# Patient Record
Sex: FEMALE | Race: WHITE | NOT HISPANIC OR LATINO | Employment: UNEMPLOYED | URBAN - METROPOLITAN AREA
[De-identification: names, ages, dates, MRNs, and addresses within clinical notes are randomized per-mention and may not be internally consistent; named-entity substitution may affect disease eponyms.]

---

## 2017-01-16 ENCOUNTER — TELEPHONE (OUTPATIENT)
Dept: OTHER | Facility: HOSPITAL | Age: 57
End: 2017-01-16

## 2017-01-16 NOTE — TELEPHONE ENCOUNTER
Spoke with Dr. Warren's office to request the ov note. Note has not been dictated as of yet. Nurse Navigator will call back at the end of the week.

## 2018-11-12 ENCOUNTER — APPOINTMENT (OUTPATIENT)
Dept: GENERAL RADIOLOGY | Facility: HOSPITAL | Age: 58
End: 2018-11-12

## 2018-11-12 ENCOUNTER — HOSPITAL ENCOUNTER (INPATIENT)
Facility: HOSPITAL | Age: 58
LOS: 7 days | Discharge: HOME | End: 2018-11-19
Attending: EMERGENCY MEDICINE | Admitting: ORTHOPAEDIC SURGERY

## 2018-11-12 DIAGNOSIS — B99.9 INFECTION: ICD-10-CM

## 2018-11-12 DIAGNOSIS — L03.116 CELLULITIS OF LEFT FOOT: Primary | ICD-10-CM

## 2018-11-12 LAB
ALBUMIN SERPL-MCNC: 3.7 G/DL (ref 3.5–5.2)
ALBUMIN/GLOB SERPL: 1.1 G/DL
ALP SERPL-CCNC: 114 U/L (ref 39–117)
ALT SERPL W P-5'-P-CCNC: 28 U/L (ref 1–33)
ANION GAP SERPL CALCULATED.3IONS-SCNC: 8.7 MMOL/L
AST SERPL-CCNC: 33 U/L (ref 1–32)
BASOPHILS # BLD AUTO: 0.03 10*3/MM3 (ref 0–0.2)
BASOPHILS NFR BLD AUTO: 0.3 % (ref 0–1.5)
BILIRUB SERPL-MCNC: 0.3 MG/DL (ref 0.1–1.2)
BUN BLD-MCNC: 13 MG/DL (ref 6–20)
BUN/CREAT SERPL: 12.4 (ref 7–25)
CALCIUM SPEC-SCNC: 9.5 MG/DL (ref 8.6–10.5)
CHLORIDE SERPL-SCNC: 97 MMOL/L (ref 98–107)
CO2 SERPL-SCNC: 31.3 MMOL/L (ref 22–29)
CREAT BLD-MCNC: 1.05 MG/DL (ref 0.57–1)
CRP SERPL-MCNC: 4.79 MG/DL (ref 0–0.5)
DEPRECATED RDW RBC AUTO: 48 FL (ref 37–54)
EOSINOPHIL # BLD AUTO: 0.2 10*3/MM3 (ref 0–0.7)
EOSINOPHIL NFR BLD AUTO: 1.7 % (ref 0.3–6.2)
ERYTHROCYTE [DISTWIDTH] IN BLOOD BY AUTOMATED COUNT: 13.3 % (ref 11.7–13)
ERYTHROCYTE [SEDIMENTATION RATE] IN BLOOD: 62 MM/HR (ref 0–30)
GFR SERPL CREATININE-BSD FRML MDRD: 54 ML/MIN/1.73
GLOBULIN UR ELPH-MCNC: 3.5 GM/DL
GLUCOSE BLD-MCNC: 77 MG/DL (ref 65–99)
HCT VFR BLD AUTO: 40.1 % (ref 35.6–45.5)
HGB BLD-MCNC: 12.8 G/DL (ref 11.9–15.5)
IMM GRANULOCYTES # BLD: 0.04 10*3/MM3 (ref 0–0.03)
IMM GRANULOCYTES NFR BLD: 0.3 % (ref 0–0.5)
LYMPHOCYTES # BLD AUTO: 2.37 10*3/MM3 (ref 0.9–4.8)
LYMPHOCYTES NFR BLD AUTO: 20.7 % (ref 19.6–45.3)
MCH RBC QN AUTO: 31.4 PG (ref 26.9–32)
MCHC RBC AUTO-ENTMCNC: 31.9 G/DL (ref 32.4–36.3)
MCV RBC AUTO: 98.3 FL (ref 80.5–98.2)
MONOCYTES # BLD AUTO: 0.89 10*3/MM3 (ref 0.2–1.2)
MONOCYTES NFR BLD AUTO: 7.8 % (ref 5–12)
NEUTROPHILS # BLD AUTO: 7.93 10*3/MM3 (ref 1.9–8.1)
NEUTROPHILS NFR BLD AUTO: 69.2 % (ref 42.7–76)
PLATELET # BLD AUTO: 332 10*3/MM3 (ref 140–500)
PMV BLD AUTO: 9.4 FL (ref 6–12)
POTASSIUM BLD-SCNC: 3.8 MMOL/L (ref 3.5–5.2)
PROT SERPL-MCNC: 7.2 G/DL (ref 6–8.5)
RBC # BLD AUTO: 4.08 10*6/MM3 (ref 3.9–5.2)
SODIUM BLD-SCNC: 137 MMOL/L (ref 136–145)
WBC NRBC COR # BLD: 11.46 10*3/MM3 (ref 4.5–10.7)

## 2018-11-12 PROCEDURE — 80053 COMPREHEN METABOLIC PANEL: CPT | Performed by: NURSE PRACTITIONER

## 2018-11-12 PROCEDURE — 73630 X-RAY EXAM OF FOOT: CPT

## 2018-11-12 PROCEDURE — 87040 BLOOD CULTURE FOR BACTERIA: CPT | Performed by: EMERGENCY MEDICINE

## 2018-11-12 PROCEDURE — G0378 HOSPITAL OBSERVATION PER HR: HCPCS

## 2018-11-12 PROCEDURE — 86140 C-REACTIVE PROTEIN: CPT | Performed by: NURSE PRACTITIONER

## 2018-11-12 PROCEDURE — 85652 RBC SED RATE AUTOMATED: CPT | Performed by: NURSE PRACTITIONER

## 2018-11-12 PROCEDURE — 80048 BASIC METABOLIC PNL TOTAL CA: CPT | Performed by: EMERGENCY MEDICINE

## 2018-11-12 PROCEDURE — 99284 EMERGENCY DEPT VISIT MOD MDM: CPT

## 2018-11-12 PROCEDURE — 85025 COMPLETE CBC W/AUTO DIFF WBC: CPT | Performed by: NURSE PRACTITIONER

## 2018-11-12 PROCEDURE — 83605 ASSAY OF LACTIC ACID: CPT | Performed by: EMERGENCY MEDICINE

## 2018-11-12 RX ORDER — CLONAZEPAM 1 MG/1
1 TABLET ORAL NIGHTLY PRN
COMMUNITY
End: 2020-11-05 | Stop reason: ALTCHOICE

## 2018-11-12 RX ORDER — ALBUTEROL SULFATE 90 UG/1
2 AEROSOL, METERED RESPIRATORY (INHALATION) EVERY 4 HOURS PRN
COMMUNITY

## 2018-11-12 RX ORDER — DEXTROAMPHETAMINE SACCHARATE, AMPHETAMINE ASPARTATE, DEXTROAMPHETAMINE SULFATE AND AMPHETAMINE SULFATE 5; 5; 5; 5 MG/1; MG/1; MG/1; MG/1
20 TABLET ORAL 3 TIMES DAILY
COMMUNITY
End: 2020-11-05 | Stop reason: ALTCHOICE

## 2018-11-12 RX ORDER — HYDROCODONE BITARTRATE AND ACETAMINOPHEN 7.5; 325 MG/1; MG/1
1 TABLET ORAL ONCE
Status: COMPLETED | OUTPATIENT
Start: 2018-11-12 | End: 2018-11-12

## 2018-11-12 RX ADMIN — HYDROCODONE BITARTRATE AND ACETAMINOPHEN 1 TABLET: 7.5; 325 TABLET ORAL at 23:11

## 2018-11-13 ENCOUNTER — APPOINTMENT (OUTPATIENT)
Dept: MRI IMAGING | Facility: HOSPITAL | Age: 58
End: 2018-11-13
Attending: INTERNAL MEDICINE

## 2018-11-13 PROBLEM — Z86.14 HISTORY OF MRSA INFECTION: Status: ACTIVE | Noted: 2018-11-13

## 2018-11-13 PROBLEM — I10 HTN (HYPERTENSION): Status: ACTIVE | Noted: 2018-11-13

## 2018-11-13 PROBLEM — F41.9 ANXIETY: Status: ACTIVE | Noted: 2018-11-13

## 2018-11-13 LAB
ANION GAP SERPL CALCULATED.3IONS-SCNC: 12 MMOL/L
ANION GAP SERPL CALCULATED.3IONS-SCNC: 13.7 MMOL/L
BASOPHILS # BLD AUTO: 0.03 10*3/MM3 (ref 0–0.2)
BASOPHILS NFR BLD AUTO: 0.3 % (ref 0–1.5)
BUN BLD-MCNC: 11 MG/DL (ref 6–20)
BUN BLD-MCNC: 12 MG/DL (ref 6–20)
BUN/CREAT SERPL: 10.7 (ref 7–25)
BUN/CREAT SERPL: 11.9 (ref 7–25)
CALCIUM SPEC-SCNC: 9.5 MG/DL (ref 8.6–10.5)
CALCIUM SPEC-SCNC: 9.6 MG/DL (ref 8.6–10.5)
CHLORIDE SERPL-SCNC: 94 MMOL/L (ref 98–107)
CHLORIDE SERPL-SCNC: 97 MMOL/L (ref 98–107)
CO2 SERPL-SCNC: 26.3 MMOL/L (ref 22–29)
CO2 SERPL-SCNC: 30 MMOL/L (ref 22–29)
CREAT BLD-MCNC: 1.01 MG/DL (ref 0.57–1)
CREAT BLD-MCNC: 1.03 MG/DL (ref 0.57–1)
D-LACTATE SERPL-SCNC: 1.7 MMOL/L (ref 0.5–2)
DEPRECATED RDW RBC AUTO: 47.8 FL (ref 37–54)
EOSINOPHIL # BLD AUTO: 0.3 10*3/MM3 (ref 0–0.7)
EOSINOPHIL NFR BLD AUTO: 2.9 % (ref 0.3–6.2)
ERYTHROCYTE [DISTWIDTH] IN BLOOD BY AUTOMATED COUNT: 13.2 % (ref 11.7–13)
GFR SERPL CREATININE-BSD FRML MDRD: 55 ML/MIN/1.73
GFR SERPL CREATININE-BSD FRML MDRD: 56 ML/MIN/1.73
GLUCOSE BLD-MCNC: 69 MG/DL (ref 65–99)
GLUCOSE BLD-MCNC: 76 MG/DL (ref 65–99)
HCT VFR BLD AUTO: 39.8 % (ref 35.6–45.5)
HGB BLD-MCNC: 12.5 G/DL (ref 11.9–15.5)
IMM GRANULOCYTES # BLD: 0.03 10*3/MM3 (ref 0–0.03)
IMM GRANULOCYTES NFR BLD: 0.3 % (ref 0–0.5)
LYMPHOCYTES # BLD AUTO: 2.65 10*3/MM3 (ref 0.9–4.8)
LYMPHOCYTES NFR BLD AUTO: 25.6 % (ref 19.6–45.3)
MCH RBC QN AUTO: 31 PG (ref 26.9–32)
MCHC RBC AUTO-ENTMCNC: 31.4 G/DL (ref 32.4–36.3)
MCV RBC AUTO: 98.8 FL (ref 80.5–98.2)
MONOCYTES # BLD AUTO: 0.9 10*3/MM3 (ref 0.2–1.2)
MONOCYTES NFR BLD AUTO: 8.7 % (ref 5–12)
NEUTROPHILS # BLD AUTO: 6.45 10*3/MM3 (ref 1.9–8.1)
NEUTROPHILS NFR BLD AUTO: 62.2 % (ref 42.7–76)
PLATELET # BLD AUTO: 357 10*3/MM3 (ref 140–500)
PMV BLD AUTO: 9.7 FL (ref 6–12)
POTASSIUM BLD-SCNC: 3.7 MMOL/L (ref 3.5–5.2)
POTASSIUM BLD-SCNC: 3.7 MMOL/L (ref 3.5–5.2)
RBC # BLD AUTO: 4.03 10*6/MM3 (ref 3.9–5.2)
SODIUM BLD-SCNC: 136 MMOL/L (ref 136–145)
SODIUM BLD-SCNC: 137 MMOL/L (ref 136–145)
WBC NRBC COR # BLD: 10.36 10*3/MM3 (ref 4.5–10.7)

## 2018-11-13 PROCEDURE — 25010000002 VANCOMYCIN PER 500 MG: Performed by: INTERNAL MEDICINE

## 2018-11-13 PROCEDURE — A9577 INJ MULTIHANCE: HCPCS | Performed by: INTERNAL MEDICINE

## 2018-11-13 PROCEDURE — 25010000002 VANCOMYCIN 10 G RECONSTITUTED SOLUTION: Performed by: EMERGENCY MEDICINE

## 2018-11-13 PROCEDURE — 99205 OFFICE O/P NEW HI 60 MIN: CPT | Performed by: INTERNAL MEDICINE

## 2018-11-13 PROCEDURE — G0378 HOSPITAL OBSERVATION PER HR: HCPCS

## 2018-11-13 PROCEDURE — 80048 BASIC METABOLIC PNL TOTAL CA: CPT | Performed by: INTERNAL MEDICINE

## 2018-11-13 PROCEDURE — 25010000002 ONDANSETRON PER 1 MG: Performed by: INTERNAL MEDICINE

## 2018-11-13 PROCEDURE — 0 GADOBENATE DIMEGLUMINE 529 MG/ML SOLUTION: Performed by: INTERNAL MEDICINE

## 2018-11-13 PROCEDURE — 73723 MRI JOINT LWR EXTR W/O&W/DYE: CPT

## 2018-11-13 PROCEDURE — 85025 COMPLETE CBC W/AUTO DIFF WBC: CPT | Performed by: INTERNAL MEDICINE

## 2018-11-13 RX ORDER — SODIUM CHLORIDE 0.9 % (FLUSH) 0.9 %
3 SYRINGE (ML) INJECTION EVERY 12 HOURS SCHEDULED
Status: DISCONTINUED | OUTPATIENT
Start: 2018-11-13 | End: 2018-11-19 | Stop reason: HOSPADM

## 2018-11-13 RX ORDER — DULOXETIN HYDROCHLORIDE 30 MG/1
30 CAPSULE, DELAYED RELEASE ORAL DAILY
Status: DISCONTINUED | OUTPATIENT
Start: 2018-11-13 | End: 2018-11-19 | Stop reason: HOSPADM

## 2018-11-13 RX ORDER — ACETAMINOPHEN 325 MG/1
650 TABLET ORAL EVERY 4 HOURS PRN
Status: DISCONTINUED | OUTPATIENT
Start: 2018-11-13 | End: 2018-11-19 | Stop reason: HOSPADM

## 2018-11-13 RX ORDER — GABAPENTIN 400 MG/1
400 CAPSULE ORAL 3 TIMES DAILY
Status: DISCONTINUED | OUTPATIENT
Start: 2018-11-13 | End: 2018-11-19 | Stop reason: HOSPADM

## 2018-11-13 RX ORDER — ALBUTEROL SULFATE 2.5 MG/3ML
2.5 SOLUTION RESPIRATORY (INHALATION) EVERY 6 HOURS PRN
Status: DISCONTINUED | OUTPATIENT
Start: 2018-11-13 | End: 2018-11-19 | Stop reason: HOSPADM

## 2018-11-13 RX ORDER — ATENOLOL 25 MG/1
25 TABLET ORAL DAILY
Status: DISCONTINUED | OUTPATIENT
Start: 2018-11-13 | End: 2018-11-19 | Stop reason: HOSPADM

## 2018-11-13 RX ORDER — SODIUM CHLORIDE 0.9 % (FLUSH) 0.9 %
3-10 SYRINGE (ML) INJECTION AS NEEDED
Status: DISCONTINUED | OUTPATIENT
Start: 2018-11-13 | End: 2018-11-19 | Stop reason: HOSPADM

## 2018-11-13 RX ORDER — NICOTINE 21 MG/24HR
1 PATCH, TRANSDERMAL 24 HOURS TRANSDERMAL
Status: DISCONTINUED | OUTPATIENT
Start: 2018-11-13 | End: 2018-11-18 | Stop reason: DRUGHIGH

## 2018-11-13 RX ORDER — ONDANSETRON 2 MG/ML
4 INJECTION INTRAMUSCULAR; INTRAVENOUS EVERY 6 HOURS PRN
Status: DISCONTINUED | OUTPATIENT
Start: 2018-11-13 | End: 2018-11-17 | Stop reason: SDUPTHER

## 2018-11-13 RX ORDER — CLONAZEPAM 1 MG/1
1 TABLET ORAL DAILY
Status: DISCONTINUED | OUTPATIENT
Start: 2018-11-13 | End: 2018-11-16

## 2018-11-13 RX ORDER — VANCOMYCIN HYDROCHLORIDE 1 G/200ML
15 INJECTION, SOLUTION INTRAVENOUS EVERY 12 HOURS
Status: COMPLETED | OUTPATIENT
Start: 2018-11-13 | End: 2018-11-14

## 2018-11-13 RX ORDER — HYDROCODONE BITARTRATE AND ACETAMINOPHEN 5; 325 MG/1; MG/1
1 TABLET ORAL EVERY 4 HOURS PRN
Status: DISCONTINUED | OUTPATIENT
Start: 2018-11-13 | End: 2018-11-19 | Stop reason: HOSPADM

## 2018-11-13 RX ADMIN — HYDROCODONE BITARTRATE AND ACETAMINOPHEN 1 TABLET: 5; 325 TABLET ORAL at 20:47

## 2018-11-13 RX ADMIN — CLONAZEPAM 1 MG: 1 TABLET ORAL at 03:20

## 2018-11-13 RX ADMIN — GABAPENTIN 400 MG: 400 CAPSULE ORAL at 16:26

## 2018-11-13 RX ADMIN — NICOTINE 1 PATCH: 14 PATCH, EXTENDED RELEASE TRANSDERMAL at 08:43

## 2018-11-13 RX ADMIN — DULOXETINE HYDROCHLORIDE 30 MG: 30 CAPSULE, DELAYED RELEASE ORAL at 08:40

## 2018-11-13 RX ADMIN — HYDROCODONE BITARTRATE AND ACETAMINOPHEN 1 TABLET: 5; 325 TABLET ORAL at 03:20

## 2018-11-13 RX ADMIN — ATENOLOL 25 MG: 25 TABLET ORAL at 08:41

## 2018-11-13 RX ADMIN — GADOBENATE DIMEGLUMINE 13 ML: 529 INJECTION, SOLUTION INTRAVENOUS at 15:30

## 2018-11-13 RX ADMIN — NICOTINE 1 PATCH: 14 PATCH, EXTENDED RELEASE TRANSDERMAL at 02:22

## 2018-11-13 RX ADMIN — Medication 3 ML: at 08:40

## 2018-11-13 RX ADMIN — Medication 3 ML: at 02:22

## 2018-11-13 RX ADMIN — ONDANSETRON HYDROCHLORIDE 4 MG: 2 SOLUTION INTRAMUSCULAR; INTRAVENOUS at 08:40

## 2018-11-13 RX ADMIN — HYDROCODONE BITARTRATE AND ACETAMINOPHEN 1 TABLET: 5; 325 TABLET ORAL at 16:26

## 2018-11-13 RX ADMIN — HYDROCODONE BITARTRATE AND ACETAMINOPHEN 1 TABLET: 5; 325 TABLET ORAL at 08:40

## 2018-11-13 RX ADMIN — VANCOMYCIN HYDROCHLORIDE 1000 MG: 1 INJECTION, SOLUTION INTRAVENOUS at 12:57

## 2018-11-13 RX ADMIN — GABAPENTIN 400 MG: 400 CAPSULE ORAL at 20:47

## 2018-11-13 RX ADMIN — Medication 3 ML: at 20:48

## 2018-11-13 RX ADMIN — VANCOMYCIN HYDROCHLORIDE 1000 MG: 1 INJECTION, SOLUTION INTRAVENOUS at 23:44

## 2018-11-13 RX ADMIN — VANCOMYCIN HYDROCHLORIDE 1250 MG: 10 INJECTION, POWDER, LYOPHILIZED, FOR SOLUTION INTRAVENOUS at 00:03

## 2018-11-13 RX ADMIN — GABAPENTIN 400 MG: 400 CAPSULE ORAL at 08:40

## 2018-11-13 RX ADMIN — CLONAZEPAM 1 MG: 1 TABLET ORAL at 20:47

## 2018-11-13 NOTE — ED NOTES
Pt to ED via ambulance with c/o left foot redness and swelling x 1 year. Pt on bactrim currently. Pt with redness that travels up leg. 1+ left pedal pulse.      Eloise Ortega, RN  11/12/18 2123

## 2018-11-13 NOTE — CONSULTS
Referring Provider: Vimal Dukes*  Reason for Consultation: Recurrent cellulitis      Subjective   History of present illness:    This is a very nice 58-year-old with a history of tobacco abuse and we are asked to provide evaluation and opinion regarding recurrent cellulitis of the left ankle for which she was admitted on 11/12/18.    Per the patient, she was in her usual state until February 2018 when she developed a left ankle swelling and erythema.  She actually went to her PCP and aspiration was done which reportedly grew MRSA.  She is G2 with about 2 months of Bactrim with decent resolution in her symptoms.  She would have intermittent flares of the swelling and take a pill or 2 of Bactrim with good relief.  Denies associated constitutional symptoms of infection such as fever, chills, night sweats.  In August she had a dry aspiration with negative cultures.  Unfortunately in October she developed increasing swelling and pain in the left ankle.  This continued to progress and when she went her primary care doctor he recommended evaluation in the ER.      Per review of the past records, since admission she was evaluated with plain films of the left ankle which were suspicious but not definitive for osteomyelitis.  CRP was elevated at 4.  She was started on IV vancomycin and admitted.  Gen. surgery is been consulted and pending.  She did have a wound culture from February 2018 grew MRSA which was resistant to clindamycin but sensitive to levofloxacin, ciprofloxacin, linezolid, trimethoprim/sulfamethoxazole, rifampin, vancomycin and tetracyclines.    PMH: Depression, tobacco abuse, COPD, HTN, HLD, Back surgery, thymus removal  All-PCN (unknown reaction in youth, subsquently tolerated Augmentin)  No FMH infection  SH: lives in [place], [place] . Smoked 1 ppd x45 years.  unemployed      Review of Systems  Pertinent items are noted in HPI, all other systems reviewed and negative    Objective      Physical Exam:   Vital Signs   Temp:  [97.2 °F (36.2 °C)-98.6 °F (37 °C)] 97.8 °F (36.6 °C)  Heart Rate:  [] 92  Resp:  [16-18] 18  BP: (116-128)/(82-85) 116/84    GENERAL: Awake and alert, in no acute distress.   HEENT: Oropharynx is clear. Hearing is grossly normal.   EYES: PERRL. No conjunctival injection. No lid lag.   LYMPHATICS: No lymphadenopathy of the neck or inguinal regions.   HEART: Regular rate and rhythm. No peripheral edema.   LUNGS: Clear to auscultation anteriorly with normal respiratory effort.   GI: Soft, nontender, nondistended. No appreciable organomegaly.   SKIN: Warm and dry without cutaneous eruptions x L ankle with lateral erythematous lesion that is indurated   PSYCHIATRIC: Appropriate mood, affect, insight, and judgment.     Results Review:     Lab Results   Component Value Date    WBC 10.36 11/13/2018    HGB 12.5 11/13/2018    HCT 39.8 11/13/2018    MCV 98.8 (H) 11/13/2018     11/13/2018         Lab Results   Component Value Date    GLUCOSE 76 11/13/2018    BUN 11 11/13/2018    CREATININE 1.03 (H) 11/13/2018    EGFRIFNONA 55 (L) 11/13/2018    BCR 10.7 11/13/2018    CO2 26.3 11/13/2018    CALCIUM 9.6 11/13/2018    ALBUMIN 3.70 11/12/2018    AST 33 (H) 11/12/2018    ALT 28 11/12/2018         Estimated Creatinine Clearance: 52.6 mL/min (A) (by C-G formula based on SCr of 1.03 mg/dL (H)).      Microbiology:  bcx P    Radiology:  Plain films left ankle that showed possible osteo    Assessment/Plan   1.  Recurrent cellulitis, rule out deep infection  2.  MRSA  3.  Tobacco abuse with probable peripheral vascular disease    Recurrent cellulitis highly worrisome for deep nidus of infection such as osteomyelitis or tenosynovitis.  Agree with vancomycin as dosed for goal level of 15-20.  We will check vancomycin level tomorrow a.m. the head of the fourth dose.  I'm going to check an MRI of the left ankle with and without contrast to assess for osteomyelitis or deep infection.  She  understands we are to treat this infection very seriously or she may lose her leg.  I suspect she has underlying peripheral vascular disease given her long-standing tobacco abuse     Thank you for this consult.  We will continue to follow along and tailor antibiotics as the patient's clinical course evolves.    Jose G Staples MD  11/13/18  9:57 AM

## 2018-11-13 NOTE — ED NOTES
Pt intermittently dropping O2 sats, staying between 86-88% on RA. Pt reports Hx of COPD but denies wearing O2 at home. Placed patient on 2L O2 via NC for comfort, patient tolerated well. Current O2 95%. Will continue to monitor.      Ester Jacobo, RN  11/13/18 0006

## 2018-11-13 NOTE — ED PROVIDER NOTES
" EMERGENCY DEPARTMENT ENCOUNTER    CHIEF COMPLAINT  Chief Complaint: L foot infection  History given by: pt  History limited by: none  Room Number: 28/28  PMD: Maico Holguin MD      HPI:  Pt is a 58 y.o. female who presents complaining of \"infection\" to L foot that started a year ago and has gotten progressively worse. Pt reports she was put on Bactrim originally for MRSA but reports the second biopsy for MRSA was negative. Pt admits to left foot edema, \"black spot\" on left foot.  Reports she saw Dr Holguin today who sent her here to be admitted. Pt denies fevers or chills.    Duration:  A year  Onset: gradual  Timing: constant  Location: left foot  Radiation: none  Quality: \"infection\"  Intensity/Severity: severe  Progression: progressively worse  Associated Symptoms: left foot edema, \"black spot\" on left foot.  Aggravating Factors: none  Alleviating Factors: none  Previous Episodes: none  Treatment before arrival: Bactirum with no relief.    PAST MEDICAL HISTORY  Active Ambulatory Problems     Diagnosis Date Noted   • Mediastinal mass 12/14/2016   • Cervical stenosis of spinal canal 12/14/2016   • Cervical radiculopathy 12/14/2016     Resolved Ambulatory Problems     Diagnosis Date Noted   • No Resolved Ambulatory Problems     Past Medical History:   Diagnosis Date   • COPD (chronic obstructive pulmonary disease) (CMS/[facility])    • Depression    • Hypertension    • Lung mass        PAST SURGICAL HISTORY  Past Surgical History:   Procedure Laterality Date   • BACK SURGERY     • SKIN BIOPSY     • WRIST FRACTURE SURGERY         FAMILY HISTORY  Family History   Problem Relation Age of Onset   • Emphysema Mother    • Alzheimer's disease Father        SOCIAL HISTORY  Social History     Socioeconomic History   • Marital status:      Spouse name: Not on file   • Number of children: Not on file   • Years of education: Not on file   • Highest education level: Not on file   Social Needs   • Financial resource strain: " "Not on file   • Food insecurity - worry: Not on file   • Food insecurity - inability: Not on file   • Transportation needs - medical: Not on file   • Transportation needs - non-medical: Not on file   Occupational History   • Not on file   Tobacco Use   • Smoking status: Current Every Day Smoker   Substance and Sexual Activity   • Alcohol use: No   • Drug use: Not on file   • Sexual activity: Not on file   Other Topics Concern   • Not on file   Social History Narrative   • Not on file       ALLERGIES  Codeine and Penicillins    REVIEW OF SYSTEMS  Review of Systems   Constitutional: Negative for chills and fever.   HENT: Negative for congestion.    Respiratory: Negative for cough and shortness of breath.    Cardiovascular: Negative for chest pain.   Gastrointestinal: Negative for abdominal pain.   Genitourinary: Negative for dysuria.   Musculoskeletal:        Left foot swelling and \"black spot\" to left foot   Neurological: Negative for headaches.   All other systems reviewed and are negative.      PHYSICAL EXAM  ED Triage Vitals [11/12/18 2100]   Temp Heart Rate Resp BP SpO2   97.2 °F (36.2 °C) 100 16 116/83 100 %      Temp src Heart Rate Source Patient Position BP Location FiO2 (%)   Tympanic -- -- -- --       Physical Exam   Constitutional: She is oriented to person, place, and time. No distress.   HENT:   Head: Normocephalic and atraumatic.   Eyes: EOM are normal. Pupils are equal, round, and reactive to light.   Neck: Normal range of motion. Neck supple.   Cardiovascular: Normal rate, regular rhythm and normal heart sounds.   Pulmonary/Chest: Effort normal and breath sounds normal. No respiratory distress.   Abdominal: Soft. There is no tenderness. There is no rebound and no guarding.   Musculoskeletal: Normal range of motion. She exhibits no edema.        Left foot: There is tenderness (with erythema and warmth along top of foot, lateral aspect of foot, lateral ankle, and lateral leg) and swelling.   No " fluctuance or indurate to left foot   Neurological: She is alert and oriented to person, place, and time. She has normal sensation and normal strength.   Skin: Skin is warm and dry. No rash noted.   Black discoloration to the lateral malleolus   Psychiatric: Mood and affect normal.   Nursing note and vitals reviewed.      LAB RESULTS  Lab Results (last 24 hours)     Procedure Component Value Units Date/Time    CBC & Differential [[id number]] Collected:  11/12/18 2200    Specimen:  Blood Updated:  11/12/18 2211    Narrative:       The following orders were created for panel order CBC & Differential.  Procedure                               Abnormality         Status                     ---------                               -----------         ------                     CBC Auto Differential[[id number]]         Abnormal            Final result                 Please view results for these tests on the individual orders.    Comprehensive Metabolic Panel [[id number]]  (Abnormal) Collected:  11/12/18 2200    Specimen:  Blood Updated:  11/12/18 2233     Glucose 77 mg/dL      BUN 13 mg/dL      Creatinine 1.05 mg/dL      Sodium 137 mmol/L      Potassium 3.8 mmol/L      Chloride 97 mmol/L      CO2 31.3 mmol/L      Calcium 9.5 mg/dL      Total Protein 7.2 g/dL      Albumin 3.70 g/dL      ALT (SGPT) 28 U/L      AST (SGOT) 33 U/L      Alkaline Phosphatase 114 U/L      Total Bilirubin 0.3 mg/dL      eGFR Non African Amer 54 mL/min/1.73      Globulin 3.5 gm/dL      A/G Ratio 1.1 g/dL      BUN/Creatinine Ratio 12.4     Anion Gap 8.7 mmol/L     Sedimentation Rate [[id number]]  (Abnormal) Collected:  11/12/18 2200    Specimen:  Blood Updated:  11/12/18 2240     Sed Rate 62 mm/hr     C-reactive Protein [[id number]]  (Abnormal) Collected:  11/12/18 2200    Specimen:  Blood Updated:  11/12/18 2233     C-Reactive Protein 4.79 mg/dL     CBC Auto Differential [[id number]]  (Abnormal) Collected:  11/12/18 2200    Specimen:  Blood Updated:  11/12/18  2211     WBC 11.46 10*3/mm3      RBC 4.08 10*6/mm3      Hemoglobin 12.8 g/dL      Hematocrit 40.1 %      MCV 98.3 fL      MCH 31.4 pg      MCHC 31.9 g/dL      RDW 13.3 %      RDW-SD 48.0 fl      MPV 9.4 fL      Platelets 332 10*3/mm3      Neutrophil % 69.2 %      Lymphocyte % 20.7 %      Monocyte % 7.8 %      Eosinophil % 1.7 %      Basophil % 0.3 %      Immature Grans % 0.3 %      Neutrophils, Absolute 7.93 10*3/mm3      Lymphocytes, Absolute 2.37 10*3/mm3      Monocytes, Absolute 0.89 10*3/mm3      Eosinophils, Absolute 0.20 10*3/mm3      Basophils, Absolute 0.03 10*3/mm3      Immature Grans, Absolute 0.04 10*3/mm3           I ordered the above labs and reviewed the results    RADIOLOGY  XR Foot 3+ View Left   Final Result   Marked soft tissue swelling noted overlying the foot. On the AP view   only, there is a suggestion of some cortical irregularity involving the   lateral aspect of the calcaneus. I'm uncertain if this is a true   finding, or is artifactual, as I do not see it on the oblique view.   Osteomyelitis is not excluded. No subcutaneous gas is seen. MRI would be   more sensitive for evaluation.       This report was finalized on 11/12/2018 10:47 PM by Dr. Rdaha Laguna M.D.               I ordered the above noted radiological studies. Interpreted by radiologist. Reviewed by me in PACS.       PROCEDURES  Procedures      PROGRESS AND CONSULTS  ED Course as of Nov 12 2333   Mon Nov 12, 2018   2140 C/o left foot pain, swelling, and redness that has been ongoing for the past year, but acutely worsened over the past 3 days. Pt was initially treated for MRSA with Bactrim 1 year ago, and started another course 1 week ago. No fever or chills  [JS]      ED Course User Index  [JS] Greta Mcintyre, APRN     2227  Ordered labs for further viewing. Ordered vancomycin for infection and NORCO for pain.    2254  Ordered call from [facility].    2317  Discussed case with Dr Carpenter, [facility]  Reviewed history, exam,  results and treatments.  Discussed concerns and plan of care. Dr Carpenter accepts pt [place] [facility] [place]      MEDICAL DECISION MAKING  Results were reviewed/discussed with the patient and they were also made aware of online access. Pt also made aware that some labs, such as cultures, will not be resulted during ER visit and follow up with PMD is necessary.     MDM  Number of Diagnoses or Management Options  Cellulitis of left foot:   Diagnosis management comments: Patient's exam was consistent with cellulitis.  She was afebrile.  White blood cell count was elevated.  Sedimentation rate and CRP were also elevated.  X-ray showed soft tissue swelling.  Patient was given IV vancomycin.  Case was discussed with Dr. Carpenter and he agreed to admit the patient.       Amount and/or Complexity of Data Reviewed  Clinical lab tests: reviewed and ordered (Sed Rate 62, CRP 4.79, WBC 11.46)  Tests in the radiology section of CPT®: reviewed and ordered (Foot XR - Marked soft tissue swelling noted overlying the foot)  Discuss the patient with other providers: yes (Dr Carpenter)           DIAGNOSIS  Final diagnoses:   Cellulitis of left foot       DISPOSITION  ADMISSION    Discussed treatment plan and reason for admission with pt/family and admitting physician.  Pt/family voiced understanding of the plan for admission for further testing/treatment as needed.         Latest Documented Vital Signs:  As of 11:33 PM  BP- 126/82 HR- 95 Temp- 97.2 °F (36.2 °C) (Tympanic) O2 sat- 92%    --  Documentation assistance provided by moon Lima for Dr Dias.  Information recorded by the moon was done at my direction and has been verified and validated by me.                Alecia Lima  11/12/18 8588       Gabe Dias MD  11/12/18 7491

## 2018-11-13 NOTE — PROGRESS NOTES
"Pharmacokinetic Consult - Vancomycin Dosing (Initial Note)    Filomena Rm has been consulted for pharmacy to dose vancomycin for SSTI.  Pharmacy dosing vancomycin per Dr Carpenter's request.   Goal trough: 15-20 mg/L   Other antimicrobials: none    Relevant clinical data and objective history reviewed:  58 y.o. female 157.5 cm (62\") 64.9 kg (143 lb 1.6 oz)    Past Medical History:   Diagnosis Date   • COPD (chronic obstructive pulmonary disease) (CMS/[facility])    • Depression    • Hypertension    • Lung mass      Creatinine   Date Value Ref Range Status   11/12/2018 1.01 (H) 0.57 - 1.00 mg/dL Final   11/12/2018 1.05 (H) 0.57 - 1.00 mg/dL Final     BUN   Date Value Ref Range Status   11/12/2018 12 6 - 20 mg/dL Final     Estimated Creatinine Clearance: 53.7 mL/min (A) (by C-G formula based on SCr of 1.01 mg/dL (H)).    Lab Results   Component Value Date    WBC 11.46 (H) 11/12/2018     Temp Readings from Last 3 Encounters:   11/13/18 98.6 °F (37 °C) (Oral)   12/14/16 99.6 °F (37.6 °C) (Oral)   11/23/16 98.3 °F (36.8 °C) (Oral)           Assessment/Plan  Will start vancomycin 1,000 mg IV Q12h (LD 1,250mg). Will monitor serum creatinine every 24 hours for the first 3 days then at least every 48 hours per dosing recommendations. Vancomycin level prior to 4th dose. Pharmacy will continue to follow daily while on vancomycin and adjust as needed.     Domingo Marshall, [facility]    "

## 2018-11-13 NOTE — PLAN OF CARE
Problem: Patient Care Overview  Goal: Plan of Care Review  Outcome: Ongoing (interventions implemented as appropriate)   11/13/18 0639   Coping/Psychosocial   Plan of Care Reviewed With patient   Plan of Care Review   Progress improving   OTHER   Outcome Summary Pt rested well. Medicated for pain 1x with moderate relief. ID and Gen Surg consults to see later today. VSS, no s/s acute distress, will continue to monitor     Goal: Individualization and Mutuality  Outcome: Ongoing (interventions implemented as appropriate)      Problem: Pain, Acute (Adult)  Goal: Identify Related Risk Factors and Signs and Symptoms  Outcome: Outcome(s) achieved Date Met: 11/13/18    Goal: Acceptable Pain Control/Comfort Level  Outcome: Ongoing (interventions implemented as appropriate)      Problem: Wound (Includes Pressure Injury) (Adult)  Goal: Signs and Symptoms of Listed Potential Problems Will be Absent, Minimized or Managed (Wound)  Outcome: Ongoing (interventions implemented as appropriate)      Problem: Skin and Soft Tissue Infection (Adult)  Goal: Signs and Symptoms of Listed Potential Problems Will be Absent, Minimized or Managed (Skin and Soft Tissue Infection)  Outcome: Ongoing (interventions implemented as appropriate)

## 2018-11-13 NOTE — PROGRESS NOTES
This patient was seen early in the morning by my associate.  Discussed with the infectious disease MRI has been ordered  Discussed case in multidisciplinary rounds this morning  Conitinue antibiotics per ID    Marti Denise MD   11/13/2018

## 2018-11-13 NOTE — H&P
[facility] Admission H&P    Patient Care Team:  Maico Holguin MD as PCP - General (Family Medicine)    Chief complaint: Left ankle swelling, redness, pain    History of Present Illness    This is a 58-year-old female with a history of hypertension and anxiety as well as MRSA cellulitis/abscess involving the left foot and ankle.  She initially began having issues with this about one year ago.  She had a wound culture in February of this year that grew MRSA.  She was treated with Bactrim and had resolution of the cellulitis.  She states that over the next 5 months she had intermittent recurring mild redness and swelling to the area and she would take intermittent doses of Bactrim under her own direction.  She ultimately had a more significant infection in August that was treated with a more sustained and prolonged course of Bactrim.  She had another wound culture at that time that was negative.  Her symptoms once again improved until the past 3-4 days.  She went to see her primary care physician who referred her to the emergency room for admission, incision and drainage, and possible osteomyelitis of the left ankle.  Patient denies any fevers or chills.  She states the ankle looks as bad as ever has before.  She has not noticed any draining over the past 3-4 days.    Past Medical History:   Diagnosis Date   • COPD (chronic obstructive pulmonary disease) (CMS/[facility])    • Depression    • Hypertension    • Lung mass      Past Surgical History:   Procedure Laterality Date   • BACK SURGERY     • SKIN BIOPSY     • WRIST FRACTURE SURGERY       Family History   Problem Relation Age of Onset   • Emphysema Mother    • Alzheimer's disease Father      Social History     Tobacco Use   • Smoking status: Current Every Day Smoker   Substance Use Topics   • Alcohol use: No   • Drug use: Not on file     Medications Prior to Admission   Medication Sig Dispense Refill Last Dose   • albuterol (PROVENTIL HFA;VENTOLIN HFA) 108 (90 Base) MCG/ACT  inhaler Inhale 2 puffs Every 4 (Four) Hours As Needed for Wheezing.      • albuterol (PROVENTIL,VENTOLIN) 2 MG/5ML syrup Take 2 mg by mouth 3 (Three) Times a Day.      • amphetamine-dextroamphetamine (ADDERALL) 20 MG tablet Take 20 mg by mouth Daily.      • atenolol (TENORMIN) 25 MG tablet Take 25 mg by mouth Daily.   Taking   • clonazePAM (KlonoPIN) 1 MG tablet Take 1 mg by mouth Daily.      • DULoxetine (CYMBALTA) 30 MG capsule Take 30 mg by mouth Daily.   Taking   • gabapentin (NEURONTIN) 400 MG capsule Take 400 mg by mouth 3 (Three) Times a Day.   Taking   • TRIAMTERENE PO Take  by mouth.   Taking   • Hydrocod Polst-CPM Polst ER (TUSSIONEX PENNKINETIC ER) 10-8 MG/5ML ER suspension May take 1 teaspoon twice daily as needed for cough. Will cause drowsiness. 115 mL 0 Taking     Allergies:  Codeine and Penicillins    Review of Systems   Constitutional: Negative for chills and fever.   HENT: Negative for congestion and sore throat.    Eyes: Negative for visual disturbance.   Respiratory: Negative for cough, chest tightness, shortness of breath and wheezing.    Cardiovascular: Negative for chest pain, palpitations and leg swelling.   Gastrointestinal: Negative for abdominal distention, abdominal pain, diarrhea, nausea and vomiting.   Endocrine: Negative for polydipsia and polyuria.   Genitourinary: Negative for difficulty urinating, dysuria, frequency and urgency.   Musculoskeletal: Negative for arthralgias and myalgias.   Skin: Negative for color change and rash.        Redness, swelling, warmth to the left foot and ankle most predominant over the outside of the ankle bone   Neurological: Negative for dizziness and light-headedness.   Psychiatric/Behavioral: The patient is nervous/anxious.         PHYSICAL EXAM    Vital Signs  tMax 24 hrs:  Temp (24hrs), Av.9 °F (36.6 °C), Min:97.2 °F (36.2 °C), Max:98.6 °F (37 °C)    Vitals Ranges:  Temp:  [97.2 °F (36.2 °C)-98.6 °F (37 °C)] 98.6 °F (37 °C)  Heart Rate:   [] 92  Resp:  [16-18] 18  BP: (116-128)/(82-85) 128/85    Physical Exam   Constitutional: She is oriented to person, place, and time. She appears well-developed and well-nourished.   HENT:   Head: Normocephalic and atraumatic.   Eyes: EOM are normal. Pupils are equal, round, and reactive to light.   Neck: Neck supple. No tracheal deviation present.   Cardiovascular: Normal rate and regular rhythm. Exam reveals no gallop.   No murmur heard.  Pulmonary/Chest: Effort normal. No respiratory distress. She has no wheezes.   Abdominal: Soft. Bowel sounds are normal. She exhibits no distension. There is no tenderness.   Musculoskeletal: She exhibits no edema or tenderness.   Neurological: She is alert and oriented to person, place, and time. No cranial nerve deficit.   Skin: Skin is warm and dry.   She has cellulitis of the left foot and ankle with what appears to be a developing abscess over the lateral malleolus.  The area is erythematous, warm, tender to palpation with streaking up the left leg.  She has scattered abrasions over both lower extremities but no other areas appear cellulitic at this time.   Psychiatric:   She appears very nervous and anxious   Nursing note and vitals reviewed.      Results Review:  Results from last 7 days   Lab Units  11/12/18   2200   WBC 10*3/mm3  11.46*   HEMOGLOBIN g/dL  12.8   HEMATOCRIT %  40.1   PLATELETS 10*3/mm3  332     Results from last 7 days   Lab Units  11/12/18   2323  11/12/18   2200   SODIUM mmol/L  136  137   POTASSIUM mmol/L  3.7  3.8   CHLORIDE mmol/L  94*  97*   CO2 mmol/L  30.0*  31.3*   BUN mg/dL  12  13   CREATININE mg/dL  1.01*  1.05*   CALCIUM mg/dL  9.5  9.5   BILIRUBIN mg/dL   --   0.3   ALK PHOS U/L   --   114   ALT (SGPT) U/L   --   28   AST (SGOT) U/L   --   33*   GLUCOSE mg/dL  69  77     X-ray of the left foot and ankle:  Marked soft tissue swelling noted overlying the foot. On the AP view  only, there is a suggestion of some cortical irregularity  involving the  lateral aspect of the calcaneus. I'm uncertain if this is a true  finding, or is artifactual, as I do not see it on the oblique view.  Osteomyelitis is not excluded. No subcutaneous gas is seen. MRI would be  more sensitive for evaluation.     I reviewed the patient's new clinical results.  I reviewed the patient's new imaging results and agree with the interpretation.        Active Hospital Problems    Diagnosis Date Noted   • **Cellulitis/abscess of left foot [L03.116] 11/12/2018   • HTN (hypertension) [I10] 11/13/2018   • History of MRSA infection [Z86.14] 11/13/2018   • Anxiety [F41.9] 11/13/2018      Resolved Hospital Problems   No resolved problems to display.       Assessment & Plan    The patient will be admitted.  She was started on vancomycin in the emergency room for the cellulitis/abscess of the left ankle given her history of MRSA.  I will continue this for now.  I discussed with her my recommendation to perform an MRI for further evaluation however she is hesitant to do so given her self pay status.  I reviewed the x-ray results with her and she understands that the possibility of underlying osteomyelitis still remains.  I will ask surgery to see her as it does appear that she has an underlying abscess that's going to need incision and drainage.  I will also ask infectious disease to see her for antibiotic recommendations moving forward.  She understands that she may still need additional imaging of the foot and ankle to better evaluate for deeper infection.  Additional plans based on her clinical course.    I discussed the patients findings and my recommendations with patient    Vimal Carpenter MD  11/13/18  12:59 AM

## 2018-11-13 NOTE — ED NOTES
"Pt refusing to wear O2, states \"I just really don't want to wear it\"      Ester Jacobo, RN  11/13/18 0000    "

## 2018-11-14 ENCOUNTER — APPOINTMENT (OUTPATIENT)
Dept: CARDIOLOGY | Facility: HOSPITAL | Age: 58
End: 2018-11-14
Attending: SURGERY

## 2018-11-14 PROBLEM — M65.9 PERONEAL TENOSYNOVITIS: Status: ACTIVE | Noted: 2018-11-14

## 2018-11-14 PROBLEM — S92.252A: Status: ACTIVE | Noted: 2018-11-14

## 2018-11-14 PROBLEM — M65.979 PERONEAL TENOSYNOVITIS: Status: ACTIVE | Noted: 2018-11-14

## 2018-11-14 LAB
CREAT BLD-MCNC: 0.98 MG/DL (ref 0.57–1)
GFR SERPL CREATININE-BSD FRML MDRD: 58 ML/MIN/1.73
VANCOMYCIN TROUGH SERPL-MCNC: 19.4 MCG/ML (ref 5–20)

## 2018-11-14 PROCEDURE — 99232 SBSQ HOSP IP/OBS MODERATE 35: CPT | Performed by: INTERNAL MEDICINE

## 2018-11-14 PROCEDURE — 80202 ASSAY OF VANCOMYCIN: CPT | Performed by: INTERNAL MEDICINE

## 2018-11-14 PROCEDURE — 93922 UPR/L XTREMITY ART 2 LEVELS: CPT

## 2018-11-14 PROCEDURE — 82565 ASSAY OF CREATININE: CPT | Performed by: INTERNAL MEDICINE

## 2018-11-14 PROCEDURE — 25010000002 VANCOMYCIN PER 500 MG: Performed by: INTERNAL MEDICINE

## 2018-11-14 RX ADMIN — HYDROCODONE BITARTRATE AND ACETAMINOPHEN 1 TABLET: 5; 325 TABLET ORAL at 06:12

## 2018-11-14 RX ADMIN — NICOTINE 1 PATCH: 14 PATCH, EXTENDED RELEASE TRANSDERMAL at 09:18

## 2018-11-14 RX ADMIN — DULOXETINE HYDROCHLORIDE 30 MG: 30 CAPSULE, DELAYED RELEASE ORAL at 09:16

## 2018-11-14 RX ADMIN — Medication 3 ML: at 21:05

## 2018-11-14 RX ADMIN — CLONAZEPAM 1 MG: 1 TABLET ORAL at 21:05

## 2018-11-14 RX ADMIN — GABAPENTIN 400 MG: 400 CAPSULE ORAL at 21:05

## 2018-11-14 RX ADMIN — HYDROCODONE BITARTRATE AND ACETAMINOPHEN 1 TABLET: 5; 325 TABLET ORAL at 13:06

## 2018-11-14 RX ADMIN — GABAPENTIN 400 MG: 400 CAPSULE ORAL at 17:51

## 2018-11-14 RX ADMIN — HYDROCODONE BITARTRATE AND ACETAMINOPHEN 1 TABLET: 5; 325 TABLET ORAL at 00:43

## 2018-11-14 RX ADMIN — GABAPENTIN 400 MG: 400 CAPSULE ORAL at 09:16

## 2018-11-14 RX ADMIN — VANCOMYCIN HYDROCHLORIDE 1000 MG: 1 INJECTION, SOLUTION INTRAVENOUS at 14:33

## 2018-11-14 RX ADMIN — ATENOLOL 25 MG: 25 TABLET ORAL at 09:16

## 2018-11-14 RX ADMIN — HYDROCODONE BITARTRATE AND ACETAMINOPHEN 1 TABLET: 5; 325 TABLET ORAL at 17:51

## 2018-11-14 RX ADMIN — Medication 3 ML: at 09:18

## 2018-11-14 NOTE — PROGRESS NOTES
Discharge Planning Assessment  [facility]     Patient Name: Filomena Rm  MRN: [id number]  Today's Date: 11/14/2018    Admit Date: 11/12/2018    Discharge Needs Assessment     Row Name 11/14/18 1539       Living Environment    Lives With  alone    Current Living Arrangements  home/apartment/condo    Primary Care Provided by  self    Provides Primary Care For  no one    Family Caregiver if Needed  sibling(s)    Family Caregiver Names  brother/POA, Kenny Abrams ([phone])    Quality of Family Relationships  helpful;supportive    Able to Return to Prior Arrangements  yes       Resource/Environmental Concerns    Resource/Environmental Concerns  none    Transportation Concerns  car, none       Transition Planning    Patient/Family Anticipates Transition to  home    Transportation Anticipated  family or friend will provide       Discharge Needs Assessment    Concerns to be Addressed  discharge planning    Equipment Currently Used at Home  none    Discharge Coordination/Progress  Home        Discharge Plan     Row Name 11/14/18 2976       Plan    Plan  D/c needs pending treatment course    Patient/Family in Agreement with Plan  yes    Plan Comments  CCP met with pt to discuss d/c planning. Facesheet verified. CCP role explained. Pt resides alone in a two level  home, and denies DME use/ past home health/ past sub-acute rehab. Pt confirms pharmacy is [facility] in [place]. Per [employer] (Марина), pt approved for presumptive eligibility Medicaid and [place] will provide pt [employer] Medicaid ID when application is approved. Pt uncertain of d/c needs pending treatment course. CCP to follow. Alysha Araiza [facility]        Destination      No service coordination in this encounter.      Durable Medical Equipment      No service coordination in this encounter.      Dialysis/Infusion      No service coordination in this encounter.      Home Medical Care      No service coordination in this encounter.      Community Resources      No  service coordination in this encounter.          Demographic Summary     Row Name 11/14/18 1538       General Information    Admission Type  inpatient    Arrived From  home    Referral Source  admission list    Reason for Consult  discharge planning    Preferred Language  English        Functional Status     Row Name 11/14/18 1538       Functional Status    Usual Activity Tolerance  good    Current Activity Tolerance  good       Functional Status, IADL    Medications  independent    Meal Preparation  independent    Housekeeping  independent    Laundry  independent    Shopping  independent       Mental Status Summary    Recent Changes in Mental Status/Cognitive Functioning  no changes        Psychosocial    No documentation.       Abuse/Neglect    No documentation.       Legal    No documentation.       Substance Abuse    No documentation.       Patient Forms    No documentation.           Aaliyah Araiza LCSW

## 2018-11-14 NOTE — CONSULTS
Name: Filomena Rm ADMIT: 2018   : 1960  PCP: Maico Holguin MD    MRN: [id number] LOS: 0 days   AGE/SEX: 58 y.o. female  ROOM: [facility]         CHIEF COMPLAINT: left ankle wound, evaluate for PAD   HPI: Filomena Rm is a 58 y.o. female whose previous medical records I have reviewed and summarize below in addition to the findings from today's exam.  She was admitted on 18 complaining of left ankle swelling or swelling, erythema, drainage.  She was reportedly diagnosed with an MRSA infection as an outpatient in February and this was possibly treated.  However over the last month she has developed increasing swelling and pain of the left ankle.  She was admitted for left ankle abscess with suspicion for osteomyelitis.    She has an extensive smoking history and I been asked to evaluate her regarding her vascular status.    PAST MEDICAL HISTORY:   Past Medical History:   Diagnosis Date   • COPD (chronic obstructive pulmonary disease) (CMS/[facility])    • Depression    • Hypertension    • Lung mass       PAST SURGICAL HISTORY:   Past Surgical History:   Procedure Laterality Date   • BACK SURGERY     • SKIN BIOPSY     • WRIST FRACTURE SURGERY        FAMILY HISTORY:   Family History   Problem Relation Age of Onset   • Emphysema Mother    • Alzheimer's disease Father       SOCIAL HISTORY:   Social History     Tobacco Use   • Smoking status: Current Every Day Smoker   Substance Use Topics   • Alcohol use: No   • Drug use: Not on file      MEDICATIONS:   No current facility-administered medications on file prior to encounter.      Current Outpatient Medications on File Prior to Encounter   Medication Sig Dispense Refill   • albuterol (PROVENTIL HFA;VENTOLIN HFA) 108 (90 Base) MCG/ACT inhaler Inhale 2 puffs Every 4 (Four) Hours As Needed for Wheezing.     • albuterol (PROVENTIL,VENTOLIN) 2 MG/5ML syrup Take 2 mg by mouth 3 (Three) Times a Day.     • amphetamine-dextroamphetamine (ADDERALL) 20 MG  tablet Take 20 mg by mouth 3 (Three) Times a Day.     • atenolol (TENORMIN) 25 MG tablet Take 25 mg by mouth Daily.     • clonazePAM (KlonoPIN) 1 MG tablet Take 1 mg by mouth Daily.     • DULoxetine (CYMBALTA) 30 MG capsule Take 30 mg by mouth Daily.     • gabapentin (NEURONTIN) 400 MG capsule Take 400 mg by mouth 3 (Three) Times a Day.     • TRIAMTERENE PO Take 25 mg by mouth Daily.               ALLERGIES: Codeine and Penicillins     COMPLETE REVIEW OF SYSTEMS:     Review of Systems   Constitutional: Positive for fatigue. Negative for chills, diaphoresis and fever.   HENT: Negative for hearing loss, nosebleeds, sore throat and trouble swallowing.    Eyes: Negative for pain and visual disturbance.   Respiratory: Negative for cough, shortness of breath, wheezing and stridor.    Cardiovascular: Negative for chest pain, palpitations and leg swelling.   Gastrointestinal: Negative for abdominal distention, abdominal pain, blood in stool, constipation, diarrhea, nausea and vomiting.   Endocrine: Negative for polydipsia and polyphagia.   Genitourinary: Negative for flank pain and pelvic pain.   Musculoskeletal: Negative for arthralgias, back pain, joint swelling and neck pain.        Left ankle pain and drainage   Skin: Negative for color change, pallor, rash and wound.   Neurological: Negative for dizziness, seizures, syncope and headaches.   Psychiatric/Behavioral: Negative for behavioral problems, confusion, hallucinations and suicidal ideas.         PHYSICAL EXAM:   Patient Vitals for the past 24 hrs:   BP Temp Temp src Pulse Resp SpO2   11/14/18 0749 100/80 98.6 °F (37 °C) Oral 88 16 --   11/14/18 0000 103/81 98 °F (36.7 °C) Oral 77 18 --   11/13/18 2038 110/88 98.7 °F (37.1 °C) Oral 94 18 93 %        Physical Exam   Constitutional: She is oriented to person, place, and time. She appears well-developed and well-nourished.   HENT:   Head: Normocephalic and atraumatic.   Eyes: Pupils are equal, round, and reactive to  light. No scleral icterus.   Neck: Normal range of motion. Neck supple. No tracheal deviation present.   Cardiovascular: Normal rate and regular rhythm.   Pulses:       Carotid pulses are 2+ on the right side, and 2+ on the left side.       Radial pulses are 2+ on the right side, and 2+ on the left side.        Femoral pulses are 2+ on the right side, and 2+ on the left side.       Popliteal pulses are 2+ on the right side, and 2+ on the left side.        Dorsalis pedis pulses are 2+ on the right side, and 2+ on the left side.        Posterior tibial pulses are 2+ on the right side, and 2+ on the left side.   Pulmonary/Chest: Effort normal. No respiratory distress. She exhibits no tenderness.   Abdominal: Soft. Bowel sounds are normal. There is no tenderness.   Musculoskeletal: Normal range of motion. She exhibits no edema.   Left foot and ankle 1-2+ swelling with cellulitis, 2x3 mm skin defect on the lateral malleolus   Neurological: She is alert and oriented to person, place, and time. No cranial nerve deficit.   Skin: Skin is warm and dry.   Psychiatric: She has a normal mood and affect. Her behavior is normal.             LABS:      Recent Results (from the past 24 hour(s))   Creatinine, Serum    Collection Time: 11/14/18  6:12 AM   Result Value Ref Range    Creatinine 0.98 0.57 - 1.00 mg/dL    eGFR Non African Amer 58 (L) >60 mL/min/1.73   Vancomycin, Trough    Collection Time: 11/14/18 11:36 AM   Result Value Ref Range    Vancomycin Trough 19.40 5.00 - 20.00 mcg/mL   ]    The following radiologic or non-invasive studies including the images have been independently reviewed by me and my impressions are as follows: MRI shows abscess near the left lateral malleolus with peroneal tenosynovitis and a fracture of the navicular bone.       ASSESSMENT/PLAN: 58 y.o. female with a significant left ankle infection.  She has a long smoking history, but I think the pulses on her left foot are palpable.  I will check a  lower extremity Doppler with digit pressures to quantify her perfusion.  If these results are within normal limits, she will be cleared from my standpoint for orthopedic intervention on the left ankle to treat her abscess with underlying fracture.      Problem Points:  4:  Patient has a new problem, with additional work-up planned  Total problem points:4 or more    Data Points:  1:  I have reviewed or order clinical lab test  2:  I have personally and independently review of image, tracing, or specimen  2:  I have reviewed and summation of old records and/or discussed the patients care with another health care provider  Total data points:4 or more    Risk Points:  High:  Chronic illness with severe exacerbation or progression    MDM requires 2/3 (Problem points, Data points and Risk)  MDM Prob point Data point Risk   SF 1 1 Minimal   Low 2 2 Low   Mod 3 3 Moderate   High 4 4 High     Code requires 3/3 (MDM, History and Exam)  Code MDM History Exam Time   77732 SF/Low Detailed Detailed 30   78731 Mod Comprehensive Comprehensive 50   82749 High Comprehensive Comprehensive 70     Detailed history:  4 elements HPI or status of 3 chronic problems; 2-9 system ROS  Comprehensive:  4 elements HPI or status of 3 chronic problems;  10 system ROS    Detailed Exam:  12 findings from any organ system  Comprehensive Exam:  2 findings from each of 9 systems.     Billin    Assessment/Plan       Cellulitis/abscess of left foot    HTN (hypertension)    History of MRSA infection    Anxiety      Chano Hauser MD   18

## 2018-11-14 NOTE — PROGRESS NOTES
"Pharmacokinetic Consult - Vancomycin Dosing (Follow-up Note)    Filomena Rm is a 58 y.o. female who is on day 2 pharmacy to dose vancomycin for left ankle cellulitis with abscess and peroneal tenosynovitis - history of MRSA  Pharmacy dosing vancomycin per Dr. Carpenter's request.   Goal trough: 15-20 mg/L     Current Vancomycin dose: 1000mg IV q12h     ID (Dr. Staples) following.  General surgery recommended specialty surgical consultation - Ortho consulted    Relevant clinical data and objective history reviewed:  157.5 cm (62\")  64.9 kg (143 lb 1.6 oz)  Body mass index is 26.17 kg/m².     She has a past medical history of COPD (chronic obstructive pulmonary disease) (CMS/[facility]), Depression, Hypertension, and Lung mass.    Allergies as of 11/12/2018 - Reviewed 11/12/2018   Allergen Reaction Noted   • Codeine Nausea And Vomiting 11/23/2016   • Penicillins Rash 10/05/2016     Vital Signs (last 24 hours)       11/13 0700  -  11/14 0659 11/14 0700  -  11/14 1257   Most Recent    Temp (°F) 97.8 -  98.7      98.6     98.6 (37)    Heart Rate 77 -  112      88     88    Resp   18      16     16    /81 -  116/84      100/80     100/80    SpO2 (%)   93       93        Estimated Creatinine Clearance: 55.3 mL/min (by C-G formula based on SCr of 0.98 mg/dL).  Results from last 7 days   Lab Units  11/14/18   0612  11/13/18   0621  11/12/18   2323   CREATININE mg/dL  0.98  1.03*  1.01*     Results from last 7 days   Lab Units  11/13/18   0621  11/12/18   2200   WBC 10*3/mm3  10.36  11.46*     Baseline culture/source/susceptibility:   11/12 BC NG24h     Dr. Staples noted: MRI, discussed with radiology, shows lateral malleolus abscess with peroneal tenosynovitis and nondisplaced fracture on the medial aspect of the navicular     Labs:  11/12 CRP 4.79  11/12 ESR 62    Recent Vancomycin trough dosing history:  11/13 0003 1250mg IV x1 (~19mg/kg)  11/13 1257 & 2344 1000mg IV q12h therafter (~15mg/kg)   11/14 1136 Trough " 19.4 mcg/mL    Lab Results   Component Value Date    GILBERTO 19.40 11/14/2018     Assessment/Plan  1) Vancomycin trough level drawn prior to steady state = 19.4 mcg/mL. With longer duration of antibiotics anticipate Vancomycin accumulation.  Will reduce dose slightly to 750mg IV q12h.  Repeat trough before Friday 12 Noon dose.    2) Will monitor serum creatinine at least every 24h for first 72h followed by at least q48 hours per dosing recommendations.    3) Encourage hydration as allowed by MD to help prevent toxic accumulation; monitor for s/sxn of toxicity including increase in SCr and decrease in UOP.    Pharmacy will continue to follow daily while on vancomycin and adjust as needed.     Thanks, Shaheen Middleton, PharmD, BCPS

## 2018-11-14 NOTE — PROGRESS NOTES
INFECTIOUS DISEASES PROGRESS NOTE    CC: f/u ankle infection    S:   Some drainage from ankle  No f/c/ns    O:  Physical Exam:  Temp:  [98 °F (36.7 °C)-98.7 °F (37.1 °C)] 98.6 °F (37 °C)  Heart Rate:  [77-94] 88  Resp:  [16-18] 16  BP: (100-110)/(80-88) 100/80  Physical Exam   Constitutional: She appears well-developed. No distress.   Pulmonary/Chest: Effort normal.   Skin:   Left ankle abscess with surrounding erythema and cellulitis   Psychiatric:   Anxious.  Limited insight and judgment        Diagnostics:     MRI, discussed with radiology, shows lateral malleolus abscess with peroneal tenosynovitis and nondisplaced fracture on the medial aspect of the navicular    Assessment/Plan     1.   left ankle cellulitis with abscess and peroneal tenosynovitis  2.  MRSA  3.  Tobacco abuse with probable peripheral vascular disease  4.  Navicular fracture    Discussed with general surgery and they recommend specialty surgical consultation.  Will ask orthopedics for evaluation given navicular fracture, tenosynovitis and possible osteomyelitis.  The long-standing nature of the disease raises suspicion for osteomyelitis although the MRI changes are relatively mild and I would've expected more pronounced changes should she really of had 9 months of chronic osteomyelitis.  Discussed this with radiology.  We will continue on vancomycin.    Discussed with Dr. Denise yesterday.    Jose G Staples MD  8:49 AM  11/14/18

## 2018-11-15 LAB
BH CV LOWER ARTERIAL LEFT ABI RATIO: 1.1
BH CV LOWER ARTERIAL LEFT DORSALIS PEDIS SYS MAX: 118 MMHG
BH CV LOWER ARTERIAL LEFT GREAT TOE SYS MAX: 111 MMHG
BH CV LOWER ARTERIAL LEFT POST TIBIAL SYS MAX: 112 MMHG
BH CV LOWER ARTERIAL LEFT TBI RATIO: 1
BH CV LOWER ARTERIAL RIGHT ABI RATIO: 1.2
BH CV LOWER ARTERIAL RIGHT DORSALIS PEDIS SYS MAX: 125 MMHG
BH CV LOWER ARTERIAL RIGHT GREAT TOE SYS MAX: 102 MMHG
BH CV LOWER ARTERIAL RIGHT POST TIBIAL SYS MAX: 120 MMHG
BH CV LOWER ARTERIAL RIGHT TBI RATIO: 0.99
CREAT BLD-MCNC: 0.78 MG/DL (ref 0.57–1)
GFR SERPL CREATININE-BSD FRML MDRD: 76 ML/MIN/1.73
UPPER ARTERIAL RIGHT ARM BRACHIAL SYS MAX: 103 MMHG

## 2018-11-15 PROCEDURE — 82565 ASSAY OF CREATININE: CPT | Performed by: INTERNAL MEDICINE

## 2018-11-15 PROCEDURE — 99254 IP/OBS CNSLTJ NEW/EST MOD 60: CPT | Performed by: ORTHOPAEDIC SURGERY

## 2018-11-15 PROCEDURE — 90791 PSYCH DIAGNOSTIC EVALUATION: CPT

## 2018-11-15 PROCEDURE — 99232 SBSQ HOSP IP/OBS MODERATE 35: CPT | Performed by: INTERNAL MEDICINE

## 2018-11-15 PROCEDURE — 25010000002 VANCOMYCIN 750 MG RECONSTITUTED SOLUTION: Performed by: INTERNAL MEDICINE

## 2018-11-15 RX ADMIN — CLONAZEPAM 1 MG: 1 TABLET ORAL at 20:35

## 2018-11-15 RX ADMIN — DULOXETINE HYDROCHLORIDE 30 MG: 30 CAPSULE, DELAYED RELEASE ORAL at 09:42

## 2018-11-15 RX ADMIN — SODIUM CHLORIDE 750 MG: 900 INJECTION, SOLUTION INTRAVENOUS at 15:23

## 2018-11-15 RX ADMIN — HYDROCODONE BITARTRATE AND ACETAMINOPHEN 1 TABLET: 5; 325 TABLET ORAL at 00:44

## 2018-11-15 RX ADMIN — Medication 3 ML: at 20:35

## 2018-11-15 RX ADMIN — GABAPENTIN 400 MG: 400 CAPSULE ORAL at 09:43

## 2018-11-15 RX ADMIN — HYDROCODONE BITARTRATE AND ACETAMINOPHEN 1 TABLET: 5; 325 TABLET ORAL at 20:38

## 2018-11-15 RX ADMIN — Medication 3 ML: at 09:42

## 2018-11-15 RX ADMIN — HYDROCODONE BITARTRATE AND ACETAMINOPHEN 1 TABLET: 5; 325 TABLET ORAL at 08:00

## 2018-11-15 RX ADMIN — GABAPENTIN 400 MG: 400 CAPSULE ORAL at 20:35

## 2018-11-15 RX ADMIN — ATENOLOL 25 MG: 25 TABLET ORAL at 09:41

## 2018-11-15 RX ADMIN — SODIUM CHLORIDE 750 MG: 900 INJECTION, SOLUTION INTRAVENOUS at 02:34

## 2018-11-15 NOTE — CONSULTS
"AC consulted d/t suicidal statements made to dtr after MD told pt she may lose her foot.      Interviewed pt alone in [facility] [address].  Speech is rapid, she is very busy in her room getting dressed, make-up application.  Speech is relevant but overinclusive often.      Pt admits to bipolar diagnosis, sees Dr Igor Martinez q 3 months.  No therapist, states she doesn't want a therapist.  A different MD came in to room today, assured her that her foot would not be amputated.  She denies all depression today.      Has attempted suicide x 4 in past- last attempt was 8 yr ago.  She adamantly denies SI now.  Self mutilated as a teen ager.      She doesn't want to give her full history of alcohol/drug use.  Does state she stopped ETOH for over 20 yr but is drinking 1-2 x per week currently.  Refers to alcohol as a \"blessing or a curse.\"  Was active in AA x 30 yr.  Not active with AA now.  Plans to continue ETOH use moderately.  States has abused meds in past, gets pain meds, adderall from MD currently.      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  She is living off of her CARLOS, getting .  Job seeking.      DOESN'T WANT US TO SPEAK TO DTR.      When has psych or CD admits goes to [facility], last admit was 3 yr ago.      Declines all referrals.  AC will follow, if have further concerns may want to consult psychiatrist.    "

## 2018-11-15 NOTE — CONSULTS
Orthopedic Consult      Patient: Filomena Rm    Date of Admission: 11/12/2018  9:31 PM    YOB: 1960    Medical Record Number: [id number]    Consulting Physician: Marti Denise MD    Chief Complaints: Left ankle infection    History of Present Illness: 58 y.o. female admitted to [facility] to services of Marti Denise MD with a left ankle infection.  This has been a long-standing issue, dating back almost a year.  At one point, she had a biopsy done by her primary care physician that confirmed MRSA.  She was placed on Bactrim and things seemed to get better for a time.  Over the past month, she reports worsening pain and swelling.  She was admitted to [facility] 2 days ago with swelling and redness.  She reports that these have both gotten significantly better since starting on IV antibiotics.  She has been able to continue to bear weight on the left leg. Current pain is described as mild, constant and aching.  Denies any fevers, chills, or sweats.    Allergies:   Allergies   Allergen Reactions   • Codeine Nausea And Vomiting   • Penicillins Rash       Home Medications:    Current Facility-Administered Medications:   •  acetaminophen (TYLENOL) tablet 650 mg, 650 mg, Oral, Q4H PRN, Vimal Carpenter MD  •  albuterol (PROVENTIL) nebulizer solution 0.083% 2.5 mg/3mL, 2.5 mg, Nebulization, Q6H PRN, Vimal Carpenter MD  •  atenolol (TENORMIN) tablet 25 mg, 25 mg, Oral, Daily, Vimal Carpenter MD, 25 mg at 11/15/18 0941  •  clonazePAM (KlonoPIN) tablet 1 mg, 1 mg, Oral, Daily, Vimal Carpenter MD, 1 mg at 11/14/18 2105  •  DULoxetine (CYMBALTA) DR capsule 30 mg, 30 mg, Oral, Daily, Vimal Carpenter MD, 30 mg at 11/15/18 0942  •  gabapentin (NEURONTIN) capsule 400 mg, 400 mg, Oral, TID, Vimal Carpenter MD, 400 mg at 11/15/18 0943  •  HYDROcodone-acetaminophen (NORCO) 5-325 MG per tablet 1 tablet, 1 tablet, Oral, Q4H PRN,  Vimal Carpenter MD, 1 tablet at 11/15/18 0800  •  nicotine (NICODERM CQ) 14 MG/24HR patch 1 patch, 1 patch, Transdermal, Q24H, Geni Cordero APRN, 1 patch at 11/14/18 0918  •  nicotine polacrilex (NICORETTE) gum 2 mg, 2 mg, Mouth/Throat, Q1H PRN, Marti Denise MD  •  ondansetron (ZOFRAN) injection 4 mg, 4 mg, Intravenous, Q6H PRN, Vimal Carpenter MD, 4 mg at 11/13/18 0840  •  Pharmacy to dose vancomycin, , Does not apply, Continuous PRN, Vimal Carpenter MD  •  sodium chloride 0.9 % flush 3 mL, 3 mL, Intravenous, Q12H, Vimal Carpenter MD, 3 mL at 11/15/18 0942  •  sodium chloride 0.9 % flush 3-10 mL, 3-10 mL, Intravenous, PRN, Vimal Carpenter MD  •  vancomycin 750 mg/250 mL 0.9% NS add-vantage, 750 mg, Intravenous, Q12H, Marti Denise MD, 750 mg at 11/15/18 1523    Current Medications:  Scheduled Meds:  atenolol 25 mg Oral Daily   clonazePAM 1 mg Oral Daily   DULoxetine 30 mg Oral Daily   gabapentin 400 mg Oral TID   nicotine 1 patch Transdermal Q24H   sodium chloride 3 mL Intravenous Q12H   vancomycin 750 mg Intravenous Q12H     Continuous Infusions:  Pharmacy to dose vancomycin      PRN Meds:.•  acetaminophen  •  albuterol  •  HYDROcodone-acetaminophen  •  nicotine polacrilex  •  ondansetron  •  Pharmacy to dose vancomycin  •  sodium chloride    Past Medical History:   Diagnosis Date   • COPD (chronic obstructive pulmonary disease) (CMS/HCC)    • Depression    • Hypertension    • Lung mass        Past Surgical History:   Procedure Laterality Date   • BACK SURGERY     • SKIN BIOPSY     • WRIST FRACTURE SURGERY         Social History     Occupational History   • Not on file   Tobacco Use   • Smoking status: Current Every Day Smoker   Substance and Sexual Activity   • Alcohol use: No   • Drug use: Not on file   • Sexual activity: Not on file    Social History     Social History Narrative   • Not on file       Family History   Problem Relation Age  of Onset   • Emphysema Mother    • Alzheimer's disease Father        Review of Systems:     Constitutional:  Denies fever, shaking or chills   Eyes:  Denies change in visual acuity   HEENT:  Denies nasal congestion or sore throat   Respiratory:  Denies cough or shortness of breath   Cardiovascular:  Denies chest pain or edema  Endocrine: Denies tremors, palpitations, intolerance of heat or cold, polyuria, polydipsia.  GI:  Denies abdominal pain, nausea, vomiting, bloody stools or diarrhea  :  Denies frequency, urgency, incontinence, retention, or nocturia.  Musculoskeletal:  Denies numbness tingling or loss of motor function except as above  Integument:  Denies rash, lesion or ulceration   Neurologic:  Denies headache or focal weakness, deficits  Heme:  Denies epistaxis, spontaneous or excessive bleeding, hematuria, melena, fatigue, enlarged or tender lymph nodes.      All other pertinent positives and negatives as noted above in HPI.    Physical Exam: 58 y.o. female    Vitals:    11/14/18 1404 11/14/18 1945 11/14/18 2300 11/15/18 0733   BP: 130/90 99/62 119/86 128/86   BP Location: Right arm Left arm Right arm Right arm   Patient Position: Sitting Lying Lying Lying   Pulse: 84 89 80 67   Resp: 16 18 16 16   Temp: 97.2 °F (36.2 °C) 98.5 °F (36.9 °C) 98.4 °F (36.9 °C) 97.3 °F (36.3 °C)   TempSrc: Oral Oral Oral Oral   SpO2: 94% 93% 94%    Weight:       Height:         General:  Awake, alert. No acute distress.      Head/Neck:  Normocephalic, atraumatic.  Conjunctiva and sclera clear.  Hearing adequate for the exam.  Neck is supple with normal ROM.    Psych:  Affect and demeanor appropriate.    CV:  Regular rate and rhythm.  Hemodynamically stable.    Lungs:  Good chest expansion, breathing unlabored.    Abdomen:  Soft.  Non-tender, non-distended.    Extremities:  The left leg is examined.  She has an approximately 1 cm eschar over the lateral malleolus.  There is no drainage.  Slight superficial erythema.there is  tenderness over the lateral malleolus and peroneal tendons.  No fluctuance that I can appreciate although she does have moderate edema.  Ankle motion is well tolerated.  Eversion and dorsiflexion are mildly uncomfortable.  She can plantarflex and dorsiflex with good strength.  Intact sensation.  Palpable pulses.  Brisk capillary refill.  Her gait is nonantalgic.      All other extremities atraumatic without gross abnormality.     Diagnostic Tests:    Admission on 11/12/2018   Component Date Value Ref Range Status   • Glucose 11/12/2018 77  65 - 99 mg/dL Final   • BUN 11/12/2018 13  6 - 20 mg/dL Final   • Creatinine 11/12/2018 1.05* 0.57 - 1.00 mg/dL Final   • Sodium 11/12/2018 137  136 - 145 mmol/L Final   • Potassium 11/12/2018 3.8  3.5 - 5.2 mmol/L Final   • Chloride 11/12/2018 97* 98 - 107 mmol/L Final   • CO2 11/12/2018 31.3* 22.0 - 29.0 mmol/L Final   • Calcium 11/12/2018 9.5  8.6 - 10.5 mg/dL Final   • Total Protein 11/12/2018 7.2  6.0 - 8.5 g/dL Final   • Albumin 11/12/2018 3.70  3.50 - 5.20 g/dL Final   • ALT (SGPT) 11/12/2018 28  1 - 33 U/L Final   • AST (SGOT) 11/12/2018 33* 1 - 32 U/L Final   • Alkaline Phosphatase 11/12/2018 114  39 - 117 U/L Final   • Total Bilirubin 11/12/2018 0.3  0.1 - 1.2 mg/dL Final   • eGFR Non African Amer 11/12/2018 54* >60 mL/min/1.73 Final   • Globulin 11/12/2018 3.5  gm/dL Final   • A/G Ratio 11/12/2018 1.1  g/dL Final   • BUN/Creatinine Ratio 11/12/2018 12.4  7.0 - 25.0 Final   • Anion Gap 11/12/2018 8.7  mmol/L Final   • Sed Rate 11/12/2018 62* 0 - 30 mm/hr Final   • C-Reactive Protein 11/12/2018 4.79* 0.00 - 0.50 mg/dL Final   • WBC 11/12/2018 11.46* 4.50 - 10.70 10*3/mm3 Final   • RBC 11/12/2018 4.08  3.90 - 5.20 10*6/mm3 Final   • Hemoglobin 11/12/2018 12.8  11.9 - 15.5 g/dL Final   • Hematocrit 11/12/2018 40.1  35.6 - 45.5 % Final   • MCV 11/12/2018 98.3* 80.5 - 98.2 fL Final   • MCH 11/12/2018 31.4  26.9 - 32.0 pg Final   • MCHC 11/12/2018 31.9* 32.4 - 36.3 g/dL Final    • RDW 11/12/2018 13.3* 11.7 - 13.0 % Final   • RDW-SD 11/12/2018 48.0  37.0 - 54.0 fl Final   • MPV 11/12/2018 9.4  6.0 - 12.0 fL Final   • Platelets 11/12/2018 332  140 - 500 10*3/mm3 Final   • Neutrophil % 11/12/2018 69.2  42.7 - 76.0 % Final   • Lymphocyte % 11/12/2018 20.7  19.6 - 45.3 % Final   • Monocyte % 11/12/2018 7.8  5.0 - 12.0 % Final   • Eosinophil % 11/12/2018 1.7  0.3 - 6.2 % Final   • Basophil % 11/12/2018 0.3  0.0 - 1.5 % Final   • Immature Grans % 11/12/2018 0.3  0.0 - 0.5 % Final   • Neutrophils, Absolute 11/12/2018 7.93  1.90 - 8.10 10*3/mm3 Final   • Lymphocytes, Absolute 11/12/2018 2.37  0.90 - 4.80 10*3/mm3 Final   • Monocytes, Absolute 11/12/2018 0.89  0.20 - 1.20 10*3/mm3 Final   • Eosinophils, Absolute 11/12/2018 0.20  0.00 - 0.70 10*3/mm3 Final   • Basophils, Absolute 11/12/2018 0.03  0.00 - 0.20 10*3/mm3 Final   • Immature Grans, Absolute 11/12/2018 0.04* 0.00 - 0.03 10*3/mm3 Final   • Glucose 11/12/2018 69  65 - 99 mg/dL Final   • BUN 11/12/2018 12  6 - 20 mg/dL Final   • Creatinine 11/12/2018 1.01* 0.57 - 1.00 mg/dL Final   • Sodium 11/12/2018 136  136 - 145 mmol/L Final   • Potassium 11/12/2018 3.7  3.5 - 5.2 mmol/L Final   • Chloride 11/12/2018 94* 98 - 107 mmol/L Final   • CO2 11/12/2018 30.0* 22.0 - 29.0 mmol/L Final   • Calcium 11/12/2018 9.5  8.6 - 10.5 mg/dL Final   • eGFR Non  Amer 11/12/2018 56* >60 mL/min/1.73 Final   • BUN/Creatinine Ratio 11/12/2018 11.9  7.0 - 25.0 Final   • Anion Gap 11/12/2018 12.0  mmol/L Final   • Lactate 11/12/2018 1.7  0.5 - 2.0 mmol/L Final   • Blood Culture 11/12/2018 No growth at 2 days   Preliminary   • Blood Culture 11/12/2018 No growth at 2 days   Preliminary   • Glucose 11/13/2018 76  65 - 99 mg/dL Final   • BUN 11/13/2018 11  6 - 20 mg/dL Final   • Creatinine 11/13/2018 1.03* 0.57 - 1.00 mg/dL Final   • Sodium 11/13/2018 137  136 - 145 mmol/L Final   • Potassium 11/13/2018 3.7  3.5 - 5.2 mmol/L Final   • Chloride 11/13/2018 97* 98 -  107 mmol/L Final   • CO2 11/13/2018 26.3  22.0 - 29.0 mmol/L Final   • Calcium 11/13/2018 9.6  8.6 - 10.5 mg/dL Final   • eGFR Non African Amer 11/13/2018 55* >60 mL/min/1.73 Final   • BUN/Creatinine Ratio 11/13/2018 10.7  7.0 - 25.0 Final   • Anion Gap 11/13/2018 13.7  mmol/L Final   • WBC 11/13/2018 10.36  4.50 - 10.70 10*3/mm3 Final   • RBC 11/13/2018 4.03  3.90 - 5.20 10*6/mm3 Final   • Hemoglobin 11/13/2018 12.5  11.9 - 15.5 g/dL Final   • Hematocrit 11/13/2018 39.8  35.6 - 45.5 % Final   • MCV 11/13/2018 98.8* 80.5 - 98.2 fL Final   • MCH 11/13/2018 31.0  26.9 - 32.0 pg Final   • MCHC 11/13/2018 31.4* 32.4 - 36.3 g/dL Final   • RDW 11/13/2018 13.2* 11.7 - 13.0 % Final   • RDW-SD 11/13/2018 47.8  37.0 - 54.0 fl Final   • MPV 11/13/2018 9.7  6.0 - 12.0 fL Final   • Platelets 11/13/2018 357  140 - 500 10*3/mm3 Final   • Neutrophil % 11/13/2018 62.2  42.7 - 76.0 % Final   • Lymphocyte % 11/13/2018 25.6  19.6 - 45.3 % Final   • Monocyte % 11/13/2018 8.7  5.0 - 12.0 % Final   • Eosinophil % 11/13/2018 2.9  0.3 - 6.2 % Final   • Basophil % 11/13/2018 0.3  0.0 - 1.5 % Final   • Immature Grans % 11/13/2018 0.3  0.0 - 0.5 % Final   • Neutrophils, Absolute 11/13/2018 6.45  1.90 - 8.10 10*3/mm3 Final   • Lymphocytes, Absolute 11/13/2018 2.65  0.90 - 4.80 10*3/mm3 Final   • Monocytes, Absolute 11/13/2018 0.90  0.20 - 1.20 10*3/mm3 Final   • Eosinophils, Absolute 11/13/2018 0.30  0.00 - 0.70 10*3/mm3 Final   • Basophils, Absolute 11/13/2018 0.03  0.00 - 0.20 10*3/mm3 Final   • Immature Grans, Absolute 11/13/2018 0.03  0.00 - 0.03 10*3/mm3 Final   • Creatinine 11/14/2018 0.98  0.57 - 1.00 mg/dL Final   • eGFR Non African Amer 11/14/2018 58* >60 mL/min/1.73 Final   • Vancomycin Trough 11/14/2018 19.40  5.00 - 20.00 mcg/mL Final   • RIGHT DORSALIS PEDIS SYS MAX 11/14/2018 125  mmHg Final   • RIGHT POST TIBIAL SYS MAX 11/14/2018 120  mmHg Final   • RIGHT GREAT TOE SYS MAX 11/14/2018 102  mmHg Final   • RIGHT MICHAEL RATIO  11/14/2018 1.2   Final   • RIGHT TBI RATIO 11/14/2018 0.99   Final   • LEFT DORSALIS PEDIS SYS MAX 11/14/2018 118  mmHg Final   • LEFT POST TIBIAL SYS MAX 11/14/2018 112  mmHg Final   • LEFT GREAT TOE SYS MAX 11/14/2018 111  mmHg Final   • LEFT MICHAEL RATIO 11/14/2018 1.1   Final   • LEFT TBI RATIO 11/14/2018 1   Final   • Upper arterial right arm brachial * 11/14/2018 103  mmHg Final   • Creatinine 11/15/2018 0.78  0.57 - 1.00 mg/dL Final   • eGFR Non African Amer 11/15/2018 76  >60 mL/min/1.73 Final     Lab Results (last 24 hours)     Procedure Component Value Units Date/Time    Creatinine, Serum [[id number]]  (Normal) Collected:  11/15/18 0638    Specimen:  Blood Updated:  11/15/18 0805     Creatinine 0.78 mg/dL      eGFR Non African Amer 76 mL/min/1.73     Blood Culture - Blood, Arm, Right [[id number]] Collected:  11/12/18 2323    Specimen:  Blood from Arm, Right Updated:  11/14/18 2345     Blood Culture No growth at 2 days    Blood Culture - Blood, Arm, Left [[id number]] Collected:  11/12/18 2323    Specimen:  Blood from Arm, Left Updated:  11/14/18 2345     Blood Culture No growth at 2 days        Imaging: AP, oblique and lateral views of the left foot are reviewed along with the associated report.  There is edema adjacent to the lateral malleolus.  No other significant findings noted.  MRI of the left ankle is reviewed along with the associated report.  There is an abscess adjacent to lateral malleolus.  There is what appears to be infectious tenosynovitis of the peroneal tendons.  There is a questionable fracture of the navicular and some reactive marrow edema in the calcaneus.    Assessment:  Left ankle abscess and peroneal tenosynovitis    Plan:  This needs to be washed out.  She is not septic and there is no urgency but that is the next step for her.  We discussed this including all the risks, benefits and alternatives.  This has been a long-standing infection and may require more than one surgery.  There is also  certainly no guarantee that we will be able to eradicate this infection..  The risks, benefits and alternatives to an irrigation and debridement were carefully discussed with the patient at the bedside.  She wants to proceed with that.  We'll try to get this done expeditiously.    Date: 11/15/2018    Maxwell Lanier MD    CC: Marti Denise MD

## 2018-11-15 NOTE — NURSING NOTE
Pt's daughter, who is estranged from her since August '18, has visited the pt sofie. Pt was very happy and emotional to see her daughter. Dr. Denise saw the pt and the pt was told that an outcome could possibly be amputation of her left foot. The pt then became very sad, hopeless feeling, angry, then denial. The daughter came out of the room and spoke with me about the pt's emotions switching to suicidal/death talks. She stated the pt said she needed her to go to her house to watch her dog, the daughter stated that the pt's MRSA was on objects at her home and wasn't comfortable with that, so the pt stated to the daughter that she is just a big disease and should be killed off. The daughter asked the pt what she meant by that and the pt responded that she should just kill herself off and light the body on fire to cleanse it. ACCESS was then consulted by me and the daughter. When I returned to the room, the pt was very angry about ACCESS being consulted and I told the pt that it is just a conversation and that she is going through a lot so having someone else to talk to is a great thing. The pt asked the daughter to leave and she did. The pt then went off the floor for some time (no heart monitor or IV was inserted). When the pt returned she asked to be discharged at which time I told her that all I can do is call the Dr and let them know what she would like. Dr Juan walton/LUNA was then paged and the pt was refused a DC. The pt has now asked for me to give her something to make her high and be knocked out for the rest of the night and she will stay. I told her that I cannot and will not do that as the drs will not do that either. She said she is going to look up whether medicaid really will not pay for her medical bills if she leaves [place] and will call me back into her room with her next step.

## 2018-11-15 NOTE — PROGRESS NOTES
Name: Filomena Rm ADMIT: 2018   : 1960  PCP: Maico Holguin MD    MRN: [id number] LOS: 0 days   AGE/SEX: 58 y.o. female    ROOM: [facility]   Subjective   Drainage from left ankle    Brief hospital course since admission:  Chronic infection  MRI shows  1.  Abscess formation anterolateral to the lateral malleolus appears to  drain to the skin surface laterally with surrounding cellulitis. There  is adjacent peroneal tenosynovitis that is presumed infectious. Marrow  edema is present within the lateral calcaneus in the region of the  peroneal tubercle that may be reactive to adjacent synovitis or  represent early osteomyelitis.  2. Small nondisplaced fracture of the medial aspect of the navicular  where there is surrounding bone marrow edema and enhancement. This could  represent a posttraumatic fracture or stress fracture. Navicular  osteomyelitis is also a consideration though there is no clear cortical  loss and this is not in the region of the lateral ankle abscess.  Follow-up MRI may be helpful in the setting to assure that this is not  progressive.  I have reviewed past medical history, family history, social history and allergies.  No changes from admission note.      Review of Systems   Constitutional: Positive for fatigue. Negative for fever.   Respiratory: Negative for shortness of breath.    Cardiovascular: Negative for chest pain.   Skin: Positive for color change and wound.          Objective   Vital Signs  Temp:  [97.2 °F (36.2 °C)-98.7 °F (37.1 °C)] 97.2 °F (36.2 °C)  Heart Rate:  [77-94] 89  Resp:  [16-18] 18  BP: ()/(62-90) 99/62  SpO2:  [93 %-94 %] 93 %  on   ;   Device (Oxygen Therapy): room air  Body mass index is 26.17 kg/m².    Intake/Output Summary (Last 24 hours) at 2018  Last data filed at 2018 1700  Gross per 24 hour   Intake 840 ml   Output --   Net 840 ml       Physical Exam   Constitutional: She is oriented to person, place, and time. She  appears well-developed and well-nourished. No distress.   HENT:   Head: Normocephalic and atraumatic.   Eyes: Pupils are equal, round, and reactive to light. No scleral icterus.   Cardiovascular: Normal rate and regular rhythm.   Pulmonary/Chest: Effort normal. No respiratory distress. She has no decreased breath sounds. She has no wheezes.   Abdominal: Soft. Bowel sounds are normal. There is no hepatosplenomegaly.   Musculoskeletal:        Left ankle: She exhibits swelling. Tenderness.   Neurological: She is alert and oriented to person, place, and time.   Skin: No cyanosis. Nails show no clubbing.   Psychiatric: She has a normal mood and affect. Her behavior is normal.       Results Review:    Results from last 7 days   Lab Units  11/13/18   0621  11/12/18   2200   WBC 10*3/mm3  10.36  11.46*   HEMOGLOBIN g/dL  12.5  12.8   HEMATOCRIT %  39.8  40.1   PLATELETS 10*3/mm3  357  332     Results from last 7 days   Lab Units  11/14/18   0612  11/13/18   0621  11/12/18   2323  11/12/18   2200   SODIUM mmol/L   --   137  136  137   POTASSIUM mmol/L   --   3.7  3.7  3.8   CHLORIDE mmol/L   --   97*  94*  97*   CO2 mmol/L   --   26.3  30.0*  31.3*   BUN mg/dL   --   11  12  13   CREATININE mg/dL  0.98  1.03*  1.01*  1.05*   GLUCOSE mg/dL   --   76  69  77   CALCIUM mg/dL   --   9.6  9.5  9.5         Hemoglobin A1C:No results found for: HGBA1C  Glucose Range:No results found for: POCGLU      atenolol 25 mg Oral Daily   clonazePAM 1 mg Oral Daily   DULoxetine 30 mg Oral Daily   gabapentin 400 mg Oral TID   nicotine 1 patch Transdermal Q24H   sodium chloride 3 mL Intravenous Q12H   [START ON 11/15/2018] vancomycin 750 mg Intravenous Q12H       Pharmacy to dose vancomycin    Diet Regular  Assessment/Plan       Assessment/Plan      Active Hospital Problems    Diagnosis Date Noted   • **Peroneal tenosynovitis [M65.9] 11/14/2018   • Fracture of navicular bone of left foot [S92.252A] 11/14/2018   • HTN (hypertension) [I10]  11/13/2018   • History of MRSA infection [Z86.14] 11/13/2018   • Anxiety [F41.9] 11/13/2018   • Cellulitis/abscess of left foot [L03.116] 11/12/2018      Resolved Hospital Problems   No resolved problems to display.       Continue antibiotics per infectious disease recommendations  Both vascular surgery and orthopedic surgery will be evaluating the patient        Marti Denise MD  [facility]  11/14/18

## 2018-11-15 NOTE — PROGRESS NOTES
Name: Filomena Rm ADMIT: 2018   : 1960  PCP: Maico Holguin MD    MRN: [id number] LOS: 1 days   AGE/SEX: 58 y.o. female    ROOM: [facility]   Subjective   Drainage from left ankle is better    Brief hospital course since admission:  Chronic infection  MRI shows  1.  Abscess formation anterolateral to the lateral malleolus appears to  drain to the skin surface laterally with surrounding cellulitis. There  is adjacent peroneal tenosynovitis that is presumed infectious. Marrow  edema is present within the lateral calcaneus in the region of the  peroneal tubercle that may be reactive to adjacent synovitis or  represent early osteomyelitis.  2. Small nondisplaced fracture of the medial aspect of the navicular  where there is surrounding bone marrow edema and enhancement. This could  represent a posttraumatic fracture or stress fracture. Navicular  osteomyelitis is also a consideration though there is no clear cortical  loss and this is not in the region of the lateral ankle abscess.  Follow-up MRI may be helpful in the setting to assure that this is not  progressive.  I have reviewed past medical history, family history, social history and allergies.  No changes from admission note.      Review of Systems   Constitutional: Positive for fatigue. Negative for fever.   Respiratory: Negative for shortness of breath.    Cardiovascular: Negative for chest pain.   Skin: Positive for color change and wound.          Objective   Vital Signs  Temp:  [97.3 °F (36.3 °C)-98.5 °F (36.9 °C)] 97.3 °F (36.3 °C)  Heart Rate:  [67-89] 67  Resp:  [16-18] 16  BP: ()/(62-86) 128/86  SpO2:  [93 %-94 %] 94 %  on   ;   Device (Oxygen Therapy): room air  Body mass index is 26.17 kg/m².    Intake/Output Summary (Last 24 hours) at 11/15/2018 3791  Last data filed at 11/15/2018 1300  Gross per 24 hour   Intake 840 ml   Output --   Net 840 ml       Physical Exam   Constitutional: She is oriented to person, place, and  time. She appears well-developed and well-nourished. No distress.   HENT:   Head: Normocephalic and atraumatic.   Eyes: Pupils are equal, round, and reactive to light. No scleral icterus.   Cardiovascular: Normal rate and regular rhythm.   Pulmonary/Chest: Effort normal. No respiratory distress. She has no decreased breath sounds. She has no wheezes.   Abdominal: Soft. Bowel sounds are normal. There is no hepatosplenomegaly.   Musculoskeletal:        Left ankle: She exhibits swelling. Tenderness.   Neurological: She is alert and oriented to person, place, and time.   Skin: No cyanosis. Nails show no clubbing.   Psychiatric: She has a normal mood and affect. Her behavior is normal.       Results Review:    Results from last 7 days   Lab Units  11/13/18   0621  11/12/18   2200   WBC 10*3/mm3  10.36  11.46*   HEMOGLOBIN g/dL  12.5  12.8   HEMATOCRIT %  39.8  40.1   PLATELETS 10*3/mm3  357  332     Results from last 7 days   Lab Units  11/15/18   0638  11/14/18   0612  11/13/18   0621  11/12/18   2323  11/12/18   2200   SODIUM mmol/L   --    --   137  136  137   POTASSIUM mmol/L   --    --   3.7  3.7  3.8   CHLORIDE mmol/L   --    --   97*  94*  97*   CO2 mmol/L   --    --   26.3  30.0*  31.3*   BUN mg/dL   --    --   11  12  13   CREATININE mg/dL  0.78  0.98  1.03*  1.01*  1.05*   GLUCOSE mg/dL   --    --   76  69  77   CALCIUM mg/dL   --    --   9.6  9.5  9.5         Hemoglobin A1C:No results found for: HGBA1C  Glucose Range:No results found for: POCGLU      atenolol 25 mg Oral Daily   clonazePAM 1 mg Oral Daily   DULoxetine 30 mg Oral Daily   gabapentin 400 mg Oral TID   nicotine 1 patch Transdermal Q24H   sodium chloride 3 mL Intravenous Q12H   vancomycin 750 mg Intravenous Q12H       Pharmacy to dose vancomycin    Diet Regular  Assessment/Plan       Assessment/Plan      Active Hospital Problems    Diagnosis Date Noted   • **Peroneal tenosynovitis [M65.9] 11/14/2018   • Fracture of navicular bone of left foot  [S92.252A] 11/14/2018   • HTN (hypertension) [I10] 11/13/2018   • History of MRSA infection [Z86.14] 11/13/2018   • Anxiety [F41.9] 11/13/2018   • Cellulitis/abscess of left foot [L03.116] 11/12/2018      Resolved Hospital Problems   No resolved problems to display.       Continue antibiotics per infectious disease recommendations  Ortho to see        Marti Denise MD  [facility]  11/15/18

## 2018-11-15 NOTE — PROGRESS NOTES
ID note  CC: f/u MRSA  S: feels okay.  No more drainage. Foot better. No f/c/ns  O: NAD, L ankle indruated and fluctuant    Cr 0.8  vanc 19.4    A/p  1.   left ankle cellulitis with abscess and peroneal tenosynovitis  2.  MRSA  3.  Tobacco abuse without peripheral vascular disease  4.  Navicular fracture    No PVD on vascular eval.  Appreciate their help  Does not want amputation  Ortho eval P.    Cont vanc

## 2018-11-15 NOTE — PLAN OF CARE
Problem: Patient Care Overview  Goal: Plan of Care Review  Outcome: Ongoing (interventions implemented as appropriate)   11/15/18 0614   Coping/Psychosocial   Plan of Care Reviewed With patient   Plan of Care Review   Progress improving   OTHER   Outcome Summary c/o pain x2; no soa; left ankle swollen, red, warm - covered w/gauze; VSS; pt wanting to leave, getting anxious; ortho to see pt; will continue to monitor       Problem: Pain, Acute (Adult)  Goal: Acceptable Pain Control/Comfort Level  Outcome: Ongoing (interventions implemented as appropriate)      Problem: Wound (Includes Pressure Injury) (Adult)  Goal: Signs and Symptoms of Listed Potential Problems Will be Absent, Minimized or Managed (Wound)  Outcome: Ongoing (interventions implemented as appropriate)      Problem: Skin and Soft Tissue Infection (Adult)  Goal: Signs and Symptoms of Listed Potential Problems Will be Absent, Minimized or Managed (Skin and Soft Tissue Infection)  Outcome: Ongoing (interventions implemented as appropriate)

## 2018-11-15 NOTE — PROGRESS NOTES
LOS: 1 day   Patient Care Team:  Maico Holguin MD as PCP - General (Family Medicine)    Chief Complaint: left ankle abscess    Subjective     58 y.o. female with left ankle infection, navicular fracture  Agitated last night after interaction with her daughter per the RN  Calm this morning.  Mild left ankle pain    Review of Systems  Review of Systems - Negative except left ankle pain      Objective     Vital Signs  Temp:  [97.2 °F (36.2 °C)-98.5 °F (36.9 °C)] 97.3 °F (36.3 °C)  Heart Rate:  [67-89] 67  Resp:  [16-18] 16  BP: ()/(62-90) 128/86    Physical Exam  General: No acute distress. Alert and oriented x 4  HEENT: No jugular venous distension, trachea is midline  CV: RRR, S1S2  Resp: Clear unlabored breathing on room air  Abd: Abdomen is soft, nontender, nondistended  Extremities: left ankle abscess with 3x3 mm skin opening, no progression of her cellulitis    Results Review:       Recent Results (from the past 24 hour(s))   Vancomycin, Trough    Collection Time: 11/14/18 11:36 AM   Result Value Ref Range    Vancomycin Trough 19.40 5.00 - 20.00 mcg/mL   Doppler Ankle Brachial Index Single Level CAR    Collection Time: 11/14/18  8:38 PM   Result Value Ref Range    RIGHT DORSALIS PEDIS SYS  mmHg    RIGHT POST TIBIAL SYS  mmHg    RIGHT GREAT TOE SYS  mmHg    RIGHT MICHAEL RATIO 1.2     RIGHT TBI RATIO 0.99     LEFT DORSALIS PEDIS SYS  mmHg    LEFT POST TIBIAL SYS  mmHg    LEFT GREAT TOE SYS  mmHg    LEFT MICHAEL RATIO 1.1     LEFT TBI RATIO 1     Upper arterial right arm brachial sys max 103 mmHg   ]      Assessment/Plan             Peroneal tenosynovitis    Cellulitis/abscess of left foot    HTN (hypertension)    History of MRSA infection    Anxiety    Fracture of navicular bone of left foot      Assessment & Plan  58 y.o. female with left ankle abscess/tenosynovitis/tarsal fracture.  Her non-invasive vascular studies show normal ABIs and digital  pressures/waveforms  No further vascular workup- adequate perfusion present despite risk factors/tobacco use  Ortho eval pending for management of abscess/fracture  Will see as needed.       Chano Hauser MD  11/15/18  7:54 AM

## 2018-11-16 ENCOUNTER — ANESTHESIA EVENT (OUTPATIENT)
Dept: PERIOP | Facility: HOSPITAL | Age: 58
End: 2018-11-16

## 2018-11-16 ENCOUNTER — ANESTHESIA (OUTPATIENT)
Dept: PERIOP | Facility: HOSPITAL | Age: 58
End: 2018-11-16

## 2018-11-16 PROBLEM — Z72.0 TOBACCO USE: Status: ACTIVE | Noted: 2018-11-16

## 2018-11-16 LAB
CREAT BLD-MCNC: 0.72 MG/DL (ref 0.57–1)
GFR SERPL CREATININE-BSD FRML MDRD: 83 ML/MIN/1.73
VANCOMYCIN TROUGH SERPL-MCNC: 15.1 MCG/ML (ref 5–20)

## 2018-11-16 PROCEDURE — 25010000002 ONDANSETRON PER 1 MG: Performed by: INTERNAL MEDICINE

## 2018-11-16 PROCEDURE — 80202 ASSAY OF VANCOMYCIN: CPT | Performed by: INTERNAL MEDICINE

## 2018-11-16 PROCEDURE — 82565 ASSAY OF CREATININE: CPT | Performed by: INTERNAL MEDICINE

## 2018-11-16 PROCEDURE — 25010000002 VANCOMYCIN 750 MG RECONSTITUTED SOLUTION: Performed by: INTERNAL MEDICINE

## 2018-11-16 PROCEDURE — 99232 SBSQ HOSP IP/OBS MODERATE 35: CPT | Performed by: INTERNAL MEDICINE

## 2018-11-16 RX ORDER — ALPRAZOLAM 0.5 MG/1
0.5 TABLET ORAL EVERY 6 HOURS
Status: DISCONTINUED | OUTPATIENT
Start: 2018-11-16 | End: 2018-11-19 | Stop reason: HOSPADM

## 2018-11-16 RX ADMIN — GABAPENTIN 400 MG: 400 CAPSULE ORAL at 08:10

## 2018-11-16 RX ADMIN — SODIUM CHLORIDE 750 MG: 900 INJECTION, SOLUTION INTRAVENOUS at 02:29

## 2018-11-16 RX ADMIN — DULOXETINE HYDROCHLORIDE 30 MG: 30 CAPSULE, DELAYED RELEASE ORAL at 08:10

## 2018-11-16 RX ADMIN — HYDROCODONE BITARTRATE AND ACETAMINOPHEN 1 TABLET: 5; 325 TABLET ORAL at 12:28

## 2018-11-16 RX ADMIN — ONDANSETRON HYDROCHLORIDE 4 MG: 2 SOLUTION INTRAMUSCULAR; INTRAVENOUS at 12:28

## 2018-11-16 RX ADMIN — ATENOLOL 25 MG: 25 TABLET ORAL at 08:10

## 2018-11-16 RX ADMIN — Medication 3 ML: at 20:29

## 2018-11-16 RX ADMIN — GABAPENTIN 400 MG: 400 CAPSULE ORAL at 17:12

## 2018-11-16 RX ADMIN — ALPRAZOLAM 0.5 MG: 0.5 TABLET ORAL at 19:24

## 2018-11-16 RX ADMIN — Medication 3 ML: at 08:10

## 2018-11-16 RX ADMIN — SODIUM CHLORIDE 750 MG: 900 INJECTION, SOLUTION INTRAVENOUS at 14:55

## 2018-11-16 RX ADMIN — HYDROCODONE BITARTRATE AND ACETAMINOPHEN 1 TABLET: 5; 325 TABLET ORAL at 02:29

## 2018-11-16 RX ADMIN — HYDROCODONE BITARTRATE AND ACETAMINOPHEN 1 TABLET: 5; 325 TABLET ORAL at 08:10

## 2018-11-16 RX ADMIN — GABAPENTIN 400 MG: 400 CAPSULE ORAL at 20:28

## 2018-11-16 RX ADMIN — HYDROCODONE BITARTRATE AND ACETAMINOPHEN 1 TABLET: 5; 325 TABLET ORAL at 20:28

## 2018-11-16 NOTE — PLAN OF CARE
Problem: Patient Care Overview  Goal: Plan of Care Review  Outcome: Ongoing (interventions implemented as appropriate)   11/16/18 0700   Coping/Psychosocial   Plan of Care Reviewed With patient   Plan of Care Review   Progress no change   OTHER   Outcome Summary Left ankle pain controlled with Percocet. Encouraged to Stay in bed with left leg elevated. Mepilex dressing applied to left outer ankle. Will continue.

## 2018-11-16 NOTE — PROGRESS NOTES
ID note  CC: f/u MRSA  S: feels okay.  No more drainage. Foot better but still painful.  Tolerating vanc except for some mild nausea   O: NAD, L ankle indruated and fluctuant     Bcx NGTD     A/p  1.   left ankle cellulitis with abscess and peroneal tenosynovitis  2.  MRSA  3.  Tobacco abuse without peripheral vascular disease  4.  Navicular fracture     Ortho eval appreciated; I&D planned for tomorrow.    Cont vanc goal level of 15-20.

## 2018-11-16 NOTE — PROGRESS NOTES
Access Center Follow Up:  Attempted to see pt but she was not in her room.  RN suspects she left the floor to smoke.

## 2018-11-16 NOTE — PLAN OF CARE
Problem: Patient Care Overview  Goal: Plan of Care Review  Outcome: Ongoing (interventions implemented as appropriate)   11/15/18 2026   Coping/Psychosocial   Plan of Care Reviewed With patient   Plan of Care Review   Progress improving   OTHER   Outcome Summary pt medicated for pain with moderate relief. pt educated on not smoking while wearing nicotine patch, leaving floor with iv, and hospital safety procedures. pt continues to disregard. will try to continue to monitor pt

## 2018-11-16 NOTE — PLAN OF CARE
Problem: Patient Care Overview  Goal: Plan of Care Review  Outcome: Ongoing (interventions implemented as appropriate)   11/16/18 0880   Coping/Psychosocial   Plan of Care Reviewed With patient   Plan of Care Review   Progress no change   OTHER   Outcome Summary left ankle is red, painful and swollen. encouraged to keep off of left leg and to keep it elevated. refusing nicotine patch and gum. noncompliant, continues to go outside and smoke. MD aware. iv abx continued. npo after midnight. surgery on left foot tmrw. will continue to monitor.       Problem: Pain, Acute (Adult)  Goal: Acceptable Pain Control/Comfort Level  Outcome: Ongoing (interventions implemented as appropriate)      Problem: Wound (Includes Pressure Injury) (Adult)  Goal: Signs and Symptoms of Listed Potential Problems Will be Absent, Minimized or Managed (Wound)  Outcome: Ongoing (interventions implemented as appropriate)      Problem: Skin and Soft Tissue Infection (Adult)  Goal: Signs and Symptoms of Listed Potential Problems Will be Absent, Minimized or Managed (Skin and Soft Tissue Infection)  Outcome: Ongoing (interventions implemented as appropriate)      Problem: Suicide Risk (Adult)  Goal: Strength-Based Wellness/Recovery  Outcome: Ongoing (interventions implemented as appropriate)    Goal: Physical Safety  Outcome: Ongoing (interventions implemented as appropriate)

## 2018-11-16 NOTE — PROGRESS NOTES
"Pharmacy to Dose Vancomycin - trough evaluation    Patient: Filomena Rm (N642/1, [id number]  LOS: 2 days )  Relevant clinical data and objective history reviewed:  58 y.o. female 157.5 cm (62\") 64.9 kg (143 lb 1.6 oz)  Body mass index is 26.17 kg/m².  she has a past medical history of COPD (chronic obstructive pulmonary disease) (CMS/HCC), Depression, Hypertension, and Lung mass.    Day #4  Duration: 7 days  Consult for Dr Carpenter  Indication: LLE cellulitis  Current dose: 750 mg IV q12h     Microbiology Results (last 10 days)     Procedure Component Value - Date/Time    Blood Culture - Blood, Arm, Left [[id number]] Collected:  18    Lab Status:  Preliminary result Specimen:  Blood from Arm, Left Updated:  11/15/18 2345     Blood Culture No growth at 3 days    Blood Culture - Blood, Arm, Right [[id number]] Collected:  18    Lab Status:  Preliminary result Specimen:  Blood from Arm, Right Updated:  11/15/18 2345     Blood Culture No growth at 3 days        Other Abx: none    Recent Vancomycin trough dosing history:   0003 1250mg IV x1 (~19mg/kg)   1257 & 2344 1000mg IV q12h therafter (~15mg/kg)               1136 Trough 19.4 mcg/mL  11/15 0235 1523 vanc 750 mg IV q12h   0230                1322 Trough = 15.1 mcg/mL    Results from last 7 days   Lab Units  18   0621  18   2200   WBC 10*3/mm3  10.36  11.46*   HEMOGLOBIN g/dL  12.5  12.8   HEMATOCRIT %  39.8  40.1   PLATELETS 10*3/mm3  357  332     Lab Results   Lab Value Date/Time    LACTATE 1.7 2018 2323     Temp (24hrs), Av °F (36.7 °C), Min:98 °F (36.7 °C), Max:98 °F (36.7 °C)    Serum creatinine: 0.72 mg/dL 18 0534  Estimated creatinine clearance: 75.3 mL/min    Assessment/Plan:  - At low end of goal 15-20  - Continue vancomycin 750 mg IV q12h    - reassess trough . Predicted 15-20.    Dontrell Florez, PharmD  18 10:02 AM      "

## 2018-11-16 NOTE — PROGRESS NOTES
Continued Stay Note  [facility]     Patient Name: Filomena Rm  MRN: [id number]  Today's Date: 11/16/2018    Admit Date: 11/12/2018    Discharge Plan     Row Name 11/16/18 1242       Plan    Plan  Home pending ID recommendations for ABT     Plan Comments  [place] plans to return home at [place]. Pt to have an I&D today per Deanne. CCP to follow for ID's recommendations for antibiotic therapy. [email or web address]/CCP         Discharge Codes    No documentation.             Greta Gatica RN

## 2018-11-16 NOTE — PROGRESS NOTES
Name: Filomena Rm ADMIT: 2018   : 1960  PCP: Maico Holguin MD    MRN: [id number] LOS: 2 days   AGE/SEX: 58 y.o. female    ROOM: [facility]   Subjective   Drainage from left ankle is better    Brief hospital course since admission:  Chronic infection  MRI shows  1.  Abscess formation anterolateral to the lateral malleolus appears to  drain to the skin surface laterally with surrounding cellulitis. There  is adjacent peroneal tenosynovitis that is presumed infectious. Marrow  edema is present within the lateral calcaneus in the region of the  peroneal tubercle that may be reactive to adjacent synovitis or  represent early osteomyelitis.  2. Small nondisplaced fracture of the medial aspect of the navicular  where there is surrounding bone marrow edema and enhancement. This could  represent a posttraumatic fracture or stress fracture. Navicular  osteomyelitis is also a consideration though there is no clear cortical  loss and this is not in the region of the lateral ankle abscess.  Follow-up MRI may be helpful in the setting to assure that this is not  progressive.  I have reviewed past medical history, family history, social history and allergies.  No changes from admission note.      Review of Systems   Constitutional: Positive for fatigue. Negative for fever.   Respiratory: Negative for shortness of breath.    Cardiovascular: Negative for chest pain.   Skin: Positive for color change and wound.          Objective   Vital Signs  Temp:  [97.9 °F (36.6 °C)-98 °F (36.7 °C)] 97.9 °F (36.6 °C)  Heart Rate:  [69-75] 75  Resp:  [16-18] 18  BP: (120-147)/(79-93) 147/87  SpO2:  [95 %] 95 %  on   ;   Device (Oxygen Therapy): room air  Body mass index is 26.17 kg/m².  No intake or output data in the 24 hours ending 18 1805    Physical Exam   Constitutional: She is oriented to person, place, and time. She appears well-developed and well-nourished. No distress.   HENT:   Head: Normocephalic and  atraumatic.   Eyes: Pupils are equal, round, and reactive to light. No scleral icterus.   Cardiovascular: Normal rate and regular rhythm.   Pulmonary/Chest: Effort normal. No respiratory distress. She has no decreased breath sounds. She has no wheezes.   Abdominal: Soft. Bowel sounds are normal. There is no hepatosplenomegaly.   Musculoskeletal:        Left ankle: She exhibits swelling. Tenderness.   Neurological: She is alert and oriented to person, place, and time.   Skin: No cyanosis. Nails show no clubbing.   Psychiatric: She has a normal mood and affect. Her behavior is normal.       Results Review:    Results from last 7 days   Lab Units  11/13/18   0621  11/12/18   2200   WBC 10*3/mm3  10.36  11.46*   HEMOGLOBIN g/dL  12.5  12.8   HEMATOCRIT %  39.8  40.1   PLATELETS 10*3/mm3  357  332     Results from last 7 days   Lab Units  11/16/18   0534  11/15/18   0638  11/14/18   0612  11/13/18   0621  11/12/18   2323  11/12/18   2200   SODIUM mmol/L   --    --    --   137  136  137   POTASSIUM mmol/L   --    --    --   3.7  3.7  3.8   CHLORIDE mmol/L   --    --    --   97*  94*  97*   CO2 mmol/L   --    --    --   26.3  30.0*  31.3*   BUN mg/dL   --    --    --   11  12  13   CREATININE mg/dL  0.72  0.78  0.98  1.03*  1.01*  1.05*   GLUCOSE mg/dL   --    --    --   76  69  77   CALCIUM mg/dL   --    --    --   9.6  9.5  9.5         Hemoglobin A1C:No results found for: HGBA1C  Glucose Range:No results found for: POCGLU      atenolol 25 mg Oral Daily   clonazePAM 1 mg Oral Daily   DULoxetine 30 mg Oral Daily   gabapentin 400 mg Oral TID   nicotine 1 patch Transdermal Q24H   sodium chloride 3 mL Intravenous Q12H   vancomycin 750 mg Intravenous Q12H       Pharmacy to dose vancomycin    Diet Regular  NPO Diet NPO Except: Sips with meds  Assessment/Plan       Assessment/Plan      Active Hospital Problems    Diagnosis Date Noted   • **Peroneal tenosynovitis [M65.9] 11/14/2018   • Tobacco use [Z72.0] 11/16/2018   • Fracture  of navicular bone of left foot [S92.252A] 11/14/2018   • HTN (hypertension) [I10] 11/13/2018   • History of MRSA infection [Z86.14] 11/13/2018   • Anxiety [F41.9] 11/13/2018   • Cellulitis/abscess of left foot [L03.116] 11/12/2018      Resolved Hospital Problems   No resolved problems to display.       Continue antibiotics per infectious disease recommendations  I & D in AM  Add xanax for anxiety         Marti Denise MD  [facility]  11/16/18

## 2018-11-17 PROBLEM — Z91.199 NON COMPLIANCE WITH MEDICAL TREATMENT: Status: ACTIVE | Noted: 2018-11-17

## 2018-11-17 LAB
BACTERIA SPEC AEROBE CULT: NORMAL
BACTERIA SPEC AEROBE CULT: NORMAL

## 2018-11-17 PROCEDURE — 25010000002 ONDANSETRON PER 1 MG: Performed by: INTERNAL MEDICINE

## 2018-11-17 PROCEDURE — 25010000002 HYDROMORPHONE PER 4 MG: Performed by: NURSE ANESTHETIST, CERTIFIED REGISTERED

## 2018-11-17 PROCEDURE — 11044 DBRDMT BONE 1ST 20 SQ CM/<: CPT | Performed by: ORTHOPAEDIC SURGERY

## 2018-11-17 PROCEDURE — 25010000002 MIDAZOLAM PER 1 MG: Performed by: ANESTHESIOLOGY

## 2018-11-17 PROCEDURE — 0SBG0ZZ EXCISION OF LEFT ANKLE JOINT, OPEN APPROACH: ICD-10-PCS | Performed by: ORTHOPAEDIC SURGERY

## 2018-11-17 PROCEDURE — 25010000002 FENTANYL CITRATE (PF) 100 MCG/2ML SOLUTION: Performed by: NURSE ANESTHETIST, CERTIFIED REGISTERED

## 2018-11-17 PROCEDURE — 25010000002 KETOROLAC TROMETHAMINE PER 15 MG: Performed by: NURSE ANESTHETIST, CERTIFIED REGISTERED

## 2018-11-17 PROCEDURE — 87070 CULTURE OTHR SPECIMN AEROBIC: CPT | Performed by: ORTHOPAEDIC SURGERY

## 2018-11-17 PROCEDURE — 87205 SMEAR GRAM STAIN: CPT | Performed by: ORTHOPAEDIC SURGERY

## 2018-11-17 PROCEDURE — 25010000002 PROPOFOL 10 MG/ML EMULSION: Performed by: NURSE ANESTHETIST, CERTIFIED REGISTERED

## 2018-11-17 PROCEDURE — 25010000002 DEXAMETHASONE PER 1 MG: Performed by: NURSE ANESTHETIST, CERTIFIED REGISTERED

## 2018-11-17 PROCEDURE — 25010000002 VANCOMYCIN 750 MG RECONSTITUTED SOLUTION: Performed by: INTERNAL MEDICINE

## 2018-11-17 PROCEDURE — L4386 NON-PNEUM WALK BOOT PRE CST: HCPCS | Performed by: ORTHOPAEDIC SURGERY

## 2018-11-17 PROCEDURE — 0QBK0ZZ EXCISION OF LEFT FIBULA, OPEN APPROACH: ICD-10-PCS | Performed by: ORTHOPAEDIC SURGERY

## 2018-11-17 PROCEDURE — 87075 CULTR BACTERIA EXCEPT BLOOD: CPT | Performed by: ORTHOPAEDIC SURGERY

## 2018-11-17 RX ORDER — BISACODYL 10 MG
10 SUPPOSITORY, RECTAL RECTAL DAILY PRN
Status: DISCONTINUED | OUTPATIENT
Start: 2018-11-17 | End: 2018-11-19 | Stop reason: HOSPADM

## 2018-11-17 RX ORDER — PROMETHAZINE HYDROCHLORIDE 25 MG/1
25 SUPPOSITORY RECTAL ONCE AS NEEDED
Status: DISCONTINUED | OUTPATIENT
Start: 2018-11-17 | End: 2018-11-17 | Stop reason: HOSPADM

## 2018-11-17 RX ORDER — MAGNESIUM HYDROXIDE 1200 MG/15ML
LIQUID ORAL AS NEEDED
Status: DISCONTINUED | OUTPATIENT
Start: 2018-11-17 | End: 2018-11-17 | Stop reason: HOSPADM

## 2018-11-17 RX ORDER — PROMETHAZINE HYDROCHLORIDE 25 MG/ML
12.5 INJECTION, SOLUTION INTRAMUSCULAR; INTRAVENOUS ONCE AS NEEDED
Status: DISCONTINUED | OUTPATIENT
Start: 2018-11-17 | End: 2018-11-17 | Stop reason: HOSPADM

## 2018-11-17 RX ORDER — FENTANYL CITRATE 50 UG/ML
50 INJECTION, SOLUTION INTRAMUSCULAR; INTRAVENOUS
Status: DISCONTINUED | OUTPATIENT
Start: 2018-11-17 | End: 2018-11-17 | Stop reason: HOSPADM

## 2018-11-17 RX ORDER — OXYCODONE AND ACETAMINOPHEN 7.5; 325 MG/1; MG/1
2 TABLET ORAL EVERY 4 HOURS PRN
Status: DISCONTINUED | OUTPATIENT
Start: 2018-11-17 | End: 2018-11-19 | Stop reason: HOSPADM

## 2018-11-17 RX ORDER — HYDROCODONE BITARTRATE AND ACETAMINOPHEN 7.5; 325 MG/1; MG/1
1 TABLET ORAL ONCE AS NEEDED
Status: DISCONTINUED | OUTPATIENT
Start: 2018-11-17 | End: 2018-11-17 | Stop reason: HOSPADM

## 2018-11-17 RX ORDER — HYDROMORPHONE HYDROCHLORIDE 1 MG/ML
0.5 INJECTION, SOLUTION INTRAMUSCULAR; INTRAVENOUS; SUBCUTANEOUS
Status: DISCONTINUED | OUTPATIENT
Start: 2018-11-17 | End: 2018-11-17 | Stop reason: HOSPADM

## 2018-11-17 RX ORDER — ACETAMINOPHEN 650 MG
TABLET, EXTENDED RELEASE ORAL AS NEEDED
Status: DISCONTINUED | OUTPATIENT
Start: 2018-11-17 | End: 2018-11-17 | Stop reason: HOSPADM

## 2018-11-17 RX ORDER — SODIUM CHLORIDE, SODIUM LACTATE, POTASSIUM CHLORIDE, CALCIUM CHLORIDE 600; 310; 30; 20 MG/100ML; MG/100ML; MG/100ML; MG/100ML
100 INJECTION, SOLUTION INTRAVENOUS CONTINUOUS
Status: DISCONTINUED | OUTPATIENT
Start: 2018-11-17 | End: 2018-11-17

## 2018-11-17 RX ORDER — NALOXONE HCL 0.4 MG/ML
0.1 VIAL (ML) INJECTION
Status: DISCONTINUED | OUTPATIENT
Start: 2018-11-17 | End: 2018-11-19 | Stop reason: HOSPADM

## 2018-11-17 RX ORDER — DIPHENHYDRAMINE HYDROCHLORIDE 50 MG/ML
12.5 INJECTION INTRAMUSCULAR; INTRAVENOUS
Status: DISCONTINUED | OUTPATIENT
Start: 2018-11-17 | End: 2018-11-17 | Stop reason: HOSPADM

## 2018-11-17 RX ORDER — FAMOTIDINE 10 MG/ML
20 INJECTION, SOLUTION INTRAVENOUS ONCE
Status: DISCONTINUED | OUTPATIENT
Start: 2018-11-17 | End: 2018-11-17 | Stop reason: HOSPADM

## 2018-11-17 RX ORDER — SODIUM CHLORIDE, SODIUM LACTATE, POTASSIUM CHLORIDE, CALCIUM CHLORIDE 600; 310; 30; 20 MG/100ML; MG/100ML; MG/100ML; MG/100ML
9 INJECTION, SOLUTION INTRAVENOUS CONTINUOUS
Status: DISCONTINUED | OUTPATIENT
Start: 2018-11-17 | End: 2018-11-17

## 2018-11-17 RX ORDER — HYDROMORPHONE HCL 110MG/55ML
PATIENT CONTROLLED ANALGESIA SYRINGE INTRAVENOUS AS NEEDED
Status: DISCONTINUED | OUTPATIENT
Start: 2018-11-17 | End: 2018-11-17 | Stop reason: SURG

## 2018-11-17 RX ORDER — ONDANSETRON 2 MG/ML
4 INJECTION INTRAMUSCULAR; INTRAVENOUS EVERY 6 HOURS PRN
Status: DISCONTINUED | OUTPATIENT
Start: 2018-11-17 | End: 2018-11-19 | Stop reason: HOSPADM

## 2018-11-17 RX ORDER — PROPOFOL 10 MG/ML
VIAL (ML) INTRAVENOUS AS NEEDED
Status: DISCONTINUED | OUTPATIENT
Start: 2018-11-17 | End: 2018-11-17 | Stop reason: SURG

## 2018-11-17 RX ORDER — FENTANYL CITRATE 50 UG/ML
INJECTION, SOLUTION INTRAMUSCULAR; INTRAVENOUS AS NEEDED
Status: DISCONTINUED | OUTPATIENT
Start: 2018-11-17 | End: 2018-11-17 | Stop reason: SURG

## 2018-11-17 RX ORDER — ACETAMINOPHEN 325 MG/1
650 TABLET ORAL EVERY 4 HOURS PRN
Status: DISCONTINUED | OUTPATIENT
Start: 2018-11-17 | End: 2018-11-19 | Stop reason: HOSPADM

## 2018-11-17 RX ORDER — ONDANSETRON 2 MG/ML
4 INJECTION INTRAMUSCULAR; INTRAVENOUS ONCE AS NEEDED
Status: DISCONTINUED | OUTPATIENT
Start: 2018-11-17 | End: 2018-11-17 | Stop reason: HOSPADM

## 2018-11-17 RX ORDER — HYDROMORPHONE HYDROCHLORIDE 1 MG/ML
0.5 INJECTION, SOLUTION INTRAMUSCULAR; INTRAVENOUS; SUBCUTANEOUS
Status: DISCONTINUED | OUTPATIENT
Start: 2018-11-17 | End: 2018-11-19 | Stop reason: HOSPADM

## 2018-11-17 RX ORDER — LIDOCAINE HYDROCHLORIDE 10 MG/ML
0.5 INJECTION, SOLUTION EPIDURAL; INFILTRATION; INTRACAUDAL; PERINEURAL ONCE AS NEEDED
Status: DISCONTINUED | OUTPATIENT
Start: 2018-11-17 | End: 2018-11-17 | Stop reason: HOSPADM

## 2018-11-17 RX ORDER — NALOXONE HCL 0.4 MG/ML
0.2 VIAL (ML) INJECTION AS NEEDED
Status: DISCONTINUED | OUTPATIENT
Start: 2018-11-17 | End: 2018-11-17 | Stop reason: HOSPADM

## 2018-11-17 RX ORDER — ONDANSETRON 4 MG/1
4 TABLET, FILM COATED ORAL EVERY 6 HOURS PRN
Status: DISCONTINUED | OUTPATIENT
Start: 2018-11-17 | End: 2018-11-19 | Stop reason: HOSPADM

## 2018-11-17 RX ORDER — SODIUM CHLORIDE 0.9 % (FLUSH) 0.9 %
1-10 SYRINGE (ML) INJECTION AS NEEDED
Status: DISCONTINUED | OUTPATIENT
Start: 2018-11-17 | End: 2018-11-17 | Stop reason: HOSPADM

## 2018-11-17 RX ORDER — EPHEDRINE SULFATE 50 MG/ML
5 INJECTION, SOLUTION INTRAVENOUS ONCE AS NEEDED
Status: DISCONTINUED | OUTPATIENT
Start: 2018-11-17 | End: 2018-11-17 | Stop reason: HOSPADM

## 2018-11-17 RX ORDER — MIDAZOLAM HYDROCHLORIDE 1 MG/ML
2 INJECTION INTRAMUSCULAR; INTRAVENOUS
Status: DISCONTINUED | OUTPATIENT
Start: 2018-11-17 | End: 2018-11-17 | Stop reason: HOSPADM

## 2018-11-17 RX ORDER — VANCOMYCIN HYDROCHLORIDE 1 G/200ML
15 INJECTION, SOLUTION INTRAVENOUS ONCE
Status: DISCONTINUED | OUTPATIENT
Start: 2018-11-17 | End: 2018-11-17 | Stop reason: SDUPTHER

## 2018-11-17 RX ORDER — DOCUSATE SODIUM 100 MG/1
100 CAPSULE, LIQUID FILLED ORAL 2 TIMES DAILY
Status: DISCONTINUED | OUTPATIENT
Start: 2018-11-17 | End: 2018-11-19 | Stop reason: HOSPADM

## 2018-11-17 RX ORDER — LABETALOL HYDROCHLORIDE 5 MG/ML
5 INJECTION, SOLUTION INTRAVENOUS
Status: DISCONTINUED | OUTPATIENT
Start: 2018-11-17 | End: 2018-11-17 | Stop reason: HOSPADM

## 2018-11-17 RX ORDER — MIDAZOLAM HYDROCHLORIDE 1 MG/ML
1 INJECTION INTRAMUSCULAR; INTRAVENOUS
Status: DISCONTINUED | OUTPATIENT
Start: 2018-11-17 | End: 2018-11-17 | Stop reason: HOSPADM

## 2018-11-17 RX ORDER — OXYCODONE AND ACETAMINOPHEN 7.5; 325 MG/1; MG/1
1 TABLET ORAL EVERY 4 HOURS PRN
Status: DISCONTINUED | OUTPATIENT
Start: 2018-11-17 | End: 2018-11-19 | Stop reason: HOSPADM

## 2018-11-17 RX ORDER — DEXAMETHASONE SODIUM PHOSPHATE 10 MG/ML
INJECTION INTRAMUSCULAR; INTRAVENOUS AS NEEDED
Status: DISCONTINUED | OUTPATIENT
Start: 2018-11-17 | End: 2018-11-17 | Stop reason: SURG

## 2018-11-17 RX ORDER — FENTANYL CITRATE 50 UG/ML
INJECTION, SOLUTION INTRAMUSCULAR; INTRAVENOUS
Status: COMPLETED
Start: 2018-11-17 | End: 2018-11-17

## 2018-11-17 RX ORDER — FLUMAZENIL 0.1 MG/ML
0.2 INJECTION INTRAVENOUS AS NEEDED
Status: DISCONTINUED | OUTPATIENT
Start: 2018-11-17 | End: 2018-11-17 | Stop reason: HOSPADM

## 2018-11-17 RX ORDER — PROMETHAZINE HYDROCHLORIDE 25 MG/1
12.5 TABLET ORAL ONCE AS NEEDED
Status: DISCONTINUED | OUTPATIENT
Start: 2018-11-17 | End: 2018-11-17 | Stop reason: HOSPADM

## 2018-11-17 RX ORDER — KETOROLAC TROMETHAMINE 30 MG/ML
INJECTION, SOLUTION INTRAMUSCULAR; INTRAVENOUS AS NEEDED
Status: DISCONTINUED | OUTPATIENT
Start: 2018-11-17 | End: 2018-11-17 | Stop reason: SURG

## 2018-11-17 RX ORDER — PROMETHAZINE HYDROCHLORIDE 25 MG/1
25 TABLET ORAL ONCE AS NEEDED
Status: DISCONTINUED | OUTPATIENT
Start: 2018-11-17 | End: 2018-11-17 | Stop reason: HOSPADM

## 2018-11-17 RX ORDER — OXYCODONE AND ACETAMINOPHEN 7.5; 325 MG/1; MG/1
1 TABLET ORAL ONCE AS NEEDED
Status: COMPLETED | OUTPATIENT
Start: 2018-11-17 | End: 2018-11-17

## 2018-11-17 RX ORDER — ONDANSETRON 4 MG/1
4 TABLET, ORALLY DISINTEGRATING ORAL EVERY 6 HOURS PRN
Status: DISCONTINUED | OUTPATIENT
Start: 2018-11-17 | End: 2018-11-19 | Stop reason: HOSPADM

## 2018-11-17 RX ORDER — VANCOMYCIN HYDROCHLORIDE 1 G/200ML
15 INJECTION, SOLUTION INTRAVENOUS ONCE
Status: DISCONTINUED | OUTPATIENT
Start: 2018-11-17 | End: 2018-11-17 | Stop reason: DRUGHIGH

## 2018-11-17 RX ORDER — LIDOCAINE HYDROCHLORIDE 20 MG/ML
INJECTION, SOLUTION INFILTRATION; PERINEURAL AS NEEDED
Status: DISCONTINUED | OUTPATIENT
Start: 2018-11-17 | End: 2018-11-17 | Stop reason: SURG

## 2018-11-17 RX ADMIN — DOCUSATE SODIUM 100 MG: 100 CAPSULE, LIQUID FILLED ORAL at 13:28

## 2018-11-17 RX ADMIN — MUPIROCIN 10 APPLICATION: 20 OINTMENT TOPICAL at 20:25

## 2018-11-17 RX ADMIN — GABAPENTIN 400 MG: 400 CAPSULE ORAL at 20:25

## 2018-11-17 RX ADMIN — LIDOCAINE HYDROCHLORIDE 100 MG: 20 INJECTION, SOLUTION INFILTRATION; PERINEURAL at 10:50

## 2018-11-17 RX ADMIN — ALPRAZOLAM 0.5 MG: 0.5 TABLET ORAL at 01:12

## 2018-11-17 RX ADMIN — HYDROMORPHONE HYDROCHLORIDE 0.5 MG: 2 INJECTION INTRAMUSCULAR; INTRAVENOUS; SUBCUTANEOUS at 11:11

## 2018-11-17 RX ADMIN — FENTANYL CITRATE 50 MCG: 50 INJECTION INTRAMUSCULAR; INTRAVENOUS at 11:05

## 2018-11-17 RX ADMIN — GABAPENTIN 400 MG: 400 CAPSULE ORAL at 15:47

## 2018-11-17 RX ADMIN — OXYCODONE HYDROCHLORIDE AND ACETAMINOPHEN 2 TABLET: 7.5; 325 TABLET ORAL at 20:26

## 2018-11-17 RX ADMIN — DOCUSATE SODIUM 100 MG: 100 CAPSULE, LIQUID FILLED ORAL at 20:25

## 2018-11-17 RX ADMIN — HYDROMORPHONE HYDROCHLORIDE 0.5 MG: 2 INJECTION INTRAMUSCULAR; INTRAVENOUS; SUBCUTANEOUS at 11:26

## 2018-11-17 RX ADMIN — ALPRAZOLAM 0.5 MG: 0.5 TABLET ORAL at 18:21

## 2018-11-17 RX ADMIN — Medication 3 ML: at 20:45

## 2018-11-17 RX ADMIN — ATENOLOL 25 MG: 25 TABLET ORAL at 06:58

## 2018-11-17 RX ADMIN — ONDANSETRON HYDROCHLORIDE 4 MG: 2 SOLUTION INTRAMUSCULAR; INTRAVENOUS at 11:08

## 2018-11-17 RX ADMIN — OXYCODONE HYDROCHLORIDE AND ACETAMINOPHEN 1 TABLET: 7.5; 325 TABLET ORAL at 12:12

## 2018-11-17 RX ADMIN — SODIUM CHLORIDE, POTASSIUM CHLORIDE, SODIUM LACTATE AND CALCIUM CHLORIDE 9 ML/HR: 600; 310; 30; 20 INJECTION, SOLUTION INTRAVENOUS at 09:33

## 2018-11-17 RX ADMIN — SODIUM CHLORIDE 750 MG: 900 INJECTION, SOLUTION INTRAVENOUS at 02:05

## 2018-11-17 RX ADMIN — DULOXETINE HYDROCHLORIDE 30 MG: 30 CAPSULE, DELAYED RELEASE ORAL at 13:50

## 2018-11-17 RX ADMIN — OXYCODONE HYDROCHLORIDE AND ACETAMINOPHEN 2 TABLET: 7.5; 325 TABLET ORAL at 13:28

## 2018-11-17 RX ADMIN — SODIUM CHLORIDE 750 MG: 900 INJECTION, SOLUTION INTRAVENOUS at 14:41

## 2018-11-17 RX ADMIN — HYDROCODONE BITARTRATE AND ACETAMINOPHEN 1 TABLET: 5; 325 TABLET ORAL at 01:12

## 2018-11-17 RX ADMIN — DEXAMETHASONE SODIUM PHOSPHATE 8 MG: 10 INJECTION INTRAMUSCULAR; INTRAVENOUS at 11:05

## 2018-11-17 RX ADMIN — HYDROMORPHONE HYDROCHLORIDE 0.5 MG: 1 INJECTION, SOLUTION INTRAMUSCULAR; INTRAVENOUS; SUBCUTANEOUS at 11:58

## 2018-11-17 RX ADMIN — MIDAZOLAM 2 MG: 1 INJECTION INTRAMUSCULAR; INTRAVENOUS at 09:33

## 2018-11-17 RX ADMIN — FENTANYL CITRATE 50 MCG: 50 INJECTION INTRAMUSCULAR; INTRAVENOUS at 10:50

## 2018-11-17 RX ADMIN — Medication 3 ML: at 08:38

## 2018-11-17 RX ADMIN — ALPRAZOLAM 0.5 MG: 0.5 TABLET ORAL at 13:28

## 2018-11-17 RX ADMIN — FENTANYL CITRATE 50 MCG: 50 INJECTION INTRAMUSCULAR; INTRAVENOUS at 11:39

## 2018-11-17 RX ADMIN — PROPOFOL 200 MG: 10 INJECTION, EMULSION INTRAVENOUS at 10:50

## 2018-11-17 RX ADMIN — NICOTINE 1 PATCH: 14 PATCH, EXTENDED RELEASE TRANSDERMAL at 13:50

## 2018-11-17 RX ADMIN — FENTANYL CITRATE 50 MCG: 50 INJECTION INTRAMUSCULAR; INTRAVENOUS at 11:44

## 2018-11-17 RX ADMIN — KETOROLAC TROMETHAMINE 30 MG: 30 INJECTION, SOLUTION INTRAMUSCULAR; INTRAVENOUS at 11:27

## 2018-11-17 NOTE — PLAN OF CARE
Problem: Patient Care Overview  Goal: Plan of Care Review  Outcome: Ongoing (interventions implemented as appropriate)   11/17/18 8415   Coping/Psychosocial   Plan of Care Reviewed With patient   OTHER   Outcome Summary Patient resting well post xanax dose. Vital signs as charted. Refused to wear cardiac monitor. NPO post midnight(fluids removed from table,for I and D today-consented.Patient remained in her room as instructedthe whole night. Dressing left ankle dry and intact. Monitored.     Goal: Discharge Needs Assessment  Outcome: Ongoing (interventions implemented as appropriate)      Problem: Wound (Includes Pressure Injury) (Adult)  Goal: Signs and Symptoms of Listed Potential Problems Will be Absent, Minimized or Managed (Wound)  Outcome: Ongoing (interventions implemented as appropriate)      Problem: Skin and Soft Tissue Infection (Adult)  Goal: Signs and Symptoms of Listed Potential Problems Will be Absent, Minimized or Managed (Skin and Soft Tissue Infection)  Outcome: Ongoing (interventions implemented as appropriate)      Problem: Suicide Risk (Adult)  Goal: Identify Related Risk Factors and Signs and Symptoms  Outcome: Ongoing (interventions implemented as appropriate)    Goal: Strength-Based Wellness/Recovery  Outcome: Ongoing (interventions implemented as appropriate)    Goal: Physical Safety  Outcome: Ongoing (interventions implemented as appropriate)

## 2018-11-17 NOTE — BRIEF OP NOTE
INCISION AND DRAINAGE LOWER EXTREMITY  Progress Note    Filomena Rm  11/12/2018 - 11/17/2018    Pre-op Diagnosis:   Left ankle infection       Post-Op Diagnosis Codes:   same    Procedure/CPT® Codes:      Procedure(s):  Incision and Drainage of Left ankle    Surgeon(s):  Maxwell Lanier MD    Anesthesia: General    Staff:   Circulator: Caroline Ashford RN; Radha Ramos RN  Scrub Person: Natasha Son    Estimated Blood Loss: minimal    Urine Voided: * No values recorded between 11/17/2018 10:47 AM and 11/17/2018 11:33 AM *    Specimens:                ID Type Source Tests Collected by Time   1 : left infected ankle wound Wound Ankle, Left WOUND CULTURE Maxwell Lanier MD 11/17/2018 1126         Drains:      Findings: see dictation    Complications: none      Maxwell Lanier MD     Date: 11/17/2018  Time: 11:34 AM

## 2018-11-17 NOTE — PROGRESS NOTES
"Met w/ patient and met her b/f of \"2 weeks\".  She states she does not like mood stabilizers. She denies any SI or HI.  She is aware of the rules re: leaving the unit. Also discussed the need to follow MD's orders re: ambulating w/ her ankle to better assure a good results from the surgery.  She anticipates following up w/ Dr. Martinez in December.    "

## 2018-11-17 NOTE — NURSING NOTE
Report called to ERICA Peters ( in preop),informing her  that patient's atenolol was given with sips of water, IV site flushed this am with no problems . That patient is a hardstick and that IV site was inserted yesterday.Mentioned that patient seems to have her make up  On despite the advise given by RN not to (patient  non-compliant at times).

## 2018-11-17 NOTE — NURSING NOTE
Patient turning off bed alarm, and attempting to ambulate by her self. Patient has asked multiple times to go outside to smoke, nicotine patch applied. Patient then asked myself if she could smoke in the bathroom. Reinforced that no smoking is allowed. When entered room 10 minutes later, patient had turned the bed alarm off, left ankle wound had blood drainage, and bathroom/room smelled of smoke. Patient was educated to not turn off bed alarm, and was notified that she was placed on strict bed rest, and that smoking was not allowed. Bed alarm was re-activated. Left ankle dressing was re-inforced with an abd pad and kerlix.

## 2018-11-17 NOTE — PROGRESS NOTES
Name: Filomena Rm ADMIT: 2018   : 1960  PCP: Maico Holguin MD    MRN: [id number] LOS: 3 days   AGE/SEX: 58 y.o. female    ROOM: [facility]   Subjective   S/p Incision and Drainage of Left ankle      Brief hospital course since admission:  Chronic infection  MRI shows  1.  Abscess formation anterolateral to the lateral malleolus appears to  drain to the skin surface laterally with surrounding cellulitis. There  is adjacent peroneal tenosynovitis that is presumed infectious. Marrow  edema is present within the lateral calcaneus in the region of the  peroneal tubercle that may be reactive to adjacent synovitis or  represent early osteomyelitis.  2. Small nondisplaced fracture of the medial aspect of the navicular  where there is surrounding bone marrow edema and enhancement. This could  represent a posttraumatic fracture or stress fracture. Navicular  osteomyelitis is also a consideration though there is no clear cortical  loss and this is not in the region of the lateral ankle abscess.  Follow-up MRI may be helpful in the setting to assure that this is not  progressive.  I have reviewed past medical history, family history, social history and allergies.  No changes from admission note.      Review of Systems   Constitutional: Positive for fatigue. Negative for fever.   Respiratory: Negative for shortness of breath.    Cardiovascular: Negative for chest pain.   Skin: Positive for color change and wound.          Objective   Vital Signs  Temp:  [97.5 °F (36.4 °C)-98.3 °F (36.8 °C)] 98.2 °F (36.8 °C)  Heart Rate:  [62-84] 71  Resp:  [14-27] 18  BP: (120-166)/() 148/80  SpO2:  [96 %-100 %] 97 %  on  Flow (L/min):  [2-4] 2;   Device (Oxygen Therapy): room air  Body mass index is 26.17 kg/m².    Intake/Output Summary (Last 24 hours) at 2018 1445  Last data filed at 2018 1140  Gross per 24 hour   Intake 1270 ml   Output --   Net 1270 ml       Physical Exam   Constitutional: She is  oriented to person, place, and time. She appears well-developed and well-nourished. No distress.   HENT:   Head: Normocephalic and atraumatic.   Eyes: Pupils are equal, round, and reactive to light. No scleral icterus.   Cardiovascular: Normal rate and regular rhythm.   Pulmonary/Chest: Effort normal. No respiratory distress. She has no decreased breath sounds. She has no wheezes.   Abdominal: Soft. Bowel sounds are normal. There is no hepatosplenomegaly.   Musculoskeletal:        Left ankle: She exhibits swelling. Tenderness.   Neurological: She is alert and oriented to person, place, and time.   Skin: No cyanosis. Nails show no clubbing.   Psychiatric: She has a normal mood and affect. Her behavior is normal.       Results Review:    Results from last 7 days   Lab Units  11/13/18   0621  11/12/18   2200   WBC 10*3/mm3  10.36  11.46*   HEMOGLOBIN g/dL  12.5  12.8   HEMATOCRIT %  39.8  40.1   PLATELETS 10*3/mm3  357  332     Results from last 7 days   Lab Units  11/16/18   0534  11/15/18   0638  11/14/18   0612  11/13/18   0621  11/12/18   2323  11/12/18   2200   SODIUM mmol/L   --    --    --   137  136  137   POTASSIUM mmol/L   --    --    --   3.7  3.7  3.8   CHLORIDE mmol/L   --    --    --   97*  94*  97*   CO2 mmol/L   --    --    --   26.3  30.0*  31.3*   BUN mg/dL   --    --    --   11  12  13   CREATININE mg/dL  0.72  0.78  0.98  1.03*  1.01*  1.05*   GLUCOSE mg/dL   --    --    --   76  69  77   CALCIUM mg/dL   --    --    --   9.6  9.5  9.5         Hemoglobin A1C:No results found for: HGBA1C  Glucose Range:No results found for: POCGLU      ALPRAZolam 0.5 mg Oral Q6H   atenolol 25 mg Oral Daily   docusate sodium 100 mg Oral BID   DULoxetine 30 mg Oral Daily   gabapentin 400 mg Oral TID   mupirocin  Each Nare BID   nicotine 1 patch Transdermal Q24H   [START ON 11/18/2018] polyethylene glycol 17 g Oral Daily   sodium chloride 3 mL Intravenous Q12H   vancomycin 750 mg Intravenous Q12H       lactated ringers 9  mL/hr Last Rate: Stopped (11/17/18 1253)   lactated ringers 100 mL/hr Last Rate: 100 mL/hr (11/17/18 1253)   Pharmacy to dose vancomycin     Diet Regular  Assessment/Plan       Assessment/Plan      Active Hospital Problems    Diagnosis Date Noted   • **Peroneal tenosynovitis [M65.9] 11/14/2018   • Non compliance with medical treatment [Z91.19] 11/17/2018   • Tobacco use [Z72.0] 11/16/2018   • Fracture of navicular bone of left foot [S92.252A] 11/14/2018   • HTN (hypertension) [I10] 11/13/2018   • History of MRSA infection [Z86.14] 11/13/2018   • Anxiety [F41.9] 11/13/2018   • Cellulitis/abscess of left foot [L03.116] 11/12/2018      Resolved Hospital Problems   No resolved problems to display.       Continue antibiotics per infectious disease recommendations  I & D done  Add xanax for anxiety   Strict Bedrest  Patient has noncompliance with medical treatment.        Marti Denise MD  [facility]  11/17/18

## 2018-11-17 NOTE — ANESTHESIA PROCEDURE NOTES
ANESTHESIA INTUBATION  Urgency: elective    Airway not difficult    General Information and Staff    Patient location during procedure: OR  CRNA: Gladys Cervantes CRNA    Indications and Patient Condition  Indications for airway management: airway protection    Preoxygenated: yes  MILS not maintained throughout  Mask difficulty assessment: 1 - vent by mask    Final Airway Details  Final airway type: supraglottic airway      Successful airway: classic  Size 4    Number of attempts at approach: 1    Additional Comments  Inflated to seal.

## 2018-11-17 NOTE — ANESTHESIA PREPROCEDURE EVALUATION
Anesthesia Evaluation     Patient summary reviewed and Nursing notes reviewed   no history of anesthetic complications:  NPO Solid Status: > 8 hours  NPO Liquid Status: > 2 hours           Airway   Mallampati: II  TM distance: >3 FB  Neck ROM: full  Possible difficult intubation  Dental      Pulmonary    (+) a smoker Current Abstained day of surgery, COPD,   (-) sleep apnea, rhonchi, decreased breath sounds, wheezes  Cardiovascular   Exercise tolerance: good (4-7 METS)    Rhythm: regular  Rate: normal    (+) hypertension,   (-) CAD, dysrhythmias, angina, HERNANDEZ, murmur      Neuro/Psych  (+) numbness, psychiatric history,     (-) seizures, CVA  GI/Hepatic/Renal/Endo    (-) no renal disease, diabetes    Musculoskeletal         ROS comment: Hist ACDF good rom  Abdominal     Abdomen: soft.   Substance History      OB/GYN          Other      history of cancer      Other Comment: Thymoma s/p removal                Anesthesia Plan    ASA 2     general     intravenous induction   Anesthetic plan, all risks, benefits, and alternatives have been provided, discussed and informed consent has been obtained with: patient.

## 2018-11-17 NOTE — ANESTHESIA POSTPROCEDURE EVALUATION
"Patient: Filomena Rm    Procedure Summary     Date:  11/17/18 Room / Location:  [facility] OR [address] [place] [address] [facility] MAIN OR    Anesthesia Start:  1046 Anesthesia Stop:  1149    Procedure:  Incision and Drainage of Left ankle (Left ) Diagnosis:      Surgeon:  Maxwell Lanier MD Provider:  Kiko Lucero MD    Anesthesia Type:  general ASA Status:  2          Anesthesia Type: general  Last vitals  BP   (!) 166/102 (11/17/18 1143)   Temp   36.8 °C (98.3 °F) (11/17/18 1143)   Pulse   84 (11/17/18 1143)   Resp   24 (11/17/18 1143)     SpO2   99 % (11/17/18 1143)     Post Anesthesia Care and Evaluation    Patient location during evaluation: bedside  Patient participation: complete - patient participated  Level of consciousness: awake and alert  Pain management: adequate  Airway patency: patent  Anesthetic complications: No anesthetic complications  PONV Status: none  Cardiovascular status: acceptable  Respiratory status: acceptable  Hydration status: acceptable    Comments: BP (!) 166/102 (BP Location: Left arm, Patient Position: Lying)   Pulse 84   Temp 36.8 °C (98.3 °F) (Oral)   Resp 24   Ht 157.5 cm (62\")   Wt 64.9 kg (143 lb 1.6 oz)   SpO2 99%   BMI 26.17 kg/m²         "

## 2018-11-17 NOTE — OP NOTE
Orthopaedic Operative Note    Facility: [facility]    Patient: Filomena Rm    Medical Record Number: [id number]    YOB: 1960    Dictating Surgeon: Maxwell Lanier M.D.*    Primary Care Physician: Maico Holguin MD    Date of Operation: 11/17/2018    Pre-Operative Diagnosis:  Left ankle chronic infection and abscess with extension into the peroneal tendon sheath    Post-Operative Diagnosis:  Left ankle chronic infection and abscess with extension into the peroneal tendon sheath     Procedure Performed:  Irrigation and debridement of left ankle chronic infection including, skin, subcutaneous tissue and bone    Surgeon: Maxwell aLnier MD     Assistant: none    Anesthesia: general    Complications: None.     Estimated Blood Loss: Less than 50 mL.     Implants: none    Specimens: * No orders in the log *    Brief Operative Indication:  Ms. Rm had a long history of chronically infected left ankle.  The risks, benefits and alternatives to irrigation and debridement were discussed in detail.  She acknowledged understanding of the information and consented to proceed.  The patient and operative site were identified in the preoperative holding area.  The surgical site was marked.  The patient was taken to the operating room and placed in the supine position.  Adequate general anesthesia was administered.    Description of the procedure in detail:  The patient and operative site were identified in the preoperative holding area.  The surgical site was marked.  The patient was taken to the operating room and placed in the supine position.  Adequate general anesthesia was administered.  She was repositioned on the operating table.  A timeout was taken.  Preoperative antibiotics were intentionally withheld as this patient is already on IV antibiotics.    I began by fashioning an approximately 5 cm incision over the lateral aspect of the ankle.  She had an open wound over the  malleolus was some purulent material and necrotic edges.  I fashioned the incision so as to ellipse this wound out and debride the synovitis.  I incised the skin only and then carefully dissected down into the subcutaneous tissues.  There was a large abscess which was evacuated.  Several cultures were sent.  There was extensive necrosis involving the subcutaneous tissues, lateral aspect of the malleolus, and peroneal tendon sheath.  This made visualization of normal anatomic planes and structures somewhat difficult.  This was all debrided sharply using a combination of a scalpel and rongeur.  There was synovitis extending up the peroneal tendon sheath and this was carefully explored and debrided as well, taking care to keep the tendons protected.  Of note, the tendon sheath had been significantly eroded and the tendons were easily subluxatable out of the groove.  The necrotic nonviable tissue was all debrided back to healthy, viable-appearing tissue.  The wound was copiously irrigated out with 500 cc of a Betadine-containing saline solution followed by a liter of plain sterile saline.  I made sure that there were no further areas that warranted debridement.    I irrigated with another 500 cc of plain sterile saline and then let down the tourniquet.  I made sure that good hemostasis was obtained.  The wound was then closed using nylon suture.  I did leave the sinus tract open and packed that with iodoform.  A sterile dressing was applied.  The foot was placed in a boot.  The patient was awakened and taken to the recovery room in good condition.    Maxwell Lanier MD  11/17/18

## 2018-11-17 NOTE — PLAN OF CARE
Problem: Patient Care Overview  Goal: Plan of Care Review  Outcome: Ongoing (interventions implemented as appropriate)   11/17/18 0035   Coping/Psychosocial   Plan of Care Reviewed With patient   Plan of Care Review   Progress no change   OTHER   Outcome Summary VSS. Refusing telemetry monitor. IV antibiotics. Refusing bed rest. Dressing reinforced. To be transferred to ortho.        Problem: Pain, Acute (Adult)  Goal: Acceptable Pain Control/Comfort Level  Outcome: Ongoing (interventions implemented as appropriate)      Problem: Wound (Includes Pressure Injury) (Adult)  Goal: Signs and Symptoms of Listed Potential Problems Will be Absent, Minimized or Managed (Wound)  Outcome: Ongoing (interventions implemented as appropriate)      Problem: Skin and Soft Tissue Infection (Adult)  Goal: Signs and Symptoms of Listed Potential Problems Will be Absent, Minimized or Managed (Skin and Soft Tissue Infection)  Outcome: Ongoing (interventions implemented as appropriate)      Problem: Suicide Risk (Adult)  Goal: Identify Related Risk Factors and Signs and Symptoms  Outcome: Ongoing (interventions implemented as appropriate)    Goal: Strength-Based Wellness/Recovery  Outcome: Ongoing (interventions implemented as appropriate)    Goal: Physical Safety  Outcome: Ongoing (interventions implemented as appropriate)      Problem: Skin Injury Risk (Adult)  Goal: Identify Related Risk Factors and Signs and Symptoms  Outcome: Ongoing (interventions implemented as appropriate)    Goal: Skin Health and Integrity  Outcome: Ongoing (interventions implemented as appropriate)

## 2018-11-18 LAB
ANION GAP SERPL CALCULATED.3IONS-SCNC: 9.8 MMOL/L
BASOPHILS # BLD AUTO: 0.02 10*3/MM3 (ref 0–0.2)
BASOPHILS NFR BLD AUTO: 0.2 % (ref 0–1.5)
BUN BLD-MCNC: 12 MG/DL (ref 6–20)
BUN/CREAT SERPL: 15.2 (ref 7–25)
CALCIUM SPEC-SCNC: 8.9 MG/DL (ref 8.6–10.5)
CHLORIDE SERPL-SCNC: 107 MMOL/L (ref 98–107)
CO2 SERPL-SCNC: 26.2 MMOL/L (ref 22–29)
CREAT BLD-MCNC: 0.79 MG/DL (ref 0.57–1)
DEPRECATED RDW RBC AUTO: 46.3 FL (ref 37–54)
EOSINOPHIL # BLD AUTO: 0.02 10*3/MM3 (ref 0–0.7)
EOSINOPHIL NFR BLD AUTO: 0.2 % (ref 0.3–6.2)
ERYTHROCYTE [DISTWIDTH] IN BLOOD BY AUTOMATED COUNT: 12.8 % (ref 11.7–13)
GFR SERPL CREATININE-BSD FRML MDRD: 75 ML/MIN/1.73
GLUCOSE BLD-MCNC: 118 MG/DL (ref 65–99)
HCT VFR BLD AUTO: 34.9 % (ref 35.6–45.5)
HGB BLD-MCNC: 10.7 G/DL (ref 11.9–15.5)
IMM GRANULOCYTES # BLD: 0 10*3/MM3 (ref 0–0.03)
IMM GRANULOCYTES NFR BLD: 0 % (ref 0–0.5)
LYMPHOCYTES # BLD AUTO: 1.43 10*3/MM3 (ref 0.9–4.8)
LYMPHOCYTES NFR BLD AUTO: 12.8 % (ref 19.6–45.3)
MCH RBC QN AUTO: 30.5 PG (ref 26.9–32)
MCHC RBC AUTO-ENTMCNC: 30.7 G/DL (ref 32.4–36.3)
MCV RBC AUTO: 99.4 FL (ref 80.5–98.2)
MONOCYTES # BLD AUTO: 0.8 10*3/MM3 (ref 0.2–1.2)
MONOCYTES NFR BLD AUTO: 7.2 % (ref 5–12)
NEUTROPHILS # BLD AUTO: 8.89 10*3/MM3 (ref 1.9–8.1)
NEUTROPHILS NFR BLD AUTO: 79.6 % (ref 42.7–76)
PLATELET # BLD AUTO: 291 10*3/MM3 (ref 140–500)
PMV BLD AUTO: 9.2 FL (ref 6–12)
POTASSIUM BLD-SCNC: 4.7 MMOL/L (ref 3.5–5.2)
RBC # BLD AUTO: 3.51 10*6/MM3 (ref 3.9–5.2)
SODIUM BLD-SCNC: 143 MMOL/L (ref 136–145)
WBC NRBC COR # BLD: 11.16 10*3/MM3 (ref 4.5–10.7)

## 2018-11-18 PROCEDURE — 85025 COMPLETE CBC W/AUTO DIFF WBC: CPT | Performed by: ORTHOPAEDIC SURGERY

## 2018-11-18 PROCEDURE — 25010000002 VANCOMYCIN 750 MG RECONSTITUTED SOLUTION: Performed by: INTERNAL MEDICINE

## 2018-11-18 PROCEDURE — 80048 BASIC METABOLIC PNL TOTAL CA: CPT | Performed by: ORTHOPAEDIC SURGERY

## 2018-11-18 RX ORDER — TRIAMTERENE CAPSULES 50 MG/1
50 CAPSULE ORAL DAILY
Status: DISCONTINUED | OUTPATIENT
Start: 2018-11-18 | End: 2018-11-19 | Stop reason: HOSPADM

## 2018-11-18 RX ORDER — DEXTROAMPHETAMINE SACCHARATE, AMPHETAMINE ASPARTATE, DEXTROAMPHETAMINE SULFATE AND AMPHETAMINE SULFATE 1.25; 1.25; 1.25; 1.25 MG/1; MG/1; MG/1; MG/1
20 TABLET ORAL 3 TIMES DAILY
Status: DISCONTINUED | OUTPATIENT
Start: 2018-11-18 | End: 2018-11-19 | Stop reason: HOSPADM

## 2018-11-18 RX ORDER — NICOTINE 21 MG/24HR
1 PATCH, TRANSDERMAL 24 HOURS TRANSDERMAL
Status: DISCONTINUED | OUTPATIENT
Start: 2018-11-19 | End: 2018-11-19 | Stop reason: HOSPADM

## 2018-11-18 RX ADMIN — DULOXETINE HYDROCHLORIDE 30 MG: 30 CAPSULE, DELAYED RELEASE ORAL at 09:13

## 2018-11-18 RX ADMIN — POLYETHYLENE GLYCOL 3350 17 G: 17 POWDER, FOR SOLUTION ORAL at 09:13

## 2018-11-18 RX ADMIN — OXYCODONE HYDROCHLORIDE AND ACETAMINOPHEN 2 TABLET: 7.5; 325 TABLET ORAL at 18:16

## 2018-11-18 RX ADMIN — NICOTINE 1 PATCH: 14 PATCH, EXTENDED RELEASE TRANSDERMAL at 09:12

## 2018-11-18 RX ADMIN — SODIUM CHLORIDE 750 MG: 900 INJECTION, SOLUTION INTRAVENOUS at 17:00

## 2018-11-18 RX ADMIN — DOCUSATE SODIUM 100 MG: 100 CAPSULE, LIQUID FILLED ORAL at 09:13

## 2018-11-18 RX ADMIN — DOCUSATE SODIUM 100 MG: 100 CAPSULE, LIQUID FILLED ORAL at 20:48

## 2018-11-18 RX ADMIN — ATENOLOL 25 MG: 25 TABLET ORAL at 09:13

## 2018-11-18 RX ADMIN — ALPRAZOLAM 0.5 MG: 0.5 TABLET ORAL at 01:32

## 2018-11-18 RX ADMIN — OXYCODONE HYDROCHLORIDE AND ACETAMINOPHEN 2 TABLET: 7.5; 325 TABLET ORAL at 05:41

## 2018-11-18 RX ADMIN — OXYCODONE HYDROCHLORIDE AND ACETAMINOPHEN 2 TABLET: 7.5; 325 TABLET ORAL at 01:32

## 2018-11-18 RX ADMIN — TRIAMTERENE 50 MG: 50 CAPSULE ORAL at 16:13

## 2018-11-18 RX ADMIN — GABAPENTIN 400 MG: 400 CAPSULE ORAL at 09:12

## 2018-11-18 RX ADMIN — MUPIROCIN 10 APPLICATION: 20 OINTMENT TOPICAL at 20:47

## 2018-11-18 RX ADMIN — ALPRAZOLAM 0.5 MG: 0.5 TABLET ORAL at 06:36

## 2018-11-18 RX ADMIN — SODIUM CHLORIDE 750 MG: 900 INJECTION, SOLUTION INTRAVENOUS at 02:34

## 2018-11-18 RX ADMIN — GABAPENTIN 400 MG: 400 CAPSULE ORAL at 20:48

## 2018-11-18 RX ADMIN — Medication 3 ML: at 09:18

## 2018-11-18 RX ADMIN — OXYCODONE HYDROCHLORIDE AND ACETAMINOPHEN 2 TABLET: 7.5; 325 TABLET ORAL at 22:39

## 2018-11-18 RX ADMIN — OXYCODONE HYDROCHLORIDE AND ACETAMINOPHEN 2 TABLET: 7.5; 325 TABLET ORAL at 11:23

## 2018-11-18 RX ADMIN — DEXTROAMPHETAMINE SACCHARATE, AMPHETAMINE ASPARTATE, DEXTROAMPHETAMINE SULFATE AND AMPHETAMINE SULFATE 20 MG: 1.25; 1.25; 1.25; 1.25 TABLET ORAL at 16:13

## 2018-11-18 RX ADMIN — ALPRAZOLAM 0.5 MG: 0.5 TABLET ORAL at 12:45

## 2018-11-18 RX ADMIN — DEXTROAMPHETAMINE SACCHARATE, AMPHETAMINE ASPARTATE, DEXTROAMPHETAMINE SULFATE AND AMPHETAMINE SULFATE 20 MG: 1.25; 1.25; 1.25; 1.25 TABLET ORAL at 20:48

## 2018-11-18 RX ADMIN — ALPRAZOLAM 0.5 MG: 0.5 TABLET ORAL at 19:08

## 2018-11-18 NOTE — NURSING NOTE
Upon entering room patient has stated that she is going to go outside to smoke and that she is leaving today whether she gets discharged today or not. Informed patient that we have a strict no smoking policy and that she cannot do so. MD aware and also reinforced that no smoking will be allowed and that she is to not leave her room. Patient aware of the risks of leaving AMA and has agreed to comply with hospital policies.

## 2018-11-18 NOTE — PLAN OF CARE
Problem: Patient Care Overview  Goal: Plan of Care Review  Outcome: Ongoing (interventions implemented as appropriate)   11/18/18 0407   Coping/Psychosocial   Plan of Care Reviewed With patient   Plan of Care Review   Progress improving   OTHER   Outcome Summary Pt has been stable through the night. Ambulates with walker use and 1 assist. Advised to limit activity as LHA put bedrest orders in. Pain well controlled with PO pain meds. Dressing changed due to dried and new drainage, has stopped draining at this time. On IV vanc. Will d/c home when able to do so.      Goal: Individualization and Mutuality  Outcome: Ongoing (interventions implemented as appropriate)    Goal: Discharge Needs Assessment  Outcome: Ongoing (interventions implemented as appropriate)   11/13/18 0123 11/14/18 1539   Discharge Needs Assessment   Concerns to be Addressed --  discharge planning   Patient/Family Anticipates Transition to --  home   Patient/Family Anticipated Services at Transition none --    Transportation Concerns --  car, none   Transportation Anticipated --  family or friend will provide   Discharge Coordination/Progress --  Home   Disability   Equipment Currently Used at Home --  none     Goal: Interprofessional Rounds/Family Conf  Outcome: Ongoing (interventions implemented as appropriate)   11/18/18 0407   Interdisciplinary Rounds/Family Conf   Participants nursing;patient       Problem: Pain, Acute (Adult)  Goal: Acceptable Pain Control/Comfort Level  Outcome: Ongoing (interventions implemented as appropriate)   11/18/18 0407   Pain, Acute (Adult)   Acceptable Pain Control/Comfort Level making progress toward outcome       Problem: Wound (Includes Pressure Injury) (Adult)  Goal: Signs and Symptoms of Listed Potential Problems Will be Absent, Minimized or Managed (Wound)  Outcome: Ongoing (interventions implemented as appropriate)   11/18/18 0407   Goal/Outcome Evaluation   Problems Assessed (Wound) all   Problems Present  (Wound) pain;situational response;bleeding       Problem: Skin and Soft Tissue Infection (Adult)  Goal: Signs and Symptoms of Listed Potential Problems Will be Absent, Minimized or Managed (Skin and Soft Tissue Infection)  Outcome: Ongoing (interventions implemented as appropriate)   11/18/18 0407   Goal/Outcome Evaluation   Problems Assessed (Skin and Soft Tissue Infection) all   Problems Present (Skin/Soft Tissue Inf) pain;situational response       Problem: Fall Risk (Adult)  Goal: Identify Related Risk Factors and Signs and Symptoms  Outcome: Outcome(s) achieved Date Met: 11/18/18 11/18/18 0407   Fall Risk (Adult)   Related Risk Factors (Fall Risk) gait/mobility problems;environment unfamiliar;culprit medication(s)   Signs and Symptoms (Fall Risk) presence of risk factors     Goal: Absence of Fall  Outcome: Ongoing (interventions implemented as appropriate)   11/18/18 0407   Fall Risk (Adult)   Absence of Fall achieves outcome

## 2018-11-18 NOTE — PROGRESS NOTES
Name: Filomena Rm ADMIT: 2018   : 1960  PCP: Maico Holguin MD    MRN: [id number] LOS: 4 days   AGE/SEX: 58 y.o. female    ROOM: [facility]   Subjective   S/p Incision and Drainage of Left ankle  Patient was caught smoking in the bathroom  Is very noncompliant with medical directions and instructions      Brief hospital course since admission:  Chronic infection  MRI shows  1.  Abscess formation anterolateral to the lateral malleolus appears to  drain to the skin surface laterally with surrounding cellulitis. There  is adjacent peroneal tenosynovitis that is presumed infectious. Marrow  edema is present within the lateral calcaneus in the region of the  peroneal tubercle that may be reactive to adjacent synovitis or  represent early osteomyelitis.  2. Small nondisplaced fracture of the medial aspect of the navicular  where there is surrounding bone marrow edema and enhancement. This could  represent a posttraumatic fracture or stress fracture. Navicular  osteomyelitis is also a consideration though there is no clear cortical  loss and this is not in the region of the lateral ankle abscess.  Follow-up MRI may be helpful in the setting to assure that this is not  progressive.  I have reviewed past medical history, family history, social history and allergies.  No changes from admission note.      Review of Systems   Constitutional: Positive for fatigue. Negative for fever.   Respiratory: Negative for shortness of breath.    Cardiovascular: Negative for chest pain.   Skin: Positive for color change and wound.          Objective   Vital Signs  Temp:  [97.2 °F (36.2 °C)-97.8 °F (36.6 °C)] 97.2 °F (36.2 °C)  Heart Rate:  [71-83] 71  Resp:  [16] 16  BP: ()/(58-85) 122/68  SpO2:  [94 %-99 %] 94 %  on   ;   Device (Oxygen Therapy): room air  Body mass index is 26.17 kg/m².    Intake/Output Summary (Last 24 hours) at 2018 5283  Last data filed at 2018 0234  Gross per 24 hour   Intake  250 ml   Output 200 ml   Net 50 ml       Physical Exam   Constitutional: She is oriented to person, place, and time. She appears well-developed and well-nourished. No distress.   HENT:   Head: Normocephalic and atraumatic.   Eyes: Pupils are equal, round, and reactive to light. No scleral icterus.   Cardiovascular: Normal rate and regular rhythm.   Pulmonary/Chest: Effort normal. No respiratory distress. She has no decreased breath sounds. She has no wheezes.   Abdominal: Soft. Bowel sounds are normal. There is no hepatosplenomegaly.   Musculoskeletal:        Left ankle: She exhibits swelling. Tenderness.   Neurological: She is alert and oriented to person, place, and time.   Skin: No cyanosis. Nails show no clubbing.   Psychiatric: She has a normal mood and affect. Her behavior is normal.       Results Review:    Results from last 7 days   Lab Units  11/18/18   0506  11/13/18   0621  11/12/18   2200   WBC 10*3/mm3  11.16*  10.36  11.46*   HEMOGLOBIN g/dL  10.7*  12.5  12.8   HEMATOCRIT %  34.9*  39.8  40.1   PLATELETS 10*3/mm3  291  357  332     Results from last 7 days   Lab Units  11/18/18   0506  11/16/18   0534  11/15/18   0638   11/13/18   0621  11/12/18   2323   SODIUM mmol/L  143   --    --    --   137  136   POTASSIUM mmol/L  4.7   --    --    --   3.7  3.7   CHLORIDE mmol/L  107   --    --    --   97*  94*   CO2 mmol/L  26.2   --    --    --   26.3  30.0*   BUN mg/dL  12   --    --    --   11  12   CREATININE mg/dL  0.79  0.72  0.78   < >  1.03*  1.01*   GLUCOSE mg/dL  118*   --    --    --   76  69   CALCIUM mg/dL  8.9   --    --    --   9.6  9.5    < > = values in this interval not displayed.         Hemoglobin A1C:No results found for: HGBA1C  Glucose Range:No results found for: POCGLU      ALPRAZolam 0.5 mg Oral Q6H   atenolol 25 mg Oral Daily   docusate sodium 100 mg Oral BID   DULoxetine 30 mg Oral Daily   gabapentin 400 mg Oral TID   mupirocin  Each Nare BID   nicotine 1 patch Transdermal Q24H   NON  FORMULARY 20 mg Oral TID   polyethylene glycol 17 g Oral Daily   sodium chloride 3 mL Intravenous Q12H   triamterene 50 mg Oral Daily   vancomycin 750 mg Intravenous Q12H       Pharmacy to dose vancomycin    Diet Regular; Vegetarian; No Red Meat ([employer])  Assessment/Plan       Assessment/Plan      Active Hospital Problems    Diagnosis Date Noted   • **Peroneal tenosynovitis [M65.9] 11/14/2018   • Non compliance with medical treatment [Z91.19] 11/17/2018   • Tobacco use [Z72.0] 11/16/2018   • Fracture of navicular bone of left foot [S92.252A] 11/14/2018   • HTN (hypertension) [I10] 11/13/2018   • History of MRSA infection [Z86.14] 11/13/2018   • Anxiety [F41.9] 11/13/2018   • Cellulitis/abscess of left foot [L03.116] 11/12/2018      Resolved Hospital Problems   No resolved problems to display.       Continue antibiotics per infectious disease recommendations  I & D done  Add xanax for anxiety   Strict Bedrest  Patient has noncompliance with medical treatment.  She is not to leave room        Marti Denise MD  [facility]  11/18/18

## 2018-11-18 NOTE — PLAN OF CARE
Problem: Patient Care Overview  Goal: Plan of Care Review  Outcome: Ongoing (interventions implemented as appropriate)   11/18/18 1732   Coping/Psychosocial   Plan of Care Reviewed With patient   Plan of Care Review   Progress improving   OTHER   Outcome Summary Pain well managed w/ po percocet. Continues to be noncompliant with medical advice. Refusing to wear walking boot. Pt. aware that she cannot smoke in her bathroom and that security will be called. Empathetic listening provided, more relaxed and resting comfortably. Home triamterene and adderall ordered. Will cont to monitor.        Problem: Pain, Acute (Adult)  Goal: Acceptable Pain Control/Comfort Level  Outcome: Ongoing (interventions implemented as appropriate)   11/18/18 1732   Pain, Acute (Adult)   Acceptable Pain Control/Comfort Level achieves outcome       Problem: Fall Risk (Adult)  Goal: Absence of Fall  Outcome: Ongoing (interventions implemented as appropriate)   11/18/18 1732   Fall Risk (Adult)   Absence of Fall making progress toward outcome

## 2018-11-18 NOTE — SIGNIFICANT NOTE
11/18/18 1042   Rehab Time/Intention   Evaluation Not Performed other (see comments)  (Spoke with pt and RN. Pt up in room independently and ambulating. Upon entry, pt did not have boot on L foot.  Educated pt on importance of wearing boot with weight bearing. ASsisted pt to ensure proper fitting. Pt states the boot hurts her foot.)   Rehab Treatment   Discipline physical therapist   Reason Treatment Not Performed other (see comments)  (Encouraged pt to wear boot as instructed per MD and to f/u him regarding activity restrictions and boot wear. She verbalized understanding. She is anxious to go home. PT not indicated.)

## 2018-11-18 NOTE — PROGRESS NOTES
Status post operative irrigation and debridement of the left ankle.  Operative cultures negative to date.  Continue vancomycin.  We'll officially see tomorrow.

## 2018-11-18 NOTE — PROGRESS NOTES
"Access Center Follow Up:  Pt found in room with RN. She states she is aggravated at her foot and the situation she is in but denies any SI.  \"I am right with God and I won't do that\".  Pt was talkative and shared about her etoh use, her relapses and her difficult year of losses.  Pt is worried about her dog who is being checked on by family and friends but pt says her dog is dying.  Pt states she had been dx bipolar but she does not like the psych meds she has been given for it.  However she says her Adderall is helpful and she is frustrated that it has not been given to her while she is in the hospital.  Her RN is going to bring it up to the pt's MD.  Access will continue to follow to provide support.  "

## 2018-11-18 NOTE — SIGNIFICANT NOTE
11/18/18 1045   PT Discharge Summary   Reason for Discharge Independent   Discharge Destination Home with assist

## 2018-11-18 NOTE — PROGRESS NOTES
"Orthopaedic Surgery  Daily Progress Note    BP 99/58 (BP Location: Left arm, Patient Position: Lying)   Pulse 77   Temp 97.8 °F (36.6 °C) (Oral)   Resp 16   Ht 157.5 cm (62\")   Wt 64.9 kg (143 lb 1.6 oz)   SpO2 94%   BMI 26.17 kg/m²       Imaging Results (last 24 hours)     ** No results found for the last 24 hours. **          Patient Care Team:  Maico Holguin MD as PCP - General (Family Medicine)    SUBJECTIVE  Patient reports pain much better than before surgery.    PHYSICAL EXAM  On my entry into the room, patient is walking around without obvious pain.  She is not wearing the boot.  Dressing clean, dry, intact  Toes warm, perfused, brisk capillary refill  Flexes/extends toes  SILT over toes  No pain with passive stretch       Peroneal tenosynovitis    Cellulitis/abscess of left foot    HTN (hypertension)    History of MRSA infection    Anxiety    Fracture of navicular bone of left foot    Tobacco use    Non compliance with medical treatment      PLAN / DISPOSITION:  POD 1 s/p left ankle I&D by Dr. Lanier    Operative cultures show no growth to date.    1. Pain control: Orals  2.  Antibiotics: Continue IV antibiotics as directed by infectious disease, follow-up operative culture data  3.  PT: Weightbearing as tolerated in tall boot  4. DVT: ALLIE/SCDs plus mobilization  5. Dispo: pending    Kiko Britt Jr, MD  11/18/18  9:09 AM  "

## 2018-11-19 ENCOUNTER — TELEPHONE (OUTPATIENT)
Dept: ORTHOPEDIC SURGERY | Facility: CLINIC | Age: 58
End: 2018-11-19

## 2018-11-19 VITALS
DIASTOLIC BLOOD PRESSURE: 72 MMHG | TEMPERATURE: 97.9 F | OXYGEN SATURATION: 95 % | RESPIRATION RATE: 16 BRPM | BODY MASS INDEX: 26.33 KG/M2 | WEIGHT: 143.1 LBS | HEART RATE: 81 BPM | SYSTOLIC BLOOD PRESSURE: 119 MMHG | HEIGHT: 62 IN

## 2018-11-19 LAB
ANION GAP SERPL CALCULATED.3IONS-SCNC: 9.9 MMOL/L
BASOPHILS # BLD AUTO: 0.01 10*3/MM3 (ref 0–0.2)
BASOPHILS NFR BLD AUTO: 0.1 % (ref 0–1.5)
BUN BLD-MCNC: 11 MG/DL (ref 6–20)
BUN/CREAT SERPL: 15.5 (ref 7–25)
CALCIUM SPEC-SCNC: 8.5 MG/DL (ref 8.6–10.5)
CHLORIDE SERPL-SCNC: 106 MMOL/L (ref 98–107)
CO2 SERPL-SCNC: 26.1 MMOL/L (ref 22–29)
CREAT BLD-MCNC: 0.71 MG/DL (ref 0.57–1)
DEPRECATED RDW RBC AUTO: 49.6 FL (ref 37–54)
EOSINOPHIL # BLD AUTO: 0.27 10*3/MM3 (ref 0–0.7)
EOSINOPHIL NFR BLD AUTO: 3 % (ref 0.3–6.2)
ERYTHROCYTE [DISTWIDTH] IN BLOOD BY AUTOMATED COUNT: 13.3 % (ref 11.7–13)
GFR SERPL CREATININE-BSD FRML MDRD: 85 ML/MIN/1.73
GLUCOSE BLD-MCNC: 77 MG/DL (ref 65–99)
HCT VFR BLD AUTO: 34.8 % (ref 35.6–45.5)
HGB BLD-MCNC: 10.5 G/DL (ref 11.9–15.5)
IMM GRANULOCYTES # BLD: 0.03 10*3/MM3 (ref 0–0.03)
IMM GRANULOCYTES NFR BLD: 0.3 % (ref 0–0.5)
LYMPHOCYTES # BLD AUTO: 2.48 10*3/MM3 (ref 0.9–4.8)
LYMPHOCYTES NFR BLD AUTO: 27.3 % (ref 19.6–45.3)
MCH RBC QN AUTO: 30.6 PG (ref 26.9–32)
MCHC RBC AUTO-ENTMCNC: 30.2 G/DL (ref 32.4–36.3)
MCV RBC AUTO: 101.5 FL (ref 80.5–98.2)
MONOCYTES # BLD AUTO: 0.62 10*3/MM3 (ref 0.2–1.2)
MONOCYTES NFR BLD AUTO: 6.8 % (ref 5–12)
NEUTROPHILS # BLD AUTO: 5.69 10*3/MM3 (ref 1.9–8.1)
NEUTROPHILS NFR BLD AUTO: 62.5 % (ref 42.7–76)
PLATELET # BLD AUTO: 273 10*3/MM3 (ref 140–500)
PMV BLD AUTO: 9.4 FL (ref 6–12)
POTASSIUM BLD-SCNC: 4 MMOL/L (ref 3.5–5.2)
RBC # BLD AUTO: 3.43 10*6/MM3 (ref 3.9–5.2)
SODIUM BLD-SCNC: 142 MMOL/L (ref 136–145)
WBC NRBC COR # BLD: 9.1 10*3/MM3 (ref 4.5–10.7)

## 2018-11-19 PROCEDURE — 85025 COMPLETE CBC W/AUTO DIFF WBC: CPT | Performed by: INTERNAL MEDICINE

## 2018-11-19 PROCEDURE — 25010000002 VANCOMYCIN 750 MG RECONSTITUTED SOLUTION: Performed by: INTERNAL MEDICINE

## 2018-11-19 PROCEDURE — 99232 SBSQ HOSP IP/OBS MODERATE 35: CPT | Performed by: INTERNAL MEDICINE

## 2018-11-19 PROCEDURE — 80048 BASIC METABOLIC PNL TOTAL CA: CPT | Performed by: INTERNAL MEDICINE

## 2018-11-19 PROCEDURE — 99231 SBSQ HOSP IP/OBS SF/LOW 25: CPT | Performed by: NURSE PRACTITIONER

## 2018-11-19 RX ORDER — OXYCODONE AND ACETAMINOPHEN 7.5; 325 MG/1; MG/1
TABLET ORAL
Qty: 50 TABLET | Refills: 0 | OUTPATIENT
Start: 2018-11-19 | End: 2018-11-28 | Stop reason: SDUPTHER

## 2018-11-19 RX ORDER — ACETAMINOPHEN 325 MG/1
650 TABLET ORAL EVERY 4 HOURS PRN
COMMUNITY
Start: 2018-11-19 | End: 2019-02-12 | Stop reason: HOSPADM

## 2018-11-19 RX ORDER — OXYCODONE AND ACETAMINOPHEN 7.5; 325 MG/1; MG/1
TABLET ORAL
Qty: 50 TABLET | Refills: 0
Start: 2018-11-19 | End: 2018-11-19

## 2018-11-19 RX ORDER — SULFAMETHOXAZOLE AND TRIMETHOPRIM 800; 160 MG/1; MG/1
1 TABLET ORAL EVERY 12 HOURS SCHEDULED
Status: DISCONTINUED | OUTPATIENT
Start: 2018-11-19 | End: 2018-11-19 | Stop reason: HOSPADM

## 2018-11-19 RX ORDER — SULFAMETHOXAZOLE AND TRIMETHOPRIM 800; 160 MG/1; MG/1
1 TABLET ORAL EVERY 12 HOURS SCHEDULED
Qty: 41 TABLET | Refills: 0 | Status: SHIPPED | OUTPATIENT
Start: 2018-11-19 | End: 2018-12-10

## 2018-11-19 RX ADMIN — Medication 3 ML: at 09:18

## 2018-11-19 RX ADMIN — ALPRAZOLAM 0.5 MG: 0.5 TABLET ORAL at 01:04

## 2018-11-19 RX ADMIN — ALPRAZOLAM 0.5 MG: 0.5 TABLET ORAL at 06:18

## 2018-11-19 RX ADMIN — OXYCODONE HYDROCHLORIDE AND ACETAMINOPHEN 2 TABLET: 7.5; 325 TABLET ORAL at 06:18

## 2018-11-19 RX ADMIN — SULFAMETHOXAZOLE AND TRIMETHOPRIM 160 MG: 800; 160 TABLET ORAL at 10:37

## 2018-11-19 RX ADMIN — ALPRAZOLAM 0.5 MG: 0.5 TABLET ORAL at 13:05

## 2018-11-19 RX ADMIN — TRIAMTERENE 50 MG: 50 CAPSULE ORAL at 09:03

## 2018-11-19 RX ADMIN — POLYETHYLENE GLYCOL 3350 17 G: 17 POWDER, FOR SOLUTION ORAL at 09:03

## 2018-11-19 RX ADMIN — ATENOLOL 25 MG: 25 TABLET ORAL at 09:03

## 2018-11-19 RX ADMIN — DULOXETINE HYDROCHLORIDE 30 MG: 30 CAPSULE, DELAYED RELEASE ORAL at 09:03

## 2018-11-19 RX ADMIN — OXYCODONE HYDROCHLORIDE AND ACETAMINOPHEN 2 TABLET: 7.5; 325 TABLET ORAL at 10:40

## 2018-11-19 RX ADMIN — NICOTINE 1 PATCH: 21 PATCH, EXTENDED RELEASE TRANSDERMAL at 09:03

## 2018-11-19 RX ADMIN — OXYCODONE HYDROCHLORIDE AND ACETAMINOPHEN 2 TABLET: 7.5; 325 TABLET ORAL at 02:32

## 2018-11-19 RX ADMIN — SODIUM CHLORIDE 750 MG: 900 INJECTION, SOLUTION INTRAVENOUS at 01:04

## 2018-11-19 RX ADMIN — DEXTROAMPHETAMINE SACCHARATE, AMPHETAMINE ASPARTATE, DEXTROAMPHETAMINE SULFATE AND AMPHETAMINE SULFATE 20 MG: 1.25; 1.25; 1.25; 1.25 TABLET ORAL at 09:03

## 2018-11-19 RX ADMIN — DOCUSATE SODIUM 100 MG: 100 CAPSULE, LIQUID FILLED ORAL at 09:03

## 2018-11-19 RX ADMIN — GABAPENTIN 400 MG: 400 CAPSULE ORAL at 09:03

## 2018-11-19 NOTE — PROGRESS NOTES
Orthopedic Progress Note      Patient: Filomena Rm    YOB: 1960    Medical Record Number: [id number]    Attending Physician: Marti Denise MD    Date of Admission: 11/12/2018  9:31 PM    Admitting Dx:  Cellulitis of left foot [L03.116]  Cellulitis of left foot [L03.116]    Status Post: Irrigation and debridement of left ankle chronic infection including, skin, subcutaneous tissue and bone    Post Operative Day Number: 2    Current Problem List:   Patient Active Problem List   Diagnosis   • Mediastinal mass   • Cervical stenosis of spinal canal   • Cervical radiculopathy   • Cellulitis/abscess of left foot   • HTN (hypertension)   • History of MRSA infection   • Anxiety   • Peroneal tenosynovitis   • Fracture of navicular bone of left foot   • Tobacco use   • Non compliance with medical treatment         Past Medical History:   Diagnosis Date   • COPD (chronic obstructive pulmonary disease) (CMS/HCC)    • Depression    • Hypertension    • Lung mass        SUBJECTIVE: 58 y.o.  female    OBJECTIVE:   Vitals:    11/18/18 2234 11/19/18 0316 11/19/18 0700 11/19/18 1100   BP: 117/71 137/83 128/71 119/72   BP Location: Right arm Left arm Left arm Left arm   Patient Position: Lying Lying Lying Sitting   Pulse: 80 67 77 81   Resp: 16 16 16 16   Temp: 97.7 °F (36.5 °C) 98 °F (36.7 °C) 97.5 °F (36.4 °C) 97.9 °F (36.6 °C)   TempSrc: Oral Oral Oral Oral   SpO2: 94% 96% 94% 95%   Weight:       Height:         I/O last 3 completed shifts:  In: 1220 [P.O.:720; IV Piggyback:500]  Out: -     Current Medications:  Scheduled Meds:  ALPRAZolam 0.5 mg Oral Q6H   amphetamine-dextroamphetamine 20 mg Oral TID   atenolol 25 mg Oral Daily   docusate sodium 100 mg Oral BID   DULoxetine 30 mg Oral Daily   gabapentin 400 mg Oral TID   nicotine 1 patch Transdermal Q24H   polyethylene glycol 17 g Oral Daily   sodium chloride 3 mL Intravenous Q12H   sulfamethoxazole-trimethoprim 1 tablet Oral Q12H   triamterene 50 mg  Oral Daily     PRN Meds:.•  acetaminophen  •  acetaminophen  •  albuterol  •  bisacodyl  •  HYDROcodone-acetaminophen  •  HYDROmorphone **AND** naloxone  •  nicotine polacrilex  •  ondansetron **OR** ondansetron ODT **OR** ondansetron  •  oxyCODONE-acetaminophen  •  oxyCODONE-acetaminophen  •  sodium chloride    Diagnostic Tests:   Lab Results (last 24 hours)     Procedure Component Value Units Date/Time    Wound Culture - Wound, Ankle, Left [[id number]] Collected:  11/17/18 1126    Specimen:  Wound from Ankle, Left Updated:  11/19/18 0835     Wound Culture No growth at 2 days     Gram Stain No WBCs or organisms seen    Wound Culture - Wound, Ankle, Left [[id number]] Collected:  11/17/18 1153    Specimen:  Wound from Ankle, Left Updated:  11/19/18 0834     Wound Culture No growth at 2 days     Gram Stain No WBCs or organisms seen    Basic Metabolic Panel [[id number]]  (Abnormal) Collected:  11/19/18 0332    Specimen:  Blood Updated:  11/19/18 0449     Glucose 77 mg/dL      BUN 11 mg/dL      Creatinine 0.71 mg/dL      Sodium 142 mmol/L      Potassium 4.0 mmol/L      Chloride 106 mmol/L      CO2 26.1 mmol/L      Calcium 8.5 mg/dL      eGFR Non African Amer 85 mL/min/1.73      BUN/Creatinine Ratio 15.5     Anion Gap 9.9 mmol/L     Narrative:       GFR Normal >60  Chronic Kidney Disease <60  Kidney Failure <15    CBC & Differential [[id number]] Collected:  11/19/18 0332    Specimen:  Blood Updated:  11/19/18 0431    Narrative:       The following orders were created for panel order CBC & Differential.  Procedure                               Abnormality         Status                     ---------                               -----------         ------                     CBC Auto Differential[[id number]]        Abnormal            Final result                 Please view results for these tests on the individual orders.    CBC Auto Differential [[id number]]  (Abnormal) Collected:  11/19/18 0332    Specimen:  Blood Updated:   "11/19/18 0431     WBC 9.10 10*3/mm3      RBC 3.43 10*6/mm3      Hemoglobin 10.5 g/dL      Hematocrit 34.8 %      .5 fL      MCH 30.6 pg      MCHC 30.2 g/dL      RDW 13.3 %      RDW-SD 49.6 fl      MPV 9.4 fL      Platelets 273 10*3/mm3      Neutrophil % 62.5 %      Lymphocyte % 27.3 %      Monocyte % 6.8 %      Eosinophil % 3.0 %      Basophil % 0.1 %      Immature Grans % 0.3 %      Neutrophils, Absolute 5.69 10*3/mm3      Lymphocytes, Absolute 2.48 10*3/mm3      Monocytes, Absolute 0.62 10*3/mm3      Eosinophils, Absolute 0.27 10*3/mm3      Basophils, Absolute 0.01 10*3/mm3      Immature Grans, Absolute 0.03 10*3/mm3           PHYSICAL EXAM:      Patient alert and oriented x4.      Left ankle:  The boot and dressings are removed.  Dressing with small amount of serosanguinous drainage.  Sutures at the proximal and distal portions of the incision.  Packing in place.  Mild edema and erythema. Palpable pedal pulses.  Brisk capillary refill.       ASSESSMENT & PLAN:  Changed dressing and removed 1/2\" of packing. I provided patient with wound care instructions.  She will continue antibiotic therapy as instructed.  She will follow up with Dr. Lanier in clinic in 2 weeks.       Date: 11/19/2018    TAYLER Santizo            "

## 2018-11-19 NOTE — DISCHARGE SUMMARY
Name: Filomena Rm  Age: 58 y.o.  Sex: female  :  1960  MRN: [id number]         Primary Care Physician: Maico Holguin MD      Date of Admission:  2018  Date of Discharge:  2018      CHIEF COMPLAINT     Foot Swelling         DISCHARGE DIAGNOSIS  Active Hospital Problems    Diagnosis Date Noted   • **Peroneal tenosynovitis [M65.9] 2018   • Non compliance with medical treatment [Z91.19] 2018   • Tobacco use [Z72.0] 2018   • Fracture of navicular bone of left foot [S92.252A] 2018   • HTN (hypertension) [I10] 2018   • History of MRSA infection [Z86.14] 2018   • Anxiety [F41.9] 2018   • Cellulitis/abscess of left foot [L03.116] 2018      Resolved Hospital Problems   No resolved problems to display.       SECONDARY DIAGNOSES  Past Medical History:   Diagnosis Date   • COPD (chronic obstructive pulmonary disease) (CMS/[facility])    • Depression    • Hypertension    • Lung mass        CONSULTS   Consulting Physician(s)     Provider Relationship Specialty    Maxwell Lanier MD Consulting Physician Orthopedic Surgery    Marti Denise MD Consulting Physician Pulmonary Disease            PROCEDURES PERFORMED  Procedure : Incision and Drainage of Left ankle by Dr Pierce, 2018  Approximately 5 cm incision over the lateral aspect of the ankle.  She had an open wound over the malleolus was some purulent material and necrotic edges.  I fashioned the incision so as to ellipse this wound out and debride the synovitis.  I incised the skin only and then carefully dissected down into the subcutaneous tissues.  There was a large abscess which was evacuated.  Several cultures were sent.  There was extensive necrosis involving the subcutaneous tissues, lateral aspect of the malleolus, and peroneal tendon sheath.  This made visualization of normal anatomic planes and structures somewhat difficult.  This was all debrided sharply using a combination of  a scalpel and rongeur.  There was synovitis extending up the peroneal tendon sheath and this was carefully explored and debrided as well, taking care to keep the tendons protected.  Of note, the tendon sheath had been significantly eroded and the tendons were easily subluxatable out of the groove.  The necrotic nonviable tissue was all debrided back to healthy, viable-appearing tissue.  The wound was copiously irrigated out with 500 cc of a Betadine-containing saline solution followed by a liter of plain sterile saline.  I made sure that there were no further areas that warranted debridement.     I irrigated with another 500 cc of plain sterile saline and then let down the tourniquet.  I made sure that good hemostasis was obtained.  The wound was then closed using nylon suture.  I did leave the sinus tract open and packed that with iodoform.  A sterile dressing was applied.  The foot was placed in a boot.      HOSPITAL COURSE  This is a 58-year-old female with a history of hypertension, anxiety and bipolar disorder also MRSA cellulitis and abscess involving the left foot and ankle presented to the hospital with increased pain.  Initially this is all began in February of this year and grew MRSA.  She was treated with Bactrim with resolution of cellulitis.  However in the next 5 months she had intermittent recurrent mild redness and swelling of the area and was taking intermittently Bactrim and Augmentin.  After that admission she underwent MRI of the ankle which showed no abscess involving the tendon and also calcaneum fracture.  Both infectious disease and orthopedics saw the patient.  In addition vascular surgery saw the patient and did flow studies which showed fairly good blood flow.  She underwent incision and drainage of the left ankle and details of her documented.  Patient was initially given while she was in the hospital vancomycin.  Patient is a noncompliant patient and infectious disease feels that she is  not a good candidate for IV antibiotics because of her bipolar disorder and noncompliance and recommended Bactrim double strength twice a day for 3 weeks.  She will have a follow-up with the Dr. Lanier in 2 weeks      PHYSICAL EXAM  Temp:  [97.5 °F (36.4 °C)-98 °F (36.7 °C)] 97.9 °F (36.6 °C)  Heart Rate:  [67-85] 81  Resp:  [16] 16  BP: (117-137)/(71-83) 119/72  Body mass index is 26.17 kg/m².  Physical Exam  HEENT: Unremarkable, pupils are round equal and reacting to light   NECK: No lymphadenopathy, throat is clear,   RESPRATORY SYSTEM: Breath sounds are equal on both sides and are normal, no wheezes or crackles  CARDIOVASULAR SYSTEM: Heart rate is regular without murmur  ABDOMEN: Soft, no ascites, no hepatosplenomegaly.  EXTREMITIES: No cyanosis, clubbing. Left ankle in boot    CONDITION ON DISCHARGE  Stable.      DISCHARGE DISPOSITION   Home      ALLERGIES  Allergies   Allergen Reactions   • Codeine Nausea And Vomiting   • Penicillins Rash       RECENT LABS  Results from last 7 days   Lab Units  11/19/18   0332  11/18/18   0506  11/13/18   0621   WBC 10*3/mm3  9.10  11.16*  10.36   HEMOGLOBIN g/dL  10.5*  10.7*  12.5   HEMATOCRIT %  34.8*  34.9*  39.8   PLATELETS 10*3/mm3  273  291  357     Results from last 7 days   Lab Units  11/19/18   0332  11/18/18   0506  11/16/18   0534   11/13/18   0621   SODIUM mmol/L  142  143   --    --   137   POTASSIUM mmol/L  4.0  4.7   --    --   3.7   CHLORIDE mmol/L  106  107   --    --   97*   CO2 mmol/L  26.1  26.2   --    --   26.3   BUN mg/dL  11  12   --    --   11   CREATININE mg/dL  0.71  0.79  0.72   < >  1.03*   GLUCOSE mg/dL  77  118*   --    --   76   CALCIUM mg/dL  8.5*  8.9   --    --   9.6    < > = values in this interval not displayed.           DIET;  Diet Order   Procedures   • Diet Regular; Vegetarian; No Red Meat ([place] Vegetarian)       DISCHARGE MEDICATIONS     Your medication list      START taking these medications      Instructions Last Dose Given  Next Dose Due   acetaminophen 325 MG tablet  Commonly known as:  TYLENOL      Take 2 tablets by mouth Every 4 (Four) Hours As Needed for Mild Pain .       oxyCODONE-acetaminophen 7.5-325 MG per tablet  Commonly known as:  PERCOCET      Take 1-2 tabs PO Q 4 hours PRN pain       sulfamethoxazole-trimethoprim 800-160 MG per tablet  Commonly known as:  BACTRIM DS,SEPTRA DS      Take 1 tablet by mouth Every 12 (Twelve) Hours for 41 doses.          CHANGE how you take these medications      Instructions Last Dose Given Next Dose Due   albuterol 108 (90 Base) MCG/ACT inhaler  Commonly known as:  PROVENTIL HFA;VENTOLIN HFA  What changed:  Another medication with the same name was removed. Continue taking this medication, and follow the directions you see here.      Inhale 2 puffs Every 4 (Four) Hours As Needed for Wheezing.          CONTINUE taking these medications      Instructions Last Dose Given Next Dose Due   amphetamine-dextroamphetamine 20 MG tablet  Commonly known as:  ADDERALL      Take 20 mg by mouth 3 (Three) Times a Day.       atenolol 25 MG tablet  Commonly known as:  TENORMIN      Take 25 mg by mouth Daily.       clonazePAM 1 MG tablet  Commonly known as:  KlonoPIN      Take 1 mg by mouth Daily.       CYMBALTA 30 MG capsule  Generic drug:  DULoxetine      Take 30 mg by mouth Daily.       gabapentin 400 MG capsule  Commonly known as:  NEURONTIN      Take 400 mg by mouth 3 (Three) Times a Day.       TRIAMTERENE PO      Take 25 mg by mouth Daily.             Where to Get Your Medications      These medications were sent to [facility] - [address] [facility] [address] - [phone]  - [phone] [address] [address]    Phone:  [phone]   · sulfamethoxazole-trimethoprim 800-160 MG per tablet     You can get these medications from any pharmacy    You don't need a prescription for these medications  · acetaminophen 325 MG tablet    "  Information about where to get these medications is not yet available    Ask your nurse or doctor about these medications  · oxyCODONE-acetaminophen 7.5-325 MG per tablet        No future appointments.  Additional Instructions for the Follow-ups that You Need to Schedule     Apply Ice to Affected Ankle / Foot Every 2 Hours   As directed      Discharge Follow-up with Specialty: Orthopedics; 2 Weeks   As directed      Specialty:  Orthopedics    Follow Up:  2 Weeks    Follow Up Details:  Follow up with Dr. Lanier in office.         Remove Dressing   As directed      Change dressing daily.  Remove 1/2\" of packing every day.      Follow-up Information     Maico Holguin MD Follow up.    Specialty:  Family Medicine  Contact information:  [address]  [facility] [address]  [facility] [address]  [phone]             Maxwell Lanier MD Follow up in 2 week(s).    Specialty:  Orthopedic Surgery  Contact information:  [address] [facility] [address]  [facility] [id number] [phone]                   TEST  RESULTS PENDING AT DISCHARGE  None   Order Current Status    Anaerobic Culture - Wound, Ankle, Left In process    Anaerobic Culture - Wound, Ankle, Left In process    Wound Culture - Wound, Ankle, Left Preliminary result    Wound Culture - Wound, Ankle, Left Preliminary result           CODE STATUS  Code Status and Medical Interventions:   Ordered at: 11/13/18 0058     Code Status:    CPR     Medical Interventions (Level of Support Prior to Arrest):    Full           Marti Denise MD  [facility]  11/19/18      Time: greater than 35 minutes.  "

## 2018-11-19 NOTE — PLAN OF CARE
Problem: Patient Care Overview  Goal: Plan of Care Review  Outcome: Outcome(s) achieved Date Met: 11/19/18 11/19/18 1490   Coping/Psychosocial   Plan of Care Reviewed With patient   Plan of Care Review   Progress improving   OTHER   Outcome Summary Pt POD 2 I&D L ankle. VSS, NVI, dressing CDI. Ambulating without assistance, in a boot. Voiding adequately per BRP. Pain controlled with PO percocet. D/C home today

## 2018-11-19 NOTE — TELEPHONE ENCOUNTER
PATIENT IS CURRENTLY AN INPATIENT AT  WANTS TO KNOW WHEN SHE WILL BE DISCHARGED AND ALSO SAYS THAT THE BOOT SHE HAS TOO LONG AND TOO HIGH WHICH MAKES IT DIFFICULT TO WALK. PATIENT SAYS THAT SHE CAN BE REACHED ON CEE AT [phone].

## 2018-11-19 NOTE — PROGRESS NOTES
Access Center Follow Up:  Pt reports she got her Adderall and is feeling much better.  She is hoping to go home today.  The pt's dog is at home and she reports it is dying and she wants to be with it.  Pt still feels like her boot does not fit right and she can barely walk with it on.  Encouraged her to discuss with her ortho doc today prior to discharge.  Anticipate d/c to home today.

## 2018-11-19 NOTE — PLAN OF CARE
Problem: Patient Care Overview  Goal: Plan of Care Review  Outcome: Ongoing (interventions implemented as appropriate)   11/18/18 3032   Coping/Psychosocial   Plan of Care Reviewed With patient   Plan of Care Review   Progress improving   OTHER   Outcome Summary patient ambulating independently to bathroom and in a room, abx administered, pain controlled at this time, educated on b/p monitoring      Goal: Discharge Needs Assessment  Outcome: Ongoing (interventions implemented as appropriate)    Goal: Interprofessional Rounds/Family Conf  Outcome: Ongoing (interventions implemented as appropriate)      Problem: Pain, Acute (Adult)  Goal: Acceptable Pain Control/Comfort Level  Outcome: Ongoing (interventions implemented as appropriate)      Problem: Wound (Includes Pressure Injury) (Adult)  Goal: Signs and Symptoms of Listed Potential Problems Will be Absent, Minimized or Managed (Wound)  Outcome: Ongoing (interventions implemented as appropriate)      Problem: Skin and Soft Tissue Infection (Adult)  Goal: Signs and Symptoms of Listed Potential Problems Will be Absent, Minimized or Managed (Skin and Soft Tissue Infection)  Outcome: Ongoing (interventions implemented as appropriate)      Problem: Fall Risk (Adult)  Goal: Absence of Fall  Outcome: Ongoing (interventions implemented as appropriate)

## 2018-11-19 NOTE — PROGRESS NOTES
INFECTIOUS DISEASES PROGRESS NOTE    CC: f/u abscess    S:   Wants to go home  No sig pain  No f/c/ns    O:  Physical Exam:  Temp:  [97.2 °F (36.2 °C)-98 °F (36.7 °C)] 97.5 °F (36.4 °C)  Heart Rate:  [67-85] 77  Resp:  [16] 16  BP: (117-137)/(68-83) 128/71  Physical Exam   Constitutional: She appears well-developed. No distress.   Pulmonary/Chest: Effort normal.   Abdominal: Soft. She exhibits no distension. There is no tenderness.   Neurological: She is alert.   Skin: Skin is warm and dry.   Psychiatric: She has a normal mood and affect. Her behavior is normal.        Diagnostics:      bcx neg  Surgical cx ngtd  WBC 9.1 (p63, L 27, M 7, E3)  H/H 10.5/35    Cr 0.7     A/p  1.   left ankle cellulitis with abscess and peroneal tenosynovitis  2.  MRSA history  3.  Tobacco abuse without peripheral vascular disease  4.  Navicular fracture     S/p I and D with tendon sheath involvement.  Doing much better  I don't think she is a good candidate for IV abx after discharge and no mandatory indication for IV abx  Will d/c vanc and start bactrim ds po bid x3 additional weeks  Will see PRN    Jose G Staples MD  9:08 AM  11/19/18

## 2018-11-20 ENCOUNTER — READMISSION MANAGEMENT (OUTPATIENT)
Dept: CALL CENTER | Facility: HOSPITAL | Age: 58
End: 2018-11-20

## 2018-11-20 LAB
BACTERIA SPEC AEROBE CULT: NORMAL
BACTERIA SPEC AEROBE CULT: NORMAL
GRAM STN SPEC: NORMAL
GRAM STN SPEC: NORMAL

## 2018-11-20 NOTE — OUTREACH NOTE
Prep Survey      Responses   Facility patient discharged from?  [place]   Is patient eligible?  Yes   Discharge diagnosis  Peroneal tenosynovitis (I &D) L/ankle   Does the patient have one of the following disease processes/diagnoses(primary or secondary)?  General Surgery   Does the patient have Home health ordered?  No   Is there a DME ordered?  No   Comments regarding appointments  see AVS   General alerts for this patient  Non-compliance/daily dsg changes remove 1/2 inch packing daily   Prep survey completed?  Yes          Angle Bui RN

## 2018-11-20 NOTE — PAYOR COMM NOTE
"UR CONTACT:   JACKIE       P: [phone]      F: [phone]  WAS MEDICAID PENDING          Filomena Blancas (58 y.o. Female)     Date of Birth Social Security Number Address Home Phone MRN    1960  [id number] [address] [phone] [id number]    Church Marital Status          Other        Admission Date Admission Type Admitting Provider Attending Provider Department, Room/Bed    11/12/18 Emergency Vimal Carpenter MD  [facility] [address], P882/1    Discharge Date Discharge Disposition Discharge Destination        11/19/2018 Home or Self Care Home             Attending Provider:  (none)   Allergies:  Codeine, Penicillins    Isolation:  Contact   Infection:  MRSA (11/14/18)   Code Status:  Prior    Ht:  157.5 cm (62\")   Wt:  64.9 kg (143 lb 1.6 oz)    Admission Cmt:  None   Principal Problem:  Peroneal tenosynovitis [M65.9]                 Active Insurance as of 11/12/2018     Primary Coverage     Payor Plan Insurance Group Employer/Plan Group    [employer] MEDICAID HU[employer]      Payor Plan Address Payor Plan Phone Number Payor Plan Fax Number Effective Dates    PO  [phone]  11/12/2018 - None Entered    [place] [id number]       Subscriber Name Subscriber Birth Date Member ID       FILOMENA BLANCAS 1960 [id number]                 Emergency Contacts      (Rel.) Home Phone Work Phone Mobile Phone    Jose Alberto  (STEFANIE)Kenny (Brother) [phone] -- --               History & Physical      Vimal Carpenter MD at 11/13/2018 12:59 AM          [facility] Admission H&P    Patient Care Team:  Maico Holguin MD as PCP - General (Family Medicine)    Chief complaint: Left ankle swelling, redness, pain    History of Present Illness    This is a 58-year-old female with a history of hypertension and anxiety as well as MRSA cellulitis/abscess involving the left foot and ankle.  She initially began having issues with this about " one year ago.  She had a wound culture in February of this year that grew MRSA.  She was treated with Bactrim and had resolution of the cellulitis.  She states that over the next 5 months she had intermittent recurring mild redness and swelling to the area and she would take intermittent doses of Bactrim under her own direction.  She ultimately had a more significant infection in August that was treated with a more sustained and prolonged course of Bactrim.  She had another wound culture at that time that was negative.  Her symptoms once again improved until the past 3-4 days.  She went to see her primary care physician who referred her to the emergency room for admission, incision and drainage, and possible osteomyelitis of the left ankle.  Patient denies any fevers or chills.  She states the ankle looks as bad as ever has before.  She has not noticed any draining over the past 3-4 days.    Past Medical History:   Diagnosis Date   • COPD (chronic obstructive pulmonary disease) (CMS/[facility])    • Depression    • Hypertension    • Lung mass      Past Surgical History:   Procedure Laterality Date   • BACK SURGERY     • SKIN BIOPSY     • WRIST FRACTURE SURGERY       Family History   Problem Relation Age of Onset   • Emphysema Mother    • Alzheimer's disease Father      Social History     Tobacco Use   • Smoking status: Current Every Day Smoker   Substance Use Topics   • Alcohol use: No   • Drug use: Not on file     Medications Prior to Admission   Medication Sig Dispense Refill Last Dose   • albuterol (PROVENTIL HFA;VENTOLIN HFA) 108 (90 Base) MCG/ACT inhaler Inhale 2 puffs Every 4 (Four) Hours As Needed for Wheezing.      • albuterol (PROVENTIL,VENTOLIN) 2 MG/5ML syrup Take 2 mg by mouth 3 (Three) Times a Day.      • amphetamine-dextroamphetamine (ADDERALL) 20 MG tablet Take 20 mg by mouth Daily.      • atenolol (TENORMIN) 25 MG tablet Take 25 mg by mouth Daily.   Taking   • clonazePAM (KlonoPIN) 1 MG tablet Take 1 mg by  mouth Daily.      • DULoxetine (CYMBALTA) 30 MG capsule Take 30 mg by mouth Daily.   Taking   • gabapentin (NEURONTIN) 400 MG capsule Take 400 mg by mouth 3 (Three) Times a Day.   Taking   • TRIAMTERENE PO Take  by mouth.   Taking   • Hydrocod Polst-CPM Polst ER (TUSSIONEX PENNKINETIC ER) 10-8 MG/5ML ER suspension May take 1 teaspoon twice daily as needed for cough. Will cause drowsiness. 115 mL 0 Taking     Allergies:  Codeine and Penicillins    Review of Systems   Constitutional: Negative for chills and fever.   HENT: Negative for congestion and sore throat.    Eyes: Negative for visual disturbance.   Respiratory: Negative for cough, chest tightness, shortness of breath and wheezing.    Cardiovascular: Negative for chest pain, palpitations and leg swelling.   Gastrointestinal: Negative for abdominal distention, abdominal pain, diarrhea, nausea and vomiting.   Endocrine: Negative for polydipsia and polyuria.   Genitourinary: Negative for difficulty urinating, dysuria, frequency and urgency.   Musculoskeletal: Negative for arthralgias and myalgias.   Skin: Negative for color change and rash.        Redness, swelling, warmth to the left foot and ankle most predominant over the outside of the ankle bone   Neurological: Negative for dizziness and light-headedness.   Psychiatric/Behavioral: The patient is nervous/anxious.         PHYSICAL EXAM    Vital Signs  tMax 24 hrs:  Temp (24hrs), Av.9 °F (36.6 °C), Min:97.2 °F (36.2 °C), Max:98.6 °F (37 °C)    Vitals Ranges:  Temp:  [97.2 °F (36.2 °C)-98.6 °F (37 °C)] 98.6 °F (37 °C)  Heart Rate:  [] 92  Resp:  [16-18] 18  BP: (116-128)/(82-85) 128/85    Physical Exam   Constitutional: She is oriented to person, place, and time. She appears well-developed and well-nourished.   HENT:   Head: Normocephalic and atraumatic.   Eyes: EOM are normal. Pupils are equal, round, and reactive to light.   Neck: Neck supple. No tracheal deviation present.   Cardiovascular: Normal  rate and regular rhythm. Exam reveals no gallop.   No murmur heard.  Pulmonary/Chest: Effort normal. No respiratory distress. She has no wheezes.   Abdominal: Soft. Bowel sounds are normal. She exhibits no distension. There is no tenderness.   Musculoskeletal: She exhibits no edema or tenderness.   Neurological: She is alert and oriented to person, place, and time. No cranial nerve deficit.   Skin: Skin is warm and dry.   She has cellulitis of the left foot and ankle with what appears to be a developing abscess over the lateral malleolus.  The area is erythematous, warm, tender to palpation with streaking up the left leg.  She has scattered abrasions over both lower extremities but no other areas appear cellulitic at this time.   Psychiatric:   She appears very nervous and anxious   Nursing note and vitals reviewed.      Results Review:  Results from last 7 days   Lab Units  11/12/18   2200   WBC 10*3/mm3  11.46*   HEMOGLOBIN g/dL  12.8   HEMATOCRIT %  40.1   PLATELETS 10*3/mm3  332     Results from last 7 days   Lab Units  11/12/18   2323  11/12/18   2200   SODIUM mmol/L  136  137   POTASSIUM mmol/L  3.7  3.8   CHLORIDE mmol/L  94*  97*   CO2 mmol/L  30.0*  31.3*   BUN mg/dL  12  13   CREATININE mg/dL  1.01*  1.05*   CALCIUM mg/dL  9.5  9.5   BILIRUBIN mg/dL   --   0.3   ALK PHOS U/L   --   114   ALT (SGPT) U/L   --   28   AST (SGOT) U/L   --   33*   GLUCOSE mg/dL  69  77     X-ray of the left foot and ankle:  Marked soft tissue swelling noted overlying the foot. On the AP view  only, there is a suggestion of some cortical irregularity involving the  lateral aspect of the calcaneus. I'm uncertain if this is a true  finding, or is artifactual, as I do not see it on the oblique view.  Osteomyelitis is not excluded. No subcutaneous gas is seen. MRI would be  more sensitive for evaluation.     I reviewed the patient's new clinical results.  I reviewed the patient's new imaging results and agree with the  interpretation.        Active Hospital Problems    Diagnosis Date Noted   • **Cellulitis/abscess of left foot [L03.116] 11/12/2018   • HTN (hypertension) [I10] 11/13/2018   • History of MRSA infection [Z86.14] 11/13/2018   • Anxiety [F41.9] 11/13/2018      Resolved Hospital Problems   No resolved problems to display.       Assessment & Plan    The patient will be admitted.  She was started on vancomycin in the emergency room for the cellulitis/abscess of the left ankle given her history of MRSA.  I will continue this for now.  I discussed with her my recommendation to perform an MRI for further evaluation however she is hesitant to do so given her self pay status.  I reviewed the x-ray results with her and she understands that the possibility of underlying osteomyelitis still remains.  I will ask surgery to see her as it does appear that she has an underlying abscess that's going to need incision and drainage.  I will also ask infectious disease to see her for antibiotic recommendations moving forward.  She understands that she may still need additional imaging of the foot and ankle to better evaluate for deeper infection.  Additional plans based on her clinical course.    I discussed the patients findings and my recommendations with patient    Vimal Carpenter MD  11/13/18  12:59 AM              Electronically signed by Vimal Carpenter MD at 11/13/2018  1:12 AM          Emergency Department Notes      Eloise Ortega RN at 11/12/2018  9:14 PM        Pt to ED via ambulance with c/o left foot redness and swelling x 1 year. Pt on bactrim currently. Pt with redness that travels up leg. 1+ left pedal pulse.      Eloise Ortega RN  11/12/18 2123      Electronically signed by Eloise Ortega RN at 11/12/2018  9:23 PM     Gabe Dias MD at 11/12/2018 10:17 PM           EMERGENCY DEPARTMENT ENCOUNTER    CHIEF COMPLAINT  Chief Complaint: L foot infection  History given by: pt  History limited by:  "none  Room Number: 28/28  PMD: Maico Holguin MD      HPI:  Pt is a 58 y.o. female who presents complaining of \"infection\" to L foot that started a year ago and has gotten progressively worse. Pt reports she was put on Bactrim originally for MRSA but reports the second biopsy for MRSA was negative. Pt admits to left foot edema, \"black spot\" on left foot.  Reports she saw Dr Holguin today who sent her here to be admitted. Pt denies fevers or chills.    Duration:  A year  Onset: gradual  Timing: constant  Location: left foot  Radiation: none  Quality: \"infection\"  Intensity/Severity: severe  Progression: progressively worse  Associated Symptoms: left foot edema, \"black spot\" on left foot.  Aggravating Factors: none  Alleviating Factors: none  Previous Episodes: none  Treatment before arrival: Bactirum with no relief.    PAST MEDICAL HISTORY  Active Ambulatory Problems     Diagnosis Date Noted   • Mediastinal mass 12/14/2016   • Cervical stenosis of spinal canal 12/14/2016   • Cervical radiculopathy 12/14/2016     Resolved Ambulatory Problems     Diagnosis Date Noted   • No Resolved Ambulatory Problems     Past Medical History:   Diagnosis Date   • COPD (chronic obstructive pulmonary disease) (CMS/[facility])    • Depression    • Hypertension    • Lung mass        PAST SURGICAL HISTORY  Past Surgical History:   Procedure Laterality Date   • BACK SURGERY     • SKIN BIOPSY     • WRIST FRACTURE SURGERY         FAMILY HISTORY  Family History   Problem Relation Age of Onset   • Emphysema Mother    • Alzheimer's disease Father        SOCIAL HISTORY  Social History     Socioeconomic History   • Marital status:      Spouse name: Not on file   • Number of children: Not on file   • Years of education: Not on file   • Highest education level: Not on file   Social Needs   • Financial resource strain: Not on file   • Food insecurity - worry: Not on file   • Food insecurity - inability: Not on file   • Transportation needs - " "medical: Not on file   • Transportation needs - non-medical: Not on file   Occupational History   • Not on file   Tobacco Use   • Smoking status: Current Every Day Smoker   Substance and Sexual Activity   • Alcohol use: No   • Drug use: Not on file   • Sexual activity: Not on file   Other Topics Concern   • Not on file   Social History Narrative   • Not on file       ALLERGIES  Codeine and Penicillins    REVIEW OF SYSTEMS  Review of Systems   Constitutional: Negative for chills and fever.   HENT: Negative for congestion.    Respiratory: Negative for cough and shortness of breath.    Cardiovascular: Negative for chest pain.   Gastrointestinal: Negative for abdominal pain.   Genitourinary: Negative for dysuria.   Musculoskeletal:        Left foot swelling and \"black spot\" to left foot   Neurological: Negative for headaches.   All other systems reviewed and are negative.      PHYSICAL EXAM  ED Triage Vitals [11/12/18 2100]   Temp Heart Rate Resp BP SpO2   97.2 °F (36.2 °C) 100 16 116/83 100 %      Temp src Heart Rate Source Patient Position BP Location FiO2 (%)   Tympanic -- -- -- --       Physical Exam   Constitutional: She is oriented to person, place, and time. No distress.   HENT:   Head: Normocephalic and atraumatic.   Eyes: EOM are normal. Pupils are equal, round, and reactive to light.   Neck: Normal range of motion. Neck supple.   Cardiovascular: Normal rate, regular rhythm and normal heart sounds.   Pulmonary/Chest: Effort normal and breath sounds normal. No respiratory distress.   Abdominal: Soft. There is no tenderness. There is no rebound and no guarding.   Musculoskeletal: Normal range of motion. She exhibits no edema.        Left foot: There is tenderness (with erythema and warmth along top of foot, lateral aspect of foot, lateral ankle, and lateral leg) and swelling.   No fluctuance or indurate to left foot   Neurological: She is alert and oriented to person, place, and time. She has normal sensation and " normal strength.   Skin: Skin is warm and dry. No rash noted.   Black discoloration to the lateral malleolus   Psychiatric: Mood and affect normal.   Nursing note and vitals reviewed.      LAB RESULTS  Lab Results (last 24 hours)     Procedure Component Value Units Date/Time    CBC & Differential [[id number]] Collected:  11/12/18 2200    Specimen:  Blood Updated:  11/12/18 2211    Narrative:       The following orders were created for panel order CBC & Differential.  Procedure                               Abnormality         Status                     ---------                               -----------         ------                     CBC Auto Differential[[id number]]         Abnormal            Final result                 Please view results for these tests on the individual orders.    Comprehensive Metabolic Panel [[id number]]  (Abnormal) Collected:  11/12/18 2200    Specimen:  Blood Updated:  11/12/18 2233     Glucose 77 mg/dL      BUN 13 mg/dL      Creatinine 1.05 mg/dL      Sodium 137 mmol/L      Potassium 3.8 mmol/L      Chloride 97 mmol/L      CO2 31.3 mmol/L      Calcium 9.5 mg/dL      Total Protein 7.2 g/dL      Albumin 3.70 g/dL      ALT (SGPT) 28 U/L      AST (SGOT) 33 U/L      Alkaline Phosphatase 114 U/L      Total Bilirubin 0.3 mg/dL      eGFR Non African Amer 54 mL/min/1.73      Globulin 3.5 gm/dL      A/G Ratio 1.1 g/dL      BUN/Creatinine Ratio 12.4     Anion Gap 8.7 mmol/L     Sedimentation Rate [[id number]]  (Abnormal) Collected:  11/12/18 2200    Specimen:  Blood Updated:  11/12/18 2240     Sed Rate 62 mm/hr     C-reactive Protein [[id number]]  (Abnormal) Collected:  11/12/18 2200    Specimen:  Blood Updated:  11/12/18 2233     C-Reactive Protein 4.79 mg/dL     CBC Auto Differential [[id number]]  (Abnormal) Collected:  11/12/18 2200    Specimen:  Blood Updated:  11/12/18 2211     WBC 11.46 10*3/mm3      RBC 4.08 10*6/mm3      Hemoglobin 12.8 g/dL      Hematocrit 40.1 %      MCV 98.3 fL      MCH 31.4 pg       MCHC 31.9 g/dL      RDW 13.3 %      RDW-SD 48.0 fl      MPV 9.4 fL      Platelets 332 10*3/mm3      Neutrophil % 69.2 %      Lymphocyte % 20.7 %      Monocyte % 7.8 %      Eosinophil % 1.7 %      Basophil % 0.3 %      Immature Grans % 0.3 %      Neutrophils, Absolute 7.93 10*3/mm3      Lymphocytes, Absolute 2.37 10*3/mm3      Monocytes, Absolute 0.89 10*3/mm3      Eosinophils, Absolute 0.20 10*3/mm3      Basophils, Absolute 0.03 10*3/mm3      Immature Grans, Absolute 0.04 10*3/mm3           I ordered the above labs and reviewed the results    RADIOLOGY  XR Foot 3+ View Left   Final Result   Marked soft tissue swelling noted overlying the foot. On the AP view   only, there is a suggestion of some cortical irregularity involving the   lateral aspect of the calcaneus. I'm uncertain if this is a true   finding, or is artifactual, as I do not see it on the oblique view.   Osteomyelitis is not excluded. No subcutaneous gas is seen. MRI would be   more sensitive for evaluation.       This report was finalized on 11/12/2018 10:47 PM by Dr. Radha Laguna M.D.               I ordered the above noted radiological studies. Interpreted by radiologist. Reviewed by me in PACS.       PROCEDURES  Procedures      PROGRESS AND CONSULTS  ED Course as of Nov 12 2333   Mon Nov 12, 2018   2140 C/o left foot pain, swelling, and redness that has been ongoing for the past year, but acutely worsened over the past 3 days. Pt was initially treated for MRSA with Bactrim 1 year ago, and started another course 1 week ago. No fever or chills  [JS]      ED Course User Index  [JS] Greta Mcintyre, APRN     2227  Ordered labs for further viewing. Ordered vancomycin for infection and NORCO for pain.    2254  Ordered call from [facility].    2317  Discussed case with Dr Carpenter, [facility]  Reviewed history, exam, results and treatments.  Discussed concerns and plan of care. Dr Carpenter accepts pt to be admitted to med/surge.      MEDICAL DECISION  MAKING  Results were reviewed/discussed with the patient and they were also made aware of online access. Pt also made aware that some labs, such as cultures, will not be resulted during ER visit and follow up with PMD is necessary.     MDM  Number of Diagnoses or Management Options  Cellulitis of left foot:   Diagnosis management comments: Patient's exam was consistent with cellulitis.  She was afebrile.  White blood cell count was elevated.  Sedimentation rate and CRP were also elevated.  X-ray showed soft tissue swelling.  Patient was given IV vancomycin.  Case was discussed with Dr. Carpenter and he agreed to admit the patient.       Amount and/or Complexity of Data Reviewed  Clinical lab tests: reviewed and ordered (Sed Rate 62, CRP 4.79, WBC 11.46)  Tests in the radiology section of CPT®:  reviewed and ordered (Foot XR - Marked soft tissue swelling noted overlying the foot)  Discuss the patient with other providers: yes (Dr Carpenter)           DIAGNOSIS  Final diagnoses:   Cellulitis of left foot       DISPOSITION  ADMISSION    Discussed treatment plan and reason for admission with pt/family and admitting physician.  Pt/family voiced understanding of the plan for admission for further testing/treatment as needed.         Latest Documented Vital Signs:  As of 11:33 PM  BP- 126/82 HR- 95 Temp- 97.2 °F (36.2 °C) (Tympanic) O2 sat- 92%    --  Documentation assistance provided by moon Lima for Dr Dias.  Information recorded by the scribe was done at my direction and has been verified and validated by me.                Alecia iLma  11/12/18 2778       Gabe Dias MD  11/12/18 8196      Electronically signed by Gabe Dias MD at 11/12/2018 11:33 PM     Ester Jacobo, RN at 11/12/2018 11:15 PM        Pt intermittently dropping O2 sats, staying between 86-88% on RA. Pt reports Hx of COPD but denies wearing O2 at home. Placed patient on 2L O2 via NC for comfort, patient  "tolerated well. Current O2 95%. Will continue to monitor.      Ester Jacobo RN  11/13/18 0006      Electronically signed by Ester Jacobo, RN at 11/13/2018 12:06 AM     Ester Jacobo RN at 11/13/2018 12:00 AM        Pt refusing to wear O2, states \"I just really don't want to wear it\"      Ester Jacobo RN  11/13/18 0007      Electronically signed by Ester Jacobo RN at 11/13/2018 12:07 AM       Lines, Drains & Airways    Active LDAs     None         Inactive LDAs     Name:   Placement date:   Placement time:   Removal date:   Removal time:   Site:   Days:    [REMOVED] Peripheral IV 11/12/18 2200 Right Antecubital   11/12/18    2200    11/13/18    2230    Antecubital   1    [REMOVED] Peripheral IV 11/14/18 0100 Right Wrist   11/14/18    0100    11/14/18    0902    Wrist   less than 1    [REMOVED] Peripheral IV 11/14/18 1146 Anterior;Proximal;Right Forearm   11/14/18    1146    11/14/18    1600    Forearm   less than 1    [REMOVED] Peripheral IV 11/15/18 0111 Left Antecubital   11/15/18    0111    11/16/18    1930    Antecubital   1    [REMOVED] Peripheral IV 11/16/18 1749 Anterior;Distal;Right;Upper Arm   11/16/18    1749    11/17/18    1530    Arm   less than 1    [REMOVED] Peripheral IV 11/17/18 0923 Right Wrist   11/17/18    0923    11/17/18    1530    Wrist   less than 1    [REMOVED] Peripheral IV 11/17/18 1701 Anterior;Right Forearm   11/17/18    1701    11/19/18    0157    Forearm   1    [REMOVED] Peripheral IV 11/18/18 1744 Left Forearm   11/18/18    1744    11/19/18    0157    Forearm   less than 1    [REMOVED] Peripheral IV 11/19/18 0250 Right Antecubital   11/19/18    0250    11/19/18    1608    Antecubital   less than 1    [REMOVED] Supraglottic Airway Classic 4   11/17/18    1058 created via procedure documentation    11/17/18    1137     less than 1                Hospital Medications (all)       Dose Frequency Start End    fentaNYL citrate (PF) (SUBLIMAZE) 100 MCG/2ML injection  - ADS Override " Pull   11/17/2018 11/17/2018    Notes to Pharmacy: Created by cabinet override    gadobenate dimeglumine (MULTIHANCE) injection 13 mL 13 mL Once in Imaging 11/13/2018 11/13/2018    Sig - Route: Infuse 13 mL into a venous catheter Once. - Intravenous    HYDROcodone-acetaminophen (NORCO) 7.5-325 MG per tablet 1 tablet 1 tablet Once 11/12/2018 11/12/2018    Sig - Route: Take 1 tablet by mouth 1 (One) Time. - Oral    mupirocin (BACTROBAN) 2 % nasal ointment  2 Times Daily 11/17/2018 11/19/2018    Sig - Route: by Each Nare route 2 (Two) Times a Day. - Each Nare    oxyCODONE-acetaminophen (PERCOCET) 7.5-325 MG per tablet 1 tablet 1 tablet Once As Needed 11/17/2018 11/17/2018    Sig - Route: Take 1 tablet by mouth 1 (One) Time As Needed for Moderate Pain . - Oral    vancomycin (VANCOCIN) in iso-osmotic dextrose IVPB 1 g (premix) 200 mL 15 mg/kg × 64.9 kg Every 12 Hours 11/13/2018 11/14/2018    Sig - Route: Infuse 200 mL into a venous catheter Every 12 (Twelve) Hours. - Intravenous    vancomycin 1250 mg/250 mL 0.9% NS IVPB (BHS) 20 mg/kg × 66 kg Once 11/12/2018 11/13/2018    Sig - Route: Infuse 250 mL into a venous catheter 1 (One) Time. - Intravenous          Operative/Procedure Notes       Maxwell Lanier MD at 11/17/2018 11:06 AM  Version 1 of 1         INCISION AND DRAINAGE LOWER EXTREMITY  Progress Note    Filomena Rm  11/12/2018 - 11/17/2018    Pre-op Diagnosis:   Left ankle infection       Post-Op Diagnosis Codes:   same    Procedure/CPT® Codes:      Procedure(s):  Incision and Drainage of Left ankle    Surgeon(s):  Maxwell Lanier MD    Anesthesia: General    Staff:   Circulator: Caroline Ashford RN; Radha Ramos RN  Scrub Person: Natasha Son    Estimated Blood Loss: minimal    Urine Voided: * No values recorded between 11/17/2018 10:47 AM and 11/17/2018 11:33 AM *    Specimens:                ID Type Source Tests Collected by Time   1 : left infected ankle wound Wound Ankle, Left WOUND  CULTURE Maxwell Lanier MD 11/17/2018 1126         Drains:      Findings: see dictation    Complications: none      Maxwell Lanier MD     Date: 11/17/2018  Time: 11:34 AM        Electronically signed by Maxwell Lanier MD at 11/17/2018 11:35 AM     Maxwell Lanier MD at 11/17/2018 11:06 AM  Version 1 of 1       Orthopaedic Operative Note    Facility: [facility]    Patient: Filomena Rm    Medical Record Number: [id number]    YOB: 1960    Dictating Surgeon: Maxwell Lanier M.D.*    Primary Care Physician: Maico Holguin MD    Date of Operation: 11/17/2018    Pre-Operative Diagnosis:  Left ankle chronic infection and abscess with extension into the peroneal tendon sheath    Post-Operative Diagnosis:  Left ankle chronic infection and abscess with extension into the peroneal tendon sheath     Procedure Performed:  Irrigation and debridement of left ankle chronic infection including, skin, subcutaneous tissue and bone    Surgeon: Maxwell Lanier MD     Assistant: none    Anesthesia: general    Complications: None.     Estimated Blood Loss: Less than 50 mL.     Implants: none    Specimens: * No orders in the log *    Brief Operative Indication:  Ms. Rm had a long history of chronically infected left ankle.  The risks, benefits and alternatives to irrigation and debridement were discussed in detail.  She acknowledged understanding of the information and consented to proceed.  The patient and operative site were identified in the preoperative holding area.  The surgical site was marked.  The patient was taken to the operating room and placed in the supine position.  Adequate general anesthesia was administered.    Description of the procedure in detail:  The patient and operative site were identified in the preoperative holding area.  The surgical site was marked.  The patient was taken to the operating room and placed in the supine position.  Adequate general  anesthesia was administered.  She was repositioned on the operating table.  A timeout was taken.  Preoperative antibiotics were intentionally withheld as this patient is already on IV antibiotics.    I began by fashioning an approximately 5 cm incision over the lateral aspect of the ankle.  She had an open wound over the malleolus was some purulent material and necrotic edges.  I fashioned the incision so as to ellipse this wound out and debride the synovitis.  I incised the skin only and then carefully dissected down into the subcutaneous tissues.  There was a large abscess which was evacuated.  Several cultures were sent.  There was extensive necrosis involving the subcutaneous tissues, lateral aspect of the malleolus, and peroneal tendon sheath.  This made visualization of normal anatomic planes and structures somewhat difficult.  This was all debrided sharply using a combination of a scalpel and rongeur.  There was synovitis extending up the peroneal tendon sheath and this was carefully explored and debrided as well, taking care to keep the tendons protected.  Of note, the tendon sheath had been significantly eroded and the tendons were easily subluxatable out of the groove.  The necrotic nonviable tissue was all debrided back to healthy, viable-appearing tissue.  The wound was copiously irrigated out with 500 cc of a Betadine-containing saline solution followed by a liter of plain sterile saline.  I made sure that there were no further areas that warranted debridement.    I irrigated with another 500 cc of plain sterile saline and then let down the tourniquet.  I made sure that good hemostasis was obtained.  The wound was then closed using nylon suture.  I did leave the sinus tract open and packed that with iodoform.  A sterile dressing was applied.  The foot was placed in a boot.  The patient was awakened and taken to the recovery room in good condition.    Maxwell Lanier MD  11/17/18    Electronically  signed by Maxwell Lanier MD at 11/17/2018 11:50 AM          Physician Progress Notes      Gypsy Mart APRN at 11/19/2018  2:02 PM              Orthopedic Progress Note      Patient: Filomena Rm    YOB: 1960    Medical Record Number: [id number]    Attending Physician: Marti Denise MD    Date of Admission: 11/12/2018  9:31 PM    Admitting Dx:  Cellulitis of left foot [L03.116]  Cellulitis of left foot [L03.116]    Status Post: Irrigation and debridement of left ankle chronic infection including, skin, subcutaneous tissue and bone    Post Operative Day Number: 2    Current Problem List:   Patient Active Problem List   Diagnosis   • Mediastinal mass   • Cervical stenosis of spinal canal   • Cervical radiculopathy   • Cellulitis/abscess of left foot   • HTN (hypertension)   • History of MRSA infection   • Anxiety   • Peroneal tenosynovitis   • Fracture of navicular bone of left foot   • Tobacco use   • Non compliance with medical treatment         Past Medical History:   Diagnosis Date   • COPD (chronic obstructive pulmonary disease) (CMS/[facility])    • Depression    • Hypertension    • Lung mass        SUBJECTIVE: 58 y.o.  female    OBJECTIVE:   Vitals:    11/18/18 2234 11/19/18 0316 11/19/18 0700 11/19/18 1100   BP: 117/71 137/83 128/71 119/72   BP Location: Right arm Left arm Left arm Left arm   Patient Position: Lying Lying Lying Sitting   Pulse: 80 67 77 81   Resp: 16 16 16 16   Temp: 97.7 °F (36.5 °C) 98 °F (36.7 °C) 97.5 °F (36.4 °C) 97.9 °F (36.6 °C)   TempSrc: Oral Oral Oral Oral   SpO2: 94% 96% 94% 95%   Weight:       Height:         I/O last 3 completed shifts:  In: 1220 [P.O.:720; IV Piggyback:500]  Out: -     Current Medications:  Scheduled Meds:  ALPRAZolam 0.5 mg Oral Q6H   amphetamine-dextroamphetamine 20 mg Oral TID   atenolol 25 mg Oral Daily   docusate sodium 100 mg Oral BID   DULoxetine 30 mg Oral Daily   gabapentin 400 mg Oral TID   nicotine 1 patch Transdermal Q24H    polyethylene glycol 17 g Oral Daily   sodium chloride 3 mL Intravenous Q12H   sulfamethoxazole-trimethoprim 1 tablet Oral Q12H   triamterene 50 mg Oral Daily     PRN Meds:.•  acetaminophen  •  acetaminophen  •  albuterol  •  bisacodyl  •  HYDROcodone-acetaminophen  •  HYDROmorphone **AND** naloxone  •  nicotine polacrilex  •  ondansetron **OR** ondansetron ODT **OR** ondansetron  •  oxyCODONE-acetaminophen  •  oxyCODONE-acetaminophen  •  sodium chloride    Diagnostic Tests:   Lab Results (last 24 hours)     Procedure Component Value Units Date/Time    Wound Culture - Wound, Ankle, Left [[id number]] Collected:  11/17/18 1126    Specimen:  Wound from Ankle, Left Updated:  11/19/18 0835     Wound Culture No growth at 2 days     Gram Stain No WBCs or organisms seen    Wound Culture - Wound, Ankle, Left [[id number]] Collected:  11/17/18 1153    Specimen:  Wound from Ankle, Left Updated:  11/19/18 0834     Wound Culture No growth at 2 days     Gram Stain No WBCs or organisms seen    Basic Metabolic Panel [[id number]]  (Abnormal) Collected:  11/19/18 0332    Specimen:  Blood Updated:  11/19/18 0449     Glucose 77 mg/dL      BUN 11 mg/dL      Creatinine 0.71 mg/dL      Sodium 142 mmol/L      Potassium 4.0 mmol/L      Chloride 106 mmol/L      CO2 26.1 mmol/L      Calcium 8.5 mg/dL      eGFR Non African Amer 85 mL/min/1.73      BUN/Creatinine Ratio 15.5     Anion Gap 9.9 mmol/L     Narrative:       GFR Normal >60  Chronic Kidney Disease <60  Kidney Failure <15    CBC & Differential [[id number]] Collected:  11/19/18 0332    Specimen:  Blood Updated:  11/19/18 0431    Narrative:       The following orders were created for panel order CBC & Differential.  Procedure                               Abnormality         Status                     ---------                               -----------         ------                     CBC Auto Differential[[id number]]        Abnormal            Final result                 Please view  "results for these tests on the individual orders.    CBC Auto Differential [[id number]]  (Abnormal) Collected:  11/19/18 0332    Specimen:  Blood Updated:  11/19/18 0431     WBC 9.10 10*3/mm3      RBC 3.43 10*6/mm3      Hemoglobin 10.5 g/dL      Hematocrit 34.8 %      .5 fL      MCH 30.6 pg      MCHC 30.2 g/dL      RDW 13.3 %      RDW-SD 49.6 fl      MPV 9.4 fL      Platelets 273 10*3/mm3      Neutrophil % 62.5 %      Lymphocyte % 27.3 %      Monocyte % 6.8 %      Eosinophil % 3.0 %      Basophil % 0.1 %      Immature Grans % 0.3 %      Neutrophils, Absolute 5.69 10*3/mm3      Lymphocytes, Absolute 2.48 10*3/mm3      Monocytes, Absolute 0.62 10*3/mm3      Eosinophils, Absolute 0.27 10*3/mm3      Basophils, Absolute 0.01 10*3/mm3      Immature Grans, Absolute 0.03 10*3/mm3           PHYSICAL EXAM:      Patient alert and oriented x4.      Left ankle:  The boot and dressings are removed.  Dressing with small amount of serosanguinous drainage.  Sutures at the proximal and distal portions of the incision.  Packing in place.  Mild edema and erythema. Palpable pedal pulses.  Brisk capillary refill.       ASSESSMENT & PLAN:  Changed dressing and removed 1/2\" of packing. I provided patient with wound care instructions.  She will continue antibiotic therapy as instructed.  She will follow up with Dr. Lanier in clinic in 2 weeks.       Date: 11/19/2018    TAYLER Santizo              Electronically signed by Gypsy Mart APRN at 11/19/2018  3:27 PM     Jose G Staples MD at 11/19/2018  9:08 AM        INFECTIOUS DISEASES PROGRESS NOTE    CC: f/u abscess    S:   Wants to go home  No sig pain  No f/c/ns    O:  Physical Exam:  Temp:  [97.2 °F (36.2 °C)-98 °F (36.7 °C)] 97.5 °F (36.4 °C)  Heart Rate:  [67-85] 77  Resp:  [16] 16  BP: (117-137)/(68-83) 128/71  Physical Exam   Constitutional: She appears well-developed. No distress.   Pulmonary/Chest: Effort normal.   Abdominal: Soft. She exhibits no " distension. There is no tenderness.   Neurological: She is alert.   Skin: Skin is warm and dry.   Psychiatric: She has a normal mood and affect. Her behavior is normal.        Diagnostics:      bcx neg  Surgical cx ngtd  WBC 9.1 (p63, L 27, M 7, E3)  H/H 10.5/35    Cr 0.7     A/p  1.   left ankle cellulitis with abscess and peroneal tenosynovitis  2.  MRSA history  3.  Tobacco abuse without peripheral vascular disease  4.  Navicular fracture     S/p I and D with tendon sheath involvement.  Doing much better  I don't think she is a good candidate for IV abx after discharge and no mandatory indication for IV abx  Will d/c vanc and start bactrim ds po bid x3 additional weeks  Will see PRN    Jose G Staples MD  9:08 AM  18           Electronically signed by Jose G Staples MD at 2018  9:12 AM     Marti Denise MD at 2018  2:37 PM              Name: Filomena Rm ADMIT: 2018   : 1960  PCP: Maico Holguin MD    MRN: [id number] LOS: 4 days   AGE/SEX: 58 y.o. female    ROOM: [facility]   Subjective   S/p Incision and Drainage of Left ankle  Patient was caught smoking in the bathroom  Is very noncompliant with medical directions and instructions      Brief hospital course since admission:  Chronic infection  MRI shows  1.  Abscess formation anterolateral to the lateral malleolus appears to  drain to the skin surface laterally with surrounding cellulitis. There  is adjacent peroneal tenosynovitis that is presumed infectious. Marrow  edema is present within the lateral calcaneus in the region of the  peroneal tubercle that may be reactive to adjacent synovitis or  represent early osteomyelitis.  2. Small nondisplaced fracture of the medial aspect of the navicular  where there is surrounding bone marrow edema and enhancement. This could  represent a posttraumatic fracture or stress fracture. Navicular  osteomyelitis is also a consideration though there is  no clear cortical  loss and this is not in the region of the lateral ankle abscess.  Follow-up MRI may be helpful in the setting to assure that this is not  progressive.  I have reviewed past medical history, family history, social history and allergies.  No changes from admission note.      Review of Systems   Constitutional: Positive for fatigue. Negative for fever.   Respiratory: Negative for shortness of breath.    Cardiovascular: Negative for chest pain.   Skin: Positive for color change and wound.          Objective   Vital Signs  Temp:  [97.2 °F (36.2 °C)-97.8 °F (36.6 °C)] 97.2 °F (36.2 °C)  Heart Rate:  [71-83] 71  Resp:  [16] 16  BP: ()/(58-85) 122/68  SpO2:  [94 %-99 %] 94 %  on   ;   Device (Oxygen Therapy): room air  Body mass index is 26.17 kg/m².    Intake/Output Summary (Last 24 hours) at 11/18/2018 1437  Last data filed at 11/18/2018 0234  Gross per 24 hour   Intake 250 ml   Output 200 ml   Net 50 ml       Physical Exam   Constitutional: She is oriented to person, place, and time. She appears well-developed and well-nourished. No distress.   HENT:   Head: Normocephalic and atraumatic.   Eyes: Pupils are equal, round, and reactive to light. No scleral icterus.   Cardiovascular: Normal rate and regular rhythm.   Pulmonary/Chest: Effort normal. No respiratory distress. She has no decreased breath sounds. She has no wheezes.   Abdominal: Soft. Bowel sounds are normal. There is no hepatosplenomegaly.   Musculoskeletal:        Left ankle: She exhibits swelling. Tenderness.   Neurological: She is alert and oriented to person, place, and time.   Skin: No cyanosis. Nails show no clubbing.   Psychiatric: She has a normal mood and affect. Her behavior is normal.       Results Review:    Results from last 7 days   Lab Units  11/18/18   0506  11/13/18   0621  11/12/18   2200   WBC 10*3/mm3  11.16*  10.36  11.46*   HEMOGLOBIN g/dL  10.7*  12.5  12.8   HEMATOCRIT %  34.9*  39.8  40.1   PLATELETS 10*3/mm3   291  357  332     Results from last 7 days   Lab Units  11/18/18   0506  11/16/18   0534  11/15/18   0638   11/13/18   0621  11/12/18   2323   SODIUM mmol/L  143   --    --    --   137  136   POTASSIUM mmol/L  4.7   --    --    --   3.7  3.7   CHLORIDE mmol/L  107   --    --    --   97*  94*   CO2 mmol/L  26.2   --    --    --   26.3  30.0*   BUN mg/dL  12   --    --    --   11  12   CREATININE mg/dL  0.79  0.72  0.78   < >  1.03*  1.01*   GLUCOSE mg/dL  118*   --    --    --   76  69   CALCIUM mg/dL  8.9   --    --    --   9.6  9.5    < > = values in this interval not displayed.         Hemoglobin A1C:No results found for: HGBA1C  Glucose Range:No results found for: POCGLU      ALPRAZolam 0.5 mg Oral Q6H   atenolol 25 mg Oral Daily   docusate sodium 100 mg Oral BID   DULoxetine 30 mg Oral Daily   gabapentin 400 mg Oral TID   mupirocin  Each Nare BID   nicotine 1 patch Transdermal Q24H   NON FORMULARY 20 mg Oral TID   polyethylene glycol 17 g Oral Daily   sodium chloride 3 mL Intravenous Q12H   triamterene 50 mg Oral Daily   vancomycin 750 mg Intravenous Q12H       Pharmacy to dose vancomycin    Diet Regular; Vegetarian; No Red Meat ([employer] Veget[employer])  Assessment/Plan       Assessment/Plan     Active Hospital Problems    Diagnosis Date Noted   • **Peroneal tenosynovitis [M65.9] 11/14/2018   • Non compliance with medical treatment [Z91.19] 11/17/2018   • Tobacco use [Z72.0] 11/16/2018   • Fracture of navicular bone of left foot [S92.252A] 11/14/2018   • HTN (hypertension) [I10] 11/13/2018   • History of MRSA infection [Z86.14] 11/13/2018   • Anxiety [F41.9] 11/13/2018   • Cellulitis/abscess of left foot [L03.116] 11/12/2018      Resolved Hospital Problems   No resolved problems to display.       Continue antibiotics per infectious disease recommendations  I & D done  Add xanax for anxiety   Strict Bedrest  Patient has noncompliance with medical treatment.  She is not to leave room        Marti Denise  "MD [facility]  11/18/18    Electronically signed by Marti eDnise MD at 11/18/2018  2:44 PM     Elyse Mejia MD at 11/18/2018 12:12 PM        Status post operative irrigation and debridement of the left ankle.  Operative cultures negative to date.  Continue vancomycin.  We'll officially see tomorrow.    Electronically signed by Elyse Mejia MD at 11/18/2018 12:12 PM     Kiko Britt Jr., MD at 11/18/2018  9:09 AM          Orthopaedic Surgery  Daily Progress Note    BP 99/58 (BP Location: Left arm, Patient Position: Lying)   Pulse 77   Temp 97.8 °F (36.6 °C) (Oral)   Resp 16   Ht 157.5 cm (62\")   Wt 64.9 kg (143 lb 1.6 oz)   SpO2 94%   BMI 26.17 kg/m²        Imaging Results (last 24 hours)     ** No results found for the last 24 hours. **          Patient Care Team:  Maico Holguin MD as PCP - General (Family Medicine)    SUBJECTIVE  Patient reports pain much better than before surgery.    PHYSICAL EXAM  On my entry into the room, patient is walking around without obvious pain.  She is not wearing the boot.  Dressing clean, dry, intact  Toes warm, perfused, brisk capillary refill  Flexes/extends toes  SILT over toes  No pain with passive stretch       Peroneal tenosynovitis    Cellulitis/abscess of left foot    HTN (hypertension)    History of MRSA infection    Anxiety    Fracture of navicular bone of left foot    Tobacco use    Non compliance with medical treatment      PLAN / DISPOSITION:  POD 1 s/p left ankle I&D by Dr. Lanier    Operative cultures show no growth to date.    1. Pain control: Orals  2.  Antibiotics: Continue IV antibiotics as directed by infectious disease, follow-up operative culture data  3.  PT: Weightbearing as tolerated in tall boot  4. DVT: ALLIE/SCDs plus mobilization  5. Dispo: pending    Kiko Britt Jr, MD  11/18/18  9:09 AM    Electronically signed by Kiko Britt Jr., MD at 11/18/2018  9:15 AM     Marti Denise MD at 11/17/2018  " 2:27 PM              Name: Filomena Rm ADMIT: 2018   : 1960  PCP: Maico Holguin MD    MRN: [id number] LOS: 3 days   AGE/SEX: 58 y.o. female    ROOM: [facility]   Subjective   S/p Incision and Drainage of Left ankle      Brief hospital course since admission:  Chronic infection  MRI shows  1.  Abscess formation anterolateral to the lateral malleolus appears to  drain to the skin surface laterally with surrounding cellulitis. There  is adjacent peroneal tenosynovitis that is presumed infectious. Marrow  edema is present within the lateral calcaneus in the region of the  peroneal tubercle that may be reactive to adjacent synovitis or  represent early osteomyelitis.  2. Small nondisplaced fracture of the medial aspect of the navicular  where there is surrounding bone marrow edema and enhancement. This could  represent a posttraumatic fracture or stress fracture. Navicular  osteomyelitis is also a consideration though there is no clear cortical  loss and this is not in the region of the lateral ankle abscess.  Follow-up MRI may be helpful in the setting to assure that this is not  progressive.  I have reviewed past medical history, family history, social history and allergies.  No changes from admission note.      Review of Systems   Constitutional: Positive for fatigue. Negative for fever.   Respiratory: Negative for shortness of breath.    Cardiovascular: Negative for chest pain.   Skin: Positive for color change and wound.          Objective   Vital Signs  Temp:  [97.5 °F (36.4 °C)-98.3 °F (36.8 °C)] 98.2 °F (36.8 °C)  Heart Rate:  [62-84] 71  Resp:  [14-27] 18  BP: (120-166)/() 148/80  SpO2:  [96 %-100 %] 97 %  on  Flow (L/min):  [2-4] 2;   Device (Oxygen Therapy): room air  Body mass index is 26.17 kg/m².    Intake/Output Summary (Last 24 hours) at 2018 1445  Last data filed at 2018 1140  Gross per 24 hour   Intake 1270 ml   Output --   Net 1270 ml       Physical Exam    Constitutional: She is oriented to person, place, and time. She appears well-developed and well-nourished. No distress.   HENT:   Head: Normocephalic and atraumatic.   Eyes: Pupils are equal, round, and reactive to light. No scleral icterus.   Cardiovascular: Normal rate and regular rhythm.   Pulmonary/Chest: Effort normal. No respiratory distress. She has no decreased breath sounds. She has no wheezes.   Abdominal: Soft. Bowel sounds are normal. There is no hepatosplenomegaly.   Musculoskeletal:        Left ankle: She exhibits swelling. Tenderness.   Neurological: She is alert and oriented to person, place, and time.   Skin: No cyanosis. Nails show no clubbing.   Psychiatric: She has a normal mood and affect. Her behavior is normal.       Results Review:    Results from last 7 days   Lab Units  11/13/18   0621  11/12/18   2200   WBC 10*3/mm3  10.36  11.46*   HEMOGLOBIN g/dL  12.5  12.8   HEMATOCRIT %  39.8  40.1   PLATELETS 10*3/mm3  357  332     Results from last 7 days   Lab Units  11/16/18   0534  11/15/18   0638  11/14/18   0612  11/13/18   0621  11/12/18   2323  11/12/18   2200   SODIUM mmol/L   --    --    --   137  136  137   POTASSIUM mmol/L   --    --    --   3.7  3.7  3.8   CHLORIDE mmol/L   --    --    --   97*  94*  97*   CO2 mmol/L   --    --    --   26.3  30.0*  31.3*   BUN mg/dL   --    --    --   11  12  13   CREATININE mg/dL  0.72  0.78  0.98  1.03*  1.01*  1.05*   GLUCOSE mg/dL   --    --    --   76  69  77   CALCIUM mg/dL   --    --    --   9.6  9.5  9.5         Hemoglobin A1C:No results found for: HGBA1C  Glucose Range:No results found for: POCGLU      ALPRAZolam 0.5 mg Oral Q6H   atenolol 25 mg Oral Daily   docusate sodium 100 mg Oral BID   DULoxetine 30 mg Oral Daily   gabapentin 400 mg Oral TID   mupirocin  Each Nare BID   nicotine 1 patch Transdermal Q24H   [START ON 11/18/2018] polyethylene glycol 17 g Oral Daily   sodium chloride 3 mL Intravenous Q12H   vancomycin 750 mg Intravenous Q12H        lactated ringers 9 mL/hr Last Rate: Stopped (18 1253)   lactated ringers 100 mL/hr Last Rate: 100 mL/hr (18 1253)   Pharmacy to dose vancomycin     Diet Regular  Assessment/Plan       Assessment/Plan     Active Hospital Problems    Diagnosis Date Noted   • **Peroneal tenosynovitis [M65.9] 2018   • Non compliance with medical treatment [Z91.19] 2018   • Tobacco use [Z72.0] 2018   • Fracture of navicular bone of left foot [S92.252A] 2018   • HTN (hypertension) [I10] 2018   • History of MRSA infection [Z86.14] 2018   • Anxiety [F41.9] 2018   • Cellulitis/abscess of left foot [L03.116] 2018      Resolved Hospital Problems   No resolved problems to display.       Continue antibiotics per infectious disease recommendations  I & D done  Add xanax for anxiety   Strict Bedrest  Patient has noncompliance with medical treatment.        Marti Denise MD  [facility]  18    Electronically signed by Marti Denise MD at 2018  2:45 PM     Marti Denise MD at 2018  6:05 PM              Name: Filomena Rm ADMIT: 2018   : 1960  PCP: Maico Holguin MD    MRN: [id number] LOS: 2 days   AGE/SEX: 58 y.o. female    ROOM: [facility]   Subjective   Drainage from left ankle is better    Brief hospital course since admission:  Chronic infection  MRI shows  1.  Abscess formation anterolateral to the lateral malleolus appears to  drain to the skin surface laterally with surrounding cellulitis. There  is adjacent peroneal tenosynovitis that is presumed infectious. Marrow  edema is present within the lateral calcaneus in the region of the  peroneal tubercle that may be reactive to adjacent synovitis or  represent early osteomyelitis.  2. Small nondisplaced fracture of the medial aspect of the navicular  where there is surrounding bone marrow edema and enhancement. This could  represent a posttraumatic  fracture or stress fracture. Navicular  osteomyelitis is also a consideration though there is no clear cortical  loss and this is not in the region of the lateral ankle abscess.  Follow-up MRI may be helpful in the setting to assure that this is not  progressive.  I have reviewed past medical history, family history, social history and allergies.  No changes from admission note.      Review of Systems   Constitutional: Positive for fatigue. Negative for fever.   Respiratory: Negative for shortness of breath.    Cardiovascular: Negative for chest pain.   Skin: Positive for color change and wound.          Objective   Vital Signs  Temp:  [97.9 °F (36.6 °C)-98 °F (36.7 °C)] 97.9 °F (36.6 °C)  Heart Rate:  [69-75] 75  Resp:  [16-18] 18  BP: (120-147)/(79-93) 147/87  SpO2:  [95 %] 95 %  on   ;   Device (Oxygen Therapy): room air  Body mass index is 26.17 kg/m².  No intake or output data in the 24 hours ending 11/16/18 1805    Physical Exam   Constitutional: She is oriented to person, place, and time. She appears well-developed and well-nourished. No distress.   HENT:   Head: Normocephalic and atraumatic.   Eyes: Pupils are equal, round, and reactive to light. No scleral icterus.   Cardiovascular: Normal rate and regular rhythm.   Pulmonary/Chest: Effort normal. No respiratory distress. She has no decreased breath sounds. She has no wheezes.   Abdominal: Soft. Bowel sounds are normal. There is no hepatosplenomegaly.   Musculoskeletal:        Left ankle: She exhibits swelling. Tenderness.   Neurological: She is alert and oriented to person, place, and time.   Skin: No cyanosis. Nails show no clubbing.   Psychiatric: She has a normal mood and affect. Her behavior is normal.       Results Review:    Results from last 7 days   Lab Units  11/13/18   0621  11/12/18   2200   WBC 10*3/mm3  10.36  11.46*   HEMOGLOBIN g/dL  12.5  12.8   HEMATOCRIT %  39.8  40.1   PLATELETS 10*3/mm3  357  332     Results from last 7 days   Lab Units   11/16/18   0534  11/15/18   0638  11/14/18   0612  11/13/18   0621  11/12/18   2323  11/12/18   2200   SODIUM mmol/L   --    --    --   137  136  137   POTASSIUM mmol/L   --    --    --   3.7  3.7  3.8   CHLORIDE mmol/L   --    --    --   97*  94*  97*   CO2 mmol/L   --    --    --   26.3  30.0*  31.3*   BUN mg/dL   --    --    --   11  12  13   CREATININE mg/dL  0.72  0.78  0.98  1.03*  1.01*  1.05*   GLUCOSE mg/dL   --    --    --   76  69  77   CALCIUM mg/dL   --    --    --   9.6  9.5  9.5         Hemoglobin A1C:No results found for: HGBA1C  Glucose Range:No results found for: POCGLU      atenolol 25 mg Oral Daily   clonazePAM 1 mg Oral Daily   DULoxetine 30 mg Oral Daily   gabapentin 400 mg Oral TID   nicotine 1 patch Transdermal Q24H   sodium chloride 3 mL Intravenous Q12H   vancomycin 750 mg Intravenous Q12H       Pharmacy to dose vancomycin    Diet Regular  NPO Diet NPO Except: Sips with meds  Assessment/Plan       Assessment/Plan     Active Hospital Problems    Diagnosis Date Noted   • **Peroneal tenosynovitis [M65.9] 11/14/2018   • Tobacco use [Z72.0] 11/16/2018   • Fracture of navicular bone of left foot [S92.252A] 11/14/2018   • HTN (hypertension) [I10] 11/13/2018   • History of MRSA infection [Z86.14] 11/13/2018   • Anxiety [F41.9] 11/13/2018   • Cellulitis/abscess of left foot [L03.116] 11/12/2018      Resolved Hospital Problems   No resolved problems to display.       Continue antibiotics per infectious disease recommendations  I & D in AM  Add xanax for anxiety         Marti Denise MD  [facility]  11/16/18    Electronically signed by Marti Denise MD at 11/16/2018  6:06 PM     Jose G Staples MD at 11/16/2018 12:51 PM        ID note  CC: f/u MRSA  S: feels okay.  No more drainage. Foot better but still painful.  Tolerating vanc except for some mild nausea   O: NAD, L ankle indruated and fluctuant     Bcx NGTD     A/p  1.   left ankle cellulitis with  abscess and peroneal tenosynovitis  2.  MRSA  3.  Tobacco abuse without peripheral vascular disease  4.  Navicular fracture     Ortho eval appreciated; I&D planned for tomorrow.    Cont vanc  goal level of 15-20.        Electronically signed by Jose G Staples MD at 2018 12:52 PM     Marti Denise MD at 11/15/2018  5:51 PM              Name: Filomena Rm ADMIT: 2018   : 1960  PCP: Maico Holguin MD    MRN: [id number] LOS: 1 days   AGE/SEX: 58 y.o. female    ROOM: [facility]   Subjective   Drainage from left ankle is better    Brief hospital course since admission:  Chronic infection  MRI shows  1.  Abscess formation anterolateral to the lateral malleolus appears to  drain to the skin surface laterally with surrounding cellulitis. There  is adjacent peroneal tenosynovitis that is presumed infectious. Marrow  edema is present within the lateral calcaneus in the region of the  peroneal tubercle that may be reactive to adjacent synovitis or  represent early osteomyelitis.  2. Small nondisplaced fracture of the medial aspect of the navicular  where there is surrounding bone marrow edema and enhancement. This could  represent a posttraumatic fracture or stress fracture. Navicular  osteomyelitis is also a consideration though there is no clear cortical  loss and this is not in the region of the lateral ankle abscess.  Follow-up MRI may be helpful in the setting to assure that this is not  progressive.  I have reviewed past medical history, family history, social history and allergies.  No changes from admission note.      Review of Systems   Constitutional: Positive for fatigue. Negative for fever.   Respiratory: Negative for shortness of breath.    Cardiovascular: Negative for chest pain.   Skin: Positive for color change and wound.          Objective   Vital Signs  Temp:  [97.3 °F (36.3 °C)-98.5 °F (36.9 °C)] 97.3 °F (36.3 °C)  Heart Rate:  [67-89] 67  Resp:  [16-18] 16  BP:  ()/(62-86) 128/86  SpO2:  [93 %-94 %] 94 %  on   ;   Device (Oxygen Therapy): room air  Body mass index is 26.17 kg/m².    Intake/Output Summary (Last 24 hours) at 11/15/2018 1751  Last data filed at 11/15/2018 1300  Gross per 24 hour   Intake 840 ml   Output --   Net 840 ml       Physical Exam   Constitutional: She is oriented to person, place, and time. She appears well-developed and well-nourished. No distress.   HENT:   Head: Normocephalic and atraumatic.   Eyes: Pupils are equal, round, and reactive to light. No scleral icterus.   Cardiovascular: Normal rate and regular rhythm.   Pulmonary/Chest: Effort normal. No respiratory distress. She has no decreased breath sounds. She has no wheezes.   Abdominal: Soft. Bowel sounds are normal. There is no hepatosplenomegaly.   Musculoskeletal:        Left ankle: She exhibits swelling. Tenderness.   Neurological: She is alert and oriented to person, place, and time.   Skin: No cyanosis. Nails show no clubbing.   Psychiatric: She has a normal mood and affect. Her behavior is normal.       Results Review:    Results from last 7 days   Lab Units  11/13/18   0621  11/12/18   2200   WBC 10*3/mm3  10.36  11.46*   HEMOGLOBIN g/dL  12.5  12.8   HEMATOCRIT %  39.8  40.1   PLATELETS 10*3/mm3  357  332     Results from last 7 days   Lab Units  11/15/18   0638  11/14/18   0612  11/13/18   0621  11/12/18   2323  11/12/18   2200   SODIUM mmol/L   --    --   137  136  137   POTASSIUM mmol/L   --    --   3.7  3.7  3.8   CHLORIDE mmol/L   --    --   97*  94*  97*   CO2 mmol/L   --    --   26.3  30.0*  31.3*   BUN mg/dL   --    --   11 12  13   CREATININE mg/dL  0.78  0.98  1.03*  1.01*  1.05*   GLUCOSE mg/dL   --    --   76  69  77   CALCIUM mg/dL   --    --   9.6  9.5  9.5         Hemoglobin A1C:No results found for: HGBA1C  Glucose Range:No results found for: POCGLU      atenolol 25 mg Oral Daily   clonazePAM 1 mg Oral Daily   DULoxetine 30 mg Oral Daily   gabapentin 400 mg Oral  TID   nicotine 1 patch Transdermal Q24H   sodium chloride 3 mL Intravenous Q12H   vancomycin 750 mg Intravenous Q12H       Pharmacy to dose vancomycin    Diet Regular  Assessment/Plan       Assessment/Plan     Active Hospital Problems    Diagnosis Date Noted   • **Peroneal tenosynovitis [M65.9] 11/14/2018   • Fracture of navicular bone of left foot [S92.252A] 11/14/2018   • HTN (hypertension) [I10] 11/13/2018   • History of MRSA infection [Z86.14] 11/13/2018   • Anxiety [F41.9] 11/13/2018   • Cellulitis/abscess of left foot [L03.116] 11/12/2018      Resolved Hospital Problems   No resolved problems to display.       Continue antibiotics per infectious disease recommendations  Ortho to see        Marti Denise MD  [facility]  11/15/18    Electronically signed by Marti Denise MD at 11/15/2018  5:54 PM     Jose G Staples MD at 11/15/2018 12:04 PM        ID note  CC: f/u MRSA  S: feels okay.  No more drainage. Foot better. No f/c/ns  O: NAD, L ankle indruated and fluctuant    Cr 0.8  vanc 19.4    A/p  1.   left ankle cellulitis with abscess and peroneal tenosynovitis  2.  MRSA  3.  Tobacco abuse without peripheral vascular disease  4.  Navicular fracture    No PVD on vascular eval.  Appreciate their help  Does not want amputation  Ortho eval P.    Cont vanc         Electronically signed by Jose G Staples MD at 11/15/2018 12:06 PM     Chano Hauser MD at 11/15/2018  7:54 AM             LOS: 1 day   Patient Care Team:  Maico Holguin MD as PCP - General (Family Medicine)    Chief Complaint: left ankle abscess    Subjective     58 y.o. female with left ankle infection, navicular fracture  Agitated last night after interaction with her daughter per the RN  Calm this morning.  Mild left ankle pain    Review of Systems  Review of Systems - Negative except left ankle pain      Objective     Vital Signs  Temp:  [97.2 °F (36.2 °C)-98.5 °F (36.9 °C)]  97.3 °F (36.3 °C)  Heart Rate:  [67-89] 67  Resp:  [16-18] 16  BP: ()/(62-90) 128/86    Physical Exam  General: No acute distress. Alert and oriented x 4  HEENT: No jugular venous distension, trachea is midline  CV: RRR, S1S2  Resp: Clear unlabored breathing on room air  Abd: Abdomen is soft, nontender, nondistended  Extremities: left ankle abscess with 3x3 mm skin opening, no progression of her cellulitis    Results Review:       Recent Results (from the past 24 hour(s))   Vancomycin, Trough    Collection Time: 18 11:36 AM   Result Value Ref Range    Vancomycin Trough 19.40 5.00 - 20.00 mcg/mL   Doppler Ankle Brachial Index Single Level CAR    Collection Time: 18  8:38 PM   Result Value Ref Range    RIGHT DORSALIS PEDIS SYS  mmHg    RIGHT POST TIBIAL SYS  mmHg    RIGHT GREAT TOE SYS  mmHg    RIGHT MICHAEL RATIO 1.2     RIGHT TBI RATIO 0.99     LEFT DORSALIS PEDIS SYS  mmHg    LEFT POST TIBIAL SYS  mmHg    LEFT GREAT TOE SYS  mmHg    LEFT MICHAEL RATIO 1.1     LEFT TBI RATIO 1     Upper arterial right arm brachial sys max 103 mmHg   ]      Assessment/Plan             Peroneal tenosynovitis    Cellulitis/abscess of left foot    HTN (hypertension)    History of MRSA infection    Anxiety    Fracture of navicular bone of left foot      Assessment & Plan  58 y.o. female with left ankle abscess/tenosynovitis/tarsal fracture.  Her non-invasive vascular studies show normal ABIs and digital pressures/waveforms  No further vascular workup- adequate perfusion present despite risk factors/tobacco use  Ortho eval pending for management of abscess/fracture  Will see as needed.       Chano Hauser MD  11/15/18  7:54 AM      Electronically signed by Chano Hauser MD at 11/15/2018  7:57 AM     Marti Denise MD at 2018  7:26 PM              Name: Filomena Rm ADMIT: 2018   : 1960  PCP: Maico Holguin MD    MRN: [id number] LOS:  0 days   AGE/SEX: 58 y.o. female    ROOM: [facility]   Subjective   Drainage from left ankle    Brief hospital course since admission:  Chronic infection  MRI shows  1.  Abscess formation anterolateral to the lateral malleolus appears to  drain to the skin surface laterally with surrounding cellulitis. There  is adjacent peroneal tenosynovitis that is presumed infectious. Marrow  edema is present within the lateral calcaneus in the region of the  peroneal tubercle that may be reactive to adjacent synovitis or  represent early osteomyelitis.  2. Small nondisplaced fracture of the medial aspect of the navicular  where there is surrounding bone marrow edema and enhancement. This could  represent a posttraumatic fracture or stress fracture. Navicular  osteomyelitis is also a consideration though there is no clear cortical  loss and this is not in the region of the lateral ankle abscess.  Follow-up MRI may be helpful in the setting to assure that this is not  progressive.  I have reviewed past medical history, family history, social history and allergies.  No changes from admission note.      Review of Systems   Constitutional: Positive for fatigue. Negative for fever.   Respiratory: Negative for shortness of breath.    Cardiovascular: Negative for chest pain.   Skin: Positive for color change and wound.          Objective   Vital Signs  Temp:  [97.2 °F (36.2 °C)-98.7 °F (37.1 °C)] 97.2 °F (36.2 °C)  Heart Rate:  [77-94] 89  Resp:  [16-18] 18  BP: ()/(62-90) 99/62  SpO2:  [93 %-94 %] 93 %  on   ;   Device (Oxygen Therapy): room air  Body mass index is 26.17 kg/m².    Intake/Output Summary (Last 24 hours) at 11/14/2018 1956  Last data filed at 11/14/2018 1700  Gross per 24 hour   Intake 840 ml   Output --   Net 840 ml       Physical Exam   Constitutional: She is oriented to person, place, and time. She appears well-developed and well-nourished. No distress.   HENT:   Head: Normocephalic and atraumatic.   Eyes: Pupils  are equal, round, and reactive to light. No scleral icterus.   Cardiovascular: Normal rate and regular rhythm.   Pulmonary/Chest: Effort normal. No respiratory distress. She has no decreased breath sounds. She has no wheezes.   Abdominal: Soft. Bowel sounds are normal. There is no hepatosplenomegaly.   Musculoskeletal:        Left ankle: She exhibits swelling. Tenderness.   Neurological: She is alert and oriented to person, place, and time.   Skin: No cyanosis. Nails show no clubbing.   Psychiatric: She has a normal mood and affect. Her behavior is normal.       Results Review:    Results from last 7 days   Lab Units  11/13/18   0621  11/12/18   2200   WBC 10*3/mm3  10.36  11.46*   HEMOGLOBIN g/dL  12.5  12.8   HEMATOCRIT %  39.8  40.1   PLATELETS 10*3/mm3  357  332     Results from last 7 days   Lab Units  11/14/18   0612  11/13/18   0621  11/12/18   2323  11/12/18   2200   SODIUM mmol/L   --   137  136  137   POTASSIUM mmol/L   --   3.7  3.7  3.8   CHLORIDE mmol/L   --   97*  94*  97*   CO2 mmol/L   --   26.3  30.0*  31.3*   BUN mg/dL   --   11  12  13   CREATININE mg/dL  0.98  1.03*  1.01*  1.05*   GLUCOSE mg/dL   --   76  69  77   CALCIUM mg/dL   --   9.6  9.5  9.5         Hemoglobin A1C:No results found for: HGBA1C  Glucose Range:No results found for: POCGLU      atenolol 25 mg Oral Daily   clonazePAM 1 mg Oral Daily   DULoxetine 30 mg Oral Daily   gabapentin 400 mg Oral TID   nicotine 1 patch Transdermal Q24H   sodium chloride 3 mL Intravenous Q12H   [START ON 11/15/2018] vancomycin 750 mg Intravenous Q12H       Pharmacy to dose vancomycin    Diet Regular  Assessment/Plan       Assessment/Plan     Active Hospital Problems    Diagnosis Date Noted   • **Peroneal tenosynovitis [M65.9] 11/14/2018   • Fracture of navicular bone of left foot [S92.252A] 11/14/2018   • HTN (hypertension) [I10] 11/13/2018   • History of MRSA infection [Z86.14] 11/13/2018   • Anxiety [F41.9] 11/13/2018   • Cellulitis/abscess of left  foot [L03.116] 11/12/2018      Resolved Hospital Problems   No resolved problems to display.       Continue antibiotics per infectious disease recommendations  Both vascular surgery and orthopedic surgery will be evaluating the patient        Marti Denise MD  [facility]  11/14/18    Electronically signed by Marti Denise MD at 11/14/2018  7:57 PM     Jose G Staples MD at 11/14/2018  8:49 AM        INFECTIOUS DISEASES PROGRESS NOTE    CC: f/u ankle infection    S:   Some drainage from ankle  No f/c/ns    O:  Physical Exam:  Temp:  [98 °F (36.7 °C)-98.7 °F (37.1 °C)] 98.6 °F (37 °C)  Heart Rate:  [77-94] 88  Resp:  [16-18] 16  BP: (100-110)/(80-88) 100/80  Physical Exam   Constitutional: She appears well-developed. No distress.   Pulmonary/Chest: Effort normal.   Skin:   Left ankle abscess with surrounding erythema and cellulitis   Psychiatric:   Anxious.  Limited insight and judgment        Diagnostics:     MRI, discussed with radiology, shows lateral malleolus abscess with peroneal tenosynovitis and nondisplaced fracture on the medial aspect of the navicular    Assessment/Plan     1.    left ankle cellulitis with abscess and peroneal tenosynovitis  2.  MRSA  3.  Tobacco abuse with probable peripheral vascular disease  4.  Navicular fracture    Discussed with general surgery and they recommend specialty surgical consultation.  Will ask orthopedics for evaluation given navicular fracture, tenosynovitis and possible osteomyelitis.  The long-standing nature of the disease raises suspicion for osteomyelitis although the MRI changes are relatively mild and I would've expected more pronounced changes should she really of had 9 months of chronic osteomyelitis.  Discussed this with radiology.  We will continue on vancomycin.    Discussed with Dr. Denise yesterday.    Jose G Staples MD  8:49 AM  11/14/18           Electronically signed by Jose G Staples  MD Richard at 11/14/2018  8:52 AM     Marti Denise MD at 11/13/2018  2:15 PM        This patient was seen early in the morning by my associate.  Discussed with the infectious disease MRI has been ordered  Discussed case in multidisciplinary rounds this morning  Conitinue antibiotics per XAVIER MONAE. MD Camelia   11/13/2018    Electronically signed by Marti Denise MD at 11/13/2018  2:16 PM          Consult Notes       Maxwell Lanier MD at 11/15/2018  6:25 PM             Orthopedic Consult      Patient: Filomena Rm    Date of Admission: 11/12/2018  9:31 PM    YOB: 1960    Medical Record Number: [id number]    Consulting Physician: Marti Denise MD    Chief Complaints: Left ankle infection    History of Present Illness: 58 y.o. female admitted to [facility] to services of Marti Denise MD with a left ankle infection.  This has been a long-standing issue, dating back almost a year.  At one point, she had a biopsy done by her primary care physician that confirmed MRSA.  She was placed on Bactrim and things seemed to get better for a time.  Over the past month, she reports worsening pain and swelling.  She was admitted to [facility] 2 days ago with swelling and redness.  She reports that these have both gotten significantly better since starting on IV antibiotics.  She has been able to continue to bear weight on the left leg. Current pain is described as mild, constant and aching.  Denies any fevers, chills, or sweats.    Allergies:   Allergies   Allergen Reactions   • Codeine Nausea And Vomiting   • Penicillins Rash       Home Medications:    Current Facility-Administered Medications:   •  acetaminophen (TYLENOL) tablet 650 mg, 650 mg, Oral, Q4H PRN, Vimal Carpenter MD  •  albuterol (PROVENTIL) nebulizer solution 0.083% 2.5 mg/3mL, 2.5 mg, Nebulization, Q6H PRN, Vimal Carpenter MD  •  atenolol (TENORMIN) tablet 25 mg, 25 mg, Oral,  Daily, Vimal Carpenter MD, 25 mg at 11/15/18 0941  •  clonazePAM (KlonoPIN) tablet 1 mg, 1 mg, Oral, Daily, Vimal Carpenter MD, 1 mg at 11/14/18 2105  •  DULoxetine (CYMBALTA) DR capsule 30 mg, 30 mg, Oral, Daily, Vimal Carpenter MD, 30 mg at 11/15/18 0942  •  gabapentin (NEURONTIN) capsule 400 mg, 400 mg, Oral, TID, Vimal Carpenter MD, 400 mg at 11/15/18 0943  •  HYDROcodone-acetaminophen (NORCO) 5-325 MG per tablet 1 tablet, 1 tablet, Oral, Q4H PRN, Vimal Carpenter MD, 1 tablet at 11/15/18 0800  •  nicotine (NICODERM CQ) 14 MG/24HR patch 1 patch, 1 patch, Transdermal, Q24H, Geni Cordero APRN, 1 patch at 11/14/18 0918  •  nicotine polacrilex (NICORETTE) gum 2 mg, 2 mg, Mouth/Throat, Q1H PRN, Marti Denise MD  •  ondansetron (ZOFRAN) injection 4 mg, 4 mg, Intravenous, Q6H PRN, Vimal Carpenter MD, 4 mg at 11/13/18 0840  •  Pharmacy to dose vancomycin, , Does not apply, Continuous PRN, Vimal Carpenter MD  •  sodium chloride 0.9 % flush 3 mL, 3 mL, Intravenous, Q12H, Vimal Carpenter MD, 3 mL at 11/15/18 0942  •  sodium chloride 0.9 % flush 3-10 mL, 3-10 mL, Intravenous, PRN, Vimal Carpenter MD  •  vancomycin 750 mg/250 mL 0.9% NS add-vantage, 750 mg, Intravenous, Q12H, Marti Denise MD, 750 mg at 11/15/18 1523    Current Medications:  Scheduled Meds:  atenolol 25 mg Oral Daily   clonazePAM 1 mg Oral Daily   DULoxetine 30 mg Oral Daily   gabapentin 400 mg Oral TID   nicotine 1 patch Transdermal Q24H   sodium chloride 3 mL Intravenous Q12H   vancomycin 750 mg Intravenous Q12H     Continuous Infusions:  Pharmacy to dose vancomycin      PRN Meds:.•  acetaminophen  •  albuterol  •  HYDROcodone-acetaminophen  •  nicotine polacrilex  •  ondansetron  •  Pharmacy to dose vancomycin  •  sodium chloride    Past Medical History:   Diagnosis Date   • COPD (chronic obstructive pulmonary disease) (CMS/HCC)    •  Depression    • Hypertension    • Lung mass        Past Surgical History:   Procedure Laterality Date   • BACK SURGERY     • SKIN BIOPSY     • WRIST FRACTURE SURGERY         Social History     Occupational History   • Not on file   Tobacco Use   • Smoking status: Current Every Day Smoker   Substance and Sexual Activity   • Alcohol use: No   • Drug use: Not on file   • Sexual activity: Not on file    Social History     Social History Narrative   • Not on file       Family History   Problem Relation Age of Onset   • Emphysema Mother    • Alzheimer's disease Father        Review of Systems:     Constitutional:  Denies fever, shaking or chills   Eyes:  Denies change in visual acuity   HEENT:  Denies nasal congestion or sore throat   Respiratory:  Denies cough or shortness of breath   Cardiovascular:  Denies chest pain or edema  Endocrine: Denies tremors, palpitations, intolerance of heat or cold, polyuria, polydipsia.  GI:  Denies abdominal pain, nausea, vomiting, bloody stools or diarrhea  :  Denies frequency, urgency, incontinence, retention, or nocturia.  Musculoskeletal:  Denies numbness tingling or loss of motor function except as above  Integument:  Denies rash, lesion or ulceration   Neurologic:  Denies headache or focal weakness, deficits  Heme:  Denies epistaxis, spontaneous or excessive bleeding, hematuria, melena, fatigue, enlarged or tender lymph nodes.      All other pertinent positives and negatives as noted above in HPI.    Physical Exam: 58 y.o. female    Vitals:    11/14/18 1404 11/14/18 1945 11/14/18 2300 11/15/18 0733   BP: 130/90 99/62 119/86 128/86   BP Location: Right arm Left arm Right arm Right arm   Patient Position: Sitting Lying Lying Lying   Pulse: 84 89 80 67   Resp: 16 18 16 16   Temp: 97.2 °F (36.2 °C) 98.5 °F (36.9 °C) 98.4 °F (36.9 °C) 97.3 °F (36.3 °C)   TempSrc: Oral Oral Oral Oral   SpO2: 94% 93% 94%    Weight:       Height:         General:  Awake, alert. No acute distress.       Head/Neck:  Normocephalic, atraumatic.  Conjunctiva and sclera clear.  Hearing adequate for the exam.  Neck is supple with normal ROM.    Psych:  Affect and demeanor appropriate.    CV:  Regular rate and rhythm.  Hemodynamically stable.    Lungs:  Good chest expansion, breathing unlabored.    Abdomen:  Soft.  Non-tender, non-distended.    Extremities:  The left leg is examined.  She has an approximately 1 cm eschar over the lateral malleolus.  There is no drainage.  Slight superficial erythema.there is tenderness over the lateral malleolus and peroneal tendons.  No fluctuance that I can appreciate although she does have moderate edema.  Ankle motion is well tolerated.  Eversion and dorsiflexion are mildly uncomfortable.  She can plantarflex and dorsiflex with good strength.  Intact sensation.  Palpable pulses.  Brisk capillary refill.  Her gait is nonantalgic.      All other extremities atraumatic without gross abnormality.     Diagnostic Tests:    Admission on 11/12/2018   Component Date Value Ref Range Status   • Glucose 11/12/2018 77  65 - 99 mg/dL Final   • BUN 11/12/2018 13  6 - 20 mg/dL Final   • Creatinine 11/12/2018 1.05* 0.57 - 1.00 mg/dL Final   • Sodium 11/12/2018 137  136 - 145 mmol/L Final   • Potassium 11/12/2018 3.8  3.5 - 5.2 mmol/L Final   • Chloride 11/12/2018 97* 98 - 107 mmol/L Final   • CO2 11/12/2018 31.3* 22.0 - 29.0 mmol/L Final   • Calcium 11/12/2018 9.5  8.6 - 10.5 mg/dL Final   • Total Protein 11/12/2018 7.2  6.0 - 8.5 g/dL Final   • Albumin 11/12/2018 3.70  3.50 - 5.20 g/dL Final   • ALT (SGPT) 11/12/2018 28  1 - 33 U/L Final   • AST (SGOT) 11/12/2018 33* 1 - 32 U/L Final   • Alkaline Phosphatase 11/12/2018 114  39 - 117 U/L Final   • Total Bilirubin 11/12/2018 0.3  0.1 - 1.2 mg/dL Final   • eGFR Non African Amer 11/12/2018 54* >60 mL/min/1.73 Final   • Globulin 11/12/2018 3.5  gm/dL Final   • A/G Ratio 11/12/2018 1.1  g/dL Final   • BUN/Creatinine Ratio 11/12/2018 12.4  7.0 - 25.0  Final   • Anion Gap 11/12/2018 8.7  mmol/L Final   • Sed Rate 11/12/2018 62* 0 - 30 mm/hr Final   • C-Reactive Protein 11/12/2018 4.79* 0.00 - 0.50 mg/dL Final   • WBC 11/12/2018 11.46* 4.50 - 10.70 10*3/mm3 Final   • RBC 11/12/2018 4.08  3.90 - 5.20 10*6/mm3 Final   • Hemoglobin 11/12/2018 12.8  11.9 - 15.5 g/dL Final   • Hematocrit 11/12/2018 40.1  35.6 - 45.5 % Final   • MCV 11/12/2018 98.3* 80.5 - 98.2 fL Final   • MCH 11/12/2018 31.4  26.9 - 32.0 pg Final   • MCHC 11/12/2018 31.9* 32.4 - 36.3 g/dL Final   • RDW 11/12/2018 13.3* 11.7 - 13.0 % Final   • RDW-SD 11/12/2018 48.0  37.0 - 54.0 fl Final   • MPV 11/12/2018 9.4  6.0 - 12.0 fL Final   • Platelets 11/12/2018 332  140 - 500 10*3/mm3 Final   • Neutrophil % 11/12/2018 69.2  42.7 - 76.0 % Final   • Lymphocyte % 11/12/2018 20.7  19.6 - 45.3 % Final   • Monocyte % 11/12/2018 7.8  5.0 - 12.0 % Final   • Eosinophil % 11/12/2018 1.7  0.3 - 6.2 % Final   • Basophil % 11/12/2018 0.3  0.0 - 1.5 % Final   • Immature Grans % 11/12/2018 0.3  0.0 - 0.5 % Final   • Neutrophils, Absolute 11/12/2018 7.93  1.90 - 8.10 10*3/mm3 Final   • Lymphocytes, Absolute 11/12/2018 2.37  0.90 - 4.80 10*3/mm3 Final   • Monocytes, Absolute 11/12/2018 0.89  0.20 - 1.20 10*3/mm3 Final   • Eosinophils, Absolute 11/12/2018 0.20  0.00 - 0.70 10*3/mm3 Final   • Basophils, Absolute 11/12/2018 0.03  0.00 - 0.20 10*3/mm3 Final   • Immature Grans, Absolute 11/12/2018 0.04* 0.00 - 0.03 10*3/mm3 Final   • Glucose 11/12/2018 69  65 - 99 mg/dL Final   • BUN 11/12/2018 12  6 - 20 mg/dL Final   • Creatinine 11/12/2018 1.01* 0.57 - 1.00 mg/dL Final   • Sodium 11/12/2018 136  136 - 145 mmol/L Final   • Potassium 11/12/2018 3.7  3.5 - 5.2 mmol/L Final   • Chloride 11/12/2018 94* 98 - 107 mmol/L Final   • CO2 11/12/2018 30.0* 22.0 - 29.0 mmol/L Final   • Calcium 11/12/2018 9.5  8.6 - 10.5 mg/dL Final   • eGFR Non  Amer 11/12/2018 56* >60 mL/min/1.73 Final   • BUN/Creatinine Ratio 11/12/2018 11.9  7.0 -  25.0 Final   • Anion Gap 11/12/2018 12.0  mmol/L Final   • Lactate 11/12/2018 1.7  0.5 - 2.0 mmol/L Final   • Blood Culture 11/12/2018 No growth at 2 days   Preliminary   • Blood Culture 11/12/2018 No growth at 2 days   Preliminary   • Glucose 11/13/2018 76  65 - 99 mg/dL Final   • BUN 11/13/2018 11  6 - 20 mg/dL Final   • Creatinine 11/13/2018 1.03* 0.57 - 1.00 mg/dL Final   • Sodium 11/13/2018 137  136 - 145 mmol/L Final   • Potassium 11/13/2018 3.7  3.5 - 5.2 mmol/L Final   • Chloride 11/13/2018 97* 98 - 107 mmol/L Final   • CO2 11/13/2018 26.3  22.0 - 29.0 mmol/L Final   • Calcium 11/13/2018 9.6  8.6 - 10.5 mg/dL Final   • eGFR Non African Amer 11/13/2018 55* >60 mL/min/1.73 Final   • BUN/Creatinine Ratio 11/13/2018 10.7  7.0 - 25.0 Final   • Anion Gap 11/13/2018 13.7  mmol/L Final   • WBC 11/13/2018 10.36  4.50 - 10.70 10*3/mm3 Final   • RBC 11/13/2018 4.03  3.90 - 5.20 10*6/mm3 Final   • Hemoglobin 11/13/2018 12.5  11.9 - 15.5 g/dL Final   • Hematocrit 11/13/2018 39.8  35.6 - 45.5 % Final   • MCV 11/13/2018 98.8* 80.5 - 98.2 fL Final   • MCH 11/13/2018 31.0  26.9 - 32.0 pg Final   • MCHC 11/13/2018 31.4* 32.4 - 36.3 g/dL Final   • RDW 11/13/2018 13.2* 11.7 - 13.0 % Final   • RDW-SD 11/13/2018 47.8  37.0 - 54.0 fl Final   • MPV 11/13/2018 9.7  6.0 - 12.0 fL Final   • Platelets 11/13/2018 357  140 - 500 10*3/mm3 Final   • Neutrophil % 11/13/2018 62.2  42.7 - 76.0 % Final   • Lymphocyte % 11/13/2018 25.6  19.6 - 45.3 % Final   • Monocyte % 11/13/2018 8.7  5.0 - 12.0 % Final   • Eosinophil % 11/13/2018 2.9  0.3 - 6.2 % Final   • Basophil % 11/13/2018 0.3  0.0 - 1.5 % Final   • Immature Grans % 11/13/2018 0.3  0.0 - 0.5 % Final   • Neutrophils, Absolute 11/13/2018 6.45  1.90 - 8.10 10*3/mm3 Final   • Lymphocytes, Absolute 11/13/2018 2.65  0.90 - 4.80 10*3/mm3 Final   • Monocytes, Absolute 11/13/2018 0.90  0.20 - 1.20 10*3/mm3 Final   • Eosinophils, Absolute 11/13/2018 0.30  0.00 - 0.70 10*3/mm3 Final   • Basophils,  Absolute 11/13/2018 0.03  0.00 - 0.20 10*3/mm3 Final   • Immature Grans, Absolute 11/13/2018 0.03  0.00 - 0.03 10*3/mm3 Final   • Creatinine 11/14/2018 0.98  0.57 - 1.00 mg/dL Final   • eGFR Non African Amer 11/14/2018 58* >60 mL/min/1.73 Final   • Vancomycin Trough 11/14/2018 19.40  5.00 - 20.00 mcg/mL Final   • RIGHT DORSALIS PEDIS SYS MAX 11/14/2018 125  mmHg Final   • RIGHT POST TIBIAL SYS MAX 11/14/2018 120  mmHg Final   • RIGHT GREAT TOE SYS MAX 11/14/2018 102  mmHg Final   • RIGHT MICHAEL RATIO 11/14/2018 1.2   Final   • RIGHT TBI RATIO 11/14/2018 0.99   Final   • LEFT DORSALIS PEDIS SYS MAX 11/14/2018 118  mmHg Final   • LEFT POST TIBIAL SYS MAX 11/14/2018 112  mmHg Final   • LEFT GREAT TOE SYS MAX 11/14/2018 111  mmHg Final   • LEFT MICHAEL RATIO 11/14/2018 1.1   Final   • LEFT TBI RATIO 11/14/2018 1   Final   • Upper arterial right arm brachial * 11/14/2018 103  mmHg Final   • Creatinine 11/15/2018 0.78  0.57 - 1.00 mg/dL Final   • eGFR Non African Amer 11/15/2018 76  >60 mL/min/1.73 Final     Lab Results (last 24 hours)     Procedure Component Value Units Date/Time    Creatinine, Serum [[id number]]  (Normal) Collected:  11/15/18 0638    Specimen:  Blood Updated:  11/15/18 0805     Creatinine 0.78 mg/dL      eGFR Non African Amer 76 mL/min/1.73     Blood Culture - Blood, Arm, Right [[id number]] Collected:  11/12/18 2323    Specimen:  Blood from Arm, Right Updated:  11/14/18 2345     Blood Culture No growth at 2 days    Blood Culture - Blood, Arm, Left [[id number]] Collected:  11/12/18 2323    Specimen:  Blood from Arm, Left Updated:  11/14/18 2345     Blood Culture No growth at 2 days        Imaging: AP, oblique and lateral views of the left foot are reviewed along with the associated report.  There is edema adjacent to the lateral malleolus.  No other significant findings noted.  MRI of the left ankle is reviewed along with the associated report.  There is an abscess adjacent to lateral malleolus.  There is what  appears to be infectious tenosynovitis of the peroneal tendons.  There is a questionable fracture of the navicular and some reactive marrow edema in the calcaneus.    Assessment:  Left ankle abscess and peroneal tenosynovitis    Plan:  This needs to be washed out.  She is not septic and there is no urgency but that is the next step for her.  We discussed this including all the risks, benefits and alternatives.  This has been a long-standing infection and may require more than one surgery.  There is also certainly no guarantee that we will be able to eradicate this infection..  The risks, benefits and alternatives to an irrigation and debridement were carefully discussed with the patient at the bedside.  She wants to proceed with that.  We'll try to get this done expeditiously.    Date: 11/15/2018    Maxwell Lanier MD    CC: Marti Denise MD        Electronically signed by Maxwell Lanier MD at 2018  9:40 AM     Chano Hauser MD at 2018  1:25 PM      Consult Orders    1. Inpatient Vascular Surgery Consult [[id number]] ordered by Marti Denise MD at 18 1150                Name: Filomena Rm ADMIT: 2018   : 1960  PCP: Maico Holguin MD    MRN: [id number] LOS: 0 days   AGE/SEX: 58 y.o. female  ROOM: [facility]         CHIEF COMPLAINT: left ankle wound, evaluate for PAD   HPI: Filomena Rm is a 58 y.o. female whose previous medical records I have reviewed and summarize below in addition to the findings from today's exam.  She was admitted on 18 complaining of left ankle swelling or swelling, erythema, drainage.  She was reportedly diagnosed with an MRSA infection as an outpatient in February and this was possibly treated.  However over the last month she has developed increasing swelling and pain of the left ankle.  She was admitted for left ankle abscess with suspicion for osteomyelitis.    She has an extensive smoking history and I been asked  to evaluate her regarding her vascular status.    PAST MEDICAL HISTORY:   Past Medical History:   Diagnosis Date   • COPD (chronic obstructive pulmonary disease) (CMS/[facility])    • Depression    • Hypertension    • Lung mass       PAST SURGICAL HISTORY:   Past Surgical History:   Procedure Laterality Date   • BACK SURGERY     • SKIN BIOPSY     • WRIST FRACTURE SURGERY        FAMILY HISTORY:   Family History   Problem Relation Age of Onset   • Emphysema Mother    • Alzheimer's disease Father       SOCIAL HISTORY:   Social History     Tobacco Use   • Smoking status: Current Every Day Smoker   Substance Use Topics   • Alcohol use: No   • Drug use: Not on file      MEDICATIONS:   No current facility-administered medications on file prior to encounter.      Current Outpatient Medications on File Prior to Encounter   Medication Sig Dispense Refill   • albuterol (PROVENTIL HFA;VENTOLIN HFA) 108 (90 Base) MCG/ACT inhaler Inhale 2 puffs Every 4 (Four) Hours As Needed for Wheezing.     • albuterol (PROVENTIL,VENTOLIN) 2 MG/5ML syrup Take 2 mg by mouth 3 (Three) Times a Day.     • amphetamine-dextroamphetamine (ADDERALL) 20 MG tablet Take 20 mg by mouth 3 (Three) Times a Day.     • atenolol (TENORMIN) 25 MG tablet Take 25 mg by mouth Daily.     • clonazePAM (KlonoPIN) 1 MG tablet Take 1 mg by mouth Daily.     • DULoxetine (CYMBALTA) 30 MG capsule Take 30 mg by mouth Daily.     • gabapentin (NEURONTIN) 400 MG capsule Take 400 mg by mouth 3 (Three) Times a Day.     • TRIAMTERENE PO Take 25 mg by mouth Daily.               ALLERGIES: Codeine and Penicillins     COMPLETE REVIEW OF SYSTEMS:     Review of Systems   Constitutional: Positive for fatigue. Negative for chills, diaphoresis and fever.   HENT: Negative for hearing loss, nosebleeds, sore throat and trouble swallowing.    Eyes: Negative for pain and visual disturbance.   Respiratory: Negative for cough, shortness of breath, wheezing and stridor.    Cardiovascular: Negative for  chest pain, palpitations and leg swelling.   Gastrointestinal: Negative for abdominal distention, abdominal pain, blood in stool, constipation, diarrhea, nausea and vomiting.   Endocrine: Negative for polydipsia and polyphagia.   Genitourinary: Negative for flank pain and pelvic pain.   Musculoskeletal: Negative for arthralgias, back pain, joint swelling and neck pain.        Left ankle pain and drainage   Skin: Negative for color change, pallor, rash and wound.   Neurological: Negative for dizziness, seizures, syncope and headaches.   Psychiatric/Behavioral: Negative for behavioral problems, confusion, hallucinations and suicidal ideas.         PHYSICAL EXAM:   Patient Vitals for the past 24 hrs:   BP Temp Temp src Pulse Resp SpO2   11/14/18 0749 100/80 98.6 °F (37 °C) Oral 88 16 --   11/14/18 0000 103/81 98 °F (36.7 °C) Oral 77 18 --   11/13/18 2038 110/88 98.7 °F (37.1 °C) Oral 94 18 93 %        Physical Exam   Constitutional: She is oriented to person, place, and time. She appears well-developed and well-nourished.   HENT:   Head: Normocephalic and atraumatic.   Eyes: Pupils are equal, round, and reactive to light. No scleral icterus.   Neck: Normal range of motion. Neck supple. No tracheal deviation present.   Cardiovascular: Normal rate and regular rhythm.   Pulses:       Carotid pulses are 2+ on the right side, and 2+ on the left side.       Radial pulses are 2+ on the right side, and 2+ on the left side.        Femoral pulses are 2+ on the right side, and 2+ on the left side.       Popliteal pulses are 2+ on the right side, and 2+ on the left side.        Dorsalis pedis pulses are 2+ on the right side, and 2+ on the left side.        Posterior tibial pulses are 2+ on the right side, and 2+ on the left side.   Pulmonary/Chest: Effort normal. No respiratory distress. She exhibits no tenderness.   Abdominal: Soft. Bowel sounds are normal. There is no tenderness.   Musculoskeletal: Normal range of motion. She  exhibits no edema.   Left foot and ankle 1-2+ swelling with cellulitis, 2x3 mm skin defect on the lateral malleolus   Neurological: She is alert and oriented to person, place, and time. No cranial nerve deficit.   Skin: Skin is warm and dry.   Psychiatric: She has a normal mood and affect. Her behavior is normal.             LABS:      Recent Results (from the past 24 hour(s))   Creatinine, Serum    Collection Time: 11/14/18  6:12 AM   Result Value Ref Range    Creatinine 0.98 0.57 - 1.00 mg/dL    eGFR Non African Amer 58 (L) >60 mL/min/1.73   Vancomycin, Trough    Collection Time: 11/14/18 11:36 AM   Result Value Ref Range    Vancomycin Trough 19.40 5.00 - 20.00 mcg/mL   ]    The following radiologic or non-invasive studies including the images have been independently reviewed by me and my impressions are as follows: MRI shows abscess near the left lateral malleolus with peroneal tenosynovitis and a fracture of the navicular bone.       ASSESSMENT/PLAN: 58 y.o. female with a significant left ankle infection.  She has a long smoking history, but I think the pulses on her left foot are palpable.  I will check a lower extremity Doppler with digit pressures to quantify her perfusion.  If these results are within normal limits, she will be cleared from my standpoint for orthopedic intervention on the left ankle to treat her abscess with underlying fracture.      Problem Points:  4:  Patient has a new problem, with additional work-up planned  Total problem points:4 or more    Data Points:  1:  I have reviewed or order clinical lab test  2:  I have personally and independently review of image, tracing, or specimen  2:  I have reviewed and summation of old records and/or discussed the patients care with another health care provider  Total data points:4 or more    Risk Points:  High:  Chronic illness with severe exacerbation or progression    MDM requires 2/3 (Problem points, Data points and Risk)  MDM Prob point Data point  Risk   SF 1 1 Minimal   Low 2 2 Low   Mod 3 3 Moderate   High 4 4 High     Code requires 3/3 (MDM, History and Exam)  Code MDM History Exam Time   52802 SF/Low Detailed Detailed 30   95168 Mod Comprehensive Comprehensive 50   60225 High Comprehensive Comprehensive 70     Detailed history:  4 elements HPI or status of 3 chronic problems; 2-9 system ROS  Comprehensive:  4 elements HPI or status of 3 chronic problems;  10 system ROS    Detailed Exam:  12 findings from any organ system  Comprehensive Exam:  2 findings from each of 9 systems.     [place]illin    Assessment/Plan       Cellulitis/abscess of left foot    HTN (hypertension)    History of MRSA infection    Anxiety      Chano Hauser MD   18       Electronically signed by Chano Hauser MD at 2018  1:31 PM     Jose G Staples MD at 2018  9:57 AM      Consult Orders    1. Inpatient Infectious Diseases Consult [[id number]] ordered by Vimal Carpenter MD at 18 0058                Referring Provider: Vimal Dukes*  Reason for Consultation: Recurrent cellulitis      Subjective   History of present illness:    This is a very nice 58-year-old with a history of tobacco abuse and we are asked to provide evaluation and opinion regarding recurrent cellulitis of the left ankle for which she was admitted on 18.    Per the patient, she was in her usual state until 2018 when she developed a left ankle swelling and erythema.  She actually went to her PCP and aspiration was done which reportedly grew MRSA.  She is G2 with about 2 months of Bactrim with decent resolution in her symptoms.  She would have intermittent flares of the swelling and take a pill or 2 of Bactrim with good relief.  Denies associated constitutional symptoms of infection such as fever, chills, night sweats.  In August she had a dry aspiration with negative cultures.  Unfortunately in October she developed increasing  swelling and pain in the left ankle.  This continued to progress and when she went her primary care doctor he recommended evaluation in the ER.      Per review of the past records, since admission she was evaluated with plain films of the left ankle which were suspicious but not definitive for osteomyelitis.  CRP was elevated at 4.  She was started on IV vancomycin and admitted.  Gen. surgery is been consulted and pending.  She did have a wound culture from February 2018 grew MRSA which was resistant to clindamycin but sensitive to levofloxacin, ciprofloxacin, linezolid, trimethoprim/sulfamethoxazole, rifampin, vancomycin and tetracyclines.    PMH: Depression, tobacco abuse, COPD, HTN, HLD, Back surgery, thymus removal  All-PCN (unknown reaction in youth, subsquently tolerated Augmentin)  No FMH infection  SH: lives in [place], [place] . Smoked 1 ppd x45 years.  unemployed      Review of Systems  Pertinent items are noted in HPI, all other systems reviewed and negative    Objective     Physical Exam:   Vital Signs   Temp:  [97.2 °F (36.2 °C)-98.6 °F (37 °C)] 97.8 °F (36.6 °C)  Heart Rate:  [] 92  Resp:  [16-18] 18  BP: (116-128)/(82-85) 116/84    GENERAL: Awake and alert, in no acute distress.   HEENT: Oropharynx is clear. Hearing is grossly normal.   EYES: PERRL. No conjunctival injection. No lid lag.   LYMPHATICS: No lymphadenopathy of the neck or inguinal regions.   HEART: Regular rate and rhythm. No peripheral edema.   LUNGS: Clear to auscultation anteriorly with normal respiratory effort.   GI: Soft, nontender, nondistended. No appreciable organomegaly.   SKIN: Warm and dry without cutaneous eruptions x L ankle with lateral erythematous lesion that is indurated   PSYCHIATRIC: Appropriate mood, affect, insight, and judgment.     Results Review:     Lab Results   Component Value Date    WBC 10.36 11/13/2018    HGB 12.5 11/13/2018    HCT 39.8 11/13/2018    MCV 98.8 (H) 11/13/2018     11/13/2018          Lab Results   Component Value Date    GLUCOSE 76 2018    BUN 11 2018    CREATININE 1.03 (H) 2018    EGFRIFNONA 55 (L) 2018    BCR 10.7 2018    CO2 26.3 2018    CALCIUM 9.6 2018    ALBUMIN 3.70 2018    AST 33 (H) 2018    ALT 28 2018         Estimated Creatinine Clearance: 52.6 mL/min (A) (by C-G formula based on SCr of 1.03 mg/dL (H)).      Microbiology:  bcx P    Radiology:  Plain films left ankle that showed possible osteo    Assessment/Plan   1.  Recurrent cellulitis, rule out deep infection  2.  MRSA  3.  Tobacco abuse with probable peripheral vascular disease    Recurrent cellulitis highly worrisome for deep nidus of infection such as osteomyelitis or tenosynovitis.  Agree with vancomycin as dosed for goal level of 15-20.  We will check vancomycin level tomorrow a.m. the head of the fourth dose.  I'm going to check an MRI of the left ankle with and without contrast to assess for osteomyelitis or deep infection.  She understands we are to treat this infection very seriously or she may lose her leg.  I suspect she has underlying peripheral vascular disease given her long-standing tobacco abuse     Thank you for this consult.  We will continue to follow along and tailor antibiotics as the patient's clinical course evolves.    Jose G Staples MD  18  9:57 AM        Electronically signed by Jose G Staples MD at 2018 10:51 AM          Discharge Summary      Marti Denise MD at 2018  4:04 PM                 Name: Filomena Rm  Age: 58 y.o.  Sex: female  :  1960  MRN: [id number]         Primary Care Physician: Maico Holguin MD      Date of Admission:  2018  Date of Discharge:  2018      CHIEF COMPLAINT     Foot Swelling         DISCHARGE DIAGNOSIS  Active Hospital Problems    Diagnosis Date Noted   • **Peroneal tenosynovitis [M65.9] 2018   • Non compliance with medical  treatment [Z91.19] 11/17/2018   • Tobacco use [Z72.0] 11/16/2018   • Fracture of navicular bone of left foot [S92.252A] 11/14/2018   • HTN (hypertension) [I10] 11/13/2018   • History of MRSA infection [Z86.14] 11/13/2018   • Anxiety [F41.9] 11/13/2018   • Cellulitis/abscess of left foot [L03.116] 11/12/2018      Resolved Hospital Problems   No resolved problems to display.       SECONDARY DIAGNOSES  Past Medical History:   Diagnosis Date   • COPD (chronic obstructive pulmonary disease) (CMS/[facility])    • Depression    • Hypertension    • Lung mass        CONSULTS   Consulting Physician(s)     Provider Relationship Specialty    Maxwell Lanier MD Consulting Physician Orthopedic Surgery    Marti Denise MD Consulting Physician Pulmonary Disease            PROCEDURES PERFORMED  Procedure : Incision and Drainage of Left ankle by Dr Pierce, 11/17/2018  Approximately 5 cm incision over the lateral aspect of the ankle.  She had an open wound over the malleolus was some purulent material and necrotic edges.  I fashioned the incision so as to ellipse this wound out and debride the synovitis.  I incised the skin only and then carefully dissected down into the subcutaneous tissues.  There was a large abscess which was evacuated.  Several cultures were sent.  There was extensive necrosis involving the subcutaneous tissues, lateral aspect of the malleolus, and peroneal tendon sheath.  This made visualization of normal anatomic planes and structures somewhat difficult.  This was all debrided sharply using a combination of a scalpel and rongeur.  There was synovitis extending up the peroneal tendon sheath and this was carefully explored and debrided as well, taking care to keep the tendons protected.  Of note, the tendon sheath had been significantly eroded and the tendons were easily subluxatable out of the groove.  The necrotic nonviable tissue was all debrided back to healthy, viable-appearing tissue.  The wound was  copiously irrigated out with 500 cc of a Betadine-containing saline solution followed by a liter of plain sterile saline.  I made sure that there were no further areas that warranted debridement.     I irrigated with another 500 cc of plain sterile saline and then let down the tourniquet.  I made sure that good hemostasis was obtained.  The wound was then closed using nylon suture.  I did leave the sinus tract open and packed that with iodoform.  A sterile dressing was applied.  The foot was placed in a boot.      HOSPITAL COURSE  This is a 58-year-old female with a history of hypertension, anxiety and bipolar disorder also MRSA cellulitis and abscess involving the left foot and ankle presented to the hospital with increased pain.  Initially this is all began in February of this year and grew MRSA.  She was treated with Bactrim with resolution of cellulitis.  However in the next 5 months she had intermittent recurrent mild redness and swelling of the area and was taking intermittently Bactrim and Augmentin.  After that admission she underwent MRI of the ankle which showed no abscess involving the tendon and also calcaneum fracture.  Both infectious disease and orthopedics saw the patient.  In addition vascular surgery saw the patient and did flow studies which showed fairly good blood flow.  She underwent incision and drainage of the left ankle and details of her documented.  Patient was initially given while she was in the hospital vancomycin.  Patient is a noncompliant patient and infectious disease feels that she is not a good candidate for IV antibiotics because of her bipolar disorder and noncompliance and recommended Bactrim double strength twice a day for 3 weeks.  She will have a follow-up with the Dr. Lanier in 2 weeks      PHYSICAL EXAM  Temp:  [97.5 °F (36.4 °C)-98 °F (36.7 °C)] 97.9 °F (36.6 °C)  Heart Rate:  [67-85] 81  Resp:  [16] 16  BP: (117-137)/(71-83) 119/72  Body mass index is 26.17  kg/m².  Physical Exam  HEENT: Unremarkable, pupils are round equal and reacting to light   NECK: No lymphadenopathy, throat is clear,   RESPRATORY SYSTEM: Breath sounds are equal on both sides and are normal, no wheezes or crackles  CARDIOVASULAR SYSTEM: Heart rate is regular without murmur  ABDOMEN: Soft, no ascites, no hepatosplenomegaly.  EXTREMITIES: No cyanosis, clubbing. Left ankle in boot    CONDITION ON DISCHARGE  Stable.      DISCHARGE DISPOSITION   Home      ALLERGIES  Allergies   Allergen Reactions   • Codeine Nausea And Vomiting   • Penicillins Rash       RECENT LABS  Results from last 7 days   Lab Units  11/19/18   0332  11/18/18   0506  11/13/18   0621   WBC 10*3/mm3  9.10  11.16*  10.36   HEMOGLOBIN g/dL  10.5*  10.7*  12.5   HEMATOCRIT %  34.8*  34.9*  39.8   PLATELETS 10*3/mm3  273  291  357     Results from last 7 days   Lab Units  11/19/18   0332  11/18/18   0506  11/16/18   0534   11/13/18 0621   SODIUM mmol/L  142  143   --    --   137   POTASSIUM mmol/L  4.0  4.7   --    --   3.7   CHLORIDE mmol/L  106  107   --    --   97*   CO2 mmol/L  26.1  26.2   --    --   26.3   BUN mg/dL  11  12   --    --   11   CREATININE mg/dL  0.71  0.79  0.72   < >  1.03*   GLUCOSE mg/dL  77  118*   --    --   76   CALCIUM mg/dL  8.5*  8.9   --    --   9.6    < > = values in this interval not displayed.           DIET;  Diet Order   Procedures   • Diet Regular; Vegetarian; No Red Meat ([place] [employer])       DISCHARGE MEDICATIONS     Your medication list      START taking these medications      Instructions Last Dose Given Next Dose Due   acetaminophen 325 MG tablet  Commonly known as:  TYLENOL      Take 2 tablets by mouth Every 4 (Four) Hours As Needed for Mild Pain .       oxyCODONE-acetaminophen 7.5-325 MG per tablet  Commonly known as:  PERCOCET      Take 1-2 tabs PO Q 4 hours PRN pain       sulfamethoxazole-trimethoprim 800-160 MG per tablet  Commonly known as:  BACTRIM DS,SEPTRA DS      Take 1 tablet by  mouth Every 12 (Twelve) Hours for 41 doses.          CHANGE how you take these medications      Instructions Last Dose Given Next Dose Due   albuterol 108 (90 Base) MCG/ACT inhaler  Commonly known as:  PROVENTIL HFA;VENTOLIN HFA  What changed:  Another medication with the same name was removed. Continue taking this medication, and follow the directions you see here.      Inhale 2 puffs Every 4 (Four) Hours As Needed for Wheezing.          CONTINUE taking these medications      Instructions Last Dose Given Next Dose Due   amphetamine-dextroamphetamine 20 MG tablet  Commonly known as:  ADDERALL      Take 20 mg by mouth 3 (Three) Times a Day.       atenolol 25 MG tablet  Commonly known as:  TENORMIN      Take 25 mg by mouth Daily.       clonazePAM 1 MG tablet  Commonly known as:  KlonoPIN      Take 1 mg by mouth Daily.       CYMBALTA 30 MG capsule  Generic drug:  DULoxetine      Take 30 mg by mouth Daily.       gabapentin 400 MG capsule  Commonly known as:  NEURONTIN      Take 400 mg by mouth 3 (Three) Times a Day.       TRIAMTERENE PO      Take 25 mg by mouth Daily.             Where to Get Your Medications      These medications were sent to [address] AT [facility] [address] - [phone]  - [phone] [address] [address]    Phone:  [phone]   · sulfamethoxazole-trimethoprim 800-160 MG per tablet     You can get these medications from any pharmacy    You don't need a prescription for these medications  · acetaminophen 325 MG tablet     Information about where to get these medications is not yet available    Ask your nurse or doctor about these medications  · oxyCODONE-acetaminophen 7.5-325 MG per tablet        No future appointments.  Additional Instructions for the Follow-ups that You Need to Schedule     Apply Ice to Affected Ankle / Foot Every 2 Hours   As directed      Discharge Follow-up with Specialty: Orthopedics; 2  "Weeks   As directed      Specialty:  Orthopedics    Follow Up:  2 Weeks    Follow Up Details:  Follow up with Dr. Lanier in office.         Remove Dressing   As directed      Change dressing daily.  Remove 1/2\" of packing every day.      Follow-up Information     Maico Holguin MD Follow up.    Specialty:  Family Medicine  Contact information:  [address]  [address]  [facility] [address]  [phone]             Maxwell Lanier MD Follow up in 2 week(s).    Specialty:  Orthopedic Surgery  Contact information:  [address]  [facility] [address]  [phone]                   TEST  RESULTS PENDING AT DISCHARGE  None   Order Current Status    Anaerobic Culture - Wound, Ankle, Left In process    Anaerobic Culture - Wound, Ankle, Left In process    Wound Culture - Wound, Ankle, Left Preliminary result    Wound Culture - Wound, Ankle, Left Preliminary result           CODE STATUS  Code Status and Medical Interventions:   Ordered at: 11/13/18 0058     Code Status:    CPR     Medical Interventions (Level of Support Prior to Arrest):    Full           Marti Denise MD  [facility]  11/19/18      Time: greater than 35 minutes.    Electronically signed by Marti Denise MD at 11/19/2018  4:10 PM       "

## 2018-11-21 ENCOUNTER — READMISSION MANAGEMENT (OUTPATIENT)
Dept: CALL CENTER | Facility: HOSPITAL | Age: 58
End: 2018-11-21

## 2018-11-21 NOTE — OUTREACH NOTE
General Surgery Week 1 Survey      Responses   Facility patient discharged from?  [facility]   Does the patient have one of the following disease processes/diagnoses(primary or secondary)?  General Surgery   Is there a successful TCM telephone encounter documented?  No   Week 1 attempt successful?  Yes   Call start time  1542   Call end time  1550   General alerts for this patient  Non-compliance/daily dsg changes remove 1/2 inch packing daily   Discharge diagnosis  Peroneal tenosynovitis (I &D) L/ankle   Meds reviewed with patient/caregiver?  Yes   Is the patient having any side effects they believe may be caused by any medication additions or changes?  No   Does the patient have all medications related to this admission filled (includes all antibiotics, pain medications, etc.)  Yes   Is the patient taking all medications as directed (includes completed medication regime)?  Yes   Does the patient have a follow up appointment scheduled with their surgeon?  No   What is preventing the patient from scheduling follow up appointments?  Haven't had time   Nursing Interventions  Verified appointment date/time/provider   Has the patient kept scheduled appointments due by today?  N/A   Has home health visited the patient within 72 hours of discharge?  N/A   Psychosocial issues?  No   Did the patient receive a copy of their discharge instructions?  Yes   Nursing interventions  Reviewed instructions with patient   What is the patient's perception of their health status since discharge?  Improving   Nursing interventions  Nurse provided patient education   Is the patient /caregiver able to teach back basic post-op care?  Keep incision areas clean,dry and protected, Take showers only when approved by MD-sponge bathchandra until then, Drive as instructed by MD in discharge instructions   Is the patient/caregiver able to teach back signs and symptoms of incisional infection?  Increased redness, swelling or pain at the incisonal site,  Increased drainage or bleeding, Incisional warmth, Pus or odor from incision, Fever   Is the patient/caregiver able to teach back steps to recovery at home?  Set small, achievable goals for return to baseline health, Rest and rebuild strength, gradually increase activity, Make a list of questions for surgeon's appointment   Is the patient/caregiver able to teach back the hierarchy of who to call/visit for symptoms/problems? PCP, Specialist, Home health nurse, Urgent Care, ED, 911  Yes   Additional teach back comments  Pt states she is doing well, packing incision as ordered, not taking alot of pain meds.   Week 1 call completed?  Yes          Kristine Venegas RN

## 2018-11-22 LAB
BACTERIA SPEC ANAEROBE CULT: NORMAL
BACTERIA SPEC ANAEROBE CULT: NORMAL

## 2018-11-23 NOTE — PROGRESS NOTES
Case Management Discharge Note    Final Note: Home, no need for iv abx    Destination      No service has been selected for the patient.      Durable Medical Equipment      No service has been selected for the patient.      Dialysis/Infusion      No service has been selected for the patient.      Home Medical Care      No service has been selected for the patient.      Community Resources      No service has been selected for the patient.        Other: Other(private auto)    Final Discharge Disposition Code: 01 - home or self-care

## 2018-11-27 ENCOUNTER — TELEPHONE (OUTPATIENT)
Dept: ORTHOPEDIC SURGERY | Facility: CLINIC | Age: 58
End: 2018-11-27

## 2018-11-27 NOTE — TELEPHONE ENCOUNTER
Left answering machine message for patient to call for post op appt. Have scheduled patient at EP office on 12/3 @ 3:50. Need to confirm appt./[place]

## 2018-11-27 NOTE — TELEPHONE ENCOUNTER
----- Message from TAYLER Santizo sent at 11/20/2018 11:12 AM EST -----  Please call patient to schedule 2 week follow up with Dr. Lanier for  Left ankle I&D.  Thanks

## 2018-11-28 RX ORDER — OXYCODONE AND ACETAMINOPHEN 7.5; 325 MG/1; MG/1
TABLET ORAL
Qty: 50 TABLET | Refills: 0 | OUTPATIENT
Start: 2018-11-28 | End: 2018-11-28 | Stop reason: SDUPTHER

## 2018-11-28 RX ORDER — OXYCODONE AND ACETAMINOPHEN 7.5; 325 MG/1; MG/1
TABLET ORAL
Qty: 50 TABLET | Refills: 0 | Status: SHIPPED | OUTPATIENT
Start: 2018-11-28 | End: 2018-12-12 | Stop reason: SDUPTHER

## 2018-11-29 ENCOUNTER — READMISSION MANAGEMENT (OUTPATIENT)
Dept: CALL CENTER | Facility: HOSPITAL | Age: 58
End: 2018-11-29

## 2018-11-29 NOTE — OUTREACH NOTE
General Surgery Week 2 Survey      Responses   Facility patient discharged from?  [facility]   Does the patient have one of the following disease processes/diagnoses(primary or secondary)?  General Surgery   Week 2 attempt successful?  No   Unsuccessful attempts  Attempt 1          Michelle Aparicio RN

## 2018-11-30 ENCOUNTER — READMISSION MANAGEMENT (OUTPATIENT)
Dept: CALL CENTER | Facility: HOSPITAL | Age: 58
End: 2018-11-30

## 2018-11-30 NOTE — OUTREACH NOTE
General Surgery Week 2 Survey      Responses   Facility patient discharged from?  [facility]   Does the patient have one of the following disease processes/diagnoses(primary or secondary)?  General Surgery   Week 2 attempt successful?  No   Unsuccessful attempts  Attempt 2          Susanne Oneill RN

## 2018-12-03 ENCOUNTER — OFFICE VISIT (OUTPATIENT)
Dept: ORTHOPEDIC SURGERY | Facility: CLINIC | Age: 58
End: 2018-12-03

## 2018-12-03 VITALS — BODY MASS INDEX: 24.84 KG/M2 | TEMPERATURE: 97.4 F | HEIGHT: 62 IN | WEIGHT: 135 LBS

## 2018-12-03 DIAGNOSIS — Z09 SURGERY FOLLOW-UP: Primary | ICD-10-CM

## 2018-12-03 PROCEDURE — 99024 POSTOP FOLLOW-UP VISIT: CPT | Performed by: ORTHOPAEDIC SURGERY

## 2018-12-03 PROCEDURE — 73610 X-RAY EXAM OF ANKLE: CPT | Performed by: ORTHOPAEDIC SURGERY

## 2018-12-03 RX ORDER — OXYCODONE AND ACETAMINOPHEN 7.5; 325 MG/1; MG/1
1 TABLET ORAL EVERY 4 HOURS PRN
Qty: 50 TABLET | Refills: 0 | Status: SHIPPED | OUTPATIENT
Start: 2018-12-03 | End: 2018-12-12 | Stop reason: SDUPTHER

## 2018-12-04 NOTE — PROGRESS NOTES
"Ms. Rm is now 2 weeks out from her irrigation and debridement of the left ankle.  She tells me that she pulled the packing out of her ankle about a week ago.  After she did that, the wound got much bigger.  She also tells me that she noticed a \"white string\" in the base of the wound  She says that her pain and swelling seem to be getting better.  She is still having some drainage.  Denies any fevers, chills, or sweats.  She is still taking oral Bactrim but is off the IV antibiotics.    Vitals:    12/03/18 1627   Temp: 97.4 °F (36.3 °C)   TempSrc: Temporal   Weight: 61.2 kg (135 lb)   Height: 157.5 cm (62.01\")     Her sutures were in place and removed.  The wound has marginal erythema.  The opening in the midportion where she had that sinus is now much bigger, measuring about a centimeter in diameter.  One of the peroneal tendons is visible in the base of the wound.  There is serous drainage from the wound but no purulence.  Ankle motion is excellent and painless.  Gait is nonantalgic.    AP, mortise and lateral views left ankle are ordered and reviewed.  These compared to previous x-rays.  No evidence for osteomyelitis or other concerning findings.    Assessment: Two-week status post irrigation and debridement of left ankle abscess with postoperative wound dehiscence    Plan: I recommended wet-to-dry dressings and regular wound care.  I want to see her back in 2 weeks.  I've agreed to refill her Percocet.  She is instructed to continue the oral antibiotics.  She may need a referral to plastic surgery once we get her infection resolved.  "

## 2018-12-12 RX ORDER — OXYCODONE AND ACETAMINOPHEN 7.5; 325 MG/1; MG/1
1 TABLET ORAL EVERY 4 HOURS PRN
Qty: 50 TABLET | Refills: 0 | Status: SHIPPED | OUTPATIENT
Start: 2018-12-12 | End: 2018-12-20 | Stop reason: SDUPTHER

## 2018-12-20 RX ORDER — OXYCODONE AND ACETAMINOPHEN 7.5; 325 MG/1; MG/1
1 TABLET ORAL EVERY 4 HOURS PRN
Qty: 50 TABLET | Refills: 0 | Status: SHIPPED | OUTPATIENT
Start: 2018-12-20 | End: 2018-12-27 | Stop reason: SDUPTHER

## 2018-12-20 NOTE — TELEPHONE ENCOUNTER
SX Left Incision and Drainage of Left ankle    Per KELLY patient last filled oxycodone-apap 7.5/325 mg 12/12/2018 qty 50 days filled 8

## 2018-12-27 RX ORDER — OXYCODONE AND ACETAMINOPHEN 7.5; 325 MG/1; MG/1
1 TABLET ORAL EVERY 4 HOURS PRN
Qty: 50 TABLET | Refills: 0 | Status: SHIPPED | OUTPATIENT
Start: 2018-12-27 | End: 2019-01-03 | Stop reason: SDUPTHER

## 2019-01-02 ENCOUNTER — OFFICE VISIT (OUTPATIENT)
Dept: ORTHOPEDIC SURGERY | Facility: CLINIC | Age: 59
End: 2019-01-02

## 2019-01-02 VITALS — TEMPERATURE: 97.7 F | BODY MASS INDEX: 24.84 KG/M2 | WEIGHT: 135 LBS | HEIGHT: 62 IN

## 2019-01-02 DIAGNOSIS — Z09 SURGERY FOLLOW-UP: Primary | ICD-10-CM

## 2019-01-02 PROCEDURE — 99024 POSTOP FOLLOW-UP VISIT: CPT | Performed by: ORTHOPAEDIC SURGERY

## 2019-01-02 NOTE — TELEPHONE ENCOUNTER
Okay.  Please call her and let her know that I am not sure that he would do the surgery that she needs.  Did find another doctor, Dr. Guerra.  He says that he can see her and help her with her problem.  If she would like to see him, I can set it up.  Let me know.  Thanks.

## 2019-01-03 RX ORDER — OXYCODONE AND ACETAMINOPHEN 7.5; 325 MG/1; MG/1
1 TABLET ORAL EVERY 4 HOURS PRN
Qty: 50 TABLET | Refills: 0 | Status: SHIPPED | OUTPATIENT
Start: 2019-01-03 | End: 2019-01-15 | Stop reason: SDUPTHER

## 2019-01-03 NOTE — PROGRESS NOTES
Ms. Rm comes in for follow-up of her left ankle.  She reports persistent pain in the ankle which is modestly improved.  She reports that the wound is no better.  She has been compliant with the wet-to-dry dressings.  No fevers, chills or sweats.  She is still taking Bactrim.  Bactrim makes her feel nauseous.    Her wound appears much bigger today.  The wound now measures approximately 2 cm in diameter.  There is slight surrounding marginal erythema but no streaking.  No fluctuance or purulence.  There is some granulation tissue in the base of the wound.  This is covering the tendon sheath of the peroneals.  Ankle motion is well tolerated and roughly symmetric to the contralateral side.  Gait is nonantalgic.    Assessment: Wound dehiscence status post irrigation and debridement left ankle    Plan: We need to get her to see a plastic surgeon.  I'm going to do some investigating to see who I can find that will take her insurance.  I have instructed her to continue the wet-to-dry dressings for now.  She requested that I talk to Dr. Staples about her Bactrim which is making her nauseous to see if there is another alternative.  I will contact him as well.  I'm going to schedule her to come back and see me in 1 month but we are going to make arrangements for her to see plastics in the interim.    Maxwell Lanier MD

## 2019-01-15 RX ORDER — OXYCODONE AND ACETAMINOPHEN 7.5; 325 MG/1; MG/1
1 TABLET ORAL EVERY 4 HOURS PRN
Qty: 42 TABLET | Refills: 0 | Status: SHIPPED | OUTPATIENT
Start: 2019-01-15 | End: 2019-01-22 | Stop reason: SDUPTHER

## 2019-01-15 NOTE — TELEPHONE ENCOUNTER
Sx 11/17/18   Procedure Laterality Anesthesia   Incision and Drainage of Left ankle       KOF   Its been 2 month since SX   Wean down from : see below   oxyCODONE-acetaminophen (PERCOCET) 7.5-325 MG per tablet  1 tablet, Every 4 Hours PRN 0 ordered           Summary: Take 1 tablet by mouth Every 4 (Four) Hours As Needed for Moderate Pain ., Starting Thu 1/3/2019, Normal   Dose, Route, Frequency: 1 tablet, Oral, Every 4 Hours PRN  Start: 1/3/2019         Patient Sig: Take 1 tablet by mouth Every 4 (Four) Hours As Needed for Moderate Pain .       Ordered on: 1/3/2019           Dispense: 50 tablet

## 2019-01-22 RX ORDER — OXYCODONE AND ACETAMINOPHEN 7.5; 325 MG/1; MG/1
1 TABLET ORAL EVERY 4 HOURS PRN
Qty: 42 TABLET | Refills: 0 | Status: SHIPPED | OUTPATIENT
Start: 2019-01-22 | End: 2019-02-04 | Stop reason: SDUPTHER

## 2019-02-04 RX ORDER — OXYCODONE AND ACETAMINOPHEN 7.5; 325 MG/1; MG/1
1 TABLET ORAL EVERY 8 HOURS PRN
Qty: 42 TABLET | Refills: 0 | Status: SHIPPED | OUTPATIENT
Start: 2019-02-04 | End: 2020-11-05 | Stop reason: ALTCHOICE

## 2019-02-07 ENCOUNTER — APPOINTMENT (OUTPATIENT)
Dept: PREADMISSION TESTING | Facility: HOSPITAL | Age: 59
End: 2019-02-07

## 2019-02-07 VITALS
BODY MASS INDEX: 28.07 KG/M2 | HEIGHT: 60 IN | RESPIRATION RATE: 18 BRPM | DIASTOLIC BLOOD PRESSURE: 99 MMHG | HEART RATE: 105 BPM | SYSTOLIC BLOOD PRESSURE: 145 MMHG | WEIGHT: 143 LBS | OXYGEN SATURATION: 96 % | TEMPERATURE: 98.1 F

## 2019-02-07 LAB
ANION GAP SERPL CALCULATED.3IONS-SCNC: 13.7 MMOL/L
BUN BLD-MCNC: 8 MG/DL (ref 6–20)
BUN/CREAT SERPL: 11.1 (ref 7–25)
CALCIUM SPEC-SCNC: 9.6 MG/DL (ref 8.6–10.5)
CHLORIDE SERPL-SCNC: 95 MMOL/L (ref 98–107)
CO2 SERPL-SCNC: 27.3 MMOL/L (ref 22–29)
CREAT BLD-MCNC: 0.72 MG/DL (ref 0.57–1)
DEPRECATED RDW RBC AUTO: 47.7 FL (ref 37–54)
ERYTHROCYTE [DISTWIDTH] IN BLOOD BY AUTOMATED COUNT: 13.5 % (ref 11.7–13)
GFR SERPL CREATININE-BSD FRML MDRD: 83 ML/MIN/1.73
GLUCOSE BLD-MCNC: 82 MG/DL (ref 65–99)
HCT VFR BLD AUTO: 43 % (ref 35.6–45.5)
HGB BLD-MCNC: 13.9 G/DL (ref 11.9–15.5)
MCH RBC QN AUTO: 31.2 PG (ref 26.9–32)
MCHC RBC AUTO-ENTMCNC: 32.3 G/DL (ref 32.4–36.3)
MCV RBC AUTO: 96.6 FL (ref 80.5–98.2)
PLATELET # BLD AUTO: 294 10*3/MM3 (ref 140–500)
PMV BLD AUTO: 9.8 FL (ref 6–12)
POTASSIUM BLD-SCNC: 3.7 MMOL/L (ref 3.5–5.2)
RBC # BLD AUTO: 4.45 10*6/MM3 (ref 3.9–5.2)
SODIUM BLD-SCNC: 136 MMOL/L (ref 136–145)
WBC NRBC COR # BLD: 8.65 10*3/MM3 (ref 4.5–10.7)

## 2019-02-07 PROCEDURE — 85027 COMPLETE CBC AUTOMATED: CPT | Performed by: PLASTIC SURGERY

## 2019-02-07 PROCEDURE — 93005 ELECTROCARDIOGRAM TRACING: CPT

## 2019-02-07 PROCEDURE — 80048 BASIC METABOLIC PNL TOTAL CA: CPT | Performed by: PLASTIC SURGERY

## 2019-02-07 PROCEDURE — 93010 ELECTROCARDIOGRAM REPORT: CPT | Performed by: INTERNAL MEDICINE

## 2019-02-07 PROCEDURE — 36415 COLL VENOUS BLD VENIPUNCTURE: CPT

## 2019-02-07 NOTE — DISCHARGE INSTRUCTIONS
Take the following medications the morning of surgery with a small sip of water: ATENOLOL    ARRIVE 545    General Instructions:  • Do not eat solid food after midnight the night before surgery.  • You may drink clear liquids day of surgery but must stop at least one hour before your hospital arrival time.  • It is beneficial for you to have a clear drink that contains carbohydrates the day of surgery.  We suggest a 12 to 20 ounce bottle of Gatorade or Powerade for non-diabetic patients or a 12 to 20 ounce bottle of G2 or Powerade Zero for diabetic patients. (Pediatric patients, are not advised to drink a 12 to 20 ounce carbohydrate drink)    Clear liquids are liquids you can see through.  Nothing red in color.     Plain water                               Sports drinks  Sodas                                   Gelatin (Jell-O)  Fruit juices without pulp such as white grape juice and apple juice  Popsicles that contain no fruit or yogurt  Tea or coffee (no cream or milk added)  Gatorade / Powerade  G2 / Powerade Zero    • Infants may have breast milk up to four hours before surgery.  • Infants drinking formula may drink formula up to six hours before surgery.   • Patients who avoid smoking, chewing tobacco and alcohol for 4 weeks prior to surgery have a reduced risk of post-operative complications.  Quit smoking as many days before surgery as you can.  • Do not smoke, use chewing tobacco or drink alcohol the day of surgery.   • If applicable bring your C-PAP/ BI-PAP machine.  • Bring any papers given to you in the doctor’s office.  • Wear clean comfortable clothes and socks.  • Do not wear contact lenses or make-up.  Bring a case for your glasses.   • Bring crutches or walker if applicable.  • Remove all piercings.  Leave jewelry and any other valuables at home.  • Hair extensions with metal clips must be removed prior to surgery.  • The Pre-Admission Testing nurse will instruct you to bring medications if unable to  obtain an accurate list in Pre-Admission Testing.        If you were given a blood bank ID arm band remember to bring it with you the day of surgery.    Preventing a Surgical Site Infection:  • For 2 to 3 days before surgery, avoid shaving with a razor because the razor can irritate skin and make it easier to develop an infection.    • Any areas of open skin can increase the risk of a post-operative wound infection by allowing bacteria to enter and travel throughout the body.  Notify your surgeon if you have any skin wounds / rashes even if it is not near the expected surgical site.  The area will need assessed to determine if surgery should be delayed until it is healed.  • The night prior to surgery sleep in a clean bed with clean clothing.  Do not allow pets to sleep with you.  • Shower on the morning of surgery using a fresh bar of anti-bacterial soap (such as Dial) and clean washcloth.  Dry with a clean towel and dress in clean clothing.  • Ask your surgeon if you will be receiving antibiotics prior to surgery.  • Make sure you, your family, and all healthcare providers clean their hands with soap and water or an alcohol based hand  before caring for you or your wound.    Day of surgery:  Upon arrival, a Pre-op nurse and Anesthesiologist will review your health history, obtain vital signs, and answer questions you may have.  The only belongings needed at this time will be your home medications and if applicable your C-PAP/BI-PAP machine.  If you are staying overnight your family can leave the rest of your belongings in the car and bring them to your room later.  A Pre-op nurse will start an IV and you may receive medication in preparation for surgery, including something to help you relax.  Your family will be able to see you in the Pre-op area.  While you are in surgery your family should notify the waiting room  if they leave the waiting room area and provide a contact phone  number.    Please be aware that surgery does come with discomfort.  We want to make every effort to control your discomfort so please discuss any uncontrolled symptoms with your nurse.   Your doctor will most likely have prescribed pain medications.      If you are going home after surgery you will receive individualized written care instructions before being discharged.  A responsible adult must drive you to and from the hospital on the day of your surgery and stay with you for 24 hours.    If you are staying overnight following surgery, you will be transported to your hospital room following the recovery period.  [facility] has all private rooms.    You have received a list of surgical assistants for your reference.  If you have any questions please call Pre-Admission Testing at [phone].  Deductibles and co-payments are collected on the day of service. Please be prepared to pay the required co-pay, deductible or deposit on the day of service as defined by your plan.

## 2019-02-08 ENCOUNTER — OFFICE VISIT (OUTPATIENT)
Dept: ORTHOPEDIC SURGERY | Facility: CLINIC | Age: 59
End: 2019-02-08

## 2019-02-08 VITALS — WEIGHT: 143 LBS | BODY MASS INDEX: 28.07 KG/M2 | HEIGHT: 60 IN | TEMPERATURE: 98.4 F

## 2019-02-08 DIAGNOSIS — M70.41 PREPATELLAR BURSITIS OF RIGHT KNEE: Primary | ICD-10-CM

## 2019-02-08 PROCEDURE — 20610 DRAIN/INJ JOINT/BURSA W/O US: CPT | Performed by: ORTHOPAEDIC SURGERY

## 2019-02-08 PROCEDURE — 99213 OFFICE O/P EST LOW 20 MIN: CPT | Performed by: ORTHOPAEDIC SURGERY

## 2019-02-08 RX ORDER — OXYCODONE AND ACETAMINOPHEN 7.5; 325 MG/1; MG/1
1 TABLET ORAL EVERY 4 HOURS PRN
Qty: 50 TABLET | Refills: 0 | Status: SHIPPED | OUTPATIENT
Start: 2019-02-08 | End: 2020-11-05 | Stop reason: ALTCHOICE

## 2019-02-08 RX ADMIN — LIDOCAINE HYDROCHLORIDE 2 ML: 10 INJECTION, SOLUTION EPIDURAL; INFILTRATION; INTRACAUDAL; PERINEURAL at 12:40

## 2019-02-08 RX ADMIN — METHYLPREDNISOLONE ACETATE 80 MG: 80 INJECTION, SUSPENSION INTRA-ARTICULAR; INTRALESIONAL; INTRAMUSCULAR; SOFT TISSUE at 12:40

## 2019-02-08 NOTE — PROGRESS NOTES
Patient: Filomena Liu    YOB: 1960    Medical Record Number: [id number]    Chief Complaints:  Follow-up status post left ankle irrigation and debridement, new complaint of right knee swelling    History of Present Illness:     58 y.o. female patient who presents for 2 issues.  The first is her left ankle.  She continues to have significant pain.  She says the wound is somewhat better.  She is scheduled for surgery with Dr. Madi Mcintyre in the near future.  Denies any fevers, drainage, redness, or streaking.    She reports a new complaint of right knee swelling.  It started several weeks ago without any injury or precipitating event.  She had one aspiration and he lovely off approximately 40 cc of bloody fluid, per her report.  She reports moderate constant aching.  Localizes the symptoms to the front of the knee.  Activity and kneeling both seem to aggravate the symptoms.    Allergies: No Known Allergies    Home Medications:    Current Outpatient Medications:   •  acetaminophen (TYLENOL) 325 MG tablet, Take 2 tablets by mouth Every 4 (Four) Hours As Needed for Mild Pain ., Disp: , Rfl:   •  albuterol (PROVENTIL HFA;VENTOLIN HFA) 108 (90 Base) MCG/ACT inhaler, Inhale 2 puffs Every 4 (Four) Hours As Needed for Wheezing., Disp: , Rfl:   •  amphetamine-dextroamphetamine (ADDERALL) 20 MG tablet, Take 20 mg by mouth 3 (Three) Times a Day., Disp: , Rfl:   •  atenolol (TENORMIN) 25 MG tablet, Take 25 mg by mouth Daily., Disp: , Rfl:   •  clonazePAM (KlonoPIN) 1 MG tablet, Take 1 mg by mouth At Night As Needed., Disp: , Rfl:   •  DULoxetine (CYMBALTA) 30 MG capsule, Take 30 mg by mouth Daily., Disp: , Rfl:   •  oxyCODONE-acetaminophen (PERCOCET) 7.5-325 MG per tablet, Take 1 tablet by mouth Every 8 (Eight) Hours As Needed for Moderate Pain ., Disp: 42 tablet, Rfl: 0  •  TRIAMTERENE PO, Take 25 mg by mouth Daily., Disp: , Rfl:     Past Medical History:   Diagnosis Date   • Ankle pain    • Ankle wound      LEFT   • Asthma    • Benign tumor of thymus     REMOVED   • Bruises easily    • Cataract    • COPD (chronic obstructive pulmonary disease) (CMS/[facility])    • DDD (degenerative disc disease), cervical    • Depression    • GERD (gastroesophageal reflux disease)    • History of MRSA infection     LEFT ANKLE TREAT [facility]    • Hyperlipidemia    • Hypertension    • Spinal stenosis    • Spondylolisthesis of cervical region        Past Surgical History:   Procedure Laterality Date   • BACK SURGERY     • HYSTERECTOMY     • INCISION AND DRAINAGE LEG Left 11/17/2018    Procedure: Incision and Drainage of Left ankle;  Surgeon: Maxwell Lanier MD;  Location: [facility] OR;  Service: Orthopedics   • SKIN BIOPSY     • TOTAL THYMECTOMY     • WRIST FRACTURE SURGERY         Social History     Occupational History   • Not on file   Tobacco Use   • Smoking status: Current Every Day Smoker     Packs/day: 1.00     Types: Cigarettes   • Smokeless tobacco: Never Used   Substance and Sexual Activity   • Alcohol use: Yes     Comment: OCCAS   • Drug use: No   • Sexual activity: Not on file      Social History     Social History Narrative   • Not on file       Family History   Problem Relation Age of Onset   • Emphysema Mother    • Alzheimer's disease Father    • Malig Hyperthermia Neg Hx        Review of Systems:      Constitutional: Denies fever, shaking or chills   Eyes: Denies change in visual acuity   HEENT: Denies nasal congestion or sore throat   Respiratory: Denies cough or shortness of breath   Cardiovascular: Denies chest pain or edema  Endocrine: Denies tremors, palpitations, intolerance of heat or cold, polyuria, polydipsia.  GI: Denies abdominal pain, nausea, vomiting, bloody stools or diarrhea  : Denies frequency, urgency, incontinence, retention, or nocturia.  Musculoskeletal: Denies numbness, tingling or loss of motor function except as above  Integument: Denies new rash, lesion or ulceration   Neurologic: Denies headache or  "focal weakness, deficits  Heme: Denies spontaneous or excessive bleeding, epistaxis, hematuria, melena, fatigue, enlarged or tender lymph nodes.      All other pertinent positives and negatives as noted above in HPI.    Physical Exam: 58 y.o. female  Vitals:    02/08/19 1008   Temp: 98.4 °F (36.9 °C)   TempSrc: Temporal   Weight: 64.9 kg (143 lb)   Height: 152.4 cm (60\")       General:  Patient is awake and alert.  Appears in no acute distress or discomfort.    Psych:  Affect and demeanor are appropriate.    Eyes:  Conjunctiva and sclera appear grossly normal.  Eyes track well and EOM seem to be intact.    Ears:  No gross abnormalities.  Hearing adequate for the exam.    Cardiovascular:  Regular rate and rhythm.    Lungs:  Good chest expansion.  Breathing unlabored.    Extremities: The right knee is examined.  Skin is benign.  She has a large prepatellar bursal effusion.  No erythema.  No increased warmth or tenderness.  Full knee motion.  No instability.  Negative medial and lateral Sarbjit's test.  Good strength with knee extension.  Intact sensation throughout the leg and foot.  Brisk capillary refill.    The left ankle wound appears to have significant granulation tissue in the base of the wound.  The peroneal tendon remains visible.  Ankle motion is symmetric to the contralateral side and well-tolerated.    Imaging:  None taken    Assessment/Plan:  1.  Right knee prepatellar bursitis  2.  Follow-up 3 months status post left ankle irrigation and debridement for chronic infection    My impression is that the infection has resolved at this point.  She has chronic colonization of the wound but I think this is really a chronic wound healing issue at this point.  I defer further treatment to Dr. Madi Mcintyre.  She does not require any further antibiotics at this point.  I did agree to give her one last prescription for the pain medicine until her upcoming plastic surgery.      I have agreed to repeat the aspiration " for the right knee.  The risks, benefits and alternatives were discussed.  She consented.  The aspiration was performed as described below without complication.  We discussed appropriate activity modifications to hopefully minimize the risk of recurrence.  She can follow-up as needed for the knee.      Maxwell Lanier MD    02/08/2019    Large Joint Arthrocentesis: R knee  Date/Time: 2/8/2019 12:40 PM  Consent given by: patient  Site marked: site marked  Timeout: Immediately prior to procedure a time out was called to verify the correct patient, procedure, equipment, support staff and site/side marked as required   Supporting Documentation  Indications: pain and joint swelling   Procedure Details  Location: knee - R knee  Preparation: Patient was prepped and draped in the usual sterile fashion  Needle gauge: 21 G.  Approach: anterolateral  Medications administered: 80 mg methylPREDNISolone acetate 80 MG/ML; 2 mL lidocaine PF 1% 1 %  Aspirate amount: 30 mL  Aspirate: bloody  Patient tolerance: patient tolerated the procedure well with no immediate complications

## 2019-02-11 ENCOUNTER — TELEPHONE (OUTPATIENT)
Dept: ORTHOPEDIC SURGERY | Facility: CLINIC | Age: 59
End: 2019-02-11

## 2019-02-11 ENCOUNTER — ANESTHESIA EVENT (OUTPATIENT)
Dept: PERIOP | Facility: HOSPITAL | Age: 59
End: 2019-02-11

## 2019-02-11 NOTE — TELEPHONE ENCOUNTER
Called patient and double checked on her wanting a refill on the Oxycodone since patient was given an rx on Friday 2/8/19. Patient stated that she was unaware of her rx from Friday and will pick it up from the pharmacy.

## 2019-02-12 ENCOUNTER — ANESTHESIA (OUTPATIENT)
Dept: PERIOP | Facility: HOSPITAL | Age: 59
End: 2019-02-12

## 2019-02-12 ENCOUNTER — HOSPITAL ENCOUNTER (OUTPATIENT)
Facility: HOSPITAL | Age: 59
Setting detail: HOSPITAL OUTPATIENT SURGERY
Discharge: HOME | End: 2019-02-12
Attending: PLASTIC SURGERY | Admitting: PLASTIC SURGERY

## 2019-02-12 VITALS
HEART RATE: 91 BPM | DIASTOLIC BLOOD PRESSURE: 86 MMHG | RESPIRATION RATE: 16 BRPM | SYSTOLIC BLOOD PRESSURE: 127 MMHG | TEMPERATURE: 97 F | OXYGEN SATURATION: 90 %

## 2019-02-12 PROCEDURE — 25010000002 HYDROMORPHONE PER 4 MG: Performed by: ANESTHESIOLOGY

## 2019-02-12 PROCEDURE — 25010000002 KETOROLAC TROMETHAMINE PER 15 MG: Performed by: NURSE ANESTHETIST, CERTIFIED REGISTERED

## 2019-02-12 PROCEDURE — 25010000003 CEFAZOLIN 1-4 GM/50ML-% SOLUTION: Performed by: PLASTIC SURGERY

## 2019-02-12 PROCEDURE — 25010000002 ONDANSETRON PER 1 MG: Performed by: NURSE ANESTHETIST, CERTIFIED REGISTERED

## 2019-02-12 PROCEDURE — 25010000002 FENTANYL CITRATE (PF) 100 MCG/2ML SOLUTION: Performed by: NURSE ANESTHETIST, CERTIFIED REGISTERED

## 2019-02-12 PROCEDURE — 25010000002 DEXAMETHASONE PER 1 MG: Performed by: NURSE ANESTHETIST, CERTIFIED REGISTERED

## 2019-02-12 PROCEDURE — 25010000002 PROPOFOL 10 MG/ML EMULSION: Performed by: NURSE ANESTHETIST, CERTIFIED REGISTERED

## 2019-02-12 PROCEDURE — 25010000002 MIDAZOLAM PER 1 MG: Performed by: ANESTHESIOLOGY

## 2019-02-12 PROCEDURE — 25010000002 EPINEPHRINE PER 0.1 MG: Performed by: PLASTIC SURGERY

## 2019-02-12 PROCEDURE — 25010000002 FENTANYL CITRATE (PF) 100 MCG/2ML SOLUTION: Performed by: ANESTHESIOLOGY

## 2019-02-12 RX ORDER — PROPOFOL 10 MG/ML
VIAL (ML) INTRAVENOUS AS NEEDED
Status: DISCONTINUED | OUTPATIENT
Start: 2019-02-12 | End: 2019-02-12 | Stop reason: SURG

## 2019-02-12 RX ORDER — CEFAZOLIN SODIUM 1 G/50ML
1 INJECTION, SOLUTION INTRAVENOUS ONCE
Status: COMPLETED | OUTPATIENT
Start: 2019-02-12 | End: 2019-02-12

## 2019-02-12 RX ORDER — SODIUM CHLORIDE 0.9 % (FLUSH) 0.9 %
1-10 SYRINGE (ML) INJECTION AS NEEDED
Status: DISCONTINUED | OUTPATIENT
Start: 2019-02-12 | End: 2019-02-12 | Stop reason: HOSPADM

## 2019-02-12 RX ORDER — ACETAMINOPHEN 500 MG
500 TABLET ORAL ONCE
Status: COMPLETED | OUTPATIENT
Start: 2019-02-12 | End: 2019-02-12

## 2019-02-12 RX ORDER — LIDOCAINE HYDROCHLORIDE 20 MG/ML
INJECTION, SOLUTION INFILTRATION; PERINEURAL AS NEEDED
Status: DISCONTINUED | OUTPATIENT
Start: 2019-02-12 | End: 2019-02-12 | Stop reason: SURG

## 2019-02-12 RX ORDER — SODIUM CHLORIDE 0.9 % (FLUSH) 0.9 %
3 SYRINGE (ML) INJECTION EVERY 12 HOURS SCHEDULED
Status: DISCONTINUED | OUTPATIENT
Start: 2019-02-12 | End: 2019-02-12 | Stop reason: HOSPADM

## 2019-02-12 RX ORDER — PROMETHAZINE HYDROCHLORIDE 25 MG/ML
6.25 INJECTION, SOLUTION INTRAMUSCULAR; INTRAVENOUS ONCE AS NEEDED
Status: DISCONTINUED | OUTPATIENT
Start: 2019-02-12 | End: 2019-02-12 | Stop reason: HOSPADM

## 2019-02-12 RX ORDER — FENTANYL CITRATE 50 UG/ML
50 INJECTION, SOLUTION INTRAMUSCULAR; INTRAVENOUS
Status: DISCONTINUED | OUTPATIENT
Start: 2019-02-12 | End: 2019-02-12 | Stop reason: HOSPADM

## 2019-02-12 RX ORDER — NALOXONE HCL 0.4 MG/ML
0.4 VIAL (ML) INJECTION AS NEEDED
Status: DISCONTINUED | OUTPATIENT
Start: 2019-02-12 | End: 2019-02-12 | Stop reason: HOSPADM

## 2019-02-12 RX ORDER — LIDOCAINE HYDROCHLORIDE 10 MG/ML
0.5 INJECTION, SOLUTION EPIDURAL; INFILTRATION; INTRACAUDAL; PERINEURAL ONCE AS NEEDED
Status: DISCONTINUED | OUTPATIENT
Start: 2019-02-12 | End: 2019-02-12 | Stop reason: HOSPADM

## 2019-02-12 RX ORDER — MAGNESIUM HYDROXIDE 1200 MG/15ML
LIQUID ORAL AS NEEDED
Status: DISCONTINUED | OUTPATIENT
Start: 2019-02-12 | End: 2019-02-12 | Stop reason: HOSPADM

## 2019-02-12 RX ORDER — BUPIVACAINE HYDROCHLORIDE AND EPINEPHRINE 5; 5 MG/ML; UG/ML
INJECTION, SOLUTION PERINEURAL AS NEEDED
Status: DISCONTINUED | OUTPATIENT
Start: 2019-02-12 | End: 2019-02-12 | Stop reason: HOSPADM

## 2019-02-12 RX ORDER — FENTANYL CITRATE 50 UG/ML
INJECTION, SOLUTION INTRAMUSCULAR; INTRAVENOUS AS NEEDED
Status: DISCONTINUED | OUTPATIENT
Start: 2019-02-12 | End: 2019-02-12 | Stop reason: SURG

## 2019-02-12 RX ORDER — PROMETHAZINE HYDROCHLORIDE 25 MG/1
25 SUPPOSITORY RECTAL ONCE AS NEEDED
Status: DISCONTINUED | OUTPATIENT
Start: 2019-02-12 | End: 2019-02-12 | Stop reason: HOSPADM

## 2019-02-12 RX ORDER — NALBUPHINE HCL 10 MG/ML
2 AMPUL (ML) INJECTION EVERY 4 HOURS PRN
Status: DISCONTINUED | OUTPATIENT
Start: 2019-02-12 | End: 2019-02-12 | Stop reason: HOSPADM

## 2019-02-12 RX ORDER — DIPHENHYDRAMINE HYDROCHLORIDE 50 MG/ML
12.5 INJECTION INTRAMUSCULAR; INTRAVENOUS
Status: DISCONTINUED | OUTPATIENT
Start: 2019-02-12 | End: 2019-02-12 | Stop reason: HOSPADM

## 2019-02-12 RX ORDER — MIDAZOLAM HYDROCHLORIDE 1 MG/ML
2 INJECTION INTRAMUSCULAR; INTRAVENOUS
Status: DISCONTINUED | OUTPATIENT
Start: 2019-02-12 | End: 2019-02-12 | Stop reason: HOSPADM

## 2019-02-12 RX ORDER — HYDROMORPHONE HYDROCHLORIDE 1 MG/ML
0.25 INJECTION, SOLUTION INTRAMUSCULAR; INTRAVENOUS; SUBCUTANEOUS
Status: DISCONTINUED | OUTPATIENT
Start: 2019-02-12 | End: 2019-02-12 | Stop reason: HOSPADM

## 2019-02-12 RX ORDER — ACETAMINOPHEN 325 MG/1
650 TABLET ORAL ONCE AS NEEDED
Status: DISCONTINUED | OUTPATIENT
Start: 2019-02-12 | End: 2019-02-12 | Stop reason: HOSPADM

## 2019-02-12 RX ORDER — SODIUM CHLORIDE, SODIUM LACTATE, POTASSIUM CHLORIDE, CALCIUM CHLORIDE 600; 310; 30; 20 MG/100ML; MG/100ML; MG/100ML; MG/100ML
9 INJECTION, SOLUTION INTRAVENOUS CONTINUOUS
Status: DISCONTINUED | OUTPATIENT
Start: 2019-02-12 | End: 2019-02-12 | Stop reason: HOSPADM

## 2019-02-12 RX ORDER — HYDROCODONE BITARTRATE AND ACETAMINOPHEN 5; 325 MG/1; MG/1
1 TABLET ORAL ONCE AS NEEDED
Status: COMPLETED | OUTPATIENT
Start: 2019-02-12 | End: 2019-02-12

## 2019-02-12 RX ORDER — MIDAZOLAM HYDROCHLORIDE 1 MG/ML
1 INJECTION INTRAMUSCULAR; INTRAVENOUS
Status: DISCONTINUED | OUTPATIENT
Start: 2019-02-12 | End: 2019-02-12 | Stop reason: HOSPADM

## 2019-02-12 RX ORDER — ONDANSETRON 2 MG/ML
INJECTION INTRAMUSCULAR; INTRAVENOUS AS NEEDED
Status: DISCONTINUED | OUTPATIENT
Start: 2019-02-12 | End: 2019-02-12 | Stop reason: SURG

## 2019-02-12 RX ORDER — DEXAMETHASONE SODIUM PHOSPHATE 10 MG/ML
INJECTION INTRAMUSCULAR; INTRAVENOUS AS NEEDED
Status: DISCONTINUED | OUTPATIENT
Start: 2019-02-12 | End: 2019-02-12 | Stop reason: SURG

## 2019-02-12 RX ORDER — PROMETHAZINE HYDROCHLORIDE 25 MG/1
25 TABLET ORAL ONCE AS NEEDED
Status: DISCONTINUED | OUTPATIENT
Start: 2019-02-12 | End: 2019-02-12 | Stop reason: HOSPADM

## 2019-02-12 RX ORDER — ACETAMINOPHEN 650 MG/1
650 SUPPOSITORY RECTAL ONCE AS NEEDED
Status: DISCONTINUED | OUTPATIENT
Start: 2019-02-12 | End: 2019-02-12 | Stop reason: HOSPADM

## 2019-02-12 RX ORDER — KETOROLAC TROMETHAMINE 30 MG/ML
INJECTION, SOLUTION INTRAMUSCULAR; INTRAVENOUS AS NEEDED
Status: DISCONTINUED | OUTPATIENT
Start: 2019-02-12 | End: 2019-02-12 | Stop reason: SURG

## 2019-02-12 RX ORDER — NALBUPHINE HCL 10 MG/ML
10 AMPUL (ML) INJECTION EVERY 4 HOURS PRN
Status: DISCONTINUED | OUTPATIENT
Start: 2019-02-12 | End: 2019-02-12 | Stop reason: HOSPADM

## 2019-02-12 RX ORDER — HYDRALAZINE HYDROCHLORIDE 20 MG/ML
5 INJECTION INTRAMUSCULAR; INTRAVENOUS
Status: DISCONTINUED | OUTPATIENT
Start: 2019-02-12 | End: 2019-02-12 | Stop reason: HOSPADM

## 2019-02-12 RX ORDER — WOUND DRESSING ADHESIVE - LIQUID
LIQUID MISCELLANEOUS AS NEEDED
Status: DISCONTINUED | OUTPATIENT
Start: 2019-02-12 | End: 2019-02-12 | Stop reason: HOSPADM

## 2019-02-12 RX ADMIN — FENTANYL CITRATE 50 MCG: 50 INJECTION INTRAMUSCULAR; INTRAVENOUS at 09:30

## 2019-02-12 RX ADMIN — FENTANYL CITRATE 50 MCG: 50 INJECTION INTRAMUSCULAR; INTRAVENOUS at 08:02

## 2019-02-12 RX ADMIN — SODIUM CHLORIDE, POTASSIUM CHLORIDE, SODIUM LACTATE AND CALCIUM CHLORIDE 9 ML/HR: 600; 310; 30; 20 INJECTION, SOLUTION INTRAVENOUS at 06:29

## 2019-02-12 RX ADMIN — HYDROCODONE BITARTRATE AND ACETAMINOPHEN 1 TABLET: 5; 325 TABLET ORAL at 09:28

## 2019-02-12 RX ADMIN — FENTANYL CITRATE 50 MCG: 50 INJECTION INTRAMUSCULAR; INTRAVENOUS at 09:27

## 2019-02-12 RX ADMIN — ACETAMINOPHEN 500 MG: 500 TABLET, FILM COATED ORAL at 06:30

## 2019-02-12 RX ADMIN — FENTANYL CITRATE 50 MCG: 50 INJECTION INTRAMUSCULAR; INTRAVENOUS at 08:12

## 2019-02-12 RX ADMIN — FENTANYL CITRATE 50 MCG: 50 INJECTION INTRAMUSCULAR; INTRAVENOUS at 07:28

## 2019-02-12 RX ADMIN — LIDOCAINE HYDROCHLORIDE 100 MG: 20 INJECTION, SOLUTION INFILTRATION; PERINEURAL at 07:28

## 2019-02-12 RX ADMIN — FENTANYL CITRATE 50 MCG: 50 INJECTION INTRAMUSCULAR; INTRAVENOUS at 07:53

## 2019-02-12 RX ADMIN — DEXAMETHASONE SODIUM PHOSPHATE 6 MG: 10 INJECTION INTRAMUSCULAR; INTRAVENOUS at 07:41

## 2019-02-12 RX ADMIN — KETOROLAC TROMETHAMINE 30 MG: 30 INJECTION, SOLUTION INTRAMUSCULAR; INTRAVENOUS at 08:12

## 2019-02-12 RX ADMIN — PROPOFOL 200 MG: 10 INJECTION, EMULSION INTRAVENOUS at 07:28

## 2019-02-12 RX ADMIN — CEFAZOLIN SODIUM 1 G: 1 INJECTION, SOLUTION INTRAVENOUS at 07:35

## 2019-02-12 RX ADMIN — ONDANSETRON 4 MG: 2 INJECTION INTRAMUSCULAR; INTRAVENOUS at 08:12

## 2019-02-12 RX ADMIN — HYDROMORPHONE HYDROCHLORIDE 0.25 MG: 1 INJECTION, SOLUTION INTRAMUSCULAR; INTRAVENOUS; SUBCUTANEOUS at 09:42

## 2019-02-12 RX ADMIN — HYDROMORPHONE HYDROCHLORIDE 0.25 MG: 1 INJECTION, SOLUTION INTRAMUSCULAR; INTRAVENOUS; SUBCUTANEOUS at 09:36

## 2019-02-12 RX ADMIN — MIDAZOLAM 1 MG: 1 INJECTION INTRAMUSCULAR; INTRAVENOUS at 07:21

## 2019-02-12 RX ADMIN — MIDAZOLAM 2 MG: 1 INJECTION INTRAMUSCULAR; INTRAVENOUS at 06:30

## 2019-02-12 NOTE — ANESTHESIA PREPROCEDURE EVALUATION
Anesthesia Evaluation     NPO Solid Status: > 8 hours             Airway   Mallampati: II  TM distance: >3 FB  Neck ROM: full  No difficulty expected  Dental - normal exam     Pulmonary - normal exam   (+) a smoker Current Smoked day of surgery, COPD, asthma,     ROS comment: Hx of removal of thymus mass  Cardiovascular   Exercise tolerance: good (4-7 METS)    ECG reviewed  Rhythm: regular    (+) hypertension, hyperlipidemia,   (-) murmur      Neuro/Psych  (+) numbness, psychiatric history Anxiety,     GI/Hepatic/Renal/Endo    (+)  GERD,      Musculoskeletal     Abdominal    Substance History      OB/GYN          Other                        Anesthesia Plan    ASA 2     general     intravenous induction

## 2019-02-12 NOTE — ANESTHESIA POSTPROCEDURE EVALUATION
Patient: Filomena Liu    Procedure Summary     Date:  02/12/19 [facility] / [facility]:   [facility]    Anesthesia Start:  0725 Anesthesia Stop:  0831    Procedure:  debridement SKIN GRAFT SPLIT THICKNESS left ankle wound (N/A ) Diagnosis:  (Chronic left ankle wound)    Surgeon:  Barry Worthy MD Provider:  cSot Delgado MD    Anesthesia Type:  general ASA Status:  2          Anesthesia Type: general  Last vitals  BP   127/86 (02/12/19 1015)   Temp   36.1 °C (97 °F) (02/12/19 1015)   Pulse   91 (02/12/19 1015)   Resp   16 (02/12/19 1015)     SpO2   90 % (02/12/19 1015)     Post Anesthesia Care and Evaluation    Patient location during evaluation: bedside  Patient participation: complete - patient participated  Level of consciousness: awake  Pain score: 1  Pain management: adequate  Airway patency: patent  Anesthetic complications: No anesthetic complications    Cardiovascular status: acceptable  Respiratory status: acceptable  Hydration status: acceptable    Comments: -------------------------              02/12/19                    1015        -------------------------   BP:         127/86        Pulse:        91          Resp:         16          Temp:   36.1 °C (97 °F)   SpO2:         90%        -------------------------

## 2019-02-12 NOTE — BRIEF OP NOTE
SKIN GRAFT SPLIT THICKNESS  Progress Note    Filomena Liu  2/12/2019    Pre-op Diagnosis:   Chronic left ankle wound       Post-Op Diagnosis Codes:   Same    Procedure/CPT® Codes:      Procedure(s):  debridement SKIN GRAFT SPLIT THICKNESS left ankle wound, local advancement flaps for by 1 cm ×2    Surgeon(s):  Barry Worthy MD    Anesthesia: General    Staff:   Circulator: Jessie Breaux RN  Scrub Person: Ron Asher    Estimated Blood Loss: 5 mL    Urine Voided: * No values recorded between 2/12/2019  7:25 AM and 2/12/2019  8:21 AM *    Specimens:                None      Drains:      Findings: Granulating peroneal tendons    Complications: None      Barry Worthy MD     Date: 2/12/2019  Time: 8:25 AM

## 2019-02-12 NOTE — PERIOPERATIVE NURSING NOTE
Dr. Madi Mcintyre called to bedside regarding cast tightness near left fifth toe. Cast saw used to open cast on lateral aspect of cast. Left toes capillary refill brisk, color bianca.

## 2019-02-12 NOTE — H&P
Filomena Ospina is a 58-year-old woman who developed a chronic infection in the lateral aspect of the left ankle.  This may have originated from a spider bite but developed a chronic infection and eventual loss of skin posterior to the lateral malleolus.    Medical history includes COPD related to smoking, osteoarthritis and some psychiatric problems.  She is a smoker but no known drug allergies.    On examination she is 5 foot and weighs 101 she is to extreme nervousness and cheerful but is alert and oriented.  Chest is clear, heart sounds are normal with a dual rhythm and no bruits.  There is a skin defect approximately 4 x 3 cm on the lateral aspect of the left ankle exposing the peroneal tendons which are now largely granulating.  The wound is clean with no surrounding cellulitis or purulent discharge.    Plan of management is debridement, local advancement flaps and split skin graft.

## 2019-02-12 NOTE — ANESTHESIA PROCEDURE NOTES
Airway  Urgency: elective    Airway not difficult    General Information and Staff    Patient location during procedure: OR  Anesthesiologist: Scot Delgado MD  CRNA: Greta Osuna CRNA    Indications and Patient Condition  Indications for airway management: airway protection    Preoxygenated: yes (Pt pre-O2 with 100% O2)  Mask difficulty assessment: 0 - not attempted    Final Airway Details  Final airway type: supraglottic airway      Successful airway: classic  Size 4    Number of attempts at approach: 1    Additional Comments  Appears ATOLMA x1. No change in dentition. + ETCO2. Airway seal pressure <20cm H2O.

## 2019-02-18 RX ORDER — METHYLPREDNISOLONE ACETATE 80 MG/ML
80 INJECTION, SUSPENSION INTRA-ARTICULAR; INTRALESIONAL; INTRAMUSCULAR; SOFT TISSUE
Status: COMPLETED | OUTPATIENT
Start: 2019-02-08 | End: 2019-02-08

## 2019-02-18 RX ORDER — LIDOCAINE HYDROCHLORIDE 10 MG/ML
2 INJECTION, SOLUTION EPIDURAL; INFILTRATION; INTRACAUDAL; PERINEURAL
Status: COMPLETED | OUTPATIENT
Start: 2019-02-08 | End: 2019-02-08

## 2019-02-20 ENCOUNTER — TELEPHONE (OUTPATIENT)
Dept: ORTHOPEDIC SURGERY | Facility: CLINIC | Age: 59
End: 2019-02-20

## 2019-02-20 NOTE — TELEPHONE ENCOUNTER
Informed patient of [facility] prior message patient voiced understanding. 02/20/19 1449   She did state the skin graft she received is burning and I explaine to her that she will need to Dr. Worthy for he did the surgery skin graft 02/12/19

## 2019-02-20 NOTE — TELEPHONE ENCOUNTER
Please call patient to let her know the following:  According to Dr. Lanier's last progress note, he agreed to give her one last refill (which he did) to get her through until she had surgery with Dr. Worthy.  She needs to request refills from Dr. Worthy as she is now under his care. Thanks

## 2019-02-20 NOTE — TELEPHONE ENCOUNTER
[facility] prescribed  Oxycodone-apap 7.5/325 mg #50 RF 0 days supply: 8     SX debridement  02/12/19 SKIN GRAFT SPLIT THICKNESS left ankle wound Dr. MELISSA who  prescribed [place] 7.5/325 mg #24 rf 0 days supply 6  Per Jimbo patient filled medication  02/12/2019     Please advise

## 2019-02-28 ENCOUNTER — TELEPHONE (OUTPATIENT)
Dept: ORTHOPEDIC SURGERY | Facility: CLINIC | Age: 59
End: 2019-02-28

## 2019-03-01 NOTE — TELEPHONE ENCOUNTER
No. Please explain to her that I spoke with her plastic surgeon recently and she definitely needs further surgery.  I recommend that she follow up with the plastic surgeon recommended by Dr. Madi Mcintyre.  Thank.s

## 2019-03-04 ENCOUNTER — TELEPHONE (OUTPATIENT)
Dept: ORTHOPEDIC SURGERY | Facility: CLINIC | Age: 59
End: 2019-03-04

## 2019-03-04 NOTE — TELEPHONE ENCOUNTER
"[facility] patient called following up previous message left 02/28 & patient stated she previously \"had MRSA in Left ankle which never healed and had surgery about 16 days ago by plastic surgeon who did a skin graft & made a mess out of it\". Patient states Left ankle is swollen, red, warm to the touch and tendons in leg are hanging out of [my] skin and shooting, burning pain\". Would like to speak to [facility]. Please advise. Thanks /srh  "

## 2019-03-04 NOTE — TELEPHONE ENCOUNTER
I called patient with this information and she states that this is not good enough for her, she is not wanting to see another plastic surgeon. She is very adamant that she see [facility] again and she would like a referral to either/or both a wound clinic and a pain management clinic.   I advised that I will schedule her an appointment with [facility]'s Gypsy LESTER because [facility] is completely booked up for the next couple of weeks. Patient was okay with that and she is scheduled for Wednesday 3/6 @ 9:40 AM

## 2019-03-06 ENCOUNTER — TELEPHONE (OUTPATIENT)
Dept: ORTHOPEDIC SURGERY | Facility: CLINIC | Age: 59
End: 2019-03-06

## 2019-03-06 ENCOUNTER — OFFICE VISIT (OUTPATIENT)
Dept: ORTHOPEDIC SURGERY | Facility: CLINIC | Age: 59
End: 2019-03-06

## 2019-03-06 VITALS — TEMPERATURE: 98 F | WEIGHT: 143 LBS | HEIGHT: 60 IN | BODY MASS INDEX: 28.07 KG/M2

## 2019-03-06 DIAGNOSIS — Z09 SURGERY FOLLOW-UP: Primary | ICD-10-CM

## 2019-03-06 PROCEDURE — 99212 OFFICE O/P EST SF 10 MIN: CPT | Performed by: ORTHOPAEDIC SURGERY

## 2019-03-06 RX ORDER — OXYCODONE AND ACETAMINOPHEN 7.5; 325 MG/1; MG/1
1 TABLET ORAL EVERY 4 HOURS PRN
Qty: 50 TABLET | Refills: 0 | Status: SHIPPED | OUTPATIENT
Start: 2019-03-06 | End: 2020-11-05 | Stop reason: ALTCHOICE

## 2019-03-06 NOTE — TELEPHONE ENCOUNTER
"Patient has appt with Gypsy this morning at 10:40am. She says she wants to go to the ER because she is in so much pain with her ankle. She wants [facility] to get her admitted and get her pain rx. She says \"I am at my jumping point\".  "

## 2019-03-06 NOTE — TELEPHONE ENCOUNTER
I spoke to Dr. Lanier.  Please call patient to let her know she is welcome to see us in office today as scheduled or she may go to the ER if she chooses.  Dr. Lanier cannot directly admit her to the hospital. The ER doctor will assess and admit her if medically necessary.

## 2019-03-06 NOTE — PROGRESS NOTES
"Chief complaint: Follow-up status post left ankle irrigation and debridement    Ms. Rm comes in for follow-up of her left ankle.  She is very upset about her experience with her plastic surgeon.  She says that the surgery was \"a total disaster\".      I told her that I did personally speak with Dr. Madi Mcintyre after her surgery.  He informed me that she removed her cast and did not comply with any of the postoperative instructions.  Consequently, her graft completely failed.  He recommended referral to another plastic surgeon for a possible flap.  She admits that she did take the cast off and was not fully compliant.  Dr. Madi Mcintyre referred her to another plastic surgeon.  She cannot remember who that was.  She does not want to have to go see someone else.  She has asked if I might be able to do the soft tissue procedure for her.    Exam: Her wound looks about the same to me as it has in the past.  She has a large wound there with significant hypertrophic granulation tissue.  No erythema or purulence.  No palpable fluctuance.  She does have tenderness around the wound.  No tenderness anteriorly or medially in the ankle.  She can plantarflex and dorsiflex the ankle with minimal discomfort.    Assessment: Chronic left ankle wound status post irrigation and debridement    Plan: I told her that she absolutely needs to follow-up with recommended plastic surgeon.  I do not know who that was but I suggested she contact Dr. Madi Mcintyre's office to find out.  Unfortunately, she has a chronic wound and I do not have anything that I can offer her that could make her better at this point.  She asked if would be willing to give her 1 more prescription for pain medicine.  I did give her a refill of the Percocet but I told her that this will be her last refill from our office.    "

## 2019-03-06 NOTE — TELEPHONE ENCOUNTER
I relayed [facility] message to patient. She said she is going to the ER and to cancel her appt with Gypsy for today. ME

## 2019-03-19 ENCOUNTER — TELEPHONE (OUTPATIENT)
Dept: ORTHOPEDIC SURGERY | Facility: CLINIC | Age: 59
End: 2019-03-19

## 2019-03-19 NOTE — TELEPHONE ENCOUNTER
Patient would like to know if [facility] could refer her to pain mgmt. Until she is able to see the plastic surgeon on 4/15. Would like to attend [facility]. The phone number is [phone].

## 2019-03-20 DIAGNOSIS — S62.102B OPEN FRACTURE OF LEFT WRIST, INITIAL ENCOUNTER: Primary | ICD-10-CM

## 2019-03-21 ENCOUNTER — TELEPHONE (OUTPATIENT)
Dept: ORTHOPEDIC SURGERY | Facility: CLINIC | Age: 59
End: 2019-03-21

## 2019-03-21 DIAGNOSIS — M25.572 LEFT ANKLE PAIN, UNSPECIFIED CHRONICITY: ICD-10-CM

## 2019-03-21 DIAGNOSIS — Z98.890 STATUS POST INCISION AND DRAINAGE: ICD-10-CM

## 2019-03-21 DIAGNOSIS — Z09 SURGERY FOLLOW-UP: Primary | ICD-10-CM

## 2019-03-21 NOTE — TELEPHONE ENCOUNTER
Could you please change the referral to left ankle instead of left wrist so I can send to pain mgmt please/dm

## 2020-10-20 ENCOUNTER — PREP FOR SURGERY (OUTPATIENT)
Dept: OTHER | Facility: HOSPITAL | Age: 60
End: 2020-10-20

## 2020-10-20 DIAGNOSIS — Z12.11 SCREENING FOR COLON CANCER: Primary | ICD-10-CM

## 2020-10-27 PROBLEM — Z12.11 SCREENING FOR COLON CANCER: Status: ACTIVE | Noted: 2020-10-27

## 2020-10-29 ENCOUNTER — TRANSCRIBE ORDERS (OUTPATIENT)
Dept: ADMINISTRATIVE | Facility: HOSPITAL | Age: 60
End: 2020-10-29

## 2020-10-29 DIAGNOSIS — M81.0 SENILE OSTEOPOROSIS: Primary | ICD-10-CM

## 2020-10-31 PROBLEM — M81.0 OSTEOPOROSIS: Status: ACTIVE | Noted: 2020-10-31

## 2020-10-31 RX ORDER — ZOLEDRONIC ACID 5 MG/100ML
5 INJECTION, SOLUTION INTRAVENOUS ONCE
Status: CANCELLED | OUTPATIENT
Start: 2020-11-05

## 2020-11-05 ENCOUNTER — HOSPITAL ENCOUNTER (OUTPATIENT)
Dept: INFUSION THERAPY | Facility: HOSPITAL | Age: 60
Discharge: HOME | End: 2020-11-05
Admitting: FAMILY MEDICINE

## 2020-11-05 ENCOUNTER — TRANSCRIBE ORDERS (OUTPATIENT)
Dept: ADMINISTRATIVE | Facility: HOSPITAL | Age: 60
End: 2020-11-05

## 2020-11-05 VITALS
DIASTOLIC BLOOD PRESSURE: 88 MMHG | BODY MASS INDEX: 31.41 KG/M2 | RESPIRATION RATE: 20 BRPM | HEART RATE: 102 BPM | HEIGHT: 60 IN | OXYGEN SATURATION: 97 % | WEIGHT: 160 LBS | SYSTOLIC BLOOD PRESSURE: 133 MMHG | TEMPERATURE: 98.2 F

## 2020-11-05 DIAGNOSIS — M81.0 SENILE OSTEOPOROSIS: Primary | ICD-10-CM

## 2020-11-05 DIAGNOSIS — M81.0 AGE RELATED OSTEOPOROSIS, UNSPECIFIED PATHOLOGICAL FRACTURE PRESENCE: ICD-10-CM

## 2020-11-05 DIAGNOSIS — U07.1 COVID-19: Primary | ICD-10-CM

## 2020-11-05 PROCEDURE — 25010000002 ZOLEDRONIC ACID 5 MG/100ML SOLUTION: Performed by: FAMILY MEDICINE

## 2020-11-05 PROCEDURE — 96365 THER/PROPH/DIAG IV INF INIT: CPT

## 2020-11-05 PROCEDURE — 96374 THER/PROPH/DIAG INJ IV PUSH: CPT

## 2020-11-05 RX ORDER — ZOLEDRONIC ACID 5 MG/100ML
5 INJECTION, SOLUTION INTRAVENOUS ONCE
OUTPATIENT
Start: 2021-11-05

## 2020-11-05 RX ORDER — ZOLEDRONIC ACID 5 MG/100ML
5 INJECTION, SOLUTION INTRAVENOUS ONCE
Status: COMPLETED | OUTPATIENT
Start: 2020-11-05 | End: 2020-11-05

## 2020-11-05 RX ORDER — LISINOPRIL 20 MG/1
20 TABLET ORAL DAILY
COMMUNITY

## 2020-11-05 RX ORDER — ZOLEDRONIC ACID 5 MG/100ML
5 INJECTION, SOLUTION INTRAVENOUS ONCE
COMMUNITY

## 2020-11-05 RX ADMIN — ZOLEDRONIC ACID 5 MG: 5 SOLUTION INTRAVENOUS at 12:40

## 2020-11-05 NOTE — PROGRESS NOTES
Pt tolerated infusion well. Kept for 30 minute observation. No side effects noted. Pt cockcroft-gault score is 75.2cc/min. D/C ambulatory at 1325.

## 2020-11-05 NOTE — PATIENT INSTRUCTIONS
Zoledronic Acid injection (Paget's Disease, Osteoporosis)  What is this medicine?  ZOLEDRONIC ACID (ADAM le deonn ik AS id) lowers the amount of calcium loss from bone. It is used to treat Paget's disease and osteoporosis in women.  This medicine may be used for other purposes; ask your health care provider or pharmacist if you have questions.  COMMON BRAND NAME(S): Reclast, Zometa  What should I tell my health care provider before I take this medicine?  They need to know if you have any of these conditions:  · aspirin-sensitive asthma  · cancer, especially if you are receiving medicines used to treat cancer  · dental disease or wear dentures  · infection  · kidney disease  · low levels of calcium in the blood  · past surgery on the parathyroid gland or intestines  · receiving corticosteroids like dexamethasone or prednisone  · an unusual or allergic reaction to zoledronic acid, other medicines, foods, dyes, or preservatives  · pregnant or trying to get pregnant  · breast-feeding  How should I use this medicine?  This medicine is for infusion into a vein. It is given by a health care professional in a hospital or clinic setting.  Talk to your pediatrician regarding the use of this medicine in children. This medicine is not approved for use in children.  Overdosage: If you think you have taken too much of this medicine contact a poison control center or emergency room at once.  NOTE: This medicine is only for you. Do not share this medicine with others.  What if I miss a dose?  It is important not to miss your dose. Call your doctor or health care professional if you are unable to keep an appointment.  What may interact with this medicine?  · certain antibiotics given by injection  · NSAIDs, medicines for pain and inflammation, like ibuprofen or naproxen  · some diuretics like bumetanide, furosemide  · teriparatide  This list may not describe all possible interactions. Give your health care provider a list of all the  medicines, herbs, non-prescription drugs, or dietary supplements you use. Also tell them if you smoke, drink alcohol, or use illegal drugs. Some items may interact with your medicine.  What should I watch for while using this medicine?  Visit your doctor or health care professional for regular checkups. It may be some time before you see the benefit from this medicine. Do not stop taking your medicine unless your doctor tells you to. Your doctor may order blood tests or other tests to see how you are doing.  Women should inform their doctor if they wish to become pregnant or think they might be pregnant. There is a potential for serious side effects to an unborn child. Talk to your health care professional or pharmacist for more information.  You should make sure that you get enough calcium and vitamin D while you are taking this medicine. Discuss the foods you eat and the vitamins you take with your health care professional.  Some people who take this medicine have severe bone, joint, and/or muscle pain. This medicine may also increase your risk for jaw problems or a broken thigh bone. Tell your doctor right away if you have severe pain in your jaw, bones, joints, or muscles. Tell your doctor if you have any pain that does not go away or that gets worse.  Tell your dentist and dental surgeon that you are taking this medicine. You should not have major dental surgery while on this medicine. See your dentist to have a dental exam and fix any dental problems before starting this medicine. Take good care of your teeth while on this medicine. Make sure you see your dentist for regular follow-up appointments.  What side effects may I notice from receiving this medicine?  Side effects that you should report to your doctor or health care professional as soon as possible:  · allergic reactions like skin rash, itching or hives, swelling of the face, lips, or tongue  · anxiety, confusion, or depression  · breathing  problems  · changes in vision  · eye pain  · feeling faint or lightheaded, falls  · jaw pain, especially after dental work  · mouth sores  · muscle cramps, stiffness, or weakness  · redness, blistering, peeling or loosening of the skin, including inside the mouth  · trouble passing urine or change in the amount of urine  Side effects that usually do not require medical attention (report to your doctor or health care professional if they continue or are bothersome):  · bone, joint, or muscle pain  · constipation  · diarrhea  · fever  · hair loss  · irritation at site where injected  · loss of appetite  · nausea, vomiting  · stomach upset  · trouble sleeping  · trouble swallowing  · weak or tired  This list may not describe all possible side effects. Call your doctor for medical advice about side effects. You may report side effects to FDA at 5-294-FDA-5349.  Where should I keep my medicine?  This drug is given in a hospital or clinic and will not be stored at home.  NOTE: This sheet is a summary. It may not cover all possible information. If you have questions about this medicine, talk to your doctor, pharmacist, or health care provider.  © 2020 [employer]vier/Gold Standard (2015-05-16 14:19:57)

## 2020-11-10 ENCOUNTER — LAB (OUTPATIENT)
Dept: LAB | Facility: HOSPITAL | Age: 60
End: 2020-11-10

## 2020-11-10 DIAGNOSIS — U07.1 COVID-19: ICD-10-CM

## 2020-11-10 PROCEDURE — U0004 COV-19 TEST NON-CDC HGH THRU: HCPCS

## 2020-11-10 PROCEDURE — C9803 HOPD COVID-19 SPEC COLLECT: HCPCS

## 2020-11-11 ENCOUNTER — ANESTHESIA EVENT (OUTPATIENT)
Dept: PERIOP | Facility: HOSPITAL | Age: 60
End: 2020-11-11

## 2020-11-11 LAB — SARS-COV-2 RNA RESP QL NAA+PROBE: NOT DETECTED

## 2020-11-12 ENCOUNTER — ANESTHESIA (OUTPATIENT)
Dept: PERIOP | Facility: HOSPITAL | Age: 60
End: 2020-11-12

## 2020-11-12 ENCOUNTER — HOSPITAL ENCOUNTER (OUTPATIENT)
Facility: HOSPITAL | Age: 60
Setting detail: HOSPITAL OUTPATIENT SURGERY
Discharge: HOME | End: 2020-11-12
Attending: SURGERY | Admitting: SURGERY

## 2020-11-12 VITALS
RESPIRATION RATE: 20 BRPM | OXYGEN SATURATION: 96 % | TEMPERATURE: 97.4 F | HEART RATE: 79 BPM | WEIGHT: 162.2 LBS | BODY MASS INDEX: 31.68 KG/M2 | SYSTOLIC BLOOD PRESSURE: 139 MMHG | DIASTOLIC BLOOD PRESSURE: 92 MMHG

## 2020-11-12 DIAGNOSIS — Z12.11 SCREENING FOR COLON CANCER: ICD-10-CM

## 2020-11-12 PROCEDURE — 45385 COLONOSCOPY W/LESION REMOVAL: CPT | Performed by: SURGERY

## 2020-11-12 PROCEDURE — 45380 COLONOSCOPY AND BIOPSY: CPT | Performed by: SURGERY

## 2020-11-12 PROCEDURE — S0260 H&P FOR SURGERY: HCPCS | Performed by: SURGERY

## 2020-11-12 PROCEDURE — 25010000002 PROPOFOL 10 MG/ML EMULSION: Performed by: NURSE ANESTHETIST, CERTIFIED REGISTERED

## 2020-11-12 PROCEDURE — 88305 TISSUE EXAM BY PATHOLOGIST: CPT | Performed by: SURGERY

## 2020-11-12 RX ORDER — ALBUTEROL SULFATE 1.25 MG/3ML
1 SOLUTION RESPIRATORY (INHALATION) EVERY 6 HOURS PRN
COMMUNITY

## 2020-11-12 RX ORDER — PROPOFOL 10 MG/ML
VIAL (ML) INTRAVENOUS AS NEEDED
Status: DISCONTINUED | OUTPATIENT
Start: 2020-11-12 | End: 2020-11-12 | Stop reason: SURG

## 2020-11-12 RX ORDER — SODIUM CHLORIDE, SODIUM LACTATE, POTASSIUM CHLORIDE, CALCIUM CHLORIDE 600; 310; 30; 20 MG/100ML; MG/100ML; MG/100ML; MG/100ML
100 INJECTION, SOLUTION INTRAVENOUS CONTINUOUS
Status: DISCONTINUED | OUTPATIENT
Start: 2020-11-12 | End: 2020-11-12 | Stop reason: HOSPADM

## 2020-11-12 RX ORDER — SODIUM CHLORIDE 0.9 % (FLUSH) 0.9 %
10 SYRINGE (ML) INJECTION EVERY 12 HOURS SCHEDULED
Status: DISCONTINUED | OUTPATIENT
Start: 2020-11-12 | End: 2020-11-12 | Stop reason: HOSPADM

## 2020-11-12 RX ORDER — ONDANSETRON 2 MG/ML
4 INJECTION INTRAMUSCULAR; INTRAVENOUS ONCE AS NEEDED
Status: DISCONTINUED | OUTPATIENT
Start: 2020-11-12 | End: 2020-11-12 | Stop reason: HOSPADM

## 2020-11-12 RX ORDER — SODIUM CHLORIDE, SODIUM LACTATE, POTASSIUM CHLORIDE, CALCIUM CHLORIDE 600; 310; 30; 20 MG/100ML; MG/100ML; MG/100ML; MG/100ML
9 INJECTION, SOLUTION INTRAVENOUS CONTINUOUS PRN
Status: DISCONTINUED | OUTPATIENT
Start: 2020-11-12 | End: 2020-11-12 | Stop reason: HOSPADM

## 2020-11-12 RX ORDER — LIDOCAINE HYDROCHLORIDE 20 MG/ML
INJECTION, SOLUTION INFILTRATION; PERINEURAL AS NEEDED
Status: DISCONTINUED | OUTPATIENT
Start: 2020-11-12 | End: 2020-11-12 | Stop reason: SURG

## 2020-11-12 RX ORDER — SODIUM CHLORIDE 9 MG/ML
40 INJECTION, SOLUTION INTRAVENOUS AS NEEDED
Status: DISCONTINUED | OUTPATIENT
Start: 2020-11-12 | End: 2020-11-12 | Stop reason: HOSPADM

## 2020-11-12 RX ORDER — LIDOCAINE HYDROCHLORIDE 10 MG/ML
0.5 INJECTION, SOLUTION EPIDURAL; INFILTRATION; INTRACAUDAL; PERINEURAL ONCE AS NEEDED
Status: DISCONTINUED | OUTPATIENT
Start: 2020-11-12 | End: 2020-11-12 | Stop reason: HOSPADM

## 2020-11-12 RX ORDER — SODIUM CHLORIDE 0.9 % (FLUSH) 0.9 %
10 SYRINGE (ML) INJECTION AS NEEDED
Status: DISCONTINUED | OUTPATIENT
Start: 2020-11-12 | End: 2020-11-12 | Stop reason: HOSPADM

## 2020-11-12 RX ADMIN — PROPOFOL 50 MG: 10 INJECTION, EMULSION INTRAVENOUS at 08:53

## 2020-11-12 RX ADMIN — PROPOFOL 50 MG: 10 INJECTION, EMULSION INTRAVENOUS at 09:17

## 2020-11-12 RX ADMIN — PROPOFOL 50 MG: 10 INJECTION, EMULSION INTRAVENOUS at 09:13

## 2020-11-12 RX ADMIN — PROPOFOL 50 MG: 10 INJECTION, EMULSION INTRAVENOUS at 08:50

## 2020-11-12 RX ADMIN — PROPOFOL 100 MG: 10 INJECTION, EMULSION INTRAVENOUS at 08:47

## 2020-11-12 RX ADMIN — PROPOFOL 50 MG: 10 INJECTION, EMULSION INTRAVENOUS at 08:55

## 2020-11-12 RX ADMIN — PROPOFOL 50 MG: 10 INJECTION, EMULSION INTRAVENOUS at 09:05

## 2020-11-12 RX ADMIN — PROPOFOL 50 MG: 10 INJECTION, EMULSION INTRAVENOUS at 08:57

## 2020-11-12 RX ADMIN — PROPOFOL 50 MG: 10 INJECTION, EMULSION INTRAVENOUS at 09:03

## 2020-11-12 RX ADMIN — LIDOCAINE HYDROCHLORIDE 100 MG: 20 INJECTION, SOLUTION INFILTRATION; PERINEURAL at 08:44

## 2020-11-12 RX ADMIN — SODIUM CHLORIDE, POTASSIUM CHLORIDE, SODIUM LACTATE AND CALCIUM CHLORIDE 9 ML/HR: 600; 310; 30; 20 INJECTION, SOLUTION INTRAVENOUS at 08:17

## 2020-11-12 RX ADMIN — PROPOFOL 50 MG: 10 INJECTION, EMULSION INTRAVENOUS at 09:09

## 2020-11-12 RX ADMIN — PROPOFOL 50 MG: 10 INJECTION, EMULSION INTRAVENOUS at 09:00

## 2020-11-12 NOTE — INTERVAL H&P NOTE
BP (!) 146/102   Pulse 93   Temp 97.6 °F (36.4 °C) (Oral)   Resp 18   Wt 73.6 kg (162 lb 3.2 oz)   SpO2 100%   BMI 31.68 kg/m²     H&P reviewed. The patient was examined and there are no changes to the H&P.

## 2020-11-12 NOTE — OP NOTE
Operative Note:     Pre-op dx: Screening Colonoscopy     Post-op dx: Diverticulosis, hepatic flexure polyp, polyp at 70 cm and polyp at 60 cm     Procedure: Colonoscopy with hot snare polypectomy x1, cold polypectomy x2     Surgeon: Filomena Mckeon MD     Anesthesia: MAC     EBL: none     Specimen:  3 polps      Complications: none     Procedure:     Colonoscopy:  A digital rectal examination was performed which revealed no rectal masses.  Scope was introduced into the rectum and advanced into the sigmoid, descending colon, splenic flexure, transverse colon, hepatic flexure, ascending colon and into the cecum.  Cecum was identified by the ileocecal valve and appendiceal orifice.  Patient had severe diverticulosis throughout the sigmoid and descending colon that was large and small.  Patient had a hepatic flexure polyp that was removed in its entirety with a cold biopsy forceps and submitted to pathology.  Patient had a polyp at 70 cm that was removed in its entirety with a cold biopsy forceps.  Patient had a third polyp at 60 cm that was removed with a hot snare and its entirety and submitted to pathology.  There was no evidence of any masses, AV malformation or inflammation.  The bowel preparation was excellent. The scope was slowly withdrawn and a second look was performed.  Upon the second look, there were no new findings.  The colon was desufflated and the procedure was terminated.   Patient was transferred to recovery room in stable condition.       Assessment/Plan:  Repeat colonoscopy in 2 years.  Patient had diverticulosis and will give her a patient teaching pamphlet on diverticulosis  Patient had 3 polyps that was removed in their entirety and submitted to pathology, I will have her call my office on Tuesday for the pathology results     Filomena Mckeon MD  General, Minimally Invasive and Endoscopic Surgery  [facility] [place] [id number]   [address]                                   [address]  [place] [address]               [place] [address]     P: [phone]  F: [phone]     Cc:  Beatriz Bravo MD

## 2020-11-12 NOTE — H&P
Date: 2020     Patient: Filomena Liu    : 1960   [id number]     CC:  Screening Colonoscopy    History:   The patient is a 60 y.o. female of Fernando Dunn MD  who presents to discuss screening colonoscopy. The patient currently has no complaints.  Patient denies any history of nausea, abdominal pain, weight loss, change in bowel habits or rectal bleeding.  Patient denies melena, hematochezia or BRBPR.  No family history of ulcerative colitis, Crohn's disease, familial polyposis or colon cancer.    The following portions of the patient's history were reviewed and updated as appropriate: allergies, current medications, past family history, past medical history, past social history, past surgical history and problem list.    Past Medical History:   Diagnosis Date   • Ankle pain    • Ankle wound     LEFT   • Asthma    • Benign tumor of thymus     REMOVED   • Bruises easily    • Cataract    • COPD (chronic obstructive pulmonary disease) (CMS/[facility])    • DDD (degenerative disc disease), cervical    • Depression    • GERD (gastroesophageal reflux disease)    • History of MRSA infection     LEFT ANKLE TREAT [facility]    • Hyperlipidemia    • Hypertension    • Spinal stenosis    • Spondylolisthesis of cervical region       Past Surgical History:   Procedure Laterality Date   • BACK SURGERY     • HYSTERECTOMY     • INCISION AND DRAINAGE LEG Left 2018    Procedure: Incision and Drainage of Left ankle;  Surgeon: Maxwell Lanier MD;  Location: [facility];  Service: Orthopedics   • SKIN BIOPSY     • SKIN GRAFT SPLIT THICKNESS N/A 2019    Procedure: debridement SKIN GRAFT SPLIT THICKNESS left ankle wound;  Surgeon: Barry Worthy MD;  Location: [facility];  Service: Plastics   • TOTAL THYMECTOMY     • WRIST FRACTURE SURGERY       Medications:   Medications Prior to Admission   Medication Sig Dispense Refill Last Dose   • albuterol (PROVENTIL HFA;VENTOLIN HFA) 108 (90 Base) MCG/ACT inhaler  Inhale 2 puffs Every 4 (Four) Hours As Needed for Wheezing.      • atenolol (TENORMIN) 25 MG tablet Take 25 mg by mouth Daily.      • DULoxetine (CYMBALTA) 30 MG capsule Take 30 mg by mouth Daily.      • lisinopril (PRINIVIL,ZESTRIL) 20 MG tablet Take 20 mg by mouth Daily.      • TRIAMTERENE PO Take 25 mg by mouth Daily.      • zoledronic acid (Reclast) 5 MG/100ML solution infusion Infuse 5 mg into a venous catheter 1 (One) Time. YEARLY        Allergies: Patient has no known allergies.   Social History:  Social History     Socioeconomic History   • Marital status:      Spouse name: Not on file   • Number of children: Not on file   • Years of education: Not on file   • Highest education level: Not on file   Tobacco Use   • Smoking status: Current Every Day Smoker     Packs/day: 1.00     Types: Cigarettes   • Smokeless tobacco: Never Used   Substance and Sexual Activity   • Alcohol use: Yes     Comment: OCCAS   • Drug use: No      Family History   Problem Relation Age of Onset   • Emphysema Mother    • Alzheimer's disease Father    • Malig Hyperthermia Neg Hx         Review of Systems:   General: Patient reports good health  Eyes: No eye problems  Ears, nose, mouth and throat: No rhinitis, no hearing problems, no chronic cough  Cardiovascular/heart: Denies palpitations, syncope or chest pain  Respiratory/lung: Denies shortness of breath, hemoptysis, or dyspnea on exertion   Genital/urinary: No frequency, hematuria or dysuria  Hematological/lymphatic: Denies anemia or other problems  Musculoskeletal: No joint pain, no defects  Skin: No psoriasis or other skin issues  Neurological: No seizures or other neurological problems  Psychiatric: None  Endocrine: Negative  Gastro-intestinal: No constipation, no diarrhea, no melena, no hematochezia    Physical Examination:  General: Alert and oriented x3 in no acute distress  HEENT: Normal cephalic, atraumatic, PERRLA, EOMI, sclera anicteric, moist mucous membranes, neck  is supple, no JVD, no carotid bruits, no thyromegaly no adenopathy  Chest: CTA and percussion  CVA: RRR, normal S1-S2, no murmurs, no gallops or rubs  Abdomen: Positive BS, soft, nondistended, nontender, no rebound, no guarding, no hernias, no organomegaly and no masses  Extremities: Full range of motion, no clubbing, no cyanosis or edema  Neurovascular: Grossly intact  Debilities: none  Emotional behavior: appropriate     There were no vitals filed for this visit.  Impression:  60 y.o. female for screening colonoscopy.    Plan:  Patient is presenting for screening colonoscopy.  I have recommended that the patient undergo a screening colonoscopy in accordance of American Cancer Society's guidelines.  I have discussed this procedure in detail with the patient.  I have discussed the risks, benefits and alternatives.  I have discussed the risk of anesthesia, bleeding and perforation.  Patient understands these risks, benefits and alternatives and wishes to proceed.      Filomena Mckeon MD  General, Minimally Invasive and Endoscopic Surgery  [facility]    [facility] [address]    [address]  [place], [place] [address] [place] [id number]    P: [phone]  F: [phone]    Cc:  Fernando Dunn MD

## 2020-11-12 NOTE — ANESTHESIA PREPROCEDURE EVALUATION
Anesthesia Evaluation     Patient summary reviewed and Nursing notes reviewed   no history of anesthetic complications:  NPO Solid Status: > 8 hours  NPO Liquid Status: > 8 hours           Airway   Mallampati: II  TM distance: >3 FB  Neck ROM: full  No difficulty expected  Dental          Pulmonary    (+) a smoker (1 ppd.) Current, COPD (used inhhaler/nebulizer this AM) moderate, asthma,wheezes (insp. wheeze left),   Sleep apnea: snores.  Cardiovascular - normal exam    ECG reviewed  Beta blocker given within 24 hours of surgery and Beta blocker not taken-may be given intraoperatively  Rhythm: regular  Rate: normal    (+) hypertension poorly controlled 2 medications or greater,       Neuro/Psych  (+) psychiatric history Anxiety and Depression,     GI/Hepatic/Renal/Endo    (+) obesity,  GERD well controlled,  liver disease fatty liver disease,     Musculoskeletal     Abdominal   (+) obese,    Substance History - negative use  Alcohol use: in past.     OB/GYN          Other   arthritis,                      Anesthesia Plan    ASA 3     MAC     intravenous induction     Anesthetic plan, all risks, benefits, and alternatives have been provided, discussed and informed consent has been obtained with: patient.  Use of blood products discussed with patient  Consented to blood products.

## 2020-11-12 NOTE — ANESTHESIA POSTPROCEDURE EVALUATION
Patient: Filomena Liu    Procedure Summary     Date: 11/12/20 Room / Location: [facility] ENDOSCOPY 1 /  [facility]    Anesthesia Start: 0842 Anesthesia Stop: 0924    Procedure: COLONOSCOPY, polypectomy (N/A ) Diagnosis:       Screening for colon cancer      (Screening for colon cancer [Z12.11])    Surgeon: Filomena Mckeon MD Provider: Teri Laboy CRNA    Anesthesia Type: MAC ASA Status: 3          Anesthesia Type: MAC    Vitals  No vitals data found for the desired time range.          Post Anesthesia Care and Evaluation    Patient location during evaluation: bedside  Patient participation: complete - patient participated  Level of consciousness: awake and alert  Pain score: 0  Pain management: adequate  Airway patency: patent  Anesthetic complications: No anesthetic complications    Cardiovascular status: acceptable  Respiratory status: acceptable  Hydration status: acceptable

## 2020-11-13 LAB
CYTO UR: NORMAL
LAB AP CASE REPORT: NORMAL
PATH REPORT.FINAL DX SPEC: NORMAL
PATH REPORT.GROSS SPEC: NORMAL

## 2020-11-18 ENCOUNTER — TELEPHONE (OUTPATIENT)
Dept: SURGERY | Facility: CLINIC | Age: 60
End: 2020-11-18

## 2020-11-18 NOTE — TELEPHONE ENCOUNTER
I have telephoned the patient with her pathology results which revealed tubular adenomas x3.  I have advised patient that she will need to repeat her colonoscopy in 2 years.

## 2021-03-08 ENCOUNTER — TRANSCRIBE ORDERS (OUTPATIENT)
Dept: ADMINISTRATIVE | Facility: HOSPITAL | Age: 61
End: 2021-03-08

## 2021-03-08 DIAGNOSIS — R10.12 LEFT UPPER QUADRANT ABDOMINAL PAIN: Primary | ICD-10-CM

## 2021-03-09 ENCOUNTER — HOSPITAL ENCOUNTER (OUTPATIENT)
Dept: CT IMAGING | Facility: HOSPITAL | Age: 61
Discharge: HOME | End: 2021-03-09
Admitting: FAMILY MEDICINE

## 2021-03-09 DIAGNOSIS — R10.12 LEFT UPPER QUADRANT ABDOMINAL PAIN: ICD-10-CM

## 2021-03-09 LAB — CREAT BLDA-MCNC: 0.9 MG/DL (ref 0.6–1.3)

## 2021-03-09 PROCEDURE — 82565 ASSAY OF CREATININE: CPT

## 2021-03-09 PROCEDURE — 0 DIATRIZOATE MEGLUMINE & SODIUM PER 1 ML: Performed by: FAMILY MEDICINE

## 2021-03-09 PROCEDURE — 25010000002 IOPAMIDOL 61 % SOLUTION: Performed by: FAMILY MEDICINE

## 2021-03-09 PROCEDURE — 74177 CT ABD & PELVIS W/CONTRAST: CPT

## 2021-03-09 RX ADMIN — DIATRIZOATE MEGLUMINE AND DIATRIZOATE SODIUM 30 ML: 600; 100 SOLUTION ORAL; RECTAL at 10:40

## 2021-03-09 RX ADMIN — IOPAMIDOL 100 ML: 612 INJECTION, SOLUTION INTRAVENOUS at 11:53

## 2021-03-11 ENCOUNTER — APPOINTMENT (OUTPATIENT)
Dept: CT IMAGING | Facility: HOSPITAL | Age: 61
End: 2021-03-11

## 2021-03-19 ENCOUNTER — BULK ORDERING (OUTPATIENT)
Dept: CASE MANAGEMENT | Facility: OTHER | Age: 61
End: 2021-03-19

## 2021-03-19 DIAGNOSIS — Z23 IMMUNIZATION DUE: ICD-10-CM

## 2021-04-07 ENCOUNTER — IMMUNIZATION (OUTPATIENT)
Dept: VACCINE CLINIC | Facility: HOSPITAL | Age: 61
End: 2021-04-07

## 2021-04-07 DIAGNOSIS — Z23 IMMUNIZATION DUE: ICD-10-CM

## 2021-04-07 PROCEDURE — 91300 HC SARSCOV02 VAC 30MCG/0.3ML IM: CPT | Performed by: INTERNAL MEDICINE

## 2021-04-07 PROCEDURE — 0001A: CPT | Performed by: INTERNAL MEDICINE

## 2021-04-28 ENCOUNTER — IMMUNIZATION (OUTPATIENT)
Dept: VACCINE CLINIC | Facility: HOSPITAL | Age: 61
End: 2021-04-28

## 2021-04-28 PROCEDURE — 91300 HC SARSCOV02 VAC 30MCG/0.3ML IM: CPT | Performed by: INTERNAL MEDICINE

## 2021-04-28 PROCEDURE — 0002A: CPT | Performed by: INTERNAL MEDICINE

## 2023-05-12 NOTE — PROGRESS NOTES
New shoulder    Patient: Filomena Liu        YOB: 1960    Medical Record Number: [id number]        Chief Complaints: Left shoulder pain      History of Present Illness: This is a 62-year-old female presents complaining of left shoulder pain she is right-hand dominant is been ongoing for a year and a half no history of injury she does have a big dog about 2 years old and weighs 90 pounds and she thinks that may be part of it.  She does have night pain her pain is a 7 out of 10 moderate intermittent aching worse with activity somewhat better with rest past medical history well listed below and reviewed by me        Allergies: No Known Allergies    Medications:   Home Medications:  Current Outpatient Medications on File Prior to Visit   Medication Sig   • albuterol (ACCUNEB) 1.25 MG/3ML nebulizer solution Take 3 mL by nebulization Every 6 (Six) Hours As Needed for Wheezing.   • albuterol (PROVENTIL HFA;VENTOLIN HFA) 108 (90 Base) MCG/ACT inhaler Inhale 2 puffs Every 4 (Four) Hours As Needed for Wheezing.   • lisinopril (PRINIVIL,ZESTRIL) 20 MG tablet Take 1 tablet by mouth Daily.   • methocarbamol (ROBAXIN) 500 MG tablet    • mirtazapine (REMERON) 15 MG tablet Take 1 tablet by mouth every night at bedtime.   • QUEtiapine (SEROquel) 100 MG tablet Take 1 tablet by mouth every night at bedtime.   • rOPINIRole (REQUIP) 1 MG tablet Take 1 tablet by mouth every night at bedtime.   • Trelegy Ellipta 200-62.5-25 MCG/ACT aerosol powder  Inhale 1 puff Daily.   • TRIAMTERENE PO Take 25 mg by mouth Daily.   • atenolol (TENORMIN) 25 MG tablet Take 25 mg by mouth Daily. (Patient not taking: Reported on 5/15/2023)   • DULoxetine (CYMBALTA) 30 MG capsule Take 30 mg by mouth Daily. (Patient not taking: Reported on 5/15/2023)   • zoledronic acid (RECLAST) 5 MG/100ML solution infusion Infuse 5 mg into a venous catheter 1 (One) Time. YEARLY (Patient not taking: Reported on 5/15/2023)     No current  facility-administered medications on file prior to visit.     Current Medications:  Scheduled Meds:  Continuous Infusions:No current facility-administered medications for this visit.    PRN Meds:.    Past Medical History:   Diagnosis Date   • Ankle pain    • Ankle wound     LEFT   • Arthritis of back 2000   • Arthritis of neck 2000   • Asthma    • Benign tumor of thymus     REMOVED   • Bruises easily    • Cataract    • COPD (chronic obstructive pulmonary disease)    • DDD (degenerative disc disease), cervical    • Depression    • Fracture of wrist 2917   • GERD (gastroesophageal reflux disease)    • History of MRSA infection     LEFT ANKLE TREAT [facility]    • Hyperlipidemia    • Hypertension    • Spinal stenosis    • Spondylolisthesis of cervical region         Past Surgical History:   Procedure Laterality Date   • BACK SURGERY      cervical disc surgery with fusion-   • CHOLECYSTECTOMY     • COLONOSCOPY     • COLONOSCOPY N/A 11/12/2020    Procedure: COLONOSCOPY, polypectomy;  Surgeon: Filomena Mckeon MD;  Location: [facility];  Service: Gastroenterology;  Laterality: N/A;  Diverticulosis; Hepatic flexure polyp x 1; Polyp at 60cm x 1; Polyp at 50cm x 1- hot snare;   • HAND SURGERY  2000   • HYSTERECTOMY     • INCISION AND DRAINAGE LEG Left 11/17/2018    Procedure: Incision and Drainage of Left ankle;  Surgeon: Maxwell Lanier MD;  Location: [facility] OR;  Service: Orthopedics   • NECK SURGERY  2000   • SKIN BIOPSY     • SKIN GRAFT SPLIT THICKNESS N/A 02/12/2019    Procedure: debridement SKIN GRAFT SPLIT THICKNESS left ankle wound;  Surgeon: Barry Worthy MD;  Location: [facility];  Service: Plastics   • TOTAL THYMECTOMY     • TRIGGER POINT INJECTION  2000   • WRIST FRACTURE SURGERY          Social History     Occupational History   • Not on file   Tobacco Use   • Smoking status: Every Day     Packs/day: 0.50     Years: 30.00     Pack years: 15.00     Types: Cigarettes     Start date: 1/1/1980   •  "Smokeless tobacco: Never   Vaping Use   • Vaping Use: Never used   Substance and Sexual Activity   • Alcohol use: Not Currently     Comment: OCCAS   • Drug use: No   • Sexual activity: Yes     Partners: Male     Birth control/protection: Post-menopausal      Social History     Social History Narrative   • Not on file        Family History   Problem Relation Age of Onset   • Emphysema Mother    • Alzheimer's disease Father    • Malig Hyperthermia Neg Hx              Review of Systems:     Review of Systems      Physical Exam: 62 y.o. female  General Appearance:    Alert, cooperative, in no acute distress                   Vitals:    05/15/23 1424   Temp: 97.3 °F (36.3 °C)   Weight: 76.3 kg (168 lb 4.8 oz)   Height: 152.4 cm (60\")   PainSc:   7      Patient is alert and read ×3 no acute distress appears her above-listed at height weight and age.  Affect is normal respiratory rate is normal unlabored. Heart rate regular rate rhythm, sclera, dentition and hearing are normal for the purpose of this exam.        Ortho Exam   Physical exam the left shoulder reveals no overlying skin changes no lymphedema lymphadenopathy the patient can actively flex to about 150 passively I get them to 160 abduction is similar external rotation is 40 internal rotation to there buttock.  Rotator cuff strength is 4+ over 5 with isometric strength testing no overlying skin changes.  Patient has reasonable cervical range of motion for their age no radicular symptoms and a normal elbow exam.  There are good distal pulses.      Procedures             Radiology:   AP, scapular Y and axillary lateral of the left shoulder were ordered/reviewed to evauateknee pain.  I have no comparative films she appears to have some degenerative changes she has had fusion in her neck and has evidence of that as well as a CABG she does have an MRI from November 2022 x-rays do show some degenerative changes with sclerosis of the humeral head and narrowing of the " joint space.` Admittedly it is not a great axillary lateral  Imaging Results (Most Recent)     Procedure Component Value Units Date/Time    XR Shoulder 2+ View Left [[id number]] Resulted: 05/15/23 1416     Updated: 05/15/23 1416    Impression:      Ordering physician's impression is located in the Encounter Note dated 05/15/23. X-ray performed in the DR room.          Assessment/Plan:    Left shoulder degenerative changes I will have her see Dr. Lanier

## 2023-05-15 ENCOUNTER — OFFICE VISIT (OUTPATIENT)
Dept: ORTHOPEDIC SURGERY | Facility: CLINIC | Age: 63
End: 2023-05-15
Payer: MEDICARE

## 2023-05-15 VITALS — BODY MASS INDEX: 33.04 KG/M2 | HEIGHT: 60 IN | WEIGHT: 168.3 LBS | TEMPERATURE: 97.3 F

## 2023-05-15 DIAGNOSIS — R52 PAIN: Primary | ICD-10-CM

## 2023-05-15 DIAGNOSIS — M19.012 PRIMARY LOCALIZED OSTEOARTHROSIS OF LEFT SHOULDER REGION: ICD-10-CM

## 2023-05-15 RX ORDER — ROPINIROLE 1 MG/1
1 TABLET, FILM COATED ORAL
COMMUNITY
Start: 2023-04-27

## 2023-05-15 RX ORDER — METHOCARBAMOL 500 MG/1
TABLET, FILM COATED ORAL
COMMUNITY
Start: 2023-04-19

## 2023-05-15 RX ORDER — QUETIAPINE FUMARATE 100 MG/1
100 TABLET, FILM COATED ORAL
COMMUNITY
Start: 2023-04-27

## 2023-05-15 RX ORDER — MIRTAZAPINE 15 MG/1
15 TABLET, FILM COATED ORAL
COMMUNITY
Start: 2023-04-27

## 2023-05-15 RX ORDER — FLUTICASONE FUROATE, UMECLIDINIUM BROMIDE AND VILANTEROL TRIFENATATE 200; 62.5; 25 UG/1; UG/1; UG/1
1 POWDER RESPIRATORY (INHALATION) DAILY
COMMUNITY
Start: 2023-05-08

## 2023-05-18 ENCOUNTER — PATIENT ROUNDING (BHMG ONLY) (OUTPATIENT)
Dept: ORTHOPEDIC SURGERY | Facility: CLINIC | Age: 63
End: 2023-05-18
Payer: MEDICARE

## 2023-05-18 NOTE — PROGRESS NOTES
A [facility] Message has been sent to the patient for PATIENT ROUNDING with [facility] [place], [place]

## 2023-06-05 ENCOUNTER — OFFICE VISIT (OUTPATIENT)
Dept: ORTHOPEDIC SURGERY | Facility: CLINIC | Age: 63
End: 2023-06-05
Payer: MEDICARE

## 2023-06-05 ENCOUNTER — PREP FOR SURGERY (OUTPATIENT)
Dept: ORTHOPEDIC SURGERY | Facility: CLINIC | Age: 63
End: 2023-06-05

## 2023-06-05 VITALS — WEIGHT: 168 LBS | HEIGHT: 60 IN | TEMPERATURE: 97.8 F | BODY MASS INDEX: 32.98 KG/M2

## 2023-06-05 DIAGNOSIS — M19.012 PRIMARY LOCALIZED OSTEOARTHROSIS OF LEFT SHOULDER REGION: Primary | ICD-10-CM

## 2023-06-05 PROBLEM — M19.019 SHOULDER ARTHRITIS: Status: ACTIVE | Noted: 2023-06-05

## 2023-06-05 PROCEDURE — 1159F MED LIST DOCD IN RCRD: CPT | Performed by: ORTHOPAEDIC SURGERY

## 2023-06-05 PROCEDURE — 99214 OFFICE O/P EST MOD 30 MIN: CPT | Performed by: ORTHOPAEDIC SURGERY

## 2023-06-05 PROCEDURE — 1160F RVW MEDS BY RX/DR IN RCRD: CPT | Performed by: ORTHOPAEDIC SURGERY

## 2023-06-05 RX ORDER — ACETAMINOPHEN 325 MG/1
1000 TABLET ORAL ONCE
OUTPATIENT
Start: 2023-07-20 | End: 2023-06-05

## 2023-06-05 RX ORDER — MELOXICAM 15 MG/1
15 TABLET ORAL ONCE
OUTPATIENT
Start: 2023-07-20 | End: 2023-06-05

## 2023-06-05 RX ORDER — CEFAZOLIN SODIUM 2 G/100ML
2 INJECTION, SOLUTION INTRAVENOUS ONCE
OUTPATIENT
Start: 2023-07-20 | End: 2023-06-05

## 2023-06-05 RX ORDER — PREGABALIN 75 MG/1
150 CAPSULE ORAL ONCE
OUTPATIENT
Start: 2023-07-20 | End: 2023-06-05

## 2023-06-05 RX ORDER — HYDROCODONE BITARTRATE AND ACETAMINOPHEN 5; 325 MG/1; MG/1
1 TABLET ORAL EVERY 6 HOURS PRN
COMMUNITY

## 2023-06-05 NOTE — PROGRESS NOTES
Patient: Filomena Liu    YOB: 1960    Medical Record Number: [id number]    Chief Complaints:  Referral for left shoulder pain    History of Present Illness:     62 y.o. female patient who presents for evaluation of the left shoulder.  The patient is referred to me for further evaluation by Dr. Mullins.  Prior to seeing Dr. Mullins she had also seen Chance Mcintyre.  She tells me Dr. Mcintyre did an injection for her.  It did not help significantly.  She reports a long history of worsening shoulder pain and dysfunction.  Pain is described as moderate to severe, constant and aching. She reports difficulty with overhead activities, reaching and lifting. She has tried and failed conservative treatment including activity modifications, anti-inflammatories, home therapy and the previously discussed injection.  She is frustrated by her persistent symptoms.      Allergies: No Known Allergies    Home Medications:    Current Outpatient Medications:     albuterol (ACCUNEB) 1.25 MG/3ML nebulizer solution, Take 3 mL by nebulization Every 6 (Six) Hours As Needed for Wheezing., Disp: , Rfl:     albuterol (PROVENTIL HFA;VENTOLIN HFA) 108 (90 Base) MCG/ACT inhaler, Inhale 2 puffs Every 4 (Four) Hours As Needed for Wheezing., Disp: , Rfl:     atenolol (TENORMIN) 25 MG tablet, Take 1 tablet by mouth Daily., Disp: , Rfl:     DULoxetine (CYMBALTA) 30 MG capsule, Take 1 capsule by mouth Daily., Disp: , Rfl:     HYDROcodone-acetaminophen (NORCO) 5-325 MG per tablet, Take 1 tablet by mouth Every 6 (Six) Hours As Needed. Prescribed by pain management, Disp: , Rfl:     lisinopril (PRINIVIL,ZESTRIL) 20 MG tablet, Take 1 tablet by mouth Daily., Disp: , Rfl:     methocarbamol (ROBAXIN) 500 MG tablet, , Disp: , Rfl:     mirtazapine (REMERON) 15 MG tablet, Take 1 tablet by mouth every night at bedtime., Disp: , Rfl:     QUEtiapine (SEROquel) 100 MG tablet, Take 1 tablet by mouth every night at bedtime., Disp: , Rfl:     rOPINIRole  (REQUIP) 1 MG tablet, Take 1 tablet by mouth every night at bedtime., Disp: , Rfl:     Trelegy Ellipta 200-62.5-25 MCG/ACT aerosol powder , Inhale 1 puff Daily., Disp: , Rfl:     TRIAMTERENE PO, Take 25 mg by mouth Daily., Disp: , Rfl:     zoledronic acid (RECLAST) 5 MG/100ML solution infusion, Infuse 100 mL into a venous catheter 1 (One) Time. YEARLY, Disp: , Rfl:     Past Medical History:   Diagnosis Date    Ankle pain     Ankle wound     LEFT    Arthritis of back 2000    Arthritis of neck 2000    Asthma     Benign tumor of thymus     REMOVED    Bruises easily     Cataract     COPD (chronic obstructive pulmonary disease)     DDD (degenerative disc disease), cervical     Depression     Fracture of wrist 2917    GERD (gastroesophageal reflux disease)     History of MRSA infection     LEFT ANKLE TREAT [facility]     Hyperlipidemia     Hypertension     Spinal stenosis     Spondylolisthesis of cervical region        Past Surgical History:   Procedure Laterality Date    BACK SURGERY      cervical disc surgery with fusion-    CHOLECYSTECTOMY      COLONOSCOPY      COLONOSCOPY N/A 11/12/2020    Procedure: COLONOSCOPY, polypectomy;  Surgeon: Filomena Mckeon MD;  Location: [facility] OR;  Service: Gastroenterology;  Laterality: N/A;  Diverticulosis; Hepatic flexure polyp x 1; Polyp at 60cm x 1; Polyp at 50cm x 1- hot snare;    HAND SURGERY  2000    HYSTERECTOMY      INCISION AND DRAINAGE LEG Left 11/17/2018    Procedure: Incision and Drainage of Left ankle;  Surgeon: Maxwell Lanier MD;  Location: [facility] OR;  Service: Orthopedics    NECK SURGERY  2000    SKIN BIOPSY      SKIN GRAFT SPLIT THICKNESS N/A 02/12/2019    Procedure: debridement SKIN GRAFT SPLIT THICKNESS left ankle wound;  Surgeon: Barry Worthy MD;  Location: [facility];  Service: Plastics    TOTAL THYMECTOMY      TRIGGER POINT INJECTION  2000    WRIST FRACTURE SURGERY         Social History     Occupational History    Not on file   Tobacco Use     "Smoking status: Every Day     Packs/day: 0.50     Years: 30.00     Pack years: 15.00     Types: Cigarettes     Start date: 1/1/1980    Smokeless tobacco: Never   Vaping Use    Vaping Use: Never used   Substance and Sexual Activity    Alcohol use: Not Currently     Comment: OCCAS    Drug use: No    Sexual activity: Yes     Partners: Male     Birth control/protection: Post-menopausal      Social History     Social History Narrative    Not on file       Family History   Problem Relation Age of Onset    Emphysema Mother     Alzheimer's disease Father     Malig Hyperthermia Neg Hx        Review of Systems:      Constitutional: Denies fever, shaking or chills   Eyes: Denies change in visual acuity   HEENT: Denies nasal congestion or sore throat   Respiratory: Denies cough or shortness of breath   Cardiovascular: Denies chest pain or edema  Endocrine: Denies tremors, palpitations, intolerance of heat or cold, polyuria, polydipsia.  GI: Denies abdominal pain, nausea, vomiting, bloody stools or diarrhea  : Denies frequency, urgency, incontinence, retention, or nocturia.  Musculoskeletal: Denies numbness, tingling or loss of motor function except as above  Integument: Denies rash, lesion or ulceration   Neurologic: Denies headache or focal weakness, deficits  Heme: Denies spontaneous or excessive bleeding, epistaxis, hematuria, melena, fatigue, enlarged or tender lymph nodes.      All other pertinent positives and negatives as noted above in HPI.    Physical Exam:   62 y.o. female    Vitals:    06/05/23 1429   Temp: 97.8 °F (36.6 °C)   TempSrc: Temporal   Weight: 76.2 kg (168 lb)   Height: 152.4 cm (60\")     General:  Patient is awake and alert.  Appears in no acute distress or discomfort.    Psych:  Affect and demeanor are appropriate.  Cardiovascular:  Regular rate and rhythm.    Lungs:  Good chest expansion.  Breathing unlabored.    Extremities: Left shoulder is examined. Skin is benign.  No obvious gross abnormalities.  " No palpable masses or adenopathy.  Moderate tenderness noted over anterior glenohumeral joint and rotator interval.  Motion is limited and uncomfortable--155 FE, 55 ER, T10 IR.  No instability.  Good strength with resistive testing of the rotator cuff albeit with discomfort.  Good motor and sensory function in lower arm and hand.  Brisk capillary refill in hand.  Palpable radial pulse.  Good skin turgor.    Imaging:  Previous x-rays including AP, scapular Y and axillary views of the left shoulder are reviewed.  The x-rays show severe osteoarthritis with bone-on-bone degeneration, osteophyte formation and subchondral sclerosis.    MRI left shoulder is reviewed along with the associated report.  I have independently interpreted the images.  She has extensive rotator cuff tendinopathy and some low-grade interstitial tearing.  She has biceps tendinopathy.  She appears to have advanced glenohumeral arthritis.  There is a large effusion and a small loose body.    Assessment/Plan:  Left shoulder end stage osteoarthritis    We had a long discussion about conservative and surgical treatment options.  We talked about an arthroplasty and all that would potentially entail in terms of surgery itself, rehabilitation and recovery.  She acknowledged understanding this information.  Patient has stated that she is interested in moving forward with the replacement at this point.  The risk, benefits and alternatives were discussed.  She has consented to proceed with that.  I will have my  contact her.  She will follow-up with myself or [facility] for preoperative visit.    Maxwell Lanier MD    06/05/2023

## 2023-06-07 PROBLEM — M19.012 PRIMARY LOCALIZED OSTEOARTHROSIS OF LEFT SHOULDER REGION: Status: ACTIVE | Noted: 2023-06-07

## 2023-07-17 NOTE — H&P (VIEW-ONLY)
History & Physical         Patient: Filomena Liu    YOB: 1960    Medical Record Number: [id number]    Chief Complaints:   Chief Complaint   Patient presents with    Left Shoulder - Pre-op Exam       History of Present Illness: 63 y.o. female comes in today for upcoming shoulder replacement surgery. Reports a long history of progressively worsening pain, motion loss, and dysfunction.  Describes current pain as severe.  Has failed prolonged conservative treatment.  Denies any new complaints or issues.    Allergies: No Known Allergies    Medications:   Home Medications:    Current Outpatient Medications:     albuterol (ACCUNEB) 1.25 MG/3ML nebulizer solution, Take 3 mL by nebulization Every 6 (Six) Hours As Needed for Wheezing., Disp: , Rfl:     albuterol (PROVENTIL HFA;VENTOLIN HFA) 108 (90 Base) MCG/ACT inhaler, Inhale 2 puffs Every 4 (Four) Hours As Needed for Wheezing., Disp: , Rfl:     fluticasone (FLONASE) 50 MCG/ACT nasal spray, 1 spray into the nostril(s) as directed by provider., Disp: , Rfl:     Fluticasone-Umeclidin-Vilant (TRELEGY) 100-62.5-25 MCG/ACT inhaler, Inhale 1 puff Daily., Disp: , Rfl:     HYDROcodone-acetaminophen (NORCO) 5-325 MG per tablet, Take 1 tablet by mouth Every 6 (Six) Hours As Needed. Prescribed by pain management, Disp: , Rfl:     ipratropium-albuterol (DUO-NEB) 0.5-2.5 mg/3 ml nebulizer, , Disp: , Rfl:     lisinopril (PRINIVIL,ZESTRIL) 40 MG tablet, Take 1 tablet by mouth Daily., Disp: , Rfl:     predniSONE (DELTASONE) 20 MG tablet, TAKE 1 TABLET BY MOUTH THREE TIMES DAILY FOR 5 DAYS THEN TAKE 1 TABLET BY MOUTH TWICE DAILY FOR 5 DAYS THEN TAKE 1 TABLET BY MOUTH DAILY FOR 5 DAYS, Disp: , Rfl:     Trelegy Ellipta 200-62.5-25 MCG/ACT aerosol powder , Inhale 1 puff Daily., Disp: , Rfl:     TRIAMTERENE PO, Take 25 mg by mouth Daily., Disp: , Rfl:     triamterene-hydrochlorothiazide (MAXZIDE-25) 37.5-25 MG per tablet, Take 1 tablet by mouth Daily., Disp: , Rfl:      albuterol sulfate  (90 Base) MCG/ACT inhaler, Inhale 2 puffs. (Patient not taking: Reported on 7/17/2023), Disp: , Rfl:     amitriptyline (ELAVIL) 25 MG tablet, Take 1 tablet by mouth. (Patient not taking: Reported on 7/17/2023), Disp: , Rfl:     amphetamine-dextroamphetamine (ADDERALL) 5 MG tablet, 1 tablet. (Patient not taking: Reported on 7/17/2023), Disp: , Rfl:     atenolol (TENORMIN) 25 MG tablet, Take 1 tablet by mouth Daily. NOT CURRENTLY TAKING (Patient not taking: Reported on 7/17/2023), Disp: , Rfl:     azithromycin (ZITHROMAX) 250 MG tablet, , Disp: , Rfl:     benzonatate (TESSALON) 200 MG capsule, , Disp: , Rfl:     cephalexin (KEFLEX) 500 MG capsule, , Disp: , Rfl:     diclofenac (VOLTAREN) 0.1 % ophthalmic solution, , Disp: , Rfl:     doxycycline (VIBRAMYCIN) 100 MG capsule, , Disp: , Rfl:     DULoxetine (CYMBALTA) 30 MG capsule, Take 1 capsule by mouth Daily. NOT CURRENTLY TAKING (Patient not taking: Reported on 7/17/2023), Disp: , Rfl:     fluticasone (FLONASE) 50 MCG/ACT nasal spray, 1 spray into the nostril(s) as directed by provider. (Patient not taking: Reported on 7/17/2023), Disp: , Rfl:     HYDROcodone-acetaminophen (NORCO) 7.5-325 MG per tablet, , Disp: , Rfl:     lisinopril (PRINIVIL,ZESTRIL) 20 MG tablet, , Disp: , Rfl:     meclizine (ANTIVERT) 25 MG tablet, , Disp: , Rfl:     naltrexone (DEPADE) 50 MG tablet, , Disp: , Rfl:     pantoprazole (PROTONIX) 40 MG EC tablet, , Disp: , Rfl:     prednisoLONE acetate (PRED FORTE) 1 % ophthalmic suspension, , Disp: , Rfl:     promethazine-dextromethorphan (PROMETHAZINE-DM) 6.25-15 MG/5ML syrup, , Disp: , Rfl:     rOPINIRole (REQUIP) 1 MG tablet, Take 1 tablet by mouth every night at bedtime. (Patient not taking: Reported on 7/17/2023), Disp: , Rfl:     triamterene-hydrochlorothiazide (DYAZIDE) 37.5-25 MG per capsule, Take 1 capsule by mouth Daily. (Patient not taking: Reported on 7/17/2023), Disp: , Rfl:     zoledronic acid (RECLAST) 5  MG/100ML solution infusion, Infuse 100 mL into a venous catheter 1 (One) Time. YEARLY (Patient not taking: Reported on 7/17/2023), Disp: , Rfl:     Past Medical History:   Diagnosis Date    Ankle pain     Ankle wound     LEFT    Arthritis of back 2000    Arthritis of neck 2000    Asthma     Benign tumor of thymus     REMOVED    Bruises easily     Cataract     COPD (chronic obstructive pulmonary disease)     CTS (carpal tunnel syndrome) Surgery 1999    DDD (degenerative disc disease), cervical     Depression     Fracture of wrist 2917    GERD (gastroesophageal reflux disease)     Hip arthrosis Unknown    History of MRSA infection     LEFT ANKLE TREAT [facility]  5YEARS GO    Hyperlipidemia     Hypertension     Knee sprain June 2023    Knee swelling Unknown    Shoulder arthritis 06/05/2023    Shoulder pain, left 07/07/2023    Sleep apnea     NO MACHINE USE    Spinal stenosis     Spondylolisthesis of cervical region           Past Surgical History:   Procedure Laterality Date    BACK SURGERY      cervical disc surgery with fusion-    CHOLECYSTECTOMY      COLONOSCOPY      COLONOSCOPY N/A 11/12/2020    Procedure: COLONOSCOPY, polypectomy;  Surgeon: Filomena Mckeon MD;  Location: [facility] OR;  Service: Gastroenterology;  Laterality: N/A;  Diverticulosis; Hepatic flexure polyp x 1; Polyp at 60cm x 1; Polyp at 50cm x 1- hot snare;    HAND SURGERY  2000    HYSTERECTOMY      INCISION AND DRAINAGE LEG Left 11/17/2018    Procedure: Incision and Drainage of Left ankle;  Surgeon: Maxwell Lanier MD;  Location: [facility] OR;  Service: Orthopedics    NECK SURGERY  2000    SKIN BIOPSY      SKIN GRAFT SPLIT THICKNESS N/A 02/12/2019    Procedure: debridement SKIN GRAFT SPLIT THICKNESS left ankle wound;  Surgeon: Barry Worthy MD;  Location: [facility], [place];  Service: Plastics    TOTAL THYMECTOMY      TRIGGER POINT INJECTION  2000    WRIST FRACTURE SURGERY      CARPAL TUNNEL          Social History     Occupational History     "Not on file   Tobacco Use    Smoking status: Every Day     Packs/day: 0.25     Years: 30.00     Pack years: 7.50     Types: Cigarettes     Start date: 1/1/1980    Smokeless tobacco: Never    Tobacco comments:     I have cut down recently and will abstain starting 7/10/23   Vaping Use    Vaping Use: Never used   Substance and Sexual Activity    Alcohol use: Not Currently     Comment: OCCAS    Drug use: No    Sexual activity: Yes     Partners: Male     Birth control/protection: Post-menopausal, Tubal ligation, Hysterectomy      Social History     Social History Narrative    Not on file          Family History   Problem Relation Age of Onset    Emphysema Mother     Alzheimer's disease Father     Malig Hyperthermia Neg Hx        Review of Systems:     Constitutional:  Denies fever, shaking or chills   Eyes:  Denies change in visual acuity   HEENT:  Denies nasal congestion or sore throat   Respiratory:  Denies cough or shortness of breath   Cardiovascular:  Denies chest pain or edema   GI:  Denies abdominal pain, nausea, vomiting, bloody stools or diarrhea   Musculoskeletal:  Denies numbness, tingling or loss of motor function except as outlined above in history of present illness.  Integument:  Denies rash, lesion or ulceration   Neurologic:  Denies headache or focal weakness, deficits      All other pertinent positives and negatives as noted above in HPI.    Physical Exam: 63 y.o. female    Vitals:    07/17/23 1258   Temp: 97.4 °F (36.3 °C)   TempSrc: Temporal   Weight: 74.8 kg (165 lb)   Height: 152.4 cm (60\")     General:  Patient is awake and alert.  Appears in no acute distress or discomfort.    Psych:  Affect and demeanor are appropriate.    Eyes:  Conjunctivae and sclerae appear grossly normal.  Eyes track well and EOM seem to be intact.    Ears:  No gross abnormalities.  Hearing adequate for the exam.    Cardiovascular:  Regular rate and rhythm.    Lungs:  Good chest expansion.  Breathing unlabored.    Lymph:  " No palpable adenopathy about neck or axilla.    Neck:  Supple.  Normal ROM.  Negative Spurling's for shoulder or arm pain.    Left upper extremity:  Skin benign and intact without evidence for swelling, masses or atrophy.  No palpable masses.  Moderate anterior tenderness.  Shoulder ROM limited and uncomfortable--165° FE, 55° ER, IR to T10.  No evident instability or apprehension.  Good strength with testing of the rotator cuff, albeit with discomfort.  Deltoid fires on exam.  Good strength in the lower arm and hand.  Intact sensation throughout the arm and hand palpable radial pulse with brisk cap refill.    Diagnostic Tests:  Lab Results   Component Value Date    GLUCOSE 113 (H) 07/07/2023    CALCIUM 9.8 07/07/2023     07/07/2023    K 4.3 07/07/2023    CO2 24.7 07/07/2023     07/07/2023    BUN 12 07/07/2023    CREATININE 0.76 07/07/2023    EGFRIFNONA 83 02/07/2019    BCR 15.8 07/07/2023    ANIONGAP 10.3 07/07/2023     Lab Results   Component Value Date    WBC 9.97 07/07/2023    HGB 13.7 07/07/2023    HCT 42.2 07/07/2023    MCV 86.3 07/07/2023     07/07/2023     Lab Results   Component Value Date    INR 1.4 05/11/2022    INR 1.1 09/25/2020    INR 1.0 06/07/2020    PROTIME 15.8 (H) 05/11/2022    PROTIME 11.9 09/25/2020    PROTIME 11.5 06/07/2020       Imaging:  Previous x-rays of the shoulder are reviewed.  The x-rays show severe osteophyte formation and subchondral sclerosis.      The left shoulder MRI was previously reviewed by Dr. Lanier with the following interpretation:  She has extensive rotator cuff tendinopathy and some low-grade interstitial tearing. She has biceps tendinopathy. She appears to have advanced glenohumeral arthritis. There is a large effusion and a small loose body.      Assessment:  Left shoulder end-stage osteoarthritis    Plan: We had a thorough discussion regarding the risks, benefits and alternatives to an arthroplasty and non-surgical management versus surgery.  I  explained that surgical risks include infection, hematoma, hardware related complications including failure of fixation, loosening, fracture, persistent pain and/or loss of motion, iatrogenic nerve and/or blood vessel injury resulting in permanent weakness, numbness or dysfunction, DVT, PE, positioning related neuropraxia, and anesthesia related complications resulting in death.  We discussed the indication for a reverse as opposed to a standard total shoulder and the risks inherent to the reverse including notching, glenoid loosening, instability, and traction related neuropraxia, any of which could result in persistent pain or problems requiring further surgery.  Lastly, we discussed the rehab and all that will be expected of the patient post operatively to ensure an optimal outcome.  The patient voiced understanding of the risks, benefits, and alternative forms of treatment that were discussed and the patient consents to proceed with the reverse arthroplasty.      Patient is planning for hospital dismissal same day of surgery.  Denies history of DVT or metal allergy.    Gypsy Mart, APRN  07/17/23

## 2023-07-20 ENCOUNTER — APPOINTMENT (OUTPATIENT)
Dept: GENERAL RADIOLOGY | Facility: HOSPITAL | Age: 63
End: 2023-07-20
Payer: MEDICARE

## 2023-07-20 ENCOUNTER — HOSPITAL ENCOUNTER (OUTPATIENT)
Facility: HOSPITAL | Age: 63
Discharge: HOME | End: 2023-07-20
Attending: ORTHOPAEDIC SURGERY | Admitting: ORTHOPAEDIC SURGERY
Payer: MEDICARE

## 2023-07-20 VITALS
TEMPERATURE: 98.5 F | SYSTOLIC BLOOD PRESSURE: 126 MMHG | HEART RATE: 85 BPM | DIASTOLIC BLOOD PRESSURE: 77 MMHG | RESPIRATION RATE: 16 BRPM | OXYGEN SATURATION: 94 %

## 2023-07-20 DIAGNOSIS — Z96.612 H/O TOTAL SHOULDER REPLACEMENT, LEFT: Primary | ICD-10-CM

## 2023-07-20 DIAGNOSIS — M19.012 PRIMARY LOCALIZED OSTEOARTHROSIS OF LEFT SHOULDER REGION: ICD-10-CM

## 2023-07-20 PROCEDURE — 25010000002 FENTANYL CITRATE (PF) 50 MCG/ML SOLUTION: Performed by: ANESTHESIOLOGY

## 2023-07-20 PROCEDURE — L3670 SO ACRO/CLAV CAN WEB PRE OTS: HCPCS | Performed by: ORTHOPAEDIC SURGERY

## 2023-07-20 PROCEDURE — 25010000002 VANCOMYCIN 10 G RECONSTITUTED SOLUTION: Performed by: ORTHOPAEDIC SURGERY

## 2023-07-20 PROCEDURE — 23472 RECONSTRUCT SHOULDER JOINT: CPT | Performed by: ORTHOPAEDIC SURGERY

## 2023-07-20 PROCEDURE — C1776 JOINT DEVICE (IMPLANTABLE): HCPCS | Performed by: ORTHOPAEDIC SURGERY

## 2023-07-20 PROCEDURE — 25010000002 CEFAZOLIN IN DEXTROSE 2-4 GM/100ML-% SOLUTION: Performed by: ORTHOPAEDIC SURGERY

## 2023-07-20 PROCEDURE — 97165 OT EVAL LOW COMPLEX 30 MIN: CPT

## 2023-07-20 PROCEDURE — 73020 X-RAY EXAM OF SHOULDER: CPT

## 2023-07-20 PROCEDURE — 97110 THERAPEUTIC EXERCISES: CPT

## 2023-07-20 PROCEDURE — 25010000002 MIDAZOLAM PER 1 MG: Performed by: ANESTHESIOLOGY

## 2023-07-20 DEVICE — BLUE CO-BRAID POLYETHYLENE SIZE 2 38" C-7 NEEDLE BIOMET
Type: IMPLANTABLE DEVICE | Site: SHOULDER | Status: FUNCTIONAL
Brand: TELEFLEX

## 2023-07-20 DEVICE — TOTL SHLDER: Type: IMPLANTABLE DEVICE | Status: FUNCTIONAL

## 2023-07-20 DEVICE — COMP SHLDR GLENOID ALLIANCE 4PEG SZ3: Type: IMPLANTABLE DEVICE | Site: SHOULDER | Status: FUNCTIONAL

## 2023-07-20 DEVICE — IMPLANTABLE DEVICE
Type: IMPLANTABLE DEVICE | Site: SHOULDER | Status: FUNCTIONAL
Brand: COMPREHENSIVE SHOULDER SYSTEM

## 2023-07-20 DEVICE — KNOTLESS TISSUE CONTROL DEVICE, UNDYED UNIDIRECTIONAL (ANTIBACTERIAL) SYNTHETIC ABSORBABLE DEVICE
Type: IMPLANTABLE DEVICE | Site: SHOULDER | Status: FUNCTIONAL
Brand: STRATAFIX

## 2023-07-20 DEVICE — IMPLANTABLE DEVICE
Type: IMPLANTABLE DEVICE | Site: SHOULDER | Status: FUNCTIONAL
Brand: COMPREHENSIVE® SHOULDER SYSTEM

## 2023-07-20 DEVICE — IMPLANTABLE DEVICE
Type: IMPLANTABLE DEVICE | Site: SHOULDER | Status: FUNCTIONAL
Brand: BIOMET® BONE CEMENT R

## 2023-07-20 DEVICE — POST MOD GLEN ALLIANCE/TM: Type: IMPLANTABLE DEVICE | Site: SHOULDER | Status: FUNCTIONAL

## 2023-07-20 RX ORDER — SODIUM CHLORIDE 0.9 % (FLUSH) 0.9 %
3-10 SYRINGE (ML) INJECTION AS NEEDED
Status: DISCONTINUED | OUTPATIENT
Start: 2023-07-20 | End: 2023-07-20 | Stop reason: HOSPADM

## 2023-07-20 RX ORDER — DOCUSATE SODIUM 100 MG/1
100 CAPSULE, LIQUID FILLED ORAL 2 TIMES DAILY
Qty: 60 CAPSULE | Refills: 0 | Status: SHIPPED | OUTPATIENT
Start: 2023-07-20

## 2023-07-20 RX ORDER — DROPERIDOL 2.5 MG/ML
0.62 INJECTION, SOLUTION INTRAMUSCULAR; INTRAVENOUS
Status: DISCONTINUED | OUTPATIENT
Start: 2023-07-20 | End: 2023-07-20 | Stop reason: HOSPADM

## 2023-07-20 RX ORDER — CEFAZOLIN SODIUM 2 G/100ML
2 INJECTION, SOLUTION INTRAVENOUS EVERY 8 HOURS
Status: DISCONTINUED | OUTPATIENT
Start: 2023-07-20 | End: 2023-07-20 | Stop reason: HOSPADM

## 2023-07-20 RX ORDER — PROMETHAZINE HYDROCHLORIDE 25 MG/1
25 TABLET ORAL ONCE AS NEEDED
Status: DISCONTINUED | OUTPATIENT
Start: 2023-07-20 | End: 2023-07-20 | Stop reason: HOSPADM

## 2023-07-20 RX ORDER — ONDANSETRON 4 MG/1
4 TABLET, FILM COATED ORAL EVERY 8 HOURS PRN
Qty: 12 TABLET | Refills: 0 | Status: SHIPPED | OUTPATIENT
Start: 2023-07-20

## 2023-07-20 RX ORDER — HYDROCODONE BITARTRATE AND ACETAMINOPHEN 7.5; 325 MG/1; MG/1
1 TABLET ORAL ONCE AS NEEDED
Status: DISCONTINUED | OUTPATIENT
Start: 2023-07-20 | End: 2023-07-20 | Stop reason: HOSPADM

## 2023-07-20 RX ORDER — IPRATROPIUM BROMIDE AND ALBUTEROL SULFATE 2.5; .5 MG/3ML; MG/3ML
3 SOLUTION RESPIRATORY (INHALATION) ONCE AS NEEDED
Status: DISCONTINUED | OUTPATIENT
Start: 2023-07-20 | End: 2023-07-20 | Stop reason: HOSPADM

## 2023-07-20 RX ORDER — PREGABALIN 75 MG/1
150 CAPSULE ORAL ONCE
Status: COMPLETED | OUTPATIENT
Start: 2023-07-20 | End: 2023-07-20

## 2023-07-20 RX ORDER — MIDAZOLAM HYDROCHLORIDE 1 MG/ML
1 INJECTION INTRAMUSCULAR; INTRAVENOUS
Status: DISCONTINUED | OUTPATIENT
Start: 2023-07-20 | End: 2023-07-20 | Stop reason: HOSPADM

## 2023-07-20 RX ORDER — ONDANSETRON 2 MG/ML
4 INJECTION INTRAMUSCULAR; INTRAVENOUS ONCE AS NEEDED
Status: DISCONTINUED | OUTPATIENT
Start: 2023-07-20 | End: 2023-07-20 | Stop reason: HOSPADM

## 2023-07-20 RX ORDER — LABETALOL HYDROCHLORIDE 5 MG/ML
5 INJECTION, SOLUTION INTRAVENOUS
Status: DISCONTINUED | OUTPATIENT
Start: 2023-07-20 | End: 2023-07-20 | Stop reason: HOSPADM

## 2023-07-20 RX ORDER — ALBUTEROL SULFATE 90 UG/1
2 AEROSOL, METERED RESPIRATORY (INHALATION) EVERY 6 HOURS PRN
Status: DISCONTINUED | OUTPATIENT
Start: 2023-07-20 | End: 2023-07-20 | Stop reason: HOSPADM

## 2023-07-20 RX ORDER — FENTANYL CITRATE 50 UG/ML
50 INJECTION, SOLUTION INTRAMUSCULAR; INTRAVENOUS ONCE AS NEEDED
Status: COMPLETED | OUTPATIENT
Start: 2023-07-20 | End: 2023-07-20

## 2023-07-20 RX ORDER — FENTANYL CITRATE 50 UG/ML
50 INJECTION, SOLUTION INTRAMUSCULAR; INTRAVENOUS
Status: DISCONTINUED | OUTPATIENT
Start: 2023-07-20 | End: 2023-07-20 | Stop reason: HOSPADM

## 2023-07-20 RX ORDER — HYDROMORPHONE HYDROCHLORIDE 1 MG/ML
0.5 INJECTION, SOLUTION INTRAMUSCULAR; INTRAVENOUS; SUBCUTANEOUS
Status: DISCONTINUED | OUTPATIENT
Start: 2023-07-20 | End: 2023-07-20 | Stop reason: HOSPADM

## 2023-07-20 RX ORDER — FAMOTIDINE 10 MG/ML
20 INJECTION, SOLUTION INTRAVENOUS ONCE
Status: COMPLETED | OUTPATIENT
Start: 2023-07-20 | End: 2023-07-20

## 2023-07-20 RX ORDER — HYDRALAZINE HYDROCHLORIDE 20 MG/ML
5 INJECTION INTRAMUSCULAR; INTRAVENOUS
Status: DISCONTINUED | OUTPATIENT
Start: 2023-07-20 | End: 2023-07-20 | Stop reason: HOSPADM

## 2023-07-20 RX ORDER — EPHEDRINE SULFATE 50 MG/ML
5 INJECTION, SOLUTION INTRAVENOUS ONCE AS NEEDED
Status: DISCONTINUED | OUTPATIENT
Start: 2023-07-20 | End: 2023-07-20 | Stop reason: HOSPADM

## 2023-07-20 RX ORDER — SODIUM CHLORIDE 0.9 % (FLUSH) 0.9 %
3 SYRINGE (ML) INJECTION EVERY 12 HOURS SCHEDULED
Status: DISCONTINUED | OUTPATIENT
Start: 2023-07-20 | End: 2023-07-20 | Stop reason: HOSPADM

## 2023-07-20 RX ORDER — CEFAZOLIN SODIUM 2 G/100ML
2 INJECTION, SOLUTION INTRAVENOUS ONCE
Status: COMPLETED | OUTPATIENT
Start: 2023-07-20 | End: 2023-07-20

## 2023-07-20 RX ORDER — PROMETHAZINE HYDROCHLORIDE 25 MG/1
25 SUPPOSITORY RECTAL ONCE AS NEEDED
Status: DISCONTINUED | OUTPATIENT
Start: 2023-07-20 | End: 2023-07-20 | Stop reason: HOSPADM

## 2023-07-20 RX ORDER — HYDROCODONE BITARTRATE AND ACETAMINOPHEN 7.5; 325 MG/1; MG/1
TABLET ORAL
Qty: 42 TABLET | Refills: 0 | Status: SHIPPED | OUTPATIENT
Start: 2023-07-20

## 2023-07-20 RX ORDER — DIPHENHYDRAMINE HYDROCHLORIDE 50 MG/ML
12.5 INJECTION INTRAMUSCULAR; INTRAVENOUS
Status: DISCONTINUED | OUTPATIENT
Start: 2023-07-20 | End: 2023-07-20 | Stop reason: HOSPADM

## 2023-07-20 RX ORDER — ACETAMINOPHEN 325 MG/1
650 TABLET ORAL 2 TIMES DAILY PRN
Qty: 60 TABLET | Refills: 0 | Status: SHIPPED | OUTPATIENT
Start: 2023-07-20

## 2023-07-20 RX ORDER — MELOXICAM 15 MG/1
15 TABLET ORAL ONCE
Status: COMPLETED | OUTPATIENT
Start: 2023-07-20 | End: 2023-07-20

## 2023-07-20 RX ORDER — SODIUM CHLORIDE, SODIUM LACTATE, POTASSIUM CHLORIDE, CALCIUM CHLORIDE 600; 310; 30; 20 MG/100ML; MG/100ML; MG/100ML; MG/100ML
9 INJECTION, SOLUTION INTRAVENOUS CONTINUOUS
Status: DISCONTINUED | OUTPATIENT
Start: 2023-07-20 | End: 2023-07-20 | Stop reason: HOSPADM

## 2023-07-20 RX ORDER — MAGNESIUM HYDROXIDE 1200 MG/15ML
LIQUID ORAL AS NEEDED
Status: DISCONTINUED | OUTPATIENT
Start: 2023-07-20 | End: 2023-07-20 | Stop reason: HOSPADM

## 2023-07-20 RX ORDER — OXYCODONE AND ACETAMINOPHEN 7.5; 325 MG/1; MG/1
1 TABLET ORAL EVERY 4 HOURS PRN
Status: DISCONTINUED | OUTPATIENT
Start: 2023-07-20 | End: 2023-07-20 | Stop reason: HOSPADM

## 2023-07-20 RX ORDER — FLUMAZENIL 0.1 MG/ML
0.2 INJECTION INTRAVENOUS AS NEEDED
Status: DISCONTINUED | OUTPATIENT
Start: 2023-07-20 | End: 2023-07-20 | Stop reason: HOSPADM

## 2023-07-20 RX ORDER — ONDANSETRON 4 MG/1
4 TABLET, FILM COATED ORAL ONCE AS NEEDED
Status: DISCONTINUED | OUTPATIENT
Start: 2023-07-20 | End: 2023-07-20 | Stop reason: HOSPADM

## 2023-07-20 RX ORDER — NALOXONE HCL 0.4 MG/ML
0.2 VIAL (ML) INJECTION AS NEEDED
Status: DISCONTINUED | OUTPATIENT
Start: 2023-07-20 | End: 2023-07-20 | Stop reason: HOSPADM

## 2023-07-20 RX ORDER — ACETAMINOPHEN 325 MG/1
1000 TABLET ORAL ONCE
Status: COMPLETED | OUTPATIENT
Start: 2023-07-20 | End: 2023-07-20

## 2023-07-20 RX ORDER — HYDROCODONE BITARTRATE AND ACETAMINOPHEN 7.5; 325 MG/1; MG/1
2 TABLET ORAL ONCE AS NEEDED
Status: DISCONTINUED | OUTPATIENT
Start: 2023-07-20 | End: 2023-07-20 | Stop reason: HOSPADM

## 2023-07-20 RX ORDER — LIDOCAINE HYDROCHLORIDE 10 MG/ML
0.5 INJECTION, SOLUTION EPIDURAL; INFILTRATION; INTRACAUDAL; PERINEURAL ONCE AS NEEDED
Status: DISCONTINUED | OUTPATIENT
Start: 2023-07-20 | End: 2023-07-20 | Stop reason: HOSPADM

## 2023-07-20 RX ADMIN — PREGABALIN 150 MG: 75 CAPSULE ORAL at 07:14

## 2023-07-20 RX ADMIN — SODIUM CHLORIDE, POTASSIUM CHLORIDE, SODIUM LACTATE AND CALCIUM CHLORIDE 9 ML/HR: 600; 310; 30; 20 INJECTION, SOLUTION INTRAVENOUS at 07:13

## 2023-07-20 RX ADMIN — CEFAZOLIN SODIUM 2 G: 2 INJECTION, SOLUTION INTRAVENOUS at 08:09

## 2023-07-20 RX ADMIN — FENTANYL CITRATE 50 MCG: 50 INJECTION, SOLUTION INTRAMUSCULAR; INTRAVENOUS at 07:46

## 2023-07-20 RX ADMIN — MIDAZOLAM 1 MG: 1 INJECTION INTRAMUSCULAR; INTRAVENOUS at 07:46

## 2023-07-20 RX ADMIN — FAMOTIDINE 20 MG: 10 INJECTION INTRAVENOUS at 07:58

## 2023-07-20 RX ADMIN — MELOXICAM 15 MG: 15 TABLET ORAL at 07:14

## 2023-07-20 RX ADMIN — OXYCODONE AND ACETAMINOPHEN 1 TABLET: 7.5; 325 TABLET ORAL at 10:58

## 2023-07-20 RX ADMIN — VANCOMYCIN HYDROCHLORIDE 1250 MG: 10 INJECTION, POWDER, LYOPHILIZED, FOR SOLUTION INTRAVENOUS at 07:54

## 2023-07-20 RX ADMIN — ACETAMINOPHEN 975 MG: 325 TABLET, FILM COATED ORAL at 07:14

## 2023-07-20 NOTE — PERIOPERATIVE NURSING NOTE
PATIENT FINISHED WATCHING ORTHOPEDIC VIDEO ON TOTAL SHOULDER DISCHARGE. OCCUPATIONAL THERAPY IN ROOM.

## 2023-07-20 NOTE — THERAPY EVALUATION
Patient Name: Filomena Liu  : 1960    MRN: [id number]                              Today's Date: 2023       Admit Date: 2023    Visit Dx:     ICD-10-CM ICD-9-CM   1. H/O total shoulder replacement, left  Z96.612 V43.61   2. Primary localized osteoarthrosis of left shoulder region  M19.012 715.11     Patient Active Problem List   Diagnosis    Mediastinal mass    Cervical stenosis of spinal canal    Cervical radiculopathy    Cellulitis/abscess of left foot    HTN (hypertension)    History of MRSA infection    Anxiety    Peroneal tenosynovitis    Fracture of navicular bone of left foot    Tobacco use    Non compliance with medical treatment    Screening for colon cancer    Osteoporosis    Shoulder arthritis    Primary localized osteoarthrosis of left shoulder region     Past Medical History:   Diagnosis Date    Ankle pain     Ankle wound     LEFT    Arthritis of back     Arthritis of neck     Asthma     Benign tumor of thymus     REMOVED    Bruises easily     Cataract     COPD (chronic obstructive pulmonary disease)     CTS (carpal tunnel syndrome) Surgery     DDD (degenerative disc disease), cervical     Depression     Fracture of wrist 2917    GERD (gastroesophageal reflux disease)     Hip arthrosis Unknown    History of MRSA infection     LEFT ANKLE TREAT [facility]  5YEARS GO    Hyperlipidemia     Hypertension     Knee sprain 2023    Knee swelling Unknown    Shoulder arthritis 2023    Shoulder pain, left 2023    Sleep apnea     NO MACHINE USE    Spinal stenosis     Spondylolisthesis of cervical region      Past Surgical History:   Procedure Laterality Date    BACK SURGERY      cervical disc surgery with fusion-    CHOLECYSTECTOMY      COLONOSCOPY      COLONOSCOPY N/A 2020    Procedure: COLONOSCOPY, polypectomy;  Surgeon: Filomena Mckeon MD;  Location: [facility];  Service: Gastroenterology;  Laterality: N/A;  Diverticulosis; Hepatic flexure polyp x 1; Polyp at  60cm x 1; Polyp at 50cm x 1- hot snare;    HAND SURGERY  2000    HYSTERECTOMY      INCISION AND DRAINAGE LEG Left 11/17/2018    Procedure: Incision and Drainage of Left ankle;  Surgeon: Maxwell Lanier MD;  Location: [facility];  Service: Orthopedics    NECK SURGERY  2000    SKIN BIOPSY      SKIN GRAFT SPLIT THICKNESS N/A 02/12/2019    Procedure: debridement SKIN GRAFT SPLIT THICKNESS left ankle wound;  Surgeon: Barry Worthy MD;  Location: [facility], [place], operated by [facility];  Service: Plastics    TOTAL THYMECTOMY      TRIGGER POINT INJECTION  2000    WRIST FRACTURE SURGERY      CARPAL TUNNEL      General Information       Row Name 07/20/23 1420          OT Time and Intention    Document Type evaluation  -KB     Mode of Treatment individual therapy;occupational therapy  -KB       Row Name 07/20/23 1420          General Information    Patient Profile Reviewed yes  -KB     Prior Level of Function independent:  -KB     Existing Precautions/Restrictions non-weight bearing;shoulder  -KB     Barriers to Rehab none identified  -KB       Row Name 07/20/23 1420          Living Environment    People in Home significant other  -KB       Row Name 07/20/23 1420          Cognition    Orientation Status (Cognition) oriented x 4  -KB       Row Name 07/20/23 1420          Safety Issues, Functional Mobility    Impairments Affecting Function (Mobility) strength;range of motion (ROM);pain  -KB               User Key  (r) = Recorded By, (t) = Taken By, (c) = Cosigned By      Initials Name Provider Type    KB Pauly Andrade, OT Occupational Therapist                     Mobility/ADL's       Row Name 07/20/23 1420          Bed Mobility    Comment, (Bed Mobility) UIC  -KB       Row Name 07/20/23 1420          Activities of Daily Living    BADL Assessment/Intervention upper body dressing;grooming  -KB       Row Name 07/20/23 1420          Upper Body Dressing Assessment/Training    [place] Level (Upper Body Dressing) don;doff;minimum assist  (75% patient effort)  -KB       Row Name 07/20/23 1420          Grooming Assessment/Training    [place] Level (Grooming) minimum assist (75% patient effort)  -KB     Comment, (Grooming) ed on grooming/bathing with shoulder precautions  -KB               User Key  (r) = Recorded By, (t) = Taken By, (c) = Cosigned By      Initials Name Provider Type    Pauly Ramirez OT Occupational Therapist                   Obj/Interventions    No documentation.                  Goals/Plan       Row Name 07/20/23 1429          Dressing Goal 1 (OT)    Activity/Device (Dressing Goal 1, OT) upper body dressing  -KB     [place]/Cues Needed (Dressing Goal 1, OT) minimum assist (75% or more patient effort)  -KB     Time Frame (Dressing Goal 1, OT) 1 day  -KB     Progress/Outcome (Dressing Goal 1, OT) goal met  -KB               User Key  (r) = Recorded By, (t) = Taken By, (c) = Cosigned By      Initials Name Provider Type    Pauly Ramirez, OT Occupational Therapist                   Clinical Impression       Row Name 07/20/23 1422          Pain Assessment    Pretreatment Pain Rating 4/10  -KB     Posttreatment Pain Rating 4/10  -KB       Row Name 07/20/23 1422          Plan of Care Review    Plan of Care Reviewed With patient  -KB     Outcome Evaluation Pt seen for OT eval following L TSA. Pt and SO educated on sling use, how to don/doff, wear schedule. OT adjusted for pt. OT provided ed on UE dressing/bathing/grooming tech following shoulder precautions. Pt requiring min assist to complete. Provided written HEP including pendulum exericises, elbow, hand and wrist exercises and reviewed with pt and SO. Pt adviced to cont sling use, shoulder precautions and HEP until she returns to ortho for follow up.  -KB       Row Name 07/20/23 1422          Therapy Assessment/Plan (OT)    Therapy Frequency (OT) evaluation only  -KB       Row Name 07/20/23 1422          Positioning and Restraints    Pre-Treatment Position sitting in  chair/recliner  -KB     Post Treatment Position chair  -KB     In Chair sitting;with family/caregiver  -KB               User Key  (r) = Recorded By, (t) = Taken By, (c) = Cosigned By      Initials Name Provider Type    Pauly Ramirez OT Occupational Therapist                   Outcome Measures       Row Name 07/20/23 1429          How much help from another is currently needed...    Putting on and taking off regular lower body clothing? 3  -KB     Bathing (including washing, rinsing, and drying) 3  -KB     Toileting (which includes using toilet bed pan or urinal) 3  -KB     Putting on and taking off regular upper body clothing 3  -KB     Taking care of personal grooming (such as brushing teeth) 3  -KB     Eating meals 3  -KB     AM-PAC 6 Clicks Score (OT) 18  -KB       Row Name 07/20/23 1429          Functional Assessment    Outcome Measure Options AM-PAC 6 Clicks Daily Activity (OT)  -KB               User Key  (r) = Recorded By, (t) = Taken By, (c) = Cosigned By      Initials Name Provider Type    Pauly Ramirez OT Occupational Therapist                    Occupational Therapy Education       Title: PT OT SLP Therapies (Done)       Topic: Occupational Therapy (Done)       Point: ADL training (Done)       Description:   Instruct learner(s) on proper safety adaptation and remediation techniques during self care or transfers.   Instruct in proper use of assistive devices.                  Learning Progress Summary             CUONG Walker, VU by ISAÍAS at 7/20/2023 1429    Comment: ed on HEP, shoulder precautions, sling use                         Point: Home exercise program (Done)       Description:   Instruct learner(s) on appropriate technique for monitoring, assisting and/or progressing therapeutic exercises/activities.                  Learning Progress Summary             CUONG Walker, VU by ISAÍAS at 7/20/2023 1429    Comment: ed on HEP, shoulder precautions, sling use                         Point:  Precautions (Done)       Description:   Instruct learner(s) on prescribed precautions during self-care and functional transfers.                  Learning Progress Summary             Patient Eager, CUONG, VU by KB at 7/20/2023 0032    Comment: ed on HEP, shoulder precautions, sling use                         Point: Body mechanics (Done)       Description:   Instruct learner(s) on proper positioning and spine alignment during self-care, functional mobility activities and/or exercises.                  Learning Progress Summary             Patient Eager, E, VU by KB at 7/20/2023 3229    Comment: ed on HEP, shoulder precautions, sling use                                         User Key       Initials Effective Dates Name Provider Type Discipline     01/03/23 -  Pauly Andrade, OT Occupational Therapist OT                  OT Recommendation and Plan  Therapy Frequency (OT): evaluation only  Plan of Care Review  Plan of Care Reviewed With: patient  Outcome Evaluation: Pt seen for OT eval following L TSA. Pt and SO educated on sling use, how to don/doff, wear schedule. OT adjusted for pt. OT provided ed on UE dressing/bathing/grooming tech following shoulder precautions. Pt requiring min assist to complete. Provided written HEP including pendulum exericises, elbow, hand and wrist exercises and reviewed with pt and SO. Pt adviced to cont sling use, shoulder precautions and HEP until she returns to ortho for follow up.     Time Calculation:   Evaluation Complexity (OT)  Review Occupational Profile/Medical/Therapy History Complexity: brief/low complexity  Assessment, Occupational Performance/Identification of Deficit Complexity: 1-3 performance deficits  Clinical Decision Making Complexity (OT): problem focused assessment/low complexity  Overall Complexity of Evaluation (OT): low complexity     Time Calculation- OT       Row Name 07/20/23 1431             Time Calculation- OT    OT Start Time 1240  -KB      OT Stop Time  1257  -KB      OT Time Calculation (min) 17 min  -KB      Total Timed Code Minutes- OT 12 minute(s)  -KB      OT Received On 07/20/23  -KB         Timed Charges    56493 - OT Therapeutic Exercise Minutes 12  -KB         Untimed Charges    OT Eval/Re-eval Minutes 5  -KB         Total Minutes    Timed Charges Total Minutes 12  -KB      Untimed Charges Total Minutes 5  -KB       Total Minutes 17  -KB                User Key  (r) = Recorded By, (t) = Taken By, (c) = Cosigned By      Initials Name Provider Type    KB Pauly Andrade OT Occupational Therapist                  Therapy Charges for Today       Code Description Service Date Service Provider Modifiers Qty    [id number]  OT THER PROC EA 15 MIN 7/20/2023 Pauly Andrade OT GO 1    [id number]  OT EVAL LOW COMPLEXITY 2 7/20/2023 Pauly Andrade OT GO 1                 Pauly Andrade OT  7/20/2023

## 2023-07-20 NOTE — BRIEF OP NOTE
TOTAL SHOULDER ARTHROPLASTY  Progress Note    Filomena Liu  7/20/2023    Pre-op Diagnosis:   Primary localized osteoarthrosis of left shoulder region [M19.012]       Post-Op Diagnosis Codes:     * Primary localized osteoarthrosis of left shoulder region [M19.012]    Procedure/CPT® Codes:        Procedure(s):  Total shoulder arthroplasty, biceps tenodesis        SURGICAL APPROACH: Deltopectoral    SURGICAL TECHNIQUE: Tenotomy      Surgeon(s):  Maxwell Lanier MD    Anesthesia: General with Block    Staff:   Circulator: Aniya Girard RN  Scrub Person: Janeth Graf  Assistant: Gypsy Mart APRN  Assistant: Gypsy Mart APRN      Estimated Blood Loss: 100ml    Urine Voided: * No values recorded between 7/20/2023  8:14 AM and 7/20/2023  8:41 AM *    Specimens:                None          Drains: * No LDAs found *    Findings: See dictation        Complications: None    Assistant: Gypsy Mart APRN  was responsible for performing the following activities: Retraction and their skilled assistance was necessary for the success of this case.    Maxwell Lanier MD     Date: 7/20/2023  Time: 0949

## 2023-07-20 NOTE — OP NOTE
Orthopaedic Operative Note    Facility: [facility]    Patient: Filomena Liu    Medical Record Number: [id number]    YOB: 1960    Dictating Surgeon: Maxwell Lanier M.D.    Primary Care Physician: Fernando Dunn MD    Date of Operation:  7/20/2023    PREOPERATIVE DIAGNOSIS: Left shoulder end-stage osteoarthritis    POSTOPERATIVE DIAGNOSIS: Left shoulder end-stage osteoarthritis    PROCEDURES PERFORMED:    1.  Left total shoulder arthroplasty    2.  Tenodesis of long head of biceps tendon    SURGEON: Maxwell Lanier MD     ASSISTANT: Eden Javed whose assistance was critical for help with patient positioning, suctioning and irrigation, retraction, manipulation of the extremity for insertion of the implants, wound closure and application of the bandages.  Her assistance was critical to the success of this case.      ANESTHESIA: Regional followed General solution    SPECIMEN: None.     COMPLICATION: None.     ESTIMATED BLOOD LOSS: Less than 150 mL.     IMPLANTS:     1. Biomet 12 micro comprehensive stem with size 42 x 18 Versa-Dial humeral head    2. Size 3 glenoid with trabecular metal peg cemented with 1 batch of bone cement.     INDICATIONS: The patient had a long history of left shoulder pain which has been progressively worsening. The patient has long-standing osteoarthritis which has been nonresponsive to conservative treatment.    INFORMED CONSENT:  The risks, benefits, and alternatives to a total shoulder arthroplasty were discussed with the patient in detail. The patient acknowledged understanding the information and consented to proceed.  I explained that surgical risks include infection, hematoma, hardware related complications including failure of fixation, loosening, fracture, subscapularis repair failure and/or rotator cuff tear necessitating revision surgery, persistent pain and/or loss of motion, iatrogenic nerve and/or blood vessel injury resulting in  permanent weakness, numbness or dysfunction, DVT, PE, positioning related neuropraxia, and anesthesia related complications resulting in death.  I did explain the possible need for reverse arthroplasty depending upon the degree of retroversion and the status of his rotator cuff at the time of his surgery.  I further explained the risks inherent to reverse arthroplasty as compared to a standard total shoulder.  Specifically, we discussed the risks of notching, glenoid loosening, instability, and traction related neuropraxia, any of which could result in persistent pain or problems requiring further surgery.     DESCRIPTION OF PROCEDURE: The patient and operative site were identified in the preoperative holding area. The surgical site was marked. Following this, adequate regional anesthesia of the left upper extremity was administered. The patient was then taken to the operating room and placed in the supine position. Adequate general anesthesia was administered and then the patient was repositioned on the operating table in the modified beach chair position. The head and neck were placed in neutral alignment and all bony prominences were carefully padded and protected. Once we had the patient appropriately positioned, a timeout was taken. Preoperative antibiotics were administered.     I began the procedure by cleaning the extremity with an alcohol solution, a Hibiclens scrub was performed, and then the extremity was prepped with ChloraPrep x2. I allowed those to dry for 3 minutes before the draping procedure was carried out. Next, an approximately 8 cm incision was fashioned over the anterior aspect of the shoulder centered over the deltopectoral interval. Full-thickness medial and lateral skin flaps were developed and the cephalic vein dissected out. This structure was retracted laterally and kept protected throughout the duration of the case. The underlying clavipectoral fascia was divided and the strap muscles  were retracted medially. The anterior circumflex vessels were suture-ligated with 3-0 silk ties. The subscapularis was then tagged and released off the lesser tuberosity, careful to leave a small cuff of tissue to allow for a later anatomic repair of that structure.     The long head of the biceps was dissected out of the groove. This was released off of its attachment to the supraglenoid tubercle and then the diseased intraarticular portion of the biceps removed. The biceps was then tenodesed to the pectoralis tendon using two #2 MaxBraid sutures.     Once I completed that process, I then redirected my attention to the humeral side. There were large osteophytes surrounding the periphery of the head. These were removed at this point with a curved osteotome and rongeur. Once I completed that process, I had good exposure of the head. The cuff did appear to be intact and so I determined that we could proceed with a total shoulder arthroplasty. The cutting guide for the head cut was inserted. This was pinned into position at 30 degrees of retroversion as referenced off the forearm. The head cut was then carried out in typical fashion, careful to preserve the rotator cuff attachments during the cutting. The cut portion of the head was taken to the back table and measured. It measured a 42 in diameter.  Having completed the head cut and removal of the osteophytes, I then directed my attention to the glenoid. I dissected out the axillary nerve and made sure I had that structure identified and protected. Once the nerve was identified and protected, the subscapularis mobilization was performed. I was able to get a 360 degree release of the subscapularis and excellent mobilization of that structure without jeopardizing the axillary nerve.      Once I had completed that process, the glenoid was exposed. The superior, anterior, and inferior glenoid labral tissues were carefully removed to expose the glenoid. I measured it. It  measured a size 3. I pinned the center pin and then reamed and prepared the glenoid in typical fashion.     I had my assistant mix one batch of bone cement on the back table using current cement mixing technique. Once we completed that process and I drilled the 3 peg holes as well as the central hole for the central peg, the implant was impacted into position with cement in the peg holes and on the back of the implant.  This seated well and was firmly secure. I held that in place until the cement had cured. The excess extruded cement was removed with a Huger elevator. Next, I directed my attention back to the humerus. The humerus was reamed and broached up to a 12 micro broach where good fill was achieved. I elected to go with a size 12 stem. I drilled 2 drill holes in the lesser tuberosity and 2 transosseous sutures were placed through those to allow for a combined transosseous/transtendinous repair of the subscapularis. The implant was impacted into position. The size 12 stem seated well.      I then trialed with multiple head components.  The 42 x 18 fit very well and demonstrated excellent motion and stability. I could translate the head roughly 50% posteriorly and 50% inferiorly and reduce right back onto the glenoid. Again, it seemed to fit very well and I was satisfied that was the appropriate size for implantation in this case.     The trial head component was removed and the final component was impacted into position. I took care to make sure the Shields taper was fully secure before reducing the shoulder and again carrying it through a range of motion. Again, the shoulder demonstrated excellent motion and stability. Next, I irrigated out with 500 mL of a Betadine-containing saline solution followed by 1 L of sterile saline mixed with bacitracin via pulsatile lavage.     I placed the arm in about 30 degrees of external rotation. The subscapularis repair was performed in the typical fashion using the  previously placed transosseous sutures as well as multiple transtendinous sutures. Two sutures were used to close down the defect in the rotator interval as well. I was able to get an excellent repair in the subscapularis without any significant tension. The shoulder demonstrated good motion and stability. Therefore, I directed my attention to final closure. I confirmed the axillary nerve was intact at this point and then I irrigated out with another 2 L of sterile saline via pulsatile lavage. I made sure we had good hemostasis.      The wound was then sequentially closed in layers using Vicryl stitches to the deep tissues and a running subcuticular Monocryl stitch followed by Steri-Strips for the skin. Sterile dressings were applied to the wound and the drapes withdrawn. The patient tolerated the procedure well. There were no complications. Ms. Rm was taken to the recovery room in good condition. Her arm was placed in a sling prior to transfer to the recovery room.     Maxwell Lanier M.D.  7/20/2023    cc: Fernando Dunn MD

## 2023-07-20 NOTE — NURSING NOTE
Dr Lanier to bedside speaking to pt and family about bruising on upper operative arm and gauze on lower operative arm.

## 2023-07-20 NOTE — PLAN OF CARE
Goal Outcome Evaluation:  Plan of Care Reviewed With: patient           Outcome Evaluation: Pt seen for OT eval following L TSA. Pt and SO educated on sling use, how to don/doff, wear schedule. OT adjusted for pt. OT provided ed on UE dressing/bathing/grooming tech following shoulder precautions. Pt requiring min assist to complete. Provided written HEP including pendulum exericises, elbow, hand and wrist exercises and reviewed with pt and SO. Pt adviced to cont sling use, shoulder precautions and HEP until she returns to ortho for follow up.  OT wore appropriate PPE during session and performed hand hygiene before/after session.

## 2023-07-24 ENCOUNTER — TELEPHONE (OUTPATIENT)
Dept: ORTHOPEDIC SURGERY | Facility: HOSPITAL | Age: 63
End: 2023-07-24
Payer: MEDICARE

## 2023-07-24 RX ORDER — HYDROCODONE BITARTRATE AND ACETAMINOPHEN 7.5; 325 MG/1; MG/1
1 TABLET ORAL EVERY 4 HOURS PRN
Qty: 50 TABLET | Refills: 0 | Status: SHIPPED | OUTPATIENT
Start: 2023-07-24

## 2023-07-24 NOTE — TELEPHONE ENCOUNTER
Post op day 4  Discharge Instructions:  Ask patient about his or her discharge instructions  ?  Patient confirmed understanding   []  Further instruction needed   What, if any, recommendations, teaching, or interventions did you provide? Click or tap here to enter text.  Health status:  Pain controlled Yes   Pain is tolerable with the medications. Would like an increase or change in medication if possible.   Recommended interventions:  Yes  incision/dressing status   ?  Clean without redness, drainage, odor  []  Redness    []  Drainage - color Click or tap here to enter text.  []  Odor  IRAM - Green light blinking Yes  Difficulties urination No  Last BM 7/24/2023 (if no BM by day 3-recommend OTC suppository or fleets enema)  No issues   Medications:  ?Medications reviewed with patient/family/caregiver  Patient taking medications as prescribed?   Yes  If not taking medications as prescribed, note specific medicine(s) and reason for each:  Click or tap here to enter text.  Hospital Follow Up Plan:  Follow up Appointment with Orthopedic surgeon:  ?Has f/u appointment                []Scheduled f/u appointment  Home Care ordered at discharge?    No        Home Care started, or contact made?    No   If no, action taken: Total Shoulder   DME obtained/used in home?         Yes   Using IS  Yes   Other information: Ms. Rm said she is doing well. All in all she is pleased with the progress. She is having more pain than she anticipated. But she is taking the pain medication and they do seem to help some. The block did a number on her lungs, but she is doing the deep breathing and IS and it seems to be getting better, though she still gets out of breath with too much activity.  She is having some itching around the incision site, no rash, or open areas. But she is cleaning it regularly and it seems to help some. She was requesting a refill on her pain medication, she said she will be out by Thursday. She would like an  increase or change in medication if possible. She would like the medication sent to:  [facility]   [phone]

## 2023-07-24 NOTE — TELEPHONE ENCOUNTER
Attempted to reach Ms. Liu to see how she is doing as she is POD 4 LTS. Message left at this time.

## 2023-07-26 RX ORDER — HYDROCODONE BITARTRATE AND ACETAMINOPHEN 7.5; 325 MG/1; MG/1
1 TABLET ORAL EVERY 4 HOURS PRN
Qty: 50 TABLET | Refills: 0 | Status: SHIPPED | OUTPATIENT
Start: 2023-07-26

## 2023-08-01 ENCOUNTER — TREATMENT (OUTPATIENT)
Dept: PHYSICAL THERAPY | Facility: CLINIC | Age: 63
End: 2023-08-01
Payer: MEDICARE

## 2023-08-01 DIAGNOSIS — Z96.612 AFTERCARE FOLLOWING LEFT SHOULDER JOINT REPLACEMENT SURGERY: ICD-10-CM

## 2023-08-01 DIAGNOSIS — Z47.1 AFTERCARE FOLLOWING LEFT SHOULDER JOINT REPLACEMENT SURGERY: ICD-10-CM

## 2023-08-01 DIAGNOSIS — M25.512 ACUTE PAIN OF LEFT SHOULDER: ICD-10-CM

## 2023-08-01 DIAGNOSIS — R29.898 SHOULDER WEAKNESS: ICD-10-CM

## 2023-08-01 DIAGNOSIS — Z96.612 STATUS POST TOTAL SHOULDER ARTHROPLASTY, LEFT: Primary | ICD-10-CM

## 2023-08-01 DIAGNOSIS — R29.3 POSTURE IMBALANCE: ICD-10-CM

## 2023-08-01 PROCEDURE — 97110 THERAPEUTIC EXERCISES: CPT | Performed by: PHYSICAL THERAPIST

## 2023-08-01 PROCEDURE — 97530 THERAPEUTIC ACTIVITIES: CPT | Performed by: PHYSICAL THERAPIST

## 2023-08-01 PROCEDURE — 97161 PT EVAL LOW COMPLEX 20 MIN: CPT | Performed by: PHYSICAL THERAPIST

## 2023-08-02 ENCOUNTER — TELEPHONE (OUTPATIENT)
Dept: ORTHOPEDIC SURGERY | Facility: HOSPITAL | Age: 63
End: 2023-08-02
Payer: MEDICARE

## 2023-08-02 ENCOUNTER — OFFICE VISIT (OUTPATIENT)
Dept: ORTHOPEDIC SURGERY | Facility: CLINIC | Age: 63
End: 2023-08-02
Payer: MEDICARE

## 2023-08-02 VITALS — TEMPERATURE: 97.3 F | WEIGHT: 165.3 LBS | BODY MASS INDEX: 32.45 KG/M2 | HEIGHT: 60 IN

## 2023-08-02 DIAGNOSIS — Z98.890 S/P SHOULDER SURGERY: ICD-10-CM

## 2023-08-02 DIAGNOSIS — M19.012 PRIMARY LOCALIZED OSTEOARTHROSIS OF LEFT SHOULDER REGION: Primary | ICD-10-CM

## 2023-08-02 RX ORDER — HYDROCODONE BITARTRATE AND ACETAMINOPHEN 7.5; 325 MG/1; MG/1
1 TABLET ORAL EVERY 6 HOURS PRN
Qty: 50 TABLET | Refills: 0 | Status: SHIPPED | OUTPATIENT
Start: 2023-08-02

## 2023-08-04 ENCOUNTER — TELEPHONE (OUTPATIENT)
Dept: ORTHOPEDIC SURGERY | Facility: CLINIC | Age: 63
End: 2023-08-04

## 2023-08-04 NOTE — TELEPHONE ENCOUNTER
"     Caller: Filomena Liu \"Filomena Liu\"    Relationship to patient: Self    Best call back number: [phone]    Patient is needing PATIENT HAS MEDICAID UNTIL THE END OF AUGUST AND WANTS SERVICES (SHANNON, SX, ECT)  TO BE RE BILLED ADDING MEDICAID AS SECONDARY     "

## 2023-08-08 ENCOUNTER — TELEPHONE (OUTPATIENT)
Dept: ORTHOPEDIC SURGERY | Facility: CLINIC | Age: 63
End: 2023-08-08
Payer: MEDICARE

## 2023-08-08 RX ORDER — OXYCODONE AND ACETAMINOPHEN 7.5; 325 MG/1; MG/1
1 TABLET ORAL EVERY 4 HOURS PRN
Qty: 50 TABLET | Refills: 0 | Status: SHIPPED | OUTPATIENT
Start: 2023-08-08 | End: 2023-08-14 | Stop reason: SDUPTHER

## 2023-08-08 NOTE — TELEPHONE ENCOUNTER
I called pt and advised her that Rx was sent into pharmacy. Pt verbally understanding   
Please review and advise   [phone]    Pt called stated she is still in a lot of pain and had been taken her Norco every 4 hr but then looked at the bottle and it's says every 6 hrs. She didn't realize the sig changed.   Pt then stated the Norco was never really helping and ask if Percocet can be sent instead? If Not she will need RF on Norco before it's due.       Sx 07/20/2023 Total  shoulder arthroplasty Left   Last FD 08/02/2023   Last OV 08/02/2023  
No

## 2023-08-14 RX ORDER — OXYCODONE AND ACETAMINOPHEN 7.5; 325 MG/1; MG/1
1 TABLET ORAL EVERY 4 HOURS PRN
Qty: 50 TABLET | Refills: 0 | Status: SHIPPED | OUTPATIENT
Start: 2023-08-14 | End: 2023-08-19 | Stop reason: SDUPTHER

## 2023-08-14 NOTE — TELEPHONE ENCOUNTER
From: Filomena Liu  To: Office of Maxwell Lanier  Sent: 8/14/2023 10:43 AM EDT  Subject: Medication Renewal Request    Refills have been requested for the following medications:     oxyCODONE-acetaminophen (PERCOCET) 7.5-325 MG per tablet [Maxwell Lanier]    Preferred pharmacy: [id number] [address] [address] AT [facility] OF [place] [address] & [place] [address] - [phone] [facility] [phone] FX  Delivery method: Pickup

## 2023-08-21 RX ORDER — OXYCODONE AND ACETAMINOPHEN 7.5; 325 MG/1; MG/1
1 TABLET ORAL EVERY 6 HOURS PRN
Qty: 42 TABLET | Refills: 0 | Status: SHIPPED | OUTPATIENT
Start: 2023-08-21 | End: 2023-08-21 | Stop reason: SDUPTHER

## 2023-08-21 RX ORDER — OXYCODONE AND ACETAMINOPHEN 7.5; 325 MG/1; MG/1
1 TABLET ORAL EVERY 6 HOURS PRN
Qty: 42 TABLET | Refills: 0 | Status: SHIPPED | OUTPATIENT
Start: 2023-08-21 | End: 2023-08-28 | Stop reason: DRUGHIGH

## 2023-08-21 RX ORDER — OXYCODONE AND ACETAMINOPHEN 7.5; 325 MG/1; MG/1
1 TABLET ORAL EVERY 6 HOURS PRN
Qty: 42 TABLET | Refills: 0 | Status: SHIPPED | OUTPATIENT
Start: 2023-08-21 | End: 2023-08-28

## 2023-08-21 NOTE — TELEPHONE ENCOUNTER
Pt states all surrounding [facility] are out of stock for oxycodone.   Pt requesting hydrocodone 7.5mg be refilled at [facility].

## 2023-08-21 NOTE — TELEPHONE ENCOUNTER
----- Message from Filomena Liu sent at 8/21/2023 10:27 AM EDT -----  Regarding: Prescription refill  Contact: [phone]  Good morning. Your office approved a refill for Oxycodone 7.5-325 mg this morning to the [facility] in [facility]. That pharmacy has contacted me to say they are out of stock and dont know when they will have it come in. The surrounding [facility] are also out of stock.   The [facility] in [facility] has the Hydrocodone 7.5-325 mg. Which Dr Lanier also has prescribed.( 7.5-325 mg every 4 hours). Could your office please send a refill request for that medicine instead? Their address is:  [facility]   [address] [address] [phone]  Thank you  Filomena Liu

## 2023-08-21 NOTE — TELEPHONE ENCOUNTER
----- Message from Filomena Liu sent at 8/21/2023 11:07 AM EDT -----  Regarding: Prescription refill   Contact: [phone]  I just received your message.  No [facility] has the O[facility]codone. That’s why I sent a message asking Dr Eduardo to refill the Hydrocodone 7.5 to the [facility] in [facility]. Address [address] [place] [address]. Phone number [phone]

## 2023-08-28 ENCOUNTER — OFFICE VISIT (OUTPATIENT)
Dept: ORTHOPEDIC SURGERY | Facility: CLINIC | Age: 63
End: 2023-08-28
Payer: MEDICARE

## 2023-08-28 VITALS — WEIGHT: 165.6 LBS | TEMPERATURE: 97.3 F | BODY MASS INDEX: 32.51 KG/M2 | HEIGHT: 60 IN

## 2023-08-28 DIAGNOSIS — Z09 SURGERY FOLLOW-UP: Primary | ICD-10-CM

## 2023-08-28 RX ORDER — OXYCODONE AND ACETAMINOPHEN 7.5; 325 MG/1; MG/1
1 TABLET ORAL EVERY 6 HOURS PRN
Qty: 42 TABLET | Refills: 0 | OUTPATIENT
Start: 2023-08-28

## 2023-08-28 RX ORDER — HYDROCODONE BITARTRATE AND ACETAMINOPHEN 7.5; 325 MG/1; MG/1
1 TABLET ORAL EVERY 4 HOURS PRN
Qty: 42 TABLET | Refills: 0 | Status: SHIPPED | OUTPATIENT
Start: 2023-08-28 | End: 2023-09-01 | Stop reason: SDUPTHER

## 2023-08-28 NOTE — PROGRESS NOTES
"Filomena Liu : 1960 MRN: [id number] DATE: 2023    DIAGNOSIS: 6 week follow up left shoulder arthroplasty      SUBJECTIVE:  Patient returns today for 6 week follow up of left shoulder replacement. Patient reports doing well with no unusual complaints.     OBJECTIVE:    Temp 97.3 øF (36.3 øC) (Temporal)   Ht 152.4 cm (60\")   Wt 75.1 kg (165 lb 9.6 oz)   BMI 32.34 kg/mý     Exam: Stitch abscess noted at proximal end of incision.  Otherwise, the incision is well healed. No erythema or drainage. Shoulder moves fluidly with pendulums.  Motion is on track per protocol.  The arm is soft and nontender.  Good motor and sensory function.  Palpable distal pulses.     DIAGNOSTIC STUDIES    Xrays: AP and scapular Y views of the left shoulder are ordered and reviewed for evaluation of shoulder replacement.  They demonstrate a well positioned, well aligned replacement without complicating factors noted.  In comparison with previous films there has been no change.    ASSESSMENT: 6 week follow up left shoulder replacement    PLAN:   1.  Continue PT per protocol.  2.  Discontinue sling and begin working on progressing ROM as tolerated.  3.  Counseled patient about appropriate activity modifications and restrictions.  Released to drive at this point.  4.  Incision painted with Betadine.  Stitch removed from proximal end of incision.  Band-Aid applied.  Patient provided Betadine swabs and instructions on continued care.  She will monitor for signs and symptoms of infection.  Encouraged her to call if there is any issues or concerns.  5.  I have agreed to refill her pain medication today.  I have switched her from Percocet to [place].  The risk were discussed.  6.  Follow-up in 6 weeks with Dr. Lanier.    Gypsy Mart, APRN    2023   "

## 2023-09-01 DIAGNOSIS — Z09 SURGERY FOLLOW-UP: ICD-10-CM

## 2023-09-01 RX ORDER — HYDROCODONE BITARTRATE AND ACETAMINOPHEN 7.5; 325 MG/1; MG/1
1 TABLET ORAL EVERY 4 HOURS PRN
Qty: 42 TABLET | Refills: 0 | Status: SHIPPED | OUTPATIENT
Start: 2023-09-01

## 2023-09-01 NOTE — TELEPHONE ENCOUNTER
Last fill date 08/28/23    Last office visit 08/28/23    Surgery 07/20/23    Please review and advise

## 2023-09-06 DIAGNOSIS — Z09 SURGERY FOLLOW-UP: ICD-10-CM

## 2023-09-06 RX ORDER — HYDROCODONE BITARTRATE AND ACETAMINOPHEN 7.5; 325 MG/1; MG/1
1 TABLET ORAL EVERY 4 HOURS PRN
Qty: 42 TABLET | Refills: 0 | Status: SHIPPED | OUTPATIENT
Start: 2023-09-06 | End: 2023-09-11 | Stop reason: SDUPTHER

## 2023-09-06 NOTE — TELEPHONE ENCOUNTER
Last fill 09/01/23    Last appt 08/28/23    Surgery 07/20/23    Future appt 10/09/23       Please review and advise

## 2023-09-11 DIAGNOSIS — Z09 SURGERY FOLLOW-UP: ICD-10-CM

## 2023-09-11 RX ORDER — HYDROCODONE BITARTRATE AND ACETAMINOPHEN 7.5; 325 MG/1; MG/1
1 TABLET ORAL EVERY 6 HOURS PRN
Qty: 42 TABLET | Refills: 0 | Status: SHIPPED | OUTPATIENT
Start: 2023-09-11 | End: 2023-09-18 | Stop reason: SDUPTHER

## 2023-09-11 NOTE — TELEPHONE ENCOUNTER
I have sent in a refill of Norco, but changed the frequency to every 6 hours.  Please make sure patient is aware.  Thanks

## 2023-09-18 DIAGNOSIS — Z09 SURGERY FOLLOW-UP: ICD-10-CM

## 2023-09-18 RX ORDER — HYDROCODONE BITARTRATE AND ACETAMINOPHEN 7.5; 325 MG/1; MG/1
1 TABLET ORAL EVERY 6 HOURS PRN
Qty: 42 TABLET | Refills: 0 | OUTPATIENT
Start: 2023-09-18

## 2023-09-18 RX ORDER — HYDROCODONE BITARTRATE AND ACETAMINOPHEN 7.5; 325 MG/1; MG/1
1 TABLET ORAL EVERY 6 HOURS PRN
Qty: 42 TABLET | Refills: 0 | Status: SHIPPED | OUTPATIENT
Start: 2023-09-18

## 2023-09-26 DIAGNOSIS — Z09 SURGERY FOLLOW-UP: ICD-10-CM

## 2023-09-26 RX ORDER — HYDROCODONE BITARTRATE AND ACETAMINOPHEN 7.5; 325 MG/1; MG/1
1 TABLET ORAL EVERY 8 HOURS PRN
Qty: 30 TABLET | Refills: 0 | Status: SHIPPED | OUTPATIENT
Start: 2023-09-26

## 2023-10-04 DIAGNOSIS — Z09 SURGERY FOLLOW-UP: ICD-10-CM

## 2023-10-05 RX ORDER — HYDROCODONE BITARTRATE AND ACETAMINOPHEN 7.5; 325 MG/1; MG/1
1 TABLET ORAL EVERY 8 HOURS PRN
Qty: 30 TABLET | Refills: 0 | Status: SHIPPED | OUTPATIENT
Start: 2023-10-05

## 2023-10-12 DIAGNOSIS — Z09 SURGERY FOLLOW-UP: ICD-10-CM

## 2023-10-12 RX ORDER — HYDROCODONE BITARTRATE AND ACETAMINOPHEN 7.5; 325 MG/1; MG/1
1 TABLET ORAL EVERY 8 HOURS PRN
Qty: 30 TABLET | Refills: 0 | Status: CANCELLED | OUTPATIENT
Start: 2023-10-12

## 2023-10-12 RX ORDER — HYDROCODONE BITARTRATE AND ACETAMINOPHEN 7.5; 325 MG/1; MG/1
1 TABLET ORAL EVERY 8 HOURS PRN
Qty: 30 TABLET | Refills: 0 | Status: SHIPPED | OUTPATIENT
Start: 2023-10-12

## 2023-10-12 NOTE — TELEPHONE ENCOUNTER
I spoke to Ms. Rm.  She reports she continues to have pain not only in her shoulder, but all through her body.  She is no longer going to formal physical therapy, but says she is doing home exercises.  Reports she tested positive for rheumatoid arthritis, but the value was low and was not treated with medications.  I agreed to refill her pain medication once more.  The risk were discussed.  I recommended she contact Dr. Dunn to schedule an appointment for evaluation for her systemic pain.  She verbalized understanding of all we discussed and agreed with this plan.

## 2023-10-16 ENCOUNTER — TELEPHONE (OUTPATIENT)
Dept: ORTHOPEDIC SURGERY | Facility: CLINIC | Age: 63
End: 2023-10-16

## 2023-10-16 NOTE — TELEPHONE ENCOUNTER
"  Caller: Filomena Liu \"Filomena Liu\"    Relationship to patient: Self    Best call back number: [phone]    Patient is needing:  PATIENT TESTED POSITIVE TO COVID LAST FRIDAY AND SHE WOULD LIKE TO CHANGE HER 10/16 POST-OP TO TELEHEALTH IF POSSIBLE. PLEASE CALL BACK TO CONFIRM.   "

## 2023-10-18 ENCOUNTER — TELEMEDICINE (OUTPATIENT)
Dept: ORTHOPEDIC SURGERY | Facility: CLINIC | Age: 63
End: 2023-10-18
Payer: MEDICARE

## 2023-10-18 RX ORDER — IBUPROFEN 800 MG/1
800 TABLET ORAL 3 TIMES DAILY
Qty: 90 TABLET | Refills: 1 | Status: SHIPPED | OUTPATIENT
Start: 2023-10-18

## 2023-10-18 RX ORDER — TRAMADOL HYDROCHLORIDE 50 MG/1
50 TABLET ORAL EVERY 4 HOURS PRN
Qty: 60 TABLET | Refills: 0 | Status: SHIPPED | OUTPATIENT
Start: 2023-10-18

## 2023-10-18 NOTE — PROGRESS NOTES
We attempted a video visit.  She was able to see me but she could not get her audio to work.  We ended up having to abort to a telephone conversation.  She says that the shoulder does not seem to be getting much better.  She says she is still having a lot of pain down into her biceps.  She feels like she was progressing with therapy with regards to her motion and function.  She does have a history of neck problems and she wonders if her pain could be coming from the neck.  I told her that there is just no way for me to know over the phone.  I need to see her again, get x-rays and perform an exam.  She has been battling COVID so she had to miss her scheduled visit with me.  We will try to get that rescheduled for when she is cleared to come off of quarantine.  In the meantime, I did agree to give her a prescription for tramadol and ibuprofen to take as needed.  Risks of these medicines were discussed.  I will have the office call her to set up an appointment.    Maxwell Lanier MD    Answers submitted by the patient for this visit:  Other (Submitted on 10/16/2023)  Please describe your symptoms.: Arms and neck pain. Followup on shoulder surgery 7/20/23  Have you had these symptoms before?: Yes  How long have you been having these symptoms?: Greater than 2 weeks  Please list any medications you are currently taking for this condition.: Hydrocodone 7.5, Tylenol 500 mg, Icy hot  Please describe any probable cause for these symptoms. : Arthitis, Spinal stenosis, Degenerative disc disease  Primary Reason for Visit (Submitted on 10/16/2023)  What is the primary reason for your visit?: Other

## 2023-10-30 RX ORDER — TRAMADOL HYDROCHLORIDE 50 MG/1
50 TABLET ORAL EVERY 4 HOURS PRN
Qty: 60 TABLET | Refills: 0 | Status: SHIPPED | OUTPATIENT
Start: 2023-10-30

## 2023-10-30 NOTE — TELEPHONE ENCOUNTER
Last refill 10/18/23    Last appointment Telemedicine 10/18/23    Surgery 07/20/23    Future appointment Not on File

## 2023-11-15 RX ORDER — TRAMADOL HYDROCHLORIDE 50 MG/1
50 TABLET ORAL EVERY 4 HOURS PRN
Qty: 60 TABLET | Refills: 0 | Status: SHIPPED | OUTPATIENT
Start: 2023-11-15

## 2023-11-15 RX ORDER — TRAMADOL HYDROCHLORIDE 50 MG/1
50 TABLET ORAL EVERY 4 HOURS PRN
Qty: 60 TABLET | Refills: 0 | Status: SHIPPED | OUTPATIENT
Start: 2023-11-15 | End: 2023-11-15 | Stop reason: SDUPTHER

## 2023-11-29 RX ORDER — TRAMADOL HYDROCHLORIDE 50 MG/1
50 TABLET ORAL EVERY 8 HOURS PRN
Qty: 30 TABLET | Refills: 0 | Status: SHIPPED | OUTPATIENT
Start: 2023-11-29

## 2023-12-22 ENCOUNTER — OFFICE VISIT (OUTPATIENT)
Dept: ORTHOPEDIC SURGERY | Facility: CLINIC | Age: 63
End: 2023-12-22
Payer: MEDICARE

## 2023-12-22 VITALS — TEMPERATURE: 98.7 F | HEIGHT: 60 IN | BODY MASS INDEX: 34.08 KG/M2 | WEIGHT: 173.6 LBS

## 2023-12-22 DIAGNOSIS — R52 PAIN: Primary | ICD-10-CM

## 2023-12-22 DIAGNOSIS — Z09 SURGERY FOLLOW-UP: ICD-10-CM

## 2023-12-22 PROCEDURE — 99212 OFFICE O/P EST SF 10 MIN: CPT | Performed by: ORTHOPAEDIC SURGERY

## 2023-12-22 NOTE — PROGRESS NOTES
CC:  5 months s/p left TSA    Ms. Rm follows up today for her left shoulder.  She is now 5 months out.  She tells me she wishes she never had the surgery.  From a pain standpoint, she feels like the surgery was not nearly as helpful as she would like.  She says that her motion is good.  She says that on a daily basis she is able to do everything that she wants to do.  She developed COVID postoperatively and she feels like she is still battling residual effects from that illness.  As result of her COVID, she was never able to adequately rehab the shoulder.  She does feels like the surgery was a disappointment.    Her incision is healed.  No gross abnormalities on inspection.  She has mild diffuse tenderness around the shoulder but nothing exquisite.  No increased warmth or effusion.  Her motion is great.  She can range the shoulder nearly fully in all planes.  Internal rotation is limited to about T10 or so but otherwise I would say she has absolutely full motion.  She can abduct, elevate, internally and externally rotate against resistance with overall pretty good strength.  She had only mild discomfort with abduction and elevation.    AP, scapular Y and axillary views left shoulder are ordered and reviewed evaluate her implants.  These are compared to previous x-rays.  No concerning findings.  Implants are well-fixed and well-positioned.    Assessment: 5 months status post left total shoulder arthroplasty    Plan: Her x-rays look fine and on exam, my sense is that her shoulder is about as good as we could have hoped in terms of her motion and strength.  She still has a lot of pain though and I cannot account for that based on what I am seeing.  I told her that I would recommend we give it more time.  I think her pain should gradually subside.  She admits that she has problems with pain in general.  She has multiple other issues including her back and she reports chronic pain.  I told her that my experience is  that patients get better up to a year and I would recommend giving it a full year before we initiate an extensive workup here.  She is in agreement with that plan.  I encouraged her to keep working on home strengthening exercises.  I will plan to see her back in 6 months.     Maxwell Lanier MD

## 2024-01-13 RX ORDER — TRAMADOL HYDROCHLORIDE 50 MG/1
50 TABLET ORAL EVERY 8 HOURS PRN
Qty: 30 TABLET | Refills: 0 | OUTPATIENT
Start: 2024-01-13

## 2024-01-19 ENCOUNTER — PREP FOR SURGERY (OUTPATIENT)
Dept: OTHER | Facility: HOSPITAL | Age: 64
End: 2024-01-19
Payer: MEDICARE

## 2024-01-19 DIAGNOSIS — Z86.010 HISTORY OF ADENOMATOUS POLYP OF COLON: ICD-10-CM

## 2024-01-19 DIAGNOSIS — Z12.11 SCREENING FOR COLON CANCER: Primary | ICD-10-CM

## 2024-02-06 PROBLEM — Z86.0101 HISTORY OF ADENOMATOUS POLYP OF COLON: Status: ACTIVE | Noted: 2024-01-19

## 2024-02-06 PROBLEM — Z86.010 HISTORY OF ADENOMATOUS POLYP OF COLON: Status: ACTIVE | Noted: 2024-01-19

## 2024-02-22 RX ORDER — HYDROCODONE BITARTRATE AND ACETAMINOPHEN 7.5; 325 MG/1; MG/1
1 TABLET ORAL EVERY 6 HOURS PRN
Qty: 50 TABLET | Refills: 0 | OUTPATIENT
Start: 2024-02-22

## 2024-03-27 RX ORDER — ROPINIROLE 1 MG/1
1 TABLET, FILM COATED ORAL
COMMUNITY
Start: 2024-01-02

## 2024-03-28 ENCOUNTER — HOSPITAL ENCOUNTER (OUTPATIENT)
Facility: HOSPITAL | Age: 64
Setting detail: HOSPITAL OUTPATIENT SURGERY
Discharge: HOME | End: 2024-03-28
Attending: SURGERY | Admitting: SURGERY
Payer: MEDICARE

## 2024-03-28 ENCOUNTER — TELEPHONE (OUTPATIENT)
Dept: SURGERY | Facility: CLINIC | Age: 64
End: 2024-03-28
Payer: MEDICARE

## 2024-03-28 ENCOUNTER — ANESTHESIA (OUTPATIENT)
Dept: GASTROENTEROLOGY | Facility: HOSPITAL | Age: 64
End: 2024-03-28
Payer: MEDICARE

## 2024-03-28 ENCOUNTER — ANESTHESIA EVENT (OUTPATIENT)
Dept: GASTROENTEROLOGY | Facility: HOSPITAL | Age: 64
End: 2024-03-28
Payer: MEDICARE

## 2024-03-28 ENCOUNTER — PREP FOR SURGERY (OUTPATIENT)
Dept: OTHER | Facility: HOSPITAL | Age: 64
End: 2024-03-28
Payer: MEDICARE

## 2024-03-28 VITALS
DIASTOLIC BLOOD PRESSURE: 97 MMHG | BODY MASS INDEX: 34.63 KG/M2 | HEART RATE: 85 BPM | HEIGHT: 60 IN | RESPIRATION RATE: 16 BRPM | SYSTOLIC BLOOD PRESSURE: 174 MMHG | WEIGHT: 176.4 LBS | OXYGEN SATURATION: 97 %

## 2024-03-28 DIAGNOSIS — Z86.010 HISTORY OF ADENOMATOUS POLYP OF COLON: ICD-10-CM

## 2024-03-28 DIAGNOSIS — Z12.11 SCREENING FOR COLON CANCER: Primary | ICD-10-CM

## 2024-03-28 PROBLEM — K57.90 DIVERTICULOSIS: Status: ACTIVE | Noted: 2024-03-28

## 2024-03-28 PROCEDURE — S0260 H&P FOR SURGERY: HCPCS | Performed by: SURGERY

## 2024-03-28 PROCEDURE — 25010000002 PROPOFOL 10 MG/ML EMULSION: Performed by: NURSE ANESTHETIST, CERTIFIED REGISTERED

## 2024-03-28 PROCEDURE — G0104 CA SCREEN;FLEXI SIGMOIDSCOPE: HCPCS | Performed by: SURGERY

## 2024-03-28 PROCEDURE — 25810000003 LACTATED RINGERS PER 1000 ML: Performed by: SURGERY

## 2024-03-28 RX ORDER — SODIUM CHLORIDE, SODIUM LACTATE, POTASSIUM CHLORIDE, CALCIUM CHLORIDE 600; 310; 30; 20 MG/100ML; MG/100ML; MG/100ML; MG/100ML
30 INJECTION, SOLUTION INTRAVENOUS CONTINUOUS PRN
Status: DISCONTINUED | OUTPATIENT
Start: 2024-03-28 | End: 2024-03-28 | Stop reason: HOSPADM

## 2024-03-28 RX ORDER — PROPOFOL 10 MG/ML
VIAL (ML) INTRAVENOUS AS NEEDED
Status: DISCONTINUED | OUTPATIENT
Start: 2024-03-28 | End: 2024-03-28 | Stop reason: SURG

## 2024-03-28 RX ORDER — LIDOCAINE HYDROCHLORIDE 20 MG/ML
INJECTION, SOLUTION INFILTRATION; PERINEURAL AS NEEDED
Status: DISCONTINUED | OUTPATIENT
Start: 2024-03-28 | End: 2024-03-28 | Stop reason: SURG

## 2024-03-28 RX ADMIN — PROPOFOL 180 MCG/KG/MIN: 10 INJECTION, EMULSION INTRAVENOUS at 08:41

## 2024-03-28 RX ADMIN — PROPOFOL 100 MG: 10 INJECTION, EMULSION INTRAVENOUS at 08:40

## 2024-03-28 RX ADMIN — PROPOFOL 50 MG: 10 INJECTION, EMULSION INTRAVENOUS at 08:46

## 2024-03-28 RX ADMIN — LIDOCAINE HYDROCHLORIDE 60 MG: 20 INJECTION, SOLUTION INFILTRATION; PERINEURAL at 08:40

## 2024-03-28 RX ADMIN — PROPOFOL 50 MG: 10 INJECTION, EMULSION INTRAVENOUS at 08:55

## 2024-03-28 RX ADMIN — PROPOFOL 50 MG: 10 INJECTION, EMULSION INTRAVENOUS at 08:51

## 2024-03-28 RX ADMIN — SODIUM CHLORIDE, POTASSIUM CHLORIDE, SODIUM LACTATE AND CALCIUM CHLORIDE 30 ML/HR: 600; 310; 30; 20 INJECTION, SOLUTION INTRAVENOUS at 07:56

## 2024-03-28 NOTE — ANESTHESIA POSTPROCEDURE EVALUATION
Patient: Filomena Liu    Procedure Summary       Date: 03/28/24 Room / Location: [facility] ENDOSCOPY 5 / [facility] ENDOSCOPY    Anesthesia Start: 0836 Anesthesia Stop: 0905    Procedure: SIGMOIDOSCOPY FLEXIBLE Diagnosis:       Screening for colon cancer      History of adenomatous polyp of colon      (Screening for colon cancer [Z12.11])      (History of adenomatous polyp of colon [Z86.010])    Surgeons: Leatha Johns MD Provider: Pako Patel MD    Anesthesia Type: MAC ASA Status: 3            Anesthesia Type: MAC    Vitals  Vitals Value Taken Time   /97 03/28/24 0925   Temp     Pulse 86 03/28/24 0926   Resp 16 03/28/24 0925   SpO2 97 % 03/28/24 0926   Vitals shown include unfiled device data.        Post Anesthesia Care and Evaluation    Patient location during evaluation: PACU  Patient participation: complete - patient participated  Level of consciousness: awake and alert  Pain management: adequate    Airway patency: patent  Anesthetic complications: No anesthetic complications    Cardiovascular status: acceptable  Respiratory status: acceptable  Hydration status: acceptable    Comments: --------------------            03/28/24 0925     --------------------   BP:       174/97     Pulse:      85       Resp:       16       SpO2:      97%      --------------------

## 2024-03-28 NOTE — H&P
General Surgery  History and Physical    CC: Screening for colon cancer    HPI: The patient is a pleasant 63 y.o. year-old lady who presents today for a repeat screening colonoscopy.  Her last colonoscopy was done in November 2020 by Dr. Mckeon and significant for 3 adenomatous colon polyps and diverticulosis.  She denies any melena or hematochezia and has no family history of colon cancer.    Past Medical History:   Hypertension  Hyperlipidemia  GERD  Cervical spinal stenosis  Carpal tunnel syndrome  COPD  Obstructive sleep apnea  History of benign thymus tumor  History of adenomatous colon polyps    Past Surgical History:   Colonoscopy (2020, Dr. Mckeon-tubular adenoma x 3, diverticulosis)  Cervical spine surgery  Cholecystectomy  Hysterectomy  Hand surgery  Left ankle skin graft  Left shoulder arthroplasty  Thymectomy    Medications:   Medications Prior to Admission   Medication Sig Dispense Refill Last Dose    acetaminophen (TYLENOL) 325 MG tablet Take 2 tablets by mouth 2 (Two) Times a Day As Needed for Mild Pain. 60 tablet 0 3/27/2024    albuterol (PROVENTIL HFA;VENTOLIN HFA) 108 (90 Base) MCG/ACT inhaler Inhale 2 puffs 3 times a day.   3/28/2024    fluticasone (FLONASE) 50 MCG/ACT nasal spray 1 spray into the nostril(s) as directed by provider.   Past Week    HYDROcodone-acetaminophen (Norco) 7.5-325 MG per tablet Take 1 tablet by mouth Every 8 (Eight) Hours As Needed for Moderate Pain. 30 tablet 0 Past Week    ipratropium-albuterol (DUO-NEB) 0.5-2.5 mg/3 ml nebulizer    Past Week    lisinopril (PRINIVIL,ZESTRIL) 40 MG tablet Take 1 tablet by mouth Daily.   3/27/2024    rOPINIRole (REQUIP) 1 MG tablet Take 1 tablet by mouth every night at bedtime.   Past Week    Trelegy Ellipta 200-62.5-25 MCG/ACT aerosol powder  Inhale 1 puff Daily.   3/27/2024    TRIAMTERENE PO Take 25 mg by mouth Daily.   3/27/2024    docusate sodium (COLACE) 100 MG capsule Take 1 capsule by mouth 2 (Two) Times a Day. 60 capsule 0      ibuprofen (ADVIL,MOTRIN) 800 MG tablet Take 1 tablet by mouth 3 (Three) Times a Day. 90 tablet 1 3/22/2024    ondansetron (Zofran) 4 MG tablet Take 1 tablet by mouth Every 8 (Eight) Hours As Needed for Nausea or Vomiting. 12 tablet 0 More than a month    traMADol (ULTRAM) 50 MG tablet Take 1 tablet by mouth Every 8 (Eight) Hours As Needed for Moderate Pain. 30 tablet 0        Allergies: No known drug allergies    Family History: No family history of gastrointestinal malignancy, her father had Alzheimer's dementia    Social History: Engaged, daily cigarette smoker, occasional alcohol use    ROS: A comprehensive review of systems was conducted and negative for melena or hematochezia  All other systems reviewed and negative    Physical Exam:  Vitals:    03/28/24 0747   BP: 168/99   Pulse: 96   Resp: 20   SpO2: 96%     Height: 152 cm  Weight: 80 kg  BMI: 34  General: No acute distress, well-nourished & well-developed  HEAD: normocephalic, atraumatic  EYES: normal conjunctiva, sclera anicteric  EARS: grossly normal hearing  NECK: supple, no thyromegaly  CARDIOVASCULAR: regular rate and rhythm  RESPIRATORY: clear to auscultation bilaterally  GASTROINTESTINAL: soft, nontender, non-distended  PSYCHIATRIC: oriented x3, normal mood and affect    ASSESSMENT & PLAN  Ms. Rm is a 63-year-old lady with a history of adenomatous colon polyps who presents today for repeat screening colonoscopy.  She has been counseled on the risks of the procedure to include bleeding, colon perforation, and missed pathology.  Despite these risks, she has consented to proceed.    Leatha Johns MD  General, Robotic, and Endoscopic Surgery  [facility]    [address], [address]  [place] [address]  P: [phone]  F: [phone]

## 2024-03-28 NOTE — OP NOTE
Operative Note :  eLatha Johns MD      Filomena Rm  1960    Procedure Date: 03/28/24    Pre-op Diagnosis:  Screening for colon cancer [Z12.11]  History of adenomatous polyp of colon [Z86.010]    Post-Operative Diagnosis:  Poor prep  Diverticulosis    Procedure:   Flexible sigmoidoscopy    Surgeon: Leatha Johns MD    Assistant: None    Anesthesia:  MAC (monitored anesthetic care)    Estimated Blood Loss: Minimal    Specimens: None    Complications: None    Indications:  Ms. Rm is a 63 year-old lady with a history of adenomatous colon polyps who presents today for repeat screening colonoscopy.  She has been counseled on the risks of the procedure to include bleeding, colon perforation, and missed pathology.  Despite these risks, she has consented to proceed.    Findings: Poor prep with solid stool throughout the colon, extensive sigmoid diverticulosis    Description of procedure:  The patient was brought to the endoscopy suite and karen in the left lateral decubitus position.  Continuous propofol anesthesia was administered.  A surgical timeout was completed.  A digital rectal exam was performed, revealing no abnormalities.  An adult colonoscope was then inserted through the anus and passed under direct visualization to the level of the mid sigmoid colon noting extensive diverticulosis and a lot of solid brown stool throughout the sigmoid colon and rectum.  I attempted to traverse through the sigmoid colon but a large piece of stool occluded the lumen and made it so that I could not pass safely beyond it.  The colonoscopy was therefore aborted and scope withdrawn out of the rectum.  The patient transferred to the recovery area in stable condition.     Recommendations:  She will need a 2-day Nulytely bowel prep before a reattempt at colonoscopy can be undertaken.    Leatha Johns MD  General, Robotic, and Endoscopic Surgery  [facility] [address]  [place] [address]  P: [phone]  F: [phone]

## 2024-03-28 NOTE — ANESTHESIA PREPROCEDURE EVALUATION
Anesthesia Evaluation                  Airway   Mallampati: III  Neck ROM: full  Dental      Pulmonary    (+) a smoker Current, COPD, asthma,sleep apnea  Cardiovascular     Rhythm: regular  Rate: normal    (+) hypertension, hyperlipidemia      Neuro/Psych  (+) numbness, psychiatric history Anxiety and Depression  GI/Hepatic/Renal/Endo      Musculoskeletal     Abdominal    Substance History      OB/GYN          Other   arthritis,                 Anesthesia Plan    ASA 3     MAC     (Smoker  BMI  inhalers)  intravenous induction     Anesthetic plan, risks, benefits, and alternatives have been provided, discussed and informed consent has been obtained with: patient.    CODE STATUS:

## 2024-03-28 NOTE — TELEPHONE ENCOUNTER
Called patient to r/s her colonoscopy with Nulytely bowel prep. She states she will call back if she decides to schedule.

## 2024-03-28 NOTE — TELEPHONE ENCOUNTER
----- Message from Leatha Johns MD sent at 3/28/2024  9:02 AM EDT -----  Regarding: Reschedule colonoscopy with 2-day Nulytely bowel prep  This patient had a poor bowel prep with a lot of solid stool still in the colon.  Her colonoscopy was therefore aborted.  She will need to be rescheduled for a different day with a 2-day Nulytely bowel prep.    Thanks,  KAYE

## 2024-03-29 ENCOUNTER — TELEPHONE (OUTPATIENT)
Dept: SURGERY | Facility: CLINIC | Age: 64
End: 2024-03-29
Payer: MEDICARE

## 2024-03-29 NOTE — TELEPHONE ENCOUNTER
Patient called in to reschedule her Colonoscopy with Dr. Johns. Her Nulytely Bowel Prep was called into her [facility] voicemail today at 1:12 PM. Her bowel prep [facility]. Patient aware.

## 2024-04-11 ENCOUNTER — TELEPHONE (OUTPATIENT)
Dept: SURGERY | Facility: CLINIC | Age: 64
End: 2024-04-11
Payer: MEDICARE

## 2024-04-11 NOTE — TELEPHONE ENCOUNTER
Left message informing patient of new arrival time for 4/15/24. Patient now needs to arrive at 12:30

## 2024-04-15 ENCOUNTER — ANESTHESIA EVENT (OUTPATIENT)
Dept: GASTROENTEROLOGY | Facility: HOSPITAL | Age: 64
End: 2024-04-15
Payer: MEDICARE

## 2024-04-15 ENCOUNTER — HOSPITAL ENCOUNTER (OUTPATIENT)
Facility: HOSPITAL | Age: 64
Setting detail: HOSPITAL OUTPATIENT SURGERY
Discharge: HOME | End: 2024-04-15
Attending: SURGERY | Admitting: SURGERY
Payer: MEDICARE

## 2024-04-15 ENCOUNTER — ANESTHESIA (OUTPATIENT)
Dept: GASTROENTEROLOGY | Facility: HOSPITAL | Age: 64
End: 2024-04-15
Payer: MEDICARE

## 2024-04-15 VITALS
WEIGHT: 181.7 LBS | HEART RATE: 80 BPM | SYSTOLIC BLOOD PRESSURE: 133 MMHG | RESPIRATION RATE: 18 BRPM | OXYGEN SATURATION: 94 % | DIASTOLIC BLOOD PRESSURE: 82 MMHG | HEIGHT: 60 IN | BODY MASS INDEX: 35.67 KG/M2

## 2024-04-15 DIAGNOSIS — Z12.11 SCREENING FOR COLON CANCER: ICD-10-CM

## 2024-04-15 DIAGNOSIS — R93.3 ABNORMAL COLONOSCOPY: ICD-10-CM

## 2024-04-15 DIAGNOSIS — Z86.010 HISTORY OF ADENOMATOUS POLYP OF COLON: ICD-10-CM

## 2024-04-15 DIAGNOSIS — K57.90 DIVERTICULOSIS: Primary | ICD-10-CM

## 2024-04-15 PROCEDURE — 25810000003 LACTATED RINGERS PER 1000 ML: Performed by: SURGERY

## 2024-04-15 PROCEDURE — 88305 TISSUE EXAM BY PATHOLOGIST: CPT | Performed by: SURGERY

## 2024-04-15 PROCEDURE — S0260 H&P FOR SURGERY: HCPCS | Performed by: SURGERY

## 2024-04-15 PROCEDURE — 45385 COLONOSCOPY W/LESION REMOVAL: CPT | Performed by: SURGERY

## 2024-04-15 PROCEDURE — 25010000002 PROPOFOL 200 MG/20ML EMULSION: Performed by: ANESTHESIOLOGY

## 2024-04-15 RX ORDER — SODIUM CHLORIDE, SODIUM LACTATE, POTASSIUM CHLORIDE, CALCIUM CHLORIDE 600; 310; 30; 20 MG/100ML; MG/100ML; MG/100ML; MG/100ML
1000 INJECTION, SOLUTION INTRAVENOUS CONTINUOUS
Status: DISCONTINUED | OUTPATIENT
Start: 2024-04-15 | End: 2024-04-15 | Stop reason: HOSPADM

## 2024-04-15 RX ORDER — PROPOFOL 10 MG/ML
INJECTION, EMULSION INTRAVENOUS CONTINUOUS PRN
Status: DISCONTINUED | OUTPATIENT
Start: 2024-04-15 | End: 2024-04-15 | Stop reason: SURG

## 2024-04-15 RX ORDER — SODIUM CHLORIDE 0.9 % (FLUSH) 0.9 %
10 SYRINGE (ML) INJECTION AS NEEDED
Status: DISCONTINUED | OUTPATIENT
Start: 2024-04-15 | End: 2024-04-15 | Stop reason: HOSPADM

## 2024-04-15 RX ADMIN — PROPOFOL 200 MCG/KG/MIN: 10 INJECTION, EMULSION INTRAVENOUS at 14:05

## 2024-04-15 RX ADMIN — SODIUM CHLORIDE, POTASSIUM CHLORIDE, SODIUM LACTATE AND CALCIUM CHLORIDE 1000 ML: 600; 310; 30; 20 INJECTION, SOLUTION INTRAVENOUS at 13:00

## 2024-04-15 NOTE — OP NOTE
Operative Note :  Leatha Johns MD      Filomena Tinsleyver  1960    Procedure Date: 04/15/24    Pre-op Diagnosis:  Screening for colon cancer [Z12.11]  History of adenomatous polyp of colon [Z86.010]    Post-Operative Diagnosis:  Poor prep  Diverticulosis  Tortuous colon  Colon polyp    Procedure:   Flexible sigmoidoscopy with hot snare polypectomy    Surgeon: Leatha Johns MD    Assistant: None    Anesthesia:  MAC (monitored anesthetic care)    Estimated Blood Loss: Minimal    Specimens: Sigmoid colon polyp    Complications: None    Indications:  The patient is a 63-year-old lady who recently underwent an attempted colonoscopy but had an extremely poor prep with large solid stool burden throughout the sigmoid colon precluding safe continuation of colonoscopy beyond that point.  Therefore, her previous colonoscopy was aborted and she returns today after completing a 2-day bowel prep but still not being completely clear.  She has been counseled on the risks of the planned colonoscopy today and has consented to proceed.    Findings: Very tortuous sigmoid colon with extensive diverticulosis, a lot of retained solid stool still throughout the sigmoid colon    Description of procedure:  The patient was brought to the endoscopy suite and karen in the left lateral decubitus position.  Continuous propofol anesthesia was administered.  A surgical timeout was completed.  A digital rectal exam was performed, revealing no abnormalities.  An adult colonoscope was then inserted through the anus and passed under direct visualization to the level of the mid sigmoid colon.  There was a lot of solid brown stool still retained within the sigmoid colon, I suspect from previous diverticular impactions that had cleared.  I identified a 5 mm polyp of the sigmoid colon that was removed using the hot snare and retrieved.  Despite multiple attempts at turning her onto her back and then back on her side again and applying a lot  of abdominal wall pressure, I could not advance safely through the remainder of the sigmoid colon due to tortuosity of the colon and significant belly breathing by the patient causing decompression of the colon and unsafe visualization.  I spent about 45 minutes trying to traverse through the sigmoid colon and despite all of our best efforts, I need any further attempts with possible risk perforation of the colon.  Therefore, the procedure was terminated and the scope withdrawn.  The patient had was transferred to the recovery area in stable condition.     Recommendations:  I will call the patient in 1 week or less with the pathology results of the one polyp removed.  She will then need to have a barium enema done as an outpatient due to 2 unsuccessful attempts at colonoscopy.    Leatha Johns MD  General, Robotic, and Endoscopic Surgery  [facility]    [address], [address]  [place] [address]  P: [phone]  F: [phone]

## 2024-04-15 NOTE — ANESTHESIA PREPROCEDURE EVALUATION
Anesthesia Evaluation     NPO Solid Status: > 8 hours  NPO Liquid Status: > 8 hours           Airway   Mallampati: I  No difficulty expected  Dental      Pulmonary    (+) COPD,sleep apnea  Cardiovascular   Exercise tolerance: good (4-7 METS)    (+) hypertension, hyperlipidemia      Neuro/Psych  (+) numbness, psychiatric history  GI/Hepatic/Renal/Endo    (+) obesity, GERD    Musculoskeletal     Abdominal    Substance History      OB/GYN          Other   arthritis,                 Anesthesia Plan    ASA 3     MAC     intravenous induction     Anesthetic plan, risks, benefits, and alternatives have been provided, discussed and informed consent has been obtained with: patient.    CODE STATUS:

## 2024-04-15 NOTE — ANESTHESIA POSTPROCEDURE EVALUATION
Patient: Filomena Liu    Procedure Summary       Date: 04/15/24 Room / Location: [facility] ENDOSCOPY 4 / [facility] ENDOSCOPY    Anesthesia Start: 1404 Anesthesia Stop: 1438    Procedure: COLONOSCOPY into sigmoid colon with hot snare polypectomy Diagnosis:       Screening for colon cancer      History of adenomatous polyp of colon      (Screening for colon cancer [Z12.11])      (History of adenomatous polyp of colon [Z86.010])    Surgeons: Leatha Johns MD Provider: Tato Stubbs MD    Anesthesia Type: MAC ASA Status: 3            Anesthesia Type: MAC    Vitals  Vitals Value Taken Time   /82 04/15/24 1453   Temp     Pulse 81 04/15/24 1453   Resp 18 04/15/24 1452   SpO2 93 % 04/15/24 1453   Vitals shown include unfiled device data.        Post Anesthesia Care and Evaluation    Patient location during evaluation: PACU  Patient participation: complete - patient participated  Level of consciousness: awake  Pain management: adequate    Airway patency: patent  Anesthetic complications: No anesthetic complications  PONV Status: none  Cardiovascular status: acceptable  Respiratory status: acceptable  Hydration status: acceptable    Comments: Patient seen and examined postoperatively; vital signs stable; SpO2 greater than or equal to 90%; cardiopulmonary status stable; nausea/vomiting adequately controlled; pain adequately controlled; no apparent anesthesia complications; patient discharged from anesthesia care when discharge criteria were met

## 2024-04-15 NOTE — H&P
General Surgery  History and Physical    CC: Screening for colon cancer    HPI: The patient is a pleasant 63 y.o. year-old lady who presents today for repeat attempt at screening colonoscopy.  She presented last month for an attempted colonoscopy but had a very poor bowel prep and I was unable to go beyond the sigmoid colon due to fecal impaction.  Her last full colonoscopy was done in November 2020 by Dr. Mckeon and significant for 3 adenomatous colon polyps and diverticulosis.  She has no family history of colon cancer and denies melena or hematochezia.    Past Medical History:   Hypertension  Hyperlipidemia  GERD  Cervical spinal stenosis  Carpal tunnel syndrome  COPD  Obstructive sleep apnea  History of benign thymus tumor  History of adenomatous colon polyps     Past Surgical History:   Colonoscopy (2020, Dr. Mckeon-tubular adenoma x 3, diverticulosis)  Cervical spine surgery  Cholecystectomy  Hysterectomy  Hand surgery  Left ankle skin graft  Left shoulder arthroplasty  Thymectomy    Medications:   Medications Prior to Admission   Medication Sig Dispense Refill Last Dose    albuterol (PROVENTIL HFA;VENTOLIN HFA) 108 (90 Base) MCG/ACT inhaler Inhale 2 puffs 3 times a day.   Past Week    fluticasone (FLONASE) 50 MCG/ACT nasal spray 1 spray into the nostril(s) as directed by provider.   Past Month    ipratropium-albuterol (DUO-NEB) 0.5-2.5 mg/3 ml nebulizer    4/15/2024    lisinopril (PRINIVIL,ZESTRIL) 40 MG tablet Take 1 tablet by mouth Daily.   4/14/2024    rOPINIRole (REQUIP) 1 MG tablet Take 1 tablet by mouth every night at bedtime.   4/14/2024    Trelegy Ellipta 200-62.5-25 MCG/ACT aerosol powder  Inhale 1 puff Daily.   4/14/2024    TRIAMTERENE PO Take 25 mg by mouth Daily.   4/14/2024    acetaminophen (TYLENOL) 325 MG tablet Take 2 tablets by mouth 2 (Two) Times a Day As Needed for Mild Pain. 60 tablet 0     docusate sodium (COLACE) 100 MG capsule Take 1 capsule by mouth 2 (Two) Times a Day. 60 capsule 0  More than a month    HYDROcodone-acetaminophen (Norco) 7.5-325 MG per tablet Take 1 tablet by mouth Every 8 (Eight) Hours As Needed for Moderate Pain. 30 tablet 0 More than a month    ibuprofen (ADVIL,MOTRIN) 800 MG tablet Take 1 tablet by mouth 3 (Three) Times a Day. 90 tablet 1     ondansetron (Zofran) 4 MG tablet Take 1 tablet by mouth Every 8 (Eight) Hours As Needed for Nausea or Vomiting. 12 tablet 0 More than a month    traMADol (ULTRAM) 50 MG tablet Take 1 tablet by mouth Every 8 (Eight) Hours As Needed for Moderate Pain. 30 tablet 0 More than a month       Allergies: No known drug allergies     Family History: No family history of gastrointestinal malignancy, her father had Alzheimer's dementia     Social History: Engaged, daily cigarette smoker, occasional alcohol use     ROS: A comprehensive review of systems was conducted and negative for melena or hematochezia  All other systems reviewed and negative    Physical Exam:  Vitals:    04/15/24 1259   BP: 155/86   Pulse: 96   Resp: 16   SpO2: 96%     Height: 152 cm  Weight: 82 kg  BMI: 35.49  General: No acute distress, well-nourished & well-developed  HEAD: normocephalic, atraumatic  EYES: normal conjunctiva, sclera anicteric  EARS: grossly normal hearing  NECK: supple, no thyromegaly  CARDIOVASCULAR: regular rate and rhythm  RESPIRATORY: clear to auscultation bilaterally  GASTROINTESTINAL: soft, nontender, non-distended  PSYCHIATRIC: oriented x3, normal mood and affect    ASSESSMENT & PLAN  Mrs. Liu is a 63-year-old lady here for repeat screening colonoscopy given a history of adenomatous colon polyps.  She has been counseled on the risks of the procedure to include bleeding, colon perforation, and possible missed pathology.  Despite these risks, she has consented to proceed.    Leatha Johns MD  General, Robotic, and Endoscopic Surgery  [facility]    [address], [address]  [place] [address]  P: [phone]  F: [phone]

## 2024-04-15 NOTE — DISCHARGE INSTRUCTIONS
For the next 24 hours patient needs to be with a responsible adult.    For 24 hours DO NOT drive, operate machinery, appliances, drink alcohol, make important decisions or sign legal documents.    Start with a light or bland diet if you are feeling sick to your stomach otherwise advance to regular diet as tolerated.    Follow recommendations on procedure report if provided by your doctor.    Call Dr Johns for problems .    Problems may include but not limited to: large amounts of bleeding, trouble breathing, repeated vomiting, severe unrelieved pain, fever or chills.

## 2024-04-16 LAB
LAB AP CASE REPORT: NORMAL
PATH REPORT.FINAL DX SPEC: NORMAL
PATH REPORT.GROSS SPEC: NORMAL

## 2024-04-17 ENCOUNTER — TELEPHONE (OUTPATIENT)
Dept: SURGERY | Facility: CLINIC | Age: 64
End: 2024-04-17
Payer: MEDICARE

## 2024-04-17 NOTE — TELEPHONE ENCOUNTER
Pt called in shortly after we called her.  She is asking if this test can be performed under anesthesia?  If so, she would be willing to do it this way.

## 2024-04-17 NOTE — TELEPHONE ENCOUNTER
Cld pt and informed her of Dr Johns's message and recommendation for the BE.  Pt stated scheduling had already called her and she is refusing the test.  She stated she had one previously and it was humiliating and will never do again.    Message sent back to Dr Johns to inform.

## 2024-04-17 NOTE — TELEPHONE ENCOUNTER
----- Message from Leatha Johns MD sent at 4/17/2024  1:07 PM EDT -----  Rozina, please call this patient and let her know the single polyp identified and removed came back benign but showed some precancerous potential to it.  That precancerous potential is removed when the polyp is removed, but it is possible she may have other precancerous polyps elsewhere throughout the colon and that is why it was important to do the barium enema ([facility] has been working with the patient to get this arranged) to identify any other polyps as she may need to have a laparoscopic surgery to remove those.    Thanks,  KAYE

## 2024-04-18 NOTE — TELEPHONE ENCOUNTER
I called and spoke with the pt - explained to her that RAD does not do the BE under anesthesia.  I read Dr Johns's msg to her and expressed the need for the test.   Pt said she would think on it and call back if she decides to move forward.

## 2024-04-18 NOTE — TELEPHONE ENCOUNTER
I spoke with Rozina in [facility]/ [facility] and she stated the do NOT do this test under anesthesia.  They need the pt alert and awake to be able to complete.

## 2024-08-05 ENCOUNTER — APPOINTMENT (OUTPATIENT)
Dept: CARDIOLOGY | Facility: HOSPITAL | Age: 64
DRG: 190 | End: 2024-08-05
Payer: MEDICARE

## 2024-08-05 ENCOUNTER — APPOINTMENT (OUTPATIENT)
Dept: GENERAL RADIOLOGY | Facility: HOSPITAL | Age: 64
DRG: 190 | End: 2024-08-05
Payer: MEDICARE

## 2024-08-05 ENCOUNTER — HOSPITAL ENCOUNTER (INPATIENT)
Facility: HOSPITAL | Age: 64
LOS: 10 days | Discharge: HOME HEALTH | DRG: 190 | End: 2024-08-16
Attending: EMERGENCY MEDICINE | Admitting: STUDENT IN AN ORGANIZED HEALTH CARE EDUCATION/TRAINING PROGRAM
Payer: MEDICARE

## 2024-08-05 ENCOUNTER — APPOINTMENT (OUTPATIENT)
Dept: ULTRASOUND IMAGING | Facility: HOSPITAL | Age: 64
DRG: 190 | End: 2024-08-05
Payer: MEDICARE

## 2024-08-05 DIAGNOSIS — K92.1 MELENA: ICD-10-CM

## 2024-08-05 DIAGNOSIS — N17.9 ACUTE RENAL FAILURE, UNSPECIFIED ACUTE RENAL FAILURE TYPE: ICD-10-CM

## 2024-08-05 DIAGNOSIS — D62 ACUTE BLOOD LOSS ANEMIA: Primary | ICD-10-CM

## 2024-08-05 DIAGNOSIS — E87.5 HYPERKALEMIA: ICD-10-CM

## 2024-08-05 DIAGNOSIS — J44.1 ACUTE EXACERBATION OF CHRONIC OBSTRUCTIVE PULMONARY DISEASE (COPD): ICD-10-CM

## 2024-08-05 DIAGNOSIS — K21.9 GASTROESOPHAGEAL REFLUX DISEASE, UNSPECIFIED WHETHER ESOPHAGITIS PRESENT: ICD-10-CM

## 2024-08-05 DIAGNOSIS — K70.30 ALCOHOLIC CIRRHOSIS OF LIVER WITHOUT ASCITES: ICD-10-CM

## 2024-08-05 DIAGNOSIS — K92.2 GASTROINTESTINAL HEMORRHAGE, UNSPECIFIED GASTROINTESTINAL HEMORRHAGE TYPE: ICD-10-CM

## 2024-08-05 DIAGNOSIS — R13.10 DYSPHAGIA, UNSPECIFIED TYPE: ICD-10-CM

## 2024-08-05 DIAGNOSIS — F10.20 ALCOHOLISM: ICD-10-CM

## 2024-08-05 LAB
ABO GROUP BLD: NORMAL
ALBUMIN SERPL-MCNC: 3.6 G/DL (ref 3.5–5.2)
ALBUMIN/GLOB SERPL: 1.2 G/DL
ALP SERPL-CCNC: 146 U/L (ref 39–117)
ALT SERPL W P-5'-P-CCNC: 65 U/L (ref 1–33)
AMPHET+METHAMPHET UR QL: NEGATIVE
ANION GAP SERPL CALCULATED.3IONS-SCNC: 18 MMOL/L (ref 5–15)
ANION GAP SERPL CALCULATED.3IONS-SCNC: 23.6 MMOL/L (ref 5–15)
AST SERPL-CCNC: 209 U/L (ref 1–32)
BARBITURATES UR QL SCN: NEGATIVE
BASOPHILS # BLD AUTO: 0.01 10*3/MM3 (ref 0–0.2)
BASOPHILS # BLD AUTO: 0.03 10*3/MM3 (ref 0–0.2)
BASOPHILS NFR BLD AUTO: 0.2 % (ref 0–1.5)
BASOPHILS NFR BLD AUTO: 0.4 % (ref 0–1.5)
BENZODIAZ UR QL SCN: POSITIVE
BH CV LOWER VASCULAR LEFT COMMON FEMORAL AUGMENT: NORMAL
BH CV LOWER VASCULAR LEFT COMMON FEMORAL COMPETENT: NORMAL
BH CV LOWER VASCULAR LEFT COMMON FEMORAL COMPRESS: NORMAL
BH CV LOWER VASCULAR LEFT COMMON FEMORAL PHASIC: NORMAL
BH CV LOWER VASCULAR LEFT COMMON FEMORAL SPONT: NORMAL
BH CV LOWER VASCULAR RIGHT COMMON FEMORAL AUGMENT: NORMAL
BH CV LOWER VASCULAR RIGHT COMMON FEMORAL COMPETENT: NORMAL
BH CV LOWER VASCULAR RIGHT COMMON FEMORAL COMPRESS: NORMAL
BH CV LOWER VASCULAR RIGHT COMMON FEMORAL PHASIC: NORMAL
BH CV LOWER VASCULAR RIGHT COMMON FEMORAL SPONT: NORMAL
BH CV LOWER VASCULAR RIGHT DISTAL FEMORAL COMPRESS: NORMAL
BH CV LOWER VASCULAR RIGHT GASTRONEMIUS COMPRESS: NORMAL
BH CV LOWER VASCULAR RIGHT GREATER SAPH AK COMPRESS: NORMAL
BH CV LOWER VASCULAR RIGHT GREATER SAPH BK COMPRESS: NORMAL
BH CV LOWER VASCULAR RIGHT LESSER SAPH COMPRESS: NORMAL
BH CV LOWER VASCULAR RIGHT MID FEMORAL AUGMENT: NORMAL
BH CV LOWER VASCULAR RIGHT MID FEMORAL COMPETENT: NORMAL
BH CV LOWER VASCULAR RIGHT MID FEMORAL COMPRESS: NORMAL
BH CV LOWER VASCULAR RIGHT MID FEMORAL PHASIC: NORMAL
BH CV LOWER VASCULAR RIGHT MID FEMORAL SPONT: NORMAL
BH CV LOWER VASCULAR RIGHT PERONEAL COMPRESS: NORMAL
BH CV LOWER VASCULAR RIGHT POPLITEAL AUGMENT: NORMAL
BH CV LOWER VASCULAR RIGHT POPLITEAL COMPETENT: NORMAL
BH CV LOWER VASCULAR RIGHT POPLITEAL COMPRESS: NORMAL
BH CV LOWER VASCULAR RIGHT POPLITEAL PHASIC: NORMAL
BH CV LOWER VASCULAR RIGHT POPLITEAL SPONT: NORMAL
BH CV LOWER VASCULAR RIGHT POSTERIOR TIBIAL COMPRESS: NORMAL
BH CV LOWER VASCULAR RIGHT PROFUNDA FEMORAL COMPRESS: NORMAL
BH CV LOWER VASCULAR RIGHT PROXIMAL FEMORAL COMPRESS: NORMAL
BH CV LOWER VASCULAR RIGHT SAPHENOFEMORAL JUNCTION COMPRESS: NORMAL
BH CV VAS HEPATIC PORTAL VEIN DIAMETER: 0.97 CM
BH CV VAS HEPATOPORTAL HEPATIC V LT DIRECTION: NORMAL
BH CV VAS HEPATOPORTAL HEPATIC V MID DIRECTION: NORMAL
BH CV VAS HEPATOPORTAL HEPATIC V RT DIRECTION: NORMAL
BH CV VAS HEPATOPORTAL IVC CONFLUENCE SPONT: NORMAL
BH CV VAS HEPATOPORTAL PORTAL V LT INTRA DIRECTION: NORMAL
BH CV VAS HEPATOPORTAL PORTAL V MAIN INTRA DIRECTION: NORMAL
BH CV VAS HEPATOPORTAL PORTAL V PSV: 26.2 CM/S
BH CV VAS HEPATOPORTAL PORTAL V RT INTRA DIRECTION: NORMAL
BH CV VAS HEPATOPORTAL SMV DIRECTION: NORMAL
BH CV VAS HEPATOPORTAL SMV PSV: 28.5 CM/S
BH CV VAS HEPATOPORTAL SPLENIC DIRECTION: NORMAL
BH CV VAS HEPATOPORTAL SPLENIC V PSV: 17.8 CM/S
BH CV VAS POP FLUID COLLECTED: 1
BH CV VAS PRELIMINARY FINDINGS SCRIPTING: 1
BH CV VAS SMA HEPATIC EDV: 31.8 CM/S
BH CV VAS SMA HEPATIC PSV: 104 CM/S
BH CV VAS SMA SPLENIC EDV: 40 CM/S
BH CV VAS SMA SPLENIC PSV: 153 CM/S
BILIRUB SERPL-MCNC: 2.6 MG/DL (ref 0–1.2)
BILIRUB UR QL STRIP: NEGATIVE
BLD GP AB SCN SERPL QL: NEGATIVE
BUN SERPL-MCNC: 39 MG/DL (ref 8–23)
BUN SERPL-MCNC: 42 MG/DL (ref 8–23)
BUN/CREAT SERPL: 11.1 (ref 7–25)
BUN/CREAT SERPL: 14.3 (ref 7–25)
CALCIUM SPEC-SCNC: 8.4 MG/DL (ref 8.6–10.5)
CALCIUM SPEC-SCNC: 8.7 MG/DL (ref 8.6–10.5)
CANNABINOIDS SERPL QL: NEGATIVE
CHLORIDE SERPL-SCNC: 87 MMOL/L (ref 98–107)
CHLORIDE SERPL-SCNC: 88 MMOL/L (ref 98–107)
CLARITY UR: CLEAR
CO2 SERPL-SCNC: 18 MMOL/L (ref 22–29)
CO2 SERPL-SCNC: 18.4 MMOL/L (ref 22–29)
COCAINE UR QL: NEGATIVE
COLOR UR: YELLOW
CREAT SERPL-MCNC: 2.94 MG/DL (ref 0.57–1)
CREAT SERPL-MCNC: 3.5 MG/DL (ref 0.57–1)
CREAT UR-MCNC: 114 MG/DL
D-LACTATE SERPL-SCNC: 2.9 MMOL/L (ref 0.5–2)
D-LACTATE SERPL-SCNC: 3.2 MMOL/L (ref 0.5–2)
DEPRECATED RDW RBC AUTO: 44.7 FL (ref 37–54)
DEPRECATED RDW RBC AUTO: 48 FL (ref 37–54)
EGFRCR SERPLBLD CKD-EPI 2021: 14 ML/MIN/1.73
EGFRCR SERPLBLD CKD-EPI 2021: 17.3 ML/MIN/1.73
EOSINOPHIL # BLD AUTO: 0 10*3/MM3 (ref 0–0.4)
EOSINOPHIL # BLD AUTO: 0.06 10*3/MM3 (ref 0–0.4)
EOSINOPHIL NFR BLD AUTO: 0 % (ref 0.3–6.2)
EOSINOPHIL NFR BLD AUTO: 0.7 % (ref 0.3–6.2)
ERYTHROCYTE [DISTWIDTH] IN BLOOD BY AUTOMATED COUNT: 15.4 % (ref 12.3–15.4)
ERYTHROCYTE [DISTWIDTH] IN BLOOD BY AUTOMATED COUNT: 16.1 % (ref 12.3–15.4)
ETHANOL BLD-MCNC: 148 MG/DL (ref 0–10)
ETHANOL UR QL: 0.15 %
FENTANYL UR-MCNC: NEGATIVE NG/ML
GEN 5 2HR TROPONIN T REFLEX: 48 NG/L
GLOBULIN UR ELPH-MCNC: 2.9 GM/DL
GLUCOSE BLDC GLUCOMTR-MCNC: 107 MG/DL (ref 70–130)
GLUCOSE BLDC GLUCOMTR-MCNC: 122 MG/DL (ref 70–130)
GLUCOSE BLDC GLUCOMTR-MCNC: 123 MG/DL (ref 70–130)
GLUCOSE BLDC GLUCOMTR-MCNC: 168 MG/DL (ref 70–130)
GLUCOSE BLDC GLUCOMTR-MCNC: 172 MG/DL (ref 70–130)
GLUCOSE SERPL-MCNC: 129 MG/DL (ref 65–99)
GLUCOSE SERPL-MCNC: 65 MG/DL (ref 65–99)
GLUCOSE UR STRIP-MCNC: NEGATIVE MG/DL
HBV SURFACE AG SERPL QL IA: NORMAL
HCT VFR BLD AUTO: 23.8 % (ref 34–46.6)
HCT VFR BLD AUTO: 24.4 % (ref 34–46.6)
HGB BLD-MCNC: 7.3 G/DL (ref 12–15.9)
HGB BLD-MCNC: 7.4 G/DL (ref 12–15.9)
HGB UR QL STRIP.AUTO: NEGATIVE
IMM GRANULOCYTES # BLD AUTO: 0.03 10*3/MM3 (ref 0–0.05)
IMM GRANULOCYTES # BLD AUTO: 0.05 10*3/MM3 (ref 0–0.05)
IMM GRANULOCYTES NFR BLD AUTO: 0.4 % (ref 0–0.5)
IMM GRANULOCYTES NFR BLD AUTO: 0.8 % (ref 0–0.5)
INR PPP: 1.4 (ref 0.9–1.1)
KETONES UR QL STRIP: ABNORMAL
LEUKOCYTE ESTERASE UR QL STRIP.AUTO: NEGATIVE
LYMPHOCYTES # BLD AUTO: 0.29 10*3/MM3 (ref 0.7–3.1)
LYMPHOCYTES # BLD AUTO: 0.85 10*3/MM3 (ref 0.7–3.1)
LYMPHOCYTES NFR BLD AUTO: 10.4 % (ref 19.6–45.3)
LYMPHOCYTES NFR BLD AUTO: 4.6 % (ref 19.6–45.3)
MAGNESIUM SERPL-MCNC: 2.8 MG/DL (ref 1.6–2.4)
MCH RBC QN AUTO: 24.9 PG (ref 26.6–33)
MCH RBC QN AUTO: 25.8 PG (ref 26.6–33)
MCHC RBC AUTO-ENTMCNC: 30.3 G/DL (ref 31.5–35.7)
MCHC RBC AUTO-ENTMCNC: 30.7 G/DL (ref 31.5–35.7)
MCV RBC AUTO: 82.2 FL (ref 79–97)
MCV RBC AUTO: 84.1 FL (ref 79–97)
METHADONE UR QL SCN: NEGATIVE
MONOCYTES # BLD AUTO: 0.06 10*3/MM3 (ref 0.1–0.9)
MONOCYTES # BLD AUTO: 0.74 10*3/MM3 (ref 0.1–0.9)
MONOCYTES NFR BLD AUTO: 0.9 % (ref 5–12)
MONOCYTES NFR BLD AUTO: 9.1 % (ref 5–12)
NEUTROPHILS NFR BLD AUTO: 5.95 10*3/MM3 (ref 1.7–7)
NEUTROPHILS NFR BLD AUTO: 6.45 10*3/MM3 (ref 1.7–7)
NEUTROPHILS NFR BLD AUTO: 79 % (ref 42.7–76)
NEUTROPHILS NFR BLD AUTO: 93.5 % (ref 42.7–76)
NITRITE UR QL STRIP: NEGATIVE
NRBC BLD AUTO-RTO: 0 /100 WBC (ref 0–0.2)
NRBC BLD AUTO-RTO: 0 /100 WBC (ref 0–0.2)
NT-PROBNP SERPL-MCNC: 605 PG/ML (ref 0–900)
OPIATES UR QL: POSITIVE
OXYCODONE UR QL SCN: NEGATIVE
PH UR STRIP.AUTO: <=5 [PH] (ref 5–8)
PLATELET # BLD AUTO: 103 10*3/MM3 (ref 140–450)
PLATELET # BLD AUTO: 192 10*3/MM3 (ref 140–450)
PMV BLD AUTO: 10.6 FL (ref 6–12)
PMV BLD AUTO: 12 FL (ref 6–12)
POTASSIUM SERPL-SCNC: 5.7 MMOL/L (ref 3.5–5.2)
POTASSIUM SERPL-SCNC: 6 MMOL/L (ref 3.5–5.2)
POTASSIUM SERPL-SCNC: 6 MMOL/L (ref 3.5–5.2)
PROT ?TM UR-MCNC: 10.7 MG/DL
PROT SERPL-MCNC: 6.5 G/DL (ref 6–8.5)
PROT UR QL STRIP: NEGATIVE
PROT/CREAT UR: 93.9 MG/G CREA (ref 0–200)
PROTHROMBIN TIME: 17.4 SECONDS (ref 11.7–14.2)
QT INTERVAL: 425 MS
QTC INTERVAL: 446 MS
RBC # BLD AUTO: 2.83 10*6/MM3 (ref 3.77–5.28)
RBC # BLD AUTO: 2.97 10*6/MM3 (ref 3.77–5.28)
RH BLD: POSITIVE
SODIUM SERPL-SCNC: 123 MMOL/L (ref 136–145)
SODIUM SERPL-SCNC: 130 MMOL/L (ref 136–145)
SODIUM UR-SCNC: <20 MMOL/L
SP GR UR STRIP: 1.01 (ref 1–1.03)
T&S EXPIRATION DATE: NORMAL
TROPONIN T DELTA: -8 NG/L
TROPONIN T SERPL HS-MCNC: 56 NG/L
UROBILINOGEN UR QL STRIP: ABNORMAL
WBC NRBC COR # BLD AUTO: 6.36 10*3/MM3 (ref 3.4–10.8)
WBC NRBC COR # BLD AUTO: 8.16 10*3/MM3 (ref 3.4–10.8)

## 2024-08-05 PROCEDURE — G0378 HOSPITAL OBSERVATION PER HR: HCPCS

## 2024-08-05 PROCEDURE — 94799 UNLISTED PULMONARY SVC/PX: CPT

## 2024-08-05 PROCEDURE — 80307 DRUG TEST PRSMV CHEM ANLYZR: CPT | Performed by: EMERGENCY MEDICINE

## 2024-08-05 PROCEDURE — 25010000002 CEFTRIAXONE PER 250 MG: Performed by: EMERGENCY MEDICINE

## 2024-08-05 PROCEDURE — 83735 ASSAY OF MAGNESIUM: CPT | Performed by: EMERGENCY MEDICINE

## 2024-08-05 PROCEDURE — 87340 HEPATITIS B SURFACE AG IA: CPT | Performed by: INTERNAL MEDICINE

## 2024-08-05 PROCEDURE — 86901 BLOOD TYPING SEROLOGIC RH(D): CPT | Performed by: EMERGENCY MEDICINE

## 2024-08-05 PROCEDURE — 76705 ECHO EXAM OF ABDOMEN: CPT

## 2024-08-05 PROCEDURE — 85610 PROTHROMBIN TIME: CPT | Performed by: EMERGENCY MEDICINE

## 2024-08-05 PROCEDURE — 93971 EXTREMITY STUDY: CPT

## 2024-08-05 PROCEDURE — 25010000002 METHYLPREDNISOLONE PER 125 MG: Performed by: EMERGENCY MEDICINE

## 2024-08-05 PROCEDURE — 25010000002 BUMETANIDE PER 0.5 MG: Performed by: INTERNAL MEDICINE

## 2024-08-05 PROCEDURE — 81003 URINALYSIS AUTO W/O SCOPE: CPT | Performed by: INTERNAL MEDICINE

## 2024-08-05 PROCEDURE — 93005 ELECTROCARDIOGRAM TRACING: CPT | Performed by: EMERGENCY MEDICINE

## 2024-08-05 PROCEDURE — 80053 COMPREHEN METABOLIC PANEL: CPT | Performed by: INTERNAL MEDICINE

## 2024-08-05 PROCEDURE — 99291 CRITICAL CARE FIRST HOUR: CPT

## 2024-08-05 PROCEDURE — 86900 BLOOD TYPING SEROLOGIC ABO: CPT | Performed by: EMERGENCY MEDICINE

## 2024-08-05 PROCEDURE — 84132 ASSAY OF SERUM POTASSIUM: CPT | Performed by: INTERNAL MEDICINE

## 2024-08-05 PROCEDURE — 94640 AIRWAY INHALATION TREATMENT: CPT

## 2024-08-05 PROCEDURE — 76775 US EXAM ABDO BACK WALL LIM: CPT

## 2024-08-05 PROCEDURE — 94760 N-INVAS EAR/PLS OXIMETRY 1: CPT

## 2024-08-05 PROCEDURE — 84156 ASSAY OF PROTEIN URINE: CPT | Performed by: INTERNAL MEDICINE

## 2024-08-05 PROCEDURE — 93971 EXTREMITY STUDY: CPT | Performed by: SURGERY

## 2024-08-05 PROCEDURE — 36415 COLL VENOUS BLD VENIPUNCTURE: CPT | Performed by: INTERNAL MEDICINE

## 2024-08-05 PROCEDURE — 93975 VASCULAR STUDY: CPT | Performed by: SURGERY

## 2024-08-05 PROCEDURE — 94664 DEMO&/EVAL PT USE INHALER: CPT

## 2024-08-05 PROCEDURE — P9047 ALBUMIN (HUMAN), 25%, 50ML: HCPCS | Performed by: INTERNAL MEDICINE

## 2024-08-05 PROCEDURE — 86923 COMPATIBILITY TEST ELECTRIC: CPT

## 2024-08-05 PROCEDURE — 83880 ASSAY OF NATRIURETIC PEPTIDE: CPT | Performed by: EMERGENCY MEDICINE

## 2024-08-05 PROCEDURE — 82570 ASSAY OF URINE CREATININE: CPT | Performed by: INTERNAL MEDICINE

## 2024-08-05 PROCEDURE — 71045 X-RAY EXAM CHEST 1 VIEW: CPT

## 2024-08-05 PROCEDURE — 25010000002 OCTREOTIDE PER 25 MCG: Performed by: EMERGENCY MEDICINE

## 2024-08-05 PROCEDURE — 85025 COMPLETE CBC W/AUTO DIFF WBC: CPT | Performed by: INTERNAL MEDICINE

## 2024-08-05 PROCEDURE — 63710000001 INSULIN REGULAR HUMAN PER 5 UNITS: Performed by: INTERNAL MEDICINE

## 2024-08-05 PROCEDURE — 82077 ASSAY SPEC XCP UR&BREATH IA: CPT | Performed by: EMERGENCY MEDICINE

## 2024-08-05 PROCEDURE — 84300 ASSAY OF URINE SODIUM: CPT | Performed by: INTERNAL MEDICINE

## 2024-08-05 PROCEDURE — 94761 N-INVAS EAR/PLS OXIMETRY MLT: CPT

## 2024-08-05 PROCEDURE — 25010000002 CALCIUM GLUCONATE-NACL 1-0.675 GM/50ML-% SOLUTION: Performed by: EMERGENCY MEDICINE

## 2024-08-05 PROCEDURE — 84484 ASSAY OF TROPONIN QUANT: CPT | Performed by: EMERGENCY MEDICINE

## 2024-08-05 PROCEDURE — 25010000002 ALBUMIN HUMAN 25% PER 50 ML: Performed by: INTERNAL MEDICINE

## 2024-08-05 PROCEDURE — 93975 VASCULAR STUDY: CPT

## 2024-08-05 PROCEDURE — 83605 ASSAY OF LACTIC ACID: CPT | Performed by: INTERNAL MEDICINE

## 2024-08-05 PROCEDURE — 99204 OFFICE O/P NEW MOD 45 MIN: CPT | Performed by: INTERNAL MEDICINE

## 2024-08-05 PROCEDURE — 63710000001 INSULIN REGULAR HUMAN PER 5 UNITS: Performed by: EMERGENCY MEDICINE

## 2024-08-05 PROCEDURE — 86850 RBC ANTIBODY SCREEN: CPT | Performed by: EMERGENCY MEDICINE

## 2024-08-05 PROCEDURE — 25010000002 THIAMINE PER 100 MG: Performed by: EMERGENCY MEDICINE

## 2024-08-05 PROCEDURE — 85025 COMPLETE CBC W/AUTO DIFF WBC: CPT | Performed by: EMERGENCY MEDICINE

## 2024-08-05 PROCEDURE — 82948 REAGENT STRIP/BLOOD GLUCOSE: CPT

## 2024-08-05 PROCEDURE — 93010 ELECTROCARDIOGRAM REPORT: CPT | Performed by: INTERNAL MEDICINE

## 2024-08-05 RX ORDER — ACETAMINOPHEN 160 MG/5ML
650 SOLUTION ORAL EVERY 4 HOURS PRN
Status: DISCONTINUED | OUTPATIENT
Start: 2024-08-05 | End: 2024-08-15

## 2024-08-05 RX ORDER — ROPINIROLE 1 MG/1
1 TABLET, FILM COATED ORAL NIGHTLY
Status: DISCONTINUED | OUTPATIENT
Start: 2024-08-05 | End: 2024-08-16 | Stop reason: HOSPADM

## 2024-08-05 RX ORDER — LORAZEPAM 2 MG/ML
1 INJECTION INTRAMUSCULAR
Status: DISCONTINUED | OUTPATIENT
Start: 2024-08-05 | End: 2024-08-10

## 2024-08-05 RX ORDER — ONDANSETRON 2 MG/ML
4 INJECTION INTRAMUSCULAR; INTRAVENOUS EVERY 6 HOURS PRN
Status: DISCONTINUED | OUTPATIENT
Start: 2024-08-05 | End: 2024-08-16 | Stop reason: HOSPADM

## 2024-08-05 RX ORDER — ATENOLOL 25 MG/1
25 TABLET ORAL DAILY
Status: DISCONTINUED | OUTPATIENT
Start: 2024-08-06 | End: 2024-08-16 | Stop reason: HOSPADM

## 2024-08-05 RX ORDER — LORAZEPAM 2 MG/ML
2 INJECTION INTRAMUSCULAR
Status: DISCONTINUED | OUTPATIENT
Start: 2024-08-05 | End: 2024-08-10

## 2024-08-05 RX ORDER — SODIUM CHLORIDE 0.9 % (FLUSH) 0.9 %
10 SYRINGE (ML) INJECTION AS NEEDED
Status: DISCONTINUED | OUTPATIENT
Start: 2024-08-05 | End: 2024-08-16 | Stop reason: HOSPADM

## 2024-08-05 RX ORDER — SODIUM CHLORIDE 0.9 % (FLUSH) 0.9 %
10 SYRINGE (ML) INJECTION AS NEEDED
Status: DISCONTINUED | OUTPATIENT
Start: 2024-08-05 | End: 2024-08-15

## 2024-08-05 RX ORDER — HYDROCODONE BITARTRATE AND ACETAMINOPHEN 7.5; 325 MG/1; MG/1
1 TABLET ORAL EVERY 8 HOURS PRN
Status: DISCONTINUED | OUTPATIENT
Start: 2024-08-05 | End: 2024-08-16 | Stop reason: HOSPADM

## 2024-08-05 RX ORDER — LORAZEPAM 1 MG/1
2 TABLET ORAL
Status: DISCONTINUED | OUTPATIENT
Start: 2024-08-05 | End: 2024-08-10

## 2024-08-05 RX ORDER — CALCIUM GLUCONATE 20 MG/ML
1000 INJECTION, SOLUTION INTRAVENOUS ONCE
Status: COMPLETED | OUTPATIENT
Start: 2024-08-05 | End: 2024-08-05

## 2024-08-05 RX ORDER — IPRATROPIUM BROMIDE AND ALBUTEROL SULFATE 2.5; .5 MG/3ML; MG/3ML
3 SOLUTION RESPIRATORY (INHALATION) 4 TIMES DAILY
Status: DISCONTINUED | OUTPATIENT
Start: 2024-08-05 | End: 2024-08-12

## 2024-08-05 RX ORDER — FUROSEMIDE 20 MG/1
20 TABLET ORAL DAILY
COMMUNITY
Start: 2024-07-25 | End: 2024-08-16 | Stop reason: HOSPADM

## 2024-08-05 RX ORDER — PANTOPRAZOLE SODIUM 40 MG/10ML
80 INJECTION, POWDER, LYOPHILIZED, FOR SOLUTION INTRAVENOUS ONCE
Status: COMPLETED | OUTPATIENT
Start: 2024-08-05 | End: 2024-08-05

## 2024-08-05 RX ORDER — CHOLECALCIFEROL (VITAMIN D3) 25 MCG
1 TABLET ORAL DAILY
COMMUNITY
Start: 2024-01-01

## 2024-08-05 RX ORDER — NICOTINE 21 MG/24HR
1 PATCH, TRANSDERMAL 24 HOURS TRANSDERMAL
Status: DISCONTINUED | OUTPATIENT
Start: 2024-08-06 | End: 2024-08-06

## 2024-08-05 RX ORDER — LORAZEPAM 1 MG/1
1 TABLET ORAL DAILY
Status: DISCONTINUED | OUTPATIENT
Start: 2024-08-08 | End: 2024-08-06

## 2024-08-05 RX ORDER — BUMETANIDE 0.25 MG/ML
2 INJECTION INTRAMUSCULAR; INTRAVENOUS ONCE
Status: COMPLETED | OUTPATIENT
Start: 2024-08-05 | End: 2024-08-05

## 2024-08-05 RX ORDER — METHYLPREDNISOLONE SODIUM SUCCINATE 125 MG/2ML
125 INJECTION, POWDER, LYOPHILIZED, FOR SOLUTION INTRAMUSCULAR; INTRAVENOUS ONCE
Status: COMPLETED | OUTPATIENT
Start: 2024-08-05 | End: 2024-08-05

## 2024-08-05 RX ORDER — ONDANSETRON 4 MG/1
4 TABLET, ORALLY DISINTEGRATING ORAL EVERY 6 HOURS PRN
Status: DISCONTINUED | OUTPATIENT
Start: 2024-08-05 | End: 2024-08-16 | Stop reason: HOSPADM

## 2024-08-05 RX ORDER — OCTREOTIDE ACETATE 50 UG/ML
50 INJECTION, SOLUTION INTRAVENOUS; SUBCUTANEOUS ONCE
Status: COMPLETED | OUTPATIENT
Start: 2024-08-05 | End: 2024-08-05

## 2024-08-05 RX ORDER — ACETAMINOPHEN 325 MG/1
650 TABLET ORAL EVERY 4 HOURS PRN
Status: DISCONTINUED | OUTPATIENT
Start: 2024-08-05 | End: 2024-08-16 | Stop reason: HOSPADM

## 2024-08-05 RX ORDER — FOLIC ACID 1 MG/1
1 TABLET ORAL DAILY
Status: DISCONTINUED | OUTPATIENT
Start: 2024-08-05 | End: 2024-08-15

## 2024-08-05 RX ORDER — ALBUTEROL SULFATE 2.5 MG/3ML
2.5 SOLUTION RESPIRATORY (INHALATION)
Status: COMPLETED | OUTPATIENT
Start: 2024-08-05 | End: 2024-08-05

## 2024-08-05 RX ORDER — DEXTROSE MONOHYDRATE 25 G/50ML
25 INJECTION, SOLUTION INTRAVENOUS ONCE
Status: COMPLETED | OUTPATIENT
Start: 2024-08-05 | End: 2024-08-05

## 2024-08-05 RX ORDER — TRIAMTERENE AND HYDROCHLOROTHIAZIDE 37.5; 25 MG/1; MG/1
1 CAPSULE ORAL DAILY
COMMUNITY
Start: 2024-04-30 | End: 2024-08-16 | Stop reason: HOSPADM

## 2024-08-05 RX ORDER — POLYETHYLENE GLYCOL 3350 17 G/17G
17 POWDER, FOR SOLUTION ORAL DAILY PRN
Status: DISCONTINUED | OUTPATIENT
Start: 2024-08-05 | End: 2024-08-16 | Stop reason: HOSPADM

## 2024-08-05 RX ORDER — LORAZEPAM 1 MG/1
1 TABLET ORAL
Status: DISCONTINUED | OUTPATIENT
Start: 2024-08-05 | End: 2024-08-10

## 2024-08-05 RX ORDER — BISACODYL 5 MG/1
5 TABLET, DELAYED RELEASE ORAL DAILY PRN
Status: DISCONTINUED | OUTPATIENT
Start: 2024-08-05 | End: 2024-08-16 | Stop reason: HOSPADM

## 2024-08-05 RX ORDER — SODIUM CHLORIDE 0.9 % (FLUSH) 0.9 %
10 SYRINGE (ML) INJECTION EVERY 12 HOURS SCHEDULED
Status: DISCONTINUED | OUTPATIENT
Start: 2024-08-05 | End: 2024-08-16 | Stop reason: HOSPADM

## 2024-08-05 RX ORDER — ALBUMIN (HUMAN) 12.5 G/50ML
25 SOLUTION INTRAVENOUS EVERY 8 HOURS
Status: COMPLETED | OUTPATIENT
Start: 2024-08-05 | End: 2024-08-06

## 2024-08-05 RX ORDER — SODIUM CHLORIDE 9 MG/ML
40 INJECTION, SOLUTION INTRAVENOUS AS NEEDED
Status: DISCONTINUED | OUTPATIENT
Start: 2024-08-05 | End: 2024-08-16 | Stop reason: HOSPADM

## 2024-08-05 RX ORDER — LORAZEPAM 1 MG/1
1 TABLET ORAL EVERY 12 HOURS SCHEDULED
Status: DISCONTINUED | OUTPATIENT
Start: 2024-08-07 | End: 2024-08-06

## 2024-08-05 RX ORDER — ATENOLOL 25 MG/1
1 TABLET ORAL DAILY
COMMUNITY
Start: 2024-04-30 | End: 2024-10-27

## 2024-08-05 RX ORDER — BISACODYL 10 MG
10 SUPPOSITORY, RECTAL RECTAL DAILY PRN
Status: DISCONTINUED | OUTPATIENT
Start: 2024-08-05 | End: 2024-08-16 | Stop reason: HOSPADM

## 2024-08-05 RX ORDER — LORAZEPAM 1 MG/1
2 TABLET ORAL EVERY 6 HOURS
Status: DISCONTINUED | OUTPATIENT
Start: 2024-08-05 | End: 2024-08-06

## 2024-08-05 RX ORDER — ACETAMINOPHEN 650 MG/1
650 SUPPOSITORY RECTAL EVERY 4 HOURS PRN
Status: DISCONTINUED | OUTPATIENT
Start: 2024-08-05 | End: 2024-08-15

## 2024-08-05 RX ORDER — BUDESONIDE AND FORMOTEROL FUMARATE DIHYDRATE 160; 4.5 UG/1; UG/1
2 AEROSOL RESPIRATORY (INHALATION)
Status: DISCONTINUED | OUTPATIENT
Start: 2024-08-05 | End: 2024-08-16 | Stop reason: HOSPADM

## 2024-08-05 RX ORDER — LORAZEPAM 1 MG/1
1 TABLET ORAL EVERY 6 HOURS
Status: DISCONTINUED | OUTPATIENT
Start: 2024-08-06 | End: 2024-08-06

## 2024-08-05 RX ORDER — POTASSIUM CHLORIDE 750 MG/1
10 TABLET, EXTENDED RELEASE ORAL DAILY
COMMUNITY
Start: 2024-07-25 | End: 2024-08-16 | Stop reason: HOSPADM

## 2024-08-05 RX ORDER — IPRATROPIUM BROMIDE AND ALBUTEROL SULFATE 2.5; .5 MG/3ML; MG/3ML
3 SOLUTION RESPIRATORY (INHALATION) EVERY 4 HOURS PRN
Status: DISCONTINUED | OUTPATIENT
Start: 2024-08-05 | End: 2024-08-15

## 2024-08-05 RX ORDER — THIAMINE HYDROCHLORIDE 100 MG/ML
200 INJECTION, SOLUTION INTRAMUSCULAR; INTRAVENOUS EVERY 8 HOURS SCHEDULED
Status: COMPLETED | OUTPATIENT
Start: 2024-08-05 | End: 2024-08-10

## 2024-08-05 RX ORDER — AMOXICILLIN 250 MG
2 CAPSULE ORAL 2 TIMES DAILY PRN
Status: DISCONTINUED | OUTPATIENT
Start: 2024-08-05 | End: 2024-08-16 | Stop reason: HOSPADM

## 2024-08-05 RX ORDER — IBUPROFEN 200 MG
400 TABLET ORAL EVERY 6 HOURS PRN
COMMUNITY
End: 2024-08-16 | Stop reason: HOSPADM

## 2024-08-05 RX ADMIN — BUDESONIDE AND FORMOTEROL FUMARATE DIHYDRATE 2 PUFF: 160; 4.5 AEROSOL RESPIRATORY (INHALATION) at 20:56

## 2024-08-05 RX ADMIN — LORAZEPAM 2 MG: 1 TABLET ORAL at 15:59

## 2024-08-05 RX ADMIN — OCTREOTIDE ACETATE 25 MCG/HR: 500 INJECTION, SOLUTION INTRAVENOUS; SUBCUTANEOUS at 09:27

## 2024-08-05 RX ADMIN — BUMETANIDE 2 MG: 0.25 INJECTION INTRAMUSCULAR; INTRAVENOUS at 20:59

## 2024-08-05 RX ADMIN — FOLIC ACID 1 MG: 1 TABLET ORAL at 09:41

## 2024-08-05 RX ADMIN — CALCIUM GLUCONATE 1000 MG: 20 INJECTION, SOLUTION INTRAVENOUS at 10:01

## 2024-08-05 RX ADMIN — LORAZEPAM 2 MG: 1 TABLET ORAL at 21:01

## 2024-08-05 RX ADMIN — IPRATROPIUM BROMIDE AND ALBUTEROL SULFATE 3 ML: .5; 3 SOLUTION RESPIRATORY (INHALATION) at 20:56

## 2024-08-05 RX ADMIN — SODIUM ZIRCONIUM CYCLOSILICATE 10 G: 10 POWDER, FOR SUSPENSION ORAL at 10:48

## 2024-08-05 RX ADMIN — PANTOPRAZOLE SODIUM 80 MG: 40 INJECTION, POWDER, FOR SOLUTION INTRAVENOUS at 09:29

## 2024-08-05 RX ADMIN — THIAMINE HYDROCHLORIDE 200 MG: 100 INJECTION, SOLUTION INTRAMUSCULAR; INTRAVENOUS at 17:51

## 2024-08-05 RX ADMIN — DEXTROSE MONOHYDRATE 25 G: 25 INJECTION, SOLUTION INTRAVENOUS at 10:03

## 2024-08-05 RX ADMIN — PANTOPRAZOLE SODIUM 8 MG/HR: 40 INJECTION, POWDER, FOR SOLUTION INTRAVENOUS at 16:01

## 2024-08-05 RX ADMIN — INSULIN HUMAN 10 UNITS: 100 INJECTION, SOLUTION PARENTERAL at 19:08

## 2024-08-05 RX ADMIN — PANTOPRAZOLE SODIUM 8 MG/HR: 40 INJECTION, POWDER, FOR SOLUTION INTRAVENOUS at 09:23

## 2024-08-05 RX ADMIN — ALBUTEROL SULFATE 2.5 MG: 2.5 SOLUTION RESPIRATORY (INHALATION) at 07:50

## 2024-08-05 RX ADMIN — CEFTRIAXONE 2000 MG: 2 INJECTION, POWDER, FOR SOLUTION INTRAMUSCULAR; INTRAVENOUS at 12:13

## 2024-08-05 RX ADMIN — OCTREOTIDE ACETATE 50 MCG: 50 INJECTION, SOLUTION INTRAVENOUS; SUBCUTANEOUS at 09:30

## 2024-08-05 RX ADMIN — LORAZEPAM 2 MG: 1 TABLET ORAL at 22:37

## 2024-08-05 RX ADMIN — ALBUMIN (HUMAN) 25 G: 0.25 INJECTION, SOLUTION INTRAVENOUS at 19:13

## 2024-08-05 RX ADMIN — THIAMINE HYDROCHLORIDE 200 MG: 100 INJECTION, SOLUTION INTRAMUSCULAR; INTRAVENOUS at 21:01

## 2024-08-05 RX ADMIN — SODIUM BICARBONATE 50 MEQ: 84 INJECTION INTRAVENOUS at 10:07

## 2024-08-05 RX ADMIN — INSULIN HUMAN 5 UNITS: 100 INJECTION, SOLUTION PARENTERAL at 10:05

## 2024-08-05 RX ADMIN — LORAZEPAM 1 MG: 1 TABLET ORAL at 10:48

## 2024-08-05 RX ADMIN — PANTOPRAZOLE SODIUM 8 MG/HR: 40 INJECTION, POWDER, FOR SOLUTION INTRAVENOUS at 21:00

## 2024-08-05 RX ADMIN — Medication 10 ML: at 22:46

## 2024-08-05 RX ADMIN — SODIUM BICARBONATE 50 MEQ: 84 INJECTION INTRAVENOUS at 19:58

## 2024-08-05 RX ADMIN — ALBUTEROL SULFATE 2.5 MG: 2.5 SOLUTION RESPIRATORY (INHALATION) at 08:00

## 2024-08-05 RX ADMIN — METHYLPREDNISOLONE SODIUM SUCCINATE 125 MG: 125 INJECTION INTRAMUSCULAR; INTRAVENOUS at 07:41

## 2024-08-05 RX ADMIN — LORAZEPAM 2 MG: 1 TABLET ORAL at 09:40

## 2024-08-05 RX ADMIN — DEXTROSE MONOHYDRATE 25 G: 25 INJECTION, SOLUTION INTRAVENOUS at 19:05

## 2024-08-05 RX ADMIN — SODIUM ZIRCONIUM CYCLOSILICATE 10 G: 10 POWDER, FOR SUSPENSION ORAL at 21:00

## 2024-08-05 RX ADMIN — ALBUTEROL SULFATE 2.5 MG: 2.5 SOLUTION RESPIRATORY (INHALATION) at 07:55

## 2024-08-05 RX ADMIN — HYDROCODONE BITARTRATE AND ACETAMINOPHEN 1 TABLET: 7.5; 325 TABLET ORAL at 22:54

## 2024-08-05 RX ADMIN — ROPINIROLE 1 MG: 1 TABLET, FILM COATED ORAL at 21:01

## 2024-08-05 NOTE — ED NOTES
Pt to ED from home via [facility]. EMS called for CP. Pt reports intermittent CP x1 week and increased SOA with exertion. Pt reports hx COPD. Pt given 324mg asa by EMS.

## 2024-08-05 NOTE — H&P
HISTORY AND PHYSICAL   [facility]        Patient Identification:  Name: Filomena Liu  Age: 64 y.o.  Sex: female  :  1960  MRN: [id number]                     Primary Care Physician: Fernando Dunn MD    Chief Complaint: Chest pain    History of Present Illness:      The patient  is a 64 y.o. female with a medical history of COPD, depression, GERD, anxiety who presents to the hospital complaining of acute difficulty breathing.  The patient reports that she has been drinking alcohol for the last month.  She states she relapsed about a month ago.  She reports 2 weeks ago her right leg started swelling.  She states she had an ultrasound 2 weeks ago.  She reports for the last week she has had shortness of breath.  She states she has a history of COPD.  She reports she has been overusing her nebulizer.  She reports she has been wheezing.  She states she has been drinking heavily for the last month.  She states she has resources including AA.  She states she has not been to AA in a month.  She states that she has been to rehab in the past but she refuses to go to rehab again.  She states she would rather use her outpatient resources.  She denies any suicidal or homicidal ideation.  She reports now all of her extremities are swelling.  She denies any prior extremity swelling in the past.  The patient was evaluated in the ER and found to be in acute renal failure with creatinine above baseline in the 3 range.  She also had elevated potassium of 6.  Patient's hemoglobin was around 7 and she appeared to be having GI bleeding.  The patient was started on octreotide and Protonix drip in the ER and admitted for further evaluation treatment with consultation with GI medicine and nephrology.  She did get treatment for hyperkalemia in the ER.    Past Medical History:  Past Medical History:   Diagnosis Date    Ankle pain     Ankle wound     LEFT    Anxiety     Arthritis of back     Arthritis of neck  2000    Asthma     Benign tumor of thymus     REMOVED    Bruises easily     Cataract     COPD (chronic obstructive pulmonary disease)     CTS (carpal tunnel syndrome) Surgery 1999    DDD (degenerative disc disease), cervical     Depression     Fracture of wrist 2917    GERD (gastroesophageal reflux disease)     Hip arthrosis Unknown    History of MRSA infection     LEFT ANKLE TREAT [facility]  5YEARS GO    Hyperlipidemia     Hypertension     Knee sprain June 2023    Knee swelling Unknown    Shoulder arthritis 06/05/2023    Shoulder pain, left 07/07/2023    Sleep apnea     NO MACHINE USE    Spinal stenosis     Spondylolisthesis of cervical region      Past Surgical History:  Past Surgical History:   Procedure Laterality Date    BACK SURGERY      cervical disc surgery with fusion-    CHOLECYSTECTOMY      COLONOSCOPY      COLONOSCOPY N/A 11/12/2020    Procedure: COLONOSCOPY, polypectomy;  Surgeon: Filomena Mckeon MD;  Location: [facility] OR;  Service: Gastroenterology;  Laterality: N/A;  Diverticulosis; Hepatic flexure polyp x 1; Polyp at 60cm x 1; Polyp at 50cm x 1- hot snare;    COLONOSCOPY N/A 4/15/2024    Procedure: COLONOSCOPY into sigmoid colon with hot snare polypectomy;  Surgeon: Leatha Johns MD;  Location: [facility] ENDOSCOPY;  Service: General;  Laterality: N/A;  pre- history of polyps  post- diverticulosis, polyp    HAND SURGERY  2000    HYSTERECTOMY      INCISION AND DRAINAGE LEG Left 11/17/2018    Procedure: Incision and Drainage of Left ankle;  Surgeon: Maxwell Lanier MD;  Location: [facility] OR;  Service: Orthopedics    NECK SURGERY  2000    SIGMOIDOSCOPY N/A 3/28/2024    Procedure: SIGMOIDOSCOPY FLEXIBLE;  Surgeon: eLatha Johns MD;  Location: [facility] ENDOSCOPY;  Service: General;  Laterality: N/A;  pre: h/o colon polyps  post: poor prep, diverticulosis    SKIN BIOPSY      SKIN GRAFT SPLIT THICKNESS N/A 02/12/2019    Procedure: debridement SKIN GRAFT SPLIT THICKNESS left ankle wound;  Surgeon:  Barry Worthy MD;  Location:  [facility];  Service: Plastics    TOTAL SHOULDER ARTHROPLASTY Left 7/20/2023    Procedure: Total  shoulder arthroplasty;  Surgeon: Maxwell Lanier MD;  Location:  [facility];  Service: Orthopedics;  Laterality: Left;    TOTAL THYMECTOMY      TRIGGER POINT INJECTION  2000    WRIST FRACTURE SURGERY      CARPAL TUNNEL      Home Meds:  Medications Prior to Admission   Medication Sig Dispense Refill Last Dose    acetaminophen (TYLENOL) 325 MG tablet Take 2 tablets by mouth 2 (Two) Times a Day As Needed for Mild Pain. 60 tablet 0     albuterol (PROVENTIL HFA;VENTOLIN HFA) 108 (90 Base) MCG/ACT inhaler Inhale 2 puffs 3 times a day.   8/5/2024    atenolol (TENORMIN) 25 MG tablet Take 1 tablet by mouth Daily.   8/4/2024    fluticasone (FLONASE) 50 MCG/ACT nasal spray 1 spray into the nostril(s) as directed by provider.   Past Week    furosemide (LASIX) 20 MG tablet Take 1 tablet by mouth Daily.   8/4/2024    HYDROcodone-acetaminophen (Norco) 7.5-325 MG per tablet Take 1 tablet by mouth Every 8 (Eight) Hours As Needed for Moderate Pain. 30 tablet 0 8/4/2024    ibuprofen (ADVIL,MOTRIN) 200 MG tablet Take 2 tablets by mouth Every 6 (Six) Hours As Needed for Mild Pain.   8/5/2024    ipratropium-albuterol (DUO-NEB) 0.5-2.5 mg/3 ml nebulizer Take 3 mL by nebulization 4 (Four) Times a Day.       lisinopril (PRINIVIL,ZESTRIL) 40 MG tablet Take 1 tablet by mouth Daily.   8/4/2024    potassium chloride (KLOR-CON M10) 10 MEQ CR tablet Take 1 tablet by mouth Daily.   8/4/2024    rOPINIRole (REQUIP) 1 MG tablet Take 1 tablet by mouth every night at bedtime.   Past Week    Trelegy Ellipta 200-62.5-25 MCG/ACT aerosol powder  Inhale 1 puff Daily.   8/5/2024    triamterene-hydrochlorothiazide (DYAZIDE) 37.5-25 MG per capsule Take 1 capsule by mouth Daily.   8/4/2024     Current meds    Current Facility-Administered Medications:     albumin human 25 % IV SOLN 25 g, 25 g, Intravenous, Q8H,  Fernando Baker MD    folic acid (FOLVITE) tablet 1 mg, 1 mg, Oral, Daily, Shukri Romero MD, 1 mg at 08/05/24 0941    LORazepam (ATIVAN) tablet 1 mg, 1 mg, Oral, Q1H PRN, 1 mg at 08/05/24 1048 **OR** LORazepam (ATIVAN) injection 1 mg, 1 mg, Intravenous, Q1H PRN **OR** LORazepam (ATIVAN) tablet 2 mg, 2 mg, Oral, Q1H PRN, 2 mg at 08/05/24 1559 **OR** LORazepam (ATIVAN) injection 2 mg, 2 mg, Intravenous, Q1H PRN **OR** LORazepam (ATIVAN) injection 2 mg, 2 mg, Intravenous, Q15 Min PRN **OR** LORazepam (ATIVAN) injection 2 mg, 2 mg, Intramuscular, Q15 Min PRN, Shukri Romero MD    LORazepam (ATIVAN) tablet 2 mg, 2 mg, Oral, Q6H, 2 mg at 08/05/24 0940 **FOLLOWED BY** [START ON 8/6/2024] LORazepam (ATIVAN) tablet 1 mg, 1 mg, Oral, Q6H **FOLLOWED BY** [START ON 8/7/2024] LORazepam (ATIVAN) tablet 1 mg, 1 mg, Oral, Q12H **FOLLOWED BY** [START ON 8/8/2024] LORazepam (ATIVAN) tablet 1 mg, 1 mg, Oral, Daily, Shukri Romero MD    Magnesium Standard Dose Replacement - Follow Nurse / BPA Driven Protocol, , Does not apply, PRN, Shukri Romero MD    octreotide (sandoSTATIN) 500 mcg in sodium chloride 0.9 % 100 mL (5 mcg/mL) infusion, 25 mcg/hr, Intravenous, Continuous, Shukri Romero MD, Last Rate: 5 mL/hr at 08/05/24 0927, 25 mcg/hr at 08/05/24 0927    [COMPLETED] pantoprazole (PROTONIX) injection 80 mg, 80 mg, Intravenous, Once, 80 mg at 08/05/24 0929 **AND** pantoprazole (PROTONIX) 40 mg in sodium chloride 0.9 % 100 mL (0.4 mg/mL) MBP, 8 mg/hr, Intravenous, Continuous, Shukri Romero MD, Last Rate: 20 mL/hr at 08/05/24 1601, 8 mg/hr at 08/05/24 1601    [COMPLETED] Insert Peripheral IV, , , Once **AND** sodium chloride 0.9 % flush 10 mL, 10 mL, Intravenous, PRN, Shukri Romero MD    thiamine (B-1) injection 200 mg, 200 mg, Intravenous, Q8H **FOLLOWED BY** [START ON 8/10/2024] thiamine (VITAMIN B-1) tablet 100 mg, 100 mg, Oral, Daily, Shukri Romero MD  Allergies:  No Known  Allergies  Immunizations:  Immunization History   Administered Date(s) Administered    COVID-19 (PFIZER) Purple Cap Monovalent 2021, 2021     Social History:   Social History     Social History Narrative    Not on file     Social History     Socioeconomic History    Marital status:    Tobacco Use    Smoking status: Every Day     Current packs/day: 0.25     Average packs/day: 0.3 packs/day for 44.6 years (11.1 ttl pk-yrs)     Types: Cigarettes     Start date: 1980     Passive exposure: Current    Smokeless tobacco: Never    Tobacco comments:     I have cut down recently and will abstain starting 7/10/23   Vaping Use    Vaping status: Never Used   Substance and Sexual Activity    Alcohol use: Not Currently     Comment: OCCAS    Drug use: No    Sexual activity: Yes     Partners: Male     Birth control/protection: Post-menopausal, Tubal ligation, Hysterectomy       Family History:  Family History   Problem Relation Age of Onset    Emphysema Mother     Alzheimer's disease Father     Malig Hyperthermia Neg Hx         Review of Systems  See history of present illness and past medical history.  Patient denies headache, dizziness, syncope, falls, trauma, change in vision, change in hearing, change in taste, changes in weight, changes in appetite, focal weakness, numbness, or paresthesia.  Patient denies  orthopnea, PND, cough, sinus pressure, rhinorrhea, epistaxis, hemoptysis, nausea, vomiting, hematemesis, diarrhea, constipation or hematochezia. Denies cold or heat intolerance, polydipsia, polyuria, polyphagia. Denies hematuria, pyuria, dysuria, hesitancy, frequency or urgency.   Denies fever, chills, sweats, night sweats.    Remainder of ROS is negative.    Objective:  tMax 24 hrs: Temp (24hrs), Av.2 °F (36.8 °C), Min:98.2 °F (36.8 °C), Max:98.2 °F (36.8 °C)    Vitals Ranges:   Temp:  [98.2 °F (36.8 °C)] 98.2 °F (36.8 °C)  Heart Rate:  [] 80  Resp:  [18-20] 18  BP: (102-135)/(40-70)  "135/68      Exam:  /68 (BP Location: Left arm, Patient Position: Lying)   Pulse 80   Temp 98.2 °F (36.8 °C) (Oral)   Resp 18   Ht 152.4 cm (60\")   Wt 83.9 kg (185 lb)   SpO2 94%   BMI 36.13 kg/m²     General Appearance:    Alert, cooperative, no distress, appears stated age   Head:    Normocephalic, without obvious abnormality, atraumatic   Eyes:    PERRL, conjunctivae/corneas clear, EOM's intact, both eyes   Ears:    Normal external ear canals, both ears   Nose:   Nares normal, septum midline, mucosa normal, no drainage    or sinus tenderness   Throat:   Lips, mucosa, and tongue normal   Neck:   Supple, symmetrical, trachea midline, no adenopathy;     thyroid:  no enlargement/tenderness/nodules; no carotid    bruit or JVD   Back:     Symmetric, no curvature, ROM normal, no CVA tenderness   Lungs:     Wheezes bilaterally, respirations unlabored   Chest Wall:    No tenderness or deformity    Heart:    Regular rate and rhythm, S1 and S2 normal, no murmur, rub   or gallop   Abdomen:     Soft, nontender, bowel sounds active all four quadrants,     no masses, no hepatomegaly, no splenomegaly   Extremities:   Extremities normal, atraumatic, no cyanosis or edema   Pulses:   2+ and symmetric all extremities   Skin:   Skin color, texture, turgor normal, no rashes or lesions   Lymph nodes:   Cervical, supraclavicular, and axillary nodes normal   Neurologic:   CNII-XII intact, normal strength, sensation intact throughout      .    Data Review:  Lab Results (last 72 hours)       Procedure Component Value Units Date/Time    POC Glucose Once [[id number]]  (Normal) Collected: 08/05/24 1238    Specimen: Blood Updated: 08/05/24 1239     Glucose 122 mg/dL     High Sensitivity Troponin T 2Hr [[id number]]  (Abnormal) Collected: 08/05/24 1142    Specimen: Blood Updated: 08/05/24 1223     HS Troponin T 48 ng/L      Troponin T Delta -8 ng/L     Narrative:      High Sensitive Troponin T Reference Range:  <14.0 ng/L- Negative " Female for AMI  <22.0 ng/L- Negative Male for AMI  >=14 - Abnormal Female indicating possible myocardial injury.  >=22 - Abnormal Male indicating possible myocardial injury.   Clinicians would have to utilize clinical acumen, EKG, Troponin, and serial changes to determine if it is an Acute Myocardial Infarction or myocardial injury due to an underlying chronic condition.         POC Glucose Once [[id number]]  (Normal) Collected: 08/05/24 1144    Specimen: Blood Updated: 08/05/24 1145     Glucose 107 mg/dL     POC Glucose Once [[id number]]  (Normal) Collected: 08/05/24 1042    Specimen: Blood Updated: 08/05/24 1044     Glucose 123 mg/dL     High Sensitivity Troponin T [[id number]]  (Abnormal) Collected: 08/05/24 0921    Specimen: Blood Updated: 08/05/24 0950     HS Troponin T 56 ng/L     Narrative:      High Sensitive Troponin T Reference Range:  <14.0 ng/L- Negative Female for AMI  <22.0 ng/L- Negative Male for AMI  >=14 - Abnormal Female indicating possible myocardial injury.  >=22 - Abnormal Male indicating possible myocardial injury.   Clinicians would have to utilize clinical acumen, EKG, Troponin, and serial changes to determine if it is an Acute Myocardial Infarction or myocardial injury due to an underlying chronic condition.         Comprehensive Metabolic Panel [[id number]]  (Abnormal) Collected: 08/05/24 0921    Specimen: Blood Updated: 08/05/24 0950     Glucose 65 mg/dL      BUN 39 mg/dL      Creatinine 3.50 mg/dL      Sodium 130 mmol/L      Potassium 6.0 mmol/L      Chloride 88 mmol/L      CO2 18.4 mmol/L      Calcium 8.7 mg/dL      Total Protein 6.5 g/dL      Albumin 3.6 g/dL      ALT (SGPT) 65 U/L      AST (SGOT) 209 U/L      Alkaline Phosphatase 146 U/L      Total Bilirubin 2.6 mg/dL      Globulin 2.9 gm/dL      A/G Ratio 1.2 g/dL      BUN/Creatinine Ratio 11.1     Anion Gap 23.6 mmol/L      eGFR 14.0 mL/min/1.73      Comment: <15 Indicative of kidney failure       Narrative:      GFR Normal >60  Chronic  Kidney Disease <60  Kidney Failure <15      BNP [[id number]]  (Normal) Collected: 08/05/24 0921    Specimen: Blood Updated: 08/05/24 0950     proBNP 605.0 pg/mL     Narrative:      This assay is used as an aid in the diagnosis of individuals suspected of having heart failure. It can be used as an aid in the diagnosis of acute decompensated heart failure (ADHF) in patients presenting with signs and symptoms of ADHF to the emergency department (ED). In addition, NT-proBNP of <300 pg/mL indicates ADHF is not likely.    Age Range Result Interpretation  NT-proBNP Concentration (pg/mL:      <50             Positive            >450                   Gray                 300-450                    Negative             <300    50-75           Positive            >900                  Gray                300-900                  Negative            <300      >75             Positive            >1800                  Gray                300-1800                  Negative            <300    Magnesium [[id number]]  (Abnormal) Collected: 08/05/24 0921    Specimen: Blood Updated: 08/05/24 0950     Magnesium 2.8 mg/dL     Ethanol [[id number]]  (Abnormal) Collected: 08/05/24 0921    Specimen: Blood Updated: 08/05/24 0950     Ethanol 148 mg/dL      Ethanol % 0.148 %     Protime-INR [[id number]]  (Abnormal) Collected: 08/05/24 0921    Specimen: Blood Updated: 08/05/24 0940     Protime 17.4 Seconds      INR 1.40    CBC & Differential [[id number]]  (Abnormal) Collected: 08/05/24 0735    Specimen: Blood Updated: 08/05/24 0749    Narrative:      The following orders were created for panel order CBC & Differential.  Procedure                               Abnormality         Status                     ---------                               -----------         ------                     CBC Auto Differential[[id number]]        Abnormal            Final result                 Please view results for these tests on the individual orders.    CBC  Auto Differential [[id number]]  (Abnormal) Collected: 08/05/24 0735    Specimen: Blood Updated: 08/05/24 0749     WBC 8.16 10*3/mm3      RBC 2.83 10*6/mm3      Hemoglobin 7.3 g/dL      Hematocrit 23.8 %      MCV 84.1 fL      MCH 25.8 pg      MCHC 30.7 g/dL      RDW 16.1 %      RDW-SD 48.0 fl      MPV 12.0 fL      Platelets 192 10*3/mm3      Neutrophil % 79.0 %      Lymphocyte % 10.4 %      Monocyte % 9.1 %      Eosinophil % 0.7 %      Basophil % 0.4 %      Immature Grans % 0.4 %      Neutrophils, Absolute 6.45 10*3/mm3      Lymphocytes, Absolute 0.85 10*3/mm3      Monocytes, Absolute 0.74 10*3/mm3      Eosinophils, Absolute 0.06 10*3/mm3      Basophils, Absolute 0.03 10*3/mm3      Immature Grans, Absolute 0.03 10*3/mm3      nRBC 0.0 /100 WBC                      Imaging Results (All)       Procedure Component Value Units Date/Time    US Liver [[id number]] Resulted: 08/05/24 1435     Updated: 08/05/24 1502    XR Chest 1 View [[id number]] Collected: 08/05/24 0755     Updated: 08/05/24 0800    Narrative:      XR CHEST 1 VW-     Clinical: Chest pain     COMPARISON examination dated 2/10/2017     FINDINGS: Median sternotomy wires in position. There is mild cardiac  enlargement. No effusion, edema or acute airspace disease has developed.  Linear area of scarring versus discoid atelectasis again demonstrated at  the left lung base. The remainder is unremarkable.     CONCLUSION: No acute pulmonary process has developed.     This report was finalized on 8/5/2024 7:57 AM by Dr. Nolan Nolen M.D  on Workstation: [id number]             Past Medical History:   Diagnosis Date    Ankle pain     Ankle wound     LEFT    Anxiety     Arthritis of back 2000    Arthritis of neck 2000    Asthma     Benign tumor of thymus     REMOVED    Bruises easily     Cataract     COPD (chronic obstructive pulmonary disease)     CTS (carpal tunnel syndrome) Surgery 1999    DDD (degenerative disc disease), cervical     Depression     Fracture of wrist  2917    GERD (gastroesophageal reflux disease)     Hip arthrosis Unknown    History of MRSA infection     LEFT ANKLE TREAT [facility]  5YEARS GO    Hyperlipidemia     Hypertension     Knee sprain June 2023    Knee swelling Unknown    Shoulder arthritis 06/05/2023    Shoulder pain, left 07/07/2023    Sleep apnea     NO MACHINE USE    Spinal stenosis     Spondylolisthesis of cervical region        Assessment:  Active Hospital Problems    Diagnosis  POA    **Acute GI bleeding [K92.2]  Yes    Hyperkalemia [E87.5]  Unknown    Acute renal failure [N17.9]  Unknown    Alcoholism [F10.20]  Unknown    Acute exacerbation of chronic obstructive pulmonary disease (COPD) [J44.1]  Unknown    Acute blood loss anemia [D62]  Unknown      Resolved Hospital Problems   No resolved problems to display.       Plan:  Continue with Protonix and octreotide drip.  Nephrology and GI consults.  Treatments for hyperkalemia and acute renal failure.  Avoid nephrotoxic drugs.  Follow-up on labs and hemoglobin and transfuse as needed.  Order some bronchodilator treatments and has oxygen.  Will check echocardiogram.  Continue with alcohol withdrawal treatment protocol.    Mustapha Subramanian MD  8/5/2024  16:23 EDT

## 2024-08-05 NOTE — PROGRESS NOTES
Clinical Pharmacy Services: Medication History    Filomena Liu is a 64 y.o. female presenting to [facility] for   Chief Complaint   Patient presents with    Chest Pain       She  has a past medical history of Ankle pain, Ankle wound, Anxiety, Arthritis of back (2000), Arthritis of neck (2000), Asthma, Benign tumor of thymus, Bruises easily, Cataract, COPD (chronic obstructive pulmonary disease), CTS (carpal tunnel syndrome) (Surgery 1999), DDD (degenerative disc disease), cervical, Depression, Fracture of wrist (2917), GERD (gastroesophageal reflux disease), Hip arthrosis (Unknown), History of MRSA infection, Hyperlipidemia, Hypertension, Knee sprain (June 2023), Knee swelling (Unknown), Shoulder arthritis (06/05/2023), Shoulder pain, left (07/07/2023), Sleep apnea, Spinal stenosis, and Spondylolisthesis of cervical region.    Allergies as of 08/05/2024    (No Known Allergies)       Medication information was obtained from: Patient   Pharmacy and Phone Number:     Prior to Admission Medications       Prescriptions Last Dose Informant Patient Reported? Taking?    acetaminophen (TYLENOL) 325 MG tablet  Self No Yes    Take 2 tablets by mouth 2 (Two) Times a Day As Needed for Mild Pain.    albuterol (PROVENTIL HFA;VENTOLIN HFA) 108 (90 Base) MCG/ACT inhaler 8/5/2024 Pharmacy Yes Yes    Inhale 2 puffs 3 times a day.    atenolol (TENORMIN) 25 MG tablet 8/4/2024 Self Yes Yes    Take 1 tablet by mouth Daily.    fluticasone (FLONASE) 50 MCG/ACT nasal spray Past Week  Yes Yes    1 spray into the nostril(s) as directed by provider.    furosemide (LASIX) 20 MG tablet 8/4/2024 Self Yes Yes    Take 1 tablet by mouth Daily.    HYDROcodone-acetaminophen (Norco) 7.5-325 MG per tablet 8/4/2024 Self No Yes    Take 1 tablet by mouth Every 8 (Eight) Hours As Needed for Moderate Pain.    ibuprofen (ADVIL,MOTRIN) 200 MG tablet 8/5/2024 Self Yes Yes    Take 2 tablets by mouth Every 6 (Six) Hours As Needed for Mild Pain.     ipratropium-albuterol (DUO-NEB) 0.5-2.5 mg/3 ml nebulizer  Self Yes Yes    Take 3 mL by nebulization 4 (Four) Times a Day.    lisinopril (PRINIVIL,ZESTRIL) 40 MG tablet 8/4/2024 Self Yes Yes    Take 1 tablet by mouth Daily.    potassium chloride (KLOR-CON M10) 10 MEQ CR tablet 8/4/2024 Self Yes Yes    Take 1 tablet by mouth Daily.    rOPINIRole (REQUIP) 1 MG tablet Past Week Self Yes Yes    Take 1 tablet by mouth every night at bedtime.    Trelegy Ellipta 200-62.5-25 MCG/ACT aerosol powder  8/5/2024 Self Yes Yes    Inhale 1 puff Daily.    triamterene-hydrochlorothiazide (DYAZIDE) 37.5-25 MG per capsule 8/4/2024  Yes Yes    Take 1 capsule by mouth Daily.              Medication notes:     This medication list is complete to the best of my knowledge as of 8/5/2024    Please call if questions.    Bereket Treviño  Medication History Technician  [phone]    8/5/2024 10:05 EDT

## 2024-08-05 NOTE — PROGRESS NOTES
Full nephrology consult to follow.  Chart reviewed.  Has SANDRO, hyperkalemia, and suspected GIB.  K 6.0.  Temporizing measures already ordered.  Will recheck BMP at 1PM.

## 2024-08-05 NOTE — ED NOTES
Nursing report ED to floor  Filomena Liu  64 y.o.  female    HPI :  HPI (Adult)  Stated Reason for Visit: CP/ SOA  History Obtained From: patient, EMS  Duration (Weeks): 1    Chief Complaint  Chief Complaint   Patient presents with    Chest Pain       Admitting doctor:   Mustapha Subramanian MD    Admitting diagnosis:   The primary encounter diagnosis was Acute blood loss anemia. Diagnoses of Gastrointestinal hemorrhage, unspecified gastrointestinal hemorrhage type, Acute exacerbation of chronic obstructive pulmonary disease (COPD), Alcoholism, Acute renal failure, unspecified acute renal failure type, and Hyperkalemia were also pertinent to this visit.    Code status:   Current Code Status       Date Active Code Status Order ID Comments User Context       Prior            Allergies:   Patient has no known allergies.    Isolation:   No active isolations    Intake and Output  No intake or output data in the 24 hours ending 08/05/24 1224    Weight:       08/05/24  0719   Weight: 83.9 kg (185 lb)       Most recent vitals:   Vitals:    08/05/24 0959 08/05/24 1000 08/05/24 1040 08/05/24 1131   BP:   112/54 120/68   Pulse: 75 74 81 78   Resp:   18    Temp:       SpO2: 92% 92% 92% 92%   Weight:       Height:           Active LDAs/IV Access:   Lines, Drains & Airways       Active LDAs       Name Placement date Placement time Site Days    Peripheral IV 08/05/24 0741 Anterior;Left Hand 08/05/24  0741  Hand  less than 1    Peripheral IV 08/05/24 0920 Anterior;Right;Upper Arm 08/05/24  0920  Arm  less than 1    Peripheral IV 08/05/24 1003 Left Antecubital 08/05/24  1003  Antecubital  less than 1                    Labs (abnormal labs have a star):   Labs Reviewed   COMPREHENSIVE METABOLIC PANEL - Abnormal; Notable for the following components:       Result Value    BUN 39 (*)     Creatinine 3.50 (*)     Sodium 130 (*)     Potassium 6.0 (*)     Chloride 88 (*)     CO2 18.4 (*)     ALT (SGPT) 65 (*)     AST (SGOT) 209 (*)      Alkaline Phosphatase 146 (*)     Total Bilirubin 2.6 (*)     Anion Gap 23.6 (*)     eGFR 14.0 (*)     All other components within normal limits    Narrative:     GFR Normal >60  Chronic Kidney Disease <60  Kidney Failure <15     TROPONIN - Abnormal; Notable for the following components:    HS Troponin T 56 (*)     All other components within normal limits    Narrative:     High Sensitive Troponin T Reference Range:  <14.0 ng/L- Negative Female for AMI  <22.0 ng/L- Negative Male for AMI  >=14 - Abnormal Female indicating possible myocardial injury.  >=22 - Abnormal Male indicating possible myocardial injury.   Clinicians would have to utilize clinical acumen, EKG, Troponin, and serial changes to determine if it is an Acute Myocardial Infarction or myocardial injury due to an underlying chronic condition.        CBC WITH AUTO DIFFERENTIAL - Abnormal; Notable for the following components:    RBC 2.83 (*)     Hemoglobin 7.3 (*)     Hematocrit 23.8 (*)     MCH 25.8 (*)     MCHC 30.7 (*)     RDW 16.1 (*)     Neutrophil % 79.0 (*)     Lymphocyte % 10.4 (*)     All other components within normal limits   ETHANOL - Abnormal; Notable for the following components:    Ethanol 148 (*)     All other components within normal limits   MAGNESIUM - Abnormal; Notable for the following components:    Magnesium 2.8 (*)     All other components within normal limits   PROTIME-INR - Abnormal; Notable for the following components:    Protime 17.4 (*)     INR 1.40 (*)     All other components within normal limits   HIGH SENSITIVITIY TROPONIN T 2HR - Abnormal; Notable for the following components:    HS Troponin T 48 (*)     Troponin T Delta -8 (*)     All other components within normal limits    Narrative:     High Sensitive Troponin T Reference Range:  <14.0 ng/L- Negative Female for AMI  <22.0 ng/L- Negative Male for AMI  >=14 - Abnormal Female indicating possible myocardial injury.  >=22 - Abnormal Male indicating possible myocardial  injury.   Clinicians would have to utilize clinical acumen, EKG, Troponin, and serial changes to determine if it is an Acute Myocardial Infarction or myocardial injury due to an underlying chronic condition.        BNP (IN-HOUSE) - Normal    Narrative:     This assay is used as an aid in the diagnosis of individuals suspected of having heart failure. It can be used as an aid in the diagnosis of acute decompensated heart failure (ADHF) in patients presenting with signs and symptoms of ADHF to the emergency department (ED). In addition, NT-proBNP of <300 pg/mL indicates ADHF is not likely.    Age Range Result Interpretation  NT-proBNP Concentration (pg/mL:      <50             Positive            >450                   Gray                 300-450                    Negative             <300    50-75           Positive            >900                  Gray                300-900                  Negative            <300      >75             Positive            >1800                  Gray                300-1800                  Negative            <300   POCT GLUCOSE FINGERSTICK - Normal   POCT GLUCOSE FINGERSTICK - Normal   URINE DRUG SCREEN   BASIC METABOLIC PANEL   HEPATITIS B SURFACE ANTIGEN   CBC WITH AUTO DIFFERENTIAL   POCT GLUCOSE FINGERSTICK   POCT GLUCOSE FINGERSTICK   POCT GLUCOSE FINGERSTICK   POCT GLUCOSE FINGERSTICK   TYPE AND SCREEN   CBC AND DIFFERENTIAL    Narrative:     The following orders were created for panel order CBC & Differential.  Procedure                               Abnormality         Status                     ---------                               -----------         ------                     CBC Auto Differential[[id number]]        Abnormal            Final result                 Please view results for these tests on the individual orders.   CBC AND DIFFERENTIAL    Narrative:     The following orders were created for panel order CBC & Differential.  Procedure                                Abnormality         Status                     ---------                               -----------         ------                     CBC Auto Differential[[id number]]                                                         Please view results for these tests on the individual orders.       EKG:   ECG 12 Lead Chest Pain   Final Result   HEART RATE=66  bpm   RR Nrviotvv=095  ms   TX Usnkgksc=310  ms   P Horizontal Axis=7  deg   P Front Axis=70  deg   QRSD Interval=99  ms   QT Gcyoqcvc=815  ms   TGaQ=260  ms   QRS Axis=56  deg   T Wave Axis=54  deg   - OTHERWISE NORMAL ECG -   Sinus rhythm   Low voltage, precordial leads   No change from previous tracing   Electronically Signed By: Arvin Schmidt ([facility]) 2024-08-05 09:58:53   Date and Time of Study:2024-08-05 07:48:18      Telemetry Scan   Final Result          Meds given in ED:   Medications   sodium chloride 0.9 % flush 10 mL (has no administration in time range)   pantoprazole (PROTONIX) injection 80 mg (80 mg Intravenous Given 8/5/24 0929)     And   pantoprazole (PROTONIX) 40 mg in sodium chloride 0.9 % 100 mL (0.4 mg/mL) MBP (8 mg/hr Intravenous Currently Infusing 8/5/24 0942)   octreotide (sandoSTATIN) 500 mcg in sodium chloride 0.9 % 100 mL (5 mcg/mL) infusion (25 mcg/hr Intravenous New Bag 8/5/24 0927)   Magnesium Standard Dose Replacement - Follow Nurse / BPA Driven Protocol (has no administration in time range)   thiamine (B-1) injection 200 mg (has no administration in time range)     Followed by   thiamine (VITAMIN B-1) tablet 100 mg (has no administration in time range)   folic acid (FOLVITE) tablet 1 mg (1 mg Oral Given 8/5/24 0941)   LORazepam (ATIVAN) tablet 2 mg (2 mg Oral Given 8/5/24 0940)     Followed by   LORazepam (ATIVAN) tablet 1 mg (has no administration in time range)     Followed by   LORazepam (ATIVAN) tablet 1 mg (has no administration in time range)     Followed by   LORazepam (ATIVAN) tablet 1 mg (has no administration in time range)    LORazepam (ATIVAN) tablet 1 mg (1 mg Oral Given 8/5/24 1048)     Or   LORazepam (ATIVAN) injection 1 mg ( Intravenous Not Given:  See Alt 8/5/24 1048)     Or   LORazepam (ATIVAN) tablet 2 mg ( Oral Not Given:  See Alt 8/5/24 1048)     Or   LORazepam (ATIVAN) injection 2 mg ( Intravenous Not Given:  See Alt 8/5/24 1048)     Or   LORazepam (ATIVAN) injection 2 mg ( Intravenous Not Given:  See Alt 8/5/24 1048)     Or   LORazepam (ATIVAN) injection 2 mg ( Intramuscular Not Given:  See Alt 8/5/24 1048)   cefTRIAXone (ROCEPHIN) 2,000 mg in sodium chloride 0.9 % 100 mL MBP (2,000 mg Intravenous New Bag 8/5/24 1213)   methylPREDNISolone sodium succinate (SOLU-Medrol) injection 125 mg (125 mg Intravenous Given 8/5/24 0741)   albuterol (PROVENTIL) nebulizer solution 0.083% 2.5 mg/3mL (2.5 mg Nebulization Given 8/5/24 0800)   octreotide (sandoSTATIN) injection 50 mcg (50 mcg Intravenous Given 8/5/24 0930)   calcium gluconate 1000 Mg/50ml 0.675% NaCl IV SOLN (0 mg Intravenous Stopped 8/5/24 1020)   insulin regular (humuLIN R,novoLIN R) injection 5 Units (5 Units Intravenous Given 8/5/24 1005)   dextrose (D50W) (25 g/50 mL) IV injection 25 g (25 g Intravenous Given 8/5/24 1003)   sodium zirconium cyclosilicate (LOKELMA) packet 10 g (10 g Oral Given 8/5/24 1048)   sodium bicarbonate injection 8.4% 50 mEq (50 mEq Intravenous Given 8/5/24 1007)       Imaging results:  No radiology results for the last day    Ambulatory status:   - assist    Social issues:   Social History     Socioeconomic History    Marital status:    Tobacco Use    Smoking status: Every Day     Current packs/day: 0.25     Average packs/day: 0.3 packs/day for 44.6 years (11.1 ttl pk-yrs)     Types: Cigarettes     Start date: 1/1/1980     Passive exposure: Current    Smokeless tobacco: Never    Tobacco comments:     I have cut down recently and will abstain starting 7/10/23   Vaping Use    Vaping status: Never Used   Substance and Sexual Activity     Alcohol use: Not Currently     Comment: OCCAS    Drug use: No    Sexual activity: Yes     Partners: Male     Birth control/protection: Post-menopausal, Tubal ligation, Hysterectomy       Peripheral Neurovascular  Peripheral Neurovascular (Adult)  Peripheral Neurovascular WDL: WDL, pulse assessment  Pulse Assessment: dorsalis pedis  Additional Documentation: Edema (Group)  Edema  Edema: leg, right  Leg, Right Edema: 1+ (Trace)    Neuro Cognitive  Neuro Cognitive (Adult)  Cognitive/Neuro/Behavioral WDL: WDL, orientation  Orientation: oriented x 4    Learning  Learning Assessment (Adult)  Learning Readiness and Ability: no barriers identified    Respiratory  Respiratory WDL  Respiratory WDL: .WDL except, rhythm/pattern, cough  Rhythm/Pattern, Respiratory: unlabored, pattern regular  Cough Frequency: frequent  Cough Type: no productive sputum  Breath Sounds  Breath Sounds: All Fields  All Lung Fields Breath Sounds: Posterior:, Lateral:, coarse, wheezes, expiratory  Breath Sounds Post-Respiratory Treatment  Breath Sounds Posttreatment All Fields: All Fields  Breath Sounds Posttreatment All Fields: aeration increased    Abdominal Pain       Pain Assessments  Pain (Adult)  (0-10) Pain Rating: Rest: 8  Pain Location: chest  Pain Description: tightness    NIH Stroke Scale       Barbara Singh RN  08/05/24 12:24 EDT

## 2024-08-05 NOTE — CONSULTS
[facility] Consult Note      Patient Name: Filomena Liu  : 1960  MRN: [id number]  Primary Care Physician:  Fernando Dunn MD  Referring Physician: Mustapha Subramanian MD  Date of admission: 2024    Subjective     Reason for Consult:  SANDRO     HPI:   Filomena Liu is a 64 y.o. female with HTN, GERD, COPD, ETOH abuse and suspected cirrhosis who presented today with cough, dyspnea and BLE edema.  Also n/v + dark stool.  Found to have SANDRO with BUN/Cr 39/3.5.  Hyperkalemic, acidotic, and anemic, with K 6.0, bicab 18 (AG 23), and hgb 7.3.  Cr was normal 0.7 in 2023 (and hgb 11.3 in 2024).  Normotensive.  Given D50, insulin, calcium, amp of bicarb, and lokelma in ER.  Started on PPI & octreotide drips.  Home meds notable for ACEi, lasix, KCL supplement and ibuprofen.  RLE doppler neg for DVT.  CXR w/o central congestion.  Seen by GI.  Serum albumin not terribly low (3.6).  INR is 1.4 and PLT normal.      Review of Systems:   14 point review of systems is otherwise negative except for mentioned above on HPI    Personal History     Past Medical History:   Diagnosis Date    Ankle pain     Ankle wound     LEFT    Anxiety     Arthritis of back     Arthritis of neck     Asthma     Benign tumor of thymus     REMOVED    Bruises easily     Cataract     COPD (chronic obstructive pulmonary disease)     CTS (carpal tunnel syndrome) Surgery     DDD (degenerative disc disease), cervical     Depression     Fracture of wrist 2917    GERD (gastroesophageal reflux disease)     Hip arthrosis Unknown    History of MRSA infection     LEFT ANKLE TREAT [facility]  5YEARS GO    Hyperlipidemia     Hypertension     Knee sprain 2023    Knee swelling Unknown    Shoulder arthritis 2023    Shoulder pain, left 2023    Sleep apnea     NO MACHINE USE    Spinal stenosis     Spondylolisthesis of cervical region        Past Surgical History:   Procedure Laterality Date    BACK  SURGERY      cervical disc surgery with fusion-    CHOLECYSTECTOMY      COLONOSCOPY      COLONOSCOPY N/A 11/12/2020    Procedure: COLONOSCOPY, polypectomy;  Surgeon: Filomena Mckeon MD;  Location: [facility] OR;  Service: Gastroenterology;  Laterality: N/A;  Diverticulosis; Hepatic flexure polyp x 1; Polyp at 60cm x 1; Polyp at 50cm x 1- hot snare;    COLONOSCOPY N/A 4/15/2024    Procedure: COLONOSCOPY into sigmoid colon with hot snare polypectomy;  Surgeon: Leatha Johns MD;  Location: [facility] ENDOSCOPY;  Service: General;  Laterality: N/A;  pre- history of polyps  post- diverticulosis, polyp    HAND SURGERY  2000    HYSTERECTOMY      INCISION AND DRAINAGE LEG Left 11/17/2018    Procedure: Incision and Drainage of Left ankle;  Surgeon: Maxwell Lanier MD;  Location: [facility] MAIN OR;  Service: Orthopedics    NECK SURGERY  2000    SIGMOIDOSCOPY N/A 3/28/2024    Procedure: SIGMOIDOSCOPY FLEXIBLE;  Surgeon: Leatha Johns MD;  Location: [facility] ENDOSCOPY;  Service: General;  Laterality: N/A;  pre: h/o colon polyps  post: poor prep, diverticulosis    SKIN BIOPSY      SKIN GRAFT SPLIT THICKNESS N/A 02/12/2019    Procedure: debridement SKIN GRAFT SPLIT THICKNESS left ankle wound;  Surgeon: Barry Worthy MD;  Location: [facility] OR [facility];  Service: Plastics    TOTAL SHOULDER ARTHROPLASTY Left 7/20/2023    Procedure: Total  shoulder arthroplasty;  Surgeon: Maxwell Lanier MD;  [place] [facility] [place] [facility]  Service: Orthopedics;  Laterality: Left;    TOTAL THYMECTOMY      TRIGGER POINT INJECTION  2000    WRIST FRACTURE SURGERY      CARPAL TUNNEL       Family History: family history includes Alzheimer's disease in her father; Emphysema in her mother.    Social History:  reports that she has been smoking cigarettes. She started smoking about 44 years ago. She has a 11.1 pack-year smoking history. She has been exposed to tobacco smoke. She has never used smokeless tobacco. She reports that she does not currently  use alcohol. She reports that she does not use drugs.    Home Medications:  Prior to Admission medications    Medication Sig Start Date End Date Taking? Authorizing Provider   acetaminophen (TYLENOL) 325 MG tablet Take 2 tablets by mouth 2 (Two) Times a Day As Needed for Mild Pain. 7/20/23  Yes Maxwell Lanier MD   albuterol (PROVENTIL HFA;VENTOLIN HFA) 108 (90 Base) MCG/ACT inhaler Inhale 2 puffs 3 times a day.   Yes Trevin Liu MD   atenolol (TENORMIN) 25 MG tablet Take 1 tablet by mouth Daily. 4/30/24 10/27/24 Yes Trevin Liu MD   fluticasone (FLONASE) 50 MCG/ACT nasal spray 1 spray into the nostril(s) as directed by provider.   Yes Trevin Liu MD   furosemide (LASIX) 20 MG tablet Take 1 tablet by mouth Daily. 7/25/24 7/25/25 Yes Trevin Liu MD   HYDROcodone-acetaminophen (Norco) 7.5-325 MG per tablet Take 1 tablet by mouth Every 8 (Eight) Hours As Needed for Moderate Pain. 10/12/23  Yes Gypsy Mart APRN   ibuprofen (ADVIL,MOTRIN) 200 MG tablet Take 2 tablets by mouth Every 6 (Six) Hours As Needed for Mild Pain.   Yes Trevin Liu MD   ipratropium-albuterol (DUO-NEB) 0.5-2.5 mg/3 ml nebulizer Take 3 mL by nebulization 4 (Four) Times a Day.   Yes Trevin Liu MD   lisinopril (PRINIVIL,ZESTRIL) 40 MG tablet Take 1 tablet by mouth Daily. 4/10/23  Yes Trevin Liu MD   potassium chloride (KLOR-CON M10) 10 MEQ CR tablet Take 1 tablet by mouth Daily. 7/25/24 7/25/25 Yes Trevin Liu MD   rOPINIRole (REQUIP) 1 MG tablet Take 1 tablet by mouth every night at bedtime. 1/2/24  Yes Trevin Liu MD Trelegy Ellipta 200-62.5-25 MCG/ACT aerosol powder  Inhale 1 puff Daily. 5/8/23  Yes Trevin Liu MD   triamterene-hydrochlorothiazide (DYAZIDE) 37.5-25 MG per capsule Take 1 capsule by mouth Daily. 4/30/24  Yes Provider, MD Trevin   docusate sodium (COLACE) 100 MG capsule Take 1 capsule by mouth 2 (Two) Times a Day. 7/20/23  8/5/24  Maxwell Lanier MD   ibuprofen (ADVIL,MOTRIN) 800 MG tablet Take 1 tablet by mouth 3 (Three) Times a Day. 10/18/23 8/5/24  Maxwell Lanier MD   ondansetron (Zofran) 4 MG tablet Take 1 tablet by mouth Every 8 (Eight) Hours As Needed for Nausea or Vomiting. 7/20/23 8/5/24  Maxwell Lanier MD   traMADol (ULTRAM) 50 MG tablet Take 1 tablet by mouth Every 8 (Eight) Hours As Needed for Moderate Pain. 11/29/23 8/5/24  Maxwell Lanier MD   TRIAMTERENE PO Take 25 mg by mouth Daily.  8/5/24  Emergency, Nurse Shana, RN       Allergies:  No Known Allergies    Objective     Vitals:   Temp:  [98.2 °F (36.8 °C)] 98.2 °F (36.8 °C)  Heart Rate:  [] 80  Resp:  [18-20] 18  BP: (102-135)/(40-70) 135/68  No intake or output data in the 24 hours ending 08/05/24 8144    Physical Exam:    General Appearance: anxious WF in no acute distress on NC O2   Skin: warm and dry  HEENT: oral mucosa normal, nonicteric sclera  Neck: supple, no JVD  Lungs: CTA  Heart: RRR, normal S1 and S2  Abdomen: soft, nontender, nondistended  : no palpable bladder  Extremities: trace to 1+ BLE edema, no cyanosis or clubbing  Neuro: normal speech and mental status     Scheduled Meds:     folic acid, 1 mg, Oral, Daily  LORazepam, 2 mg, Oral, Q6H   Followed by  [START ON 8/6/2024] LORazepam, 1 mg, Oral, Q6H   Followed by  [START ON 8/7/2024] LORazepam, 1 mg, Oral, Q12H   Followed by  [START ON 8/8/2024] LORazepam, 1 mg, Oral, Daily  thiamine (B-1) IV, 200 mg, Intravenous, Q8H   Followed by  [START ON 8/10/2024] thiamine, 100 mg, Oral, Daily      IV Meds:   octreotide (SandoSTATIN) infusion, 25 mcg/hr, Last Rate: 25 mcg/hr (08/05/24 0927)  pantoprazole, 8 mg/hr, Last Rate: 8 mg/hr (08/05/24 0942)        Results Reviewed:   I have personally reviewed the results from the time of this admission to 8/5/2024 15:54 EDT     Lab Results   Component Value Date    GLUCOSE 65 08/05/2024    CALCIUM 8.7 08/05/2024     (L) 08/05/2024    K 6.0 (H)  08/05/2024    CO2 18.4 (L) 08/05/2024    CL 88 (L) 08/05/2024    BUN 39 (H) 08/05/2024    CREATININE 3.50 (H) 08/05/2024    EGFRIFAFRI >60 03/14/2023    EGFRIFNONA 83 02/07/2019    BCR 11.1 08/05/2024    ANIONGAP 23.6 (H) 08/05/2024      Lab Results   Component Value Date    MG 2.8 (H) 08/05/2024    ALBUMIN 3.6 08/05/2024           Assessment / Plan     ASSESSMENT:  SANDRO - appears non olig.  Cr 3.5 (vs 0.7 in July 2023).  Poss prerenal azotemia in assoc with GIB, severe anemia, diuretic use and impaired compensation via ACE/NSAIDs (though states motrin use scant).  HRS a consideration.  R/O obstruction.  Periph edema is fairly mild and does not have central catie on CXR, but is short of breath - will give 25% albumin over saline  Hyperkalemia, due to SANDRO + meds (ACE, KCL, beta blocker); K 6.0, given temporizing measures  AG metabolic acidosis due to SANDRO/uremia  Anemia, hgb 7.3 with dark stool.  GI w/u in progress.  Too unstable for endoscopy currently  Coagulopathy, mild (INR 1.4)  ETOH abuse  HTN - BP stable 102 to 132 systolic thus far, hold antihypertensives in light of poss GIB + SANDRO    PLAN:  Repeat BMP now   Discussed potential need for HD if K does not respond to medical therapy (though doubt will be needed)  25% albumin 25g x 2   Hold antihypertensives & lasix  Check UA, Eunice, Pr/Cr ratio, lactic acid, SPEP/IF, iron stores  Obtain renal US  Upon diet resumption please ensure low K restriction    Addendum.  Repeat labs noted.  K still 6.0.  Cr down slightly 2.9.  Bicarb ~ same.  Will retreat K with another round of D50, insulin, bicarb, lokelma, and also give bumex 2mg IV x1.  Hold atenolol still given hyperkalemia.  Recheck K at 9PM    Thank you for involving us in the care of Filomena Liu.  Please feel free to call with any questions.    Fernando Baker MD  08/05/24  15:54 EDT    [facility]  [phone]

## 2024-08-05 NOTE — CONSULTS
[facility]  Initial Inpatient Consult Note    Referring Provider: Dr. QUE Subramanian    Reason for Consultation: GI bleed    Subjective     History of present illness:    64 y.o. female with COPD(and smokes 1 ppd), GERD, HTN, sleep apnea, h/o ETOH ism (started drinking 1 pint of whiskey/day one month ago), and probable cirrhosis (seen on CT abd 3/23 at NH) who is being admitted through the emergency room today because of shortness of breath, coughing, and swelling in the legs.  She has also had a 1 week history of dark stool she has had nausea and nonbloody vomiting for the last week as well as diarrhea for the last 1 week.  No constipation.  No bright red blood per rectum.  She does take ibuprofen as needed.  She last had an EGD a long time ago.       She last had 2 colonoscopy attempts by Dr. Leatha Johns in the last one year that couldn't be done because of diverticular disease according to the patient. She then had  a ColoGuard test this year that was negative.        In the emergency room the patient's troponin level was elevated at 56.  Her creatinine is 3.5 with a BUN of 39.  Her potassium is 6.0 with a sodium of 130.  Total bilirubin is 2.6, alk phos 146, ALT of 65, AST of 209, albumin of 3.6.  INR 1.4, hemoglobin is 7.3, hematocrit 23.8, MCV of 84, platelet count 192,000, white count of 8.16.  In 4 of 2024 her hemoglobin was 11.6.    Past Medical History:  Past Medical History:   Diagnosis Date    Ankle pain     Ankle wound     LEFT    Anxiety     Arthritis of back 2000    Arthritis of neck 2000    Asthma     Benign tumor of thymus     REMOVED    Bruises easily     Cataract     COPD (chronic obstructive pulmonary disease)     CTS (carpal tunnel syndrome) Surgery 1999    DDD (degenerative disc disease), cervical     Depression     Fracture of wrist 2917    GERD (gastroesophageal reflux disease)     Hip arthrosis Unknown    History of MRSA infection     LEFT ANKLE TREAT [facility]  5YEARS GO     Hyperlipidemia     Hypertension     Knee sprain June 2023    Knee swelling Unknown    Shoulder arthritis 06/05/2023    Shoulder pain, left 07/07/2023    Sleep apnea     NO MACHINE USE    Spinal stenosis     Spondylolisthesis of cervical region      Past Surgical History:  Past Surgical History:   Procedure Laterality Date    BACK SURGERY      cervical disc surgery with fusion-    CHOLECYSTECTOMY      COLONOSCOPY      COLONOSCOPY N/A 11/12/2020    Procedure: COLONOSCOPY, polypectomy;  Surgeon: Filomena Mckeon MD;  Location:  [facility] OR;  Service: Gastroenterology;  Laterality: N/A;  Diverticulosis; Hepatic flexure polyp x 1; Polyp at 60cm x 1; Polyp at 50cm x 1- hot snare;    COLONOSCOPY N/A 4/15/2024    Procedure: COLONOSCOPY into sigmoid colon with hot snare polypectomy;  Surgeon: Leatha Johns MD;  Location: [facility] ENDOSCOPY;  Service: General;  Laterality: N/A;  pre- history of polyps  post- diverticulosis, polyp    HAND SURGERY  2000    HYSTERECTOMY      INCISION AND DRAINAGE LEG Left 11/17/2018    Procedure: Incision and Drainage of Left ankle;  Surgeon: Maxwell Lanier MD;  Location: [facility] MAIN OR;  Service: Orthopedics    NECK SURGERY  2000    SIGMOIDOSCOPY N/A 3/28/2024    Procedure: SIGMOIDOSCOPY FLEXIBLE;  Surgeon: Leatha Johns MD;  Location: [facility] ENDOSCOPY;  Service: General;  Laterality: N/A;  pre: h/o colon polyps  post: poor prep, diverticulosis    SKIN BIOPSY      SKIN GRAFT SPLIT THICKNESS N/A 02/12/2019    Procedure: debridement SKIN GRAFT SPLIT THICKNESS left ankle wound;  Surgeon: Barry Worthy MD;  [facility];  Service: Plastics    TOTAL SHOULDER ARTHROPLASTY Left 7/20/2023    Procedure: Total  shoulder arthroplasty;  Surgeon: Maxwell Lanier MD;  [facility];  Service: Orthopedics;  Laterality: Left;    TOTAL THYMECTOMY      TRIGGER POINT INJECTION  2000    WRIST FRACTURE SURGERY      CARPAL TUNNEL      Social History:   Social History      Tobacco Use    Smoking status: Every Day     Current packs/day: 0.25     Average packs/day: 0.3 packs/day for 44.6 years (11.1 ttl pk-yrs)     Types: Cigarettes     Start date: 1/1/1980     Passive exposure: Current    Smokeless tobacco: Never    Tobacco comments:     I have cut down recently and will abstain starting 7/10/23   Substance Use Topics    Alcohol use: Not Currently     Comment: OCCAS      Family History:  Family History   Problem Relation Age of Onset    Emphysema Mother     Alzheimer's disease Father     Malig Hyperthermia Neg Hx        Home Meds:  (Not in a hospital admission)    Current Meds:   folic acid, 1 mg, Oral, Daily  LORazepam, 2 mg, Oral, Q6H   Followed by  [START ON 8/6/2024] LORazepam, 1 mg, Oral, Q6H   Followed by  [START ON 8/7/2024] LORazepam, 1 mg, Oral, Q12H   Followed by  [START ON 8/8/2024] LORazepam, 1 mg, Oral, Daily  thiamine (B-1) IV, 200 mg, Intravenous, Q8H   Followed by  [START ON 8/10/2024] thiamine, 100 mg, Oral, Daily      Allergies:  No Known Allergies  Review of Systems  The following systems were reviewed and negative;  musculoskeletal and neurological     Objective     Vital Signs  Temp:  [98.2 °F (36.8 °C)] 98.2 °F (36.8 °C)  Heart Rate:  [] 81  Resp:  [18-20] 18  BP: (102-132)/(40-69) 112/54  Physical Exam:  General Appearance:    Alert, cooperative, in no acute distress   Head:    Normocephalic, without obvious abnormality, atraumatic   Eyes:            Lids and lashes normal, conjunctivae and sclerae normal, no   icterus   Throat:   No oral lesions, no thrush, oral mucosa moist   Neck:   No adenopathy, supple, trachea midline, no thyromegaly, no   carotid bruit, no JVD   Lungs:     ,respirations regular, even and                   unlabored.  She has expiratory wheezing.    Heart:    Regular rhythm and normal rate, normal S1 and S2, no            murmur, no gallop, no rub, no click   Chest Wall:    No abnormalities observed   Abdomen:     Normal bowel  sounds, no masses, no organomegaly, soft        nontender, nondistended, no guarding, no rebound                 tenderness, obese   Rectal:     Deferred   Extremities:   no edema, no cyanosis, no redness   Skin:   No bleeding, bruising or rash   Lymph nodes:   No palpable adenopathy   Psychiatric:  Judgement and insight: normal   Orientation to person place and time: normal   Mood and affect: normal   Results Review:   I reviewed the patient's new clinical results.    Results from last 7 days   Lab Units 08/05/24  0735   WBC 10*3/mm3 8.16   HEMOGLOBIN g/dL 7.3*   HEMATOCRIT % 23.8*   PLATELETS 10*3/mm3 192     Results from last 7 days   Lab Units 08/05/24  0921   SODIUM mmol/L 130*   POTASSIUM mmol/L 6.0*   CHLORIDE mmol/L 88*   CO2 mmol/L 18.4*   BUN mg/dL 39*   CREATININE mg/dL 3.50*   CALCIUM mg/dL 8.7   BILIRUBIN mg/dL 2.6*   ALK PHOS U/L 146*   ALT (SGPT) U/L 65*   AST (SGOT) U/L 209*   GLUCOSE mg/dL 65     Results from last 7 days   Lab Units 08/05/24  0921   INR  1.40*     Lab Results   Lab Value Date/Time    LIPASE 31 03/14/2023 1237    LIPASE 14 05/11/2022 0059    LIPASE 21 (L) 06/01/2020 0149    LIPASE 22 (L) 05/12/2020 1235    LIPASE 29 01/09/2020 1229    LIPASE 23 02/27/2018 1221       Radiology:  XR Chest 1 View   Final Result          Assessment & Plan   Assessment:   64 y.o. female with COPD(and smokes 1 ppd), GERD, HTN, sleep apnea, h/o ETOH ism (started drinking 1 pint of whiskey/day one month ago), and probable cirrhosis (seen on CT abd 3/23 at NH) who is being admitted through the emergency room today because of shortness of breath, coughing, and swelling in the legs.  She has also had a 1 week history of dark stool she has had nausea and nonbloody vomiting for the last week.  She has an elevated troponin with chest tightness.  Renal failure with potassium of 6 point  She has chest tightness, shortness of breath, and is wheezing on exam.  I think she is too sick to have an EGD at this time.  She  has elevated liver function test.  She is anemic.  She has dyspepsia.  She has diarrhea.  She has melena.      Plan:   I would recommend Cardiology evaluate her.  I would also recommend that pulmonary medicine evaluate her.  I agree with nephrology seeing her because of her renal failure and potassium of 6.0.  She needs to stop smoking.  She needs to stop drinking alcohol.  Check a hepatitis profile.  Ultrasound of the liver.  I will check serial H&H's and transfuse as necessary.  On a Protonix drip and octreotide drip.  I agree with her being on IV antibiotics because of GI bleeding with a history of cirrhosis of the liver.  I think she should eventually have an EGD but I think she is too sick to have an EGD now.  I will check anemia labs.  I will check an alpha-fetoprotein level.  I will check a Doppler ultrasound of the hepatic vessels.    I discussed the patient's findings and my recommendations with patient, family, and primary care team.         Dilan Nunes M.D.  [facility]  [address] [place] [address]  Office: [phone]

## 2024-08-05 NOTE — ED PROVIDER NOTES
EMERGENCY DEPARTMENT ENCOUNTER  Room Number:  12/12  PCP: Fernando Dunn MD  Independent Historians: Patient and EMS      HPI:  Chief Complaint: had concerns including Chest Pain.     A complete HPI/ROS/PMH/PSH/SH/FH are unobtainable due to: None    Chronic or social conditions impacting patient care (Social Determinants of Health): None      Context: Filomena Liu is a 64 y.o. female with a medical history of COPD, depression, GERD, anxiety who presents to the ED c/o acute difficulty breathing.  The patient reports that she has been drinking alcohol for the last month.  She states she relapsed about a month ago.  She reports 2 weeks ago her right leg started swelling.  She states she had an ultrasound 2 weeks ago.  She reports for the last week she has had shortness of breath.  She states she has a history of COPD.  She reports she has been overusing her nebulizer.  She reports she has been wheezing.  She states she has been drinking heavily for the last month.  She states she has resources including AA.  She states she has not been to AA in a month.  She states that she has been to rehab in the past but she refuses to go to rehab again.  She states she would rather use her outpatient resources.  She denies any suicidal or homicidal ideation.  She reports now all of her extremities are swelling.  She denies any prior extremity swelling in the past.      Review of prior external notes (non-ED) -and- Review of prior external test results outside of this encounter:  Ultrasound of the right lower extremity on 7/8/2024 was normal    Prescription drug monitoring program review:         PAST MEDICAL HISTORY  Active Ambulatory Problems     Diagnosis Date Noted    Mediastinal mass 12/14/2016    Cervical stenosis of spinal canal 12/14/2016    Cervical radiculopathy 12/14/2016    Cellulitis/abscess of left foot 11/12/2018    HTN (hypertension) 11/13/2018    History of MRSA infection 11/13/2018    Anxiety 11/13/2018     Peroneal tenosynovitis 11/14/2018    Fracture of navicular bone of left foot 11/14/2018    Tobacco use 11/16/2018    Non compliance with medical treatment 11/17/2018    Screening for colon cancer 10/27/2020    Osteoporosis 10/31/2020    Shoulder arthritis 06/05/2023    Primary localized osteoarthrosis of left shoulder region 06/07/2023    History of adenomatous polyp of colon 01/19/2024    Diverticulosis 03/28/2024     Resolved Ambulatory Problems     Diagnosis Date Noted    No Resolved Ambulatory Problems     Past Medical History:   Diagnosis Date    Ankle pain     Ankle wound     Arthritis of back 2000    Arthritis of neck 2000    Asthma     Benign tumor of thymus     Bruises easily     Cataract     COPD (chronic obstructive pulmonary disease)     CTS (carpal tunnel syndrome) Surgery 1999    DDD (degenerative disc disease), cervical     Depression     Fracture of wrist 2917    GERD (gastroesophageal reflux disease)     Hip arthrosis Unknown    Hyperlipidemia     Hypertension     Knee sprain June 2023    Knee swelling Unknown    Shoulder pain, left 07/07/2023    Sleep apnea     Spinal stenosis     Spondylolisthesis of cervical region          PAST SURGICAL HISTORY  Past Surgical History:   Procedure Laterality Date    BACK SURGERY      cervical disc surgery with fusion-    CHOLECYSTECTOMY      COLONOSCOPY      COLONOSCOPY N/A 11/12/2020    Procedure: COLONOSCOPY, polypectomy;  Surgeon: Filomena Mckeon MD;  Location: [facility] OR;  Service: Gastroenterology;  Laterality: N/A;  Diverticulosis; Hepatic flexure polyp x 1; Polyp at 60cm x 1; Polyp at 50cm x 1- hot snare;    COLONOSCOPY N/A 4/15/2024    Procedure: COLONOSCOPY into sigmoid colon with hot snare polypectomy;  Surgeon: Leatha Johns MD;  Location: [facility] ENDOSCOPY;  Service: General;  Laterality: N/A;  pre- history of polyps  post- diverticulosis, polyp    HAND SURGERY  2000    HYSTERECTOMY      INCISION AND DRAINAGE LEG Left 11/17/2018    Procedure:  Incision and Drainage of Left ankle;  Surgeon: Maxwell Lanier MD;  Location: [facility] MAIN OR;  Service: Orthopedics    NECK SURGERY  2000    SIGMOIDOSCOPY N/A 3/28/2024    Procedure: SIGMOIDOSCOPY FLEXIBLE;  Surgeon: Leatha Johns MD;  Location: [facility] ENDOSCOPY;  Service: General;  Laterality: N/A;  pre: h/o colon polyps  post: poor prep, diverticulosis    SKIN BIOPSY      SKIN GRAFT SPLIT THICKNESS N/A 02/12/2019    Procedure: debridement SKIN GRAFT SPLIT THICKNESS left ankle wound;  Surgeon: Barry Worthy MD;  Location: [facility] OR [facility];  Service: Plastics    TOTAL SHOULDER ARTHROPLASTY Left 7/20/2023    Procedure: Total  shoulder arthroplasty;  Surgeon: Maxwell Lanier MD;  Location: [facility] OR [facility];  Service: Orthopedics;  Laterality: Left;    TOTAL THYMECTOMY      TRIGGER POINT INJECTION  2000    WRIST FRACTURE SURGERY      CARPAL TUNNEL         FAMILY HISTORY  Family History   Problem Relation Age of Onset    Emphysema Mother     Alzheimer's disease Father     Malig Hyperthermia Neg Hx          SOCIAL HISTORY  Social History     Socioeconomic History    Marital status:    Tobacco Use    Smoking status: Every Day     Current packs/day: 0.25     Average packs/day: 0.3 packs/day for 44.6 years (11.1 ttl pk-yrs)     Types: Cigarettes     Start date: 1/1/1980     Passive exposure: Current    Smokeless tobacco: Never    Tobacco comments:     I have cut down recently and will abstain starting 7/10/23   Vaping Use    Vaping status: Never Used   Substance and Sexual Activity    Alcohol use: Not Currently     Comment: OCCAS    Drug use: No    Sexual activity: Yes     Partners: Male     Birth control/protection: Post-menopausal, Tubal ligation, Hysterectomy         ALLERGIES  Patient has no known allergies.      REVIEW OF SYSTEMS  Review of Systems  Included in HPI  All systems reviewed and negative except for those discussed in HPI.      PHYSICAL EXAM    I have reviewed the triage vital signs and  nursing notes.    ED Triage Vitals [08/05/24 0719]   Temp Heart Rate Resp BP SpO2   98.2 °F (36.8 °C) 105 19 132/69 95 %      Temp src Heart Rate Source Patient Position BP Location FiO2 (%)   -- -- -- -- --       Physical Exam  GENERAL: Awake, alert, no acute distress chronically ill-appearing  SKIN: Warm, dry  HENT: Normocephalic, atraumatic  EYES: no scleral icterus  CV: regular rhythm, regular rate  RESPIRATORY: normal effort, lungs wheezing  ABDOMEN: soft, nontender, nondistended  MUSCULOSKELETAL: no deformity, pitting edema to the right leg, right foot.  NEURO: alert, moves all extremities, follows commands            LAB RESULTS  Recent Results (from the past 24 hour(s))   CBC Auto Differential    Collection Time: 08/05/24  7:35 AM    Specimen: Blood   Result Value Ref Range    WBC 8.16 3.40 - 10.80 10*3/mm3    RBC 2.83 (L) 3.77 - 5.28 10*6/mm3    Hemoglobin 7.3 (L) 12.0 - 15.9 g/dL    Hematocrit 23.8 (L) 34.0 - 46.6 %    MCV 84.1 79.0 - 97.0 fL    MCH 25.8 (L) 26.6 - 33.0 pg    MCHC 30.7 (L) 31.5 - 35.7 g/dL    RDW 16.1 (H) 12.3 - 15.4 %    RDW-SD 48.0 37.0 - 54.0 fl    MPV 12.0 6.0 - 12.0 fL    Platelets 192 140 - 450 10*3/mm3    Neutrophil % 79.0 (H) 42.7 - 76.0 %    Lymphocyte % 10.4 (L) 19.6 - 45.3 %    Monocyte % 9.1 5.0 - 12.0 %    Eosinophil % 0.7 0.3 - 6.2 %    Basophil % 0.4 0.0 - 1.5 %    Immature Grans % 0.4 0.0 - 0.5 %    Neutrophils, Absolute 6.45 1.70 - 7.00 10*3/mm3    Lymphocytes, Absolute 0.85 0.70 - 3.10 10*3/mm3    Monocytes, Absolute 0.74 0.10 - 0.90 10*3/mm3    Eosinophils, Absolute 0.06 0.00 - 0.40 10*3/mm3    Basophils, Absolute 0.03 0.00 - 0.20 10*3/mm3    Immature Grans, Absolute 0.03 0.00 - 0.05 10*3/mm3    nRBC 0.0 0.0 - 0.2 /100 WBC   ECG 12 Lead Chest Pain    Collection Time: 08/05/24  7:48 AM   Result Value Ref Range    QT Interval 425 ms    QTC Interval 446 ms   Duplex Venous Lower Extremity - Right CAR    Collection Time: 08/05/24  8:51 AM   Result Value Ref Range    BH CV VAS  POP FLUID COLLECTED 1.0     Right Common Femoral Spont Y     Right Common Femoral Competent Y     Right Common Femoral Phasic Y     Right Common Femoral Compress C     Right Common Femoral Augment Y     Right Saphenofemoral Junction Compress C     Right Profunda Femoral Compress C     Right Proximal Femoral Compress C     Right Mid Femoral Spont Y     Right Mid Femoral Competent Y     Right Mid Femoral Phasic Y     Right Mid Femoral Compress C     Right Mid Femoral Augment Y     Right Distal Femoral Compress C     Right Popliteal Spont Y     Right Popliteal Competent Y     Right Popliteal Phasic Y     Right Popliteal Compress C     Right Popliteal Augment Y     Right Posterior Tibial Compress C     Right Peroneal Compress C     Right Gastronemius Compress C     Right Greater Saph AK Compress C     Right Greater Saph BK Compress C     Right Lesser Saph Compress C     Left Common Femoral Spont Y     Left Common Femoral Competent Y     Left Common Femoral Phasic Y     Left Common Femoral Compress C     Left Common Femoral Augment Y     BH CV VAS PRELIMINARY FINDINGS SCRIPTING 1.0    Comprehensive Metabolic Panel    Collection Time: 08/05/24  9:21 AM    Specimen: Blood   Result Value Ref Range    Glucose 65 65 - 99 mg/dL    BUN 39 (H) 8 - 23 mg/dL    Creatinine 3.50 (H) 0.57 - 1.00 mg/dL    Sodium 130 (L) 136 - 145 mmol/L    Potassium 6.0 (H) 3.5 - 5.2 mmol/L    Chloride 88 (L) 98 - 107 mmol/L    CO2 18.4 (L) 22.0 - 29.0 mmol/L    Calcium 8.7 8.6 - 10.5 mg/dL    Total Protein 6.5 6.0 - 8.5 g/dL    Albumin 3.6 3.5 - 5.2 g/dL    ALT (SGPT) 65 (H) 1 - 33 U/L    AST (SGOT) 209 (H) 1 - 32 U/L    Alkaline Phosphatase 146 (H) 39 - 117 U/L    Total Bilirubin 2.6 (H) 0.0 - 1.2 mg/dL    Globulin 2.9 gm/dL    A/G Ratio 1.2 g/dL    BUN/Creatinine Ratio 11.1 7.0 - 25.0    Anion Gap 23.6 (H) 5.0 - 15.0 mmol/L    eGFR 14.0 (L) >60.0 mL/min/1.73   High Sensitivity Troponin T    Collection Time: 08/05/24  9:21 AM    Specimen: Blood    Result Value Ref Range    HS Troponin T 56 (C) <14 ng/L   BNP    Collection Time: 08/05/24  9:21 AM    Specimen: Blood   Result Value Ref Range    proBNP 605.0 0.0 - 900.0 pg/mL   Ethanol    Collection Time: 08/05/24  9:21 AM    Specimen: Blood   Result Value Ref Range    Ethanol 148 (H) 0 - 10 mg/dL    Ethanol % 0.148 %   Magnesium    Collection Time: 08/05/24  9:21 AM    Specimen: Blood   Result Value Ref Range    Magnesium 2.8 (H) 1.6 - 2.4 mg/dL   Type & Screen    Collection Time: 08/05/24  9:21 AM    Specimen: Blood   Result Value Ref Range    ABO Type O     RH type Positive     Antibody Screen Negative     T&S Expiration Date 8/8/2024 11:59:59 PM    Protime-INR    Collection Time: 08/05/24  9:21 AM    Specimen: Blood   Result Value Ref Range    Protime 17.4 (H) 11.7 - 14.2 Seconds    INR 1.40 (H) 0.90 - 1.10   POC Glucose Once    Collection Time: 08/05/24 10:42 AM    Specimen: Blood   Result Value Ref Range    Glucose 123 70 - 130 mg/dL         RADIOLOGY  Duplex Venous Lower Extremity - Right CAR    Result Date: 8/5/2024    Normal right lower extremity venous duplex scan.   Right popliteal fossa fluid collection.     XR Chest 1 View    Result Date: 8/5/2024  XR CHEST 1 VW-  Clinical: Chest pain  COMPARISON examination dated 2/10/2017  FINDINGS: Median sternotomy wires in position. There is mild cardiac enlargement. No effusion, edema or acute airspace disease has developed. Linear area of scarring versus discoid atelectasis again demonstrated at the left lung base. The remainder is unremarkable.  CONCLUSION: No acute pulmonary process has developed.  This report was finalized on 8/5/2024 7:57 AM by Dr. Nolan Nolen M.D on Workstation: [id number]         MEDICATIONS GIVEN IN ER  Medications   sodium chloride 0.9 % flush 10 mL (has no administration in time range)   pantoprazole (PROTONIX) injection 80 mg (80 mg Intravenous Given 8/5/24 0929)     And   pantoprazole (PROTONIX) 40 mg in sodium chloride 0.9 %  100 mL (0.4 mg/mL) MBP (8 mg/hr Intravenous Currently Infusing 8/5/24 0942)   octreotide (sandoSTATIN) 500 mcg in sodium chloride 0.9 % 100 mL (5 mcg/mL) infusion (25 mcg/hr Intravenous New Bag 8/5/24 0927)   Magnesium Standard Dose Replacement - Follow Nurse / BPA Driven Protocol (has no administration in time range)   thiamine (B-1) injection 200 mg (has no administration in time range)     Followed by   thiamine (VITAMIN B-1) tablet 100 mg (has no administration in time range)   folic acid (FOLVITE) tablet 1 mg (1 mg Oral Given 8/5/24 0941)   LORazepam (ATIVAN) tablet 2 mg (2 mg Oral Given 8/5/24 0940)     Followed by   LORazepam (ATIVAN) tablet 1 mg (has no administration in time range)     Followed by   LORazepam (ATIVAN) tablet 1 mg (has no administration in time range)     Followed by   LORazepam (ATIVAN) tablet 1 mg (has no administration in time range)   LORazepam (ATIVAN) tablet 1 mg (1 mg Oral Given 8/5/24 1048)     Or   LORazepam (ATIVAN) injection 1 mg ( Intravenous Not Given:  See Alt 8/5/24 1048)     Or   LORazepam (ATIVAN) tablet 2 mg ( Oral Not Given:  See Alt 8/5/24 1048)     Or   LORazepam (ATIVAN) injection 2 mg ( Intravenous Not Given:  See Alt 8/5/24 1048)     Or   LORazepam (ATIVAN) injection 2 mg ( Intravenous Not Given:  See Alt 8/5/24 1048)     Or   LORazepam (ATIVAN) injection 2 mg ( Intramuscular Not Given:  See Alt 8/5/24 1048)   cefTRIAXone (ROCEPHIN) 2,000 mg in sodium chloride 0.9 % 100 mL MBP (has no administration in time range)   methylPREDNISolone sodium succinate (SOLU-Medrol) injection 125 mg (125 mg Intravenous Given 8/5/24 0741)   albuterol (PROVENTIL) nebulizer solution 0.083% 2.5 mg/3mL (2.5 mg Nebulization Given 8/5/24 0800)   octreotide (sandoSTATIN) injection 50 mcg (50 mcg Intravenous Given 8/5/24 0930)   calcium gluconate 1000 Mg/50ml 0.675% NaCl IV SOLN (0 mg Intravenous Stopped 8/5/24 1020)   insulin regular (humuLIN R,novoLIN R) injection 5 Units (5 Units Intravenous  Given 8/5/24 1005)   dextrose (D50W) (25 g/50 mL) IV injection 25 g (25 g Intravenous Given 8/5/24 1003)   sodium zirconium cyclosilicate (LOKELMA) packet 10 g (10 g Oral Given 8/5/24 1048)   sodium bicarbonate injection 8.4% 50 mEq (50 mEq Intravenous Given 8/5/24 1007)         ORDERS PLACED DURING THIS VISIT:  Orders Placed This Encounter   Procedures    XR Chest 1 View    Comprehensive Metabolic Panel    High Sensitivity Troponin T    BNP    CBC Auto Differential    Urine Drug Screen - Urine, Clean Catch    Ethanol    Magnesium    Protime-INR    High Sensitivity Troponin T 2Hr    Basic Metabolic Panel    Hepatitis B Surface Antigen    Vital Signs    Continuous Pulse Oximetry    Obtain Baseline Clinical [place] Withdrawal Assessment - Ar (CIWA-Ar), Sedation Scale & Vital Signs    Clinical [place] Withdrawal Assessment (CIWA-Ar)    If CIWA-Ar Score Less Than 8 For 3 Consecutive Assessments, Monitor Every 4 Hours & Discontinue Assessment When CIWA-Ar Less Than 8 for 24 Hours    Obtain Pre & Post Sedation Scores With Every Sedative Dose - Hold For POSS Greater Than 2 or RASS of -3 or Less    Notify Provider - Withdrawal    Notify Provider of Abnormal Lab Results    Notify Provider - Vitals    Gastroenterology (on-call MD unless specified)    Nephrology (on -call MD unless specified)    [facility] (on-call MD unless specified) Details    POC Glucose Q1H    POC Glucose Once    ECG 12 Lead Chest Pain    Telemetry Scan    Type & Screen    Insert Peripheral IV    Initiate Observation Status    Seizure Precautions    Safety Precautions    CBC & Differential         OUTPATIENT MEDICATION MANAGEMENT:  Current Facility-Administered Medications Ordered in Epic   Medication Dose Route Frequency Provider Last Rate Last Admin    cefTRIAXone (ROCEPHIN) 2,000 mg in sodium chloride 0.9 % 100 mL MBP  2,000 mg Intravenous Once Shukri Romero MD        folic acid (FOLVITE) tablet 1 mg  1 mg Oral Daily Shukri Romero MD   1 mg at  08/05/24 0941    LORazepam (ATIVAN) tablet 1 mg  1 mg Oral Q1H PRN Shukri Romero MD   1 mg at 08/05/24 1048    Or    LORazepam (ATIVAN) injection 1 mg  1 mg Intravenous Q1H PRN Shukri Romero MD        Or    LORazepam (ATIVAN) tablet 2 mg  2 mg Oral Q1H PRN Shukri Romero MD        Or    LORazepam (ATIVAN) injection 2 mg  2 mg Intravenous Q1H PRN Shukri Romero MD        Or    LORazepam (ATIVAN) injection 2 mg  2 mg Intravenous Q15 Min PRN Shukri Romero MD        Or    LORazepam (ATIVAN) injection 2 mg  2 mg Intramuscular Q15 Min PRN Shukri Romero MD        LORazepam (ATIVAN) tablet 2 mg  2 mg Oral Q6H Shukri Romero MD   2 mg at 08/05/24 0940    Followed by    [START ON 8/6/2024] LORazepam (ATIVAN) tablet 1 mg  1 mg Oral Q6H Shukri Romero MD        Followed by    [START ON 8/7/2024] LORazepam (ATIVAN) tablet 1 mg  1 mg Oral Q12H Shukri Romero MD        Followed by    [START ON 8/8/2024] LORazepam (ATIVAN) tablet 1 mg  1 mg Oral Daily Shukri Romero MD        Magnesium Standard Dose Replacement - Follow Nurse / BPA Driven Protocol   Does not apply PRN Shukri Romero MD        octreotide (sandoSTATIN) 500 mcg in sodium chloride 0.9 % 100 mL (5 mcg/mL) infusion  25 mcg/hr Intravenous Continuous Shukri Romero MD 5 mL/hr at 08/05/24 0927 25 mcg/hr at 08/05/24 0927    pantoprazole (PROTONIX) 40 mg in sodium chloride 0.9 % 100 mL (0.4 mg/mL) MBP  8 mg/hr Intravenous Continuous Shukri Romero MD 20 mL/hr at 08/05/24 0942 8 mg/hr at 08/05/24 0942    sodium chloride 0.9 % flush 10 mL  10 mL Intravenous PRN Shukri Romero MD        thiamine (B-1) injection 200 mg  200 mg Intravenous Q8H Shukri Romero MD        Followed by    [START ON 8/10/2024] thiamine (VITAMIN B-1) tablet 100 mg  100 mg Oral Daily Shukri Romero MD         Current Outpatient Medications Ordered in Epic   Medication Sig Dispense Refill    acetaminophen (TYLENOL) 325 MG tablet Take 2 tablets by mouth 2 (Two) Times a Day  As Needed for Mild Pain. 60 tablet 0    albuterol (PROVENTIL HFA;VENTOLIN HFA) 108 (90 Base) MCG/ACT inhaler Inhale 2 puffs 3 times a day.      atenolol (TENORMIN) 25 MG tablet Take 1 tablet by mouth Daily.      fluticasone (FLONASE) 50 MCG/ACT nasal spray 1 spray into the nostril(s) as directed by provider.      furosemide (LASIX) 20 MG tablet Take 1 tablet by mouth Daily.      HYDROcodone-acetaminophen (Norco) 7.5-325 MG per tablet Take 1 tablet by mouth Every 8 (Eight) Hours As Needed for Moderate Pain. 30 tablet 0    ibuprofen (ADVIL,MOTRIN) 200 MG tablet Take 2 tablets by mouth Every 6 (Six) Hours As Needed for Mild Pain.      ipratropium-albuterol (DUO-NEB) 0.5-2.5 mg/3 ml nebulizer Take 3 mL by nebulization 4 (Four) Times a Day.      lisinopril (PRINIVIL,ZESTRIL) 40 MG tablet Take 1 tablet by mouth Daily.      potassium chloride (KLOR-CON M10) 10 MEQ CR tablet Take 1 tablet by mouth Daily.      rOPINIRole (REQUIP) 1 MG tablet Take 1 tablet by mouth every night at bedtime.      Trelegy Ellipta 200-62.5-25 MCG/ACT aerosol powder  Inhale 1 puff Daily.      triamterene-hydrochlorothiazide (DYAZIDE) 37.5-25 MG per capsule Take 1 capsule by mouth Daily.           PROCEDURES  Procedures      Critical care provider statement:    Critical care time (minutes): 45.   Critical care time was exclusive of:  Separately billable procedures and treating other patients   Critical care was necessary to treat or prevent imminent or life-threatening deterioration of the following conditions:  Respiratory Failure   Critical care was time spent personally by me on the following activities:  Development of treatment plan with patient or surrogate, discussions with consultants, evaluation of patient's response to treatment, examination of patient, obtaining history from patient or surrogate, ordering and performing treatments and interventions, ordering and review of laboratory studies, ordering and review of radiographic studies,  pulse oximetry, re-evaluation of patient's condition and review of old charts. Critical Care indicators:        PROGRESS, DATA ANALYSIS, CONSULTS, AND MEDICAL DECISION MAKING  All labs have been independently interpreted by me.  All radiology studies have been reviewed by me. All EKG's have been independently viewed and interpreted by me.  Discussion below represents my analysis of pertinent findings related to patient's condition, differential diagnosis, treatment plan and final disposition.    Differential diagnosis includes but is not limited to renal failure, alcohol intoxication, alcohol withdrawal, DVT, PE.    Clinical Scores:                   ED Course as of 08/05/24 1140   Mon Aug 05, 2024   0750 EKG          EKG time: 748  Rhythm/Rate: Normal sinus, rate 66  P waves and FL: Normal P, normal FL  QRS, axis: Narrow QRS, normal axis  ST and T waves: No acute    Independently Interpreted by me  Not significantly changed compared to prior 7/7/2023   [TR]   0751 The patient does report she took 2 atenolol this morning due to palpitations [TR]   0751 Hemoglobin(!): 7.3 [TR]   0816 XR Chest 1 View  My independent interpretation of the imaging study is no dense consolidation [TR]   0816 The patient reports she has had dark stools for the last several days [TR]   0852 Discussing with the ultrasound technician by phone.  Ultrasound preliminarily is negative for DVT. [TR]   0914 Rectal exam shows empty vault.  Residual is heme positive. [TR]   0915 The patient reports she feels like she is starting to go into alcohol withdrawal.  She has no tremors on exam.  She is not tachycardic.  I have ordered the alcohol withdrawal protocol and we will give her benzodiazepines as indicated.  Her labs hemolyzed and need to be recollected.  We are pending alcohol level as well as chemistries. [TR]   0945 INR(!): 1.40 [TR]   0952 Ethanol(!): 148 [TR]   0953 HS Troponin T(!!): 56 [TR]   1002 Discussed with Dr. Subramanian with A.  He  agrees to admit. [TR]   1014 Discussing with Dr. Baker with nephrology.  He agrees to consult. [TR]   1031 Discussing with Dr. Nunes with gastroenterology.  He agrees to consult. [TR]   1140 Dr. Nunes has evaluated.  Adding Rocephin for GI bleed. [TR]      ED Course User Index  [TR] Shukri Romero MD             AS OF 11:40 EDT VITALS:    BP - 112/54  HR - 81  TEMP - 98.2 °F (36.8 °C)  O2 SATS - 92%    COMPLEXITY OF CARE  The patient requires admission.      DIAGNOSIS  Final diagnoses:   Acute blood loss anemia   Gastrointestinal hemorrhage, unspecified gastrointestinal hemorrhage type   Acute exacerbation of chronic obstructive pulmonary disease (COPD)   Alcoholism   Acute renal failure, unspecified acute renal failure type   Hyperkalemia         DISPOSITION  ED Disposition       ED Disposition   Decision to Admit    Condition   --    Comment   Level of Care: Telemetry [5]   Diagnosis: Acute GI bleeding [[id number]]   Admitting Physician: PUJA AMANDA [[id number]]   Attending Physician: PUJA AMANDA [[id number]]   Bed Request Comments: not [address]                  Please note that portions of this document were completed with a voice recognition program.    Note Disclaimer: At [facility], we believe that sharing information builds trust and better relationships. You are receiving this note because you recently visited [facility]. It is possible you will see health information before a provider has talked with you about it. This kind of information can be easy to misunderstand. To help you fully understand what it means for your health, we urge you to discuss this note with your provider.         Shukri Romero MD  08/05/24 1032       Shukri Romero MD  08/05/24 1140

## 2024-08-06 ENCOUNTER — APPOINTMENT (OUTPATIENT)
Dept: GENERAL RADIOLOGY | Facility: HOSPITAL | Age: 64
DRG: 190 | End: 2024-08-06
Payer: MEDICARE

## 2024-08-06 ENCOUNTER — APPOINTMENT (OUTPATIENT)
Dept: CARDIOLOGY | Facility: HOSPITAL | Age: 64
DRG: 190 | End: 2024-08-06
Payer: MEDICARE

## 2024-08-06 LAB
ABO GROUP BLD: NORMAL
ALBUMIN SERPL-MCNC: 3.6 G/DL (ref 3.5–5.2)
ALPHA-FETOPROTEIN: 5.4 NG/ML (ref 0–8.3)
ANION GAP SERPL CALCULATED.3IONS-SCNC: 11 MMOL/L (ref 5–15)
ANION GAP SERPL CALCULATED.3IONS-SCNC: 15.5 MMOL/L (ref 5–15)
AORTIC DIMENSIONLESS INDEX: 0.7 (DI)
ARTERIAL PATENCY WRIST A: POSITIVE
ATMOSPHERIC PRESS: 743.5 MMHG
ATMOSPHERIC PRESS: 746.3 MMHG
BASE EXCESS BLDA CALC-SCNC: -0.5 MMOL/L (ref 0–2)
BASE EXCESS BLDV CALC-SCNC: 0.5 MMOL/L (ref -2–2)
BASOPHILS # BLD AUTO: 0 10*3/MM3 (ref 0–0.2)
BASOPHILS # BLD AUTO: 0.01 10*3/MM3 (ref 0–0.2)
BASOPHILS NFR BLD AUTO: 0 % (ref 0–1.5)
BASOPHILS NFR BLD AUTO: 0.1 % (ref 0–1.5)
BDY SITE: ABNORMAL
BDY SITE: ABNORMAL
BH BB BLOOD EXPIRATION DATE: NORMAL
BH BB BLOOD EXPIRATION DATE: NORMAL
BH BB BLOOD TYPE BARCODE: 5100
BH BB BLOOD TYPE BARCODE: 5100
BH BB DISPENSE STATUS: NORMAL
BH BB DISPENSE STATUS: NORMAL
BH BB PRODUCT CODE: NORMAL
BH BB PRODUCT CODE: NORMAL
BH BB UNIT NUMBER: NORMAL
BH BB UNIT NUMBER: NORMAL
BH CV ECHO MEAS - AO MAX PG: 13.1 MMHG
BH CV ECHO MEAS - AO MEAN PG: 7.4 MMHG
BH CV ECHO MEAS - AO ROOT DIAM: 2.8 CM
BH CV ECHO MEAS - AO V2 MAX: 181 CM/SEC
BH CV ECHO MEAS - AO V2 VTI: 39.8 CM
BH CV ECHO MEAS - AVA(I,D): 1.98 CM2
BH CV ECHO MEAS - EDV(CUBED): 110.6 ML
BH CV ECHO MEAS - EDV(MOD-SP2): 106 ML
BH CV ECHO MEAS - EDV(MOD-SP4): 107 ML
BH CV ECHO MEAS - EF(MOD-BP): 63.4 %
BH CV ECHO MEAS - EF(MOD-SP2): 62.3 %
BH CV ECHO MEAS - EF(MOD-SP4): 65.4 %
BH CV ECHO MEAS - ESV(CUBED): 32.6 ML
BH CV ECHO MEAS - ESV(MOD-SP2): 40 ML
BH CV ECHO MEAS - ESV(MOD-SP4): 37 ML
BH CV ECHO MEAS - FS: 33.5 %
BH CV ECHO MEAS - IVS/LVPW: 1 CM
BH CV ECHO MEAS - IVSD: 0.8 CM
BH CV ECHO MEAS - LAT PEAK E' VEL: 10.2 CM/SEC
BH CV ECHO MEAS - LV DIASTOLIC VOL/BSA (35-75): 58.3 CM2
BH CV ECHO MEAS - LV MASS(C)D: 126.7 GRAMS
BH CV ECHO MEAS - LV MAX PG: 7.1 MMHG
BH CV ECHO MEAS - LV MEAN PG: 4 MMHG
BH CV ECHO MEAS - LV SYSTOLIC VOL/BSA (12-30): 20.2 CM2
BH CV ECHO MEAS - LV V1 MAX: 133 CM/SEC
BH CV ECHO MEAS - LV V1 VTI: 26.1 CM
BH CV ECHO MEAS - LVIDD: 4.8 CM
BH CV ECHO MEAS - LVIDS: 3.2 CM
BH CV ECHO MEAS - LVOT AREA: 3 CM2
BH CV ECHO MEAS - LVOT DIAM: 1.96 CM
BH CV ECHO MEAS - LVPWD: 0.8 CM
BH CV ECHO MEAS - MED PEAK E' VEL: 10.6 CM/SEC
BH CV ECHO MEAS - MR MAX PG: 91.3 MMHG
BH CV ECHO MEAS - MR MAX VEL: 477.8 CM/SEC
BH CV ECHO MEAS - MV A DUR: 0.1 SEC
BH CV ECHO MEAS - MV A MAX VEL: 115.7 CM/SEC
BH CV ECHO MEAS - MV DEC SLOPE: 881.1 CM/SEC2
BH CV ECHO MEAS - MV DEC TIME: 187 SEC
BH CV ECHO MEAS - MV E MAX VEL: 164.8 CM/SEC
BH CV ECHO MEAS - MV E/A: 1.42
BH CV ECHO MEAS - MV MAX PG: 11 MMHG
BH CV ECHO MEAS - MV MEAN PG: 4.6 MMHG
BH CV ECHO MEAS - MV P1/2T: 59.6 MSEC
BH CV ECHO MEAS - MV V2 VTI: 35.8 CM
BH CV ECHO MEAS - MVA(P1/2T): 3.7 CM2
BH CV ECHO MEAS - MVA(VTI): 2.2 CM2
BH CV ECHO MEAS - PA ACC TIME: 0.13 SEC
BH CV ECHO MEAS - PA V2 MAX: 110.8 CM/SEC
BH CV ECHO MEAS - RAP SYSTOLE: 3 MMHG
BH CV ECHO MEAS - RV MAX PG: 1.95 MMHG
BH CV ECHO MEAS - RV V1 MAX: 69.8 CM/SEC
BH CV ECHO MEAS - RV V1 VTI: 16.3 CM
BH CV ECHO MEAS - RVSP: 38.7 MMHG
BH CV ECHO MEAS - SV(LVOT): 78.7 ML
BH CV ECHO MEAS - SV(MOD-SP2): 66 ML
BH CV ECHO MEAS - SV(MOD-SP4): 70 ML
BH CV ECHO MEAS - SVI(LVOT): 42.9 ML/M2
BH CV ECHO MEAS - SVI(MOD-SP2): 36 ML/M2
BH CV ECHO MEAS - SVI(MOD-SP4): 38.2 ML/M2
BH CV ECHO MEAS - TAPSE (>1.6): 3 CM
BH CV ECHO MEAS - TR MAX PG: 35.7 MMHG
BH CV ECHO MEAS - TR MAX VEL: 298.6 CM/SEC
BH CV ECHO MEASUREMENTS AVERAGE E/E' RATIO: 15.85
BH CV XLRA - RV BASE: 3.5 CM
BH CV XLRA - TDI S': 17 CM/SEC
BLD GP AB SCN SERPL QL: NEGATIVE
BUN SERPL-MCNC: 51 MG/DL (ref 8–23)
BUN SERPL-MCNC: 57 MG/DL (ref 8–23)
BUN/CREAT SERPL: 16.8 (ref 7–25)
BUN/CREAT SERPL: 19.3 (ref 7–25)
CALCIUM SPEC-SCNC: 8 MG/DL (ref 8.6–10.5)
CALCIUM SPEC-SCNC: 8 MG/DL (ref 8.6–10.5)
CHLORIDE SERPL-SCNC: 89 MMOL/L (ref 98–107)
CHLORIDE SERPL-SCNC: 89 MMOL/L (ref 98–107)
CO2 BLDA-SCNC: 26.5 MMOL/L (ref 23–27)
CO2 BLDA-SCNC: 26.8 MMOL/L (ref 23–27)
CO2 SERPL-SCNC: 20.5 MMOL/L (ref 22–29)
CO2 SERPL-SCNC: 21 MMOL/L (ref 22–29)
CREAT SERPL-MCNC: 2.95 MG/DL (ref 0.57–1)
CREAT SERPL-MCNC: 3.04 MG/DL (ref 0.57–1)
CROSSMATCH INTERPRETATION: NORMAL
CROSSMATCH INTERPRETATION: NORMAL
D-LACTATE SERPL-SCNC: 1.4 MMOL/L (ref 0.5–2)
DEPRECATED RDW RBC AUTO: 47.2 FL (ref 37–54)
DEPRECATED RDW RBC AUTO: 48.5 FL (ref 37–54)
DEPRECATED RDW RBC AUTO: 49.2 FL (ref 37–54)
DEVICE COMMENT: ABNORMAL
DEVICE COMMENT: ABNORMAL
EGFRCR SERPLBLD CKD-EPI 2021: 16.6 ML/MIN/1.73
EGFRCR SERPLBLD CKD-EPI 2021: 17.2 ML/MIN/1.73
EOSINOPHIL # BLD AUTO: 0 10*3/MM3 (ref 0–0.4)
EOSINOPHIL # BLD AUTO: 0.01 10*3/MM3 (ref 0–0.4)
EOSINOPHIL NFR BLD AUTO: 0 % (ref 0.3–6.2)
EOSINOPHIL NFR BLD AUTO: 0.1 % (ref 0.3–6.2)
ERYTHROCYTE [DISTWIDTH] IN BLOOD BY AUTOMATED COUNT: 15.9 % (ref 12.3–15.4)
ERYTHROCYTE [DISTWIDTH] IN BLOOD BY AUTOMATED COUNT: 16.4 % (ref 12.3–15.4)
ERYTHROCYTE [DISTWIDTH] IN BLOOD BY AUTOMATED COUNT: 16.5 % (ref 12.3–15.4)
FERRITIN SERPL-MCNC: 68.6 NG/ML (ref 13–150)
GAS FLOW AIRWAY: 2 LPM
GAS FLOW AIRWAY: 8 LPM
GEN 5 2HR TROPONIN T REFLEX: 50 NG/L
GLUCOSE BLDC GLUCOMTR-MCNC: 186 MG/DL (ref 70–130)
GLUCOSE BLDC GLUCOMTR-MCNC: 189 MG/DL (ref 70–130)
GLUCOSE BLDC GLUCOMTR-MCNC: 194 MG/DL (ref 70–130)
GLUCOSE BLDC GLUCOMTR-MCNC: 224 MG/DL (ref 70–130)
GLUCOSE SERPL-MCNC: 165 MG/DL (ref 65–99)
GLUCOSE SERPL-MCNC: 183 MG/DL (ref 65–99)
HAV IGM SERPL QL IA: NORMAL
HBV CORE IGM SERPL QL IA: NORMAL
HBV SURFACE AG SERPL QL IA: NORMAL
HCO3 BLDA-SCNC: 25.4 MMOL/L (ref 22–28)
HCO3 BLDV-SCNC: 25.3 MMOL/L (ref 22–28)
HCT VFR BLD AUTO: 21.3 % (ref 34–46.6)
HCT VFR BLD AUTO: 23.8 % (ref 34–46.6)
HCT VFR BLD AUTO: 24.6 % (ref 34–46.6)
HCV AB SER QL: NORMAL
HEMODILUTION: NO
HGB BLD-MCNC: 6.3 G/DL (ref 12–15.9)
HGB BLD-MCNC: 7.4 G/DL (ref 12–15.9)
HGB BLD-MCNC: 7.7 G/DL (ref 12–15.9)
IMM GRANULOCYTES # BLD AUTO: 0.04 10*3/MM3 (ref 0–0.05)
IMM GRANULOCYTES # BLD AUTO: 0.07 10*3/MM3 (ref 0–0.05)
IMM GRANULOCYTES NFR BLD AUTO: 0.6 % (ref 0–0.5)
IMM GRANULOCYTES NFR BLD AUTO: 0.7 % (ref 0–0.5)
IRON 24H UR-MRATE: 87 MCG/DL (ref 37–145)
IRON SATN MFR SERPL: 21 % (ref 20–50)
LEFT ATRIUM VOLUME INDEX: 22.2 ML/M2
LYMPHOCYTES # BLD AUTO: 0.24 10*3/MM3 (ref 0.7–3.1)
LYMPHOCYTES # BLD AUTO: 0.32 10*3/MM3 (ref 0.7–3.1)
LYMPHOCYTES NFR BLD AUTO: 2.9 % (ref 19.6–45.3)
LYMPHOCYTES NFR BLD AUTO: 4 % (ref 19.6–45.3)
MCH RBC QN AUTO: 25 PG (ref 26.6–33)
MCH RBC QN AUTO: 26.4 PG (ref 26.6–33)
MCH RBC QN AUTO: 26.5 PG (ref 26.6–33)
MCHC RBC AUTO-ENTMCNC: 29.6 G/DL (ref 31.5–35.7)
MCHC RBC AUTO-ENTMCNC: 31.1 G/DL (ref 31.5–35.7)
MCHC RBC AUTO-ENTMCNC: 31.3 G/DL (ref 31.5–35.7)
MCV RBC AUTO: 84.5 FL (ref 79–97)
MCV RBC AUTO: 84.5 FL (ref 79–97)
MCV RBC AUTO: 85 FL (ref 79–97)
MODALITY: ABNORMAL
MODALITY: ABNORMAL
MONOCYTES # BLD AUTO: 0.23 10*3/MM3 (ref 0.1–0.9)
MONOCYTES # BLD AUTO: 0.82 10*3/MM3 (ref 0.1–0.9)
MONOCYTES NFR BLD AUTO: 3.8 % (ref 5–12)
MONOCYTES NFR BLD AUTO: 7.4 % (ref 5–12)
NEUTROPHILS NFR BLD AUTO: 5.55 10*3/MM3 (ref 1.7–7)
NEUTROPHILS NFR BLD AUTO: 88.9 % (ref 42.7–76)
NEUTROPHILS NFR BLD AUTO: 9.78 10*3/MM3 (ref 1.7–7)
NEUTROPHILS NFR BLD AUTO: 91.5 % (ref 42.7–76)
NRBC BLD AUTO-RTO: 0 /100 WBC (ref 0–0.2)
PCO2 BLDA: 46.6 MM HG (ref 35–45)
PCO2 BLDV: 40.1 MM HG (ref 41–51)
PH BLDA: 7.34 PH UNITS (ref 7.35–7.45)
PH BLDV: 7.41 PH UNITS (ref 7.31–7.41)
PHOSPHATE SERPL-MCNC: 4.8 MG/DL (ref 2.5–4.5)
PLATELET # BLD AUTO: 119 10*3/MM3 (ref 140–450)
PLATELET # BLD AUTO: 68 10*3/MM3 (ref 140–450)
PLATELET # BLD AUTO: 75 10*3/MM3 (ref 140–450)
PMV BLD AUTO: 11.1 FL (ref 6–12)
PMV BLD AUTO: 11.3 FL (ref 6–12)
PMV BLD AUTO: 12.8 FL (ref 6–12)
PO2 BLDA: 96.3 MM HG (ref 80–100)
PO2 BLDV: 52.9 MM HG (ref 35–45)
POTASSIUM SERPL-SCNC: 5.2 MMOL/L (ref 3.5–5.2)
POTASSIUM SERPL-SCNC: 5.8 MMOL/L (ref 3.5–5.2)
RBC # BLD AUTO: 2.52 10*6/MM3 (ref 3.77–5.28)
RBC # BLD AUTO: 2.8 10*6/MM3 (ref 3.77–5.28)
RBC # BLD AUTO: 2.91 10*6/MM3 (ref 3.77–5.28)
RH BLD: POSITIVE
SAO2 % BLDCOA: 97 % (ref 92–98.5)
SAO2 % BLDCOV: 87.2 % (ref 45–75)
SODIUM SERPL-SCNC: 121 MMOL/L (ref 136–145)
SODIUM SERPL-SCNC: 125 MMOL/L (ref 136–145)
T&S EXPIRATION DATE: NORMAL
TIBC SERPL-MCNC: 416 MCG/DL (ref 298–536)
TOTAL RATE: 28 BREATHS/MINUTE
TOTAL RATE: 30 BREATHS/MINUTE
TRANSFERRIN SERPL-MCNC: 279 MG/DL (ref 200–360)
TROPONIN T DELTA: 4 NG/L
TROPONIN T SERPL HS-MCNC: 46 NG/L
UNIT  ABO: NORMAL
UNIT  ABO: NORMAL
UNIT  RH: NORMAL
UNIT  RH: NORMAL
VIT B12 BLD-MCNC: 840 PG/ML (ref 211–946)
WBC NRBC COR # BLD AUTO: 11.01 10*3/MM3 (ref 3.4–10.8)
WBC NRBC COR # BLD AUTO: 6.06 10*3/MM3 (ref 3.4–10.8)
WBC NRBC COR # BLD AUTO: 9.89 10*3/MM3 (ref 3.4–10.8)

## 2024-08-06 PROCEDURE — 99232 SBSQ HOSP IP/OBS MODERATE 35: CPT | Performed by: PHYSICIAN ASSISTANT

## 2024-08-06 PROCEDURE — 84484 ASSAY OF TROPONIN QUANT: CPT | Performed by: INTERNAL MEDICINE

## 2024-08-06 PROCEDURE — 25010000002 CEFTRIAXONE PER 250 MG: Performed by: PHYSICIAN ASSISTANT

## 2024-08-06 PROCEDURE — 36415 COLL VENOUS BLD VENIPUNCTURE: CPT | Performed by: INTERNAL MEDICINE

## 2024-08-06 PROCEDURE — 25010000002 OCTREOTIDE PER 25 MCG: Performed by: EMERGENCY MEDICINE

## 2024-08-06 PROCEDURE — 86900 BLOOD TYPING SEROLOGIC ABO: CPT

## 2024-08-06 PROCEDURE — 80048 BASIC METABOLIC PNL TOTAL CA: CPT | Performed by: INTERNAL MEDICINE

## 2024-08-06 PROCEDURE — 25010000002 THIAMINE HCL 200 MG/2ML SOLUTION: Performed by: EMERGENCY MEDICINE

## 2024-08-06 PROCEDURE — 82607 VITAMIN B-12: CPT | Performed by: INTERNAL MEDICINE

## 2024-08-06 PROCEDURE — 80069 RENAL FUNCTION PANEL: CPT | Performed by: INTERNAL MEDICINE

## 2024-08-06 PROCEDURE — 94799 UNLISTED PULMONARY SVC/PX: CPT

## 2024-08-06 PROCEDURE — 25010000002 LORAZEPAM PER 2 MG: Performed by: EMERGENCY MEDICINE

## 2024-08-06 PROCEDURE — 36430 TRANSFUSION BLD/BLD COMPNT: CPT

## 2024-08-06 PROCEDURE — 82747 ASSAY OF FOLIC ACID RBC: CPT | Performed by: INTERNAL MEDICINE

## 2024-08-06 PROCEDURE — 25010000002 ALBUMIN HUMAN 25% PER 50 ML: Performed by: INTERNAL MEDICINE

## 2024-08-06 PROCEDURE — P9016 RBC LEUKOCYTES REDUCED: HCPCS

## 2024-08-06 PROCEDURE — 85014 HEMATOCRIT: CPT | Performed by: INTERNAL MEDICINE

## 2024-08-06 PROCEDURE — 90791 PSYCH DIAGNOSTIC EVALUATION: CPT | Performed by: SOCIAL WORKER

## 2024-08-06 PROCEDURE — 86900 BLOOD TYPING SEROLOGIC ABO: CPT | Performed by: INTERNAL MEDICINE

## 2024-08-06 PROCEDURE — 86923 COMPATIBILITY TEST ELECTRIC: CPT

## 2024-08-06 PROCEDURE — 94761 N-INVAS EAR/PLS OXIMETRY MLT: CPT

## 2024-08-06 PROCEDURE — 82728 ASSAY OF FERRITIN: CPT | Performed by: INTERNAL MEDICINE

## 2024-08-06 PROCEDURE — 84165 PROTEIN E-PHORESIS SERUM: CPT | Performed by: INTERNAL MEDICINE

## 2024-08-06 PROCEDURE — 86901 BLOOD TYPING SEROLOGIC RH(D): CPT

## 2024-08-06 PROCEDURE — 93010 ELECTROCARDIOGRAM REPORT: CPT | Performed by: INTERNAL MEDICINE

## 2024-08-06 PROCEDURE — 94760 N-INVAS EAR/PLS OXIMETRY 1: CPT

## 2024-08-06 PROCEDURE — 25010000002 METHYLPREDNISOLONE PER 125 MG: Performed by: INTERNAL MEDICINE

## 2024-08-06 PROCEDURE — 25010000002 FUROSEMIDE PER 20 MG: Performed by: INTERNAL MEDICINE

## 2024-08-06 PROCEDURE — 71045 X-RAY EXAM CHEST 1 VIEW: CPT

## 2024-08-06 PROCEDURE — 82803 BLOOD GASES ANY COMBINATION: CPT

## 2024-08-06 PROCEDURE — 63710000001 INSULIN LISPRO (HUMAN) PER 5 UNITS

## 2024-08-06 PROCEDURE — 84466 ASSAY OF TRANSFERRIN: CPT | Performed by: INTERNAL MEDICINE

## 2024-08-06 PROCEDURE — 93306 TTE W/DOPPLER COMPLETE: CPT | Performed by: INTERNAL MEDICINE

## 2024-08-06 PROCEDURE — 82948 REAGENT STRIP/BLOOD GLUCOSE: CPT

## 2024-08-06 PROCEDURE — 93306 TTE W/DOPPLER COMPLETE: CPT

## 2024-08-06 PROCEDURE — 82105 ALPHA-FETOPROTEIN SERUM: CPT | Performed by: INTERNAL MEDICINE

## 2024-08-06 PROCEDURE — 36600 WITHDRAWAL OF ARTERIAL BLOOD: CPT

## 2024-08-06 PROCEDURE — 25510000001 PERFLUTREN (DEFINITY) 8.476 MG IN SODIUM CHLORIDE (PF) 0.9 % 10 ML INJECTION: Performed by: INTERNAL MEDICINE

## 2024-08-06 PROCEDURE — 83540 ASSAY OF IRON: CPT | Performed by: INTERNAL MEDICINE

## 2024-08-06 PROCEDURE — 93005 ELECTROCARDIOGRAM TRACING: CPT

## 2024-08-06 PROCEDURE — 25010000002 METHYLPREDNISOLONE PER 40 MG: Performed by: INTERNAL MEDICINE

## 2024-08-06 PROCEDURE — 80074 ACUTE HEPATITIS PANEL: CPT | Performed by: INTERNAL MEDICINE

## 2024-08-06 PROCEDURE — 84155 ASSAY OF PROTEIN SERUM: CPT | Performed by: INTERNAL MEDICINE

## 2024-08-06 PROCEDURE — 94664 DEMO&/EVAL PT USE INHALER: CPT

## 2024-08-06 PROCEDURE — 85025 COMPLETE CBC W/AUTO DIFF WBC: CPT | Performed by: INTERNAL MEDICINE

## 2024-08-06 PROCEDURE — 83605 ASSAY OF LACTIC ACID: CPT | Performed by: INTERNAL MEDICINE

## 2024-08-06 PROCEDURE — 25010000002 THIAMINE PER 100 MG: Performed by: EMERGENCY MEDICINE

## 2024-08-06 PROCEDURE — 94660 CPAP INITIATION&MGMT: CPT

## 2024-08-06 PROCEDURE — 82784 ASSAY IGA/IGD/IGG/IGM EACH: CPT | Performed by: INTERNAL MEDICINE

## 2024-08-06 PROCEDURE — P9047 ALBUMIN (HUMAN), 25%, 50ML: HCPCS | Performed by: INTERNAL MEDICINE

## 2024-08-06 PROCEDURE — 86334 IMMUNOFIX E-PHORESIS SERUM: CPT | Performed by: INTERNAL MEDICINE

## 2024-08-06 PROCEDURE — 86850 RBC ANTIBODY SCREEN: CPT | Performed by: INTERNAL MEDICINE

## 2024-08-06 PROCEDURE — 86901 BLOOD TYPING SEROLOGIC RH(D): CPT | Performed by: INTERNAL MEDICINE

## 2024-08-06 PROCEDURE — 85007 BL SMEAR W/DIFF WBC COUNT: CPT | Performed by: INTERNAL MEDICINE

## 2024-08-06 RX ORDER — IBUPROFEN 600 MG/1
1 TABLET ORAL
Status: DISCONTINUED | OUTPATIENT
Start: 2024-08-06 | End: 2024-08-16 | Stop reason: HOSPADM

## 2024-08-06 RX ORDER — DEXMEDETOMIDINE HYDROCHLORIDE 4 UG/ML
.2-1.5 INJECTION, SOLUTION INTRAVENOUS
Status: DISCONTINUED | OUTPATIENT
Start: 2024-08-06 | End: 2024-08-09

## 2024-08-06 RX ORDER — IPRATROPIUM BROMIDE AND ALBUTEROL SULFATE 2.5; .5 MG/3ML; MG/3ML
3 SOLUTION RESPIRATORY (INHALATION) ONCE
Status: DISCONTINUED | OUTPATIENT
Start: 2024-08-06 | End: 2024-08-06

## 2024-08-06 RX ORDER — ALBUMIN (HUMAN) 12.5 G/50ML
25 SOLUTION INTRAVENOUS ONCE
Status: COMPLETED | OUTPATIENT
Start: 2024-08-06 | End: 2024-08-06

## 2024-08-06 RX ORDER — METHYLPREDNISOLONE SODIUM SUCCINATE 40 MG/ML
40 INJECTION, POWDER, LYOPHILIZED, FOR SOLUTION INTRAMUSCULAR; INTRAVENOUS EVERY 8 HOURS
Status: DISCONTINUED | OUTPATIENT
Start: 2024-08-06 | End: 2024-08-07

## 2024-08-06 RX ORDER — MIDODRINE HYDROCHLORIDE 5 MG/1
10 TABLET ORAL
Status: DISCONTINUED | OUTPATIENT
Start: 2024-08-06 | End: 2024-08-07

## 2024-08-06 RX ORDER — INSULIN LISPRO 100 [IU]/ML
2-7 INJECTION, SOLUTION INTRAVENOUS; SUBCUTANEOUS
Status: DISCONTINUED | OUTPATIENT
Start: 2024-08-06 | End: 2024-08-07

## 2024-08-06 RX ORDER — METHYLPREDNISOLONE SODIUM SUCCINATE 125 MG/2ML
125 INJECTION, POWDER, LYOPHILIZED, FOR SOLUTION INTRAMUSCULAR; INTRAVENOUS ONCE
Status: COMPLETED | OUTPATIENT
Start: 2024-08-06 | End: 2024-08-06

## 2024-08-06 RX ORDER — NICOTINE POLACRILEX 4 MG
15 LOZENGE BUCCAL
Status: DISCONTINUED | OUTPATIENT
Start: 2024-08-06 | End: 2024-08-16 | Stop reason: HOSPADM

## 2024-08-06 RX ORDER — FUROSEMIDE 10 MG/ML
40 INJECTION INTRAMUSCULAR; INTRAVENOUS ONCE
Status: COMPLETED | OUTPATIENT
Start: 2024-08-06 | End: 2024-08-06

## 2024-08-06 RX ORDER — DEXTROSE MONOHYDRATE 25 G/50ML
25 INJECTION, SOLUTION INTRAVENOUS
Status: DISCONTINUED | OUTPATIENT
Start: 2024-08-06 | End: 2024-08-16 | Stop reason: HOSPADM

## 2024-08-06 RX ORDER — NICOTINE 21 MG/24HR
1 PATCH, TRANSDERMAL 24 HOURS TRANSDERMAL EVERY 24 HOURS
Status: DISCONTINUED | OUTPATIENT
Start: 2024-08-06 | End: 2024-08-16 | Stop reason: HOSPADM

## 2024-08-06 RX ADMIN — PANTOPRAZOLE SODIUM 8 MG/HR: 40 INJECTION, POWDER, FOR SOLUTION INTRAVENOUS at 21:01

## 2024-08-06 RX ADMIN — IPRATROPIUM BROMIDE AND ALBUTEROL SULFATE 3 ML: .5; 3 SOLUTION RESPIRATORY (INHALATION) at 17:06

## 2024-08-06 RX ADMIN — INSULIN LISPRO 2 UNITS: 100 INJECTION, SOLUTION INTRAVENOUS; SUBCUTANEOUS at 23:21

## 2024-08-06 RX ADMIN — PANTOPRAZOLE SODIUM 8 MG/HR: 40 INJECTION, POWDER, FOR SOLUTION INTRAVENOUS at 14:51

## 2024-08-06 RX ADMIN — TIOTROPIUM BROMIDE INHALATION SPRAY 2 PUFF: 3.12 SPRAY, METERED RESPIRATORY (INHALATION) at 07:04

## 2024-08-06 RX ADMIN — NICOTINE 1 PATCH: 21 PATCH, EXTENDED RELEASE TRANSDERMAL at 00:19

## 2024-08-06 RX ADMIN — ALBUMIN (HUMAN) 25 G: 0.25 INJECTION, SOLUTION INTRAVENOUS at 11:44

## 2024-08-06 RX ADMIN — PANTOPRAZOLE SODIUM 8 MG/HR: 40 INJECTION, POWDER, FOR SOLUTION INTRAVENOUS at 08:58

## 2024-08-06 RX ADMIN — ALBUMIN (HUMAN) 25 G: 0.25 INJECTION, SOLUTION INTRAVENOUS at 01:44

## 2024-08-06 RX ADMIN — LORAZEPAM 2 MG: 1 TABLET ORAL at 03:31

## 2024-08-06 RX ADMIN — THIAMINE HYDROCHLORIDE 200 MG: 100 INJECTION, SOLUTION INTRAMUSCULAR; INTRAVENOUS at 06:44

## 2024-08-06 RX ADMIN — Medication 10 ML: at 08:39

## 2024-08-06 RX ADMIN — THIAMINE HYDROCHLORIDE 200 MG: 100 INJECTION, SOLUTION INTRAMUSCULAR; INTRAVENOUS at 21:00

## 2024-08-06 RX ADMIN — LORAZEPAM 2 MG: 2 INJECTION INTRAMUSCULAR; INTRAVENOUS at 19:41

## 2024-08-06 RX ADMIN — SODIUM ZIRCONIUM CYCLOSILICATE 10 G: 10 POWDER, FOR SUSPENSION ORAL at 11:33

## 2024-08-06 RX ADMIN — FOLIC ACID 1 MG: 1 TABLET ORAL at 08:59

## 2024-08-06 RX ADMIN — LORAZEPAM 2 MG: 2 INJECTION INTRAMUSCULAR; INTRAVENOUS at 23:18

## 2024-08-06 RX ADMIN — PANTOPRAZOLE SODIUM 8 MG/HR: 40 INJECTION, POWDER, FOR SOLUTION INTRAVENOUS at 03:31

## 2024-08-06 RX ADMIN — METHYLPREDNISOLONE SODIUM SUCCINATE 40 MG: 40 INJECTION, POWDER, FOR SOLUTION INTRAMUSCULAR; INTRAVENOUS at 23:21

## 2024-08-06 RX ADMIN — IPRATROPIUM BROMIDE AND ALBUTEROL SULFATE 3 ML: .5; 3 SOLUTION RESPIRATORY (INHALATION) at 19:35

## 2024-08-06 RX ADMIN — BUDESONIDE AND FORMOTEROL FUMARATE DIHYDRATE 2 PUFF: 160; 4.5 AEROSOL RESPIRATORY (INHALATION) at 07:04

## 2024-08-06 RX ADMIN — LORAZEPAM 2 MG: 2 INJECTION INTRAMUSCULAR; INTRAVENOUS at 20:31

## 2024-08-06 RX ADMIN — PERFLUTREN 1 ML: 6.52 INJECTION, SUSPENSION INTRAVENOUS at 19:13

## 2024-08-06 RX ADMIN — HYDROCODONE BITARTRATE AND ACETAMINOPHEN 1 TABLET: 7.5; 325 TABLET ORAL at 08:59

## 2024-08-06 RX ADMIN — OCTREOTIDE ACETATE 25 MCG/HR: 500 INJECTION, SOLUTION INTRAVENOUS; SUBCUTANEOUS at 04:53

## 2024-08-06 RX ADMIN — LORAZEPAM 2 MG: 2 INJECTION INTRAMUSCULAR; INTRAVENOUS at 20:03

## 2024-08-06 RX ADMIN — IPRATROPIUM BROMIDE AND ALBUTEROL SULFATE 3 ML: .5; 3 SOLUTION RESPIRATORY (INHALATION) at 07:04

## 2024-08-06 RX ADMIN — IPRATROPIUM BROMIDE AND ALBUTEROL SULFATE 3 ML: .5; 3 SOLUTION RESPIRATORY (INHALATION) at 14:29

## 2024-08-06 RX ADMIN — Medication 10 ML: at 08:44

## 2024-08-06 RX ADMIN — LORAZEPAM 2 MG: 1 TABLET ORAL at 01:00

## 2024-08-06 RX ADMIN — CEFTRIAXONE 2000 MG: 2 INJECTION, POWDER, FOR SOLUTION INTRAMUSCULAR; INTRAVENOUS at 13:18

## 2024-08-06 RX ADMIN — THIAMINE HYDROCHLORIDE 200 MG: 100 INJECTION, SOLUTION INTRAMUSCULAR; INTRAVENOUS at 13:20

## 2024-08-06 RX ADMIN — DEXMEDETOMIDINE HYDROCHLORIDE 0.4 MCG/KG/HR: 4 INJECTION, SOLUTION INTRAVENOUS at 21:00

## 2024-08-06 RX ADMIN — LORAZEPAM 2 MG: 1 TABLET ORAL at 11:57

## 2024-08-06 RX ADMIN — LORAZEPAM 2 MG: 1 TABLET ORAL at 08:59

## 2024-08-06 RX ADMIN — METHYLPREDNISOLONE SODIUM SUCCINATE 125 MG: 125 INJECTION INTRAMUSCULAR; INTRAVENOUS at 18:38

## 2024-08-06 RX ADMIN — MIDODRINE HYDROCHLORIDE 10 MG: 5 TABLET ORAL at 12:08

## 2024-08-06 RX ADMIN — FUROSEMIDE 40 MG: 10 INJECTION, SOLUTION INTRAMUSCULAR; INTRAVENOUS at 18:38

## 2024-08-06 RX ADMIN — BUDESONIDE AND FORMOTEROL FUMARATE DIHYDRATE 2 PUFF: 160; 4.5 AEROSOL RESPIRATORY (INHALATION) at 19:40

## 2024-08-06 NOTE — CONSULTS
"Access Center evaluated 64-year-old female for alcohol abuse.  Patient came in with medical issues related to her drinking which includes acute GI bleeding ,hyperkalemia and other issues.  Patient states she has been drinking heavily a month ago where she was drinking 1 pint of whiskey daily.  Patient states the past month she has had about a half a pint of whiskey daily.  Patient states she has been to rehab in the past with the last time being 6 years ago at the [facility] and stated it was a \"bad experience\".  Patient denies any blackouts and denies any cravings.  Patient states part of her drinking is to deal with the chronic pain in her lower back.  Patient states she has started getting epidurals in a pain management clinic.  Patient denies SI and denies any history of attempts.  Patient denies any inpatient psychiatric care but does states she had some counseling around her divorce 6 years ago.  Patient rates her current anxiety as a 6/10 and her current depressed mood as a 5/10.  Patient's current CIWA is a 12.    Patient states her sleep is \"horrible\" and states her sleep has always been bad since she was a child.  Patient states she has had no appetite in the last 4 or 5 days.  Patient denies any psychosis.  Patient lives in a house and is in a relationship of 6 years with her significant other.  Patient has 1 adult daughter who does not live here.  Patient states her SO is her main support.    Patient getting very sleepy.  Access will follow and get more details regarding her alcohol use in terms of how long she has been drinking.  "

## 2024-08-06 NOTE — PROGRESS NOTES
[facility]  Inpatient Progress Note    Reason for Followup: Cirrhosis, anemia, melena    Subjective     Interval History:   Patient denies any dark stools this morning.  She is inquiring about going home.  No current complaints of abdominal pain.    Current Facility-Administered Medications:     acetaminophen (TYLENOL) tablet 650 mg, 650 mg, Oral, Q4H PRN **OR** acetaminophen (TYLENOL) 160 MG/5ML oral solution 650 mg, 650 mg, Oral, Q4H PRN **OR** acetaminophen (TYLENOL) suppository 650 mg, 650 mg, Rectal, Q4H PRN, Mustapha Subramanian MD    atenolol (TENORMIN) tablet 25 mg, 25 mg, Oral, Daily, Mustapha Subramanian MD    sennosides-docusate (PERICOLACE) 8.6-50 MG per tablet 2 tablet, 2 tablet, Oral, BID PRN **AND** polyethylene glycol (MIRALAX) packet 17 g, 17 g, Oral, Daily PRN **AND** bisacodyl (DULCOLAX) EC tablet 5 mg, 5 mg, Oral, Daily PRN **AND** bisacodyl (DULCOLAX) suppository 10 mg, 10 mg, Rectal, Daily PRN, Mustapha Subramanian MD    budesonide-formoterol (SYMBICORT) 160-4.5 MCG/ACT inhaler 2 puff, 2 puff, Inhalation, BID - RT, 2 puff at 08/06/24 0704 **AND** tiotropium (SPIRIVA RESPIMAT) 2.5 mcg/act aerosol solution inhaler, 2 puff, Inhalation, Daily - RT, Mustapha Subramanian MD, 2 puff at 08/06/24 0704    folic acid (FOLVITE) tablet 1 mg, 1 mg, Oral, Daily, Shukri Romero MD, 1 mg at 08/06/24 0859    HYDROcodone-acetaminophen (NORCO) 7.5-325 MG per tablet 1 tablet, 1 tablet, Oral, Q8H PRN, Mustapha Subramanian MD, 1 tablet at 08/06/24 0859    ipratropium-albuterol (DUO-NEB) nebulizer solution 3 mL, 3 mL, Nebulization, 4x Daily, Mustapha Subramanian MD, 3 mL at 08/06/24 0704    ipratropium-albuterol (DUO-NEB) nebulizer solution 3 mL, 3 mL, Nebulization, Q4H PRN, Mustapha Subramanian MD    LORazepam (ATIVAN) tablet 1 mg, 1 mg, Oral, Q1H PRN, 1 mg at 08/05/24 1048 **OR** LORazepam (ATIVAN) injection 1 mg, 1 mg, Intravenous, Q1H PRN **OR** LORazepam (ATIVAN) tablet 2 mg, 2 mg, Oral, Q1H PRN, 2 mg at 08/06/24 0859 **OR**  LORazepam (ATIVAN) injection 2 mg, 2 mg, Intravenous, Q1H PRN **OR** LORazepam (ATIVAN) injection 2 mg, 2 mg, Intravenous, Q15 Min PRN **OR** LORazepam (ATIVAN) injection 2 mg, 2 mg, Intramuscular, Q15 Min PRN, Shukri Romero MD    LORazepam (ATIVAN) tablet 2 mg, 2 mg, Oral, Q6H, 2 mg at 08/06/24 0331 **FOLLOWED BY** LORazepam (ATIVAN) tablet 1 mg, 1 mg, Oral, Q6H **FOLLOWED BY** [START ON 8/7/2024] LORazepam (ATIVAN) tablet 1 mg, 1 mg, Oral, Q12H **FOLLOWED BY** [START ON 8/8/2024] LORazepam (ATIVAN) tablet 1 mg, 1 mg, Oral, Daily, Shukri Romero MD    Magnesium Standard Dose Replacement - Follow Nurse / BPA Driven Protocol, , Does not apply, PRN, Shukri Romero MD    nicotine (NICODERM CQ) 21 MG/24HR patch 1 patch, 1 patch, Transdermal, Q24H, Mustapha Subramanian MD, 1 patch at 08/06/24 0019    octreotide (sandoSTATIN) 500 mcg in sodium chloride 0.9 % 100 mL (5 mcg/mL) infusion, 25 mcg/hr, Intravenous, Continuous, Shukri Romero MD, Last Rate: 5 mL/hr at 08/06/24 0624, 25 mcg/hr at 08/06/24 0624    ondansetron ODT (ZOFRAN-ODT) disintegrating tablet 4 mg, 4 mg, Oral, Q6H PRN **OR** ondansetron (ZOFRAN) injection 4 mg, 4 mg, Intravenous, Q6H PRN, Mustapha Subramanian MD    [COMPLETED] pantoprazole (PROTONIX) injection 80 mg, 80 mg, Intravenous, Once, 80 mg at 08/05/24 0929 **AND** pantoprazole (PROTONIX) 40 mg in sodium chloride 0.9 % 100 mL (0.4 mg/mL) MBP, 8 mg/hr, Intravenous, Continuous, Shukri Romero MD, Last Rate: 20 mL/hr at 08/06/24 0858, 8 mg/hr at 08/06/24 0858    rOPINIRole (REQUIP) tablet 1 mg, 1 mg, Oral, Nightly, Mustapha Subramanian MD, 1 mg at 08/05/24 2101    [COMPLETED] Insert Peripheral IV, , , Once **AND** sodium chloride 0.9 % flush 10 mL, 10 mL, Intravenous, PRN, Shukri Romero MD    sodium chloride 0.9 % flush 10 mL, 10 mL, Intravenous, Q12H, Mustapha Subramanian MD, 10 mL at 08/06/24 0844    sodium chloride 0.9 % flush 10 mL, 10 mL, Intravenous, PRN, Mustapha Subramanian MD, 10 mL at 08/06/24 0839    sodium  chloride 0.9 % infusion 40 mL, 40 mL, Intravenous, PRN, Mustapha Subramanian MD    thiamine (B-1) injection 200 mg, 200 mg, Intravenous, Q8H, 200 mg at 08/06/24 0644 **FOLLOWED BY** [START ON 8/10/2024] thiamine (VITAMIN B-1) tablet 100 mg, 100 mg, Oral, Daily, Shukri Romero MD    Objective     Vital Signs  Temp:  [98.2 °F (36.8 °C)-99.1 °F (37.3 °C)] 98.4 °F (36.9 °C)  Heart Rate:  [] 75  Resp:  [18-20] 20  BP: ()/(54-72) 97/60  Body mass index is 37.5 kg/m².    Intake/Output Summary (Last 24 hours) at 8/6/2024 0918  Last data filed at 8/6/2024 0545  Gross per 24 hour   Intake 950 ml   Output 1250 ml   Net -300 ml     No intake/output data recorded.     Physical Exam:   General: patient awake, alert and cooperative   Abdomen: soft, nontender, nondistended; normal bowel sounds     Results Review:     I reviewed the patient's new clinical results.  I reviewed the patient's new imaging results and agree with the interpretation.    Results from last 7 days   Lab Units 08/06/24  0833 08/06/24  0010 08/05/24  1606   WBC 10*3/mm3 11.01* 6.06 6.36   HEMOGLOBIN g/dL 7.7* 6.3* 7.4*   HEMATOCRIT % 24.6* 21.3* 24.4*   PLATELETS 10*3/mm3 119* 75* 103*     Results from last 7 days   Lab Units 08/06/24  0833 08/05/24 2051 08/05/24  1606 08/05/24  0921   SODIUM mmol/L 121*  --  123* 130*   POTASSIUM mmol/L 5.8* 5.7* 6.0* 6.0*   CHLORIDE mmol/L 89*  --  87* 88*   CO2 mmol/L 21.0*  --  18.0* 18.4*   BUN mg/dL 51*  --  42* 39*   CREATININE mg/dL 3.04*  --  2.94* 3.50*   CALCIUM mg/dL 8.0*  --  8.4* 8.7   BILIRUBIN mg/dL  --   --   --  2.6*   ALK PHOS U/L  --   --   --  146*   ALT (SGPT) U/L  --   --   --  65*   AST (SGOT) U/L  --   --   --  209*   GLUCOSE mg/dL 183*  --  129* 65     Results from last 7 days   Lab Units 08/05/24  0921   INR  1.40*     Lab Results   Lab Value Date/Time    LIPASE 31 03/14/2023 1237    LIPASE 14 05/11/2022 0059    LIPASE 21 (L) 06/01/2020 0149    LIPASE 22 (L) 05/12/2020 1235    LIPASE 29  01/09/2020 1229    LIPASE 23 02/27/2018 1221       Radiology:  US Renal Bilateral   Final Result   No hydronephrosis or echogenic nephrolithiasis.           This report was finalized on 8/5/2024 8:25 PM by Dr. Harpreet Mistry M.D on Workstation: [id number]          US Liver   Final Result   1.  Hepatic steatosis. Otherwise, evaluation of the liver is   nondiagnostic likely due to combination of patient body habitus and   degree of steatosis. Therefore, underlying focal hepatic   malignancy/abnormality cannot be excluded. Further screening for HCC   should be performed with multiphase MRI with and without contrast.   2.  No definite ascites is visualized on provided images.       This report was finalized on 8/5/2024 6:51 PM by Dr. David Briceño M.D   on Workstation: [id number] Chest 1 View   Final Result            Assessment & Plan   Assessment:   Cirrhsosis as seen on CT imaging, likely secondary to alcohol   -Liver ultrasound limited secondary to body habitus -will need to check MRI with and without for HCC screening  -No definitive ascites seen  Anemia -normal iron studies  Melena   -Needs EGD but awaiting improvement in her clinical status  Diarrhea   SANDRO   Hyperkalemia   SOB   Chest tightness w/ elevated troponins  COPD    All problems are new to me.  Plan:   Limited evaluation of the liver with liver ultrasound and hepatic duplex due to patient's body habitus.  Will plan to check MRI of the abdomen pelvis with and without contrast when kidney function is improved  Nephrology following for SANDRO and hyperkalemia -stopping diuretics, adding midodrine and giving albumin  Continue Protonix and octreotide infusion  Will eventually need EGD for evaluation of melena in the setting of her anemia and cirrhosis  Continue empiric ceftriaxone in the setting of GI bleed with her history of cirrhosis  AFP normal  Troponins flat  - checking echocardiogram with patient's chest tightness and shortness of breath  per primary team  [facility] protocol     I discussed the patient's findings and my recommendations with patient.    Dictated utilizing Dragon dictation.        Nazanin Quan PA-C   [facility], [place]  [address] [place] [address]  Office: [phone]

## 2024-08-06 NOTE — CONSULTS
[facility]    Reason for consult: respiratory distress    HPI;  Mrs. Liu is a 63yo female with COPD and alcohol abuse admitted 8/5 with melena.  She seems to have have little further in the way of melena since admission.  She has been on protonix and octeotide.  She has SANDRO she has had back/chest pain for months and per patient this hasn't changed much. This afternoon she had sudden increase in shortness of breath with increased respiratory rate and increased wob.  She has some cough.  She is somewhat confused.  Her  is at bedside.  Rapid response has been called and I have been called to see her stat.    Past Medical History:   Diagnosis Date    Ankle pain     Ankle wound     LEFT    Anxiety     Arthritis of back 2000    Arthritis of neck 2000    Asthma     Benign tumor of thymus     REMOVED    Bruises easily     Cataract     COPD (chronic obstructive pulmonary disease)     CTS (carpal tunnel syndrome) Surgery 1999    DDD (degenerative disc disease), cervical     Depression     Fracture of wrist 2917    GERD (gastroesophageal reflux disease)     Hip arthrosis Unknown    History of MRSA infection     LEFT ANKLE TREAT [facility]  5YEARS GO    Hyperlipidemia     Hypertension     Knee sprain June 2023    Knee swelling Unknown    Shoulder arthritis 06/05/2023    Shoulder pain, left 07/07/2023    Sleep apnea     NO MACHINE USE    Spinal stenosis     Spondylolisthesis of cervical region      Social History     Socioeconomic History    Marital status:    Tobacco Use    Smoking status: Every Day     Current packs/day: 0.25     Average packs/day: 0.2 packs/day for 44.6 years (11.1 ttl pk-yrs)     Types: Cigarettes     Start date: 1/1/1980     Passive exposure: Current    Smokeless tobacco: Never    Tobacco comments:     I have cut down recently and will abstain starting 7/10/23   Vaping Use    Vaping status: Never Used   Substance and Sexual Activity    Alcohol use: Not Currently     Comment: OCCAS     Drug use: No    Sexual activity: Yes     Partners: Male     Birth control/protection: Post-menopausal, Tubal ligation, Hysterectomy     Family History   Problem Relation Age of Onset    Emphysema Mother     Alzheimer's disease Father     Malig Hyperthermia Neg Hx      MEDS: reviewed as per chart  ALL: nkda  ROS; UTO 2/2 acuity, and increased wob    Vital Sign Min/Max for last 24 hours  Temp  Min: 98.4 °F (36.9 °C)  Max: 99.1 °F (37.3 °C)   BP  Min: 97/60  Max: 128/54   Pulse  Min: 73  Max: 108   Resp  Min: 18  Max: 32   SpO2  Min: 94 %  Max: 99 %   Flow (L/min)  Min: 2  Max: 6   No data recorded     GEN:   appears ill, obese, A&O x2 martina  HEENT: PERRL, EOMI, no icterus, mmm, no JVD, trachea midline, neck supple  CHEST: quite decreased, tight bilat, + wheezes, + crackles, + use of accessory muscles, she actually can get out several words at a time when she calms down a bit.   CV: RRR, no m/g/r  ABD: soft, nt, nd +bs, no hepatosplenomegaly  EXT: no c/c/e  SKIN: no rashes, no xanthomas, nl turgor, warm, dry  LYMPH: no palpable cervical or supraclavicular lymphadenopathy  NEURO: CN 2-12 intact and symmetric bilaterally  PSYCH: nl affect, impaired orientation, nl judgment, anxious mood  MSK: some kyphososis, 5/5 strength ue and le bilaterally    Labs: 8/6: reviewed:  Wbc 9  Hgb 7.4  Plts 68  Glucose 165  Bun 57  Cr 2.9  Na 125  Bicab 20  7.40/52 (VBG)    8/6: CXR increased interstitial markings, mildly in a pattern that could be c/w with pulm vasc congestion    A/P:  Acute respiratory distress -- likely multifactorial -- vbg looks ok for now -- may need nippv so will move to ICU. Respirations are very tight so suspect copd is exacerbated will give solumedrol.  Will give lasix given recent blood products, albumin, hyponatremia and likely pulm vasc congestion on CXr, will get cardiac echo.   COPD with ae -- solumedrol bronchodilators  Possible volume overload -- dose of lasix, nephrology will conitnue to follow  SANDRO --  avoid nephrotoxins.  Cirrhosis with possible gi bleed -- continue current measures  Alcohol abuse with depedence and withdrawal -- continue current measures - her anxiety probably doesn't help  Chronic back pain  8. BENY -- patient unable to tolerate pap    CC 50 mins excluding any future billable procedures, discussed with  and with rapid response nursing team

## 2024-08-06 NOTE — PLAN OF CARE
Goal Outcome Evaluation:   Plan of care reviewed with client her S.O. at bedside.  Admitted from ED and after going to Vascular Dept and Ultrasound Depts. To room 434, placed on heart monitor.  IV protonix gtt continued, Sandostatin gtt also infusing.  CIWA 14, med with prn Ativan 2 mg. Po per protocol; CIWA came down to 4.  Renal MD in to see, orders noted.  Dr. Subramanian also in to see, admit orders noted.  Repeat labs noted. Add'l meds given.  No s/s active bleeding noted.  Renal US done at bedside.  Up to BSC twice since arrival to urinate.  Bowels have not moved.  Safety maintained.  Continue to monitor.

## 2024-08-06 NOTE — CASE MANAGEMENT/SOCIAL WORK
Discharge Planning Assessment  [facility]     Patient Name: Filomena Liu  MRN: [id number]  Today's Date: 8/6/2024    Admit Date: 8/5/2024    Plan: Home, SO to transport   Discharge Needs Assessment       Row Name 08/06/24 1018       Living Environment    People in Home significant other    Name(s) of People in Home Parveen Flores [phone]    Current Living Arrangements home    Potentially Unsafe Housing Conditions none    In the past 12 months has the electric, gas, oil, or water company threatened to shut off services in your home? No    Primary Care Provided by self    Provides Primary Care For no one    Family Caregiver if Needed significant other    Family Caregiver Names Parveen Flores    Quality of Family Relationships unable to assess    Able to Return to Prior Arrangements yes       Resource/Environmental Concerns    Resource/Environmental Concerns none    Transportation Concerns none       Transportation Needs    In the past 12 months, has lack of transportation kept you from medical appointments or from getting medications? no    In the past 12 months, has lack of transportation kept you from meetings, work, or from getting things needed for daily living? No       Food Insecurity    Within the past 12 months, you worried that your food would run out before you got the money to buy more. Never true    Within the past 12 months, the food you bought just didn't last and you didn't have money to get more. Never true       Transition Planning    Patient/Family Anticipates Transition to home;home with family    Patient/Family Anticipated Services at Transition none    Transportation Anticipated family or friend will provide       Discharge Needs Assessment    Equipment Currently Used at Home none    Concerns to be Addressed no discharge needs identified;denies needs/concerns at this time    Anticipated Changes Related to Illness none    Equipment Needed After Discharge none                   Discharge Plan        Row Name 08/06/24 1020       Plan    Plan Home, SO to transport    Plan Comments Met with pt at bedside. Introduced self and explained role of . Face sheet verified, PCP is Fernando Dunn. Pt denies any difficulty paying for medications, and she obtains her medications from [email or web address]. Pt lives with her SO, Parveen, and stated that he could assist with any care needs that may arise. Pt stated that presently, she is independent in ADL's, and that she does not use, nor require, any assistive devices. Pt has never had home health or been to  SNF/Rehab, and she does not anticipate requiring those resources upon discharge. Pt refuses any SA resources at this time. Upon discharge, pt stated that her SO will transport home. Explained that CCP would follow to assess for discharge needs.                  Continued Care and Services - Admitted Since 8/5/2024    No active coordination exists for this encounter.       Expected Discharge Date and Time       Expected Discharge Date Expected Discharge Time    Aug 8, 2024            Demographic Summary    No documentation.                  Functional Status    No documentation.                  Psychosocial    No documentation.                  Abuse/Neglect    No documentation.                  Legal    No documentation.                  Substance Abuse    No documentation.                  Patient Forms    No documentation.                     Rozina Morales, RN

## 2024-08-06 NOTE — PROGRESS NOTES
[facility] Progress Note      Patient Name: Filomena Liu  : 1960  MRN: [id number]  Primary Care Physician:  Fernando Dunn MD  Date of admission: 2024    Subjective     Interval History:   F/u SANDRO    Review of Systems:   K down slightly 5.7 last night after retreatment   UOP 1250+ s/p IV bumex x1  BP low 97/60 this AM   Less dyspnea/swelling  Very anxious     Objective     Vitals:   Temp:  [98.2 °F (36.8 °C)-99.1 °F (37.3 °C)] 98.6 °F (37 °C)  Heart Rate:  [] 76  Resp:  [18-20] 20  BP: (102-135)/(40-72) 124/71  Flow (L/min):  [2] 2    Intake/Output Summary (Last 24 hours) at 2024 0732  Last data filed at 2024 0545  Gross per 24 hour   Intake 950 ml   Output 1250 ml   Net -300 ml       Physical Exam:    General Appearance:anxious WF in no acute distress on NC O2  Neck: supple, no JVD  Lungs: CTA bilat no rales  Heart: RRR, normal S1 and S2  Abdomen: soft, nontender, nondistended  : no palpable bladder  Extremities: no edema, BLE / BUE rash noted     Scheduled Meds:     atenolol, 25 mg, Oral, Daily  budesonide-formoterol, 2 puff, Inhalation, BID - RT   And  tiotropium bromide monohydrate, 2 puff, Inhalation, Daily - RT  folic acid, 1 mg, Oral, Daily  ipratropium-albuterol, 3 mL, Nebulization, 4x Daily  LORazepam, 2 mg, Oral, Q6H   Followed by  LORazepam, 1 mg, Oral, Q6H   Followed by  [START ON 2024] LORazepam, 1 mg, Oral, Q12H   Followed by  [START ON 2024] LORazepam, 1 mg, Oral, Daily  nicotine, 1 patch, Transdermal, Q24H  rOPINIRole, 1 mg, Oral, Nightly  sodium chloride, 10 mL, Intravenous, Q12H  thiamine (B-1) IV, 200 mg, Intravenous, Q8H   Followed by  [START ON 8/10/2024] thiamine, 100 mg, Oral, Daily      IV Meds:   octreotide (SandoSTATIN) infusion, 25 mcg/hr, Last Rate: 25 mcg/hr (24)  pantoprazole, 8 mg/hr, Last Rate: 8 mg/hr (24)        Results Reviewed:   I have personally reviewed the results from the time of  this admission to 8/6/2024 07:32 EDT     Results from last 7 days   Lab Units 08/05/24  2051 08/05/24  1606 08/05/24  0921   SODIUM mmol/L  --  123* 130*   POTASSIUM mmol/L 5.7* 6.0* 6.0*   CHLORIDE mmol/L  --  87* 88*   CO2 mmol/L  --  18.0* 18.4*   BUN mg/dL  --  42* 39*   CREATININE mg/dL  --  2.94* 3.50*   CALCIUM mg/dL  --  8.4* 8.7   BILIRUBIN mg/dL  --   --  2.6*   ALK PHOS U/L  --   --  146*   ALT (SGPT) U/L  --   --  65*   AST (SGOT) U/L  --   --  209*   GLUCOSE mg/dL  --  129* 65     Estimated Creatinine Clearance: 19 mL/min (A) (by C-G formula based on SCr of 2.94 mg/dL (H)).  Results from last 7 days   Lab Units 08/05/24  0921   MAGNESIUM mg/dL 2.8*         Results from last 7 days   Lab Units 08/06/24  0010 08/05/24  1606 08/05/24  0735   WBC 10*3/mm3 6.06 6.36 8.16   HEMOGLOBIN g/dL 6.3* 7.4* 7.3*   PLATELETS 10*3/mm3 75* 103* 192     Results from last 7 days   Lab Units 08/05/24  0921   INR  1.40*       Assessment / Plan     ASSESSMENT:  Non olig SANDRO - Cr 3.5 -> 3 yesterday (vs 0.7 in July 2023).  Poss prerenal azotemia in assoc with GIB, severe anemia, diuretic use and impaired compensation via ACE/NSAIDs (though states motrin use scant).  HRS a consideration.  UA bland and Eunice low < 20.  US: no hydronephrosis.  Periph edema resolved and no central catie on CXR (re: dyspnea).  Gave IV bumex x1 yesterday to address persistent hyperkalemia, with good UOP.    Hyperkalemia, due to SANDRO + meds (ACE, KCL, beta blocker); K 6.0 -> 5.7 -> 5.8 today after couple rounds of temporizing measures   AG metabolic acidosis due to SANDRO/uremia, better, bicarb 21 and AG 11  Hyponatremia, suspect due to liver dysfunction + SANDRO, Na down 121  Anemia, hgb 7.3 -> 7.7 with dark stool.  GI w/u in progress. Endoscopy on hold.  TSAT ok 21%.  SPEP pending.  On PPI/octreotide drips  Coagulopathy, mild (INR 1.4)  ETOH abuse  HTN - BP low side, 97 systolic this AM, DC'd atenolol     Rash - has vasculitic appearance    PLAN:  DC'd  atenolol   Hold additional diuresis   Add midodrine for empiric HRS therapy  Repeat 25% albumin this AM  Sched lokelma daily  Fluid restrict 1200 cc/day  Repeat BMP later  Check DOV/ANCA & C3/C4 re: dave Baker MD  08/06/24  07:32 EDT    [facility]  [phone]

## 2024-08-06 NOTE — PLAN OF CARE
Goal Outcome Evaluation:              Outcome Evaluation: VSS. O2 SAT STABLE ON 2 L/M N/C. HAD PERIODS OF INCREASED ANXIETY, RESTLESS WITH A CIWA SCORE AS HIGH AS 13. WANTING TO GO SMOKE. NICOTINE PATCH WAS APPLIED. HAD A TOTAL OF 8 MG PO ATIVAN OVERNIGHT WITH CIWA DOWN TO A 6 THIS MORNING. HAD A SMALL DARK BROWN STOOL WITH BRIGHT RED BLOOD AROUND IT. HGB  DID DROP TO 6.3 AND RECEIVED 1 UNIT PRBC'S. WILL BE RECHECKED AT 0800.

## 2024-08-06 NOTE — PROGRESS NOTES
Name: Filomena Liu ADMIT: 2024   : 1960  PCP: Fernando Dunn MD    MRN: [id number] LOS: 0 days   AGE/SEX: 64 y.o. female  ROOM: [facility]     Subjective   Subjective   Patient resting comfortably in bed.  Feels better today than yesterday.  Now having brown stool.  Having some shortness of breath but she states is not far off from her normal.  Does not have oxygen at home, states she would like it but she has not qualified for it before.       Objective   Objective   Vital Signs  Temp:  [98.4 °F (36.9 °C)-99.1 °F (37.3 °C)] 98.4 °F (36.9 °C)  Heart Rate:  [] 73  Resp:  [18-28] 28  BP: ()/(53-72) 101/53  SpO2:  [94 %-99 %] 98 %  on  Flow (L/min):  [2] 2;   Device (Oxygen Therapy): nasal cannula  Body mass index is 37.5 kg/m².  Physical Exam  Constitutional:       General: She is not in acute distress.     Appearance: She is ill-appearing.   Cardiovascular:      Rate and Rhythm: Normal rate and regular rhythm.   Pulmonary:      Effort: Pulmonary effort is normal. No respiratory distress.      Comments: Diminished breath sounds bilaterally  Abdominal:      General: Abdomen is flat. There is no distension.      Tenderness: There is no abdominal tenderness.   Musculoskeletal:         General: No swelling or deformity. Normal range of motion.   Skin:     General: Skin is warm and dry.   Neurological:      General: No focal deficit present.      Mental Status: She is alert. Mental status is at baseline.         Results Review     I reviewed the patient's new clinical results.  Results from last 7 days   Lab Units 24  1519 24  0833 24  0010 24  1606   WBC 10*3/mm3 9.89 11.01* 6.06 6.36   HEMOGLOBIN g/dL 7.4* 7.7* 6.3* 7.4*   PLATELETS 10*3/mm3 68* 119* 75* 103*     Results from last 7 days   Lab Units 24  1519 24  0833 24  2051 24  1606 24  0921   SODIUM mmol/L 125* 121*  --  123* 130*   POTASSIUM mmol/L 5.2 5.8* 5.7* 6.0* 6.0*   CHLORIDE  mmol/L 89* 89*  --  87* 88*   CO2 mmol/L 20.5* 21.0*  --  18.0* 18.4*   BUN mg/dL 57* 51*  --  42* 39*   CREATININE mg/dL 2.95* 3.04*  --  2.94* 3.50*   GLUCOSE mg/dL 165* 183*  --  129* 65   Estimated Creatinine Clearance: 18.9 mL/min (A) (by C-G formula based on SCr of 2.95 mg/dL (H)).  Results from last 7 days   Lab Units 08/06/24  0833 08/05/24  0921   ALBUMIN g/dL 3.6 3.6   BILIRUBIN mg/dL  --  2.6*   ALK PHOS U/L  --  146*   AST (SGOT) U/L  --  209*   ALT (SGPT) U/L  --  65*     Results from last 7 days   Lab Units 08/06/24  1519 08/06/24  0833 08/05/24  1606 08/05/24  0921   CALCIUM mg/dL 8.0* 8.0* 8.4* 8.7   ALBUMIN g/dL  --  3.6  --  3.6   MAGNESIUM mg/dL  --   --   --  2.8*   PHOSPHORUS mg/dL  --  4.8*  --   --      Results from last 7 days   Lab Units 08/06/24  0010 08/05/24  1954 08/05/24  1641   LACTATE mmol/L 1.4 2.9* 3.2*     SARS-CoV-2 BREEZY   Date Value Ref Range Status   11/10/2020 Not Detected Not Detected Final     Glucose   Date/Time Value Ref Range Status   08/06/2024 1205 189 (H) 70 - 130 mg/dL Final   08/06/2024 0728 224 (H) 70 - 130 mg/dL Final   08/05/2024 2023 172 (H) 70 - 130 mg/dL Final   08/05/2024 1659 168 (H) 70 - 130 mg/dL Final   08/05/2024 1238 122 70 - 130 mg/dL Final   08/05/2024 1144 107 70 - 130 mg/dL Final   08/05/2024 1042 123 70 - 130 mg/dL Final       US Renal Bilateral  Narrative: US RENAL BILATERAL-     INDICATIONS: Acute kidney injury     TECHNIQUE: ULTRASOUND OF THE KIDNEYS AND URINARY BLADDER.     COMPARISON: CT from 3/9/2021     FINDINGS:     The right kidney measures 9.1 centimeters, the left kidney measures 10.3  centimeters.     No renal lesion is identified. The right kidney is suboptimally  visualized as a result of body habitus, bowel gas, and difficulty with  patient positioning. No hydronephrosis or echogenic nephrolithiasis.     No ureteral jets were observed during the exam. The urinary bladder  otherwise appears unremarkable.     Impression: No hydronephrosis  or echogenic nephrolithiasis.        This report was finalized on 8/5/2024 8:25 PM by Dr. Harpreet Mistry M.D on Workstation: [id number]     US Liver  Narrative: Right upper quadrant abdominal sonogram     TECHNIQUE: Grayscale and color Doppler images of the right upper  quadrant of the abdomen were obtained.     HISTORY: Alcoholic cirrhosis     COMPARISON: Abdominal sonogram 11/18/2016 and CT abdomen pelvis 3/9/2021     FINDINGS:     The gallbladder is surgically absent.     The common bile duct measures for mm in diameter. There is no  intrahepatic biliary ductal dilation.     There is hepatic steatosis. Otherwise, evaluation of the liver is  nondiagnostic due to underpenetration of the ultrasound beam, likely  related to patient body habitus and degree of hepatic steatosis.     The right kidney measures 8.8 cm in length.  The right kidney  demonstrates normal echogenicity and cortical thickness. There is no  right-sided hydronephrosis. There are no right-sided cysts, masses or  renal calculi.     The visualized portion of the pancreas is unremarkable.     Visualized portions of the aorta and IVC appear normal.     Impression: 1.  Hepatic steatosis. Otherwise, evaluation of the liver is  nondiagnostic likely due to combination of patient body habitus and  degree of steatosis. Therefore, underlying focal hepatic  malignancy/abnormality cannot be excluded. Further screening for HCC  should be performed with multiphase MRI with and without contrast.  2.  No definite ascites is visualized on provided images.     This report was finalized on 8/5/2024 6:51 PM by Dr. David Briceño M.D  on Workstation: [id number]     Duplex Portal Hepatic Complete CAR    Technically difficult and limited exam. Nonvisualization of the   extrahepatic main portal vein.    No thrombus identified, but there appears to be hepatofugal in the   proximal splenic vein.    All other vessels appear normal with normal flow direction and no   evidence  of thrombus.  Duplex Venous Lower Extremity - Right CAR    Normal right lower extremity venous duplex scan.    Right popliteal fossa fluid collection.  XR Chest 1 View  XR CHEST 1 VW-     Clinical: Chest pain     COMPARISON examination dated 2/10/2017     FINDINGS: Median sternotomy wires in position. There is mild cardiac  enlargement. No effusion, edema or acute airspace disease has developed.  Linear area of scarring versus discoid atelectasis again demonstrated at  the left lung base. The remainder is unremarkable.     CONCLUSION: No acute pulmonary process has developed.     This report was finalized on 8/5/2024 7:57 AM by Dr. Nolan Nolen M.D  on Workstation: [id number]       I reviewed the patient's daily medications.  Scheduled Medications  [Held by provider] atenolol, 25 mg, Oral, Daily  budesonide-formoterol, 2 puff, Inhalation, BID - RT   And  tiotropium bromide monohydrate, 2 puff, Inhalation, Daily - RT  cefTRIAXone, 2,000 mg, Intravenous, Q24H  folic acid, 1 mg, Oral, Daily  ipratropium-albuterol, 3 mL, Nebulization, 4x Daily  midodrine, 10 mg, Oral, TID AC  nicotine, 1 patch, Transdermal, Q24H  rOPINIRole, 1 mg, Oral, Nightly  sodium chloride, 10 mL, Intravenous, Q12H  sodium zirconium cyclosilicate, 10 g, Oral, Daily  thiamine (B-1) IV, 200 mg, Intravenous, Q8H   Followed by  [START ON 8/10/2024] thiamine, 100 mg, Oral, Daily    Infusions  octreotide (SandoSTATIN) infusion, 25 mcg/hr, Last Rate: 25 mcg/hr (08/06/24 0624)  pantoprazole, 8 mg/hr, Last Rate: 8 mg/hr (08/06/24 1451)    Diet  Diet: Liquid, Renal, Fluid Restriction (240 mL/tray); Clear Liquid; Low Potassium; Other (Specify mL/day) (1200); Fluid Consistency: Thin (IDDSI 0)         I have personally reviewed:  [x]  Laboratory   [x]  Microbiology   [x]  Radiology   []  EKG/Telemetry   [x]  Cardiology/Vascular   []  Pathology   [x]  Records     Assessment/Plan     Active Hospital Problems    Diagnosis  POA    **Acute GI bleeding [K92.2]   Yes    Hyperkalemia [E87.5]  Unknown    Acute renal failure [N17.9]  Unknown    Alcoholism [F10.20]  Unknown    Acute exacerbation of chronic obstructive pulmonary disease (COPD) [J44.1]  Unknown    Acute blood loss anemia [D62]  Unknown      Resolved Hospital Problems   No resolved problems to display.       64 y.o. female admitted with Acute GI bleeding.    Melena  Cirrhosis  -Hemoglobin stable at 7.4, received a unit of blood overnight  -On PPI and octreotide drips  -GI consulted-plan for an MRI with and without for HCC screening when clinically improved.  Also plan for EGD  -SBP prophylaxis with ceftriaxone    SANDRO  Hyponatremia  Hyperkalemia  -Renal consulted-on midodrine for empiric HRS.  Discontinued atenolol.  Holding further diuresis at this time.  Taking Lokelma daily.  DOV/ANCA with C3/C4 levels ordered.  Discussed with patient and she has had the rash on her extremities for decades.  States that she has previously seen multiple dermatologist and has had autoimmune workups in the past    Alcohol use disorder  -Continue thiamine, folic acid, CIWA protocol  -Stop scheduled Ativan    Chest tightness  Shortness of breath  Elevated troponin, downtrending  -Check chest x-ray and echo  -Concern for symptomatic anemia, will give additional unit of blood  -Asked pulm to see    COPD, not on home oxygen  -Continue inhalers and DuoNeb        SCDs for DVT prophylaxis.  Full code.  Discussed with patient, family, and nursing staff.  Anticipate discharge home with HH vs SNU facility in 2-3 days.    Expected Discharge Date: 8/8/2024; Expected Discharge Time:       Marshal Lara MD  [facility]  08/06/24  17:04 EDT

## 2024-08-07 ENCOUNTER — APPOINTMENT (OUTPATIENT)
Dept: GENERAL RADIOLOGY | Facility: HOSPITAL | Age: 64
DRG: 190 | End: 2024-08-07
Payer: COMMERCIAL

## 2024-08-07 ENCOUNTER — APPOINTMENT (OUTPATIENT)
Dept: GENERAL RADIOLOGY | Facility: HOSPITAL | Age: 64
DRG: 190 | End: 2024-08-07
Payer: MEDICARE

## 2024-08-07 LAB
ALBUMIN SERPL ELPH-MCNC: 3.4 G/DL (ref 2.9–4.4)
ALBUMIN SERPL-MCNC: 3.8 G/DL (ref 3.5–5.2)
ALBUMIN/GLOB SERPL: 1.4 {RATIO} (ref 0.7–1.7)
ALBUMIN/GLOB SERPL: 1.7 G/DL
ALP SERPL-CCNC: 106 U/L (ref 39–117)
ALPHA1 GLOB SERPL ELPH-MCNC: 0.2 G/DL (ref 0–0.4)
ALPHA2 GLOB SERPL ELPH-MCNC: 0.4 G/DL (ref 0.4–1)
ALT SERPL W P-5'-P-CCNC: 51 U/L (ref 1–33)
ANION GAP SERPL CALCULATED.3IONS-SCNC: 16 MMOL/L (ref 5–15)
ARTERIAL PATENCY WRIST A: ABNORMAL
AST SERPL-CCNC: 143 U/L (ref 1–32)
ATMOSPHERIC PRESS: 742.5 MMHG
B-GLOBULIN SERPL ELPH-MCNC: 1 G/DL (ref 0.7–1.3)
BASE EXCESS BLDA CALC-SCNC: 2.6 MMOL/L (ref 0–2)
BASOPHILS # BLD AUTO: 0 10*3/MM3 (ref 0–0.2)
BASOPHILS NFR BLD AUTO: 0 % (ref 0–1.5)
BDY SITE: ABNORMAL
BILIRUB SERPL-MCNC: 2.7 MG/DL (ref 0–1.2)
BUN SERPL-MCNC: 59 MG/DL (ref 8–23)
BUN/CREAT SERPL: 23 (ref 7–25)
C3 SERPL-MCNC: 69 MG/DL (ref 82–167)
C4 SERPL-MCNC: 6 MG/DL (ref 14–44)
CALCIUM SPEC-SCNC: 8.1 MG/DL (ref 8.6–10.5)
CHLORIDE SERPL-SCNC: 90 MMOL/L (ref 98–107)
CO2 BLDA-SCNC: 29.5 MMOL/L (ref 23–27)
CO2 SERPL-SCNC: 22 MMOL/L (ref 22–29)
CREAT SERPL-MCNC: 2.57 MG/DL (ref 0.57–1)
DEPRECATED RDW RBC AUTO: 49 FL (ref 37–54)
DEPRECATED RDW RBC AUTO: 49.2 FL (ref 37–54)
DEPRECATED RDW RBC AUTO: 49.4 FL (ref 37–54)
DEVICE COMMENT: ABNORMAL
EGFRCR SERPLBLD CKD-EPI 2021: 20.3 ML/MIN/1.73
EOSINOPHIL # BLD AUTO: 0 10*3/MM3 (ref 0–0.4)
EOSINOPHIL NFR BLD AUTO: 0 % (ref 0.3–6.2)
ERYTHROCYTE [DISTWIDTH] IN BLOOD BY AUTOMATED COUNT: 16.3 % (ref 12.3–15.4)
ERYTHROCYTE [DISTWIDTH] IN BLOOD BY AUTOMATED COUNT: 16.4 % (ref 12.3–15.4)
ERYTHROCYTE [DISTWIDTH] IN BLOOD BY AUTOMATED COUNT: 16.4 % (ref 12.3–15.4)
FOLATE BLD-MCNC: 422 NG/ML
FOLATE RBC-MCNC: 1722 NG/ML
GAMMA GLOB SERPL ELPH-MCNC: 0.9 G/DL (ref 0.4–1.8)
GLOBULIN SER-MCNC: 2.6 G/DL (ref 2.2–3.9)
GLOBULIN UR ELPH-MCNC: 2.3 GM/DL
GLUCOSE BLDC GLUCOMTR-MCNC: 184 MG/DL (ref 70–130)
GLUCOSE BLDC GLUCOMTR-MCNC: 194 MG/DL (ref 70–130)
GLUCOSE BLDC GLUCOMTR-MCNC: 223 MG/DL (ref 70–130)
GLUCOSE BLDC GLUCOMTR-MCNC: 236 MG/DL (ref 70–130)
GLUCOSE SERPL-MCNC: 186 MG/DL (ref 65–99)
HBA1C MFR BLD: <4.2 % (ref 4.8–5.6)
HCO3 BLDA-SCNC: 28.1 MMOL/L (ref 22–28)
HCT VFR BLD AUTO: 23.3 % (ref 34–46.6)
HCT VFR BLD AUTO: 23.5 % (ref 34–46.6)
HCT VFR BLD AUTO: 24.5 % (ref 34–46.6)
HCT VFR BLD AUTO: 25.9 % (ref 34–46.6)
HEMODILUTION: NO
HGB BLD-MCNC: 7 G/DL (ref 12–15.9)
HGB BLD-MCNC: 7.2 G/DL (ref 12–15.9)
HGB BLD-MCNC: 7.9 G/DL (ref 12–15.9)
IGA SERPL-MCNC: 346 MG/DL (ref 87–352)
IGG SERPL-MCNC: 1009 MG/DL (ref 586–1602)
IGM SERPL-MCNC: 131 MG/DL (ref 26–217)
IMM GRANULOCYTES # BLD AUTO: 0.03 10*3/MM3 (ref 0–0.05)
IMM GRANULOCYTES # BLD AUTO: 0.07 10*3/MM3 (ref 0–0.05)
IMM GRANULOCYTES # BLD AUTO: 0.08 10*3/MM3 (ref 0–0.05)
IMM GRANULOCYTES NFR BLD AUTO: 0.5 % (ref 0–0.5)
IMM GRANULOCYTES NFR BLD AUTO: 0.9 % (ref 0–0.5)
IMM GRANULOCYTES NFR BLD AUTO: 0.9 % (ref 0–0.5)
INHALED O2 CONCENTRATION: 40 %
INTERPRETATION SERPL IEP-IMP: NORMAL
LABORATORY COMMENT REPORT: NORMAL
LYMPHOCYTES # BLD AUTO: 0.13 10*3/MM3 (ref 0.7–3.1)
LYMPHOCYTES # BLD AUTO: 0.15 10*3/MM3 (ref 0.7–3.1)
LYMPHOCYTES # BLD AUTO: 0.19 10*3/MM3 (ref 0.7–3.1)
LYMPHOCYTES NFR BLD AUTO: 1.7 % (ref 19.6–45.3)
LYMPHOCYTES NFR BLD AUTO: 1.7 % (ref 19.6–45.3)
LYMPHOCYTES NFR BLD AUTO: 3.1 % (ref 19.6–45.3)
M PROTEIN SERPL ELPH-MCNC: NORMAL G/DL
MCH RBC QN AUTO: 25.7 PG (ref 26.6–33)
MCH RBC QN AUTO: 25.9 PG (ref 26.6–33)
MCH RBC QN AUTO: 26.6 PG (ref 26.6–33)
MCHC RBC AUTO-ENTMCNC: 29.8 G/DL (ref 31.5–35.7)
MCHC RBC AUTO-ENTMCNC: 30.5 G/DL (ref 31.5–35.7)
MCHC RBC AUTO-ENTMCNC: 30.9 G/DL (ref 31.5–35.7)
MCV RBC AUTO: 84.9 FL (ref 79–97)
MCV RBC AUTO: 86 FL (ref 79–97)
MCV RBC AUTO: 86.4 FL (ref 79–97)
MODALITY: ABNORMAL
MONOCYTES # BLD AUTO: 0.16 10*3/MM3 (ref 0.1–0.9)
MONOCYTES # BLD AUTO: 0.24 10*3/MM3 (ref 0.1–0.9)
MONOCYTES # BLD AUTO: 0.31 10*3/MM3 (ref 0.1–0.9)
MONOCYTES NFR BLD AUTO: 2.1 % (ref 5–12)
MONOCYTES NFR BLD AUTO: 2.8 % (ref 5–12)
MONOCYTES NFR BLD AUTO: 5 % (ref 5–12)
NEUTROPHILS NFR BLD AUTO: 5.62 10*3/MM3 (ref 1.7–7)
NEUTROPHILS NFR BLD AUTO: 7.41 10*3/MM3 (ref 1.7–7)
NEUTROPHILS NFR BLD AUTO: 8.23 10*3/MM3 (ref 1.7–7)
NEUTROPHILS NFR BLD AUTO: 91.4 % (ref 42.7–76)
NEUTROPHILS NFR BLD AUTO: 94.6 % (ref 42.7–76)
NEUTROPHILS NFR BLD AUTO: 95.3 % (ref 42.7–76)
NRBC BLD AUTO-RTO: 0 /100 WBC (ref 0–0.2)
O2 A-A PPRESDIFF RESPIRATORY: 0.4 MMHG
PCO2 BLDA: 46.8 MM HG (ref 35–45)
PH BLDA: 7.39 PH UNITS (ref 7.35–7.45)
PHOSPHATE SERPL-MCNC: 5.3 MG/DL (ref 2.5–4.5)
PLATELET # BLD AUTO: 63 10*3/MM3 (ref 140–450)
PLATELET # BLD AUTO: 75 10*3/MM3 (ref 140–450)
PLATELET # BLD AUTO: 75 10*3/MM3 (ref 140–450)
PMV BLD AUTO: 10.4 FL (ref 6–12)
PMV BLD AUTO: 10.9 FL (ref 6–12)
PMV BLD AUTO: 11.8 FL (ref 6–12)
PO2 BLD: 214 MM[HG] (ref 0–500)
PO2 BLDA: 85.4 MM HG (ref 80–100)
POTASSIUM SERPL-SCNC: 4.9 MMOL/L (ref 3.5–5.2)
POTASSIUM SERPL-SCNC: 5.8 MMOL/L (ref 3.5–5.2)
PROT SERPL-MCNC: 6 G/DL (ref 6–8.5)
PROT SERPL-MCNC: 6.1 G/DL (ref 6–8.5)
QT INTERVAL: 429 MS
QTC INTERVAL: 447 MS
RBC # BLD AUTO: 2.71 10*6/MM3 (ref 3.77–5.28)
RBC # BLD AUTO: 2.72 10*6/MM3 (ref 3.77–5.28)
RBC # BLD AUTO: 3.05 10*6/MM3 (ref 3.77–5.28)
SAO2 % BLDCOA: 96.2 % (ref 92–98.5)
SET MECH RESP RATE: 16
SODIUM SERPL-SCNC: 128 MMOL/L (ref 136–145)
TOTAL RATE: 24 BREATHS/MINUTE
VENTILATOR MODE: ABNORMAL
WBC NRBC COR # BLD AUTO: 6.15 10*3/MM3 (ref 3.4–10.8)
WBC NRBC COR # BLD AUTO: 7.77 10*3/MM3 (ref 3.4–10.8)
WBC NRBC COR # BLD AUTO: 8.7 10*3/MM3 (ref 3.4–10.8)

## 2024-08-07 PROCEDURE — 99232 SBSQ HOSP IP/OBS MODERATE 35: CPT | Performed by: INTERNAL MEDICINE

## 2024-08-07 PROCEDURE — 71045 X-RAY EXAM CHEST 1 VIEW: CPT

## 2024-08-07 PROCEDURE — 25010000002 CEFTRIAXONE PER 250 MG: Performed by: PHYSICIAN ASSISTANT

## 2024-08-07 PROCEDURE — 25010000002 THIAMINE HCL 200 MG/2ML SOLUTION: Performed by: EMERGENCY MEDICINE

## 2024-08-07 PROCEDURE — 94761 N-INVAS EAR/PLS OXIMETRY MLT: CPT

## 2024-08-07 PROCEDURE — 25010000002 OCTREOTIDE PER 25 MCG: Performed by: EMERGENCY MEDICINE

## 2024-08-07 PROCEDURE — 85025 COMPLETE CBC W/AUTO DIFF WBC: CPT | Performed by: INTERNAL MEDICINE

## 2024-08-07 PROCEDURE — 63710000001 INSULIN LISPRO (HUMAN) PER 5 UNITS: Performed by: INTERNAL MEDICINE

## 2024-08-07 PROCEDURE — 94799 UNLISTED PULMONARY SVC/PX: CPT

## 2024-08-07 PROCEDURE — 80053 COMPREHEN METABOLIC PANEL: CPT | Performed by: STUDENT IN AN ORGANIZED HEALTH CARE EDUCATION/TRAINING PROGRAM

## 2024-08-07 PROCEDURE — 84100 ASSAY OF PHOSPHORUS: CPT | Performed by: INTERNAL MEDICINE

## 2024-08-07 PROCEDURE — 82803 BLOOD GASES ANY COMBINATION: CPT

## 2024-08-07 PROCEDURE — 83516 IMMUNOASSAY NONANTIBODY: CPT | Performed by: INTERNAL MEDICINE

## 2024-08-07 PROCEDURE — 25010000002 METHYLPREDNISOLONE PER 40 MG: Performed by: INTERNAL MEDICINE

## 2024-08-07 PROCEDURE — 83036 HEMOGLOBIN GLYCOSYLATED A1C: CPT

## 2024-08-07 PROCEDURE — 94760 N-INVAS EAR/PLS OXIMETRY 1: CPT

## 2024-08-07 PROCEDURE — 84132 ASSAY OF SERUM POTASSIUM: CPT | Performed by: INTERNAL MEDICINE

## 2024-08-07 PROCEDURE — 25010000002 PHENOBARBITAL PER 120 MG: Performed by: INTERNAL MEDICINE

## 2024-08-07 PROCEDURE — 25010000002 THIAMINE PER 100 MG: Performed by: EMERGENCY MEDICINE

## 2024-08-07 PROCEDURE — 86037 ANCA TITER EACH ANTIBODY: CPT | Performed by: INTERNAL MEDICINE

## 2024-08-07 PROCEDURE — 25010000002 LORAZEPAM PER 2 MG: Performed by: EMERGENCY MEDICINE

## 2024-08-07 PROCEDURE — 94664 DEMO&/EVAL PT USE INHALER: CPT

## 2024-08-07 PROCEDURE — 36600 WITHDRAWAL OF ARTERIAL BLOOD: CPT

## 2024-08-07 PROCEDURE — 86160 COMPLEMENT ANTIGEN: CPT | Performed by: INTERNAL MEDICINE

## 2024-08-07 PROCEDURE — 82948 REAGENT STRIP/BLOOD GLUCOSE: CPT

## 2024-08-07 PROCEDURE — 74018 RADEX ABDOMEN 1 VIEW: CPT

## 2024-08-07 PROCEDURE — 63710000001 INSULIN LISPRO (HUMAN) PER 5 UNITS

## 2024-08-07 PROCEDURE — 86038 ANTINUCLEAR ANTIBODIES: CPT | Performed by: INTERNAL MEDICINE

## 2024-08-07 PROCEDURE — 25010000002 BUMETANIDE PER 0.5 MG: Performed by: INTERNAL MEDICINE

## 2024-08-07 RX ORDER — PANTOPRAZOLE SODIUM 40 MG/10ML
40 INJECTION, POWDER, LYOPHILIZED, FOR SOLUTION INTRAVENOUS EVERY 12 HOURS SCHEDULED
Status: DISCONTINUED | OUTPATIENT
Start: 2024-08-07 | End: 2024-08-15

## 2024-08-07 RX ORDER — LORAZEPAM 1 MG/1
2 TABLET ORAL 3 TIMES DAILY
Status: DISCONTINUED | OUTPATIENT
Start: 2024-08-07 | End: 2024-08-11

## 2024-08-07 RX ORDER — PHENOBARBITAL 32.4 MG/1
32.4 TABLET ORAL ONCE
Status: DISCONTINUED | OUTPATIENT
Start: 2024-08-10 | End: 2024-08-07

## 2024-08-07 RX ORDER — PHENOBARBITAL 32.4 MG/1
32.4 TABLET ORAL ONCE
Status: COMPLETED | OUTPATIENT
Start: 2024-08-09 | End: 2024-08-09

## 2024-08-07 RX ORDER — PHENOBARBITAL SODIUM 65 MG/ML
65 INJECTION, SOLUTION INTRAMUSCULAR; INTRAVENOUS ONCE
Status: DISCONTINUED | OUTPATIENT
Start: 2024-08-08 | End: 2024-08-07

## 2024-08-07 RX ORDER — BUMETANIDE 0.25 MG/ML
4 INJECTION INTRAMUSCULAR; INTRAVENOUS ONCE
Status: COMPLETED | OUTPATIENT
Start: 2024-08-07 | End: 2024-08-07

## 2024-08-07 RX ORDER — PHENOBARBITAL 32.4 MG/1
32.4 TABLET ORAL ONCE
Status: DISCONTINUED | OUTPATIENT
Start: 2024-08-09 | End: 2024-08-07

## 2024-08-07 RX ORDER — PHENOBARBITAL SODIUM 65 MG/ML
65 INJECTION, SOLUTION INTRAMUSCULAR; INTRAVENOUS ONCE
Status: COMPLETED | OUTPATIENT
Start: 2024-08-08 | End: 2024-08-08

## 2024-08-07 RX ORDER — INSULIN LISPRO 100 [IU]/ML
2-9 INJECTION, SOLUTION INTRAVENOUS; SUBCUTANEOUS
Status: DISCONTINUED | OUTPATIENT
Start: 2024-08-07 | End: 2024-08-16 | Stop reason: HOSPADM

## 2024-08-07 RX ORDER — METHYLPREDNISOLONE SODIUM SUCCINATE 40 MG/ML
40 INJECTION, POWDER, LYOPHILIZED, FOR SOLUTION INTRAMUSCULAR; INTRAVENOUS EVERY 12 HOURS
Status: DISCONTINUED | OUTPATIENT
Start: 2024-08-07 | End: 2024-08-08

## 2024-08-07 RX ORDER — PANTOPRAZOLE SODIUM 40 MG/10ML
40 INJECTION, POWDER, LYOPHILIZED, FOR SOLUTION INTRAVENOUS
Status: DISCONTINUED | OUTPATIENT
Start: 2024-08-07 | End: 2024-08-07

## 2024-08-07 RX ORDER — PHENOBARBITAL 32.4 MG/1
32.4 TABLET ORAL ONCE
Status: COMPLETED | OUTPATIENT
Start: 2024-08-10 | End: 2024-08-10

## 2024-08-07 RX ADMIN — LORAZEPAM 2 MG: 2 INJECTION INTRAMUSCULAR; INTRAVENOUS at 10:48

## 2024-08-07 RX ADMIN — IPRATROPIUM BROMIDE AND ALBUTEROL SULFATE 3 ML: .5; 3 SOLUTION RESPIRATORY (INHALATION) at 06:37

## 2024-08-07 RX ADMIN — INSULIN LISPRO 2 UNITS: 100 INJECTION, SOLUTION INTRAVENOUS; SUBCUTANEOUS at 20:18

## 2024-08-07 RX ADMIN — FOLIC ACID 1 MG: 1 TABLET ORAL at 08:22

## 2024-08-07 RX ADMIN — THIAMINE HYDROCHLORIDE 200 MG: 100 INJECTION, SOLUTION INTRAMUSCULAR; INTRAVENOUS at 06:11

## 2024-08-07 RX ADMIN — IPRATROPIUM BROMIDE AND ALBUTEROL SULFATE 3 ML: .5; 3 SOLUTION RESPIRATORY (INHALATION) at 19:35

## 2024-08-07 RX ADMIN — Medication 10 ML: at 20:13

## 2024-08-07 RX ADMIN — SODIUM ZIRCONIUM CYCLOSILICATE 10 G: 10 POWDER, FOR SUSPENSION ORAL at 03:48

## 2024-08-07 RX ADMIN — LORAZEPAM 2 MG: 2 INJECTION INTRAMUSCULAR; INTRAVENOUS at 21:08

## 2024-08-07 RX ADMIN — BUMETANIDE 4 MG: 0.25 INJECTION INTRAMUSCULAR; INTRAVENOUS at 14:04

## 2024-08-07 RX ADMIN — LORAZEPAM 2 MG: 2 INJECTION INTRAMUSCULAR; INTRAVENOUS at 07:34

## 2024-08-07 RX ADMIN — INSULIN LISPRO 3 UNITS: 100 INJECTION, SOLUTION INTRAVENOUS; SUBCUTANEOUS at 06:32

## 2024-08-07 RX ADMIN — LORAZEPAM 2 MG: 2 INJECTION INTRAMUSCULAR; INTRAVENOUS at 15:45

## 2024-08-07 RX ADMIN — INSULIN LISPRO 2 UNITS: 100 INJECTION, SOLUTION INTRAVENOUS; SUBCUTANEOUS at 14:14

## 2024-08-07 RX ADMIN — PHENOBARBITAL SODIUM 91 MG: 65 INJECTION INTRAMUSCULAR; INTRAVENOUS at 15:09

## 2024-08-07 RX ADMIN — PANTOPRAZOLE SODIUM 40 MG: 40 INJECTION, POWDER, FOR SOLUTION INTRAVENOUS at 13:40

## 2024-08-07 RX ADMIN — METHYLPREDNISOLONE SODIUM SUCCINATE 40 MG: 40 INJECTION, POWDER, FOR SOLUTION INTRAMUSCULAR; INTRAVENOUS at 14:01

## 2024-08-07 RX ADMIN — CEFTRIAXONE 2000 MG: 2 INJECTION, POWDER, FOR SOLUTION INTRAMUSCULAR; INTRAVENOUS at 13:58

## 2024-08-07 RX ADMIN — PHENOBARBITAL SODIUM 91 MG: 65 INJECTION INTRAMUSCULAR; INTRAVENOUS at 17:36

## 2024-08-07 RX ADMIN — THIAMINE HYDROCHLORIDE 200 MG: 100 INJECTION, SOLUTION INTRAMUSCULAR; INTRAVENOUS at 21:08

## 2024-08-07 RX ADMIN — IPRATROPIUM BROMIDE AND ALBUTEROL SULFATE 3 ML: .5; 3 SOLUTION RESPIRATORY (INHALATION) at 14:19

## 2024-08-07 RX ADMIN — OCTREOTIDE ACETATE 25 MCG/HR: 500 INJECTION, SOLUTION INTRAVENOUS; SUBCUTANEOUS at 03:03

## 2024-08-07 RX ADMIN — IPRATROPIUM BROMIDE AND ALBUTEROL SULFATE 3 ML: .5; 3 SOLUTION RESPIRATORY (INHALATION) at 11:22

## 2024-08-07 RX ADMIN — LORAZEPAM 2 MG: 2 INJECTION INTRAMUSCULAR; INTRAVENOUS at 12:13

## 2024-08-07 RX ADMIN — NICOTINE 1 PATCH: 21 PATCH, EXTENDED RELEASE TRANSDERMAL at 03:08

## 2024-08-07 RX ADMIN — LORAZEPAM 2 MG: 1 TABLET ORAL at 08:22

## 2024-08-07 RX ADMIN — LORAZEPAM 2 MG: 2 INJECTION INTRAMUSCULAR; INTRAVENOUS at 03:04

## 2024-08-07 RX ADMIN — PANTOPRAZOLE SODIUM 40 MG: 40 INJECTION, POWDER, FOR SOLUTION INTRAVENOUS at 20:11

## 2024-08-07 RX ADMIN — Medication 10 ML: at 08:03

## 2024-08-07 RX ADMIN — PANTOPRAZOLE SODIUM 8 MG/HR: 40 INJECTION, POWDER, FOR SOLUTION INTRAVENOUS at 07:26

## 2024-08-07 RX ADMIN — THIAMINE HYDROCHLORIDE 200 MG: 100 INJECTION, SOLUTION INTRAMUSCULAR; INTRAVENOUS at 13:39

## 2024-08-07 RX ADMIN — BUDESONIDE AND FORMOTEROL FUMARATE DIHYDRATE 2 PUFF: 160; 4.5 AEROSOL RESPIRATORY (INHALATION) at 19:40

## 2024-08-07 RX ADMIN — INSULIN LISPRO 3 UNITS: 100 INJECTION, SOLUTION INTRAVENOUS; SUBCUTANEOUS at 10:52

## 2024-08-07 RX ADMIN — DEXMEDETOMIDINE HYDROCHLORIDE 0.4 MCG/KG/HR: 4 INJECTION, SOLUTION INTRAVENOUS at 06:07

## 2024-08-07 RX ADMIN — LORAZEPAM 2 MG: 1 TABLET ORAL at 20:11

## 2024-08-07 RX ADMIN — BUMETANIDE 4 MG: 0.25 INJECTION INTRAMUSCULAR; INTRAVENOUS at 03:46

## 2024-08-07 RX ADMIN — ROPINIROLE 1 MG: 1 TABLET, FILM COATED ORAL at 20:17

## 2024-08-07 RX ADMIN — HYDROCODONE BITARTRATE AND ACETAMINOPHEN 1 TABLET: 7.5; 325 TABLET ORAL at 18:31

## 2024-08-07 RX ADMIN — LORAZEPAM 2 MG: 1 TABLET ORAL at 15:11

## 2024-08-07 RX ADMIN — LORAZEPAM 2 MG: 2 INJECTION INTRAMUSCULAR; INTRAVENOUS at 20:11

## 2024-08-07 RX ADMIN — LORAZEPAM 2 MG: 2 INJECTION INTRAMUSCULAR; INTRAVENOUS at 03:47

## 2024-08-07 NOTE — PLAN OF CARE
Goal Outcome Evaluation:              Outcome Evaluation: Pt on and off bipap, increased work of breathing and oxygen requirement. Pt combative, confused and axious. Ativan given multiple times per CIWA, precedex started. Blood pressure and heart rate stable. NG inserted for medication d/t pt NPO for increase WOB. Increase in potassium level and treated per MD. IV diuretics given for congested chest x ray. Brother updated per phone call. Will continue plan of care.

## 2024-08-07 NOTE — PROGRESS NOTES
[facility] follow up d/t alcohol use; this writer reviewed chart and spoke with RN Kiera. Per RN, patient has been confused, anxious, and combative at times; she is back on BiPAP, temporarily out of restraints but sitter will be ordered.  Last CIWA 24 at 04:00; patient has been started on Precedex.  Discharge plan is home with significant other; CCP involved. No further needs/concerns noted at this time per RN and/or medical team; [facility] to continue following.

## 2024-08-07 NOTE — PROGRESS NOTES
[facility] Progress Note      Patient Name: Filomena Liu  : 1960  MRN: [id number]  Primary Care Physician:  Fernando Dunn MD  Date of admission: 2024    Subjective     Interval History:   F/u SANDRO    Review of Systems:   Moved to ICU yesterday for resp distress, was on bipap, now weaned to NC now (8L)  UOP 2300 s/p lasix 40mg IV x1 yesterday PM  RN called me this AM re: K 5.8.  I gave bumex 4mg IV and temporizing measures, now K down to 4.9  CXR this AM with persistent vascular catie  Remains confused    Objective     Vitals:   Temp:  [97.5 °F (36.4 °C)-98.8 °F (37.1 °C)] 98 °F (36.7 °C)  Heart Rate:  [64-92] 71  Resp:  [18-54] 30  BP: ()/(51-98) 147/86  Flow (L/min):  [2-8] 8    Intake/Output Summary (Last 24 hours) at 2024 0804  Last data filed at 2024 0624  Gross per 24 hour   Intake 1803 ml   Output 2300 ml   Net -497 ml       Physical Exam:    General Appearance: confused and agitated on NC O2  HEENT: oral mucosa normal, nonicteric sclera  Neck: supple, no JVD  Lungs: Bibasilar rales present  Heart: RRR, normal S1 and S2  Abdomen: soft, nontender, nondistended   pure wick   Extremities: no edema, cyanosis or clubbing       Scheduled Meds:     [Held by provider] atenolol, 25 mg, Oral, Daily  budesonide-formoterol, 2 puff, Inhalation, BID - RT   And  tiotropium bromide monohydrate, 2 puff, Inhalation, Daily - RT  cefTRIAXone, 2,000 mg, Intravenous, Q24H  folic acid, 1 mg, Oral, Daily  insulin lispro, 2-7 Units, Subcutaneous, 4x Daily AC & at Bedtime  ipratropium-albuterol, 3 mL, Nebulization, 4x Daily  LORazepam, 2 mg, Oral, TID  methylPREDNISolone sodium succinate, 40 mg, Intravenous, Q12H  midodrine, 10 mg, Oral, TID AC  nicotine, 1 patch, Transdermal, Q24H  rOPINIRole, 1 mg, Oral, Nightly  sodium chloride, 10 mL, Intravenous, Q12H  sodium zirconium cyclosilicate, 10 g, Oral, Daily  thiamine (B-1) IV, 200 mg, Intravenous, Q8H   Followed by  [START  ON 8/10/2024] thiamine, 100 mg, Oral, Daily      IV Meds:   dexmedetomidine, 0.2-1.5 mcg/kg/hr, Last Rate: 0.5 mcg/kg/hr (08/07/24 0624)  octreotide (SandoSTATIN) infusion, 25 mcg/hr, Last Rate: 25 mcg/hr (08/07/24 0303)  pantoprazole, 8 mg/hr, Last Rate: 8 mg/hr (08/07/24 0726)        Results Reviewed:   I have personally reviewed the results from the time of this admission to 8/7/2024 08:04 EDT     Results from last 7 days   Lab Units 08/07/24  0728 08/07/24  0251 08/06/24  1519 08/06/24  0833 08/05/24  1606 08/05/24  0921   SODIUM mmol/L  --  128* 125* 121*   < > 130*   POTASSIUM mmol/L 4.9 5.8* 5.2 5.8*   < > 6.0*   CHLORIDE mmol/L  --  90* 89* 89*   < > 88*   CO2 mmol/L  --  22.0 20.5* 21.0*   < > 18.4*   BUN mg/dL  --  59* 57* 51*   < > 39*   CREATININE mg/dL  --  2.57* 2.95* 3.04*   < > 3.50*   CALCIUM mg/dL  --  8.1* 8.0* 8.0*   < > 8.7   BILIRUBIN mg/dL  --  2.7*  --   --   --  2.6*   ALK PHOS U/L  --  106  --   --   --  146*   ALT (SGPT) U/L  --  51*  --   --   --  65*   AST (SGOT) U/L  --  143*  --   --   --  209*   GLUCOSE mg/dL  --  186* 165* 183*   < > 65    < > = values in this interval not displayed.     Estimated Creatinine Clearance: 21.7 mL/min (A) (by C-G formula based on SCr of 2.57 mg/dL (H)).  Results from last 7 days   Lab Units 08/07/24  0251 08/06/24  0833 08/05/24  0921   MAGNESIUM mg/dL  --   --  2.8*   PHOSPHORUS mg/dL 5.3* 4.8*  --          Results from last 7 days   Lab Units 08/07/24  0251 08/07/24  0023 08/06/24  1519 08/06/24  0833 08/06/24  0010   WBC 10*3/mm3 7.77 8.70 9.89 11.01* 6.06   HEMOGLOBIN g/dL 7.2* 7.0* 7.4* 7.7* 6.3*   PLATELETS 10*3/mm3 75* 75* 68* 119* 75*     Results from last 7 days   Lab Units 08/05/24  0921   INR  1.40*       Assessment / Plan     ASSESSMENT:  Non olig SANDRO - slightly better, Cr down to 2.5 (peak 3.5; was 0.7 in July 2023.  Multifactorial prerenal azotemia, vs HRS (favor latter), in assoc with GIB, severe anemia, diuretic use and impaired  compensation via ACE/NSAIDs (though states motrin use scant).   UA bland and Eunice low < 20.  US: no hydronephrosis.  UOP improved with diuresis.  Been on midodrine/octreotide  Hyperkalemia, due to SANDRO + meds (ACE, KCL, beta blocker); K 5.8 to 4.9 after temporizing measures overnight   Vol overload - CXR noted.  No periph edema.  Needs more aggressive diuresis today  Hypotension - improved, SBP up to 140s, stop midodrine   Hyponatremia, hypervolemic, improved with diuresis, Na up to 128  Anemia, hgb low 7s.  GI w/u in progress. Endoscopy on hold.  TSAT ok 21%.  SPEP pending.  On PPI/octreotide drips  ETOH abuse  Acute encephalopathy - poss withdrawal   Acute hypoxic resp failure - off bipap, on 8L NC  Rash - has vasculitic appearance but chronic in nature.  Sent DOV/ANCA.  Low C3/C4 noted    PLAN:  Bumex 4mg IV x 2 today  Hold midodrine/octreotide   D/W ERICA Baker MD  08/07/24  08:04 EDT    [facility]  [phone]

## 2024-08-07 NOTE — SIGNIFICANT NOTE
08/07/24 1249   OTHER   Discipline occupational therapist   Rehab Time/Intention   Session Not Performed other (see comments);unable to evaluate, medical status change  (Spoke with RN, hold OT today. Pt agitated and not appropriate. Will follow-up)   Recommendation   OT - Next Appointment 08/08/24

## 2024-08-07 NOTE — SIGNIFICANT NOTE
08/07/24 1204   OTHER   Discipline physical therapist   Rehab Time/Intention   Session Not Performed other (see comments);unable to evaluate, medical status change  (Hold PT today per RN, pt agitated and not appropriate for therapy. Will f/u tomorrow.)   Therapy Assessment/Plan (PT)   Criteria for Skilled Interventions Met (PT) yes   Recommendation   PT - Next Appointment 08/08/24

## 2024-08-07 NOTE — PROGRESS NOTES
[facility]  Inpatient Progress Note    Reason for Follow Up: GI bleed    Subjective     Interval History:   Hemoglobin 7.2 hematocrit 23.3.  Reviewed pulmonary note, issues with progressive respiratory compromise as well as confusion.  This prompted transfer to ICU.  Discussed with family at bedside, discussed with RN.  No evidence of active bleeding.  Should be okay to wean octreotide and switch PPI to twice daily infusion.  Will continue to monitor H&H and eventually perform endoscopy when her condition improves.    Current Facility-Administered Medications:     acetaminophen (TYLENOL) tablet 650 mg, 650 mg, Oral, Q4H PRN **OR** acetaminophen (TYLENOL) 160 MG/5ML oral solution 650 mg, 650 mg, Oral, Q4H PRN **OR** acetaminophen (TYLENOL) suppository 650 mg, 650 mg, Rectal, Q4H PRN, Mustapha Subramanian MD    [Held by provider] atenolol (TENORMIN) tablet 25 mg, 25 mg, Oral, Daily, Mustapha Subramanian MD    sennosides-docusate (PERICOLACE) 8.6-50 MG per tablet 2 tablet, 2 tablet, Oral, BID PRN **AND** polyethylene glycol (MIRALAX) packet 17 g, 17 g, Oral, Daily PRN **AND** bisacodyl (DULCOLAX) EC tablet 5 mg, 5 mg, Oral, Daily PRN **AND** bisacodyl (DULCOLAX) suppository 10 mg, 10 mg, Rectal, Daily PRN, Mustapha Subramanian MD    budesonide-formoterol (SYMBICORT) 160-4.5 MCG/ACT inhaler 2 puff, 2 puff, Inhalation, BID - RT, 2 puff at 08/06/24 1940 **AND** tiotropium (SPIRIVA RESPIMAT) 2.5 mcg/act aerosol solution inhaler, 2 puff, Inhalation, Daily - RT, Mustapha Subramanian MD, 2 puff at 08/06/24 0704    bumetanide (BUMEX) injection 4 mg, 4 mg, Intravenous, Once, Fernando Baker MD    cefTRIAXone (ROCEPHIN) 2,000 mg in sodium chloride 0.9 % 100 mL MBP, 2,000 mg, Intravenous, Q24H, Clinkenbeard, Nazanin M, PA-C, Last Rate: 200 mL/hr at 08/06/24 1318, 2,000 mg at 08/06/24 1318    dexmedetomidine (PRECEDEX) 400 mcg in 100 mL NS infusion, 0.2-1.5 mcg/kg/hr, Intravenous, Titrated, Alejandra Mendoza, TAYLER, Last  Rate: 4.4 mL/hr at 08/07/24 0823, 0.2 mcg/kg/hr at 08/07/24 0823    dextrose (D50W) (25 g/50 mL) IV injection 25 g, 25 g, Intravenous, Q15 Min PRN, Alejandra Mendoza APRN    dextrose (GLUTOSE) oral gel 15 g, 15 g, Oral, Q15 Min PRN, Alejandra Mendoza APRN    folic acid (FOLVITE) tablet 1 mg, 1 mg, Oral, Daily, Shukri Romero MD, 1 mg at 08/07/24 0822    glucagon (GLUCAGEN) injection 1 mg, 1 mg, Intramuscular, Q15 Min PRN, Alejandra Mendoza APRN    HYDROcodone-acetaminophen (NORCO) 7.5-325 MG per tablet 1 tablet, 1 tablet, Oral, Q8H PRN, Mustapha Subramanian MD, 1 tablet at 08/06/24 0859    insulin lispro (HUMALOG/ADMELOG) injection 2-7 Units, 2-7 Units, Subcutaneous, 4x Daily AC & at Bedtime, Alejandra Mendoza APRN, 3 Units at 08/07/24 0632    ipratropium-albuterol (DUO-NEB) nebulizer solution 3 mL, 3 mL, Nebulization, 4x Daily, Mustapha Subramanian MD, 3 mL at 08/07/24 0637    ipratropium-albuterol (DUO-NEB) nebulizer solution 3 mL, 3 mL, Nebulization, Q4H PRN, Mustapha Subramanian MD, 3 mL at 08/06/24 1706    LORazepam (ATIVAN) tablet 1 mg, 1 mg, Oral, Q1H PRN, 1 mg at 08/05/24 1048 **OR** LORazepam (ATIVAN) injection 1 mg, 1 mg, Intravenous, Q1H PRN **OR** LORazepam (ATIVAN) tablet 2 mg, 2 mg, Oral, Q1H PRN, 2 mg at 08/06/24 1157 **OR** LORazepam (ATIVAN) injection 2 mg, 2 mg, Intravenous, Q1H PRN, 2 mg at 08/07/24 0734 **OR** LORazepam (ATIVAN) injection 2 mg, 2 mg, Intravenous, Q15 Min PRN, 2 mg at 08/07/24 0347 **OR** LORazepam (ATIVAN) injection 2 mg, 2 mg, Intramuscular, Q15 Min PRN, Shukri Romero MD, 2 mg at 08/06/24 2003    LORazepam (ATIVAN) tablet 2 mg, 2 mg, Oral, TID, Maxwell Torres MD, 2 mg at 08/07/24 0822    Magnesium Standard Dose Replacement - Follow Nurse / BPA Driven Protocol, , Does not apply, PRN, Shukri Romero MD    methylPREDNISolone sodium succinate (SOLU-Medrol) injection 40 mg, 40 mg, Intravenous, Q12H, Maxwell Torres MD    nicotine (NICODERM CQ) 21 MG/24HR patch 1 patch, 1 patch, Transdermal,  Q24H, Mustapha Subramanian MD, 1 patch at 08/07/24 0308    octreotide (sandoSTATIN) 500 mcg in sodium chloride 0.9 % 100 mL (5 mcg/mL) infusion, 25 mcg/hr, Intravenous, Continuous, Shukri Romero MD, Last Rate: 5 mL/hr at 08/07/24 0303, 25 mcg/hr at 08/07/24 0303    ondansetron ODT (ZOFRAN-ODT) disintegrating tablet 4 mg, 4 mg, Oral, Q6H PRN **OR** ondansetron (ZOFRAN) injection 4 mg, 4 mg, Intravenous, Q6H PRN, Mustapha Subramanian MD    [COMPLETED] pantoprazole (PROTONIX) injection 80 mg, 80 mg, Intravenous, Once, 80 mg at 08/05/24 0929 **AND** pantoprazole (PROTONIX) 40 mg in sodium chloride 0.9 % 100 mL (0.4 mg/mL) MBP, 8 mg/hr, Intravenous, Continuous, Shukri Romero MD, Last Rate: 20 mL/hr at 08/07/24 0726, 8 mg/hr at 08/07/24 0726    rOPINIRole (REQUIP) tablet 1 mg, 1 mg, Oral, Nightly, Mustapha Subramanian MD, 1 mg at 08/05/24 2101    [COMPLETED] Insert Peripheral IV, , , Once **AND** sodium chloride 0.9 % flush 10 mL, 10 mL, Intravenous, PRN, Shukri Romero MD    sodium chloride 0.9 % flush 10 mL, 10 mL, Intravenous, Q12H, Mustapha Subramanian MD, 10 mL at 08/07/24 0803    sodium chloride 0.9 % flush 10 mL, 10 mL, Intravenous, PRN, Mustapha Subramanian MD, 10 mL at 08/06/24 0839    sodium chloride 0.9 % infusion 40 mL, 40 mL, Intravenous, PRN, Mustapha Subramanian MD    thiamine (B-1) injection 200 mg, 200 mg, Intravenous, Q8H, 200 mg at 08/07/24 0611 **FOLLOWED BY** [START ON 8/10/2024] thiamine (VITAMIN B-1) tablet 100 mg, 100 mg, Oral, Daily, Shukri Romero MD  Review of Systems:    Review of systems could not be obtained due to  patient confusion.    Objective     Vital Signs  Temp:  [97.5 °F (36.4 °C)-98.8 °F (37.1 °C)] 98 °F (36.7 °C)  Heart Rate:  [64-92] 77  Resp:  [18-54] 30  BP: ()/(51-98) 156/89  Body mass index is 37.5 kg/m².    Intake/Output Summary (Last 24 hours) at 8/7/2024 0938  Last data filed at 8/7/2024 0838  Gross per 24 hour   Intake 2193 ml   Output 4150 ml   Net -1957 ml     I/O this shift:  In: 390  [P.O.:240; NG/GT:150]  Out: 1850 [Urine:1850]     Physical Exam:   General: patient awake, alert and cooperative   Eyes: Normal lids and lashes, no scleral icterus   Neck: supple, normal ROM   Skin: warm and dry, not jaundiced   Cardiovascular: regular rhythm and rate, no murmurs auscultated   Pulm: clear to auscultation bilaterally, regular and unlabored   Abdomen: soft, nontender, nondistended; normal bowel sounds   Extremities: no rash or edema   Psychiatric: Normal mood and behavior; memory intact     Results Review:     I reviewed the patient's new clinical results.    Results from last 7 days   Lab Units 08/07/24  0251 08/07/24  0023 08/06/24  1519   WBC 10*3/mm3 7.77 8.70 9.89   HEMOGLOBIN g/dL 7.2* 7.0* 7.4*   HEMATOCRIT % 23.3* 23.5* 23.8*   PLATELETS 10*3/mm3 75* 75* 68*     Results from last 7 days   Lab Units 08/07/24  0728 08/07/24  0251 08/06/24  1519 08/06/24  0833 08/05/24  1606 08/05/24  0921   SODIUM mmol/L  --  128* 125* 121*   < > 130*   POTASSIUM mmol/L 4.9 5.8* 5.2 5.8*   < > 6.0*   CHLORIDE mmol/L  --  90* 89* 89*   < > 88*   CO2 mmol/L  --  22.0 20.5* 21.0*   < > 18.4*   BUN mg/dL  --  59* 57* 51*   < > 39*   CREATININE mg/dL  --  2.57* 2.95* 3.04*   < > 3.50*   CALCIUM mg/dL  --  8.1* 8.0* 8.0*   < > 8.7   BILIRUBIN mg/dL  --  2.7*  --   --   --  2.6*   ALK PHOS U/L  --  106  --   --   --  146*   ALT (SGPT) U/L  --  51*  --   --   --  65*   AST (SGOT) U/L  --  143*  --   --   --  209*   GLUCOSE mg/dL  --  186* 165* 183*   < > 65    < > = values in this interval not displayed.     Results from last 7 days   Lab Units 08/05/24  0921   INR  1.40*     Lab Results   Lab Value Date/Time    LIPASE 31 03/14/2023 1237    LIPASE 14 05/11/2022 0059    LIPASE 21 (L) 06/01/2020 0149    LIPASE 22 (L) 05/12/2020 1235    LIPASE 29 01/09/2020 1229    LIPASE 23 02/27/2018 1221       Radiology:  XR Abdomen KUB   Final Result      XR Chest 1 View   Final Result   Persistent vascular congestion.       This  report was finalized on 8/7/2024 3:35 AM by Dr. Radha Laguna M.D on Workstation: [id number]          XR Chest 1 View   Final Result   Cardiomegaly with pulmonary vascular congestion and edema,   follow-up advised.       This report was finalized on 8/6/2024 6:40 PM by Dr. Harpreet Mistry M.D on Workstation: [id number]          US Renal Bilateral   Final Result   No hydronephrosis or echogenic nephrolithiasis.           This report was finalized on 8/5/2024 8:25 PM by Dr. Harpreet Mistry M.D on Workstation: [id number]          US Liver   Final Result   1.  Hepatic steatosis. Otherwise, evaluation of the liver is   nondiagnostic likely due to combination of patient body habitus and   degree of steatosis. Therefore, underlying focal hepatic   malignancy/abnormality cannot be excluded. Further screening for HCC   should be performed with multiphase MRI with and without contrast.   2.  No definite ascites is visualized on provided images.       This report was finalized on 8/5/2024 6:51 PM by Dr. David Briceño M.D   on Workstation: [id number]          XR Chest 1 View   Final Result          Assessment & Plan     Active Hospital Problems    Diagnosis     **Acute GI bleeding     Hyperkalemia     Acute renal failure     Alcoholism     Acute exacerbation of chronic obstructive pulmonary disease (COPD)     Acute blood loss anemia        Assessment:  Melena: Suspect upper GI bleeding source, I have yet to investigate because of her current hemodynamic status and pulmonary compromise.  Patient being treated with octreotide and PPI.  Cirrhosis: Suspect alcohol in etiology.  SANDRO: Nephrology following      Plan:  Monitor H&H, transfuse as per protocol.  Okay to wean octreotide and switch PPI to infusion twice daily.  I discussed the patients findings and my recommendations with family and nursing staff.    Fernando Cohen MD

## 2024-08-07 NOTE — PROGRESS NOTES
[facility]     Mar/chart reviewed  Follow up COPD with AE, possible GI bleed, alcohol abuse with depedence and withdrawal.  Patient's confusion worsened overnight. Respiratory status looks worse at periods of agitation.    Vital Sign Min/Max for last 24 hours  Temp  Min: 97.5 °F (36.4 °C)  Max: 98.8 °F (37.1 °C)   BP  Min: 90/51  Max: 158/81   Pulse  Min: 64  Max: 92   Resp  Min: 18  Max: 54   SpO2  Min: 68 %  Max: 100 %   Flow (L/min)  Min: 2  Max: 8   Weight  Min: 87.1 kg (192 lb)  Max: 87.1 kg (192 lb)   1803/2300  Appears ill, sedated  perrl, eomi, normal sclera  mmm, no jvd, trachea midline, neck supple,  chest decreased ae bilaterally, no crackles, no wheezes,   rrr,   soft, nt, nd +bs,  no c/c/ e  Skin warm, dry no rashes    Labs: 8/7: reviewed:  Glucose 186  Bun 59  Cr 2.57  Na 128  Bicarb 22  Alt 51  Ast 143  Tbili 2.7  Wbc 7  Hgb 7.2  Plts 75    8/6: cardiac echo: EF 63%; at least moderate mitral valve regurg; dilated ivc    8/7: CXR reviewed, vascular congestion looks somewhat improved to me    A/P:  Acute respiratory distress -- likely multifactorial -- vbg looks ok for now -- may need nippv so will move to ICU. Respirations are very tight so suspect copd is exacerbated will give solumedrol.    COPD with ae -- solumedrol bronchodilators  Possible volume overload -- hopefully better now, repeat cxr, nephrology will conitnue to follow  SANDRO -- avoid nephrotoxins.  Cirrhosis with possible gi bleed -- continue current measures  Alcohol abuse with depedence and withdrawal -- this has worsened, add scheduled ativan  Chronic back pain  8. BENY -- patient unable to tolerate pap  9. Moderate mitral valve regurg    She is very sedated right now, repeat ABG.  She has not really had any evidence of bleeding since admission, maybe we can stop octreotide and protonix gtt's to limit ins?    CC 36 mins excluding any future billable procedures.

## 2024-08-08 LAB
ALBUMIN SERPL-MCNC: 3.8 G/DL (ref 3.5–5.2)
ALBUMIN/GLOB SERPL: 1.5 G/DL
ALP SERPL-CCNC: 113 U/L (ref 39–117)
ALT SERPL W P-5'-P-CCNC: 57 U/L (ref 1–33)
ANION GAP SERPL CALCULATED.3IONS-SCNC: 13 MMOL/L (ref 5–15)
AST SERPL-CCNC: 123 U/L (ref 1–32)
BASOPHILS # BLD AUTO: 0 10*3/MM3 (ref 0–0.2)
BASOPHILS NFR BLD AUTO: 0 % (ref 0–1.5)
BILIRUB SERPL-MCNC: 2.4 MG/DL (ref 0–1.2)
BUN SERPL-MCNC: 59 MG/DL (ref 8–23)
BUN/CREAT SERPL: 28.5 (ref 7–25)
CALCIUM SPEC-SCNC: 9.4 MG/DL (ref 8.6–10.5)
CHLORIDE SERPL-SCNC: 90 MMOL/L (ref 98–107)
CO2 SERPL-SCNC: 32 MMOL/L (ref 22–29)
CREAT SERPL-MCNC: 2.07 MG/DL (ref 0.57–1)
DEPRECATED RDW RBC AUTO: 50.1 FL (ref 37–54)
DEPRECATED RDW RBC AUTO: 51.7 FL (ref 37–54)
DEPRECATED RDW RBC AUTO: 52.4 FL (ref 37–54)
EGFRCR SERPLBLD CKD-EPI 2021: 26.3 ML/MIN/1.73
EOSINOPHIL # BLD AUTO: 0 10*3/MM3 (ref 0–0.4)
EOSINOPHIL NFR BLD AUTO: 0 % (ref 0.3–6.2)
ERYTHROCYTE [DISTWIDTH] IN BLOOD BY AUTOMATED COUNT: 17 % (ref 12.3–15.4)
ERYTHROCYTE [DISTWIDTH] IN BLOOD BY AUTOMATED COUNT: 17.1 % (ref 12.3–15.4)
ERYTHROCYTE [DISTWIDTH] IN BLOOD BY AUTOMATED COUNT: 17.3 % (ref 12.3–15.4)
GLOBULIN UR ELPH-MCNC: 2.6 GM/DL
GLUCOSE BLDC GLUCOMTR-MCNC: 122 MG/DL (ref 70–130)
GLUCOSE BLDC GLUCOMTR-MCNC: 153 MG/DL (ref 70–130)
GLUCOSE BLDC GLUCOMTR-MCNC: 160 MG/DL (ref 70–130)
GLUCOSE BLDC GLUCOMTR-MCNC: 164 MG/DL (ref 70–130)
GLUCOSE BLDC GLUCOMTR-MCNC: 99 MG/DL (ref 70–130)
GLUCOSE SERPL-MCNC: 160 MG/DL (ref 65–99)
HCT VFR BLD AUTO: 27.3 % (ref 34–46.6)
HCT VFR BLD AUTO: 27.3 % (ref 34–46.6)
HCT VFR BLD AUTO: 27.4 % (ref 34–46.6)
HGB BLD-MCNC: 8.2 G/DL (ref 12–15.9)
HGB BLD-MCNC: 8.3 G/DL (ref 12–15.9)
HGB BLD-MCNC: 8.4 G/DL (ref 12–15.9)
IMM GRANULOCYTES # BLD AUTO: 0.05 10*3/MM3 (ref 0–0.05)
IMM GRANULOCYTES # BLD AUTO: 0.06 10*3/MM3 (ref 0–0.05)
IMM GRANULOCYTES # BLD AUTO: 0.06 10*3/MM3 (ref 0–0.05)
IMM GRANULOCYTES NFR BLD AUTO: 0.5 % (ref 0–0.5)
IMM GRANULOCYTES NFR BLD AUTO: 0.7 % (ref 0–0.5)
IMM GRANULOCYTES NFR BLD AUTO: 0.8 % (ref 0–0.5)
LYMPHOCYTES # BLD AUTO: 0.14 10*3/MM3 (ref 0.7–3.1)
LYMPHOCYTES # BLD AUTO: 0.17 10*3/MM3 (ref 0.7–3.1)
LYMPHOCYTES # BLD AUTO: 0.41 10*3/MM3 (ref 0.7–3.1)
LYMPHOCYTES NFR BLD AUTO: 1.7 % (ref 19.6–45.3)
LYMPHOCYTES NFR BLD AUTO: 2.4 % (ref 19.6–45.3)
LYMPHOCYTES NFR BLD AUTO: 4.3 % (ref 19.6–45.3)
MCH RBC QN AUTO: 25.8 PG (ref 26.6–33)
MCH RBC QN AUTO: 25.9 PG (ref 26.6–33)
MCH RBC QN AUTO: 26.1 PG (ref 26.6–33)
MCHC RBC AUTO-ENTMCNC: 30 G/DL (ref 31.5–35.7)
MCHC RBC AUTO-ENTMCNC: 30.4 G/DL (ref 31.5–35.7)
MCHC RBC AUTO-ENTMCNC: 30.7 G/DL (ref 31.5–35.7)
MCV RBC AUTO: 85.1 FL (ref 79–97)
MCV RBC AUTO: 85.3 FL (ref 79–97)
MCV RBC AUTO: 85.8 FL (ref 79–97)
MONOCYTES # BLD AUTO: 0.38 10*3/MM3 (ref 0.1–0.9)
MONOCYTES # BLD AUTO: 0.5 10*3/MM3 (ref 0.1–0.9)
MONOCYTES # BLD AUTO: 0.83 10*3/MM3 (ref 0.1–0.9)
MONOCYTES NFR BLD AUTO: 5.3 % (ref 5–12)
MONOCYTES NFR BLD AUTO: 6.2 % (ref 5–12)
MONOCYTES NFR BLD AUTO: 8.7 % (ref 5–12)
NEUTROPHILS NFR BLD AUTO: 6.53 10*3/MM3 (ref 1.7–7)
NEUTROPHILS NFR BLD AUTO: 7.41 10*3/MM3 (ref 1.7–7)
NEUTROPHILS NFR BLD AUTO: 8.27 10*3/MM3 (ref 1.7–7)
NEUTROPHILS NFR BLD AUTO: 86.5 % (ref 42.7–76)
NEUTROPHILS NFR BLD AUTO: 91.4 % (ref 42.7–76)
NEUTROPHILS NFR BLD AUTO: 91.5 % (ref 42.7–76)
NRBC BLD AUTO-RTO: 0 /100 WBC (ref 0–0.2)
PHOSPHATE SERPL-MCNC: 4.4 MG/DL (ref 2.5–4.5)
PLATELET # BLD AUTO: 71 10*3/MM3 (ref 140–450)
PLATELET # BLD AUTO: 76 10*3/MM3 (ref 140–450)
PLATELET # BLD AUTO: 81 10*3/MM3 (ref 140–450)
PMV BLD AUTO: 11.2 FL (ref 6–12)
PMV BLD AUTO: 11.2 FL (ref 6–12)
PMV BLD AUTO: 11.5 FL (ref 6–12)
POTASSIUM SERPL-SCNC: 3.9 MMOL/L (ref 3.5–5.2)
PROT SERPL-MCNC: 6.4 G/DL (ref 6–8.5)
RBC # BLD AUTO: 3.18 10*6/MM3 (ref 3.77–5.28)
RBC # BLD AUTO: 3.2 10*6/MM3 (ref 3.77–5.28)
RBC # BLD AUTO: 3.22 10*6/MM3 (ref 3.77–5.28)
SODIUM SERPL-SCNC: 135 MMOL/L (ref 136–145)
WBC NRBC COR # BLD AUTO: 7.14 10*3/MM3 (ref 3.4–10.8)
WBC NRBC COR # BLD AUTO: 8.11 10*3/MM3 (ref 3.4–10.8)
WBC NRBC COR # BLD AUTO: 9.56 10*3/MM3 (ref 3.4–10.8)

## 2024-08-08 PROCEDURE — 82948 REAGENT STRIP/BLOOD GLUCOSE: CPT

## 2024-08-08 PROCEDURE — 25010000002 CEFTRIAXONE PER 250 MG: Performed by: PHYSICIAN ASSISTANT

## 2024-08-08 PROCEDURE — 25010000002 LORAZEPAM PER 2 MG: Performed by: EMERGENCY MEDICINE

## 2024-08-08 PROCEDURE — 94664 DEMO&/EVAL PT USE INHALER: CPT

## 2024-08-08 PROCEDURE — 25010000002 THIAMINE HCL 200 MG/2ML SOLUTION: Performed by: EMERGENCY MEDICINE

## 2024-08-08 PROCEDURE — 25010000002 THIAMINE PER 100 MG: Performed by: EMERGENCY MEDICINE

## 2024-08-08 PROCEDURE — 25010000002 BUMETANIDE PER 0.5 MG: Performed by: INTERNAL MEDICINE

## 2024-08-08 PROCEDURE — 94799 UNLISTED PULMONARY SVC/PX: CPT

## 2024-08-08 PROCEDURE — 94761 N-INVAS EAR/PLS OXIMETRY MLT: CPT

## 2024-08-08 PROCEDURE — 80053 COMPREHEN METABOLIC PANEL: CPT | Performed by: STUDENT IN AN ORGANIZED HEALTH CARE EDUCATION/TRAINING PROGRAM

## 2024-08-08 PROCEDURE — 97166 OT EVAL MOD COMPLEX 45 MIN: CPT

## 2024-08-08 PROCEDURE — 97110 THERAPEUTIC EXERCISES: CPT

## 2024-08-08 PROCEDURE — 85025 COMPLETE CBC W/AUTO DIFF WBC: CPT | Performed by: INTERNAL MEDICINE

## 2024-08-08 PROCEDURE — 94760 N-INVAS EAR/PLS OXIMETRY 1: CPT

## 2024-08-08 PROCEDURE — 25010000002 PHENOBARBITAL PER 120 MG: Performed by: INTERNAL MEDICINE

## 2024-08-08 PROCEDURE — 84100 ASSAY OF PHOSPHORUS: CPT | Performed by: INTERNAL MEDICINE

## 2024-08-08 PROCEDURE — 63710000001 INSULIN LISPRO (HUMAN) PER 5 UNITS: Performed by: INTERNAL MEDICINE

## 2024-08-08 PROCEDURE — 97162 PT EVAL MOD COMPLEX 30 MIN: CPT

## 2024-08-08 PROCEDURE — 99232 SBSQ HOSP IP/OBS MODERATE 35: CPT | Performed by: INTERNAL MEDICINE

## 2024-08-08 PROCEDURE — 92610 EVALUATE SWALLOWING FUNCTION: CPT

## 2024-08-08 PROCEDURE — 25010000002 METHYLPREDNISOLONE PER 40 MG: Performed by: INTERNAL MEDICINE

## 2024-08-08 RX ORDER — BUMETANIDE 0.25 MG/ML
2 INJECTION INTRAMUSCULAR; INTRAVENOUS
Status: DISCONTINUED | OUTPATIENT
Start: 2024-08-08 | End: 2024-08-09

## 2024-08-08 RX ORDER — METHYLPREDNISOLONE SODIUM SUCCINATE 40 MG/ML
40 INJECTION, POWDER, LYOPHILIZED, FOR SOLUTION INTRAMUSCULAR; INTRAVENOUS DAILY
Status: DISCONTINUED | OUTPATIENT
Start: 2024-08-09 | End: 2024-08-11

## 2024-08-08 RX ADMIN — Medication 10 ML: at 21:00

## 2024-08-08 RX ADMIN — Medication 10 ML: at 08:32

## 2024-08-08 RX ADMIN — ATENOLOL 25 MG: 25 TABLET ORAL at 08:51

## 2024-08-08 RX ADMIN — LORAZEPAM 2 MG: 2 INJECTION INTRAMUSCULAR; INTRAVENOUS at 00:59

## 2024-08-08 RX ADMIN — PHENOBARBITAL SODIUM 65 MG: 65 INJECTION INTRAMUSCULAR at 08:31

## 2024-08-08 RX ADMIN — IPRATROPIUM BROMIDE AND ALBUTEROL SULFATE 3 ML: .5; 3 SOLUTION RESPIRATORY (INHALATION) at 19:13

## 2024-08-08 RX ADMIN — PHENOBARBITAL SODIUM 65 MG: 65 INJECTION INTRAMUSCULAR at 23:12

## 2024-08-08 RX ADMIN — BUDESONIDE AND FORMOTEROL FUMARATE DIHYDRATE 2 PUFF: 160; 4.5 AEROSOL RESPIRATORY (INHALATION) at 19:15

## 2024-08-08 RX ADMIN — LORAZEPAM 2 MG: 2 INJECTION INTRAMUSCULAR; INTRAVENOUS at 02:02

## 2024-08-08 RX ADMIN — LORAZEPAM 1 MG: 1 TABLET ORAL at 17:10

## 2024-08-08 RX ADMIN — BUMETANIDE 2 MG: 0.25 INJECTION INTRAMUSCULAR; INTRAVENOUS at 18:11

## 2024-08-08 RX ADMIN — PANTOPRAZOLE SODIUM 40 MG: 40 INJECTION, POWDER, FOR SOLUTION INTRAVENOUS at 08:31

## 2024-08-08 RX ADMIN — HYDROCODONE BITARTRATE AND ACETAMINOPHEN 1 TABLET: 7.5; 325 TABLET ORAL at 23:09

## 2024-08-08 RX ADMIN — HYDROCODONE BITARTRATE AND ACETAMINOPHEN 1 TABLET: 7.5; 325 TABLET ORAL at 14:32

## 2024-08-08 RX ADMIN — FOLIC ACID 1 MG: 1 TABLET ORAL at 11:22

## 2024-08-08 RX ADMIN — THIAMINE HYDROCHLORIDE 200 MG: 100 INJECTION, SOLUTION INTRAMUSCULAR; INTRAVENOUS at 14:17

## 2024-08-08 RX ADMIN — NICOTINE 1 PATCH: 21 PATCH, EXTENDED RELEASE TRANSDERMAL at 00:14

## 2024-08-08 RX ADMIN — IPRATROPIUM BROMIDE AND ALBUTEROL SULFATE 3 ML: .5; 3 SOLUTION RESPIRATORY (INHALATION) at 10:49

## 2024-08-08 RX ADMIN — BUDESONIDE AND FORMOTEROL FUMARATE DIHYDRATE 2 PUFF: 160; 4.5 AEROSOL RESPIRATORY (INHALATION) at 06:51

## 2024-08-08 RX ADMIN — TIOTROPIUM BROMIDE INHALATION SPRAY 2 PUFF: 3.12 SPRAY, METERED RESPIRATORY (INHALATION) at 06:54

## 2024-08-08 RX ADMIN — LORAZEPAM 2 MG: 1 TABLET ORAL at 14:22

## 2024-08-08 RX ADMIN — ROPINIROLE 1 MG: 1 TABLET, FILM COATED ORAL at 23:10

## 2024-08-08 RX ADMIN — DEXMEDETOMIDINE HYDROCHLORIDE 0.2 MCG/KG/HR: 4 INJECTION, SOLUTION INTRAVENOUS at 02:01

## 2024-08-08 RX ADMIN — LORAZEPAM 2 MG: 2 INJECTION INTRAMUSCULAR; INTRAVENOUS at 06:17

## 2024-08-08 RX ADMIN — LORAZEPAM 2 MG: 2 INJECTION INTRAMUSCULAR; INTRAVENOUS at 11:21

## 2024-08-08 RX ADMIN — BUMETANIDE 2 MG: 0.25 INJECTION INTRAMUSCULAR; INTRAVENOUS at 10:27

## 2024-08-08 RX ADMIN — THIAMINE HYDROCHLORIDE 200 MG: 100 INJECTION, SOLUTION INTRAMUSCULAR; INTRAVENOUS at 23:13

## 2024-08-08 RX ADMIN — IPRATROPIUM BROMIDE AND ALBUTEROL SULFATE 3 ML: .5; 3 SOLUTION RESPIRATORY (INHALATION) at 06:51

## 2024-08-08 RX ADMIN — THIAMINE HYDROCHLORIDE 200 MG: 100 INJECTION, SOLUTION INTRAMUSCULAR; INTRAVENOUS at 06:12

## 2024-08-08 RX ADMIN — IPRATROPIUM BROMIDE AND ALBUTEROL SULFATE 3 ML: .5; 3 SOLUTION RESPIRATORY (INHALATION) at 14:02

## 2024-08-08 RX ADMIN — METHYLPREDNISOLONE SODIUM SUCCINATE 40 MG: 40 INJECTION, POWDER, FOR SOLUTION INTRAMUSCULAR; INTRAVENOUS at 00:14

## 2024-08-08 RX ADMIN — LORAZEPAM 2 MG: 1 TABLET ORAL at 08:51

## 2024-08-08 RX ADMIN — INSULIN LISPRO 2 UNITS: 100 INJECTION, SOLUTION INTRAVENOUS; SUBCUTANEOUS at 11:22

## 2024-08-08 RX ADMIN — PHENOBARBITAL SODIUM 91 MG: 65 INJECTION, SOLUTION INTRAMUSCULAR; INTRAVENOUS at 00:00

## 2024-08-08 RX ADMIN — PANTOPRAZOLE SODIUM 40 MG: 40 INJECTION, POWDER, FOR SOLUTION INTRAVENOUS at 23:12

## 2024-08-08 RX ADMIN — LORAZEPAM 2 MG: 2 INJECTION INTRAMUSCULAR; INTRAVENOUS at 02:41

## 2024-08-08 RX ADMIN — INSULIN LISPRO 2 UNITS: 100 INJECTION, SOLUTION INTRAVENOUS; SUBCUTANEOUS at 08:31

## 2024-08-08 RX ADMIN — CEFTRIAXONE 2000 MG: 2 INJECTION, POWDER, FOR SOLUTION INTRAMUSCULAR; INTRAVENOUS at 14:17

## 2024-08-08 RX ADMIN — LORAZEPAM 2 MG: 2 INJECTION INTRAMUSCULAR; INTRAVENOUS at 00:26

## 2024-08-08 RX ADMIN — LORAZEPAM 2 MG: 1 TABLET ORAL at 23:09

## 2024-08-08 NOTE — PLAN OF CARE
Goal Outcome Evaluation:            Pt WOB improved with BiPAP usage throughout shift. Off precedex, no sitter needed and restraints d/c. PRN ativan and scheduled used to manage CIWA. Scoring around 15. Responding well to dieretics. VSS, family updated at bedside.

## 2024-08-08 NOTE — PLAN OF CARE
Goal Outcome Evaluation:  Plan of Care Reviewed With: patient           Outcome Evaluation: Pt seen for OT keegan this AM. Admitted with acute GI bleed. PMH includes COPD, alcohol abuse. Pt initially speaking illogically then began to answer PLOF questions. She reports living alone and states she does not use equipment for ambulation. Says she does own and use a shower chair for bathing and that she was independent prior with ADLs. She required max Ax2 today for rolling L<>R. In restraints upon arrival and worked on BUE ther ex. She was also able to comb her hair sitting up in bed with min A. She continues to benefit from skilled OT. Unable to attempt sitting EOB today.      Anticipated Discharge Disposition (OT): skilled nursing facility, inpatient rehabilitation facility

## 2024-08-08 NOTE — PLAN OF CARE
Goal Outcome Evaluation:      Pt remains in CICU. Pt oriented to person and place. Restraints d/josette and Precedex off, tolerating well. Seen by speech therapy.  Family updated at bedside.

## 2024-08-08 NOTE — PLAN OF CARE
Goal Outcome Evaluation:      Remain in CICU on CIWA protocol. Patient is agitated and hallucinating. Ativan given and dex restarted on  minimal dose. Vitals stable will continued to monitor.

## 2024-08-08 NOTE — PROGRESS NOTES
[facility], [place], operated by [facility]  Inpatient Progress Note    Reason for Follow Up:  GI bleed     Subjective     Interval History:   The patient is sedated and not able to answer questions.  She is in restraints.  Her nurse notes that there is no bleeding.    Current Facility-Administered Medications:     acetaminophen (TYLENOL) tablet 650 mg, 650 mg, Oral, Q4H PRN **OR** acetaminophen (TYLENOL) 160 MG/5ML oral solution 650 mg, 650 mg, Oral, Q4H PRN **OR** acetaminophen (TYLENOL) suppository 650 mg, 650 mg, Rectal, Q4H PRN, Mustapha Subramanian MD    atenolol (TENORMIN) tablet 25 mg, 25 mg, Oral, Daily, Maxwell Torres MD, 25 mg at 08/08/24 0851    sennosides-docusate (PERICOLACE) 8.6-50 MG per tablet 2 tablet, 2 tablet, Oral, BID PRN **AND** polyethylene glycol (MIRALAX) packet 17 g, 17 g, Oral, Daily PRN **AND** bisacodyl (DULCOLAX) EC tablet 5 mg, 5 mg, Oral, Daily PRN **AND** bisacodyl (DULCOLAX) suppository 10 mg, 10 mg, Rectal, Daily PRN, Mustapha Subramanian MD    budesonide-formoterol (SYMBICORT) 160-4.5 MCG/ACT inhaler 2 puff, 2 puff, Inhalation, BID - RT, 2 puff at 08/08/24 0651 **AND** tiotropium (SPIRIVA RESPIMAT) 2.5 mcg/act aerosol solution inhaler, 2 puff, Inhalation, Daily - RT, Mustapha Subramanian MD, 2 puff at 08/08/24 0654    bumetanide (BUMEX) injection 2 mg, 2 mg, Intravenous, BID, Fernando Baker MD, 2 mg at 08/08/24 1027    cefTRIAXone (ROCEPHIN) 2,000 mg in sodium chloride 0.9 % 100 mL MBP, 2,000 mg, Intravenous, Q24H, Nazanin Quan PA-C, Last Rate: 200 mL/hr at 08/07/24 1358, 2,000 mg at 08/07/24 1358    dexmedetomidine (PRECEDEX) 400 mcg in 100 mL NS infusion, 0.2-1.5 mcg/kg/hr, Intravenous, Titrated, Alejandra Mendoza APRN, Last Rate: 4.4 mL/hr at 08/08/24 0618, 0.2 mcg/kg/hr at 08/08/24 0618    dextrose (D50W) (25 g/50 mL) IV injection 25 g, 25 g, Intravenous, Q15 Min PRN, Alejandra Mendoza APRN    dextrose (GLUTOSE) oral gel 15 g, 15 g, Oral, Q15 Min PRN, Alejandra Mendoza APRN     folic acid (FOLVITE) tablet 1 mg, 1 mg, Oral, Daily, Shukri Romero MD, 1 mg at 08/07/24 0822    glucagon (GLUCAGEN) injection 1 mg, 1 mg, Intramuscular, Q15 Min PRN, Alejandra Mendoza APRN    HYDROcodone-acetaminophen (NORCO) 7.5-325 MG per tablet 1 tablet, 1 tablet, Oral, Q8H PRN, Mustapha Subramanian MD, 1 tablet at 08/07/24 1831    insulin lispro (HUMALOG/ADMELOG) injection 2-9 Units, 2-9 Units, Subcutaneous, 4x Daily AC & at Bedtime, Maxwell Torres MD, 2 Units at 08/08/24 0831    ipratropium-albuterol (DUO-NEB) nebulizer solution 3 mL, 3 mL, Nebulization, 4x Daily, Mustapha Subramanian MD, 3 mL at 08/08/24 0651    ipratropium-albuterol (DUO-NEB) nebulizer solution 3 mL, 3 mL, Nebulization, Q4H PRN, Mustapha Subramanian MD, 3 mL at 08/06/24 1706    LORazepam (ATIVAN) tablet 1 mg, 1 mg, Oral, Q1H PRN, 1 mg at 08/05/24 1048 **OR** LORazepam (ATIVAN) injection 1 mg, 1 mg, Intravenous, Q1H PRN **OR** LORazepam (ATIVAN) tablet 2 mg, 2 mg, Oral, Q1H PRN, 2 mg at 08/06/24 1157 **OR** LORazepam (ATIVAN) injection 2 mg, 2 mg, Intravenous, Q1H PRN, 2 mg at 08/07/24 0734 **OR** LORazepam (ATIVAN) injection 2 mg, 2 mg, Intravenous, Q15 Min PRN, 2 mg at 08/08/24 0617 **OR** LORazepam (ATIVAN) injection 2 mg, 2 mg, Intramuscular, Q15 Min PRN, Shukri Romero MD, 2 mg at 08/06/24 2003    LORazepam (ATIVAN) tablet 2 mg, 2 mg, Oral, TID, Maxwell Torres MD, 2 mg at 08/08/24 0851    Magnesium Standard Dose Replacement - Follow Nurse / BPA Driven Protocol, , Does not apply, PRN, Shukri Romero MD    [START ON 8/9/2024] methylPREDNISolone sodium succinate (SOLU-Medrol) injection 40 mg, 40 mg, Intravenous, Daily, Maxwell Torres MD    nicotine (NICODERM CQ) 21 MG/24HR patch 1 patch, 1 patch, Transdermal, Q24H, Mustapha Subramanian MD, 1 patch at 08/08/24 0014    ondansetron ODT (ZOFRAN-ODT) disintegrating tablet 4 mg, 4 mg, Oral, Q6H PRN **OR** ondansetron (ZOFRAN) injection 4 mg, 4 mg, Intravenous, Q6H PRN, Mustapha Subramanian MD    pantoprazole  (PROTONIX) injection 40 mg, 40 mg, Intravenous, Q12H, Maxwell Torres MD, 40 mg at 08/08/24 0831    [COMPLETED] PHENobarbital injection 65 mg, 65 mg, Intravenous, Once, 65 mg at 08/08/24 0831 **FOLLOWED BY** PHENobarbital injection 65 mg, 65 mg, Intravenous, Once **FOLLOWED BY** [START ON 8/9/2024] PHENobarbital tablet 32.4 mg, 32.4 mg, Oral, Once **FOLLOWED BY** [START ON 8/9/2024] PHENobarbital tablet 32.4 mg, 32.4 mg, Oral, Once **FOLLOWED BY** [START ON 8/10/2024] PHENobarbital tablet 32.4 mg, 32.4 mg, Oral, Once, Maxwell Torres MD    rOPINIRole (REQUIP) tablet 1 mg, 1 mg, Oral, Nightly, Mustapha Subramanian MD, 1 mg at 08/07/24 2017    [COMPLETED] Insert Peripheral IV, , , Once **AND** sodium chloride 0.9 % flush 10 mL, 10 mL, Intravenous, PRN, Shukri Romero MD    sodium chloride 0.9 % flush 10 mL, 10 mL, Intravenous, Q12H, Mustapha Subramanian MD, 10 mL at 08/08/24 0832    sodium chloride 0.9 % flush 10 mL, 10 mL, Intravenous, PRN, Mustapha Subramanian MD, 10 mL at 08/06/24 0839    sodium chloride 0.9 % infusion 40 mL, 40 mL, Intravenous, PRN, Mustapha Subramanian MD    thiamine (B-1) injection 200 mg, 200 mg, Intravenous, Q8H, 200 mg at 08/08/24 0612 **FOLLOWED BY** [START ON 8/10/2024] thiamine (VITAMIN B-1) tablet 100 mg, 100 mg, Oral, Daily, Shukri Romero MD  Review of Systems:    Review of systems could not be obtained due to  patient sedation status.    Objective     Vital Signs  Temp:  [97.9 °F (36.6 °C)-98.2 °F (36.8 °C)] 98 °F (36.7 °C)  Heart Rate:  [69-90] 69  Resp:  [18-22] 22  BP: (112-149)/() 122/66  Body mass index is 37.5 kg/m².    Intake/Output Summary (Last 24 hours) at 8/8/2024 1036  Last data filed at 8/8/2024 0800  Gross per 24 hour   Intake 1160 ml   Output 3300 ml   Net -2140 ml     I/O this shift:  In: 40 [P.O.:40]  Out: -      Physical Exam:   General: patient asleep and in restraints   Eyes: Normal lids and lashes, no scleral icterus   Neck: supple, normal ROM   Skin: warm and dry, not  jaundiced   Cardiovascular: regular rhythm and rate, no murmurs auscultated   Pulm: clear to auscultation bilaterally, regular and unlabored   Abdomen: soft, nontender, nondistended; normal bowel sounds   Extremities: no rash or edema. In arm restraints.   Psychiatric: asleep     Results Review:     I reviewed the patient's new clinical results.    Results from last 7 days   Lab Units 08/08/24  0805 08/08/24  0013 08/07/24  1616   WBC 10*3/mm3 7.14 8.11 6.15   HEMOGLOBIN g/dL 8.4* 8.3* 7.9*   HEMATOCRIT % 27.4* 27.3* 25.9*   PLATELETS 10*3/mm3 71* 76* 63*     Results from last 7 days   Lab Units 08/08/24  0805 08/07/24  0728 08/07/24  0251 08/06/24  1519 08/05/24  1606 08/05/24  0921   SODIUM mmol/L 135*  --  128* 125*   < > 130*   POTASSIUM mmol/L 3.9 4.9 5.8* 5.2   < > 6.0*   CHLORIDE mmol/L 90*  --  90* 89*   < > 88*   CO2 mmol/L 32.0*  --  22.0 20.5*   < > 18.4*   BUN mg/dL 59*  --  59* 57*   < > 39*   CREATININE mg/dL 2.07*  --  2.57* 2.95*   < > 3.50*   CALCIUM mg/dL 9.4  --  8.1* 8.0*   < > 8.7   BILIRUBIN mg/dL 2.4*  --  2.7*  --   --  2.6*   ALK PHOS U/L 113  --  106  --   --  146*   ALT (SGPT) U/L 57*  --  51*  --   --  65*   AST (SGOT) U/L 123*  --  143*  --   --  209*   GLUCOSE mg/dL 160*  --  186* 165*   < > 65    < > = values in this interval not displayed.     Results from last 7 days   Lab Units 08/05/24  0921   INR  1.40*     Lab Results   Lab Value Date/Time    LIPASE 31 03/14/2023 1237    LIPASE 14 05/11/2022 0059    LIPASE 21 (L) 06/01/2020 0149    LIPASE 22 (L) 05/12/2020 1235    LIPASE 29 01/09/2020 1229    LIPASE 23 02/27/2018 1221       Radiology:  XR Abdomen KUB   Final Result      XR Chest 1 View   Final Result   Persistent vascular congestion.       This report was finalized on 8/7/2024 3:35 AM by Dr. Radha Laguna M.D on Workstation: [id number]          XR Chest 1 View   Final Result   Cardiomegaly with pulmonary vascular congestion and edema,   follow-up advised.       This  report was finalized on 8/6/2024 6:40 PM by Dr. Harpreet Mistry M.D on Workstation: [id number]          US Renal Bilateral   Final Result   No hydronephrosis or echogenic nephrolithiasis.           This report was finalized on 8/5/2024 8:25 PM by Dr. Harpreet Mistry M.D on Workstation: [id number]          US Liver   Final Result   1.  Hepatic steatosis. Otherwise, evaluation of the liver is   nondiagnostic likely due to combination of patient body habitus and   degree of steatosis. Therefore, underlying focal hepatic   malignancy/abnormality cannot be excluded. Further screening for HCC   should be performed with multiphase MRI with and without contrast.   2.  No definite ascites is visualized on provided images.       This report was finalized on 8/5/2024 6:51 PM by Dr. David Briceño M.D   on Workstation: [id number]          XR Chest 1 View   Final Result          Assessment & Plan     Active Hospital Problems    Diagnosis     **Acute GI bleeding     Hyperkalemia     Acute renal failure     Alcoholism     Acute exacerbation of chronic obstructive pulmonary disease (COPD)     Acute blood loss anemia        Assessment:  Melena: Suspect upper GI bleeding source, I have yet to investigate because of her current hemodynamic status and pulmonary compromise.  Patient being treated with octreotide and PPI.  Alcoholic Cirrhosis:   SANDRO: Nephrology following  H/o alcoholism.     Plan:  Monitor H&H, transfuse as per protocol.  Off Octreotide and on PPI infusion BID.  When she is more stable will perform an EGD.      I discussed the patients findings and my recommendations with patient and nursing staff.    Dilan Nunes MD

## 2024-08-08 NOTE — PLAN OF CARE
Goal Outcome Evaluation:  Plan of Care Reviewed With: patient           Outcome Evaluation: Clinical swallow eval completed.  Recommend continue with thin liquids, meds whole with thin. Recommend upright, slow rate, single sips, supervision. RN reported coughing with thin this date, if coughing persists recommen downgrade to nectar thick liquids. SLP to follow for diet tolerance and re-eval. If mental status worsens, recommend NPO.                 SLP Swallowing Diagnosis: R/O pharyngeal dysphagia (08/08/24 1500)

## 2024-08-08 NOTE — PROGRESS NOTES
[facility] Progress Note      Patient Name: Filomena Liu  : 1960  MRN: [id number]  Primary Care Physician:  Fernando Dunn MD  Date of admission: 2024    Subjective     Interval History:   F/u SANDRO    Review of Systems:   I/O 1.5/4.3, diuresed well with high dose IV bumex   Remains confused, in soft restraints  On 2.5L NC O2  Denies dyspnea     Objective     Vitals:   Temp:  [97.9 °F (36.6 °C)-98.2 °F (36.8 °C)] 98 °F (36.7 °C)  Heart Rate:  [69-90] 73  Resp:  [18-22] 22  BP: (112-156)/() 139/83  Flow (L/min):  [2-8] 2    Intake/Output Summary (Last 24 hours) at 2024 0852  Last data filed at 2024 0400  Gross per 24 hour   Intake 1120 ml   Output 3300 ml   Net -2180 ml       Physical Exam:    General Appearance: frail confused WF in soft restraints on NC O2  Neck: supple, no JVD  Lungs: Coarse BS bilat  Heart: RRR, normal S1 and S2  Abdomen: soft, nontender, nondistended  Extremities: no edema, cyanosis or clubbing       Scheduled Meds:     atenolol, 25 mg, Oral, Daily  budesonide-formoterol, 2 puff, Inhalation, BID - RT   And  tiotropium bromide monohydrate, 2 puff, Inhalation, Daily - RT  cefTRIAXone, 2,000 mg, Intravenous, Q24H  folic acid, 1 mg, Oral, Daily  insulin lispro, 2-9 Units, Subcutaneous, 4x Daily AC & at Bedtime  ipratropium-albuterol, 3 mL, Nebulization, 4x Daily  LORazepam, 2 mg, Oral, TID  [START ON 2024] methylPREDNISolone sodium succinate, 40 mg, Intravenous, Daily  nicotine, 1 patch, Transdermal, Q24H  pantoprazole, 40 mg, Intravenous, Q12H  PHENobarbital, 2 mg/kg (Ideal), Intravenous, Once  PHENobarbital, 65 mg, Intravenous, Once   Followed by  [START ON 2024] PHENobarbital, 32.4 mg, Oral, Once   Followed by  [START ON 2024] PHENobarbital, 32.4 mg, Oral, Once   Followed by  [START ON 8/10/2024] PHENobarbital, 32.4 mg, Oral, Once  rOPINIRole, 1 mg, Oral, Nightly  sodium chloride, 10 mL, Intravenous, Q12H  thiamine (B-1)  IV, 200 mg, Intravenous, Q8H   Followed by  [START ON 8/10/2024] thiamine, 100 mg, Oral, Daily      IV Meds:   dexmedetomidine, 0.2-1.5 mcg/kg/hr, Last Rate: 0.2 mcg/kg/hr (08/08/24 0618)        Results Reviewed:   I have personally reviewed the results from the time of this admission to 8/8/2024 08:52 EDT     Results from last 7 days   Lab Units 08/07/24  0728 08/07/24  0251 08/06/24  1519 08/06/24  0833 08/05/24  1606 08/05/24  0921   SODIUM mmol/L  --  128* 125* 121*   < > 130*   POTASSIUM mmol/L 4.9 5.8* 5.2 5.8*   < > 6.0*   CHLORIDE mmol/L  --  90* 89* 89*   < > 88*   CO2 mmol/L  --  22.0 20.5* 21.0*   < > 18.4*   BUN mg/dL  --  59* 57* 51*   < > 39*   CREATININE mg/dL  --  2.57* 2.95* 3.04*   < > 3.50*   CALCIUM mg/dL  --  8.1* 8.0* 8.0*   < > 8.7   BILIRUBIN mg/dL  --  2.7*  --   --   --  2.6*   ALK PHOS U/L  --  106  --   --   --  146*   ALT (SGPT) U/L  --  51*  --   --   --  65*   AST (SGOT) U/L  --  143*  --   --   --  209*   GLUCOSE mg/dL  --  186* 165* 183*   < > 65    < > = values in this interval not displayed.     Estimated Creatinine Clearance: 21.7 mL/min (A) (by C-G formula based on SCr of 2.57 mg/dL (H)).  Results from last 7 days   Lab Units 08/07/24  0251 08/06/24  0833 08/05/24  0921   MAGNESIUM mg/dL  --   --  2.8*   PHOSPHORUS mg/dL 5.3* 4.8*  --          Results from last 7 days   Lab Units 08/08/24  0805 08/08/24  0013 08/07/24  1616 08/07/24  0251 08/07/24  0023   WBC 10*3/mm3 7.14 8.11 6.15 7.77 8.70   HEMOGLOBIN g/dL 8.4* 8.3* 7.9* 7.2* 7.0*   PLATELETS 10*3/mm3 71* 76* 63* 75* 75*     Results from last 7 days   Lab Units 08/05/24  0921   INR  1.40*       Assessment / Plan     ASSESSMENT:  Non olig SANDRO - slightly better, Cr down to 2.5 yesterday (peak 3.5; was 0.7 in July 2023.  Multifactorial prerenal azotemia, vs HRS (favor latter), in assoc with GIB, severe anemia, diuretic use and impaired compensation via ACE/NSAIDs (though states motrin use scant).   UA bland and Eunice low < 20.   US: no hydronephrosis.  UOP improved with diuresis.  Labs today pending. DC'd midodrine / octreotide  Hyperkalemia, due to SANDRO + meds (ACE, KCL, beta blocker); K 5.8 to 4.9 after temporizing measures overnight   Vol overload - CXR noted.  No periph edema.  Improved diuresis yesterday with high dose IV bumex  Hypotension - improved, stopped midodrine, tolerating atenolol  Hyponatremia, hypervolemic, improved with diuresis, Na up to 128  Anemia, hgb mid8s.  GI w/u in progress. Endoscopy been on hold.  TSAT ok 21%.     ETOH abuse  Acute encephalopathy - poss withdrawal ; in soft restraints  Acute hypoxic resp failure - off bipap, O2 has been weaned to 2.5L   Rash - has vasculitic appearance but chronic in nature.  Sent DOV/ANCA.  Low C3/C4 noted       PLAN:  Follow up labs   Resume bumex at lower dose 2mg IV BID  DC'd midodrine/octreotide  D/W RN, PULURSULA Baker MD  08/08/24  08:52 EDT    [facility]  [phone]

## 2024-08-08 NOTE — NURSING NOTE
"[facility] follow-up d/t ETOH.    Patient RIB. Patient currently in bilateral wrist restraints. The patient has been hallucinating, confused, and combative. Last CIWA 9, was 26 @ 0615 but the patient reported she was doing \"better.\" The patient remains drowsy and kept eyes closed during visit. The patient answered \"yeah\" when asked about ETOH treatment and stated she plans on going to AA and has a sponsor. [facility] will follow and discuss more treatment options as the patient improves.   "

## 2024-08-08 NOTE — THERAPY EVALUATION
Acute Care - Speech Language Pathology   Swallow Initial Evaluation [facility]     Patient Name: Filomena Liu  : 1960  MRN: [id number]  Today's Date: 2024               Admit Date: 2024    Visit Dx:     ICD-10-CM ICD-9-CM   1. Acute blood loss anemia  D62 285.1   2. Gastrointestinal hemorrhage, unspecified gastrointestinal hemorrhage type  K92.2 578.9   3. Acute exacerbation of chronic obstructive pulmonary disease (COPD)  J44.1 491.21   4. Alcoholism  F10.20 303.90   5. Acute renal failure, unspecified acute renal failure type  N17.9 584.9   6. Hyperkalemia  E87.5 276.7     Patient Active Problem List   Diagnosis    Mediastinal mass    Cervical stenosis of spinal canal    Cervical radiculopathy    Cellulitis/abscess of left foot    HTN (hypertension)    History of MRSA infection    Anxiety    Peroneal tenosynovitis    Fracture of navicular bone of left foot    Tobacco use    Non compliance with medical treatment    Screening for colon cancer    Osteoporosis    Shoulder arthritis    Primary localized osteoarthrosis of left shoulder region    History of adenomatous polyp of colon    Diverticulosis    Acute GI bleeding    Hyperkalemia    Acute renal failure    Alcoholism    Acute exacerbation of chronic obstructive pulmonary disease (COPD)    Acute blood loss anemia     Past Medical History:   Diagnosis Date    Ankle pain     Ankle wound     LEFT    Anxiety     Arthritis of back     Arthritis of neck     Asthma     Benign tumor of thymus     REMOVED    Bruises easily     Cataract     COPD (chronic obstructive pulmonary disease)     CTS (carpal tunnel syndrome) Surgery     DDD (degenerative disc disease), cervical     Depression     Fracture of wrist 2917    GERD (gastroesophageal reflux disease)     Hip arthrosis Unknown    History of MRSA infection     LEFT ANKLE TREAT [facility]  5YEARS GO    Hyperlipidemia     Hypertension     Knee sprain 2023    Knee swelling Unknown    Shoulder  arthritis 06/05/2023    Shoulder pain, left 07/07/2023    Sleep apnea     NO MACHINE USE    Spinal stenosis     Spondylolisthesis of cervical region      Past Surgical History:   Procedure Laterality Date    BACK SURGERY      cervical disc surgery with fusion-    CHOLECYSTECTOMY      COLONOSCOPY      COLONOSCOPY N/A 11/12/2020    Procedure: COLONOSCOPY, polypectomy;  Surgeon: Filomena Mckeon MD;  Location:  [facility] OR;  Service: Gastroenterology;  Laterality: N/A;  Diverticulosis; Hepatic flexure polyp x 1; Polyp at 60cm x 1; Polyp at 50cm x 1- hot snare;    COLONOSCOPY N/A 4/15/2024    Procedure: COLONOSCOPY into sigmoid colon with hot snare polypectomy;  Surgeon: Leatha Johns MD;  [facility];  Service: General;  Laterality: N/A;  pre- history of polyps  post- diverticulosis, polyp    HAND SURGERY  2000    HYSTERECTOMY      INCISION AND DRAINAGE LEG Left 11/17/2018    Procedure: Incision and Drainage of Left ankle;  Surgeon: Maxwell Lanier MD;  Location: [facility] MAIN OR;  Service: Orthopedics    NECK SURGERY  2000    SIGMOIDOSCOPY N/A 3/28/2024    Procedure: SIGMOIDOSCOPY FLEXIBLE;  Surgeon: Leatha Johns MD;  [facility];  Service: General;  Laterality: N/A;  pre: h/o colon polyps  post: poor prep, diverticulosis    SKIN BIOPSY      SKIN GRAFT SPLIT THICKNESS N/A 02/12/2019    Procedure: debridement SKIN GRAFT SPLIT THICKNESS left ankle wound;  Surgeon: Barry Worthy MD;  [facility];  Service: Plastics    TOTAL SHOULDER ARTHROPLASTY Left 7/20/2023    Procedure: Total  shoulder arthroplasty;  Surgeon: Maxwell Lanier MD;  [facility];  Service: Orthopedics;  Laterality: Left;    TOTAL THYMECTOMY      TRIGGER POINT INJECTION  2000    WRIST FRACTURE SURGERY      CARPAL TUNNEL       SLP Recommendation and Plan  SLP Swallowing Diagnosis: R/O pharyngeal dysphagia (08/08/24 1500)  SLP Diet Recommendation: thin liquids, clear liquid diet,  other (see comments) (clears per baseline diet ordered, can downgrade to NTL with coughing) (08/08/24 1500)  Recommended Precautions and Strategies: upright posture during/after eating, small bites of food and sips of liquid (08/08/24 1500)  SLP Rec. for Method of Medication Administration: meds whole, meds crushed, with thick liquids (08/08/24 1500)     Monitor for Signs of Aspiration: yes, notify SLP if any concerns (08/08/24 1500)  Recommended Diagnostics: reassess via clinical swallow evaluation (08/08/24 1500)  Swallow Criteria for Skilled Therapeutic Interventions Met: demonstrates skilled criteria (08/08/24 1500)     Rehab Potential/Prognosis, Swallowing: good, to achieve stated therapy goals (08/08/24 1500)  Therapy Frequency (Swallow): PRN (08/08/24 1500)  Predicted Duration Therapy Intervention (Days): until discharge (08/08/24 1500)  Oral Care Recommendations: Oral Care BID/PRN (08/08/24 1500)                                        Plan of Care Reviewed With: patient  Outcome Evaluation: Clinical swallow eval completed.  Recommend continue with thin liquids, meds whole with thin. Recommend upright, slow rate, single sips, supervision. RN reported coughing with thin this date, if coughing persists recommen downgrade to nectar thick liquids. SLP to follow for diet tolerance and re-eval. If mental status worsens, recommend NPO.      SWALLOW EVALUATION (Last 72 Hours)       SLP Adult Swallow Evaluation       Row Name 08/08/24 1500                   Rehab Evaluation    Document Type evaluation  -OC        Subjective Information no complaints  -OC        Patient Observations alert;cooperative;agree to therapy  -OC        Patient Effort good  -OC        Symptoms Noted During/After Treatment none  -OC           General Information    Patient Profile Reviewed yes  -OC        Pertinent History Of Current Problem Patient admitted with acute GI bleeding. Currently on clear liquid diet. Hx ETOH abuse, COPD.  -OC         Current Method of Nutrition clear liquids;thin liquids  -OC        Precautions/Limitations, Vision WFL;for purposes of eval  -OC        Precautions/Limitations, Hearing WFL;for purposes of eval  -OC        Prior Level of Function-Communication unknown  -OC        Prior Level of Function-Swallowing no diet consistency restrictions  -OC        Plans/Goals Discussed with patient  -OC        Barriers to Rehab medically complex  -OC        Patient's Goals for Discharge patient did not state  -OC           Pain Scale: Numbers Pre/Post-Treatment    Pretreatment Pain Rating 0/10 - no pain  -OC        Posttreatment Pain Rating 0/10 - no pain  -OC           Oral Motor Structure and Function    Dentition Assessment natural, present and adequate  -OC        Secretion Management WNL/WFL  -OC        Mucosal Quality dry  -OC        Volitional Swallow delayed  -OC        Volitional Cough weak  -OC           Oral Musculature and Cranial Nerve Assessment    Oral Motor General Assessment generalized oral motor weakness  -OC        Vocal Impairment, Detail. Cranial Nerve X (Vagus) other (see comments)  weak vocal quality  -OC           Clinical Swallow Eval    Clinical Swallow Evaluation Summary Patient alert and upright for eval. Agreeable to PO trials. Patient demonstrated delayed coughing s/p ice chip, no change in vocal quality. Patient demonstrated no overt s/s aspiration with thin via cup/straw with small/single sips. No overt s/s aspiration with nectar thick via cup. Vocal quality weak but remained clear throughout trials.  -OC           SLP Evaluation Clinical Impression    SLP Swallowing Diagnosis R/O pharyngeal dysphagia  -OC        Functional Impact risk of aspiration/pneumonia  -OC        Rehab Potential/Prognosis, Swallowing good, to achieve stated therapy goals  -OC        Swallow Criteria for Skilled Therapeutic Interventions Met demonstrates skilled criteria  -OC           Recommendations    Therapy Frequency  (Swallow) PRN  -OC        Predicted Duration Therapy Intervention (Days) until discharge  -OC        SLP Diet Recommendation thin liquids;clear liquid diet;other (see comments)  clears per baseline diet ordered, can downgrade to NTL with coughing  -OC        Recommended Diagnostics reassess via clinical swallow evaluation  -OC        Recommended Precautions and Strategies upright posture during/after eating;small bites of food and sips of liquid  -OC        Oral Care Recommendations Oral Care BID/PRN  -OC        SLP Rec. for Method of Medication Administration meds whole;meds crushed;with thick liquids  -OC        Monitor for Signs of Aspiration yes;notify SLP if any concerns  -OC           Swallow Goals (SLP)    Swallow LTGs Swallow Long Term Goal (free text)  -OC           (LTG) Swallow    (LTG) Swallow Tolerate least restrictive diet with no overt s/s aspiration.  -OC        Echo (Swallow Long Term Goal) with minimal cues (75-90% accuracy)  -OC        Time Frame (Swallow Long Term Goal) by discharge  -OC                  User Key  (r) = Recorded By, (t) = Taken By, (c) = Cosigned By      Initials Name Effective Dates    Rachel Martínez SLP 08/28/23 -                     EDUCATION  The patient has been educated in the following areas:   Dysphagia (Swallowing Impairment).        SLP GOALS       Row Name 08/08/24 1500             (LTG) Swallow    (LTG) Swallow Tolerate least restrictive diet with no overt s/s aspiration.  -OC      Echo (Swallow Long Term Goal) with minimal cues (75-90% accuracy)  -OC      Time Frame (Swallow Long Term Goal) by discharge  -OC                User Key  (r) = Recorded By, (t) = Taken By, (c) = Cosigned By      Initials Name Provider Type    Rachel Martínez SLP Speech and Language Pathologist                         Time Calculation:    Time Calculation- SLP       Row Name 08/08/24 1530             Time Calculation- SLP    SLP Start Time 1530  -OC      SLP Received On  08/08/24  -OC         Untimed Charges    SLP Eval/Re-eval  ST Eval Oral Pharyng Swallow - 48540  -OC      35146-CL Eval Oral Pharyng Swallow Minutes 45  -OC         Total Minutes    Untimed Charges Total Minutes 45  -OC       Total Minutes 45  -OC                User Key  (r) = Recorded By, (t) = Taken By, (c) = Cosigned By      Initials Name Provider Type    Rachel Martínez SLP Speech and Language Pathologist                    Therapy Charges for Today       Code Description Service Date Service Provider Modifiers Qty    [id number]  ST EVAL ORAL PHARYNG SWALLOW 3 8/8/2024 Rachel Interiano SLP GN 1                 RUPESH Uribe  8/8/2024

## 2024-08-08 NOTE — PROGRESS NOTES
"[facility]      Mar/chart reviewed  Follow up COPD with AE, possible GI bleed, alcohol abuse with depedence and withdrawal.  Some improvement in agitation/confusion, on minimal precedex     Vital Sign Min/Max for last 24 hours  Temp  Min: 97.9 °F (36.6 °C)  Max: 98.2 °F (36.8 °C)   BP  Min: 112/74  Max: 156/89   Pulse  Min: 69  Max: 90   Resp  Min: 18  Max: 22   SpO2  Min: 83 %  Max: 100 %   Flow (L/min)  Min: 2  Max: 8   No data recorded   1510/4350    Appears ill, sedated, confused a little less agitated gives names says she is \"God only knows where\" and year is 2027  perrl, eomi, normal sclera  mmm, no jvd, trachea midline, neck supple,  chest decreased ae bilaterally, no crackles, no wheezes,   rrr,   soft, nt, nd +bs,  no c/c/ e  Skin warm, dry no rashes    Labs: 8/8: reviewed:  Wbc 8  Hgb 8.3  Plts 76  Renal function panel -- ordered, doesn't appear to be pending or collected?      A/P:  Acute respiratory distress -- likely multifactorial -- vbg looks ok for now -- may need nippv so will move to ICU. Respirations are very tight so suspect copd is exacerbated will give solumedrol.    COPD with ae -- solumedrol bronchodilators  Possible volume overload -- hopefully better now, repeat cxr, nephrology will conitnue to follow  SANDRO -- avoid nephrotoxins.  Cirrhosis with possible gi bleed -- continue current measures  Alcohol abuse with depedence and withdrawal -- this has worsened, add scheduled ativan  Chronic back pain  8. BENY -- patient unable to tolerate pap  9. Moderate mitral valve regurg    Decrease solumedrol; continue withdrawal protocols, started on phenobarbital yesterday, try and NOT use precedex.      "

## 2024-08-08 NOTE — PLAN OF CARE
Goal Outcome Evaluation:  Plan of Care Reviewed With: patient              Patient is a 64 y.o. female admitted [facility] for acute GI bleed, hyperkalemia on 8/5/2024. PMHx includes alcohol abuse, COPD, depression, GERD. Pt is AO x3 today and very sleepy. She reports she lives alone and denies use of AD at baseline. She said she has a flight of steps to enter home. Today, patient performed rolling R<>L requiring maxAx2. Strength, activity tolerance, safety awareness deficits noted. Patient may benefit from skilled PT services to address functional deficits and improve level of independence prior to discharge.       Anticipated Discharge Disposition (PT): skilled nursing facility, inpatient rehabilitation facility (pending progress and hospital course)

## 2024-08-08 NOTE — THERAPY EVALUATION
Patient Name: Filomena Liu  : 1960    MRN: [id number]                              Today's Date: 2024       Admit Date: 2024    Visit Dx:     ICD-10-CM ICD-9-CM   1. Acute blood loss anemia  D62 285.1   2. Gastrointestinal hemorrhage, unspecified gastrointestinal hemorrhage type  K92.2 578.9   3. Acute exacerbation of chronic obstructive pulmonary disease (COPD)  J44.1 491.21   4. Alcoholism  F10.20 303.90   5. Acute renal failure, unspecified acute renal failure type  N17.9 584.9   6. Hyperkalemia  E87.5 276.7     Patient Active Problem List   Diagnosis    Mediastinal mass    Cervical stenosis of spinal canal    Cervical radiculopathy    Cellulitis/abscess of left foot    HTN (hypertension)    History of MRSA infection    Anxiety    Peroneal tenosynovitis    Fracture of navicular bone of left foot    Tobacco use    Non compliance with medical treatment    Screening for colon cancer    Osteoporosis    Shoulder arthritis    Primary localized osteoarthrosis of left shoulder region    History of adenomatous polyp of colon    Diverticulosis    Acute GI bleeding    Hyperkalemia    Acute renal failure    Alcoholism    Acute exacerbation of chronic obstructive pulmonary disease (COPD)    Acute blood loss anemia     Past Medical History:   Diagnosis Date    Ankle pain     Ankle wound     LEFT    Anxiety     Arthritis of back     Arthritis of neck     Asthma     Benign tumor of thymus     REMOVED    Bruises easily     Cataract     COPD (chronic obstructive pulmonary disease)     CTS (carpal tunnel syndrome) Surgery     DDD (degenerative disc disease), cervical     Depression     Fracture of wrist 2917    GERD (gastroesophageal reflux disease)     Hip arthrosis Unknown    History of MRSA infection     LEFT ANKLE TREAT [facility]  5YEARS GO    Hyperlipidemia     Hypertension     Knee sprain 2023    Knee swelling Unknown    Shoulder arthritis 2023    Shoulder pain, left 2023    Sleep  apnea     NO MACHINE USE    Spinal stenosis     Spondylolisthesis of cervical region      Past Surgical History:   Procedure Laterality Date    BACK SURGERY      cervical disc surgery with fusion-    CHOLECYSTECTOMY      COLONOSCOPY      COLONOSCOPY N/A 11/12/2020    Procedure: COLONOSCOPY, polypectomy;  Surgeon: Filomena Mckeon MD;  Location: [facility] OR;  Service: Gastroenterology;  Laterality: N/A;  Diverticulosis; Hepatic flexure polyp x 1; Polyp at 60cm x 1; Polyp at 50cm x 1- hot snare;    COLONOSCOPY N/A 4/15/2024    Procedure: COLONOSCOPY into sigmoid colon with hot snare polypectomy;  Surgeon: Leatha Johns MD;  Location: [facility] ENDOSCOPY;  Service: General;  Laterality: N/A;  pre- history of polyps  post- diverticulosis, polyp    HAND SURGERY  2000    HYSTERECTOMY      INCISION AND DRAINAGE LEG Left 11/17/2018    Procedure: Incision and Drainage of Left ankle;  Surgeon: Maxwell Lanier MD;  Location: [facility] MAIN OR;  Service: Orthopedics    NECK SURGERY  2000    SIGMOIDOSCOPY N/A 3/28/2024    Procedure: SIGMOIDOSCOPY FLEXIBLE;  Surgeon: Leatha Johns MD;  Location: [facility] ENDOSCOPY;  Service: General;  Laterality: N/A;  pre: h/o colon polyps  post: poor prep, diverticulosis    SKIN BIOPSY      SKIN GRAFT SPLIT THICKNESS N/A 02/12/2019    Procedure: debridement SKIN GRAFT SPLIT THICKNESS left ankle wound;  Surgeon: Barry Worthy MD;  [facility];  Service: Plastics    TOTAL SHOULDER ARTHROPLASTY Left 7/20/2023    Procedure: Total  shoulder arthroplasty;  Surgeon: Maxwell Lanier MD;  Location: [facility] OR [facility];  Service: Orthopedics;  Laterality: Left;    TOTAL THYMECTOMY      TRIGGER POINT INJECTION  2000    WRIST FRACTURE SURGERY      CARPAL TUNNEL      General Information       Row Name 08/08/24 7276          OT Time and Intention    Document Type evaluation  -KR     Mode of Treatment co-treatment;physical therapy;occupational therapy  -KR       Row Name 08/08/24 8093           General Information    Patient Profile Reviewed yes  -KR     Prior Level of Function independent:  denies use of AD and states she uses a shower chair for bathing  -KR     Existing Precautions/Restrictions fall  -KR       Row Name 08/08/24 1330          Living Environment    People in Home alone  -KR       Row Name 08/08/24 1330          Home Main Entrance    Number of Stairs, Main Entrance --  flight of steps per pt  -KR       Row Name 08/08/24 1330          Cognition    Orientation Status (Cognition) oriented x 3  Initially pt speaking illogically but then began to answer orientation questions  -KR       Row Name 08/08/24 1330          Safety Issues, Functional Mobility    Impairments Affecting Function (Mobility) endurance/activity tolerance;strength;balance;cognition  -KR     Comment, Safety Issues/Impairments (Mobility) [place] medically appropriate and necessary due to pt acuity, activity tolerance, to maximize mobility efforts and safety of pt and staff. Focus on progression of care and goals established in plan of care.  -KR               User Key  (r) = Recorded By, (t) = Taken By, (c) = Cosigned By      Initials Name Provider Type    KR Delphine Madrid OT Occupational Therapist                     Mobility/ADL's       Row Name 08/08/24 1331          Bed Mobility    Rolling Left [place] (Bed Mobility) maximum assist (25% patient effort);2 person assist;verbal cues  -KR     Rolling Right [place] (Bed Mobility) maximum assist (25% patient effort);2 person assist;verbal cues  -KR     Scooting/Bridging [place] (Bed Mobility) maximum assist (25% patient effort);2 person assist;verbal cues  -KR     Assistive Device (Bed Mobility) draw sheet;bed rails  -KR       Row Name 08/08/24 1331          Transfers    Transfers sit-stand transfer  -KR       Row Name 08/08/24 1331          Sit-Stand Transfer    Sit-Stand [place] (Transfers) not tested  -KR       Row Name 08/08/24 1331           Functional Mobility    Functional Mobility- Ind. Level not tested  -[facility] Name 08/08/24 1331          Activities of Daily Living    BADL Assessment/Intervention lower body dressing;toileting;grooming;feeding  -KR       Row Name 08/08/24 1331          Lower Body Dressing Assessment/Training    [place] Level (Lower Body Dressing) lower body dressing skills;doff;don;socks;dependent (less than 25% patient effort)  -KR       Row Name 08/08/24 1331          Toileting Assessment/Training    [place] Level (Toileting) toileting skills;adjust/manage clothing;change pad/brief;perform perineal hygiene;dependent (less than 25% patient effort)  -[facility] Name 08/08/24 1331          Grooming Assessment/Training    [place] Level (Grooming) grooming skills;hair care, combing/brushing;minimum assist (75% patient effort)  -KR       Row Name 08/08/24 1331          Self-Feeding Assessment/Training    Comment, (Feeding) brought cup to mouth using RUE  -               User Key  (r) = Recorded By, (t) = Taken By, (c) = Cosigned By      Initials Name Provider Type    Delphine Alvraez OT Occupational Therapist                   Obj/Interventions       Row Name 08/08/24 1332          Range of Motion Comprehensive    General Range of Motion bilateral upper extremity ROM WFL  -KR       Row Name 08/08/24 1332          Strength Comprehensive (MMT)    General Manual Muscle Testing (MMT) Assessment upper extremity strength deficits identified  -KR       Row Name 08/08/24 1332          Motor Skills    Therapeutic Exercise --  shoulder and elbow AROM peformed sitting up in bed  -               User Key  (r) = Recorded By, (t) = Taken By, (c) = Cosigned By      Initials Name Provider Type    Delphine Alvarez OT Occupational Therapist                   Goals/Plan       Row Name 08/08/24 1334          Transfer Goal 1 (OT)    Activity/Assistive Device (Transfer Goal 1, OT) transfers, all  -     [place] Level/Cues  Needed (Transfer Goal 1, OT) moderate assist (50-74% patient effort)  -KR     Time Frame (Transfer Goal 1, OT) short term goal (STG);2 weeks  -KR     Progress/Outcome (Transfer Goal 1, OT) goal ongoing  -KR       Row Name 08/08/24 1334          Toileting Goal 1 (OT)    Activity/Device (Toileting Goal 1, OT) toileting skills, all  -KR     [place] Level/Cues Needed (Toileting Goal 1, OT) moderate assist (50-74% patient effort)  -KR     Time Frame (Toileting Goal 1, OT) short term goal (STG);2 weeks  -KR     Progress/Outcome (Toileting Goal 1, OT) goal ongoing  -KR       Row Name 08/08/24 1334          Grooming Goal 1 (OT)    Activity/Device (Grooming Goal 1, OT) grooming skills, all  -KR     [place] (Grooming Goal 1, OT) standby assist  -KR     Time Frame (Grooming Goal 1, OT) short term goal (STG);2 weeks  -KR     Progress/Outcome (Grooming Goal 1, OT) goal ongoing  -KR       Row Name 08/08/24 1334          Self-Feeding Goal 1 (OT)    [place] Level/Cues Needed (Self-Feeding Goal 1, OT) standby assist  -KR     Time Frame (Self-Feeding Goal 1, OT) short term goal (STG);2 weeks  -KR     Progress/Outcomes (Self-Feeding Goal 1, OT) goal ongoing  -KR       Row Name 08/08/24 1334          Therapy Assessment/Plan (OT)    Planned Therapy Interventions (OT) activity tolerance training;BADL retraining;neuromuscular control/coordination retraining;patient/caregiver education/training;ROM/therapeutic exercise;occupation/activity based interventions;strengthening exercise;transfer/mobility retraining;functional balance retraining;adaptive equipment training  -KR               User Key  (r) = Recorded By, (t) = Taken By, (c) = Cosigned By      Initials Name Provider Type    Delphine Alvarez OT Occupational Therapist                   Clinical Impression       Row Name 08/08/24 1333          Pain Assessment    Pretreatment Pain Rating 0/10 - no pain  -KR     Posttreatment Pain Rating 0/10 - no pain  -KR       Hal  Name 08/08/24 1333          Plan of Care Review    Plan of Care Reviewed With patient  -KR     Outcome Evaluation Pt seen for OT keegan this AM. Admitted with acute GI bleed. PMH includes COPD, alcohol abuse. Pt initially speaking illogically then began to answer PLOF questions. She reports living alone and states she does not use equipment for ambulation. Says she does own and use a shower chair for bathing and that she was independent prior with ADLs. She required max Ax2 today for rolling L<>R. In restraints upon arrival and worked on BUE ther ex. She was also able to comb her hair sitting up in bed with min A. She continues to benefit from skilled OT. Unable to attempt sitting EOB today.  -KR       Row Name 08/08/24 1333          Therapy Assessment/Plan (OT)    Rehab Potential (OT) good, to achieve stated therapy goals  -KR     Criteria for Skilled Therapeutic Interventions Met (OT) yes  -KR     Therapy Frequency (OT) 5 times/wk  -KR       Row Name 08/08/24 1333          Therapy Plan Review/Discharge Plan (OT)    Anticipated Discharge Disposition (OT) skilled nursing facility;inpatient rehabilitation facility  -KR       Row Name 08/08/24 1333          Vital Signs    Pre Patient Position Supine  -KR     Intra Patient Position Supine  -KR     Post Patient Position Supine  -KR       Row Name 08/08/24 1333          Positioning and Restraints    Pre-Treatment Position in bed  -KR     Post Treatment Position bed  -KR     In Bed notified nsg;supine;call light within reach;encouraged to call for assist;exit alarm on  -KR     Restraints released:;reapplied:;soft limb  -KR               User Key  (r) = Recorded By, (t) = Taken By, (c) = Cosigned By      Initials Name Provider Type    Delphine Alvarez, OT Occupational Therapist                   Outcome Measures       Row Name 08/08/24 9373          How much help from another is currently needed...    Putting on and taking off regular lower body clothing? 1  -KR     Bathing  (including washing, rinsing, and drying) 1  -KR     Toileting (which includes using toilet bed pan or urinal) 1  -KR     Putting on and taking off regular upper body clothing 2  -KR     Taking care of personal grooming (such as brushing teeth) 2  -KR     Eating meals 2  -KR     AM-PAC 6 Clicks Score (OT) 9  -KR       Row Name 08/08/24 1144 08/08/24 0800       How much help from another person do you currently need...    Turning from your back to your side while in flat bed without using bedrails? 1  -CB 2  -NT    Moving from lying on back to sitting on the side of a flat bed without bedrails? 1  -CB 2  -NT    Moving to and from a bed to a chair (including a wheelchair)? 1  -CB 2  -NT    Standing up from a chair using your arms (e.g., wheelchair, bedside chair)? 1  -CB 1  -NT    Climbing 3-5 steps with a railing? 1  -CB 1  -NT    To walk in hospital room? 1  -CB 1  -NT    AM-PAC 6 Clicks Score (PT) 6  -CB 9  -NT    Highest Level of Mobility Goal 2 --> Bed activities/dependent transfer  -CB 3 --> Sit at edge of bed  -NT      Row Name 08/08/24 0400          How much help from another person do you currently need...    Turning from your back to your side while in flat bed without using bedrails? 3 (P)   -MN     Moving from lying on back to sitting on the side of a flat bed without bedrails? 3 (P)   -MN     Moving to and from a bed to a chair (including a wheelchair)? 2 (P)   -MN     Standing up from a chair using your arms (e.g., wheelchair, bedside chair)? 2 (P)   -MN     Climbing 3-5 steps with a railing? 1 (P)   -MN     To walk in hospital room? 1 (P)   -MN     AM-PAC 6 Clicks Score (PT) 12 (P)   -MN     Highest Level of Mobility Goal 4 --> Transfer to chair/commode (P)   -MN       Row Name 08/08/24 1334 08/08/24 1144       Modified [place] Scale    Modified [place] Scale 5 - Severe disability.  Bedridden, incontinent, and requiring constant nursing care and attention.  -KR 5 - Severe disability.  Bedridden,  incontinent, and requiring constant nursing care and attention.  -CB      Row Name 08/08/24 1334 08/08/24 1144       Functional Assessment    Outcome Measure Options AM-PAC 6 Clicks Daily Activity (OT);Modified Ev  -KR AM-PAC 6 Clicks Basic Mobility (PT);Modified Ev  -CB              User Key  (r) = Recorded By, (t) = Taken By, (c) = Cosigned By      Initials Name Provider Type    CB Mariia Hidalgo, PT Physical Therapist    Alissa Coon, RN Registered Nurse    Delphine Alvarez, OT Occupational Therapist    Sariah Polanco, Nursing Student Nursing Student                    Occupational Therapy Education       Title: PT OT SLP Therapies (In Progress)       Topic: Occupational Therapy (Not Started)       Point: ADL training (Not Started)       Description:   Instruct learner(s) on proper safety adaptation and remediation techniques during self care or transfers.   Instruct in proper use of assistive devices.                  Learner Progress:  Not documented in this visit.              Point: Home exercise program (Not Started)       Description:   Instruct learner(s) on appropriate technique for monitoring, assisting and/or progressing therapeutic exercises/activities.                  Learner Progress:  Not documented in this visit.              Point: Precautions (Not Started)       Description:   Instruct learner(s) on prescribed precautions during self-care and functional transfers.                  Learner Progress:  Not documented in this visit.              Point: Body mechanics (Not Started)       Description:   Instruct learner(s) on proper positioning and spine alignment during self-care, functional mobility activities and/or exercises.                  Learner Progress:  Not documented in this visit.                                  OT Recommendation and Plan  Planned Therapy Interventions (OT): activity tolerance training, BADL retraining, neuromuscular control/coordination retraining,  patient/caregiver education/training, ROM/therapeutic exercise, occupation/activity based interventions, strengthening exercise, transfer/mobility retraining, functional balance retraining, adaptive equipment training  Therapy Frequency (OT): 5 times/wk  Plan of Care Review  Plan of Care Reviewed With: patient  Outcome Evaluation: Pt seen for OT eval this AM. Admitted with acute GI bleed. PMH includes COPD, alcohol abuse. Pt initially speaking illogically then began to answer PLOF questions. She reports living alone and states she does not use equipment for ambulation. Says she does own and use a shower chair for bathing and that she was independent prior with ADLs. She required max Ax2 today for rolling L<>R. In restraints upon arrival and worked on BUE ther ex. She was also able to comb her hair sitting up in bed with min A. She continues to benefit from skilled OT. Unable to attempt sitting EOB today.     Time Calculation:         Time Calculation- OT       Row Name 08/08/24 1335             Time Calculation- OT    OT Start Time 1008  -KR      OT Stop Time 1027  -KR      OT Time Calculation (min) 19 min  -KR      OT Received On 08/08/24  -KR      OT - Next Appointment 08/09/24  -KR      OT Goal Re-Cert Due Date 08/22/24  -KR         Timed Charges    50109 - OT Therapeutic Exercise Minutes 9  -KR         Untimed Charges    OT Eval/Re-eval Minutes 10  -KR         Total Minutes    Timed Charges Total Minutes 9  -KR      Untimed Charges Total Minutes 10  -KR       Total Minutes 19  -KR                User Key  (r) = Recorded By, (t) = Taken By, (c) = Cosigned By      Initials Name Provider Type    KR Delphine Madrid OT Occupational Therapist                  Therapy Charges for Today       Code Description Service Date Service Provider Modifiers Qty    [id number]  OT THER PROC EA 15 MIN 8/8/2024 Delphine Madrid OT GO 1    [id number] HC OT EVAL MOD COMPLEXITY 3 8/8/2024 Delphine Madrid OT GO 1                 Delphine  Julius, OT  8/8/2024

## 2024-08-08 NOTE — THERAPY EVALUATION
Patient Name: Filomena Liu  : 1960    MRN: [id number]                              Today's Date: 2024       Admit Date: 2024    Visit Dx:     ICD-10-CM ICD-9-CM   1. Acute blood loss anemia  D62 285.1   2. Gastrointestinal hemorrhage, unspecified gastrointestinal hemorrhage type  K92.2 578.9   3. Acute exacerbation of chronic obstructive pulmonary disease (COPD)  J44.1 491.21   4. Alcoholism  F10.20 303.90   5. Acute renal failure, unspecified acute renal failure type  N17.9 584.9   6. Hyperkalemia  E87.5 276.7     Patient Active Problem List   Diagnosis    Mediastinal mass    Cervical stenosis of spinal canal    Cervical radiculopathy    Cellulitis/abscess of left foot    HTN (hypertension)    History of MRSA infection    Anxiety    Peroneal tenosynovitis    Fracture of navicular bone of left foot    Tobacco use    Non compliance with medical treatment    Screening for colon cancer    Osteoporosis    Shoulder arthritis    Primary localized osteoarthrosis of left shoulder region    History of adenomatous polyp of colon    Diverticulosis    Acute GI bleeding    Hyperkalemia    Acute renal failure    Alcoholism    Acute exacerbation of chronic obstructive pulmonary disease (COPD)    Acute blood loss anemia     Past Medical History:   Diagnosis Date    Ankle pain     Ankle wound     LEFT    Anxiety     Arthritis of back     Arthritis of neck     Asthma     Benign tumor of thymus     REMOVED    Bruises easily     Cataract     COPD (chronic obstructive pulmonary disease)     CTS (carpal tunnel syndrome) Surgery     DDD (degenerative disc disease), cervical     Depression     Fracture of wrist 2917    GERD (gastroesophageal reflux disease)     Hip arthrosis Unknown    History of MRSA infection     LEFT ANKLE TREAT [facility]  5YEARS GO    Hyperlipidemia     Hypertension     Knee sprain 2023    Knee swelling Unknown    Shoulder arthritis 2023    Shoulder pain, left 2023    Sleep  apnea     NO MACHINE USE    Spinal stenosis     Spondylolisthesis of cervical region      Past Surgical History:   Procedure Laterality Date    BACK SURGERY      cervical disc surgery with fusion-    CHOLECYSTECTOMY      COLONOSCOPY      COLONOSCOPY N/A 11/12/2020    Procedure: COLONOSCOPY, polypectomy;  Surgeon: Filomena Mckeon MD;  Location: [facility] OR;  Service: Gastroenterology;  Laterality: N/A;  Diverticulosis; Hepatic flexure polyp x 1; Polyp at 60cm x 1; Polyp at 50cm x 1- hot snare;    COLONOSCOPY N/A 4/15/2024    Procedure: COLONOSCOPY into sigmoid colon with hot snare polypectomy;  Surgeon: Leatha Johns MD;  Location: [facility] ENDOSCOPY;  Service: General;  Laterality: N/A;  pre- history of polyps  post- diverticulosis, polyp    HAND SURGERY  2000    HYSTERECTOMY      INCISION AND DRAINAGE LEG Left 11/17/2018    Procedure: Incision and Drainage of Left ankle;  Surgeon: Maxwell Lanier MD;  Location: [facility] MAIN OR;  Service: Orthopedics    NECK SURGERY  2000    SIGMOIDOSCOPY N/A 3/28/2024    Procedure: SIGMOIDOSCOPY FLEXIBLE;  Surgeon: Leatha Johns MD;  Location: [facility] ENDOSCOPY;  Service: General;  Laterality: N/A;  pre: h/o colon polyps  post: poor prep, diverticulosis    SKIN BIOPSY      SKIN GRAFT SPLIT THICKNESS N/A 02/12/2019    Procedure: debridement SKIN GRAFT SPLIT THICKNESS left ankle wound;  Surgeon: Barry Worthy MD;  Location: [facility] OR [facility];  Service: Plastics    TOTAL SHOULDER ARTHROPLASTY Left 7/20/2023    Procedure: Total  shoulder arthroplasty;  Surgeon: Maxwell Lanier MD;  Location: [facility] OR [facility];  Service: Orthopedics;  Laterality: Left;    TOTAL THYMECTOMY      TRIGGER POINT INJECTION  2000    WRIST FRACTURE SURGERY      CARPAL TUNNEL      General Information       Row Name 08/08/24 1139          Physical Therapy Time and Intention    Document Type evaluation  -CB     Mode of Treatment co-treatment;physical therapy;occupational therapy;other (see  comments)  -CB       Row Name 08/08/24 1139          General Information    Patient Profile Reviewed yes  -CB     Prior Level of Function independent:;gait;transfer;bed mobility  pt reports she is ind at baseline without use of AD  -CB     Existing Precautions/Restrictions fall  -CB     Barriers to Rehab medically complex  -CB       Row Name 08/08/24 1139          Living Environment    People in Home --  pt reports alone  -CB       Row Name 08/08/24 1139          Home Main Entrance    Number of Stairs, Main Entrance --  flight of steps per pt report  -CB       Row Name 08/08/24 1139          Cognition    Orientation Status (Cognition) oriented x 3  -CB       Row Name 08/08/24 1139          Safety Issues, Functional Mobility    Safety Issues Affecting Function (Mobility) insight into deficits/self-awareness;judgment;problem-solving;safety precaution awareness;safety precautions follow-through/compliance  -CB     Impairments Affecting Function (Mobility) endurance/activity tolerance;strength  -CB     Comment, Safety Issues/Impairments (Mobility) Co treatment medically appropriate and necessary due to patient acuity level, activity tolerance and safety of patient and staff. Evaluation established to achieve all goals in POC.  -CB               User Key  (r) = Recorded By, (t) = Taken By, (c) = Cosigned By      Initials Name Provider Type    Mariia Olivera, PT Physical Therapist                   Mobility       Row Name 08/08/24 1141          Bed Mobility    Bed Mobility rolling left;rolling right;scooting/bridging  -CB     Rolling Left [place] (Bed Mobility) maximum assist (25% patient effort);2 person assist;verbal cues  -CB     Rolling Right [place] (Bed Mobility) maximum assist (25% patient effort);2 person assist;verbal cues  -CB     Scooting/Bridging [place] (Bed Mobility) maximum assist (25% patient effort);2 person assist;verbal cues  -CB     Assistive Device (Bed Mobility) draw sheet;bed rails   -CB               User Key  (r) = Recorded By, (t) = Taken By, (c) = Cosigned By      Initials Name Provider Type    Mariia Olivera PT Physical Therapist                   Obj/Interventions       Row Name 08/08/24 1141          Range of Motion Comprehensive    General Range of Motion bilateral lower extremity ROM WFL  -CB       Row Name 08/08/24 1141          Strength Comprehensive (MMT)    Comment, General Manual Muscle Testing (MMT) Assessment BLE grossly 3/5  -CB       Row Name 08/08/24 1141          Motor Skills    Therapeutic Exercise --  AP and HS x10 reps  -CB       Row Name 08/08/24 1141          Sensory Assessment (Somatosensory)    Sensory Assessment (Somatosensory) sensation intact  -CB               User Key  (r) = Recorded By, (t) = Taken By, (c) = Cosigned By      Initials Name Provider Type    Mariia Olivera PT Physical Therapist                   Goals/Plan       [facility] Name 08/08/24 1143          Bed Mobility Goal 1 (PT)    Activity/Assistive Device (Bed Mobility Goal 1, PT) bed mobility activities, all  -CB     [place] Level/Cues Needed (Bed Mobility Goal 1, PT) contact guard required  -CB     Time Frame (Bed Mobility Goal 1, PT) long term goal (LTG);2 weeks  -CB       Row Name 08/08/24 1143          Transfer Goal 1 (PT)    Activity/Assistive Device (Transfer Goal 1, PT) sit-to-stand/stand-to-sit;bed-to-chair/chair-to-bed;walker, rolling  -CB     [place] Level/Cues Needed (Transfer Goal 1, PT) minimum assist (75% or more patient effort)  -CB     Time Frame (Transfer Goal 1, PT) long term goal (LTG);2 weeks  -CB       Row Name 08/08/24 1143          Gait Training Goal 1 (PT)    Activity/Assistive Device (Gait Training Goal 1, PT) gait (walking locomotion);assistive device use;improve balance and speed;increase endurance/gait distance;decrease fall risk;diminish gait deviation  -CB     [place] Level (Gait Training Goal 1, PT) minimum assist (75% or more patient effort)  -CB      Distance (Gait Training Goal 1, PT) 15  -CB     Time Frame (Gait Training Goal 1, PT) long term goal (LTG);2 weeks  -CB       Row Name 08/08/24 1143          Problem Specific Goal 1 (PT)    Problem Specific Goal 1 (PT) Pt will be able to sit EOB 15min with SBA  -CB     Time Frame (Problem Specific Goal 1, PT) long-term goal (LTG);2 weeks  -CB       Row Name 08/08/24 1143          Therapy Assessment/Plan (PT)    Planned Therapy Interventions (PT) balance training;bed mobility training;gait training;home exercise program;patient/family education;transfer training;strengthening;postural re-education  -CB               User Key  (r) = Recorded By, (t) = Taken By, (c) = Cosigned By      Initials Name Provider Type    Mariia Olivera, PT Physical Therapist                   Clinical Impression       Row Name 08/08/24 1141          Pain    Pretreatment Pain Rating 0/10 - no pain  -CB     Posttreatment Pain Rating 0/10 - no pain  -CB       Row Name 08/08/24 1141          Plan of Care Review    Plan of Care Reviewed With patient  -CB     Outcome Evaluation Patient is a 64 y.o. female admitted to [facility] for acute GI bleed, hyperkalemia on 8/5/2024. PMHx includes alcohol abuse, COPD, depression, GERD. Pt is AO x3 today and very sleepy. She reports she lives alone and denies use of AD at baseline. She said she has a flight of steps to enter home. Today, patient performed rolling R<>L requiring maxAx2. Strength, activity tolerance, safety awareness deficits noted. Patient may benefit from skilled PT services to address functional deficits and improve level of independence prior to discharge.  -CB       Row Name 08/08/24 1141          Therapy Assessment/Plan (PT)    Rehab Potential (PT) good, to achieve stated therapy goals  -CB     Criteria for Skilled Interventions Met (PT) yes  -CB     Therapy Frequency (PT) 5 times/wk  -CB       Row Name 08/08/24 1141          Positioning and Restraints    Pre-Treatment Position in bed  -CB      Post Treatment Position bed  -CB     In Bed notified nsg;fowlers;call light within reach;encouraged to call for assist;side rails up x3;exit alarm on  -CB     Restraints released:;reapplied:;soft limb  -CB               User Key  (r) = Recorded By, (t) = Taken By, (c) = Cosigned By      Initials Name Provider Type    CB Mariia Hidalgo, PT Physical Therapist                   Outcome Measures       Row Name 08/08/24 1144 08/08/24 0800       How much help from another person do you currently need...    Turning from your back to your side while in flat bed without using bedrails? 1  -CB 2  -NT    Moving from lying on back to sitting on the side of a flat bed without bedrails? 1  -CB 2  -NT    Moving to and from a bed to a chair (including a wheelchair)? 1  -CB 2  -NT    Standing up from a chair using your arms (e.g., wheelchair, bedside chair)? 1  -CB 1  -NT    Climbing 3-5 steps with a railing? 1  -CB 1  -NT    To walk in hospital room? 1  -CB 1  -NT    AM-PAC 6 Clicks Score (PT) 6  -CB 9  -NT    Highest Level of Mobility Goal 2 --> Bed activities/dependent transfer  -CB 3 --> Sit at edge of bed  -NT      Row Name 08/08/24 0400          How much help from another person do you currently need...    Turning from your back to your side while in flat bed without using bedrails? 3 (P)   -MN     Moving from lying on back to sitting on the side of a flat bed without bedrails? 3 (P)   -MN     Moving to and from a bed to a chair (including a wheelchair)? 2 (P)   -MN     Standing up from a chair using your arms (e.g., wheelchair, bedside chair)? 2 (P)   -MN     Climbing 3-5 steps with a railing? 1 (P)   -MN     To walk in hospital room? 1 (P)   -MN     AM-PAC 6 Clicks Score (PT) 12 (P)   -MN     Highest Level of Mobility Goal 4 --> Transfer to chair/commode (P)   -MN       Row Name 08/08/24 1144          Modified [place] Scale    Modified Ev Scale 5 - Severe disability.  Bedridden, incontinent, and requiring constant  nursing care and attention.  -CB       Row Name 08/08/24 1144          Functional Assessment    Outcome Measure Options AM-PAC 6 Clicks Basic Mobility (PT);Modified [place]  -               User Key  (r) = Recorded By, (t) = Taken By, (c) = Cosigned By      Initials Name Provider Type    CB Mariia Hidalgo, PT Physical Therapist    Alissa Coon, RN Registered Nurse    Sariah Polanco, Nursing Student Nursing Student                                 Physical Therapy Education       Title: PT OT SLP Therapies (In Progress)       Topic: Physical Therapy (In Progress)       Point: Mobility training (In Progress)       Learning Progress Summary             Patient Acceptance, E,TB, NR by CB at 8/8/2024 1145                         Point: Home exercise program (In Progress)       Learning Progress Summary             Patient Acceptance, E,TB, NR by CB at 8/8/2024 1145                         Point: Body mechanics (Not Started)       Learner Progress:  Not documented in this visit.              Point: Precautions (Not Started)       Learner Progress:  Not documented in this visit.                              User Key       Initials Effective Dates Name Provider Type Discipline     10/22/21 -  Mariia Hidalgo PT Physical Therapist PT                  PT Recommendation and Plan  Planned Therapy Interventions (PT): balance training, bed mobility training, gait training, home exercise program, patient/family education, transfer training, strengthening, postural re-education  Plan of Care Reviewed With: patient  Outcome Evaluation: Patient is a 64 y.o. female admitted to [facility] for acute GI bleed, hyperkalemia on 8/5/2024. PMHx includes alcohol abuse, COPD, depression, GERD. Pt is AO x3 today and very sleepy. She reports she lives alone and denies use of AD at baseline. She said she has a flight of steps to enter home. Today, patient performed rolling R<>L requiring maxAx2. Strength, activity tolerance, safety awareness  deficits noted. Patient may benefit from skilled PT services to address functional deficits and improve level of independence prior to discharge.     Time Calculation:         PT Charges       Row Name 08/08/24 1147             Time Calculation    Start Time 1009  -CB      Stop Time 1027  -CB      Time Calculation (min) 18 min  -CB      PT Received On 08/08/24  -CB      PT - Next Appointment 08/09/24  -CB      PT Goal Re-Cert Due Date 08/22/24  -CB         Time Calculation- PT    Total Timed Code Minutes- PT 8 minute(s)  -CB         Timed Charges    39761 - PT Therapeutic Exercise Minutes 8  -CB         Total Minutes    Timed Charges Total Minutes 8  -CB       Total Minutes 8  -CB                User Key  (r) = Recorded By, (t) = Taken By, (c) = Cosigned By      Initials Name Provider Type    CB Mariia Hidalgo, PT Physical Therapist                  Therapy Charges for Today       Code Description Service Date Service Provider Modifiers Qty    13404899775  PT THER PROC EA 15 MIN 8/8/2024 Mariia Hidalgo, PT GP 1    58059201362 HC PT EVAL MOD COMPLEXITY 3 8/8/2024 Mariia Hidalgo, PT GP 1            PT G-Codes  Outcome Measure Options: AM-PAC 6 Clicks Basic Mobility (PT), Modified [place]  AM-PAC 6 Clicks Score (PT): 6  Modified [place] Scale: 5 - Severe disability.  Bedridden, incontinent, and requiring constant nursing care and attention.  PT Discharge Summary  Anticipated Discharge Disposition (PT): skilled nursing facility, inpatient rehabilitation facility (pending progress and hospital course)    Mariia Hidalgo PT  8/8/2024

## 2024-08-09 LAB
ALBUMIN SERPL-MCNC: 3.8 G/DL (ref 3.5–5.2)
ALBUMIN/GLOB SERPL: 1.4 G/DL
ALP SERPL-CCNC: 117 U/L (ref 39–117)
ALPHA-FETOPROTEIN: 6.54 NG/ML (ref 0–8.3)
ALT SERPL W P-5'-P-CCNC: 52 U/L (ref 1–33)
AMMONIA BLD-SCNC: 34 UMOL/L (ref 11–51)
ANA HOMOGEN TITR SER: ABNORMAL {TITER}
ANA SER QL IF: POSITIVE
ANION GAP SERPL CALCULATED.3IONS-SCNC: 15 MMOL/L (ref 5–15)
AST SERPL-CCNC: 108 U/L (ref 1–32)
BASOPHILS # BLD AUTO: 0 10*3/MM3 (ref 0–0.2)
BASOPHILS # BLD AUTO: 0 10*3/MM3 (ref 0–0.2)
BASOPHILS # BLD AUTO: 0.01 10*3/MM3 (ref 0–0.2)
BASOPHILS NFR BLD AUTO: 0 % (ref 0–1.5)
BASOPHILS NFR BLD AUTO: 0 % (ref 0–1.5)
BASOPHILS NFR BLD AUTO: 0.1 % (ref 0–1.5)
BILIRUB SERPL-MCNC: 2.2 MG/DL (ref 0–1.2)
BUN SERPL-MCNC: 60 MG/DL (ref 8–23)
BUN/CREAT SERPL: 33.5 (ref 7–25)
C-ANCA TITR SER IF: NORMAL TITER
CALCIUM SPEC-SCNC: 9.6 MG/DL (ref 8.6–10.5)
CHLORIDE SERPL-SCNC: 89 MMOL/L (ref 98–107)
CO2 SERPL-SCNC: 34 MMOL/L (ref 22–29)
CREAT SERPL-MCNC: 1.79 MG/DL (ref 0.57–1)
DEPRECATED RDW RBC AUTO: 52 FL (ref 37–54)
DEPRECATED RDW RBC AUTO: 52.6 FL (ref 37–54)
DEPRECATED RDW RBC AUTO: 53 FL (ref 37–54)
EGFRCR SERPLBLD CKD-EPI 2021: 31.3 ML/MIN/1.73
EOSINOPHIL # BLD AUTO: 0 10*3/MM3 (ref 0–0.4)
EOSINOPHIL # BLD AUTO: 0.01 10*3/MM3 (ref 0–0.4)
EOSINOPHIL # BLD AUTO: 0.01 10*3/MM3 (ref 0–0.4)
EOSINOPHIL NFR BLD AUTO: 0 % (ref 0.3–6.2)
EOSINOPHIL NFR BLD AUTO: 0.1 % (ref 0.3–6.2)
EOSINOPHIL NFR BLD AUTO: 0.1 % (ref 0.3–6.2)
ERYTHROCYTE [DISTWIDTH] IN BLOOD BY AUTOMATED COUNT: 17.6 % (ref 12.3–15.4)
ERYTHROCYTE [DISTWIDTH] IN BLOOD BY AUTOMATED COUNT: 17.7 % (ref 12.3–15.4)
ERYTHROCYTE [DISTWIDTH] IN BLOOD BY AUTOMATED COUNT: 17.7 % (ref 12.3–15.4)
GLOBULIN UR ELPH-MCNC: 2.7 GM/DL
GLUCOSE BLDC GLUCOMTR-MCNC: 113 MG/DL (ref 70–130)
GLUCOSE BLDC GLUCOMTR-MCNC: 132 MG/DL (ref 70–130)
GLUCOSE BLDC GLUCOMTR-MCNC: 188 MG/DL (ref 70–130)
GLUCOSE BLDC GLUCOMTR-MCNC: 196 MG/DL (ref 70–130)
GLUCOSE SERPL-MCNC: 100 MG/DL (ref 65–99)
HCT VFR BLD AUTO: 27.2 % (ref 34–46.6)
HCT VFR BLD AUTO: 27.8 % (ref 34–46.6)
HCT VFR BLD AUTO: 30.6 % (ref 34–46.6)
HGB BLD-MCNC: 8.5 G/DL (ref 12–15.9)
HGB BLD-MCNC: 8.7 G/DL (ref 12–15.9)
HGB BLD-MCNC: 9.5 G/DL (ref 12–15.9)
IMM GRANULOCYTES # BLD AUTO: 0.04 10*3/MM3 (ref 0–0.05)
IMM GRANULOCYTES # BLD AUTO: 0.07 10*3/MM3 (ref 0–0.05)
IMM GRANULOCYTES # BLD AUTO: 0.09 10*3/MM3 (ref 0–0.05)
IMM GRANULOCYTES NFR BLD AUTO: 0.6 % (ref 0–0.5)
IMM GRANULOCYTES NFR BLD AUTO: 0.7 % (ref 0–0.5)
IMM GRANULOCYTES NFR BLD AUTO: 0.7 % (ref 0–0.5)
LYMPHOCYTES # BLD AUTO: 0.26 10*3/MM3 (ref 0.7–3.1)
LYMPHOCYTES # BLD AUTO: 0.65 10*3/MM3 (ref 0.7–3.1)
LYMPHOCYTES # BLD AUTO: 0.98 10*3/MM3 (ref 0.7–3.1)
LYMPHOCYTES NFR BLD AUTO: 4.2 % (ref 19.6–45.3)
LYMPHOCYTES NFR BLD AUTO: 6.9 % (ref 19.6–45.3)
LYMPHOCYTES NFR BLD AUTO: 7.9 % (ref 19.6–45.3)
Lab: ABNORMAL
MCH RBC QN AUTO: 26.1 PG (ref 26.6–33)
MCH RBC QN AUTO: 26.3 PG (ref 26.6–33)
MCH RBC QN AUTO: 26.6 PG (ref 26.6–33)
MCHC RBC AUTO-ENTMCNC: 31 G/DL (ref 31.5–35.7)
MCHC RBC AUTO-ENTMCNC: 31.3 G/DL (ref 31.5–35.7)
MCHC RBC AUTO-ENTMCNC: 31.3 G/DL (ref 31.5–35.7)
MCV RBC AUTO: 83.5 FL (ref 79–97)
MCV RBC AUTO: 84.8 FL (ref 79–97)
MCV RBC AUTO: 85 FL (ref 79–97)
MONOCYTES # BLD AUTO: 0.48 10*3/MM3 (ref 0.1–0.9)
MONOCYTES # BLD AUTO: 1.05 10*3/MM3 (ref 0.1–0.9)
MONOCYTES # BLD AUTO: 1.61 10*3/MM3 (ref 0.1–0.9)
MONOCYTES NFR BLD AUTO: 11.1 % (ref 5–12)
MONOCYTES NFR BLD AUTO: 12.9 % (ref 5–12)
MONOCYTES NFR BLD AUTO: 7.7 % (ref 5–12)
MYELOPEROXIDASE AB SER IA-ACNC: <0.2 UNITS (ref 0–0.9)
NEUTROPHILS NFR BLD AUTO: 5.48 10*3/MM3 (ref 1.7–7)
NEUTROPHILS NFR BLD AUTO: 7.66 10*3/MM3 (ref 1.7–7)
NEUTROPHILS NFR BLD AUTO: 78.3 % (ref 42.7–76)
NEUTROPHILS NFR BLD AUTO: 81.2 % (ref 42.7–76)
NEUTROPHILS NFR BLD AUTO: 87.5 % (ref 42.7–76)
NEUTROPHILS NFR BLD AUTO: 9.77 10*3/MM3 (ref 1.7–7)
NRBC BLD AUTO-RTO: 0 /100 WBC (ref 0–0.2)
NRBC BLD AUTO-RTO: 0 /100 WBC (ref 0–0.2)
P-ANCA ATYPICAL TITR SER IF: NORMAL TITER
P-ANCA TITR SER IF: NORMAL TITER
PHOSPHATE SERPL-MCNC: 3.1 MG/DL (ref 2.5–4.5)
PLATELET # BLD AUTO: 66 10*3/MM3 (ref 140–450)
PLATELET # BLD AUTO: 71 10*3/MM3 (ref 140–450)
PLATELET # BLD AUTO: 76 10*3/MM3 (ref 140–450)
PMV BLD AUTO: 10.1 FL (ref 6–12)
PMV BLD AUTO: 10.6 FL (ref 6–12)
PMV BLD AUTO: 11.7 FL (ref 6–12)
POTASSIUM SERPL-SCNC: 3.2 MMOL/L (ref 3.5–5.2)
POTASSIUM SERPL-SCNC: 4.4 MMOL/L (ref 3.5–5.2)
PROT SERPL-MCNC: 6.5 G/DL (ref 6–8.5)
PROTEINASE3 AB SER IA-ACNC: <0.2 UNITS (ref 0–0.9)
RBC # BLD AUTO: 3.2 10*6/MM3 (ref 3.77–5.28)
RBC # BLD AUTO: 3.33 10*6/MM3 (ref 3.77–5.28)
RBC # BLD AUTO: 3.61 10*6/MM3 (ref 3.77–5.28)
SODIUM SERPL-SCNC: 138 MMOL/L (ref 136–145)
WBC NRBC COR # BLD AUTO: 12.47 10*3/MM3 (ref 3.4–10.8)
WBC NRBC COR # BLD AUTO: 6.26 10*3/MM3 (ref 3.4–10.8)
WBC NRBC COR # BLD AUTO: 9.44 10*3/MM3 (ref 3.4–10.8)

## 2024-08-09 PROCEDURE — 84132 ASSAY OF SERUM POTASSIUM: CPT | Performed by: INTERNAL MEDICINE

## 2024-08-09 PROCEDURE — 85025 COMPLETE CBC W/AUTO DIFF WBC: CPT | Performed by: INTERNAL MEDICINE

## 2024-08-09 PROCEDURE — 99231 SBSQ HOSP IP/OBS SF/LOW 25: CPT | Performed by: INTERNAL MEDICINE

## 2024-08-09 PROCEDURE — 80053 COMPREHEN METABOLIC PANEL: CPT | Performed by: STUDENT IN AN ORGANIZED HEALTH CARE EDUCATION/TRAINING PROGRAM

## 2024-08-09 PROCEDURE — 63710000001 INSULIN LISPRO (HUMAN) PER 5 UNITS: Performed by: INTERNAL MEDICINE

## 2024-08-09 PROCEDURE — 25010000002 THIAMINE PER 100 MG: Performed by: EMERGENCY MEDICINE

## 2024-08-09 PROCEDURE — 94664 DEMO&/EVAL PT USE INHALER: CPT

## 2024-08-09 PROCEDURE — 82140 ASSAY OF AMMONIA: CPT | Performed by: INTERNAL MEDICINE

## 2024-08-09 PROCEDURE — 82948 REAGENT STRIP/BLOOD GLUCOSE: CPT

## 2024-08-09 PROCEDURE — 25010000002 METHYLPREDNISOLONE PER 40 MG: Performed by: INTERNAL MEDICINE

## 2024-08-09 PROCEDURE — 94799 UNLISTED PULMONARY SVC/PX: CPT

## 2024-08-09 PROCEDURE — 25010000002 CEFTRIAXONE PER 250 MG: Performed by: PHYSICIAN ASSISTANT

## 2024-08-09 PROCEDURE — 94761 N-INVAS EAR/PLS OXIMETRY MLT: CPT

## 2024-08-09 PROCEDURE — 82105 ALPHA-FETOPROTEIN SERUM: CPT | Performed by: INTERNAL MEDICINE

## 2024-08-09 PROCEDURE — 84100 ASSAY OF PHOSPHORUS: CPT | Performed by: INTERNAL MEDICINE

## 2024-08-09 PROCEDURE — 25010000002 LORAZEPAM PER 2 MG: Performed by: EMERGENCY MEDICINE

## 2024-08-09 PROCEDURE — 94760 N-INVAS EAR/PLS OXIMETRY 1: CPT

## 2024-08-09 RX ORDER — POTASSIUM CHLORIDE 750 MG/1
40 TABLET, FILM COATED, EXTENDED RELEASE ORAL 2 TIMES DAILY WITH MEALS
Status: DISCONTINUED | OUTPATIENT
Start: 2024-08-09 | End: 2024-08-09

## 2024-08-09 RX ORDER — POTASSIUM CHLORIDE 1.5 G/1.58G
40 POWDER, FOR SOLUTION ORAL EVERY 4 HOURS
Status: COMPLETED | OUTPATIENT
Start: 2024-08-09 | End: 2024-08-09

## 2024-08-09 RX ORDER — SPIRONOLACTONE 25 MG/1
25 TABLET ORAL DAILY
Status: DISCONTINUED | OUTPATIENT
Start: 2024-08-09 | End: 2024-08-16 | Stop reason: HOSPADM

## 2024-08-09 RX ORDER — BUMETANIDE 2 MG/1
2 TABLET ORAL
Status: DISCONTINUED | OUTPATIENT
Start: 2024-08-10 | End: 2024-08-10

## 2024-08-09 RX ADMIN — INSULIN LISPRO 2 UNITS: 100 INJECTION, SOLUTION INTRAVENOUS; SUBCUTANEOUS at 17:20

## 2024-08-09 RX ADMIN — CEFTRIAXONE 2000 MG: 2 INJECTION, POWDER, FOR SOLUTION INTRAMUSCULAR; INTRAVENOUS at 12:49

## 2024-08-09 RX ADMIN — IPRATROPIUM BROMIDE AND ALBUTEROL SULFATE 3 ML: .5; 3 SOLUTION RESPIRATORY (INHALATION) at 15:23

## 2024-08-09 RX ADMIN — PANTOPRAZOLE SODIUM 40 MG: 40 INJECTION, POWDER, FOR SOLUTION INTRAVENOUS at 21:05

## 2024-08-09 RX ADMIN — TIOTROPIUM BROMIDE INHALATION SPRAY 2 PUFF: 3.12 SPRAY, METERED RESPIRATORY (INHALATION) at 07:11

## 2024-08-09 RX ADMIN — SPIRONOLACTONE 25 MG: 25 TABLET, FILM COATED ORAL at 12:08

## 2024-08-09 RX ADMIN — IPRATROPIUM BROMIDE AND ALBUTEROL SULFATE 3 ML: .5; 3 SOLUTION RESPIRATORY (INHALATION) at 19:22

## 2024-08-09 RX ADMIN — NICOTINE 1 PATCH: 21 PATCH, EXTENDED RELEASE TRANSDERMAL at 00:30

## 2024-08-09 RX ADMIN — THIAMINE HYDROCHLORIDE 200 MG: 100 INJECTION, SOLUTION INTRAMUSCULAR; INTRAVENOUS at 21:05

## 2024-08-09 RX ADMIN — IPRATROPIUM BROMIDE AND ALBUTEROL SULFATE 3 ML: .5; 3 SOLUTION RESPIRATORY (INHALATION) at 07:10

## 2024-08-09 RX ADMIN — THIAMINE HYDROCHLORIDE 200 MG: 100 INJECTION, SOLUTION INTRAMUSCULAR; INTRAVENOUS at 05:07

## 2024-08-09 RX ADMIN — PHENOBARBITAL 32.4 MG: 32.4 TABLET ORAL at 08:58

## 2024-08-09 RX ADMIN — FOLIC ACID 1 MG: 1 TABLET ORAL at 08:58

## 2024-08-09 RX ADMIN — LORAZEPAM 2 MG: 1 TABLET ORAL at 08:58

## 2024-08-09 RX ADMIN — LORAZEPAM 2 MG: 2 INJECTION INTRAMUSCULAR; INTRAVENOUS at 05:07

## 2024-08-09 RX ADMIN — HYDROCODONE BITARTRATE AND ACETAMINOPHEN 1 TABLET: 7.5; 325 TABLET ORAL at 12:08

## 2024-08-09 RX ADMIN — POTASSIUM CHLORIDE 40 MEQ: 1.5 POWDER, FOR SOLUTION ORAL at 09:57

## 2024-08-09 RX ADMIN — LORAZEPAM 2 MG: 2 INJECTION INTRAMUSCULAR; INTRAVENOUS at 12:44

## 2024-08-09 RX ADMIN — LORAZEPAM 2 MG: 2 INJECTION INTRAMUSCULAR; INTRAVENOUS at 17:11

## 2024-08-09 RX ADMIN — METHYLPREDNISOLONE SODIUM SUCCINATE 40 MG: 40 INJECTION, POWDER, FOR SOLUTION INTRAMUSCULAR; INTRAVENOUS at 08:58

## 2024-08-09 RX ADMIN — ROPINIROLE 1 MG: 1 TABLET, FILM COATED ORAL at 21:05

## 2024-08-09 RX ADMIN — IPRATROPIUM BROMIDE AND ALBUTEROL SULFATE 3 ML: .5; 3 SOLUTION RESPIRATORY (INHALATION) at 10:52

## 2024-08-09 RX ADMIN — INSULIN LISPRO 2 UNITS: 100 INJECTION, SOLUTION INTRAVENOUS; SUBCUTANEOUS at 12:43

## 2024-08-09 RX ADMIN — LORAZEPAM 2 MG: 1 TABLET ORAL at 14:08

## 2024-08-09 RX ADMIN — Medication 10 ML: at 08:24

## 2024-08-09 RX ADMIN — ATENOLOL 25 MG: 25 TABLET ORAL at 08:58

## 2024-08-09 RX ADMIN — BUDESONIDE AND FORMOTEROL FUMARATE DIHYDRATE 2 PUFF: 160; 4.5 AEROSOL RESPIRATORY (INHALATION) at 19:23

## 2024-08-09 RX ADMIN — THIAMINE HYDROCHLORIDE 200 MG: 100 INJECTION, SOLUTION INTRAMUSCULAR; INTRAVENOUS at 13:09

## 2024-08-09 RX ADMIN — LORAZEPAM 2 MG: 1 TABLET ORAL at 21:05

## 2024-08-09 RX ADMIN — BUDESONIDE AND FORMOTEROL FUMARATE DIHYDRATE 2 PUFF: 160; 4.5 AEROSOL RESPIRATORY (INHALATION) at 07:12

## 2024-08-09 RX ADMIN — NICOTINE 1 PATCH: 21 PATCH, EXTENDED RELEASE TRANSDERMAL at 23:45

## 2024-08-09 RX ADMIN — PANTOPRAZOLE SODIUM 40 MG: 40 INJECTION, POWDER, FOR SOLUTION INTRAVENOUS at 09:57

## 2024-08-09 RX ADMIN — PHENOBARBITAL 32.4 MG: 32.4 TABLET ORAL at 21:05

## 2024-08-09 RX ADMIN — POTASSIUM CHLORIDE 40 MEQ: 1.5 POWDER, FOR SOLUTION ORAL at 14:08

## 2024-08-09 RX ADMIN — Medication 10 ML: at 21:05

## 2024-08-09 RX ADMIN — LORAZEPAM 2 MG: 2 INJECTION INTRAMUSCULAR; INTRAVENOUS at 10:33

## 2024-08-09 NOTE — PROGRESS NOTES
[facility] Progress Note      Patient Name: Filomena Liu  : 1960  MRN: [id number]  Primary Care Physician:  Fernando Dunn MD  Date of admission: 2024    Subjective     Interval History:   F/u SANDRO    Review of Systems:   I/O 900 / 1900 s/p IV bumex x 2  C/o dyspnea, off O2    Objective     Vitals:   Temp:  [97.5 °F (36.4 °C)-99.8 °F (37.7 °C)] 99.8 °F (37.7 °C)  Heart Rate:  [69-97] 90  Resp:  [18-25] 18  BP: ()/(61-85) 139/85  Flow (L/min):  [1-3] 3    Intake/Output Summary (Last 24 hours) at 2024 0839  Last data filed at 2024 0600  Gross per 24 hour   Intake 869 ml   Output 1900 ml   Net -1031 ml       Physical Exam:    General Appearance: confused WF no distress  Neck: supple, no JVD  Lungs: Dec BS bilat  Heart: RRR, normal S1 and S2  Abdomen: soft, nontender, nondistended  Extremities: no edema, cyanosis or clubbing       Scheduled Meds:     atenolol, 25 mg, Oral, Daily  budesonide-formoterol, 2 puff, Inhalation, BID - RT   And  tiotropium bromide monohydrate, 2 puff, Inhalation, Daily - RT  bumetanide, 2 mg, Intravenous, BID  cefTRIAXone, 2,000 mg, Intravenous, Q24H  folic acid, 1 mg, Oral, Daily  insulin lispro, 2-9 Units, Subcutaneous, 4x Daily AC & at Bedtime  ipratropium-albuterol, 3 mL, Nebulization, 4x Daily  LORazepam, 2 mg, Oral, TID  methylPREDNISolone sodium succinate, 40 mg, Intravenous, Daily  nicotine, 1 patch, Transdermal, Q24H  pantoprazole, 40 mg, Intravenous, Q12H  PHENobarbital, 32.4 mg, Oral, Once   Followed by  PHENobarbital, 32.4 mg, Oral, Once   Followed by  [START ON 8/10/2024] PHENobarbital, 32.4 mg, Oral, Once  rOPINIRole, 1 mg, Oral, Nightly  sodium chloride, 10 mL, Intravenous, Q12H  thiamine (B-1) IV, 200 mg, Intravenous, Q8H   Followed by  [START ON 8/10/2024] thiamine, 100 mg, Oral, Daily      IV Meds:        Results Reviewed:   I have personally reviewed the results from the time of this admission to 2024 08:39  EDT     Results from last 7 days   Lab Units 08/09/24  0511 08/08/24  0805 08/07/24  0728 08/07/24  0251   SODIUM mmol/L 138 135*  --  128*   POTASSIUM mmol/L 3.2* 3.9 4.9 5.8*   CHLORIDE mmol/L 89* 90*  --  90*   CO2 mmol/L 34.0* 32.0*  --  22.0   BUN mg/dL 60* 59*  --  59*   CREATININE mg/dL 1.79* 2.07*  --  2.57*   CALCIUM mg/dL 9.6 9.4  --  8.1*   BILIRUBIN mg/dL 2.2* 2.4*  --  2.7*   ALK PHOS U/L 117 113  --  106   ALT (SGPT) U/L 52* 57*  --  51*   AST (SGOT) U/L 108* 123*  --  143*   GLUCOSE mg/dL 100* 160*  --  186*     Estimated Creatinine Clearance: 30.6 mL/min (A) (by C-G formula based on SCr of 1.79 mg/dL (H)).  Results from last 7 days   Lab Units 08/09/24  0511 08/08/24  0805 08/07/24  0251 08/06/24  0833 08/05/24  0921   MAGNESIUM mg/dL  --   --   --   --  2.8*   PHOSPHORUS mg/dL 3.1 4.4 5.3*   < >  --     < > = values in this interval not displayed.         Results from last 7 days   Lab Units 08/09/24  0511 08/09/24  0001 08/08/24  1617 08/08/24  0805 08/08/24  0013   WBC 10*3/mm3 9.44 12.47* 9.56 7.14 8.11   HEMOGLOBIN g/dL 8.7* 9.5* 8.2* 8.4* 8.3*   PLATELETS 10*3/mm3 76* 71* 81* 71* 76*     Results from last 7 days   Lab Units 08/05/24  0921   INR  1.40*       Assessment / Plan     ASSESSMENT:  Non olig SANDRO - improving, Cr down to 1.8 (peak 3.5; was 0.7 in July 2023.  Multifactorial prerenal azotemia, vs HRS, in assoc with GIB, severe anemia, diuretic use and impaired compensation via ACE/NSAIDs (though states motrin use scant).   UA bland and Eunice low < 20.  US: no hydronephrosis.  DC'd midodrine / octreotide  Hyperkalemia, due to SANDRO + meds (ACE, KCL, beta blocker); K 5.8 to 4.9 after temporizing measures overnight   Vol overload - CXR noted.  No periph edema.  Diuresed decent but less robust with lower dose IV bumex.  Hold today while replete K.  Add aldactone    Hypotension - resolved, stopped midodrine, tolerating atenolol  Hyponatremia, hypervolemic, improved with diuresis, Na up to  138  Hypokalemia - due to diuresis, K 3.2  Anemia, hgb mid 8s.   Endoscopy been on hold.  TSAT ok 21%.     ETOH abuse  Acute encephalopathy - poss withdrawal    Acute hypoxic resp failure - off O2  Rash - has vasculitic appearance but chronic in nature.  Sent DOV/ANCA.  Low C3/C4 noted    PLAN:  Hold diuretic & replace K (40meq PO x2)  Add aldactone 25mg daily   Transition to PO bumex tomorrow 2mg BID  Repeat CXR in AM  Remove fluid and K restriction from diet  D/W son      Fernando Baker MD  08/09/24  08:39 EDT    [facility]  [phone]

## 2024-08-09 NOTE — PROGRESS NOTES
[facility]      Mar/chart reviewed  Follow up COPD with AE, possible GI bleed, alcohol abuse with depedence and withdrawal.  No abdominal pain, reviewed nursing notes, patient still with difficult behavior  Subjective not much useful, says she wants to go home.     Vital Sign Min/Max for last 24 hours  Temp  Min: 97.5 °F (36.4 °C)  Max: 98.1 °F (36.7 °C)   BP  Min: 94/64  Max: 149/81   Pulse  Min: 69  Max: 97   Resp  Min: 20  Max: 25   SpO2  Min: 84 %  Max: 100 %   Flow (L/min)  Min: 1  Max: 3   Weight  Min: 84.4 kg (186 lb 1.1 oz)  Max: 84.4 kg (186 lb 1.1 oz)   909/1900     Appears ill, sleepy still very confused  perrl, eomi, normal sclera  mmm, no jvd, trachea midline, neck supple,  chest decreased ae bilaterally, no crackles, no wheezes,   rrr,   soft, nt, nd +bs,  no c/c/ e  Skin warm, dry no rashes    Labs: 8/9: reviewed:  Glucose 100  Bun 60  Cr 1.79  K 3.2  Bicarb 34  Wb 9.4  Hgb 8.7  Plts 76    A/P:  Acute respiratory distress --  multifactorial -- better  COPD with ae -- solumedrol bronchodilators; will change to po prednisone taper as able  Possible volume overload -- improved, nephrology will conitnue to follow  SANDRO -- avoid nephrotoxins.  Cirrhosis with possible gi bleed -- continue current measures  Alcohol abuse with depedence and withdrawal -- improving now, continue current measures  Chronic back pain  8. BENY -- patient unable to tolerate pap  9. Moderate mitral valve regurg  10. Anemia  11. Thrombocytopenia  12. Obesity    Getting close to being able to transfer out of unit, will keep one more day for behavioral issues.

## 2024-08-09 NOTE — PROGRESS NOTES
[facility]  Inpatient Progress Note    Reason for Follow Up: GI bleed    Subjective     Interval History:   Patient is agitated, RN recently gave Ativan.  No report of blood loss.  Hemoglobin 8.7 hematocrit 27.8.  Could not obtain review of systems.    Current Facility-Administered Medications:     acetaminophen (TYLENOL) tablet 650 mg, 650 mg, Oral, Q4H PRN **OR** acetaminophen (TYLENOL) 160 MG/5ML oral solution 650 mg, 650 mg, Oral, Q4H PRN **OR** acetaminophen (TYLENOL) suppository 650 mg, 650 mg, Rectal, Q4H PRN, Mustapha Subramanian MD    atenolol (TENORMIN) tablet 25 mg, 25 mg, Oral, Daily, Maxwell Torres MD, 25 mg at 08/09/24 0858    sennosides-docusate (PERICOLACE) 8.6-50 MG per tablet 2 tablet, 2 tablet, Oral, BID PRN **AND** polyethylene glycol (MIRALAX) packet 17 g, 17 g, Oral, Daily PRN **AND** bisacodyl (DULCOLAX) EC tablet 5 mg, 5 mg, Oral, Daily PRN **AND** bisacodyl (DULCOLAX) suppository 10 mg, 10 mg, Rectal, Daily PRN, Mustapha Subramanian MD    budesonide-formoterol (SYMBICORT) 160-4.5 MCG/ACT inhaler 2 puff, 2 puff, Inhalation, BID - RT, 2 puff at 08/09/24 0712 **AND** tiotropium (SPIRIVA RESPIMAT) 2.5 mcg/act aerosol solution inhaler, 2 puff, Inhalation, Daily - RT, Mustapha Subramanian MD, 2 puff at 08/09/24 0711    [START ON 8/10/2024] bumetanide (BUMEX) tablet 2 mg, 2 mg, Oral, BID, Fernando Baker MD    cefTRIAXone (ROCEPHIN) 2,000 mg in sodium chloride 0.9 % 100 mL MBP, 2,000 mg, Intravenous, Q24H, Nazanin Quan PA-C, Last Rate: 200 mL/hr at 08/09/24 1249, 2,000 mg at 08/09/24 1249    dextrose (D50W) (25 g/50 mL) IV injection 25 g, 25 g, Intravenous, Q15 Min PRN, Alejandra Mendoza APRN    dextrose (GLUTOSE) oral gel 15 g, 15 g, Oral, Q15 Min PRN, Alejandra Mendoza APRN    folic acid (FOLVITE) tablet 1 mg, 1 mg, Oral, Daily, Shukri Romero MD, 1 mg at 08/09/24 0858    glucagon (GLUCAGEN) injection 1 mg, 1 mg, Intramuscular, Q15 Min PRN, Alejandra Mendoza APRN     HYDROcodone-acetaminophen (NORCO) 7.5-325 MG per tablet 1 tablet, 1 tablet, Oral, Q8H PRN, Mustapha Subramanian MD, 1 tablet at 08/09/24 1208    insulin lispro (HUMALOG/ADMELOG) injection 2-9 Units, 2-9 Units, Subcutaneous, 4x Daily AC & at Bedtime, Maxwell Torres MD, 2 Units at 08/09/24 1243    ipratropium-albuterol (DUO-NEB) nebulizer solution 3 mL, 3 mL, Nebulization, 4x Daily, Mustapha Subramanian MD, 3 mL at 08/09/24 1052    ipratropium-albuterol (DUO-NEB) nebulizer solution 3 mL, 3 mL, Nebulization, Q4H PRN, Mustapha Subramanian MD, 3 mL at 08/06/24 1706    LORazepam (ATIVAN) tablet 1 mg, 1 mg, Oral, Q1H PRN, 1 mg at 08/08/24 1710 **OR** LORazepam (ATIVAN) injection 1 mg, 1 mg, Intravenous, Q1H PRN **OR** LORazepam (ATIVAN) tablet 2 mg, 2 mg, Oral, Q1H PRN, 2 mg at 08/06/24 1157 **OR** LORazepam (ATIVAN) injection 2 mg, 2 mg, Intravenous, Q1H PRN, 2 mg at 08/09/24 1244 **OR** LORazepam (ATIVAN) injection 2 mg, 2 mg, Intravenous, Q15 Min PRN, 2 mg at 08/08/24 0617 **OR** LORazepam (ATIVAN) injection 2 mg, 2 mg, Intramuscular, Q15 Min PRN, Shukri Romero MD, 2 mg at 08/06/24 2003    LORazepam (ATIVAN) tablet 2 mg, 2 mg, Oral, TID, Maxwell Torres MD, 2 mg at 08/09/24 0858    Magnesium Standard Dose Replacement - Follow Nurse / BPA Driven Protocol, , Does not apply, PRN, Shukri Romero MD    methylPREDNISolone sodium succinate (SOLU-Medrol) injection 40 mg, 40 mg, Intravenous, Daily, Maxwell Torres MD, 40 mg at 08/09/24 0858    nicotine (NICODERM CQ) 21 MG/24HR patch 1 patch, 1 patch, Transdermal, Q24H, Mustapha Subramanian MD, 1 patch at 08/09/24 0030    ondansetron ODT (ZOFRAN-ODT) disintegrating tablet 4 mg, 4 mg, Oral, Q6H PRN **OR** ondansetron (ZOFRAN) injection 4 mg, 4 mg, Intravenous, Q6H PRN, Mustapha Subramanian MD    pantoprazole (PROTONIX) injection 40 mg, 40 mg, Intravenous, Q12H, Maxwell Torres MD, 40 mg at 08/09/24 0957    [COMPLETED] PHENobarbital injection 65 mg, 65 mg, Intravenous, Once, 65 mg at 08/08/24 0831  **FOLLOWED BY** [COMPLETED] PHENobarbital injection 65 mg, 65 mg, Intravenous, Once, 65 mg at 08/08/24 2312 **FOLLOWED BY** [COMPLETED] PHENobarbital tablet 32.4 mg, 32.4 mg, Oral, Once, 32.4 mg at 08/09/24 0858 **FOLLOWED BY** PHENobarbital tablet 32.4 mg, 32.4 mg, Oral, Once **FOLLOWED BY** [START ON 8/10/2024] PHENobarbital tablet 32.4 mg, 32.4 mg, Oral, Once, Maxwell Torres MD    potassium chloride (KLOR-CON) packet 40 mEq, 40 mEq, Oral, Q4H, Fernando Baker MD, 40 mEq at 08/09/24 0957    rOPINIRole (REQUIP) tablet 1 mg, 1 mg, Oral, Nightly, Mustapha Subramanian MD, 1 mg at 08/08/24 2310    [COMPLETED] Insert Peripheral IV, , , Once **AND** sodium chloride 0.9 % flush 10 mL, 10 mL, Intravenous, PRN, Shukri Romero MD    sodium chloride 0.9 % flush 10 mL, 10 mL, Intravenous, Q12H, Mustapha Subramanian MD, 10 mL at 08/09/24 0824    sodium chloride 0.9 % flush 10 mL, 10 mL, Intravenous, PRN, Mustapha Subramanian MD, 10 mL at 08/06/24 0839    sodium chloride 0.9 % infusion 40 mL, 40 mL, Intravenous, PRN, Mustapha Subramanian MD    spironolactone (ALDACTONE) tablet 25 mg, 25 mg, Oral, Daily, Fernando Baker MD, 25 mg at 08/09/24 1208    thiamine (B-1) injection 200 mg, 200 mg, Intravenous, Q8H, 200 mg at 08/09/24 1309 **FOLLOWED BY** [START ON 8/10/2024] thiamine (VITAMIN B-1) tablet 100 mg, 100 mg, Oral, Daily, Shukri Romero MD  Review of Systems:    Review of systems could not be obtained due to  patient confusion.    Objective     Vital Signs  Temp:  [97.5 °F (36.4 °C)-99.8 °F (37.7 °C)] 97.8 °F (36.6 °C)  Heart Rate:  [76-97] 81  Resp:  [18-25] 22  BP: ()/() 146/79  Body mass index is 36.34 kg/m².    Intake/Output Summary (Last 24 hours) at 8/9/2024 1338  Last data filed at 8/9/2024 1000  Gross per 24 hour   Intake 969 ml   Output 1250 ml   Net -281 ml     I/O this shift:  In: 120 [P.O.:120]  Out: -      Physical Exam:   General: patient awake, alert and cooperative   Eyes: Normal lids and lashes,  no scleral icterus   Neck: supple, normal ROM   Skin: warm and dry, not jaundiced   Cardiovascular: regular rhythm and rate, no murmurs auscultated   Pulm: clear to auscultation bilaterally, regular and unlabored   Abdomen: soft, nontender, nondistended; normal bowel sounds   Extremities: no rash or edema   Psychiatric: Normal mood and behavior; memory intact     Results Review:     I reviewed the patient's new clinical results.    Results from last 7 days   Lab Units 08/09/24  0511 08/09/24  0001 08/08/24  1617   WBC 10*3/mm3 9.44 12.47* 9.56   HEMOGLOBIN g/dL 8.7* 9.5* 8.2*   HEMATOCRIT % 27.8* 30.6* 27.3*   PLATELETS 10*3/mm3 76* 71* 81*     Results from last 7 days   Lab Units 08/09/24  0511 08/08/24  0805 08/07/24  0728 08/07/24  0251   SODIUM mmol/L 138 135*  --  128*   POTASSIUM mmol/L 3.2* 3.9 4.9 5.8*   CHLORIDE mmol/L 89* 90*  --  90*   CO2 mmol/L 34.0* 32.0*  --  22.0   BUN mg/dL 60* 59*  --  59*   CREATININE mg/dL 1.79* 2.07*  --  2.57*   CALCIUM mg/dL 9.6 9.4  --  8.1*   BILIRUBIN mg/dL 2.2* 2.4*  --  2.7*   ALK PHOS U/L 117 113  --  106   ALT (SGPT) U/L 52* 57*  --  51*   AST (SGOT) U/L 108* 123*  --  143*   GLUCOSE mg/dL 100* 160*  --  186*     Results from last 7 days   Lab Units 08/05/24  0921   INR  1.40*     Lab Results   Lab Value Date/Time    LIPASE 31 03/14/2023 1237    LIPASE 14 05/11/2022 0059    LIPASE 21 (L) 06/01/2020 0149    LIPASE 22 (L) 05/12/2020 1235    LIPASE 29 01/09/2020 1229    LIPASE 23 02/27/2018 1221       Radiology:  XR Abdomen KUB   Final Result      XR Chest 1 View   Final Result   Persistent vascular congestion.       This report was finalized on 8/7/2024 3:35 AM by Dr. Radha Laguna M.D on Workstation: [id number]          XR Chest 1 View   Final Result   Cardiomegaly with pulmonary vascular congestion and edema,   follow-up advised.       This report was finalized on 8/6/2024 6:40 PM by Dr. Harpreet iMstry M.D on Workstation: [id number]          US Renal  Bilateral   Final Result   No hydronephrosis or echogenic nephrolithiasis.           This report was finalized on 8/5/2024 8:25 PM by Dr. Harpreet Mistry M.D on Workstation: [id number]          US Liver   Final Result   1.  Hepatic steatosis. Otherwise, evaluation of the liver is   nondiagnostic likely due to combination of patient body habitus and   degree of steatosis. Therefore, underlying focal hepatic   malignancy/abnormality cannot be excluded. Further screening for HCC   should be performed with multiphase MRI with and without contrast.   2.  No definite ascites is visualized on provided images.       This report was finalized on 8/5/2024 6:51 PM by Dr. David Briceño M.D   on Workstation: [id number]          XR Chest 1 View   Final Result      XR Chest 1 View    (Results Pending)       Assessment & Plan     Active Hospital Problems    Diagnosis     **Acute GI bleeding     Hyperkalemia     Acute renal failure     Alcoholism     Acute exacerbation of chronic obstructive pulmonary disease (COPD)     Acute blood loss anemia        Assessment:  Melena: Workup on hold until she clears sensory issues.  Alcoholic cirrhosis  SANDRO      Plan:  Continue to monitor H&H  Continue PPI  Eventual EGD to assess for bleeding  I discussed the patients findings and my recommendations with nursing staff.    Fernando Cohen MD

## 2024-08-09 NOTE — PLAN OF CARE
Goal Outcome Evaluation:              Outcome Evaluation: Remains confused and restless.Vitals stable. Frequent productive cough. Tolerates nectar thick liquids, but with poor appetite. Plan of care explained to brother and spouse.

## 2024-08-09 NOTE — PLAN OF CARE
Problem: Adult Inpatient Plan of Care  Goal: Plan of Care Review  Outcome: Ongoing, Progressing   Goal Outcome Evaluation:       Yelling for help at shift start. When questioned could not state her needs or concerns.No stools this shift. Remains confused to time, place and situation. Hitting staff when trying to replace cannula. Freq'ly pulling off n/c and sat's drop to low 80's.Cannula replaced and sat's return to normal. Found picking at IV dressing. Restraints will need to be re-applied. Prn ativan and norco helps some with behavior.

## 2024-08-10 ENCOUNTER — APPOINTMENT (OUTPATIENT)
Dept: GENERAL RADIOLOGY | Facility: HOSPITAL | Age: 64
DRG: 190 | End: 2024-08-10
Payer: MEDICARE

## 2024-08-10 LAB
ALBUMIN SERPL-MCNC: 3.9 G/DL (ref 3.5–5.2)
ALBUMIN/GLOB SERPL: 1.4 G/DL
ALP SERPL-CCNC: 108 U/L (ref 39–117)
ALT SERPL W P-5'-P-CCNC: 47 U/L (ref 1–33)
ANION GAP SERPL CALCULATED.3IONS-SCNC: 11.9 MMOL/L (ref 5–15)
AST SERPL-CCNC: 85 U/L (ref 1–32)
BASOPHILS # BLD AUTO: 0 10*3/MM3 (ref 0–0.2)
BASOPHILS # BLD AUTO: 0 10*3/MM3 (ref 0–0.2)
BASOPHILS # BLD AUTO: 0.01 10*3/MM3 (ref 0–0.2)
BASOPHILS NFR BLD AUTO: 0 % (ref 0–1.5)
BASOPHILS NFR BLD AUTO: 0 % (ref 0–1.5)
BASOPHILS NFR BLD AUTO: 0.1 % (ref 0–1.5)
BILIRUB SERPL-MCNC: 2.3 MG/DL (ref 0–1.2)
BUN SERPL-MCNC: 48 MG/DL (ref 8–23)
BUN/CREAT SERPL: 34.3 (ref 7–25)
CALCIUM SPEC-SCNC: 9.9 MG/DL (ref 8.6–10.5)
CHLORIDE SERPL-SCNC: 95 MMOL/L (ref 98–107)
CO2 SERPL-SCNC: 36.1 MMOL/L (ref 22–29)
CREAT SERPL-MCNC: 1.4 MG/DL (ref 0.57–1)
DEPRECATED RDW RBC AUTO: 50.2 FL (ref 37–54)
DEPRECATED RDW RBC AUTO: 53.6 FL (ref 37–54)
DEPRECATED RDW RBC AUTO: 54.1 FL (ref 37–54)
EGFRCR SERPLBLD CKD-EPI 2021: 42.1 ML/MIN/1.73
EOSINOPHIL # BLD AUTO: 0 10*3/MM3 (ref 0–0.4)
EOSINOPHIL NFR BLD AUTO: 0 % (ref 0.3–6.2)
ERYTHROCYTE [DISTWIDTH] IN BLOOD BY AUTOMATED COUNT: 17.1 % (ref 12.3–15.4)
ERYTHROCYTE [DISTWIDTH] IN BLOOD BY AUTOMATED COUNT: 17.7 % (ref 12.3–15.4)
ERYTHROCYTE [DISTWIDTH] IN BLOOD BY AUTOMATED COUNT: 17.7 % (ref 12.3–15.4)
GLOBULIN UR ELPH-MCNC: 2.7 GM/DL
GLUCOSE BLDC GLUCOMTR-MCNC: 132 MG/DL (ref 70–130)
GLUCOSE BLDC GLUCOMTR-MCNC: 167 MG/DL (ref 70–130)
GLUCOSE BLDC GLUCOMTR-MCNC: 197 MG/DL (ref 70–130)
GLUCOSE BLDC GLUCOMTR-MCNC: 257 MG/DL (ref 70–130)
GLUCOSE BLDC GLUCOMTR-MCNC: 267 MG/DL (ref 70–130)
GLUCOSE SERPL-MCNC: 134 MG/DL (ref 65–99)
HCT VFR BLD AUTO: 27.4 % (ref 34–46.6)
HCT VFR BLD AUTO: 27.8 % (ref 34–46.6)
HCT VFR BLD AUTO: 28.5 % (ref 34–46.6)
HGB BLD-MCNC: 8.5 G/DL (ref 12–15.9)
HGB BLD-MCNC: 8.6 G/DL (ref 12–15.9)
HGB BLD-MCNC: 8.8 G/DL (ref 12–15.9)
IMM GRANULOCYTES # BLD AUTO: 0.05 10*3/MM3 (ref 0–0.05)
IMM GRANULOCYTES # BLD AUTO: 0.06 10*3/MM3 (ref 0–0.05)
IMM GRANULOCYTES # BLD AUTO: 0.08 10*3/MM3 (ref 0–0.05)
IMM GRANULOCYTES NFR BLD AUTO: 0.7 % (ref 0–0.5)
IMM GRANULOCYTES NFR BLD AUTO: 0.7 % (ref 0–0.5)
IMM GRANULOCYTES NFR BLD AUTO: 0.8 % (ref 0–0.5)
LYMPHOCYTES # BLD AUTO: 0.34 10*3/MM3 (ref 0.7–3.1)
LYMPHOCYTES # BLD AUTO: 0.64 10*3/MM3 (ref 0.7–3.1)
LYMPHOCYTES # BLD AUTO: 0.67 10*3/MM3 (ref 0.7–3.1)
LYMPHOCYTES NFR BLD AUTO: 4.5 % (ref 19.6–45.3)
LYMPHOCYTES NFR BLD AUTO: 6.1 % (ref 19.6–45.3)
LYMPHOCYTES NFR BLD AUTO: 7.5 % (ref 19.6–45.3)
MCH RBC QN AUTO: 26 PG (ref 26.6–33)
MCH RBC QN AUTO: 26.3 PG (ref 26.6–33)
MCH RBC QN AUTO: 26.7 PG (ref 26.6–33)
MCHC RBC AUTO-ENTMCNC: 30.9 G/DL (ref 31.5–35.7)
MCHC RBC AUTO-ENTMCNC: 30.9 G/DL (ref 31.5–35.7)
MCHC RBC AUTO-ENTMCNC: 31 G/DL (ref 31.5–35.7)
MCV RBC AUTO: 84 FL (ref 79–97)
MCV RBC AUTO: 84.8 FL (ref 79–97)
MCV RBC AUTO: 86.4 FL (ref 79–97)
MONOCYTES # BLD AUTO: 0.93 10*3/MM3 (ref 0.1–0.9)
MONOCYTES # BLD AUTO: 1.48 10*3/MM3 (ref 0.1–0.9)
MONOCYTES # BLD AUTO: 1.81 10*3/MM3 (ref 0.1–0.9)
MONOCYTES NFR BLD AUTO: 12.2 % (ref 5–12)
MONOCYTES NFR BLD AUTO: 16.6 % (ref 5–12)
MONOCYTES NFR BLD AUTO: 17.2 % (ref 5–12)
NEUTROPHILS NFR BLD AUTO: 6.28 10*3/MM3 (ref 1.7–7)
NEUTROPHILS NFR BLD AUTO: 6.72 10*3/MM3 (ref 1.7–7)
NEUTROPHILS NFR BLD AUTO: 7.98 10*3/MM3 (ref 1.7–7)
NEUTROPHILS NFR BLD AUTO: 75.1 % (ref 42.7–76)
NEUTROPHILS NFR BLD AUTO: 75.9 % (ref 42.7–76)
NEUTROPHILS NFR BLD AUTO: 82.6 % (ref 42.7–76)
NRBC BLD AUTO-RTO: 0 /100 WBC (ref 0–0.2)
PHOSPHATE SERPL-MCNC: 2.8 MG/DL (ref 2.5–4.5)
PLATELET # BLD AUTO: 65 10*3/MM3 (ref 140–450)
PLATELET # BLD AUTO: 68 10*3/MM3 (ref 140–450)
PLATELET # BLD AUTO: 71 10*3/MM3 (ref 140–450)
PMV BLD AUTO: 10.3 FL (ref 6–12)
PMV BLD AUTO: 10.5 FL (ref 6–12)
PMV BLD AUTO: 11.1 FL (ref 6–12)
POTASSIUM SERPL-SCNC: 3.7 MMOL/L (ref 3.5–5.2)
PROT SERPL-MCNC: 6.6 G/DL (ref 6–8.5)
RBC # BLD AUTO: 3.23 10*6/MM3 (ref 3.77–5.28)
RBC # BLD AUTO: 3.3 10*6/MM3 (ref 3.77–5.28)
RBC # BLD AUTO: 3.31 10*6/MM3 (ref 3.77–5.28)
SODIUM SERPL-SCNC: 143 MMOL/L (ref 136–145)
WBC NRBC COR # BLD AUTO: 10.51 10*3/MM3 (ref 3.4–10.8)
WBC NRBC COR # BLD AUTO: 7.6 10*3/MM3 (ref 3.4–10.8)
WBC NRBC COR # BLD AUTO: 8.94 10*3/MM3 (ref 3.4–10.8)

## 2024-08-10 PROCEDURE — 25010000002 THIAMINE PER 100 MG: Performed by: EMERGENCY MEDICINE

## 2024-08-10 PROCEDURE — 63710000001 INSULIN LISPRO (HUMAN) PER 5 UNITS: Performed by: INTERNAL MEDICINE

## 2024-08-10 PROCEDURE — 82948 REAGENT STRIP/BLOOD GLUCOSE: CPT

## 2024-08-10 PROCEDURE — 25010000002 LORAZEPAM PER 2 MG: Performed by: EMERGENCY MEDICINE

## 2024-08-10 PROCEDURE — 25010000002 CEFTRIAXONE PER 250 MG: Performed by: PHYSICIAN ASSISTANT

## 2024-08-10 PROCEDURE — 80053 COMPREHEN METABOLIC PANEL: CPT | Performed by: STUDENT IN AN ORGANIZED HEALTH CARE EDUCATION/TRAINING PROGRAM

## 2024-08-10 PROCEDURE — 84100 ASSAY OF PHOSPHORUS: CPT | Performed by: INTERNAL MEDICINE

## 2024-08-10 PROCEDURE — 99232 SBSQ HOSP IP/OBS MODERATE 35: CPT | Performed by: INTERNAL MEDICINE

## 2024-08-10 PROCEDURE — 94760 N-INVAS EAR/PLS OXIMETRY 1: CPT

## 2024-08-10 PROCEDURE — 85025 COMPLETE CBC W/AUTO DIFF WBC: CPT | Performed by: INTERNAL MEDICINE

## 2024-08-10 PROCEDURE — 94664 DEMO&/EVAL PT USE INHALER: CPT

## 2024-08-10 PROCEDURE — 94761 N-INVAS EAR/PLS OXIMETRY MLT: CPT

## 2024-08-10 PROCEDURE — 25010000002 METHYLPREDNISOLONE PER 40 MG: Performed by: INTERNAL MEDICINE

## 2024-08-10 PROCEDURE — 94799 UNLISTED PULMONARY SVC/PX: CPT

## 2024-08-10 PROCEDURE — 71045 X-RAY EXAM CHEST 1 VIEW: CPT

## 2024-08-10 PROCEDURE — 25010000002 LORAZEPAM PER 2 MG: Performed by: INTERNAL MEDICINE

## 2024-08-10 RX ORDER — LORAZEPAM 1 MG/1
1 TABLET ORAL
Status: DISCONTINUED | OUTPATIENT
Start: 2024-08-10 | End: 2024-08-12

## 2024-08-10 RX ORDER — LORAZEPAM 2 MG/ML
2 INJECTION INTRAMUSCULAR
Status: DISCONTINUED | OUTPATIENT
Start: 2024-08-10 | End: 2024-08-12

## 2024-08-10 RX ORDER — LORAZEPAM 1 MG/1
2 TABLET ORAL
Status: DISCONTINUED | OUTPATIENT
Start: 2024-08-10 | End: 2024-08-12

## 2024-08-10 RX ORDER — BUMETANIDE 1 MG/1
1 TABLET ORAL
Status: DISCONTINUED | OUTPATIENT
Start: 2024-08-10 | End: 2024-08-11

## 2024-08-10 RX ORDER — LORAZEPAM 2 MG/ML
1 INJECTION INTRAMUSCULAR
Status: DISCONTINUED | OUTPATIENT
Start: 2024-08-10 | End: 2024-08-12

## 2024-08-10 RX ADMIN — PANTOPRAZOLE SODIUM 40 MG: 40 INJECTION, POWDER, FOR SOLUTION INTRAVENOUS at 21:07

## 2024-08-10 RX ADMIN — PHENOBARBITAL 32.4 MG: 32.4 TABLET ORAL at 10:22

## 2024-08-10 RX ADMIN — HYDROCODONE BITARTRATE AND ACETAMINOPHEN 1 TABLET: 7.5; 325 TABLET ORAL at 14:38

## 2024-08-10 RX ADMIN — BUMETANIDE 1 MG: 1 TABLET ORAL at 17:09

## 2024-08-10 RX ADMIN — LORAZEPAM 2 MG: 2 INJECTION INTRAMUSCULAR; INTRAVENOUS at 08:18

## 2024-08-10 RX ADMIN — BUDESONIDE AND FORMOTEROL FUMARATE DIHYDRATE 2 PUFF: 160; 4.5 AEROSOL RESPIRATORY (INHALATION) at 06:58

## 2024-08-10 RX ADMIN — Medication 10 ML: at 21:07

## 2024-08-10 RX ADMIN — LORAZEPAM 2 MG: 1 TABLET ORAL at 21:06

## 2024-08-10 RX ADMIN — LORAZEPAM 2 MG: 2 INJECTION INTRAMUSCULAR; INTRAVENOUS at 10:20

## 2024-08-10 RX ADMIN — IPRATROPIUM BROMIDE AND ALBUTEROL SULFATE 3 ML: .5; 3 SOLUTION RESPIRATORY (INHALATION) at 11:13

## 2024-08-10 RX ADMIN — FOLIC ACID 1 MG: 1 TABLET ORAL at 08:49

## 2024-08-10 RX ADMIN — LORAZEPAM 2 MG: 2 INJECTION INTRAMUSCULAR; INTRAVENOUS at 08:49

## 2024-08-10 RX ADMIN — METHYLPREDNISOLONE SODIUM SUCCINATE 40 MG: 40 INJECTION, POWDER, FOR SOLUTION INTRAMUSCULAR; INTRAVENOUS at 08:57

## 2024-08-10 RX ADMIN — IPRATROPIUM BROMIDE AND ALBUTEROL SULFATE 3 ML: .5; 3 SOLUTION RESPIRATORY (INHALATION) at 18:59

## 2024-08-10 RX ADMIN — SENNOSIDES AND DOCUSATE SODIUM 2 TABLET: 50; 8.6 TABLET ORAL at 21:07

## 2024-08-10 RX ADMIN — Medication 10 ML: at 08:50

## 2024-08-10 RX ADMIN — ACETAMINOPHEN 325MG 650 MG: 325 TABLET ORAL at 21:58

## 2024-08-10 RX ADMIN — Medication 100 MG: at 14:38

## 2024-08-10 RX ADMIN — ACETAMINOPHEN 325MG 650 MG: 325 TABLET ORAL at 17:09

## 2024-08-10 RX ADMIN — LORAZEPAM 2 MG: 1 TABLET ORAL at 14:38

## 2024-08-10 RX ADMIN — BUMETANIDE 2 MG: 2 TABLET ORAL at 08:49

## 2024-08-10 RX ADMIN — TIOTROPIUM BROMIDE INHALATION SPRAY 2 PUFF: 3.12 SPRAY, METERED RESPIRATORY (INHALATION) at 06:59

## 2024-08-10 RX ADMIN — INSULIN LISPRO 2 UNITS: 100 INJECTION, SOLUTION INTRAVENOUS; SUBCUTANEOUS at 17:09

## 2024-08-10 RX ADMIN — CEFTRIAXONE 2000 MG: 2 INJECTION, POWDER, FOR SOLUTION INTRAMUSCULAR; INTRAVENOUS at 12:35

## 2024-08-10 RX ADMIN — ATENOLOL 25 MG: 25 TABLET ORAL at 08:49

## 2024-08-10 RX ADMIN — BUDESONIDE AND FORMOTEROL FUMARATE DIHYDRATE 2 PUFF: 160; 4.5 AEROSOL RESPIRATORY (INHALATION) at 18:59

## 2024-08-10 RX ADMIN — THIAMINE HYDROCHLORIDE 200 MG: 100 INJECTION, SOLUTION INTRAMUSCULAR; INTRAVENOUS at 10:20

## 2024-08-10 RX ADMIN — ROPINIROLE 1 MG: 1 TABLET, FILM COATED ORAL at 21:08

## 2024-08-10 RX ADMIN — INSULIN LISPRO 6 UNITS: 100 INJECTION, SOLUTION INTRAVENOUS; SUBCUTANEOUS at 12:35

## 2024-08-10 RX ADMIN — IPRATROPIUM BROMIDE AND ALBUTEROL SULFATE 3 ML: .5; 3 SOLUTION RESPIRATORY (INHALATION) at 06:57

## 2024-08-10 RX ADMIN — INSULIN LISPRO 2 UNITS: 100 INJECTION, SOLUTION INTRAVENOUS; SUBCUTANEOUS at 21:07

## 2024-08-10 RX ADMIN — PANTOPRAZOLE SODIUM 40 MG: 40 INJECTION, POWDER, FOR SOLUTION INTRAVENOUS at 08:49

## 2024-08-10 RX ADMIN — SPIRONOLACTONE 25 MG: 25 TABLET, FILM COATED ORAL at 08:57

## 2024-08-10 RX ADMIN — IPRATROPIUM BROMIDE AND ALBUTEROL SULFATE 3 ML: .5; 3 SOLUTION RESPIRATORY (INHALATION) at 15:29

## 2024-08-10 NOTE — PROGRESS NOTES
"  Daily Progress Note.   [facility] CARDIAC INTENSIVE CARE  8/10/2024    Patient:  Name:  Filomena Liu  MRN:  [id number]  1960  64 y.o.  female         CC: follow up copd ae, etoh abuse wd, gib    Interval History:  Agitated overnight required prn ativan.  Drowsy will awaken mumbles then falls back asleep  Not able to give good history        Physical Exam:  /91   Pulse 91   Temp 99.8 °F (37.7 °C) (Oral)   Resp 22   Ht 152.4 cm (60\")   Wt 81.1 kg (178 lb 12.7 oz)   SpO2 94%   BMI 34.92 kg/m²   Body mass index is 34.92 kg/m².    Intake/Output Summary (Last 24 hours) at 8/10/2024 1056  Last data filed at 8/10/2024 0400  Gross per 24 hour   Intake 647 ml   Output 700 ml   Net -53 ml     General appearance: ill drowsy  Eyes: anicteric sclerae, moist conjunctivae; no lidlag;    HENT: Atraumatic; oropharynx clear with moist mucous membranes    Neck: Trachea midline;  supple large neck  Lungs: CTA, with normal respiratory effort and no intercostal retractions  CV: RRR, no rub   Abdomen: Soft, nontender; BS+obese  Extremities: No peripheral edema or ext lad  Skin: WWP no diffuse visible rash  Psych/neuro: drowsy will awaken moves all ext in agitated manner will not follow directions or answer questions coherently    Data Review:  Notable Labs:  Results from last 7 days   Lab Units 08/10/24  0520 08/09/24  2356 08/09/24  1535 08/09/24  0511 08/09/24  0001 08/08/24  1617 08/08/24  0805   WBC 10*3/mm3 10.51 8.94 6.26 9.44 12.47* 9.56 7.14   HEMOGLOBIN g/dL 8.6* 8.5* 8.5* 8.7* 9.5* 8.2* 8.4*   PLATELETS 10*3/mm3 71* 65* 66* 76* 71* 81* 71*     Results from last 7 days   Lab Units 08/10/24  0520 08/09/24  1535 08/09/24  0511 08/08/24  0805 08/07/24  0728 08/07/24  0251 08/06/24  1519 08/06/24  0833 08/05/24  2051 08/05/24  1606 08/05/24  0921   SODIUM mmol/L 143  --  138 135*  --  128* 125* 121*  --  123* 130*   POTASSIUM mmol/L 3.7 4.4 3.2* 3.9 4.9 5.8* 5.2 5.8*   < > 6.0* 6.0*   CHLORIDE " mmol/L 95*  --  89* 90*  --  90* 89* 89*  --  87* 88*   CO2 mmol/L 36.1*  --  34.0* 32.0*  --  22.0 20.5* 21.0*  --  18.0* 18.4*   BUN mg/dL 48*  --  60* 59*  --  59* 57* 51*  --  42* 39*   CREATININE mg/dL 1.40*  --  1.79* 2.07*  --  2.57* 2.95* 3.04*  --  2.94* 3.50*   GLUCOSE mg/dL 134*  --  100* 160*  --  186* 165* 183*  --  129* 65   CALCIUM mg/dL 9.9  --  9.6 9.4  --  8.1* 8.0* 8.0*  --  8.4* 8.7   MAGNESIUM mg/dL  --   --   --   --   --   --   --   --   --   --  2.8*   PHOSPHORUS mg/dL 2.8  --  3.1 4.4  --  5.3*  --  4.8*  --   --   --     < > = values in this interval not displayed.   Estimated Creatinine Clearance: 38.3 mL/min (A) (by C-G formula based on SCr of 1.4 mg/dL (H)).    Results from last 7 days   Lab Units 08/10/24  0520 08/09/24  2356 08/09/24  1535 08/09/24  0511 08/08/24  1617 08/08/24  0805 08/07/24  1616 08/07/24  0251 08/06/24  1519 08/06/24  0833 08/06/24  0010 08/05/24  1954 08/05/24  1641   AST (SGOT) U/L 85*  --   --  108*  --  123*  --  143*  --   --   --   --   --    ALT (SGPT) U/L 47*  --   --  52*  --  57*  --  51*  --   --   --   --   --    C3 COMPLEMENT mg/dl  --   --   --   --   --   --   --  69.0*  --   --   --   --   --    C4 COMPLEMENT mg/dl  --   --   --   --   --   --   --  6.0*  --   --   --   --   --    LACTATE mmol/L  --   --   --   --   --   --   --   --   --   --  1.4 2.9* 3.2*   FERRITIN ng/mL  --   --   --   --   --   --   --   --   --  68.60  --   --   --    PLATELETS 10*3/mm3 71* 65* 66* 76*   < > 71*   < > 75*   < > 119* 75*  --   --     < > = values in this interval not displayed.       Results from last 7 days   Lab Units 08/07/24  0726 08/06/24  1937   PH, ARTERIAL pH units 7.386 7.344*   PCO2, ARTERIAL mm Hg 46.8* 46.6*   PO2 ART mm Hg 85.4 96.3   HCO3 ART mmol/L 28.1* 25.4       Imaging:  Reviewed chest images personally from past 3 days    ASSESSMENT  /  PLAN:  COPD with acute exacerbation  Acute respiratory distress  SANDRO  Cirrhosis with possible GI  bleed  Chronic back pain  Alcohol abuse with withdrawal  BENY intolerant of CPAP  Moderate mitral valve regurgitation  Anemia  Thrombocytopenia  Obesity    Continue as needed for withdrawals.  Monitor needs, if not needing crit care nursing will be able to transition to floor  Solumedrol 40 daily no wheeze, not able to reilable take po pred  Symbicort spiriva     Appreciate nephrology's ongoing evaluations  GI serial H&H potential plans for EGD once more stable  Protonix  All issues new to me today.  Prior hospital course, labs and imaging reviewed.       Electronically signed by Daniel Torres MD, 08/10/24, 10:56 AM EDT.  [facility]

## 2024-08-10 NOTE — PROGRESS NOTES
Gastroenterology   Inpatient Progress Note    Reason for Follow Up: Upper GI bleed.    Subjective     Interval History:   No new bleeding reported.  Hemoglobin stable.    Current Facility-Administered Medications:     acetaminophen (TYLENOL) tablet 650 mg, 650 mg, Oral, Q4H PRN **OR** acetaminophen (TYLENOL) 160 MG/5ML oral solution 650 mg, 650 mg, Oral, Q4H PRN **OR** acetaminophen (TYLENOL) suppository 650 mg, 650 mg, Rectal, Q4H PRN, Mustapha Subramanian MD    atenolol (TENORMIN) tablet 25 mg, 25 mg, Oral, Daily, Maxwell Torres MD, 25 mg at 08/10/24 0849    sennosides-docusate (PERICOLACE) 8.6-50 MG per tablet 2 tablet, 2 tablet, Oral, BID PRN **AND** polyethylene glycol (MIRALAX) packet 17 g, 17 g, Oral, Daily PRN **AND** bisacodyl (DULCOLAX) EC tablet 5 mg, 5 mg, Oral, Daily PRN **AND** bisacodyl (DULCOLAX) suppository 10 mg, 10 mg, Rectal, Daily PRN, Mustapha Subramanian MD    budesonide-formoterol (SYMBICORT) 160-4.5 MCG/ACT inhaler 2 puff, 2 puff, Inhalation, BID - RT, 2 puff at 08/10/24 0658 **AND** tiotropium (SPIRIVA RESPIMAT) 2.5 mcg/act aerosol solution inhaler, 2 puff, Inhalation, Daily - RT, Mustapha Subramanian MD, 2 puff at 08/10/24 0659    bumetanide (BUMEX) tablet 2 mg, 2 mg, Oral, BID, Fernando Baker MD, 2 mg at 08/10/24 0849    cefTRIAXone (ROCEPHIN) 2,000 mg in sodium chloride 0.9 % 100 mL MBP, 2,000 mg, Intravenous, Q24H, Nazanin Quan PA-C, Last Rate: 200 mL/hr at 08/09/24 1249, 2,000 mg at 08/09/24 1249    dextrose (D50W) (25 g/50 mL) IV injection 25 g, 25 g, Intravenous, Q15 Min PRN, Alejandra Mendoza APRTONI    dextrose (GLUTOSE) oral gel 15 g, 15 g, Oral, Q15 Min PRN, Alejandra Mendoza, APRN    folic acid (FOLVITE) tablet 1 mg, 1 mg, Oral, Daily, Shukri Romero MD, 1 mg at 08/10/24 0849    glucagon (GLUCAGEN) injection 1 mg, 1 mg, Intramuscular, Q15 Min PRN, Alejandra Mendoza, APRN    HYDROcodone-acetaminophen (NORCO) 7.5-325 MG per tablet 1 tablet, 1 tablet, Oral, Q8H PRN, Mustapha Subramanian  MD, 1 tablet at 08/09/24 1208    insulin lispro (HUMALOG/ADMELOG) injection 2-9 Units, 2-9 Units, Subcutaneous, 4x Daily AC & at Bedtime, Maxwell Torres MD, 2 Units at 08/09/24 1720    ipratropium-albuterol (DUO-NEB) nebulizer solution 3 mL, 3 mL, Nebulization, 4x Daily, Mustapha Subramanian MD, 3 mL at 08/10/24 0657    ipratropium-albuterol (DUO-NEB) nebulizer solution 3 mL, 3 mL, Nebulization, Q4H PRN, Mustapha Subramanian MD, 3 mL at 08/06/24 1706    LORazepam (ATIVAN) tablet 1 mg, 1 mg, Oral, Q1H PRN, 1 mg at 08/08/24 1710 **OR** LORazepam (ATIVAN) injection 1 mg, 1 mg, Intravenous, Q1H PRN **OR** LORazepam (ATIVAN) tablet 2 mg, 2 mg, Oral, Q1H PRN, 2 mg at 08/06/24 1157 **OR** LORazepam (ATIVAN) injection 2 mg, 2 mg, Intravenous, Q1H PRN, 2 mg at 08/09/24 1711 **OR** LORazepam (ATIVAN) injection 2 mg, 2 mg, Intravenous, Q15 Min PRN, 2 mg at 08/10/24 0849 **OR** LORazepam (ATIVAN) injection 2 mg, 2 mg, Intramuscular, Q15 Min PRN, Shukri Romero MD, 2 mg at 08/06/24 2003    LORazepam (ATIVAN) tablet 2 mg, 2 mg, Oral, TID, Maxwell Torres MD, 2 mg at 08/09/24 2105    Magnesium Standard Dose Replacement - Follow Nurse / BPA Driven Protocol, , Does not apply, PRN, Shukri Romero MD    methylPREDNISolone sodium succinate (SOLU-Medrol) injection 40 mg, 40 mg, Intravenous, Daily, Maxwell Torres MD, 40 mg at 08/10/24 0857    nicotine (NICODERM CQ) 21 MG/24HR patch 1 patch, 1 patch, Transdermal, Q24H, Mustapha Subramanian MD, 1 patch at 08/09/24 1780    ondansetron ODT (ZOFRAN-ODT) disintegrating tablet 4 mg, 4 mg, Oral, Q6H PRN **OR** ondansetron (ZOFRAN) injection 4 mg, 4 mg, Intravenous, Q6H PRN, Mustapha Subramanian MD    pantoprazole (PROTONIX) injection 40 mg, 40 mg, Intravenous, Q12H, Maxwell Torres MD, 40 mg at 08/10/24 0849    [COMPLETED] PHENobarbital injection 65 mg, 65 mg, Intravenous, Once, 65 mg at 08/08/24 0831 **FOLLOWED BY** [COMPLETED] PHENobarbital injection 65 mg, 65 mg, Intravenous, Once, 65 mg at 08/08/24 2264  **FOLLOWED BY** [COMPLETED] PHENobarbital tablet 32.4 mg, 32.4 mg, Oral, Once, 32.4 mg at 08/09/24 0858 **FOLLOWED BY** [COMPLETED] PHENobarbital tablet 32.4 mg, 32.4 mg, Oral, Once, 32.4 mg at 08/09/24 2105 **FOLLOWED BY** PHENobarbital tablet 32.4 mg, 32.4 mg, Oral, Once, Maxwell Torres MD    rOPINIRole (REQUIP) tablet 1 mg, 1 mg, Oral, Nightly, Mustapha Subramanian MD, 1 mg at 08/09/24 2105    [COMPLETED] Insert Peripheral IV, , , Once **AND** sodium chloride 0.9 % flush 10 mL, 10 mL, Intravenous, PRN, Shukri Romero MD    sodium chloride 0.9 % flush 10 mL, 10 mL, Intravenous, Q12H, Mustapha Subramanian MD, 10 mL at 08/10/24 0850    sodium chloride 0.9 % flush 10 mL, 10 mL, Intravenous, PRN, Mustapha Subramanian MD, 10 mL at 08/06/24 0839    sodium chloride 0.9 % infusion 40 mL, 40 mL, Intravenous, PRN, Mustapha Subramanian MD    spironolactone (ALDACTONE) tablet 25 mg, 25 mg, Oral, Daily, Fernando Baker MD, 25 mg at 08/10/24 0857    thiamine (B-1) injection 200 mg, 200 mg, Intravenous, Q8H, 200 mg at 08/09/24 2105 **FOLLOWED BY** thiamine (VITAMIN B-1) tablet 100 mg, 100 mg, Oral, Daily, Shukri Romero MD  Review of Systems:    The following systems were reviewed and negative;  gastrointestinal    Objective     Vital Signs  Temp:  [97.5 °F (36.4 °C)-99.8 °F (37.7 °C)] 99.8 °F (37.7 °C)  Heart Rate:  [75-94] 94  Resp:  [22-24] 22  BP: ()/() 146/83  Body mass index is 36.34 kg/m².    Intake/Output Summary (Last 24 hours) at 8/10/2024 0910  Last data filed at 8/9/2024 2100  Gross per 24 hour   Intake 587 ml   Output 700 ml   Net -113 ml     No intake/output data recorded.     Physical Exam:   General: patient awake, alert but agitated   Eyes: no scleral icterus   Skin: warm and dry, not jaundiced   Abdomen: soft, nontender, nondistended; normal bowel sounds, no masses palpated, no periumbical lymphadenopathy   Psychiatric: Confused     Results Review:     I reviewed the patient's new clinical  results.    Results from last 7 days   Lab Units 08/10/24  0520 08/09/24  2356 08/09/24  1535   WBC 10*3/mm3 10.51 8.94 6.26   HEMOGLOBIN g/dL 8.6* 8.5* 8.5*   HEMATOCRIT % 27.8* 27.4* 27.2*   PLATELETS 10*3/mm3 71* 65* 66*     Results from last 7 days   Lab Units 08/10/24  0520 08/09/24  1535 08/09/24  0511 08/08/24  0805   SODIUM mmol/L 143  --  138 135*   POTASSIUM mmol/L 3.7 4.4 3.2* 3.9   CHLORIDE mmol/L 95*  --  89* 90*   CO2 mmol/L 36.1*  --  34.0* 32.0*   BUN mg/dL 48*  --  60* 59*   CREATININE mg/dL 1.40*  --  1.79* 2.07*   CALCIUM mg/dL 9.9  --  9.6 9.4   BILIRUBIN mg/dL 2.3*  --  2.2* 2.4*   ALK PHOS U/L 108  --  117 113   ALT (SGPT) U/L 47*  --  52* 57*   AST (SGOT) U/L 85*  --  108* 123*   GLUCOSE mg/dL 134*  --  100* 160*     Results from last 7 days   Lab Units 08/05/24  0921   INR  1.40*     Lab Results   Lab Value Date/Time    LIPASE 31 03/14/2023 1237    LIPASE 14 05/11/2022 0059    LIPASE 21 (L) 06/01/2020 0149    LIPASE 22 (L) 05/12/2020 1235    LIPASE 29 01/09/2020 1229    LIPASE 23 02/27/2018 1221       Radiology:  XR Chest 1 View   Final Result   Mildly decreased pulmonary vascular congestion.       This report was finalized on 8/10/2024 6:50 AM by Dr. Harpreet Mistry M.D on Workstation: [id number]          XR Abdomen KUB   Final Result      XR Chest 1 View   Final Result   Persistent vascular congestion.       This report was finalized on 8/7/2024 3:35 AM by Dr. Radha Laguna M.D on Workstation: [id number]          XR Chest 1 View   Final Result   Cardiomegaly with pulmonary vascular congestion and edema,   follow-up advised.       This report was finalized on 8/6/2024 6:40 PM by Dr. Harpreet Mistry M.D on Workstation: [id number]          US Renal Bilateral   Final Result   No hydronephrosis or echogenic nephrolithiasis.           This report was finalized on 8/5/2024 8:25 PM by Dr. Harpreet Mistry M.D on Workstation: [id number]          US Liver   Final Result   1.  Hepatic  steatosis. Otherwise, evaluation of the liver is   nondiagnostic likely due to combination of patient body habitus and   degree of steatosis. Therefore, underlying focal hepatic   malignancy/abnormality cannot be excluded. Further screening for HCC   should be performed with multiphase MRI with and without contrast.   2.  No definite ascites is visualized on provided images.       This report was finalized on 8/5/2024 6:51 PM by Dr. David Briceño M.D   on Workstation: [id number]          XR Chest 1 View   Final Result          Assessment & Plan     Active Hospital Problems    Diagnosis     **Acute GI bleeding     Hyperkalemia     Acute renal failure     Alcoholism     Acute exacerbation of chronic obstructive pulmonary disease (COPD)     Acute blood loss anemia      These problems are new to me  Assessment:  GI bleed.  Patient has had melenic stool but none reported overnight.  Hemoglobin has remained stable.  Cirrhosis secondary to alcohol.  COPD with acute respiratory distress.  Acute kidney injury  Alcohol withdrawal  Mild thrombocytopenia      Plan:  Continue supportive care  We will plan eventual EGD when patient is stable and out of the ICU likely early next week as she has signs of worsening blood loss and drop in hemoglobin.  Continue Protonix  I discussed the patients findings and my recommendations with patient and nursing staff.    MD Micheal Haque M.D.  [facility] [address]  Office: [phone]

## 2024-08-10 NOTE — PROGRESS NOTES
Access Center f/u d/t ETOH. Chart reviewed and spoke w/ primary RN who reports pt's mood has improved today after being given Ativan for agitation. Pt in room alone upon entry and sleeping soundly. Did not wake to clinician's voice. CIWA score 7 at 16:03 today. Will discuss PRAVEEN tx resources as pt improves. Pt was transferred to [facility] after f/u visit. No further needs/questions/concerns identified at this time. Access following.

## 2024-08-10 NOTE — PROGRESS NOTES
[facility] Progress Note      Patient Name: Filomena Liu  : 1960  MRN: [id number]  Primary Care Physician:  Fernando Dunn MD  Date of admission: 2024    Subjective     Interval History:   F/u SANDRO    Lethargic; agitated last night and required Ativan  Groans on occasion; saturating well on 2 L/min    mL yesterday      Review of Systems:   Unable to obtain reliably    Objective     Vitals:   Temp:  [97.5 °F (36.4 °C)-99.8 °F (37.7 °C)] 98.7 °F (37.1 °C)  Heart Rate:  [75-94] 79  Resp:  [22-26] 26  BP: ()/() 139/91  Flow (L/min):  [2-3] 2    Intake/Output Summary (Last 24 hours) at 8/10/2024 1144  Last data filed at 8/10/2024 0400  Gross per 24 hour   Intake 647 ml   Output 700 ml   Net -53 ml       Physical Exam:    General: confused WF, overweight, chronically ill, NAD, looks jaundiced  Psychiatric: Blunted sensorium; groggy  Neck: supple, no JVD  Lungs: Diminished breath sounds; not labored on 2 L/min  Heart: RRR, normal S1 and S2  Abdomen: soft, no grimacing, distended, BS +  Extremities: no edema, cyanosis or clubbing  Skin: Warm and dry    Scheduled Meds:     atenolol, 25 mg, Oral, Daily  budesonide-formoterol, 2 puff, Inhalation, BID - RT   And  tiotropium bromide monohydrate, 2 puff, Inhalation, Daily - RT  bumetanide, 2 mg, Oral, BID  cefTRIAXone, 2,000 mg, Intravenous, Q24H  folic acid, 1 mg, Oral, Daily  insulin lispro, 2-9 Units, Subcutaneous, 4x Daily AC & at Bedtime  ipratropium-albuterol, 3 mL, Nebulization, 4x Daily  LORazepam, 2 mg, Oral, TID  methylPREDNISolone sodium succinate, 40 mg, Intravenous, Daily  nicotine, 1 patch, Transdermal, Q24H  pantoprazole, 40 mg, Intravenous, Q12H  rOPINIRole, 1 mg, Oral, Nightly  sodium chloride, 10 mL, Intravenous, Q12H  spironolactone, 25 mg, Oral, Daily  thiamine, 100 mg, Oral, Daily      IV Meds:        Results Reviewed:   I have personally reviewed the results from the time of this admission  to 8/10/2024 11:44 EDT     Results from last 7 days   Lab Units 08/10/24  0520 08/09/24  1535 08/09/24  0511 08/08/24  0805   SODIUM mmol/L 143  --  138 135*   POTASSIUM mmol/L 3.7 4.4 3.2* 3.9   CHLORIDE mmol/L 95*  --  89* 90*   CO2 mmol/L 36.1*  --  34.0* 32.0*   BUN mg/dL 48*  --  60* 59*   CREATININE mg/dL 1.40*  --  1.79* 2.07*   CALCIUM mg/dL 9.9  --  9.6 9.4   BILIRUBIN mg/dL 2.3*  --  2.2* 2.4*   ALK PHOS U/L 108  --  117 113   ALT (SGPT) U/L 47*  --  52* 57*   AST (SGOT) U/L 85*  --  108* 123*   GLUCOSE mg/dL 134*  --  100* 160*     Estimated Creatinine Clearance: 38.3 mL/min (A) (by C-G formula based on SCr of 1.4 mg/dL (H)).  Results from last 7 days   Lab Units 08/10/24  0520 08/09/24  0511 08/08/24  0805 08/06/24  0833 08/05/24  0921   MAGNESIUM mg/dL  --   --   --   --  2.8*   PHOSPHORUS mg/dL 2.8 3.1 4.4   < >  --     < > = values in this interval not displayed.         Results from last 7 days   Lab Units 08/10/24  0520 08/09/24  2356 08/09/24  1535 08/09/24  0511 08/09/24  0001   WBC 10*3/mm3 10.51 8.94 6.26 9.44 12.47*   HEMOGLOBIN g/dL 8.6* 8.5* 8.5* 8.7* 9.5*   PLATELETS 10*3/mm3 71* 65* 66* 76* 71*     Results from last 7 days   Lab Units 08/05/24  0921   INR  1.40*       Assessment / Plan     ASSESSMENT:  Non olig SANDRO - improving (peak 3.5; was 0.7 in July 2023).  Multifactorial prerenal azotemia more likely than HRS in assoc with GIB, severe anemia, diuretic use and impaired compensation via ACE/NSAIDs (though states motrin use scant).  Potassium stable; likely metabolic alkalosis.  UA bland and Eunice low < 20.  US: no hydronephrosis.    Vol overload, improving; CXR 8/10 with decreasing pulmonary congestion.    Hypotension - resolved, stopped midodrine, tolerating atenolol  Anemia, hgb mid 8s.   Endoscopy been on hold.  TSAT ok 21%.     ETOH abuse  Acute encephalopathy - poss withdrawal    Acute hypoxic resp failure   Rash - has vasculitic appearance but chronic in nature.  Sent DOV/ANCA.  Low  C3/C4 noted    PLAN:  1.  Reduce Bumex to 1 mg twice daily  2.  If serum sodium level rises further, and still lethargic and not eating well, will give D5W  3.  Surveillance labs      Chan Brewster MD  08/10/24  11:44 EDT    [facility]  [phone]

## 2024-08-11 LAB
ALBUMIN SERPL-MCNC: 3.6 G/DL (ref 3.5–5.2)
ALBUMIN/GLOB SERPL: 1.5 G/DL
ALP SERPL-CCNC: 111 U/L (ref 39–117)
ALT SERPL W P-5'-P-CCNC: 40 U/L (ref 1–33)
ANION GAP SERPL CALCULATED.3IONS-SCNC: 10 MMOL/L (ref 5–15)
AST SERPL-CCNC: 66 U/L (ref 1–32)
BASOPHILS # BLD AUTO: 0 10*3/MM3 (ref 0–0.2)
BASOPHILS # BLD AUTO: 0.01 10*3/MM3 (ref 0–0.2)
BASOPHILS # BLD AUTO: 0.02 10*3/MM3 (ref 0–0.2)
BASOPHILS NFR BLD AUTO: 0 % (ref 0–1.5)
BASOPHILS NFR BLD AUTO: 0.1 % (ref 0–1.5)
BASOPHILS NFR BLD AUTO: 0.2 % (ref 0–1.5)
BILIRUB SERPL-MCNC: 1.8 MG/DL (ref 0–1.2)
BUN SERPL-MCNC: 45 MG/DL (ref 8–23)
BUN/CREAT SERPL: 34.6 (ref 7–25)
CALCIUM SPEC-SCNC: 9.8 MG/DL (ref 8.6–10.5)
CHLORIDE SERPL-SCNC: 90 MMOL/L (ref 98–107)
CO2 SERPL-SCNC: 41 MMOL/L (ref 22–29)
CREAT SERPL-MCNC: 1.3 MG/DL (ref 0.57–1)
DEPRECATED RDW RBC AUTO: 50.9 FL (ref 37–54)
DEPRECATED RDW RBC AUTO: 52 FL (ref 37–54)
DEPRECATED RDW RBC AUTO: 53 FL (ref 37–54)
EGFRCR SERPLBLD CKD-EPI 2021: 46 ML/MIN/1.73
EOSINOPHIL # BLD AUTO: 0.01 10*3/MM3 (ref 0–0.4)
EOSINOPHIL # BLD AUTO: 0.01 10*3/MM3 (ref 0–0.4)
EOSINOPHIL # BLD AUTO: 0.09 10*3/MM3 (ref 0–0.4)
EOSINOPHIL NFR BLD AUTO: 0.1 % (ref 0.3–6.2)
EOSINOPHIL NFR BLD AUTO: 0.1 % (ref 0.3–6.2)
EOSINOPHIL NFR BLD AUTO: 0.7 % (ref 0.3–6.2)
ERYTHROCYTE [DISTWIDTH] IN BLOOD BY AUTOMATED COUNT: 17 % (ref 12.3–15.4)
ERYTHROCYTE [DISTWIDTH] IN BLOOD BY AUTOMATED COUNT: 17.3 % (ref 12.3–15.4)
ERYTHROCYTE [DISTWIDTH] IN BLOOD BY AUTOMATED COUNT: 17.5 % (ref 12.3–15.4)
GLOBULIN UR ELPH-MCNC: 2.4 GM/DL
GLUCOSE BLDC GLUCOMTR-MCNC: 106 MG/DL (ref 70–130)
GLUCOSE BLDC GLUCOMTR-MCNC: 145 MG/DL (ref 70–130)
GLUCOSE BLDC GLUCOMTR-MCNC: 177 MG/DL (ref 70–130)
GLUCOSE BLDC GLUCOMTR-MCNC: 214 MG/DL (ref 70–130)
GLUCOSE SERPL-MCNC: 150 MG/DL (ref 65–99)
HCT VFR BLD AUTO: 29 % (ref 34–46.6)
HCT VFR BLD AUTO: 29.4 % (ref 34–46.6)
HCT VFR BLD AUTO: 30.6 % (ref 34–46.6)
HGB BLD-MCNC: 9.1 G/DL (ref 12–15.9)
HGB BLD-MCNC: 9.1 G/DL (ref 12–15.9)
HGB BLD-MCNC: 9.2 G/DL (ref 12–15.9)
IMM GRANULOCYTES # BLD AUTO: 0.09 10*3/MM3 (ref 0–0.05)
IMM GRANULOCYTES # BLD AUTO: 0.1 10*3/MM3 (ref 0–0.05)
IMM GRANULOCYTES # BLD AUTO: 0.15 10*3/MM3 (ref 0–0.05)
IMM GRANULOCYTES NFR BLD AUTO: 0.8 % (ref 0–0.5)
IMM GRANULOCYTES NFR BLD AUTO: 1 % (ref 0–0.5)
IMM GRANULOCYTES NFR BLD AUTO: 1.1 % (ref 0–0.5)
LYMPHOCYTES # BLD AUTO: 0.62 10*3/MM3 (ref 0.7–3.1)
LYMPHOCYTES # BLD AUTO: 1.17 10*3/MM3 (ref 0.7–3.1)
LYMPHOCYTES # BLD AUTO: 1.83 10*3/MM3 (ref 0.7–3.1)
LYMPHOCYTES NFR BLD AUTO: 13.6 % (ref 19.6–45.3)
LYMPHOCYTES NFR BLD AUTO: 7 % (ref 19.6–45.3)
LYMPHOCYTES NFR BLD AUTO: 9.2 % (ref 19.6–45.3)
MAGNESIUM SERPL-MCNC: 1.8 MG/DL (ref 1.6–2.4)
MCH RBC QN AUTO: 25.8 PG (ref 26.6–33)
MCH RBC QN AUTO: 26.1 PG (ref 26.6–33)
MCH RBC QN AUTO: 26.6 PG (ref 26.6–33)
MCHC RBC AUTO-ENTMCNC: 30.1 G/DL (ref 31.5–35.7)
MCHC RBC AUTO-ENTMCNC: 31 G/DL (ref 31.5–35.7)
MCHC RBC AUTO-ENTMCNC: 31.4 G/DL (ref 31.5–35.7)
MCV RBC AUTO: 84.2 FL (ref 79–97)
MCV RBC AUTO: 84.8 FL (ref 79–97)
MCV RBC AUTO: 86 FL (ref 79–97)
MONOCYTES # BLD AUTO: 0.89 10*3/MM3 (ref 0.1–0.9)
MONOCYTES # BLD AUTO: 2.13 10*3/MM3 (ref 0.1–0.9)
MONOCYTES # BLD AUTO: 2.18 10*3/MM3 (ref 0.1–0.9)
MONOCYTES NFR BLD AUTO: 10.1 % (ref 5–12)
MONOCYTES NFR BLD AUTO: 16.2 % (ref 5–12)
MONOCYTES NFR BLD AUTO: 16.7 % (ref 5–12)
NEUTROPHILS NFR BLD AUTO: 68.3 % (ref 42.7–76)
NEUTROPHILS NFR BLD AUTO: 7.21 10*3/MM3 (ref 1.7–7)
NEUTROPHILS NFR BLD AUTO: 73 % (ref 42.7–76)
NEUTROPHILS NFR BLD AUTO: 81.8 % (ref 42.7–76)
NEUTROPHILS NFR BLD AUTO: 9.21 10*3/MM3 (ref 1.7–7)
NEUTROPHILS NFR BLD AUTO: 9.31 10*3/MM3 (ref 1.7–7)
PHOSPHATE SERPL-MCNC: 2.7 MG/DL (ref 2.5–4.5)
PLATELET # BLD AUTO: 100 10*3/MM3 (ref 140–450)
PLATELET # BLD AUTO: 91 10*3/MM3 (ref 140–450)
PLATELET # BLD AUTO: 98 10*3/MM3 (ref 140–450)
PMV BLD AUTO: 10 FL (ref 6–12)
PMV BLD AUTO: 10.5 FL (ref 6–12)
PMV BLD AUTO: 11.1 FL (ref 6–12)
POTASSIUM SERPL-SCNC: 3.1 MMOL/L (ref 3.5–5.2)
PROT SERPL-MCNC: 6 G/DL (ref 6–8.5)
RBC # BLD AUTO: 3.42 10*6/MM3 (ref 3.77–5.28)
RBC # BLD AUTO: 3.49 10*6/MM3 (ref 3.77–5.28)
RBC # BLD AUTO: 3.56 10*6/MM3 (ref 3.77–5.28)
SODIUM SERPL-SCNC: 141 MMOL/L (ref 136–145)
WBC NRBC COR # BLD AUTO: 12.74 10*3/MM3 (ref 3.4–10.8)
WBC NRBC COR # BLD AUTO: 13.47 10*3/MM3 (ref 3.4–10.8)
WBC NRBC COR # BLD AUTO: 8.82 10*3/MM3 (ref 3.4–10.8)

## 2024-08-11 PROCEDURE — 84100 ASSAY OF PHOSPHORUS: CPT | Performed by: INTERNAL MEDICINE

## 2024-08-11 PROCEDURE — 63710000001 INSULIN LISPRO (HUMAN) PER 5 UNITS: Performed by: INTERNAL MEDICINE

## 2024-08-11 PROCEDURE — 94761 N-INVAS EAR/PLS OXIMETRY MLT: CPT

## 2024-08-11 PROCEDURE — 94760 N-INVAS EAR/PLS OXIMETRY 1: CPT

## 2024-08-11 PROCEDURE — 84132 ASSAY OF SERUM POTASSIUM: CPT | Performed by: INTERNAL MEDICINE

## 2024-08-11 PROCEDURE — 94799 UNLISTED PULMONARY SVC/PX: CPT

## 2024-08-11 PROCEDURE — 92526 ORAL FUNCTION THERAPY: CPT

## 2024-08-11 PROCEDURE — 85025 COMPLETE CBC W/AUTO DIFF WBC: CPT | Performed by: INTERNAL MEDICINE

## 2024-08-11 PROCEDURE — 83735 ASSAY OF MAGNESIUM: CPT | Performed by: INTERNAL MEDICINE

## 2024-08-11 PROCEDURE — 82948 REAGENT STRIP/BLOOD GLUCOSE: CPT

## 2024-08-11 PROCEDURE — 25010000002 POTASSIUM CHLORIDE 10 MEQ/100ML SOLUTION: Performed by: INTERNAL MEDICINE

## 2024-08-11 PROCEDURE — 25010000002 METHYLPREDNISOLONE PER 40 MG: Performed by: INTERNAL MEDICINE

## 2024-08-11 PROCEDURE — 25010000002 CEFTRIAXONE PER 250 MG: Performed by: STUDENT IN AN ORGANIZED HEALTH CARE EDUCATION/TRAINING PROGRAM

## 2024-08-11 PROCEDURE — 94660 CPAP INITIATION&MGMT: CPT

## 2024-08-11 PROCEDURE — 25010000002 LORAZEPAM PER 2 MG: Performed by: INTERNAL MEDICINE

## 2024-08-11 PROCEDURE — 99232 SBSQ HOSP IP/OBS MODERATE 35: CPT | Performed by: NURSE PRACTITIONER

## 2024-08-11 PROCEDURE — 80053 COMPREHEN METABOLIC PANEL: CPT | Performed by: STUDENT IN AN ORGANIZED HEALTH CARE EDUCATION/TRAINING PROGRAM

## 2024-08-11 PROCEDURE — 94664 DEMO&/EVAL PT USE INHALER: CPT

## 2024-08-11 RX ORDER — POTASSIUM CHLORIDE 7.45 MG/ML
10 INJECTION INTRAVENOUS
Status: COMPLETED | OUTPATIENT
Start: 2024-08-11 | End: 2024-08-11

## 2024-08-11 RX ORDER — METHYLPREDNISOLONE SODIUM SUCCINATE 40 MG/ML
40 INJECTION, POWDER, LYOPHILIZED, FOR SOLUTION INTRAMUSCULAR; INTRAVENOUS EVERY 12 HOURS
Status: COMPLETED | OUTPATIENT
Start: 2024-08-11 | End: 2024-08-13

## 2024-08-11 RX ORDER — ACETAZOLAMIDE 250 MG/1
250 TABLET ORAL EVERY 12 HOURS
Status: COMPLETED | OUTPATIENT
Start: 2024-08-11 | End: 2024-08-12

## 2024-08-11 RX ORDER — IPRATROPIUM BROMIDE AND ALBUTEROL SULFATE 2.5; .5 MG/3ML; MG/3ML
3 SOLUTION RESPIRATORY (INHALATION) EVERY 4 HOURS PRN
Status: DISCONTINUED | OUTPATIENT
Start: 2024-08-11 | End: 2024-08-16 | Stop reason: HOSPADM

## 2024-08-11 RX ORDER — LORAZEPAM 1 MG/1
1 TABLET ORAL 3 TIMES DAILY
Status: DISCONTINUED | OUTPATIENT
Start: 2024-08-11 | End: 2024-08-13

## 2024-08-11 RX ADMIN — INSULIN LISPRO 4 UNITS: 100 INJECTION, SOLUTION INTRAVENOUS; SUBCUTANEOUS at 17:25

## 2024-08-11 RX ADMIN — ATENOLOL 25 MG: 25 TABLET ORAL at 09:54

## 2024-08-11 RX ADMIN — HYDROCODONE BITARTRATE AND ACETAMINOPHEN 1 TABLET: 7.5; 325 TABLET ORAL at 23:23

## 2024-08-11 RX ADMIN — BUMETANIDE 1 MG: 1 TABLET ORAL at 17:24

## 2024-08-11 RX ADMIN — PANTOPRAZOLE SODIUM 40 MG: 40 INJECTION, POWDER, FOR SOLUTION INTRAVENOUS at 21:30

## 2024-08-11 RX ADMIN — IPRATROPIUM BROMIDE AND ALBUTEROL SULFATE 3 ML: .5; 3 SOLUTION RESPIRATORY (INHALATION) at 04:41

## 2024-08-11 RX ADMIN — BUDESONIDE AND FORMOTEROL FUMARATE DIHYDRATE 2 PUFF: 160; 4.5 AEROSOL RESPIRATORY (INHALATION) at 20:10

## 2024-08-11 RX ADMIN — ROPINIROLE 1 MG: 1 TABLET, FILM COATED ORAL at 21:30

## 2024-08-11 RX ADMIN — IPRATROPIUM BROMIDE AND ALBUTEROL SULFATE 3 ML: .5; 3 SOLUTION RESPIRATORY (INHALATION) at 08:01

## 2024-08-11 RX ADMIN — Medication 100 MG: at 12:20

## 2024-08-11 RX ADMIN — PANTOPRAZOLE SODIUM 40 MG: 40 INJECTION, POWDER, FOR SOLUTION INTRAVENOUS at 09:59

## 2024-08-11 RX ADMIN — Medication 10 ML: at 21:31

## 2024-08-11 RX ADMIN — FOLIC ACID 1 MG: 1 TABLET ORAL at 12:17

## 2024-08-11 RX ADMIN — BUDESONIDE AND FORMOTEROL FUMARATE DIHYDRATE 2 PUFF: 160; 4.5 AEROSOL RESPIRATORY (INHALATION) at 08:01

## 2024-08-11 RX ADMIN — POTASSIUM CHLORIDE 10 MEQ: 7.46 INJECTION, SOLUTION INTRAVENOUS at 15:19

## 2024-08-11 RX ADMIN — SPIRONOLACTONE 25 MG: 25 TABLET, FILM COATED ORAL at 12:18

## 2024-08-11 RX ADMIN — IPRATROPIUM BROMIDE AND ALBUTEROL SULFATE 3 ML: .5; 3 SOLUTION RESPIRATORY (INHALATION) at 20:06

## 2024-08-11 RX ADMIN — POTASSIUM CHLORIDE 10 MEQ: 7.46 INJECTION, SOLUTION INTRAVENOUS at 13:22

## 2024-08-11 RX ADMIN — CEFTRIAXONE 2000 MG: 2 INJECTION, POWDER, FOR SOLUTION INTRAMUSCULAR; INTRAVENOUS at 12:33

## 2024-08-11 RX ADMIN — NICOTINE 1 PATCH: 21 PATCH, EXTENDED RELEASE TRANSDERMAL at 02:39

## 2024-08-11 RX ADMIN — LORAZEPAM 2 MG: 1 TABLET ORAL at 02:38

## 2024-08-11 RX ADMIN — POTASSIUM CHLORIDE 10 MEQ: 7.46 INJECTION, SOLUTION INTRAVENOUS at 12:19

## 2024-08-11 RX ADMIN — BUMETANIDE 1 MG: 1 TABLET ORAL at 09:54

## 2024-08-11 RX ADMIN — HYDROCODONE BITARTRATE AND ACETAMINOPHEN 1 TABLET: 7.5; 325 TABLET ORAL at 07:23

## 2024-08-11 RX ADMIN — ACETAZOLAMIDE 250 MG: 250 TABLET ORAL at 21:30

## 2024-08-11 RX ADMIN — POTASSIUM CHLORIDE 10 MEQ: 7.46 INJECTION, SOLUTION INTRAVENOUS at 14:16

## 2024-08-11 RX ADMIN — POTASSIUM CHLORIDE 10 MEQ: 7.46 INJECTION, SOLUTION INTRAVENOUS at 17:24

## 2024-08-11 RX ADMIN — Medication 10 ML: at 10:00

## 2024-08-11 RX ADMIN — METHYLPREDNISOLONE SODIUM SUCCINATE 40 MG: 40 INJECTION, POWDER, FOR SOLUTION INTRAMUSCULAR; INTRAVENOUS at 21:30

## 2024-08-11 RX ADMIN — METHYLPREDNISOLONE SODIUM SUCCINATE 40 MG: 40 INJECTION, POWDER, FOR SOLUTION INTRAMUSCULAR; INTRAVENOUS at 10:00

## 2024-08-11 RX ADMIN — HYDROCODONE BITARTRATE AND ACETAMINOPHEN 1 TABLET: 7.5; 325 TABLET ORAL at 15:19

## 2024-08-11 RX ADMIN — IPRATROPIUM BROMIDE AND ALBUTEROL SULFATE 3 ML: .5; 3 SOLUTION RESPIRATORY (INHALATION) at 15:10

## 2024-08-11 RX ADMIN — LORAZEPAM 2 MG: 2 INJECTION INTRAMUSCULAR; INTRAVENOUS at 04:01

## 2024-08-11 RX ADMIN — IPRATROPIUM BROMIDE AND ALBUTEROL SULFATE 3 ML: .5; 3 SOLUTION RESPIRATORY (INHALATION) at 12:06

## 2024-08-11 RX ADMIN — TIOTROPIUM BROMIDE INHALATION SPRAY 2 PUFF: 3.12 SPRAY, METERED RESPIRATORY (INHALATION) at 08:00

## 2024-08-11 RX ADMIN — POTASSIUM CHLORIDE 10 MEQ: 7.46 INJECTION, SOLUTION INTRAVENOUS at 16:20

## 2024-08-11 NOTE — PROGRESS NOTES
Name: Filomena Liu ADMIT: 2024   : 1960  PCP: Fernando Dunn MD    MRN: [id number] LOS: 5 days   AGE/SEX: 64 y.o. female  ROOM: [facility]     Subjective   Subjective   Patient's mentation improved today.  About to have a speech evaluation coughing with dinner last night.   at bedside    Review of Systems  Chronic back pain, otherwise no complaints.     Objective   Objective   Vital Signs  Temp:  [97.6 °F (36.4 °C)-99 °F (37.2 °C)] 98.4 °F (36.9 °C)  Heart Rate:  [70-95] 84  Resp:  [18-22] 20  BP: (112-140)/(48-94) 112/68  SpO2:  [92 %-100 %] 94 %  on  Flow (L/min):  [1-2] 1;   Device (Oxygen Therapy): nasal cannula  Body mass index is 34.92 kg/m².  Physical Exam  Constitutional:       General: She is not in acute distress.     Appearance: She is ill-appearing.   Cardiovascular:      Rate and Rhythm: Normal rate and regular rhythm.   Pulmonary:      Effort: Pulmonary effort is normal. No respiratory distress.      Breath sounds: Wheezing present.   Abdominal:      General: Abdomen is flat. There is no distension.      Tenderness: There is no abdominal tenderness.   Musculoskeletal:         General: No swelling or deformity. Normal range of motion.   Skin:     General: Skin is warm and dry.   Neurological:      General: No focal deficit present.      Mental Status: She is alert. She is disoriented.         Results Review     I reviewed the patient's new clinical results.  Results from last 7 days   Lab Units 24  0752 24  0020 08/10/24  1620 08/10/24  0520   WBC 10*3/mm3 13.47* 12.74* 7.60 10.51   HEMOGLOBIN g/dL 9.1* 9.1* 8.8* 8.6*   PLATELETS 10*3/mm3 100* 91* 68* 71*     Results from last 7 days   Lab Units 24  0554 08/10/24  0520 24  1535 24  0511 24  0805   SODIUM mmol/L 141 143  --  138 135*   POTASSIUM mmol/L 3.1* 3.7 4.4 3.2* 3.9   CHLORIDE mmol/L 90* 95*  --  89* 90*   CO2 mmol/L 41.0* 36.1*  --  34.0* 32.0*   BUN mg/dL 45* 48*  --  60* 59*    CREATININE mg/dL 1.30* 1.40*  --  1.79* 2.07*   GLUCOSE mg/dL 150* 134*  --  100* 160*   Estimated Creatinine Clearance: 41.2 mL/min (A) (by C-G formula based on SCr of 1.3 mg/dL (H)).  Results from last 7 days   Lab Units 08/11/24  0554 08/10/24  0520 08/09/24  0511 08/08/24  0805   ALBUMIN g/dL 3.6 3.9 3.8 3.8   BILIRUBIN mg/dL 1.8* 2.3* 2.2* 2.4*   ALK PHOS U/L 111 108 117 113   AST (SGOT) U/L 66* 85* 108* 123*   ALT (SGPT) U/L 40* 47* 52* 57*     Results from last 7 days   Lab Units 08/11/24  0554 08/10/24  0520 08/09/24  0511 08/08/24  0805 08/05/24  1606 08/05/24  0921   CALCIUM mg/dL 9.8 9.9 9.6 9.4   < > 8.7   ALBUMIN g/dL 3.6 3.9 3.8 3.8   < > 3.6   MAGNESIUM mg/dL 1.8  --   --   --   --  2.8*   PHOSPHORUS mg/dL 2.7 2.8 3.1 4.4   < >  --     < > = values in this interval not displayed.     Results from last 7 days   Lab Units 08/06/24  0010 08/05/24  1954 08/05/24  1641   LACTATE mmol/L 1.4 2.9* 3.2*     SARS-CoV-2 BREEZY   Date Value Ref Range Status   11/10/2020 Not Detected Not Detected Final     Glucose   Date/Time Value Ref Range Status   08/11/2024 1105 177 (H) 70 - 130 mg/dL Final   08/11/2024 0609 145 (H) 70 - 130 mg/dL Final   08/10/2024 2055 197 (H) 70 - 130 mg/dL Final   08/10/2024 1702 167 (H) 70 - 130 mg/dL Final   08/10/2024 1133 257 (H) 70 - 130 mg/dL Final   08/10/2024 1132 267 (H) 70 - 130 mg/dL Final   08/10/2024 0854 132 (H) 70 - 130 mg/dL Final       XR Chest 1 View  Narrative: XR CHEST 1 VW-     HISTORY: Female who is 64 years-old, pulmonary edema, follow-up     TECHNIQUE: Frontal view of the chest     COMPARISON: 8/7/2024     FINDINGS: The heart is enlarged. Pulmonary vasculature is mildly less  congested. Sternotomy wires are noted. No focal pulmonary consolidation,  pleural effusion, or pneumothorax. No acute osseous process.     Impression: Mildly decreased pulmonary vascular congestion.     This report was finalized on 8/10/2024 6:50 AM by Dr. Harpreet Mistry M.D on Workstation:  [id number]       I reviewed the patient's daily medications.  Scheduled Medications  atenolol, 25 mg, Oral, Daily  budesonide-formoterol, 2 puff, Inhalation, BID - RT  bumetanide, 1 mg, Oral, BID  cefTRIAXone, 2,000 mg, Intravenous, Q24H  folic acid, 1 mg, Oral, Daily  insulin lispro, 2-9 Units, Subcutaneous, 4x Daily AC & at Bedtime  ipratropium-albuterol, 3 mL, Nebulization, 4x Daily  LORazepam, 1 mg, Oral, TID  methylPREDNISolone sodium succinate, 40 mg, Intravenous, Q12H  nicotine, 1 patch, Transdermal, Q24H  pantoprazole, 40 mg, Intravenous, Q12H  potassium chloride, 10 mEq, Intravenous, Q1H  rOPINIRole, 1 mg, Oral, Nightly  sodium chloride, 10 mL, Intravenous, Q12H  spironolactone, 25 mg, Oral, Daily  thiamine, 100 mg, Oral, Daily    Infusions   Diet  Diet: Liquid; Clear Liquid; Fluid Consistency: Nectar Thick         I have personally reviewed:  [x]  Laboratory   [x]  Microbiology   [x]  Radiology   []  EKG/Telemetry   []  Cardiology/Vascular   []  Pathology   [x]  Records     Assessment/Plan     Active Hospital Problems    Diagnosis  POA    **Acute GI bleeding [K92.2]  Yes    Hyperkalemia [E87.5]  Unknown    Acute renal failure [N17.9]  Unknown    Alcoholism [F10.20]  Unknown    Acute exacerbation of chronic obstructive pulmonary disease (COPD) [J44.1]  Unknown    Acute blood loss anemia [D62]  Unknown      Resolved Hospital Problems   No resolved problems to display.       64 y.o. female admitted with Acute GI bleeding.    Melena  Cirrhosis  -Hemoglobin stable  -PPI IV twice daily  -GI consulted-plan for an MRI with and without for HCC screening when clinically improved.  Also plan for EGD  -SBP prophylaxis with ceftriaxone     SANDRO, improved  Hypokalemia  -Renal following-continue Bumex 1 mg twice daily and Aldactone     Alcohol use disorder  -Continue thiamine, folic acid, CIWA protocol  -Decrease Ativan to 1 mg every 8 hours for now.    Acute COPD exacerbation COPD, not on home oxygen  Hypoxia  -Continue  Symbicort, scheduled DuoNeb.  Pulmonology following.    Chronic back pain-continue home hydrocodone.    Leukocytosis-suspect from steroids.  Afebrile and no new infectious symptoms.  On SBP prophylaxis with ceftriaxone.  Infectious workup with white blood cell count increasing Kommor fever.    SCDs for DVT prophylaxis.  Full code.  Discussed with patient, family, and nursing staff.  Anticipate discharge home with HH vs SNU facility in 2-3 days.    Expected Discharge Date: 8/13/2024; Expected Discharge Time:       Marshal Lara MD  [facility]  08/11/24  14:48 EDT

## 2024-08-11 NOTE — NURSING NOTE
Access center follow up regarding ETOH: chart reviewed. Last CIWA score of 5 however scores have been moderate to high at various points during the night. Intermittent confusion. Ativan per CIWA protocol. Access following.

## 2024-08-11 NOTE — PLAN OF CARE
Goal Outcome Evaluation:              Outcome Evaluation: Pt disoriented to place, time, and situation. Medications given for c/o back pain. Speech evaluated patient, NTL clear liquid continued. Bed rest. Purewick in place. Replaced potassium this shift. IV steroids and abx continued. Plan is for EGD and swallow study. VSS

## 2024-08-11 NOTE — PROGRESS NOTES
Gastroenterology   Inpatient Progress Note    Reason for Follow Up: Upper GI bleed    Subjective     Interval History:   Patient confused.   at bedside.    Current Facility-Administered Medications:     acetaminophen (TYLENOL) tablet 650 mg, 650 mg, Oral, Q4H PRN, 650 mg at 08/10/24 2158 **OR** acetaminophen (TYLENOL) 160 MG/5ML oral solution 650 mg, 650 mg, Oral, Q4H PRN **OR** acetaminophen (TYLENOL) suppository 650 mg, 650 mg, Rectal, Q4H PRN, Mustapha Subramanian MD    atenolol (TENORMIN) tablet 25 mg, 25 mg, Oral, Daily, Maxwell Torres MD, 25 mg at 08/10/24 0849    sennosides-docusate (PERICOLACE) 8.6-50 MG per tablet 2 tablet, 2 tablet, Oral, BID PRN, 2 tablet at 08/10/24 2107 **AND** polyethylene glycol (MIRALAX) packet 17 g, 17 g, Oral, Daily PRN **AND** bisacodyl (DULCOLAX) EC tablet 5 mg, 5 mg, Oral, Daily PRN **AND** bisacodyl (DULCOLAX) suppository 10 mg, 10 mg, Rectal, Daily PRN, Mustapha Subramanian MD    budesonide-formoterol (SYMBICORT) 160-4.5 MCG/ACT inhaler 2 puff, 2 puff, Inhalation, BID - RT, 2 puff at 08/11/24 0801 **AND** [DISCONTINUED] tiotropium (SPIRIVA RESPIMAT) 2.5 mcg/act aerosol solution inhaler, 2 puff, Inhalation, Daily - RT, Mustapha Subramanian MD, 2 puff at 08/11/24 0800    bumetanide (BUMEX) tablet 1 mg, 1 mg, Oral, BID, Chan Brewster MD, 1 mg at 08/10/24 1709    cefTRIAXone (ROCEPHIN) 2,000 mg in sodium chloride 0.9 % 100 mL MBP, 2,000 mg, Intravenous, Q24H, Marshal Lara MD, Last Rate: 200 mL/hr at 08/10/24 1235, 2,000 mg at 08/10/24 1235    dextrose (D50W) (25 g/50 mL) IV injection 25 g, 25 g, Intravenous, Q15 Min PRN, Alejandra Mendoza APRN    dextrose (GLUTOSE) oral gel 15 g, 15 g, Oral, Q15 Min PRN, Alejandra Mendoza APRN    folic acid (FOLVITE) tablet 1 mg, 1 mg, Oral, Daily, Shukri Romero MD, 1 mg at 08/10/24 0849    glucagon (GLUCAGEN) injection 1 mg, 1 mg, Intramuscular, Q15 Min PRN, Alejandra Mendoza APRN    HYDROcodone-acetaminophen (NORCO) 7.5-325 MG per tablet 1  tablet, 1 tablet, Oral, Q8H PRN, Mustapha Subramanian MD, 1 tablet at 08/11/24 0723    insulin lispro (HUMALOG/ADMELOG) injection 2-9 Units, 2-9 Units, Subcutaneous, 4x Daily AC & at Bedtime, Maxwell Torres MD, 2 Units at 08/10/24 2107    ipratropium-albuterol (DUO-NEB) nebulizer solution 3 mL, 3 mL, Nebulization, 4x Daily, Mustapha Subramanian MD, 3 mL at 08/11/24 1206    ipratropium-albuterol (DUO-NEB) nebulizer solution 3 mL, 3 mL, Nebulization, Q4H PRN, Mustapha Subramanian MD, 3 mL at 08/11/24 0441    ipratropium-albuterol (DUO-NEB) nebulizer solution 3 mL, 3 mL, Nebulization, Q4H PRN, Hillary Gallo APRN    LORazepam (ATIVAN) tablet 1 mg, 1 mg, Oral, Q1H PRN **OR** LORazepam (ATIVAN) injection 1 mg, 1 mg, Intravenous, Q1H PRN **OR** LORazepam (ATIVAN) tablet 2 mg, 2 mg, Oral, Q1H PRN, 2 mg at 08/11/24 0238 **OR** LORazepam (ATIVAN) injection 2 mg, 2 mg, Intravenous, Q1H PRN, 2 mg at 08/11/24 0401 **OR** LORazepam (ATIVAN) injection 2 mg, 2 mg, Intravenous, Q15 Min PRN **OR** LORazepam (ATIVAN) injection 2 mg, 2 mg, Intramuscular, Q15 Min PRN, Maxwell Torres MD    LORazepam (ATIVAN) tablet 1 mg, 1 mg, Oral, TID, Mrashal Lara MD    Magnesium Standard Dose Replacement - Follow Nurse / BPA Driven Protocol, , Does not apply, PRN, Shukri Romero MD    methylPREDNISolone sodium succinate (SOLU-Medrol) injection 40 mg, 40 mg, Intravenous, Q12H, Daniel Torres MD    nicotine (NICODERM CQ) 21 MG/24HR patch 1 patch, 1 patch, Transdermal, Q24H, Mustapha Subramanian MD, 1 patch at 08/11/24 0239    ondansetron ODT (ZOFRAN-ODT) disintegrating tablet 4 mg, 4 mg, Oral, Q6H PRN **OR** ondansetron (ZOFRAN) injection 4 mg, 4 mg, Intravenous, Q6H PRN, Mustapha Subramanian MD    pantoprazole (PROTONIX) injection 40 mg, 40 mg, Intravenous, Q12H, Maxwell Torres MD, 40 mg at 08/11/24 0959    potassium chloride 10 mEq in 100 mL IVPB, 10 mEq, Intravenous, Q1H, Chan Brewster MD    Potassium Replacement - Follow Nurse / BPA Driven  Protocol, , Does not apply, PRN, Chan Brewster MD    rOPINIRole (REQUIP) tablet 1 mg, 1 mg, Oral, Nightly, Mustapha Subramanian MD, 1 mg at 08/10/24 2108    [COMPLETED] Insert Peripheral IV, , , Once **AND** sodium chloride 0.9 % flush 10 mL, 10 mL, Intravenous, PRN, Shukri Romero MD    sodium chloride 0.9 % flush 10 mL, 10 mL, Intravenous, Q12H, Mustapha Subramanian MD, 10 mL at 08/11/24 1000    sodium chloride 0.9 % flush 10 mL, 10 mL, Intravenous, PRN, Mustapha Subramanian MD, 10 mL at 08/06/24 0839    sodium chloride 0.9 % infusion 40 mL, 40 mL, Intravenous, PRN, Mustapha Subramanian MD    spironolactone (ALDACTONE) tablet 25 mg, 25 mg, Oral, Daily, Fernando Baker MD, 25 mg at 08/10/24 0857    [COMPLETED] thiamine (B-1) injection 200 mg, 200 mg, Intravenous, Q8H, 200 mg at 08/10/24 1020 **FOLLOWED BY** thiamine (VITAMIN B-1) tablet 100 mg, 100 mg, Oral, Daily, Shukri Romero MD, 100 mg at 08/10/24 1438  Review of Systems:    Review of systems could not be obtained due to  patient confusion.    Objective     Vital Signs  Temp:  [97.6 °F (36.4 °C)-99 °F (37.2 °C)] 99 °F (37.2 °C)  Heart Rate:  [70-95] 84  Resp:  [18-22] 20  BP: (114-140)/(48-94) 136/73  Body mass index is 34.92 kg/m².    Intake/Output Summary (Last 24 hours) at 8/11/2024 1217  Last data filed at 8/11/2024 0732  Gross per 24 hour   Intake --   Output 2500 ml   Net -2500 ml     I/O this shift:  In: -   Out: 350 [Urine:350]     Physical Exam:   General: patient sleepy, but opens eyes to voice   Eyes: + scleral icterus   Skin: warm and dry, + jaundice   Abdomen: soft, nontender, nondistended; normal bowel sounds, no masses palpated, no periumbical lymphadenopathy   Psychiatric: Unable to assess as patient is confused and sleepy     Results Review:     I reviewed the patient's new clinical results.  I reviewed the patient's new imaging results and agree with the interpretation.  I reviewed the patient's other test results and agree with the  interpretation    Results from last 7 days   Lab Units 08/11/24  0752 08/11/24  0020 08/10/24  1620   WBC 10*3/mm3 13.47* 12.74* 7.60   HEMOGLOBIN g/dL 9.1* 9.1* 8.8*   HEMATOCRIT % 29.4* 29.0* 28.5*   PLATELETS 10*3/mm3 100* 91* 68*     Results from last 7 days   Lab Units 08/11/24  0554 08/10/24  0520 08/09/24  1535 08/09/24  0511   SODIUM mmol/L 141 143  --  138   POTASSIUM mmol/L 3.1* 3.7 4.4 3.2*   CHLORIDE mmol/L 90* 95*  --  89*   CO2 mmol/L 41.0* 36.1*  --  34.0*   BUN mg/dL 45* 48*  --  60*   CREATININE mg/dL 1.30* 1.40*  --  1.79*   CALCIUM mg/dL 9.8 9.9  --  9.6   BILIRUBIN mg/dL 1.8* 2.3*  --  2.2*   ALK PHOS U/L 111 108  --  117   ALT (SGPT) U/L 40* 47*  --  52*   AST (SGOT) U/L 66* 85*  --  108*   GLUCOSE mg/dL 150* 134*  --  100*     Results from last 7 days   Lab Units 08/05/24  0921   INR  1.40*     Lab Results   Lab Value Date/Time    LIPASE 31 03/14/2023 1237    LIPASE 14 05/11/2022 0059    LIPASE 21 (L) 06/01/2020 0149    LIPASE 22 (L) 05/12/2020 1235    LIPASE 29 01/09/2020 1229    LIPASE 23 02/27/2018 1221       Radiology:  XR Chest 1 View   Final Result   Mildly decreased pulmonary vascular congestion.       This report was finalized on 8/10/2024 6:50 AM by Dr. Harpreet Mistry M.D on Workstation: [id number]          XR Abdomen KUB   Final Result      XR Chest 1 View   Final Result   Persistent vascular congestion.       This report was finalized on 8/7/2024 3:35 AM by Dr. Radha Laguna M.D on Workstation: [id number]          XR Chest 1 View   Final Result   Cardiomegaly with pulmonary vascular congestion and edema,   follow-up advised.       This report was finalized on 8/6/2024 6:40 PM by Dr. Harpreet Mistry M.D on Workstation: [id number]          US Renal Bilateral   Final Result   No hydronephrosis or echogenic nephrolithiasis.           This report was finalized on 8/5/2024 8:25 PM by Dr. Harpreet Mistry M.D on Workstation: [id number]          US Liver   Final Result   1.   Hepatic steatosis. Otherwise, evaluation of the liver is   nondiagnostic likely due to combination of patient body habitus and   degree of steatosis. Therefore, underlying focal hepatic   malignancy/abnormality cannot be excluded. Further screening for HCC   should be performed with multiphase MRI with and without contrast.   2.  No definite ascites is visualized on provided images.       This report was finalized on 8/5/2024 6:51 PM by Dr. David Briceño M.D   on Workstation: [id number]          XR Chest 1 View   Final Result          Assessment & Plan     Active Hospital Problems    Diagnosis     **Acute GI bleeding     Hyperkalemia     Acute renal failure     Alcoholism     Acute exacerbation of chronic obstructive pulmonary disease (COPD)     Acute blood loss anemia        Assessment:  GI bleed.  Melanotic stool-resolved  Anemia-Hgb stable at 9.1  EtOH abuse  EtOH cirrhosis  SANDRO  Possible withdrawal  Mild thrombocytopenia  COPD with acute respiratory distress    These problems are new to me.  Plan:  Patient remains confused.  Hgb stable, continue to monitor lab work  Recheck ammonia level in am.  May need to consider the addition of Xifaxan  MELD score high at 29 based upon lab work 8/5  To consider EGD once stable, likely early this week  Continue PPI therapy  We will continue to follow    I discussed the patients findings and my recommendations with patient, family, and consulting provider.    TAYLER Bernard APRN  [facility] [address] [address]  Office: [phone]

## 2024-08-11 NOTE — PLAN OF CARE
Goal Outcome Evaluation:  Plan of Care Reviewed With: patient           Outcome Evaluation: Swallow reevaluated. Pt had slight wheezing noted during eval. RT to give breathing treatment after eval. Pt tolerated ice chips with no s/s. Pt had delayed coughing in 1/5 teaspoon sips of thin and 1/4 cup sips of thin. The delayed coughing was significant with pt getting very red and taking extra time to clear. Suspect silent penetration leading to aspiration and coughing or backflow from esophagus leading to posterior penetration/aspiration triggering coughing. No s/s were noted with NTL (cup/straw) with pt taking 4 oz. Pt's voice remained clear throughout the eval. Discussed with pt and . Recommend clear nectar thick liquid diet (pt not cleared for solids); meds w/ NTL or in pureed; upright for meals and 30 min after; feed only when alert; slow rate; small sips. If s/s are noted, make NPO. Pt safe for ice chips between meals (wait 30 min) after oral care. Recommend VFSS when pt is cleared for barium. GI plans an EGD this week. ST to follow.      Anticipated Discharge Disposition (SLP): unknown          SLP Swallowing Diagnosis: oral dysphagia, suspected pharyngeal dysphagia (08/11/24 1100)

## 2024-08-11 NOTE — PROGRESS NOTES
"  Daily Progress Note.   [address]  8/11/2024    Patient:  Name:  Filomena Lui  MRN:  [id number]  1960  64 y.o.  female         CC: follow up copd ae, etoh abuse wd, gib    Interval History:  Drowsy but more alert than prior days complains of chronic back pain.  Does complain of some wheezing tightness and cough.    Physical Exam:  /73 (BP Location: Right arm, Patient Position: Lying)   Pulse 95   Temp 99 °F (37.2 °C) (Oral)   Resp 20   Ht 152.4 cm (60\")   Wt 81.1 kg (178 lb 12.7 oz)   SpO2 93%   BMI 34.92 kg/m²   Body mass index is 34.92 kg/m².    Intake/Output Summary (Last 24 hours) at 8/11/2024 1048  Last data filed at 8/11/2024 0732  Gross per 24 hour   Intake --   Output 2500 ml   Net -2500 ml     General appearance: ill drowsy  Eyes: anicteric sclerae, moist conjunctivae; no lidlag;    HENT: Atraumatic; oropharynx clear with moist mucous membranes    Neck: Trachea midline;  supple large neck  Lungs: Positive expiratory wheeze, with normal respiratory effort and no intercostal retractions  CV: RRR, no rub   Abdomen: Soft, nontender; BS+obese  Extremities: No peripheral edema or ext lad  Skin: WWP no diffuse visible rash  Psych/neuro: drowsy will awaken moves all ext in will answer questions seemingly appropriate    Data Review:  Notable Labs:  Results from last 7 days   Lab Units 08/11/24  0752 08/11/24  0020 08/10/24  1620 08/10/24  0520 08/09/24  2356 08/09/24  1535 08/09/24  0511   WBC 10*3/mm3 13.47* 12.74* 7.60 10.51 8.94 6.26 9.44   HEMOGLOBIN g/dL 9.1* 9.1* 8.8* 8.6* 8.5* 8.5* 8.7*   PLATELETS 10*3/mm3 100* 91* 68* 71* 65* 66* 76*     Results from last 7 days   Lab Units 08/11/24  0554 08/10/24  0520 08/09/24  1535 08/09/24  0511 08/08/24  0805 08/07/24  0728 08/07/24  0251 08/06/24  1519 08/06/24  0833 08/05/24  1606 08/05/24  0921   SODIUM mmol/L 141 143  --  138 135*  --  128* 125* 121*   < > 130*   POTASSIUM mmol/L 3.1* 3.7 4.4 3.2* 3.9 4.9 5.8* 5.2 " 5.8*   < > 6.0*   CHLORIDE mmol/L 90* 95*  --  89* 90*  --  90* 89* 89*   < > 88*   CO2 mmol/L 41.0* 36.1*  --  34.0* 32.0*  --  22.0 20.5* 21.0*   < > 18.4*   BUN mg/dL 45* 48*  --  60* 59*  --  59* 57* 51*   < > 39*   CREATININE mg/dL 1.30* 1.40*  --  1.79* 2.07*  --  2.57* 2.95* 3.04*   < > 3.50*   GLUCOSE mg/dL 150* 134*  --  100* 160*  --  186* 165* 183*   < > 65   CALCIUM mg/dL 9.8 9.9  --  9.6 9.4  --  8.1* 8.0* 8.0*   < > 8.7   MAGNESIUM mg/dL 1.8  --   --   --   --   --   --   --   --   --  2.8*   PHOSPHORUS mg/dL 2.7 2.8  --  3.1 4.4  --  5.3*  --  4.8*  --   --     < > = values in this interval not displayed.   Estimated Creatinine Clearance: 41.2 mL/min (A) (by C-G formula based on SCr of 1.3 mg/dL (H)).    Results from last 7 days   Lab Units 08/11/24  0752 08/11/24  0554 08/11/24  0020 08/10/24  1620 08/10/24  0520 08/09/24  1535 08/09/24  0511 08/07/24  1616 08/07/24  0251 08/06/24  1519 08/06/24  0833 08/06/24  0010 08/05/24  1954 08/05/24  1641   AST (SGOT) U/L  --  66*  --   --  85*  --  108*   < > 143*  --   --   --   --   --    ALT (SGPT) U/L  --  40*  --   --  47*  --  52*   < > 51*  --   --   --   --   --    C3 COMPLEMENT mg/dl  --   --   --   --   --   --   --   --  69.0*  --   --   --   --   --    C4 COMPLEMENT mg/dl  --   --   --   --   --   --   --   --  6.0*  --   --   --   --   --    LACTATE mmol/L  --   --   --   --   --   --   --   --   --   --   --  1.4 2.9* 3.2*   FERRITIN ng/mL  --   --   --   --   --   --   --   --   --   --  68.60  --   --   --    PLATELETS 10*3/mm3 100*  --  91* 68* 71*   < > 76*   < > 75*   < > 119* 75*  --   --     < > = values in this interval not displayed.       Results from last 7 days   Lab Units 08/07/24  0726 08/06/24  1937   PH, ARTERIAL pH units 7.386 7.344*   PCO2, ARTERIAL mm Hg 46.8* 46.6*   PO2 ART mm Hg 85.4 96.3   HCO3 ART mmol/L 28.1* 25.4       Imaging:  Reviewed chest images personally from past 3 days    ASSESSMENT  /  PLAN:  COPD with acute  exacerbation  Acute respiratory distress  SANDRO  Cirrhosis with possible GI bleed  Chronic back pain  Alcohol abuse with withdrawal  BENY intolerant of CPAP  Moderate mitral valve regurgitation  Anemia  Thrombocytopenia  Obesity    Following for COPD with ae mgmt:  Solumedrol 40 daily will change to twice daily she does have wheeze today  Symbicort   Spiriva stopped  Scheduled DuoNebs  Discussed with bedside nursing.    Otherwise care per admitting hospitalist service.  Discussed discussed with family member at bedside.  All questions answered from a pulmonary perspective  Electronically signed by Daniel Torres MD, 08/11/24, 11:14 AM EDT.

## 2024-08-11 NOTE — PROGRESS NOTES
[facility] Progress Note      Patient Name: Filomena Liu  : 1960  MRN: [id number]  Primary Care Physician:  eFrnando Dunn MD  Date of admission: 2024    Subjective     Interval History:   F/u SANDRO    Still confused, but less lethargic than yesterday  Breathing is comfortable on 1 L/min  UOP not fully recorded      Review of Systems:   Unable to obtain reliably    Objective     Vitals:   Temp:  [97.6 °F (36.4 °C)-99 °F (37.2 °C)] 98.4 °F (36.9 °C)  Heart Rate:  [76-95] 76  Resp:  [18-20] 18  BP: (112-140)/(68-79) 112/68  Flow (L/min):  [1-2] 1    Intake/Output Summary (Last 24 hours) at 2024  Last data filed at 2024 1551  Gross per 24 hour   Intake 0 ml   Output 1600 ml   Net -1600 ml       Physical Exam:    General: confused WF, overweight, chr ill, NAD, jaundiced  Psychiatric: Blunted   Neck: supple, no JVD  Lungs: Diminished breath sounds; not labored   Heart: RRR, normal S1 and S2  Abdomen: soft, no grimacing, distended, BS +  Extremities: no edema, cyanosis or clubbing  Skin: Warm and dry    Scheduled Meds:     atenolol, 25 mg, Oral, Daily  budesonide-formoterol, 2 puff, Inhalation, BID - RT  bumetanide, 1 mg, Oral, BID  cefTRIAXone, 2,000 mg, Intravenous, Q24H  folic acid, 1 mg, Oral, Daily  insulin lispro, 2-9 Units, Subcutaneous, 4x Daily AC & at Bedtime  ipratropium-albuterol, 3 mL, Nebulization, 4x Daily  LORazepam, 1 mg, Oral, TID  methylPREDNISolone sodium succinate, 40 mg, Intravenous, Q12H  nicotine, 1 patch, Transdermal, Q24H  pantoprazole, 40 mg, Intravenous, Q12H  rOPINIRole, 1 mg, Oral, Nightly  sodium chloride, 10 mL, Intravenous, Q12H  spironolactone, 25 mg, Oral, Daily  thiamine, 100 mg, Oral, Daily      IV Meds:        Results Reviewed:   I have personally reviewed the results from the time of this admission to 2024 19:24 EDT     Results from last 7 days   Lab Units 24  0554 08/10/24  0520 24  1535 24  0511    SODIUM mmol/L 141 143  --  138   POTASSIUM mmol/L 3.1* 3.7 4.4 3.2*   CHLORIDE mmol/L 90* 95*  --  89*   CO2 mmol/L 41.0* 36.1*  --  34.0*   BUN mg/dL 45* 48*  --  60*   CREATININE mg/dL 1.30* 1.40*  --  1.79*   CALCIUM mg/dL 9.8 9.9  --  9.6   BILIRUBIN mg/dL 1.8* 2.3*  --  2.2*   ALK PHOS U/L 111 108  --  117   ALT (SGPT) U/L 40* 47*  --  52*   AST (SGOT) U/L 66* 85*  --  108*   GLUCOSE mg/dL 150* 134*  --  100*     Estimated Creatinine Clearance: 41.2 mL/min (A) (by C-G formula based on SCr of 1.3 mg/dL (H)).  Results from last 7 days   Lab Units 08/11/24  0554 08/10/24  0520 08/09/24  0511 08/06/24  0833 08/05/24  0921   MAGNESIUM mg/dL 1.8  --   --   --  2.8*   PHOSPHORUS mg/dL 2.7 2.8 3.1   < >  --     < > = values in this interval not displayed.         Results from last 7 days   Lab Units 08/11/24  1550 08/11/24  0752 08/11/24  0020 08/10/24  1620 08/10/24  0520   WBC 10*3/mm3 8.82 13.47* 12.74* 7.60 10.51   HEMOGLOBIN g/dL 9.2* 9.1* 9.1* 8.8* 8.6*   PLATELETS 10*3/mm3 98* 100* 91* 68* 71*     Results from last 7 days   Lab Units 08/05/24  0921   INR  1.40*       Assessment / Plan     ASSESSMENT:  Non olig SANDRO - improving (peak 3.5; was 0.7 in July 2023).  Multifactorial prerenal azotemia more likely than HRS in assoc with GIB, severe anemia, diuretic use and impaired compensation via ACE/NSAIDs (though states motrin use scant).  Potassium stable; likely metabolic alkalosis.  UA bland and Eunice low < 20.  US: no hydronephrosis.    Vol overload, improving; CXR 8/10 with decreasing pulmonary congestion.    Hypotension - resolved, stopped midodrine, tolerating atenolol  Anemia, hgb mid 8s.   Endoscopy been on hold.  TSAT ok 21%.     ETOH abuse  Acute encephalopathy - poss withdrawal    Acute hypoxic resp failure   Rash - has vasculitic appearance but chronic in nature.  Sent DOV/ANCA.  Low C3/C4 noted    PLAN:  1.  Hold Bumex given elevated bicarbonate level  2.  Diamox for 2 doses  2.  Surveillance  labs      Chan Brewster MD  08/11/24  19:24 EDT    [facility]  [phone]

## 2024-08-11 NOTE — THERAPY RE-EVALUATION
Acute Care - Speech Language Pathology   Swallow Re-Evaluation [facility]     Patient Name: Filomena Liu  : 1960  MRN: [id number]  Today's Date: 2024               Admit Date: 2024    Visit Dx:     ICD-10-CM ICD-9-CM   1. Acute blood loss anemia  D62 285.1   2. Gastrointestinal hemorrhage, unspecified gastrointestinal hemorrhage type  K92.2 578.9   3. Acute exacerbation of chronic obstructive pulmonary disease (COPD)  J44.1 491.21   4. Alcoholism  F10.20 303.90   5. Acute renal failure, unspecified acute renal failure type  N17.9 584.9   6. Hyperkalemia  E87.5 276.7     Patient Active Problem List   Diagnosis    Mediastinal mass    Cervical stenosis of spinal canal    Cervical radiculopathy    Cellulitis/abscess of left foot    HTN (hypertension)    History of MRSA infection    Anxiety    Peroneal tenosynovitis    Fracture of navicular bone of left foot    Tobacco use    Non compliance with medical treatment    Screening for colon cancer    Osteoporosis    Shoulder arthritis    Primary localized osteoarthrosis of left shoulder region    History of adenomatous polyp of colon    Diverticulosis    Acute GI bleeding    Hyperkalemia    Acute renal failure    Alcoholism    Acute exacerbation of chronic obstructive pulmonary disease (COPD)    Acute blood loss anemia     Past Medical History:   Diagnosis Date    Ankle pain     Ankle wound     LEFT    Anxiety     Arthritis of back     Arthritis of neck     Asthma     Benign tumor of thymus     REMOVED    Bruises easily     Cataract     COPD (chronic obstructive pulmonary disease)     CTS (carpal tunnel syndrome) Surgery     DDD (degenerative disc disease), cervical     Depression     Fracture of wrist 2917    GERD (gastroesophageal reflux disease)     Hip arthrosis Unknown    History of MRSA infection     LEFT ANKLE TREAT [facility]  5YEARS GO    Hyperlipidemia     Hypertension     Knee sprain 2023    Knee swelling Unknown    Shoulder  arthritis 06/05/2023    Shoulder pain, left 07/07/2023    Sleep apnea     NO MACHINE USE    Spinal stenosis     Spondylolisthesis of cervical region      Past Surgical History:   Procedure Laterality Date    BACK SURGERY      cervical disc surgery with fusion-    CHOLECYSTECTOMY      COLONOSCOPY      COLONOSCOPY N/A 11/12/2020    Procedure: COLONOSCOPY, polypectomy;  Surgeon: Filomena Mckeon MD;  Location:  [facility];  Service: Gastroenterology;  Laterality: N/A;  Diverticulosis; Hepatic flexure polyp x 1; Polyp at 60cm x 1; Polyp at 50cm x 1- hot snare;    COLONOSCOPY N/A 4/15/2024    Procedure: COLONOSCOPY into sigmoid colon with hot snare polypectomy;  Surgeon: Leatha Johns MD;  [facility];  Service: General;  Laterality: N/A;  pre- history of polyps  post- diverticulosis, polyp    HAND SURGERY  2000    HYSTERECTOMY      INCISION AND DRAINAGE LEG Left 11/17/2018    Procedure: Incision and Drainage of Left ankle;  Surgeon: Maxwell Lanier MD;  Location: [facility] MAIN OR;  Service: Orthopedics    NECK SURGERY  2000    SIGMOIDOSCOPY N/A 3/28/2024    Procedure: SIGMOIDOSCOPY FLEXIBLE;  Surgeon: Leatha Johns MD;  [facility];  Service: General;  Laterality: N/A;  pre: h/o colon polyps  post: poor prep, diverticulosis    SKIN BIOPSY      SKIN GRAFT SPLIT THICKNESS N/A 02/12/2019    Procedure: debridement SKIN GRAFT SPLIT THICKNESS left ankle wound;  Surgeon: Barry Worthy MD;  Location:  [facility];  Service: Plastics    TOTAL SHOULDER ARTHROPLASTY Left 7/20/2023    Procedure: Total  shoulder arthroplasty;  Surgeon: Maxwell Lanier MD;  [facility];  Service: Orthopedics;  Laterality: Left;    TOTAL THYMECTOMY      TRIGGER POINT INJECTION  2000    WRIST FRACTURE SURGERY      CARPAL TUNNEL       SLP Recommendation and Plan  SLP Swallowing Diagnosis: oral dysphagia, suspected pharyngeal dysphagia (08/11/24 1100)  SLP Diet Recommendation: nectar  thick liquids, ice chips between meals after oral care, with supervision (08/11/24 1100)  Recommended Precautions and Strategies: upright posture during/after eating, small bites of food and sips of liquid, other (see comments) (feed only when alert) (08/11/24 1100)  SLP Rec. for Method of Medication Administration: meds whole, meds crushed, with thick liquids, with puree, as tolerated (08/11/24 1100)     Monitor for Signs of Aspiration: yes, notify SLP if any concerns, other (see comments) (make NPO) (08/11/24 1100)  Recommended Diagnostics: VFSS ([facility]) (08/11/24 1100)  Swallow Criteria for Skilled Therapeutic Interventions Met: demonstrates skilled criteria (08/11/24 1100)  Anticipated Discharge Disposition (SLP): unknown (08/11/24 1100)  Rehab Potential/Prognosis, Swallowing: good, to achieve stated therapy goals (08/11/24 1100)  Therapy Frequency (Swallow): PRN (08/11/24 1100)  Predicted Duration Therapy Intervention (Days): until discharge (08/11/24 1100)  Oral Care Recommendations: Oral Care BID/PRN (08/11/24 1100)                                        Plan of Care Reviewed With: patient  Outcome Evaluation: Swallow reevaluated. Pt had slight wheezing noted during eval. RT to give breathing treatment after eval. Pt tolerated ice chips with no s/s. Pt had delayed coughing in 1/5 teaspoon sips of thin and 1/4 cup sips of thin. The delayed coughing was significant with pt getting very red and taking extra time to clear. Suspect silent penetration leading to aspiration and coughing or backflow from esophagus leading to posterior penetration/aspiration triggering coughing. No s/s were noted with NTL (cup/straw) with pt taking 4 oz. Pt's voice remained clear throughout the eval. Discussed with pt and . Recommend clear nectar thick liquid diet (pt not cleared for solids); meds w/ NTL or in pureed; upright for meals and 30 min after; feed only when alert; slow rate; small sips. If s/s are noted, make NPO. Pt  safe for ice chips between meals (wait 30 min) after oral care. Recommend VFSS when pt is cleared for barium. GI plans an EGD this week. ST to follow.      SWALLOW EVALUATION (Last 72 Hours)       SLP Adult Swallow Evaluation       Row Name 08/11/24 1100 08/08/24 1500                Rehab Evaluation    Document Type re-evaluation  -AW evaluation  -OC       Subjective Information complains of;pain  -AW no complaints  -OC       Patient Observations alert;cooperative;agree to therapy  -AW alert;cooperative;agree to therapy  -OC       Patient/Family/Caregiver Comments/Observations  present.  -AW --       Patient Effort good  -AW good  -OC       Symptoms Noted During/After Treatment fatigue  -AW none  -OC       Oral Care teeth brushed - regular toothbrush  -AW --          General Information    Patient Profile Reviewed yes  -AW yes  -OC       Pertinent History Of Current Problem Pt admitted with GI bleed, COPD exacerbation, SANDRO, alcohol withdrawal; h/o ETOH abuse.  -AW Patient admitted with acute GI bleeding. Currently on clear liquid diet. Hx ETOH abuse, COPD.  -OC       Current Method of Nutrition NPO;other (see comments)  eval on 8/8 recommended thin liquid diet; changed to NTL diet due to coughing  -AW clear liquids;thin liquids  -OC       Precautions/Limitations, Vision WFL;for purposes of eval  -AW WFL;for purposes of eval  -OC       Precautions/Limitations, Hearing WFL  -AW WFL;for purposes of eval  -OC       Prior Level of Function-Communication unknown  -AW unknown  -OC       Prior Level of Function-Swallowing no diet consistency restrictions  -AW no diet consistency restrictions  -OC       Plans/Goals Discussed with patient;spouse/S.O.;agreed upon  -AW patient  -OC       Barriers to Rehab none identified  -AW medically complex  -OC       Patient's Goals for Discharge return to PO diet  -AW patient did not state  -OC       Family Goals for Discharge family did not state  -AW --          Pain    Additional  Documentation Pain Scale: Numbers Pre/Post-Treatment (Group)  -AW --          Pain Scale: Numbers Pre/Post-Treatment    Pretreatment Pain Rating 8/10  -AW 0/10 - no pain  -OC       Posttreatment Pain Rating 8/10  -AW 0/10 - no pain  -OC       Pain Location - back  -AW --       Pain Intervention(s) Repositioned;Nursing Notified  -AW --          Oral Motor Structure and Function    Dentition Assessment natural, present and adequate  -AW natural, present and adequate  -OC       Secretion Management WNL/WFL  -AW WNL/WFL  -OC       Mucosal Quality moist, healthy  -AW dry  -OC       Volitional Swallow -- delayed  -OC       Volitional Cough -- weak  -OC          Oral Musculature and Cranial Nerve Assessment    Oral Motor General Assessment -- generalized oral motor weakness  -OC       Vocal Impairment, Detail. Cranial Nerve X (Vagus) -- other (see comments)  weak vocal quality  -OC          General Eating/Swallowing Observations    Respiratory Support Currently in Use nasal cannula  -AW --       Eating/Swallowing Skills self-fed;fed by SLP  -AW --       Positioning During Eating upright in bed  -AW --       Utensils Used spoon;cup;straw  -AW --       Consistencies Trialed ice chips;thin liquids;nectar/syrup-thick liquids  -AW --          Clinical Swallow Eval    Clinical Swallow Evaluation Summary Swallow reevaluated. Pt had slight wheezing noted during eval. RT to give breathing treatment after eval. Pt tolerated ice chips with no s/s. Pt had delayed coughing in 1/5 teaspoon sips of thin and 1/4 cup sips of thin. The delayed coughing was significant with pt getting very red and taking extra time to clear. Suspect silent penetration leading to aspiration and coughing or backflow from esophagus leading to posterior penetration/aspiration triggering coughing. No s/s were noted with NTL (cup/straw) with pt taking 4 oz. Pt's voice remained clear throughout the eval. Discussed with pt and . Recommend clear nectar thick  liquid diet (pt not cleared for solids); meds w/ NTL or in pureed; upright for meals and 30 min after; feed only when alert; slow rate; small sips. If s/s are noted, make NPO. Pt safe for ice chips between meals (wait 30 min) after oral care.  Recommend VFSS when pt is cleared for barium. GI plans an EGD this week. ST to follow.  -AW Patient alert and upright for eval. Agreeable to PO trials. Patient demonstrated delayed coughing s/p ice chip, no change in vocal quality. Patient demonstrated no overt s/s aspiration with thin via cup/straw with small/single sips. No overt s/s aspiration with nectar thick via cup. Vocal quality weak but remained clear throughout trials.  -OC          SLP Evaluation Clinical Impression    SLP Swallowing Diagnosis oral dysphagia;suspected pharyngeal dysphagia  -AW R/O pharyngeal dysphagia  -OC       Functional Impact risk of aspiration/pneumonia  -AW risk of aspiration/pneumonia  -OC       Rehab Potential/Prognosis, Swallowing good, to achieve stated therapy goals  -AW good, to achieve stated therapy goals  -OC       Swallow Criteria for Skilled Therapeutic Interventions Met demonstrates skilled criteria  -AW demonstrates skilled criteria  -OC          Recommendations    Therapy Frequency (Swallow) PRN  -AW PRN  -OC       Predicted Duration Therapy Intervention (Days) until discharge  -AW until discharge  -OC       SLP Diet Recommendation nectar thick liquids;ice chips between meals after oral care, with supervision  -AW thin liquids;clear liquid diet;other (see comments)  clears per baseline diet ordered, can downgrade to NTL with coughing  -OC       Recommended Diagnostics VFSS ([facility])  -AW reassess via clinical swallow evaluation  -OC       Recommended Precautions and Strategies upright posture during/after eating;small bites of food and sips of liquid;other (see comments)  feed only when alert  -AW upright posture during/after eating;small bites of food and sips of liquid  -OC        Oral Care Recommendations Oral Care BID/PRN  -AW Oral Care BID/PRN  -OC       SLP Rec. for Method of Medication Administration meds whole;meds crushed;with thick liquids;with puree;as tolerated  -AW meds whole;meds crushed;with thick liquids  -OC       Monitor for Signs of Aspiration yes;notify SLP if any concerns;other (see comments)  make NPO  -AW yes;notify SLP if any concerns  -OC       Anticipated Discharge Disposition (SLP) unknown  -AW --          Swallow Goals (SLP)    Swallow LTGs -- Swallow Long Term Goal (free text)  -OC          (LTG) Swallow    (LTG) Swallow -- Tolerate least restrictive diet with no overt s/s aspiration.  -OC       [place] (Swallow Long Term Goal) -- with minimal cues (75-90% accuracy)  -OC       Time Frame (Swallow Long Term Goal) -- by discharge  -OC                 User Key  (r) = Recorded By, (t) = Taken By, (c) = Cosigned By      Initials Name Effective Dates    Rachel Martínez SLP 08/28/23 -     Lorraine Apple SLP 08/28/23 -                     EDUCATION  The patient has been educated in the following areas:   Dysphagia (Swallowing Impairment) Oral Care/Hydration Modified Diet Instruction.        SLP GOALS       Row Name 08/08/24 1500             (LTG) Swallow    (LTG) Swallow Tolerate least restrictive diet with no overt s/s aspiration.  -OC      [place] (Swallow Long Term Goal) with minimal cues (75-90% accuracy)  -OC      Time Frame (Swallow Long Term Goal) by discharge  -OC                User Key  (r) = Recorded By, (t) = Taken By, (c) = Cosigned By      Initials Name Provider Type    Rachel Martínez SLP Speech and Language Pathologist                         Time Calculation:    Time Calculation- SLP       Row Name 08/11/24 1255             Time Calculation- SLP    SLP Start Time 1030  -AW      SLP Received On 08/11/24  -AW                User Key  (r) = Recorded By, (t) = Taken By, (c) = Cosigned By      Initials Name Provider Type    Lorraine Apple SLP  Speech and Language Pathologist                    Therapy Charges for Today       Code Description Service Date Service Provider Modifiers Qty    [id number]  ST TREATMENT SWALLOW 5 8/11/2024 Lorraine Beasley, SLP GN 1                 RUPESH Mcgovern  8/11/2024

## 2024-08-11 NOTE — PLAN OF CARE
Goal Outcome Evaluation:  Plan of Care Reviewed With: patient        Progress: no change  Outcome Evaluation: Pt alert to self, on 2L NC. Purewick in place. CIWA scores: 5-15, treated with PRN Ativan. Breathing treatment ordered this shift. Q2 turns as tolerated. VSS.

## 2024-08-12 LAB
ALBUMIN SERPL-MCNC: 3.7 G/DL (ref 3.5–5.2)
ALBUMIN/GLOB SERPL: 1.2 G/DL
ALP SERPL-CCNC: 114 U/L (ref 39–117)
ALT SERPL W P-5'-P-CCNC: 42 U/L (ref 1–33)
AMMONIA BLD-SCNC: 51 UMOL/L (ref 11–51)
ANION GAP SERPL CALCULATED.3IONS-SCNC: 12.1 MMOL/L (ref 5–15)
AST SERPL-CCNC: 58 U/L (ref 1–32)
BASOPHILS # BLD AUTO: 0.01 10*3/MM3 (ref 0–0.2)
BASOPHILS # BLD AUTO: 0.01 10*3/MM3 (ref 0–0.2)
BASOPHILS # BLD AUTO: 0.02 10*3/MM3 (ref 0–0.2)
BASOPHILS NFR BLD AUTO: 0.1 % (ref 0–1.5)
BILIRUB SERPL-MCNC: 1.7 MG/DL (ref 0–1.2)
BUN SERPL-MCNC: 40 MG/DL (ref 8–23)
BUN/CREAT SERPL: 28.4 (ref 7–25)
CALCIUM SPEC-SCNC: 9.6 MG/DL (ref 8.6–10.5)
CHLORIDE SERPL-SCNC: 88 MMOL/L (ref 98–107)
CO2 SERPL-SCNC: 33.9 MMOL/L (ref 22–29)
CREAT SERPL-MCNC: 1.41 MG/DL (ref 0.57–1)
DEPRECATED RDW RBC AUTO: 51.3 FL (ref 37–54)
DEPRECATED RDW RBC AUTO: 53.3 FL (ref 37–54)
DEPRECATED RDW RBC AUTO: 54.2 FL (ref 37–54)
EGFRCR SERPLBLD CKD-EPI 2021: 41.7 ML/MIN/1.73
EOSINOPHIL # BLD AUTO: 0 10*3/MM3 (ref 0–0.4)
EOSINOPHIL # BLD AUTO: 0.01 10*3/MM3 (ref 0–0.4)
EOSINOPHIL # BLD AUTO: 0.01 10*3/MM3 (ref 0–0.4)
EOSINOPHIL NFR BLD AUTO: 0 % (ref 0.3–6.2)
EOSINOPHIL NFR BLD AUTO: 0.1 % (ref 0.3–6.2)
EOSINOPHIL NFR BLD AUTO: 0.1 % (ref 0.3–6.2)
ERYTHROCYTE [DISTWIDTH] IN BLOOD BY AUTOMATED COUNT: 17 % (ref 12.3–15.4)
ERYTHROCYTE [DISTWIDTH] IN BLOOD BY AUTOMATED COUNT: 17.2 % (ref 12.3–15.4)
ERYTHROCYTE [DISTWIDTH] IN BLOOD BY AUTOMATED COUNT: 17.4 % (ref 12.3–15.4)
GLOBULIN UR ELPH-MCNC: 3.1 GM/DL
GLUCOSE BLDC GLUCOMTR-MCNC: 136 MG/DL (ref 70–130)
GLUCOSE BLDC GLUCOMTR-MCNC: 200 MG/DL (ref 70–130)
GLUCOSE BLDC GLUCOMTR-MCNC: 209 MG/DL (ref 70–130)
GLUCOSE BLDC GLUCOMTR-MCNC: 259 MG/DL (ref 70–130)
GLUCOSE SERPL-MCNC: 129 MG/DL (ref 65–99)
HCT VFR BLD AUTO: 30.3 % (ref 34–46.6)
HCT VFR BLD AUTO: 30.5 % (ref 34–46.6)
HCT VFR BLD AUTO: 30.8 % (ref 34–46.6)
HGB BLD-MCNC: 9.3 G/DL (ref 12–15.9)
HGB BLD-MCNC: 9.3 G/DL (ref 12–15.9)
HGB BLD-MCNC: 9.4 G/DL (ref 12–15.9)
IMM GRANULOCYTES # BLD AUTO: 0.16 10*3/MM3 (ref 0–0.05)
IMM GRANULOCYTES # BLD AUTO: 0.23 10*3/MM3 (ref 0–0.05)
IMM GRANULOCYTES # BLD AUTO: 0.35 10*3/MM3 (ref 0–0.05)
IMM GRANULOCYTES NFR BLD AUTO: 1.4 % (ref 0–0.5)
IMM GRANULOCYTES NFR BLD AUTO: 1.6 % (ref 0–0.5)
IMM GRANULOCYTES NFR BLD AUTO: 2.4 % (ref 0–0.5)
LYMPHOCYTES # BLD AUTO: 0.56 10*3/MM3 (ref 0.7–3.1)
LYMPHOCYTES # BLD AUTO: 0.91 10*3/MM3 (ref 0.7–3.1)
LYMPHOCYTES # BLD AUTO: 0.95 10*3/MM3 (ref 0.7–3.1)
LYMPHOCYTES NFR BLD AUTO: 5.1 % (ref 19.6–45.3)
LYMPHOCYTES NFR BLD AUTO: 6.3 % (ref 19.6–45.3)
LYMPHOCYTES NFR BLD AUTO: 6.4 % (ref 19.6–45.3)
MAGNESIUM SERPL-MCNC: 1.4 MG/DL (ref 1.6–2.4)
MCH RBC QN AUTO: 26.1 PG (ref 26.6–33)
MCH RBC QN AUTO: 26.2 PG (ref 26.6–33)
MCH RBC QN AUTO: 26.6 PG (ref 26.6–33)
MCHC RBC AUTO-ENTMCNC: 30.2 G/DL (ref 31.5–35.7)
MCHC RBC AUTO-ENTMCNC: 30.7 G/DL (ref 31.5–35.7)
MCHC RBC AUTO-ENTMCNC: 30.8 G/DL (ref 31.5–35.7)
MCV RBC AUTO: 84.7 FL (ref 79–97)
MCV RBC AUTO: 86.6 FL (ref 79–97)
MCV RBC AUTO: 86.8 FL (ref 79–97)
MONOCYTES # BLD AUTO: 1.19 10*3/MM3 (ref 0.1–0.9)
MONOCYTES # BLD AUTO: 1.75 10*3/MM3 (ref 0.1–0.9)
MONOCYTES # BLD AUTO: 2.46 10*3/MM3 (ref 0.1–0.9)
MONOCYTES NFR BLD AUTO: 10.8 % (ref 5–12)
MONOCYTES NFR BLD AUTO: 12.1 % (ref 5–12)
MONOCYTES NFR BLD AUTO: 16.6 % (ref 5–12)
NEUTROPHILS NFR BLD AUTO: 11.15 10*3/MM3 (ref 1.7–7)
NEUTROPHILS NFR BLD AUTO: 11.39 10*3/MM3 (ref 1.7–7)
NEUTROPHILS NFR BLD AUTO: 75.2 % (ref 42.7–76)
NEUTROPHILS NFR BLD AUTO: 79.1 % (ref 42.7–76)
NEUTROPHILS NFR BLD AUTO: 82.5 % (ref 42.7–76)
NEUTROPHILS NFR BLD AUTO: 9.11 10*3/MM3 (ref 1.7–7)
NRBC BLD AUTO-RTO: 0 /100 WBC (ref 0–0.2)
NRBC BLD AUTO-RTO: 0 /100 WBC (ref 0–0.2)
PLATELET # BLD AUTO: 122 10*3/MM3 (ref 140–450)
PLATELET # BLD AUTO: 146 10*3/MM3 (ref 140–450)
PLATELET # BLD AUTO: 168 10*3/MM3 (ref 140–450)
PMV BLD AUTO: 10.3 FL (ref 6–12)
PMV BLD AUTO: 10.6 FL (ref 6–12)
PMV BLD AUTO: 10.6 FL (ref 6–12)
POTASSIUM SERPL-SCNC: 3.6 MMOL/L (ref 3.5–5.2)
POTASSIUM SERPL-SCNC: 3.8 MMOL/L (ref 3.5–5.2)
POTASSIUM SERPL-SCNC: 3.8 MMOL/L (ref 3.5–5.2)
PROT SERPL-MCNC: 6.8 G/DL (ref 6–8.5)
RBC # BLD AUTO: 3.5 10*6/MM3 (ref 3.77–5.28)
RBC # BLD AUTO: 3.55 10*6/MM3 (ref 3.77–5.28)
RBC # BLD AUTO: 3.6 10*6/MM3 (ref 3.77–5.28)
SODIUM SERPL-SCNC: 134 MMOL/L (ref 136–145)
WBC NRBC COR # BLD AUTO: 11.04 10*3/MM3 (ref 3.4–10.8)
WBC NRBC COR # BLD AUTO: 14.42 10*3/MM3 (ref 3.4–10.8)
WBC NRBC COR # BLD AUTO: 14.81 10*3/MM3 (ref 3.4–10.8)

## 2024-08-12 PROCEDURE — 25010000002 MAGNESIUM SULFATE 2 GM/50ML SOLUTION: Performed by: STUDENT IN AN ORGANIZED HEALTH CARE EDUCATION/TRAINING PROGRAM

## 2024-08-12 PROCEDURE — 84132 ASSAY OF SERUM POTASSIUM: CPT | Performed by: STUDENT IN AN ORGANIZED HEALTH CARE EDUCATION/TRAINING PROGRAM

## 2024-08-12 PROCEDURE — 94760 N-INVAS EAR/PLS OXIMETRY 1: CPT

## 2024-08-12 PROCEDURE — 94799 UNLISTED PULMONARY SVC/PX: CPT

## 2024-08-12 PROCEDURE — 97530 THERAPEUTIC ACTIVITIES: CPT

## 2024-08-12 PROCEDURE — 82140 ASSAY OF AMMONIA: CPT | Performed by: NURSE PRACTITIONER

## 2024-08-12 PROCEDURE — 80053 COMPREHEN METABOLIC PANEL: CPT | Performed by: STUDENT IN AN ORGANIZED HEALTH CARE EDUCATION/TRAINING PROGRAM

## 2024-08-12 PROCEDURE — 85025 COMPLETE CBC W/AUTO DIFF WBC: CPT | Performed by: INTERNAL MEDICINE

## 2024-08-12 PROCEDURE — 94664 DEMO&/EVAL PT USE INHALER: CPT

## 2024-08-12 PROCEDURE — 82948 REAGENT STRIP/BLOOD GLUCOSE: CPT

## 2024-08-12 PROCEDURE — 94761 N-INVAS EAR/PLS OXIMETRY MLT: CPT

## 2024-08-12 PROCEDURE — 83735 ASSAY OF MAGNESIUM: CPT | Performed by: INTERNAL MEDICINE

## 2024-08-12 PROCEDURE — 63710000001 INSULIN LISPRO (HUMAN) PER 5 UNITS: Performed by: INTERNAL MEDICINE

## 2024-08-12 PROCEDURE — 99232 SBSQ HOSP IP/OBS MODERATE 35: CPT | Performed by: INTERNAL MEDICINE

## 2024-08-12 PROCEDURE — 25010000002 METHYLPREDNISOLONE PER 40 MG: Performed by: INTERNAL MEDICINE

## 2024-08-12 PROCEDURE — 25010000002 CEFTRIAXONE PER 250 MG: Performed by: STUDENT IN AN ORGANIZED HEALTH CARE EDUCATION/TRAINING PROGRAM

## 2024-08-12 RX ORDER — POTASSIUM CHLORIDE 750 MG/1
40 TABLET, FILM COATED, EXTENDED RELEASE ORAL EVERY 4 HOURS
Status: COMPLETED | OUTPATIENT
Start: 2024-08-12 | End: 2024-08-12

## 2024-08-12 RX ORDER — IPRATROPIUM BROMIDE AND ALBUTEROL SULFATE 2.5; .5 MG/3ML; MG/3ML
3 SOLUTION RESPIRATORY (INHALATION)
Status: DISCONTINUED | OUTPATIENT
Start: 2024-08-12 | End: 2024-08-13

## 2024-08-12 RX ORDER — MAGNESIUM SULFATE HEPTAHYDRATE 40 MG/ML
2 INJECTION, SOLUTION INTRAVENOUS
Status: COMPLETED | OUTPATIENT
Start: 2024-08-12 | End: 2024-08-12

## 2024-08-12 RX ADMIN — HYDROCODONE BITARTRATE AND ACETAMINOPHEN 1 TABLET: 7.5; 325 TABLET ORAL at 22:41

## 2024-08-12 RX ADMIN — HYDROCODONE BITARTRATE AND ACETAMINOPHEN 1 TABLET: 7.5; 325 TABLET ORAL at 14:12

## 2024-08-12 RX ADMIN — PANTOPRAZOLE SODIUM 40 MG: 40 INJECTION, POWDER, FOR SOLUTION INTRAVENOUS at 08:27

## 2024-08-12 RX ADMIN — IPRATROPIUM BROMIDE AND ALBUTEROL SULFATE 3 ML: .5; 3 SOLUTION RESPIRATORY (INHALATION) at 23:02

## 2024-08-12 RX ADMIN — ATENOLOL 25 MG: 25 TABLET ORAL at 08:27

## 2024-08-12 RX ADMIN — FOLIC ACID 1 MG: 1 TABLET ORAL at 08:27

## 2024-08-12 RX ADMIN — BUDESONIDE AND FORMOTEROL FUMARATE DIHYDRATE 2 PUFF: 160; 4.5 AEROSOL RESPIRATORY (INHALATION) at 08:08

## 2024-08-12 RX ADMIN — ACETAZOLAMIDE 250 MG: 250 TABLET ORAL at 08:27

## 2024-08-12 RX ADMIN — INSULIN LISPRO 4 UNITS: 100 INJECTION, SOLUTION INTRAVENOUS; SUBCUTANEOUS at 11:41

## 2024-08-12 RX ADMIN — IPRATROPIUM BROMIDE AND ALBUTEROL SULFATE 3 ML: .5; 3 SOLUTION RESPIRATORY (INHALATION) at 08:07

## 2024-08-12 RX ADMIN — PANTOPRAZOLE SODIUM 40 MG: 40 INJECTION, POWDER, FOR SOLUTION INTRAVENOUS at 20:52

## 2024-08-12 RX ADMIN — INSULIN LISPRO 4 UNITS: 100 INJECTION, SOLUTION INTRAVENOUS; SUBCUTANEOUS at 08:26

## 2024-08-12 RX ADMIN — MAGNESIUM SULFATE HEPTAHYDRATE 2 G: 40 INJECTION, SOLUTION INTRAVENOUS at 13:20

## 2024-08-12 RX ADMIN — IPRATROPIUM BROMIDE AND ALBUTEROL SULFATE 3 ML: .5; 3 SOLUTION RESPIRATORY (INHALATION) at 19:14

## 2024-08-12 RX ADMIN — IPRATROPIUM BROMIDE AND ALBUTEROL SULFATE 3 ML: .5; 3 SOLUTION RESPIRATORY (INHALATION) at 14:38

## 2024-08-12 RX ADMIN — Medication 100 MG: at 08:27

## 2024-08-12 RX ADMIN — POTASSIUM CHLORIDE 40 MEQ: 750 TABLET, EXTENDED RELEASE ORAL at 11:41

## 2024-08-12 RX ADMIN — Medication 10 ML: at 08:28

## 2024-08-12 RX ADMIN — METHYLPREDNISOLONE SODIUM SUCCINATE 40 MG: 40 INJECTION, POWDER, FOR SOLUTION INTRAMUSCULAR; INTRAVENOUS at 08:27

## 2024-08-12 RX ADMIN — ROPINIROLE 1 MG: 1 TABLET, FILM COATED ORAL at 20:51

## 2024-08-12 RX ADMIN — SENNOSIDES AND DOCUSATE SODIUM 2 TABLET: 50; 8.6 TABLET ORAL at 08:28

## 2024-08-12 RX ADMIN — HYDROCODONE BITARTRATE AND ACETAMINOPHEN 1 TABLET: 7.5; 325 TABLET ORAL at 06:54

## 2024-08-12 RX ADMIN — CEFTRIAXONE 2000 MG: 2 INJECTION, POWDER, FOR SOLUTION INTRAMUSCULAR; INTRAVENOUS at 15:40

## 2024-08-12 RX ADMIN — IPRATROPIUM BROMIDE AND ALBUTEROL SULFATE 3 ML: .5; 3 SOLUTION RESPIRATORY (INHALATION) at 04:40

## 2024-08-12 RX ADMIN — SPIRONOLACTONE 25 MG: 25 TABLET, FILM COATED ORAL at 08:27

## 2024-08-12 RX ADMIN — Medication 10 ML: at 20:52

## 2024-08-12 RX ADMIN — MAGNESIUM SULFATE HEPTAHYDRATE 2 G: 40 INJECTION, SOLUTION INTRAVENOUS at 11:41

## 2024-08-12 RX ADMIN — INSULIN LISPRO 6 UNITS: 100 INJECTION, SOLUTION INTRAVENOUS; SUBCUTANEOUS at 17:24

## 2024-08-12 RX ADMIN — METHYLPREDNISOLONE SODIUM SUCCINATE 40 MG: 40 INJECTION, POWDER, FOR SOLUTION INTRAMUSCULAR; INTRAVENOUS at 20:52

## 2024-08-12 RX ADMIN — BUDESONIDE AND FORMOTEROL FUMARATE DIHYDRATE 2 PUFF: 160; 4.5 AEROSOL RESPIRATORY (INHALATION) at 19:20

## 2024-08-12 RX ADMIN — LORAZEPAM 1 MG: 1 TABLET ORAL at 20:51

## 2024-08-12 RX ADMIN — LORAZEPAM 1 MG: 1 TABLET ORAL at 08:27

## 2024-08-12 RX ADMIN — IPRATROPIUM BROMIDE AND ALBUTEROL SULFATE 3 ML: .5; 3 SOLUTION RESPIRATORY (INHALATION) at 12:02

## 2024-08-12 RX ADMIN — NICOTINE 1 PATCH: 21 PATCH, EXTENDED RELEASE TRANSDERMAL at 01:23

## 2024-08-12 RX ADMIN — MAGNESIUM SULFATE HEPTAHYDRATE 2 G: 40 INJECTION, SOLUTION INTRAVENOUS at 16:29

## 2024-08-12 RX ADMIN — POTASSIUM CHLORIDE 40 MEQ: 750 TABLET, EXTENDED RELEASE ORAL at 15:40

## 2024-08-12 NOTE — PROGRESS NOTES
[facility]  [phone]  Dr. Jacques Asencio    Subjective:  LOS: 6    Chief Complaint: COPD exacerbation    Patient states she continues to have shortness of breath and wheezing.  She does not feel much better.  She is a current cigarette smoker since age 16.  She reports cough that bothers her a lot when she lays flat at night.    Objective   Vital Signs past 24hrs  Temp range: Temp (24hrs), Av.3 °F (36.8 °C), Min:97.9 °F (36.6 °C), Max:98.7 °F (37.1 °C)    BP range: BP: ()/(51-79) 88/51  Pulse range: Heart Rate:  [72-89] 76  Resp rate range: Resp:  [16] 16  Device (Oxygen Therapy): nasal cannulaFlow (L/min):  [1] 1  Oxygen range:SpO2:  [92 %-100 %] 95 %   Mechanical Ventilator:     Physical Exam  Constitutional:       Appearance: She is obese. She is ill-appearing.   Eyes:      Pupils: Pupils are equal, round, and reactive to light.   Cardiovascular:      Rate and Rhythm: Normal rate and regular rhythm.      Heart sounds: No murmur heard.  Pulmonary:      Effort: Accessory muscle usage present.      Breath sounds: Decreased breath sounds and wheezing present.   Abdominal:      General: Bowel sounds are normal.      Palpations: Abdomen is soft. There is no mass.      Tenderness: There is no abdominal tenderness.   Musculoskeletal:         General: No swelling.   Neurological:      Mental Status: She is alert.       Results Review:    I have reviewed the laboratory and imaging data since the last note by [facility] physician.  My annotations are noted in assessment and plan.      Result Review:  I have personally reviewed the results from last note by [facility] physician to 2024 15:57 EDT and agree with these findings:  [x]  Laboratory list / accordion  [x]  Microbiology  [x]  Radiology  []  EKG/Telemetry   []  Cardiology/Vascular   []  Pathology  []  Old records  []  Other:    Medication Review:  I have reviewed the current MAR.  My annotations are noted in assessment and plan.    atenolol, 25 mg,  Oral, Daily  budesonide-formoterol, 2 puff, Inhalation, BID - RT  cefTRIAXone, 2,000 mg, Intravenous, Q24H  folic acid, 1 mg, Oral, Daily  insulin lispro, 2-9 Units, Subcutaneous, 4x Daily AC & at Bedtime  ipratropium-albuterol, 3 mL, Nebulization, 4x Daily  LORazepam, 1 mg, Oral, TID  magnesium sulfate, 2 g, Intravenous, Q2H  methylPREDNISolone sodium succinate, 40 mg, Intravenous, Q12H  nicotine, 1 patch, Transdermal, Q24H  pantoprazole, 40 mg, Intravenous, Q12H  rOPINIRole, 1 mg, Oral, Nightly  sodium chloride, 10 mL, Intravenous, Q12H  spironolactone, 25 mg, Oral, Daily  thiamine, 100 mg, Oral, Daily           Lines, Drains & Airways       Active LDAs       Name Placement date Placement time Site Days    Peripheral IV 08/09/24 1017 Left;Upper Arm 08/09/24  1017  Arm  3    External Urinary Catheter 08/06/24  1900  --  5                  Isolation status: No active isolations    Dietary Orders (From admission, onward)       Start     Ordered    08/11/24 1206  Diet: Liquid; Clear Liquid; Fluid Consistency: Nectar Thick  Diet Effective Now        References:    Diet Order [place]   Question Answer Comment   Diets: Liquid    Liquid Diet: Clear Liquid    Fluid Consistency: Nectar Thick        08/11/24 1206                    PCC Problems  Acute exacerbation of COPD  Obesity  BENY, intolerant of CPAP  Current cigarette smoker  Relevant Medical Diagnoses  Suspected GI bleed  Anemia suspected acute blood loss  Cirrhosis  Alcohol abuse  Acute alcohol withdrawal  Moderate mitral regurgitation        THESE ARE NEW MEDICAL PROBLEMS TO ME.    Plan of Treatment    Still with some wheezing.  Continue current dose of IV Solu-Medrol and increased frequency of nebulizer treatments.    She is not using the noninvasive ventilation at bedside some hypercapnia on previous ABG.  Would benefit from resuming treatment of obstructive sleep apnea with CPAP as outpatient.    Patient be counseled to quit smoking prior to  discharge.    Jacques Asencio MD  08/12/24  15:57 EDT      Part of this note may be an electronic transcription/translation of spoken language to printed text using the Dragon Dictation System.

## 2024-08-12 NOTE — THERAPY TREATMENT NOTE
Patient Name: Filomena Liu  : 1960    MRN: [id number]                              Today's Date: 2024       Admit Date: 2024    Visit Dx:     ICD-10-CM ICD-9-CM   1. Acute blood loss anemia  D62 285.1   2. Gastrointestinal hemorrhage, unspecified gastrointestinal hemorrhage type  K92.2 578.9   3. Acute exacerbation of chronic obstructive pulmonary disease (COPD)  J44.1 491.21   4. Alcoholism  F10.20 303.90   5. Acute renal failure, unspecified acute renal failure type  N17.9 584.9   6. Hyperkalemia  E87.5 276.7     Patient Active Problem List   Diagnosis    Mediastinal mass    Cervical stenosis of spinal canal    Cervical radiculopathy    Cellulitis/abscess of left foot    HTN (hypertension)    History of MRSA infection    Anxiety    Peroneal tenosynovitis    Fracture of navicular bone of left foot    Tobacco use    Non compliance with medical treatment    Screening for colon cancer    Osteoporosis    Shoulder arthritis    Primary localized osteoarthrosis of left shoulder region    History of adenomatous polyp of colon    Diverticulosis    Acute GI bleeding    Hyperkalemia    Acute renal failure    Alcoholism    Acute exacerbation of chronic obstructive pulmonary disease (COPD)    Acute blood loss anemia     Past Medical History:   Diagnosis Date    Ankle pain     Ankle wound     LEFT    Anxiety     Arthritis of back     Arthritis of neck     Asthma     Benign tumor of thymus     REMOVED    Bruises easily     Cataract     COPD (chronic obstructive pulmonary disease)     CTS (carpal tunnel syndrome) Surgery     DDD (degenerative disc disease), cervical     Depression     Fracture of wrist 2917    GERD (gastroesophageal reflux disease)     Hip arthrosis Unknown    History of MRSA infection     LEFT ANKLE TREAT [facility]  5YEARS GO    Hyperlipidemia     Hypertension     Knee sprain 2023    Knee swelling Unknown    Shoulder arthritis 2023    Shoulder pain, left 2023    Sleep  apnea     NO MACHINE USE    Spinal stenosis     Spondylolisthesis of cervical region      Past Surgical History:   Procedure Laterality Date    BACK SURGERY      cervical disc surgery with fusion-    CHOLECYSTECTOMY      COLONOSCOPY      COLONOSCOPY N/A 11/12/2020    Procedure: COLONOSCOPY, polypectomy;  Surgeon: Filomena Mckeon MD;  Location: [facility] OR;  Service: Gastroenterology;  Laterality: N/A;  Diverticulosis; Hepatic flexure polyp x 1; Polyp at 60cm x 1; Polyp at 50cm x 1- hot snare;    COLONOSCOPY N/A 4/15/2024    Procedure: COLONOSCOPY into sigmoid colon with hot snare polypectomy;  Surgeon: Leatha Johns MD;  Location: [facility] ENDOSCOPY;  Service: General;  Laterality: N/A;  pre- history of polyps  post- diverticulosis, polyp    HAND SURGERY  2000    HYSTERECTOMY      INCISION AND DRAINAGE LEG Left 11/17/2018    Procedure: Incision and Drainage of Left ankle;  Surgeon: Maxwell Lanier MD;  Location: [facility] MAIN OR;  Service: Orthopedics    NECK SURGERY  2000    SIGMOIDOSCOPY N/A 3/28/2024    Procedure: SIGMOIDOSCOPY FLEXIBLE;  Surgeon: Leatha Johns MD;  Location: [facility] ENDOSCOPY;  Service: General;  Laterality: N/A;  pre: h/o colon polyps  post: poor prep, diverticulosis    SKIN BIOPSY      SKIN GRAFT SPLIT THICKNESS N/A 02/12/2019    Procedure: debridement SKIN GRAFT SPLIT THICKNESS left ankle wound;  Surgeon: Barry Worthy MD;  [place] [facility] [place] [facility] [place]: [place]    TOTAL SHOULDER ARTHROPLASTY Left 7/20/2023    Procedure: Total  shoulder arthroplasty;  Surgeon: Maxwell Lanier MD;  [place] [facility] [place] [facility] [place]  Service: [place]opedics;  Laterality: Left;    TOTAL THYMECTOMY      TRIGGER POINT INJECTION  2000    WRIST FRACTURE SURGERY      CARPAL TUNNEL      General Information       Row Name 08/12/24 1031          Physical Therapy Time and Intention    Document Type therapy note (daily note)  -EJ     Mode of Treatment co-treatment;physical therapy;occupational therapy  -EJ        Row Name 08/12/24 1031          General Information    Existing Precautions/Restrictions fall  -EJ       Row Name 08/12/24 1031          Safety Issues, Functional Mobility    Comment, Safety Issues/Impairments (Mobility) Co treatment medically appropriate and necessary due to patient acuity level, activity tolerance and safety of patient and staff. Treatment is focusing on progression of care and goals established in the POC.  -EJ               User Key  (r) = Recorded By, (t) = Taken By, (c) = Cosigned By      Initials Name Provider Type    Dorcas Lopez, PT Physical Therapist                   Mobility       Row Name 08/12/24 1032          Bed Mobility    Bed Mobility supine-sit  -EJ     Supine-Sit Nance (Bed Mobility) verbal cues;moderate assist (50% patient effort)  -EJ     Assistive Device (Bed Mobility) head of bed elevated;bed rails  -[facility] Name 08/12/24 1032          Sit-Stand Transfer    Sit-Stand Nance (Transfers) verbal cues;minimum assist (75% patient effort)  -EJ     Assistive Device (Sit-Stand Transfers) walker, front-wheeled  -EJ     Comment, (Sit-Stand Transfer) cues for hand placement  -[facility] Name 08/12/24 1032          Gait/Stairs (Locomotion)    Nance Level (Gait) verbal cues;minimum assist (75% patient effort)  -EJ     Assistive Device (Gait) walker, front-wheeled  -EJ     Distance in Feet (Gait) 20  -EJ     Deviations/Abnormal Patterns (Gait) dayanara decreased;stride length decreased  -EJ     Bilateral Gait Deviations heel strike decreased  -EJ     Comment, (Gait/Stairs) slow pace, assist w direction and walker management, mild unsteadiness  -EJ               User Key  (r) = Recorded By, (t) = Taken By, (c) = Cosigned By      Initials Name Provider Type    Dorcas Lopez, PT Physical Therapist                   Obj/Interventions    No documentation.                  Goals/Plan    No documentation.                  Clinical Impression        Row Name 08/12/24 1033          Pain    Pretreatment Pain Rating 0/10 - no pain  -EJ       Row Name 08/12/24 1033          Plan of Care Review    Plan of Care Reviewed With patient  -EJ     Outcome Evaluation Pt seen for PT this am. She is progressing w mobility, but still seems to have some confusion/processing difficulties. Pt able to transition to EOB w mod A. She stood w min A using Rwx w cues for proper hand placement. Pt able to ambulate approx 20 ft in room w min A and Rwx. She has some unsteadiness and requires assist w direction and walker management at times. Pt up in chair at end of session. Pt is eager to go home. She remains a fall risk at this time and will likely need assistance at home. Will continue to progress mobility as tolerated.  -EJ       Row Name 08/12/24 1033          Positioning and Restraints    Pre-Treatment Position in bed  -EJ     Post Treatment Position chair  -EJ     In Chair notified nsg;reclined;call light within reach;encouraged to call for assist;exit alarm on  -EJ               User Key  (r) = Recorded By, (t) = Taken By, (c) = Cosigned By      Initials Name Provider Type    EJ Dorcas Alexander, PT Physical Therapist                   Outcome Measures       Row Name 08/12/24 1036 08/12/24 0825       How much help from another person do you currently need...    Turning from your back to your side while in flat bed without using bedrails? 4  -EJ 3 (P)   -EO    Moving from lying on back to sitting on the side of a flat bed without bedrails? 2  -EJ 2 (P)   -EO    Moving to and from a bed to a chair (including a wheelchair)? 3  -EJ 1 (P)   -EO    Standing up from a chair using your arms (e.g., wheelchair, bedside chair)? 3  -EJ 1 (P)   -EO    Climbing 3-5 steps with a railing? 2  -EJ 1 (P)   -EO    To walk in hospital room? 3  -EJ 1 (P)   -EO    AM-PAC 6 Clicks Score (PT) 17  -EJ 9 (P)   -EO    Highest Level of Mobility Goal 5 --> Static standing  -EJ 3 --> Sit at edge of bed (P)    -EO              User Key  (r) = Recorded By, (t) = Taken By, (c) = Cosigned By      Initials Name Provider Type    EJ Dorcas Alexander, PT Physical Therapist    EO Kady Matthews, Nursing Student Nursing Student                                 Physical Therapy Education       Title: PT OT SLP Therapies (In Progress)       Topic: Physical Therapy (In Progress)       Point: Mobility training (In Progress)       Learning Progress Summary             Patient Acceptance, E,TB, NR by CB at 8/8/2024 1145                         Point: Home exercise program (In Progress)       Learning Progress Summary             Patient Acceptance, E,TB, NR by CB at 8/8/2024 1145                         Point: Body mechanics (Done)       Learning Progress Summary             Patient Acceptance, E, DU by NT at 8/8/2024 1733   Family Acceptance, E, DU by NT at 8/8/2024 1733                         Point: Precautions (Not Started)       Learner Progress:  Not documented in this visit.                              User Key       Initials Effective Dates Name Provider Type Discipline     10/22/21 -  Mariia Hidalgo, PT Physical Therapist PT    NT 07/03/24 -  Alissa Hoyt, RN Registered Nurse Nurse                  PT Recommendation and Plan     Plan of Care Reviewed With: patient  Outcome Evaluation: Pt seen for PT this am. She is progressing w mobility, but still seems to have some confusion/processing difficulties. Pt able to transition to EOB w mod A. She stood w min A using Rwx w cues for proper hand placement. Pt able to ambulate approx 20 ft in room w min A and Rwx. She has some unsteadiness and requires assist w direction and walker management at times. Pt up in chair at end of session. Pt is eager to go home. She remains a fall risk at this time and will likely need assistance at home. Will continue to progress mobility as tolerated.     Time Calculation:         PT Charges       Row Name 08/12/24 1038             Time Calculation     Start Time 0953  -EJ      Stop Time 1010  -EJ      Time Calculation (min) 17 min  -EJ      PT Received On 08/12/24  -EJ      PT - Next Appointment 08/13/24  -EJ                User Key  (r) = Recorded By, (t) = Taken By, (c) = Cosigned By      Initials Name Provider Type    Dorcas Lopez, PT Physical Therapist                  Therapy Charges for Today       Code Description Service Date Service Provider Modifiers Qty    [id number]  PT THERAPEUTIC ACT EA 15 MIN 8/12/2024 Dorcas Alexander, PT GP 1            PT G-Codes  Outcome Measure Options: AM-PAC 6 Clicks Daily Activity (OT), Modified Ev  AM-PAC 6 Clicks Score (PT): 17  AM-PAC 6 Clicks Score (OT): 9  Modified Ev Scale: 5 - Severe disability.  Bedridden, incontinent, and requiring constant nursing care and attention.  PT Discharge Summary  Anticipated Discharge Disposition (PT): home with 24/7 care, skilled nursing facility    Dorcas Alexander, PT  8/12/2024

## 2024-08-12 NOTE — PAYOR COMM NOTE
"Filomena Liu P \"Trang\" (64 y.o. Female)       ATTN: NURSE REVIEWER  [facility]   REF#: [id number]   PLS REPLY TO WILBERTO GREER [phone] OR FAX# [phone]      Date of Birth   1960    Social Security Number       Address   [address] [facility] [address]    Home Phone   [phone]    MRN   [id number]       [employer]   W[employer]    Marital Status                               Admission Date   8/5/24    Admission Type   Emergency    Admitting Provider   Marshal Lara MD    Attending Provider   Marshal Lara MD    Department, Room/Bed   [address], 14/1       Discharge Date       Discharge Disposition       Discharge Destination                                 Attending Provider: Marshal Lara MD    Allergies: No Known Allergies    Isolation: None   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)   Code Status: CPR    Ht: 152.4 cm (60\")   Wt: 81.1 kg (178 lb 12.7 oz)    Admission Cmt: None   Principal Problem: Acute GI bleeding [K92.2]                   Active Insurance as of 8/5/2024       Primary Coverage       Payor Plan Insurance Group Employer/Plan Group    ANTHEM MEDICARE REPLACEMENT ANTHEM MEDICARE ADVANTAGE [id number]       Payor Plan Address Payor Plan Phone Number Payor Plan Fax Number Effective Dates    [address] [phone]  1/1/2024 - None Entered    [facility] [id number]         Subscriber Name Subscriber Birth Date Member ID       FILOMENA LIU 1960 [id number]                     Emergency Contacts        (Rel.) Home Phone Work Phone Mobile Phone    Shaheen Abrams (Brother) -- -- [phone]    FAW MARTINEZ LANDRY (Significant Other) -- -- [phone]    JagMarguerite butterfield (Daughter) -- -- [phone]              Facility-Administered Medications as of 8/12/2024   Medication Dose Route Frequency Provider Last Rate Last Admin    acetaminophen (TYLENOL) tablet 650 mg  650 mg Oral Q4H PRN Subramanian, " Mustapha HUTCHINS MD   650 mg at 08/10/24 2158    Or    acetaminophen (TYLENOL) 160 MG/5ML oral solution 650 mg  650 mg Oral Q4H PRN Mustapha Subramanian MD        Or    acetaminophen (TYLENOL) suppository 650 mg  650 mg Rectal Q4H PRN Mustapha Subramanian MD        [COMPLETED] acetaZOLAMIDE (DIAMOX) tablet 250 mg  250 mg Oral Q12H Chan Brewster MD   250 mg at 08/12/24 0827    [COMPLETED] albumin human 25 % IV SOLN 25 g  25 g Intravenous Q8H Fernando Baker MD   Stopped at 08/06/24 0230    [COMPLETED] albumin human 25 % IV SOLN 25 g  25 g Intravenous Once Fernando Baker MD   25 g at 08/06/24 1144    [COMPLETED] albuterol (PROVENTIL) nebulizer solution 0.083% 2.5 mg/3mL  2.5 mg Nebulization Q15 Min Shukri Romero MD   2.5 mg at 08/05/24 0800    atenolol (TENORMIN) tablet 25 mg  25 mg Oral Daily Maxwell Torres MD   25 mg at 08/12/24 0827    sennosides-docusate (PERICOLACE) 8.6-50 MG per tablet 2 tablet  2 tablet Oral BID PRN Mustapha Subramanian MD   2 tablet at 08/12/24 0828    And    polyethylene glycol (MIRALAX) packet 17 g  17 g Oral Daily PRN Mustapha Subramanian MD        And    bisacodyl (DULCOLAX) EC tablet 5 mg  5 mg Oral Daily PRN Mustapha Subramanian MD        And    bisacodyl (DULCOLAX) suppository 10 mg  10 mg Rectal Daily PRN Mustapha Subramanian MD        budesonide-formoterol (SYMBICORT) 160-4.5 MCG/ACT inhaler 2 puff  2 puff Inhalation BID - RT Mustapha Subramanian MD   2 puff at 08/12/24 0808    [COMPLETED] bumetanide (BUMEX) injection 2 mg  2 mg Intravenous Once Fernando Baker MD   2 mg at 08/05/24 2059    [COMPLETED] bumetanide (BUMEX) injection 4 mg  4 mg Intravenous Once Fernando Baker MD   4 mg at 08/07/24 0346    [COMPLETED] bumetanide (BUMEX) injection 4 mg  4 mg Intravenous Once Fernando Baker MD   4 mg at 08/07/24 1404    [COMPLETED] calcium gluconate 1000 Mg/50ml 0.675% NaCl IV SOLN  1,000 mg Intravenous Once Shukri Romero MD   Stopped at 08/05/24 1020    [COMPLETED]  cefTRIAXone (ROCEPHIN) 2,000 mg in sodium chloride 0.9 % 100 mL MBP  2,000 mg Intravenous Once Shukri Romero  mL/hr at 08/05/24 1213 2,000 mg at 08/05/24 1213    cefTRIAXone (ROCEPHIN) 2,000 mg in sodium chloride 0.9 % 100 mL MBP  2,000 mg Intravenous Q24H Marshal Lara  mL/hr at 08/11/24 1233 2,000 mg at 08/11/24 1233    [COMPLETED] dextrose (D50W) (25 g/50 mL) IV injection 25 g  25 g Intravenous Once Shukri Romero MD   25 g at 08/05/24 1003    [COMPLETED] dextrose (D50W) (25 g/50 mL) IV injection 25 g  25 g Intravenous Once Fernando Baker MD   25 g at 08/05/24 1905    dextrose (D50W) (25 g/50 mL) IV injection 25 g  25 g Intravenous Q15 Min PRN Alejandra Mendoza APRN        dextrose (GLUTOSE) oral gel 15 g  15 g Oral Q15 Min PRN Aljeandra Mendoza APRN        folic acid (FOLVITE) tablet 1 mg  1 mg Oral Daily Shukri Romero MD   1 mg at 08/12/24 0827    [COMPLETED] furosemide (LASIX) injection 40 mg  40 mg Intravenous Once Maxwell Torres MD   40 mg at 08/06/24 1838    glucagon (GLUCAGEN) injection 1 mg  1 mg Intramuscular Q15 Min PRN Alejandra Mendoza APRN        HYDROcodone-acetaminophen (NORCO) 7.5-325 MG per tablet 1 tablet  1 tablet Oral Q8H PRN Mustapha Subramanian MD   1 tablet at 08/12/24 1412    insulin lispro (HUMALOG/ADMELOG) injection 2-9 Units  2-9 Units Subcutaneous 4x Daily AC & at Bedtime Maxwell Torres MD   4 Units at 08/12/24 1141    [COMPLETED] insulin regular (humuLIN R,novoLIN R) injection 10 Units  10 Units Intravenous Once Fernando Baker MD   10 Units at 08/05/24 1908    [COMPLETED] insulin regular (humuLIN R,novoLIN R) injection 5 Units  5 Units Intravenous Once Shukri Romero MD   5 Units at 08/05/24 1005    ipratropium-albuterol (DUO-NEB) nebulizer solution 3 mL  3 mL Nebulization 4x Daily Mustapha Subramanian MD   3 mL at 08/12/24 1202    ipratropium-albuterol (DUO-NEB) nebulizer solution 3 mL  3 mL Nebulization Q4H PRN Mustapha Subramanian MD   3 mL at  24 0441    ipratropium-albuterol (DUO-NEB) nebulizer solution 3 mL  3 mL Nebulization Q4H PRN Hillary Gallo APRN   3 mL at 24 0440    [] LORazepam (ATIVAN) tablet 1 mg  1 mg Oral Q1H PRN Shukri Romero MD   1 mg at 24 1710    Or    [] LORazepam (ATIVAN) injection 1 mg  1 mg Intravenous Q1H PRN Shukri Romero MD        Or    [] LORazepam (ATIVAN) tablet 2 mg  2 mg Oral Q1H PRN Shukri Romero MD   2 mg at 24 1157    Or    [] LORazepam (ATIVAN) injection 2 mg  2 mg Intravenous Q1H PRN Shukri Romero MD   2 mg at 24 1711    Or    [] LORazepam (ATIVAN) injection 2 mg  2 mg Intravenous Q15 Min PRN Shukri Romero MD   2 mg at 08/10/24 0849    Or    [] LORazepam (ATIVAN) injection 2 mg  2 mg Intramuscular Q15 Min PRN Shukri Romero MD   2 mg at 24    LORazepam (ATIVAN) tablet 1 mg  1 mg Oral TID Marshal Lara MD   1 mg at 24 0827    [] LORazepam (ATIVAN) tablet 2 mg  2 mg Oral Q6H Shukri Romero MD   2 mg at 24 0331    Magnesium Standard Dose Replacement - Follow Nurse / BPA Driven Protocol   Does not apply PRN Shukri Romero MD        magnesium sulfate 2g/50 mL (PREMIX) infusion  2 g Intravenous Q2H Marshal Lara MD   2 g at 24 1320    [COMPLETED] methylPREDNISolone sodium succinate (SOLU-Medrol) injection 125 mg  125 mg Intravenous Once Shukri Romero MD   125 mg at 24 0741    [COMPLETED] methylPREDNISolone sodium succinate (SOLU-Medrol) injection 125 mg  125 mg Intravenous Once Maxwell Torres MD   125 mg at 24 1838    methylPREDNISolone sodium succinate (SOLU-Medrol) injection 40 mg  40 mg Intravenous Q12H Daniel Torres MD   40 mg at 24 0827    nicotine (NICODERM CQ) 21 MG/24HR patch 1 patch  1 patch Transdermal Q24H Mustapha Subramanian MD   1 patch at 24 0123    [COMPLETED] octreotide (sandoSTATIN) injection 50 mcg  50 mcg Intravenous Once Shukri Romero MD    50 mcg at 08/05/24 0930    ondansetron ODT (ZOFRAN-ODT) disintegrating tablet 4 mg  4 mg Oral Q6H PRN Mustapha Subramanian MD        Or    ondansetron (ZOFRAN) injection 4 mg  4 mg Intravenous Q6H PRN Mustapha Subramanian MD        pantoprazole (PROTONIX) injection 40 mg  40 mg Intravenous Q12H Maxwell Torres MD   40 mg at 08/12/24 0827    [COMPLETED] pantoprazole (PROTONIX) injection 80 mg  80 mg Intravenous Once Shukri Romero MD   80 mg at 08/05/24 0929    [COMPLETED] perflutren (DEFINITY) 8.476 mg in Sodium Chloride (PF) 0.9 % 10 mL injection  1 mL Intravenous Once in imaging Maxwell Torres MD   1 mL at 08/06/24 1913    [COMPLETED] PHENobarbital 91 mg in sodium chloride 0.9 % 100 mL IVPB  2 mg/kg (Ideal) Intravenous Once Maxwell Torres  mL/hr at 08/07/24 1509 91 mg at 08/07/24 1509    Followed by    [COMPLETED] PHENobarbital 91 mg in sodium chloride 0.9 % 100 mL IVPB  2 mg/kg (Ideal) Intravenous Once Maxwell Torres  mL/hr at 08/07/24 1736 91 mg at 08/07/24 1736    [COMPLETED] PHENobarbital 91 mg in sodium chloride 0.9 % 100 mL IVPB  2 mg/kg (Ideal) Intravenous Once Maxwell Torres  mL/hr at 08/08/24 0000 91 mg at 08/08/24 0000    [COMPLETED] PHENobarbital injection 65 mg  65 mg Intravenous Once Maxwlel Torres MD   65 mg at 08/08/24 0831    Followed by    [COMPLETED] PHENobarbital injection 65 mg  65 mg Intravenous Once Maxwell Torres MD   65 mg at 08/08/24 2312    Followed by    [COMPLETED] PHENobarbital tablet 32.4 mg  32.4 mg Oral Once Maxwell Torres MD   32.4 mg at 08/09/24 0858    Followed by    [COMPLETED] PHENobarbital tablet 32.4 mg  32.4 mg Oral Once Maxwell Torres MD   32.4 mg at 08/09/24 2105    Followed by    [COMPLETED] PHENobarbital tablet 32.4 mg  32.4 mg Oral Once Maxwell Torres MD   32.4 mg at 08/10/24 1022    potassium chloride (K-DUR,KLOR-CON) ER tablet 40 mEq  40 mEq Oral Q4H Marshal Lara MD   40 mEq at 08/12/24 1141    [COMPLETED] potassium chloride (KLOR-CON)  packet 40 mEq  40 mEq Oral Q4H Fernando Baker MD   40 mEq at 08/09/24 1408    [COMPLETED] potassium chloride 10 mEq in 100 mL IVPB  10 mEq Intravenous Q1H Chan Brewster  mL/hr at 08/11/24 1724 10 mEq at 08/11/24 1724    Potassium Replacement - Follow Nurse / BPA Driven Protocol   Does not apply PRN Chan Brewster MD        rOPINIRole (REQUIP) tablet 1 mg  1 mg Oral Nightly Mustapha Subramanian MD   1 mg at 08/11/24 2130    [COMPLETED] sodium bicarbonate injection 8.4% 50 mEq  50 mEq Intravenous Once Shukri Romero MD   50 mEq at 08/05/24 1007    [COMPLETED] sodium bicarbonate injection 8.4% 50 mEq  50 mEq Intravenous Once Fernando Baker MD   50 mEq at 08/05/24 1958    sodium chloride 0.9 % flush 10 mL  10 mL Intravenous PRN Shukri Romero MD        sodium chloride 0.9 % flush 10 mL  10 mL Intravenous Q12H Mustapha Subramanian MD   10 mL at 08/12/24 0828    sodium chloride 0.9 % flush 10 mL  10 mL Intravenous PRN Mustapha Subramanian MD   10 mL at 08/06/24 0839    sodium chloride 0.9 % infusion 40 mL  40 mL Intravenous DARNELLN Mustapha Subramanian MD        [COMPLETED] sodium zirconium cyclosilicate (LOKELMA) packet 10 g  10 g Oral Once Shukri Romero MD   10 g at 08/05/24 1048    [COMPLETED] sodium zirconium cyclosilicate (LOKELMA) packet 10 g  10 g Oral Once Fernando Baker MD   10 g at 08/05/24 2100    [COMPLETED] sodium zirconium cyclosilicate (LOKELMA) packet 10 g  10 g Oral Once Fernando Baker MD   10 g at 08/07/24 0348    spironolactone (ALDACTONE) tablet 25 mg  25 mg Oral Daily Fernando Baker MD   25 mg at 08/12/24 0827    [COMPLETED] thiamine (B-1) injection 200 mg  200 mg Intravenous Q8H Shukri Romero MD   200 mg at 08/10/24 1020    Followed by    thiamine (VITAMIN B-1) tablet 100 mg  100 mg Oral Daily Shukri Romero MD   100 mg at 08/12/24 0827     Lab Results (last 24 hours)       Procedure Component Value Units Date/Time    POC Glucose Once  [[id number]]  (Abnormal) Collected: 08/12/24 1056    Specimen: Blood Updated: 08/12/24 1057     Glucose 200 mg/dL     Comprehensive Metabolic Panel [[id number]]  (Abnormal) Collected: 08/12/24 0806    Specimen: Blood Updated: 08/12/24 0837     Glucose 129 mg/dL      BUN 40 mg/dL      Creatinine 1.41 mg/dL      Sodium 134 mmol/L      Potassium 3.6 mmol/L      Chloride 88 mmol/L      CO2 33.9 mmol/L      Calcium 9.6 mg/dL      Total Protein 6.8 g/dL      Albumin 3.7 g/dL      ALT (SGPT) 42 U/L      AST (SGOT) 58 U/L      Alkaline Phosphatase 114 U/L      Total Bilirubin 1.7 mg/dL      Globulin 3.1 gm/dL      A/G Ratio 1.2 g/dL      BUN/Creatinine Ratio 28.4     Anion Gap 12.1 mmol/L      eGFR 41.7 mL/min/1.73     Narrative:      GFR Normal >60  Chronic Kidney Disease <60  Kidney Failure <15      Magnesium [[id number]]  (Abnormal) Collected: 08/12/24 0806    Specimen: Blood Updated: 08/12/24 0837     Magnesium 1.4 mg/dL     Ammonia [[id number]]  (Normal) Collected: 08/12/24 0806    Specimen: Blood Updated: 08/12/24 0837     Ammonia 51 umol/L     CBC & Differential [[id number]]  (Abnormal) Collected: 08/12/24 0806    Specimen: Blood Updated: 08/12/24 0818    Narrative:      The following orders were created for panel order CBC & Differential.  Procedure                               Abnormality         Status                     ---------                               -----------         ------                     CBC Auto Differential[[id number]]        Abnormal            Final result                 Please view results for these tests on the individual orders.    CBC Auto Differential [[id number]]  (Abnormal) Collected: 08/12/24 0806    Specimen: Blood Updated: 08/12/24 0818     WBC 14.81 10*3/mm3      RBC 3.55 10*6/mm3      Hemoglobin 9.3 g/dL      Hematocrit 30.8 %      MCV 86.8 fL      MCH 26.2 pg      MCHC 30.2 g/dL      RDW 17.2 %      RDW-SD 53.3 fl      MPV 10.3 fL      Platelets 146 10*3/mm3      Neutrophil % 75.2 %       Lymphocyte % 6.4 %      Monocyte % 16.6 %      Eosinophil % 0.1 %      Basophil % 0.1 %      Immature Grans % 1.6 %      Neutrophils, Absolute 11.15 10*3/mm3      Lymphocytes, Absolute 0.95 10*3/mm3      Monocytes, Absolute 2.46 10*3/mm3      Eosinophils, Absolute 0.01 10*3/mm3      Basophils, Absolute 0.01 10*3/mm3      Immature Grans, Absolute 0.23 10*3/mm3      nRBC 0.0 /100 WBC     POC Glucose Once [[id number]]  (Abnormal) Collected: 08/12/24 0627    Specimen: Blood Updated: 08/12/24 0629     Glucose 209 mg/dL     Potassium [[id number]]  (Normal) Collected: 08/11/24 2348    Specimen: Blood Updated: 08/12/24 0027     Potassium 3.8 mmol/L     CBC & Differential [[id number]]  (Abnormal) Collected: 08/11/24 2347    Specimen: Blood Updated: 08/12/24 0022    Narrative:      The following orders were created for panel order CBC & Differential.  Procedure                               Abnormality         Status                     ---------                               -----------         ------                     CBC Auto Differential[[id number]]        Abnormal            Final result                 Please view results for these tests on the individual orders.    CBC Auto Differential [[id number]]  (Abnormal) Collected: 08/11/24 2347    Specimen: Blood Updated: 08/12/24 0022     WBC 11.04 10*3/mm3      RBC 3.50 10*6/mm3      Hemoglobin 9.3 g/dL      Hematocrit 30.3 %      MCV 86.6 fL      MCH 26.6 pg      MCHC 30.7 g/dL      RDW 17.4 %      RDW-SD 54.2 fl      MPV 10.6 fL      Platelets 122 10*3/mm3      Neutrophil % 82.5 %      Lymphocyte % 5.1 %      Monocyte % 10.8 %      Eosinophil % 0.1 %      Basophil % 0.1 %      Immature Grans % 1.4 %      Neutrophils, Absolute 9.11 10*3/mm3      Lymphocytes, Absolute 0.56 10*3/mm3      Monocytes, Absolute 1.19 10*3/mm3      Eosinophils, Absolute 0.01 10*3/mm3      Basophils, Absolute 0.01 10*3/mm3      Immature Grans, Absolute 0.16 10*3/mm3     POC Glucose Once [[id number]]   (Normal) Collected: 08/11/24 2038    Specimen: Blood Updated: 08/11/24 2040     Glucose 106 mg/dL     POC Glucose Once [[id number]]  (Abnormal) Collected: 08/11/24 1619    Specimen: Blood Updated: 08/11/24 1621     Glucose 214 mg/dL     CBC & Differential [[id number]]  (Abnormal) Collected: 08/11/24 1550    Specimen: Blood Updated: 08/11/24 1611    Narrative:      The following orders were created for panel order CBC & Differential.  Procedure                               Abnormality         Status                     ---------                               -----------         ------                     CBC Auto Differential[[id number]]        Abnormal            Final result                 Please view results for these tests on the individual orders.    CBC Auto Differential [[id number]]  (Abnormal) Collected: 08/11/24 1550    Specimen: Blood Updated: 08/11/24 1611     WBC 8.82 10*3/mm3      RBC 3.56 10*6/mm3      Hemoglobin 9.2 g/dL      Hematocrit 30.6 %      MCV 86.0 fL      MCH 25.8 pg      MCHC 30.1 g/dL      RDW 17.5 %      RDW-SD 53.0 fl      MPV 11.1 fL      Platelets 98 10*3/mm3      Neutrophil % 81.8 %      Lymphocyte % 7.0 %      Monocyte % 10.1 %      Eosinophil % 0.1 %      Basophil % 0.0 %      Immature Grans % 1.0 %      Neutrophils, Absolute 7.21 10*3/mm3      Lymphocytes, Absolute 0.62 10*3/mm3      Monocytes, Absolute 0.89 10*3/mm3      Eosinophils, Absolute 0.01 10*3/mm3      Basophils, Absolute 0.00 10*3/mm3      Immature Grans, Absolute 0.09 10*3/mm3           Imaging Results (Last 24 Hours)       ** No results found for the last 24 hours. **          ECG/EMG Results (last 24 hours)       Procedure Component Value Units Date/Time    Telemetry Scan [[id number]] Resulted: 08/05/24     Updated: 08/12/24 0637    Telemetry Scan [[id number]] Resulted: 08/05/24     Updated: 08/12/24 0637    Telemetry Scan [[id number]] Resulted: 08/05/24     Updated: 08/12/24 1335          Orders (last 24 hrs)        Start      Ordered    08/13/24 0600  Magnesium  Morning Draw         08/12/24 1036    08/12/24 1936  Potassium  Timed         08/12/24 1036    08/12/24 1600  CBC Auto Differential  PROCEDURE ONCE         08/12/24 0803    08/12/24 1130  magnesium sulfate 2g/50 mL (PREMIX) infusion  Every 2 Hours         08/12/24 1036    08/12/24 1130  potassium chloride (K-DUR,KLOR-CON) ER tablet 40 mEq  Every 4 Hours         08/12/24 1036    08/12/24 1058  POC Glucose Once  PROCEDURE ONCE        Comments: Complete no more than 45 minutes prior to patient eating      08/12/24 1056    08/12/24 0800  CBC Auto Differential  PROCEDURE ONCE         08/12/24 0001    08/12/24 0630  POC Glucose Once  PROCEDURE ONCE        Comments: Complete no more than 45 minutes prior to patient eating      08/12/24 0627    08/12/24 0600  Ammonia  Morning Draw         08/11/24 1401    08/12/24 0600  Magnesium  Morning Draw         08/11/24 1931    08/12/24 0011  Manual Differential  Once,   Status:  Canceled         08/12/24 0010    08/12/24 0000  CBC Auto Differential  PROCEDURE ONCE         08/11/24 1601    08/11/24 2350  Potassium  Timed         08/11/24 1151    08/11/24 2100  methylPREDNISolone sodium succinate (SOLU-Medrol) injection 40 mg  Every 12 Hours         08/11/24 1113    08/11/24 2100  acetaZOLAMIDE (DIAMOX) tablet 250 mg  Every 12 Hours         08/11/24 1929    08/11/24 2040  POC Glucose Once  PROCEDURE ONCE        Comments: Complete no more than 45 minutes prior to patient eating      08/11/24 2038    08/11/24 1622  POC Glucose Once  PROCEDURE ONCE        Comments: Complete no more than 45 minutes prior to patient eating      08/11/24 1619    08/11/24 1609  Manual Differential  Once,   Status:  Canceled         08/11/24 1608    08/11/24 1600  CBC Auto Differential  PROCEDURE ONCE         08/11/24 0801    08/11/24 1245  potassium chloride 10 mEq in 100 mL IVPB  Every 1 Hour         08/11/24 1151    08/11/24 1148  Potassium Replacement - Follow  "Nurse / BPA Driven Protocol  As Needed         08/11/24 1148    08/11/24 0900  LORazepam (ATIVAN) tablet 1 mg  3 times daily         08/11/24 0747    08/11/24 0433  ipratropium-albuterol (DUO-NEB) nebulizer solution 3 mL  Every 4 Hours PRN         08/11/24 0433    08/10/24 1800  bumetanide (BUMEX) tablet 1 mg  2 Times Daily (Diuretics),   Status:  Discontinued         08/10/24 1152    08/10/24 1400  thiamine (VITAMIN B-1) tablet 100 mg  Daily        Placed in \"Followed by\" Linked Group    08/05/24 0915    08/10/24 1004  LORazepam (ATIVAN) tablet 1 mg  Every 1 Hour PRN,   Status:  Discontinued        Placed in \"Or\" Linked Group    08/10/24 1004    08/10/24 1004  LORazepam (ATIVAN) injection 1 mg  Every 1 Hour PRN,   Status:  Discontinued        Placed in \"Or\" Linked Group    08/10/24 1004    08/10/24 1004  LORazepam (ATIVAN) tablet 2 mg  Every 1 Hour PRN,   Status:  Discontinued        Placed in \"Or\" Linked Group    08/10/24 1004    08/10/24 1004  LORazepam (ATIVAN) injection 2 mg  Every 1 Hour PRN,   Status:  Discontinued        Placed in \"Or\" Linked Group    08/10/24 1004    08/10/24 1004  LORazepam (ATIVAN) injection 2 mg  Every 15 Minutes PRN,   Status:  Discontinued        Placed in \"Or\" Linked Group    08/10/24 1004    08/10/24 1004  LORazepam (ATIVAN) injection 2 mg  Every 15 Minutes PRN,   Status:  Discontinued        Placed in \"Or\" Linked Group    08/10/24 1004    08/09/24 1100  spironolactone (ALDACTONE) tablet 25 mg  Daily         08/09/24 0917    08/07/24 1215  insulin lispro (HUMALOG/ADMELOG) injection 2-9 Units  4 Times Daily Before Meals & Nightly         08/07/24 1123    08/07/24 1215  pantoprazole (PROTONIX) injection 40 mg  Every 12 Hours Scheduled         08/07/24 1124    08/07/24 0700  POC Glucose 4x Daily Before Meals & at Bedtime  4 Times Daily Before Meals & at Bedtime      Comments: Complete no more than 45 minutes prior to patient eating      08/06/24 2384    08/07/24 0600  Comprehensive " "Metabolic Panel  Daily       08/06/24 1711    08/06/24 2250  dextrose (GLUTOSE) oral gel 15 g  Every 15 Minutes PRN         08/06/24 2250    08/06/24 2250  dextrose (D50W) (25 g/50 mL) IV injection 25 g  Every 15 Minutes PRN         08/06/24 2250    08/06/24 2250  glucagon (GLUCAGEN) injection 1 mg  Every 15 Minutes PRN         08/06/24 2250    08/06/24 1330  cefTRIAXone (ROCEPHIN) 2,000 mg in sodium chloride 0.9 % 100 mL MBP  Every 24 Hours         08/06/24 1232    08/06/24 0900  atenolol (TENORMIN) tablet 25 mg  Daily         08/05/24 1651 08/06/24 0001  nicotine (NICODERM CQ) 21 MG/24HR patch 1 patch  Every 24 Hours         08/06/24 0000    08/05/24 2130  budesonide-formoterol (SYMBICORT) 160-4.5 MCG/ACT inhaler 2 puff  2 Times Daily - RT        Placed in \"And\" Linked Group    08/05/24 1651 08/05/24 2100  rOPINIRole (REQUIP) tablet 1 mg  Nightly         08/05/24 1651 08/05/24 2100  sodium chloride 0.9 % flush 10 mL  Every 12 Hours Scheduled         08/05/24 1658 08/05/24 2000  Vital Signs  Every 4 Hours       08/05/24 1658 08/05/24 1800  ipratropium-albuterol (DUO-NEB) nebulizer solution 3 mL  4 Times Daily         08/05/24 1651 08/05/24 1800  Oral Care  2 Times Daily       08/05/24 1658 08/05/24 1658  Intake & Output  Every Shift       08/05/24 1658 08/05/24 1657  ondansetron ODT (ZOFRAN-ODT) disintegrating tablet 4 mg  Every 6 Hours PRN        Placed in \"Or\" Linked Group    08/05/24 1658 08/05/24 1657  ondansetron (ZOFRAN) injection 4 mg  Every 6 Hours PRN        Placed in \"Or\" Linked Group    08/05/24 1658    08/05/24 1657  polyethylene glycol (MIRALAX) packet 17 g  Daily PRN        Placed in \"And\" Linked Group    08/05/24 1658    08/05/24 1657  bisacodyl (DULCOLAX) EC tablet 5 mg  Daily PRN        Placed in \"And\" Linked Group    08/05/24 1658    08/05/24 1657  bisacodyl (DULCOLAX) suppository 10 mg  Daily PRN        Placed in \"And\" Linked Group    08/05/24 1658    08/05/24 1657  " "sennosides-docusate (PERICOLACE) 8.6-50 MG per tablet 2 tablet  2 Times Daily PRN        Placed in \"And\" Linked Group    08/05/24 1658    08/05/24 1657  acetaminophen (TYLENOL) tablet 650 mg  Every 4 Hours PRN        Placed in \"Or\" Linked Group    08/05/24 1658    08/05/24 1657  acetaminophen (TYLENOL) 160 MG/5ML oral solution 650 mg  Every 4 Hours PRN        Placed in \"Or\" Linked Group    08/05/24 1658    08/05/24 1657  acetaminophen (TYLENOL) suppository 650 mg  Every 4 Hours PRN        Placed in \"Or\" Linked Group    08/05/24 1658    08/05/24 1657  sodium chloride 0.9 % flush 10 mL  As Needed         08/05/24 1658    08/05/24 1657  sodium chloride 0.9 % infusion 40 mL  As Needed         08/05/24 1658    08/05/24 1651  ipratropium-albuterol (DUO-NEB) nebulizer solution 3 mL  Every 4 Hours PRN         08/05/24 1652    08/05/24 1649  HYDROcodone-acetaminophen (NORCO) 7.5-325 MG per tablet 1 tablet  Every 8 Hours PRN         08/05/24 1651    08/05/24 1600  CBC & Differential  Every 8 Hours       08/05/24 1157    08/05/24 0931  folic acid (FOLVITE) tablet 1 mg  Daily         08/05/24 0915    08/05/24 0915  Magnesium Standard Dose Replacement - Follow Nurse / BPA Driven Protocol  As Needed         08/05/24 0915    08/05/24 0720  sodium chloride 0.9 % flush 10 mL  As Needed        Placed in \"And\" Linked Group    08/05/24 0720    07/25/24 0000  furosemide (LASIX) 20 MG tablet  Daily         08/05/24 1003    07/25/24 0000  potassium chloride (KLOR-CON M10) 10 MEQ CR tablet  Daily         08/05/24 1003    04/30/24 0000  atenolol (TENORMIN) 25 MG tablet  Daily         08/05/24 1003    04/30/24 0000  triamterene-hydrochlorothiazide (DYAZIDE) 37.5-25 MG per capsule  Daily         08/05/24 1004    Unscheduled  Obtain Pre & Post Sedation Scores With Every Sedative Dose - Hold For POSS Greater Than 2 or RASS of -3 or Less  As Needed       08/05/24 0915    Unscheduled  Follow Hypoglycemia Standing Orders For Blood Glucose <70 & " Notify Provider of Treatment  As Needed      Comments: Follow Hypoglycemia Orders As Outlined in Process Instructions (Open Order Report to View Full Instructions)  Notify Provider Any Time Hypoglycemia Treatment is Administered    08/06/24 2250    --  ibuprofen (ADVIL,MOTRIN) 200 MG tablet  Every 6 Hours PRN         08/05/24 1002    --  Cholecalciferol 25 MCG (1000 UT) tablet         08/05/24 1629                  Operative/Procedure Notes (last 24 hours)  Notes from 08/11/24 1437 through 08/12/24 1437   No notes of this type exist for this encounter.          Physician Progress Notes (last 48 hours)        Garth Constantino MD at 08/12/24 1417          [facility]  Inpatient Progress Note    Reason for Follow Up: Melena    Subjective     Interval History:   Melena has resolved    Current Facility-Administered Medications:     acetaminophen (TYLENOL) tablet 650 mg, 650 mg, Oral, Q4H PRN, 650 mg at 08/10/24 2158 **OR** acetaminophen (TYLENOL) 160 MG/5ML oral solution 650 mg, 650 mg, Oral, Q4H PRN **OR** acetaminophen (TYLENOL) suppository 650 mg, 650 mg, Rectal, Q4H PRN, Mustapha Subramanian MD    atenolol (TENORMIN) tablet 25 mg, 25 mg, Oral, Daily, Maxwell Torres MD, 25 mg at 08/12/24 0827    sennosides-docusate (PERICOLACE) 8.6-50 MG per tablet 2 tablet, 2 tablet, Oral, BID PRN, 2 tablet at 08/12/24 0828 **AND** polyethylene glycol (MIRALAX) packet 17 g, 17 g, Oral, Daily PRN **AND** bisacodyl (DULCOLAX) EC tablet 5 mg, 5 mg, Oral, Daily PRN **AND** bisacodyl (DULCOLAX) suppository 10 mg, 10 mg, Rectal, Daily PRN, Mustapha Subramanian MD    budesonide-formoterol (SYMBICORT) 160-4.5 MCG/ACT inhaler 2 puff, 2 puff, Inhalation, BID - RT, 2 puff at 08/12/24 0808 **AND** [DISCONTINUED] tiotropium (SPIRIVA RESPIMAT) 2.5 mcg/act aerosol solution inhaler, 2 puff, Inhalation, Daily - RT, Mustapha Subramanian MD, 2 puff at 08/11/24 0800    cefTRIAXone (ROCEPHIN) 2,000 mg in sodium chloride 0.9 % 100 mL MBP, 2,000  mg, Intravenous, Q24H, Marshal Lara MD, Last Rate: 200 mL/hr at 08/11/24 1233, 2,000 mg at 08/11/24 1233    dextrose (D50W) (25 g/50 mL) IV injection 25 g, 25 g, Intravenous, Q15 Min PRN, Alejandra Mendoza APRN    dextrose (GLUTOSE) oral gel 15 g, 15 g, Oral, Q15 Min PRN, Alejandra Mendoza APRN    folic acid (FOLVITE) tablet 1 mg, 1 mg, Oral, Daily, Shukri Romero MD, 1 mg at 08/12/24 0827    glucagon (GLUCAGEN) injection 1 mg, 1 mg, Intramuscular, Q15 Min PRN, Alejandra Mendoza APRN    HYDROcodone-acetaminophen (NORCO) 7.5-325 MG per tablet 1 tablet, 1 tablet, Oral, Q8H PRN, Mustapha Subramanian MD, 1 tablet at 08/12/24 1412    insulin lispro (HUMALOG/ADMELOG) injection 2-9 Units, 2-9 Units, Subcutaneous, 4x Daily AC & at Bedtime, Maxwell Torres MD, 4 Units at 08/12/24 1141    ipratropium-albuterol (DUO-NEB) nebulizer solution 3 mL, 3 mL, Nebulization, 4x Daily, Mustapha Subramanian MD, 3 mL at 08/12/24 1202    ipratropium-albuterol (DUO-NEB) nebulizer solution 3 mL, 3 mL, Nebulization, Q4H PRN, Mustapha Subramanian MD, 3 mL at 08/11/24 0441    ipratropium-albuterol (DUO-NEB) nebulizer solution 3 mL, 3 mL, Nebulization, Q4H PRN, Hillary Gallo APRN, 3 mL at 08/12/24 0440    LORazepam (ATIVAN) tablet 1 mg, 1 mg, Oral, TID, Marshal Lara MD, 1 mg at 08/12/24 0827    Magnesium Standard Dose Replacement - Follow Nurse / BPA Driven Protocol, , Does not apply, PRN, Shukri Romero MD    magnesium sulfate 2g/50 mL (PREMIX) infusion, 2 g, Intravenous, Q2H, Marshal Lara MD, 2 g at 08/12/24 1320    methylPREDNISolone sodium succinate (SOLU-Medrol) injection 40 mg, 40 mg, Intravenous, Q12H, Daniel Torres MD, 40 mg at 08/12/24 0827    nicotine (NICODERM CQ) 21 MG/24HR patch 1 patch, 1 patch, Transdermal, Q24H, Mustapha Subramanian MD, 1 patch at 08/12/24 0123    ondansetron ODT (ZOFRAN-ODT) disintegrating tablet 4 mg, 4 mg, Oral, Q6H PRN **OR** ondansetron (ZOFRAN) injection 4 mg, 4 mg, Intravenous, Q6H PRN, Mustapha Subramanian  MD    pantoprazole (PROTONIX) injection 40 mg, 40 mg, Intravenous, Q12H, Maxwell Torres MD, 40 mg at 08/12/24 0827    potassium chloride (K-DUR,KLOR-CON) ER tablet 40 mEq, 40 mEq, Oral, Q4H, Marshal Lara MD, 40 mEq at 08/12/24 1141    Potassium Replacement - Follow Nurse / BPA Driven Protocol, , Does not apply, PRN, Chan Brewster MD    rOPINIRole (REQUIP) tablet 1 mg, 1 mg, Oral, Nightly, Mustapha Subramanian MD, 1 mg at 08/11/24 2130    [COMPLETED] Insert Peripheral IV, , , Once **AND** sodium chloride 0.9 % flush 10 mL, 10 mL, Intravenous, PRN, Shukri Romero MD    sodium chloride 0.9 % flush 10 mL, 10 mL, Intravenous, Q12H, Mustapha Subramanian MD, 10 mL at 08/12/24 0828    sodium chloride 0.9 % flush 10 mL, 10 mL, Intravenous, PRN, Mustapha Subramanian MD, 10 mL at 08/06/24 0839    sodium chloride 0.9 % infusion 40 mL, 40 mL, Intravenous, PRN, Mustapha Subramanian MD    spironolactone (ALDACTONE) tablet 25 mg, 25 mg, Oral, Daily, Fernando Baker MD, 25 mg at 08/12/24 0827    [COMPLETED] thiamine (B-1) injection 200 mg, 200 mg, Intravenous, Q8H, 200 mg at 08/10/24 1020 **FOLLOWED BY** thiamine (VITAMIN B-1) tablet 100 mg, 100 mg, Oral, Daily, Shukri Romero MD, 100 mg at 08/12/24 0827  Review of Systems:    There is weakness and fatigue all other systems reviewed and negative    Objective     Vital Signs  Temp:  [97.9 °F (36.6 °C)-98.7 °F (37.1 °C)] 97.9 °F (36.6 °C)  Heart Rate:  [72-89] 80  Resp:  [16-18] 16  BP: ()/(51-79) 88/51  Body mass index is 34.92 kg/m².    Intake/Output Summary (Last 24 hours) at 8/12/2024 1417  Last data filed at 8/12/2024 0825  Gross per 24 hour   Intake 830 ml   Output 1500 ml   Net -670 ml     I/O this shift:  In: 240 [P.O.:240]  Out: -      Physical Exam:   General: patient awake, alert and cooperative   Eyes: Normal lids and lashes, no scleral icterus   Neck: supple, normal ROM   Skin: warm and dry, not jaundiced   Cardiovascular: regular rhythm and rate, no murmurs  auscultated   Pulm: clear to auscultation bilaterally, regular and unlabored   Abdomen: soft, nontender, nondistended; normal bowel sounds   Extremities: no rash or edema   Psychiatric: Normal mood and behavior; memory intact     Results Review:     I reviewed the patient's new clinical results.    Results from last 7 days   Lab Units 08/12/24  0806 08/11/24  2347 08/11/24  1550   WBC 10*3/mm3 14.81* 11.04* 8.82   HEMOGLOBIN g/dL 9.3* 9.3* 9.2*   HEMATOCRIT % 30.8* 30.3* 30.6*   PLATELETS 10*3/mm3 146 122* 98*     Results from last 7 days   Lab Units 08/12/24  0806 08/11/24  2348 08/11/24  0554 08/10/24  0520   SODIUM mmol/L 134*  --  141 143   POTASSIUM mmol/L 3.6 3.8 3.1* 3.7   CHLORIDE mmol/L 88*  --  90* 95*   CO2 mmol/L 33.9*  --  41.0* 36.1*   BUN mg/dL 40*  --  45* 48*   CREATININE mg/dL 1.41*  --  1.30* 1.40*   CALCIUM mg/dL 9.6  --  9.8 9.9   BILIRUBIN mg/dL 1.7*  --  1.8* 2.3*   ALK PHOS U/L 114  --  111 108   ALT (SGPT) U/L 42*  --  40* 47*   AST (SGOT) U/L 58*  --  66* 85*   GLUCOSE mg/dL 129*  --  150* 134*         Lab Results   Lab Value Date/Time    LIPASE 31 03/14/2023 1237    LIPASE 14 05/11/2022 0059    LIPASE 21 (L) 06/01/2020 0149    LIPASE 22 (L) 05/12/2020 1235    LIPASE 29 01/09/2020 1229    LIPASE 23 02/27/2018 1221       Radiology:  XR Chest 1 View   Final Result   Mildly decreased pulmonary vascular congestion.       This report was finalized on 8/10/2024 6:50 AM by Dr. Harpreet Mistry M.D on Workstation: [facility]          XR Abdomen KUB   Final Result      XR Chest 1 View   Final Result   Persistent vascular congestion.       This report was finalized on 8/7/2024 3:35 AM by Dr. Radha Laguna M.D on Workstation: [id number]          XR Chest 1 View   Final Result   Cardiomegaly with pulmonary vascular congestion and edema,   follow-up advised.       This report was finalized on 8/6/2024 6:40 PM by Dr. Harpreet Mistry M.D on Workstation: [id number]          US Renal Bilateral    Final Result   No hydronephrosis or echogenic nephrolithiasis.           This report was finalized on 2024 8:25 PM by Dr. Harpreet Mistry M.D on Workstation: [id number]          US Liver   Final Result   1.  Hepatic steatosis. Otherwise, evaluation of the liver is   nondiagnostic likely due to combination of patient body habitus and   degree of steatosis. Therefore, underlying focal hepatic   malignancy/abnormality cannot be excluded. Further screening for HCC   should be performed with multiphase MRI with and without contrast.   2.  No definite ascites is visualized on provided images.       This report was finalized on 2024 6:51 PM by Dr. David Briceño M.D   on Workstation: [email or web address]          XR Chest 1 View   Final Result          Assessment & Plan     Active Hospital Problems    Diagnosis     **Acute GI bleeding     Hyperkalemia     Acute renal failure     Alcoholism     Acute exacerbation of chronic obstructive pulmonary disease (COPD)     Acute blood loss anemia      Assessment:  GI bleed.  Melena has resolved  Anemia-Hgb stable at 9.2  EtOH abuse  EtOH cirrhosis  SANDRO  Possible withdrawal  Mild thrombocytopenia  COPD with acute respiratory distress     These problems are new to me.  Plan:  Patient remains confused.  Hgb stable, continue to monitor lab work  MELD score high at 29 based upon lab work   To consider EGD once stable, likely later this week  Continue PPI therapy  We will continue to follow  I discussed the patients findings and my recommendations with patient and nursing staff.    Garth Constantino MD                 Electronically signed by Garth Constantino MD at 24 1418       Marshal Lara MD at 24 1410              Name: Filomena Liu ADMIT: 2024   : 1960  PCP: Fernando Dunn MD    MRN: [id number] LOS: 6 days   AGE/SEX: 64 y.o. female  ROOM: [facility]     Subjective   Subjective   Patient's mentation continues to improve.  Patient is currently having  some of her chronic pain.  Sitting up in chair.    Review of Systems  Chronic back pain, otherwise no complaints.    Objective   Objective   Vital Signs  Temp:  [97.9 °F (36.6 °C)-98.7 °F (37.1 °C)] 97.9 °F (36.6 °C)  Heart Rate:  [72-89] 80  Resp:  [16-18] 16  BP: ()/(51-79) 88/51  SpO2:  [92 %-100 %] 92 %  on  Flow (L/min):  [1] 1;   Device (Oxygen Therapy): nasal cannula  Body mass index is 34.92 kg/m².  Physical Exam  Constitutional:       General: She is not in acute distress.     Appearance: She is ill-appearing.   Cardiovascular:      Rate and Rhythm: Normal rate and regular rhythm.   Pulmonary:      Effort: Pulmonary effort is normal. No respiratory distress.      Breath sounds: Wheezing present.   Abdominal:      General: Abdomen is flat. There is no distension.      Tenderness: There is no abdominal tenderness.   Musculoskeletal:         General: No swelling or deformity. Normal range of motion.   Skin:     General: Skin is warm and dry.   Neurological:      General: No focal deficit present.      Mental Status: She is alert. She is disoriented.         Results Review     I reviewed the patient's new clinical results.  Results from last 7 days   Lab Units 08/12/24  0806 08/11/24  2347 08/11/24  1550 08/11/24  0752   WBC 10*3/mm3 14.81* 11.04* 8.82 13.47*   HEMOGLOBIN g/dL 9.3* 9.3* 9.2* 9.1*   PLATELETS 10*3/mm3 146 122* 98* 100*     Results from last 7 days   Lab Units 08/12/24  0806 08/11/24  2348 08/11/24  0554 08/10/24  0520 08/09/24  1535 08/09/24  0511   SODIUM mmol/L 134*  --  141 143  --  138   POTASSIUM mmol/L 3.6 3.8 3.1* 3.7   < > 3.2*   CHLORIDE mmol/L 88*  --  90* 95*  --  89*   CO2 mmol/L 33.9*  --  41.0* 36.1*  --  34.0*   BUN mg/dL 40*  --  45* 48*  --  60*   CREATININE mg/dL 1.41*  --  1.30* 1.40*  --  1.79*   GLUCOSE mg/dL 129*  --  150* 134*  --  100*    < > = values in this interval not displayed.   Estimated Creatinine Clearance: 38 mL/min (A) (by C-G formula based on SCr of 1.41  mg/dL (H)).  Results from last 7 days   Lab Units 08/12/24  0806 08/11/24  0554 08/10/24  0520 08/09/24  0511   ALBUMIN g/dL 3.7 3.6 3.9 3.8   BILIRUBIN mg/dL 1.7* 1.8* 2.3* 2.2*   ALK PHOS U/L 114 111 108 117   AST (SGOT) U/L 58* 66* 85* 108*   ALT (SGPT) U/L 42* 40* 47* 52*     Results from last 7 days   Lab Units 08/12/24  0806 08/11/24  0554 08/10/24  0520 08/09/24  0511 08/08/24  0805   CALCIUM mg/dL 9.6 9.8 9.9 9.6 9.4   ALBUMIN g/dL 3.7 3.6 3.9 3.8 3.8   MAGNESIUM mg/dL 1.4* 1.8  --   --   --    PHOSPHORUS mg/dL  --  2.7 2.8 3.1 4.4     Results from last 7 days   Lab Units 08/06/24  0010 08/05/24  1954 08/05/24  1641   LACTATE mmol/L 1.4 2.9* 3.2*     SARS-CoV-2 BREEZY   Date Value Ref Range Status   11/10/2020 Not Detected Not Detected Final     Glucose   Date/Time Value Ref Range Status   08/12/2024 1056 200 (H) 70 - 130 mg/dL Final   08/12/2024 0627 209 (H) 70 - 130 mg/dL Final   08/11/2024 2038 106 70 - 130 mg/dL Final   08/11/2024 1619 214 (H) 70 - 130 mg/dL Final   08/11/2024 1105 177 (H) 70 - 130 mg/dL Final   08/11/2024 0609 145 (H) 70 - 130 mg/dL Final   08/10/2024 2055 197 (H) 70 - 130 mg/dL Final       XR Chest 1 View  Narrative: XR CHEST 1 VW-     HISTORY: Female who is 64 years-old, pulmonary edema, follow-up     TECHNIQUE: Frontal view of the chest     COMPARISON: 8/7/2024     FINDINGS: The heart is enlarged. Pulmonary vasculature is mildly less  congested. Sternotomy wires are noted. No focal pulmonary consolidation,  pleural effusion, or pneumothorax. No acute osseous process.     Impression: Mildly decreased pulmonary vascular congestion.     This report was finalized on 8/10/2024 6:50 AM by Dr. Harpreet Mistry M.D on Workstation: [facility]       I reviewed the patient's daily medications.  Scheduled Medications  atenolol, 25 mg, Oral, Daily  budesonide-formoterol, 2 puff, Inhalation, BID - RT  cefTRIAXone, 2,000 mg, Intravenous, Q24H  folic acid, 1 mg, Oral, Daily  insulin lispro, 2-9  Units, Subcutaneous, 4x Daily AC & at Bedtime  ipratropium-albuterol, 3 mL, Nebulization, 4x Daily  LORazepam, 1 mg, Oral, TID  magnesium sulfate, 2 g, Intravenous, Q2H  methylPREDNISolone sodium succinate, 40 mg, Intravenous, Q12H  nicotine, 1 patch, Transdermal, Q24H  pantoprazole, 40 mg, Intravenous, Q12H  potassium chloride ER, 40 mEq, Oral, Q4H  rOPINIRole, 1 mg, Oral, Nightly  sodium chloride, 10 mL, Intravenous, Q12H  spironolactone, 25 mg, Oral, Daily  thiamine, 100 mg, Oral, Daily    Infusions   Diet  Diet: Liquid; Clear Liquid; Fluid Consistency: Nectar Thick        I have personally reviewed:  [x]  Laboratory   [x]  Microbiology   [x]  Radiology   []  EKG/Telemetry   []  Cardiology/Vascular   []  Pathology   []  Records     Assessment/Plan     Active Hospital Problems    Diagnosis  POA    **Acute GI bleeding [K92.2]  Yes    Hyperkalemia [E87.5]  Unknown    Acute renal failure [N17.9]  Unknown    Alcoholism [F10.20]  Unknown    Acute exacerbation of chronic obstructive pulmonary disease (COPD) [J44.1]  Unknown    Acute blood loss anemia [D62]  Unknown      Resolved Hospital Problems   No resolved problems to display.       64 y.o. female admitted with Acute GI bleeding.    Melena  Cirrhosis  -Hemoglobin stable  -PPI IV twice daily  -GI consulted-plan for an MRI with and without for HCC screening when clinically improved.  Also plan for EGD  -SBP prophylaxis with ceftriaxone  -Ammonia okay    Dysphagia  -Speech following, patient okay for VFSS     SANDRO, improved  Hypokalemia  -Renal following-held Bumex yesterday and gave a few doses of Diamox.     Alcohol use disorder  -Continue thiamine, folic acid, CIWA protocol  -Decrease Ativan to 1 mg every 8 hours for now.  Changed to twice daily tomorrow.    Acute COPD exacerbation COPD, not on home oxygen  Hypoxia  -Continue Symbicort, scheduled DuoNeb.  Pulmonology following.    Chronic back pain-continue home hydrocodone.    Leukocytosis-Stable. Suspect from  steroids.  Afebrile and no new infectious symptoms.  On SBP prophylaxis with ceftriaxone.     SCDs for DVT prophylaxis.  Full code.  Discussed with patient, family, and nursing staff.  Anticipate discharge home with HH vs SNU facility in 2-3 days.    Expected Discharge Date: 2024; Expected Discharge Time:       Marshal Lara MD  [facility]  24  14:10 EDT            Electronically signed by Marshal Lara MD at 24 1413       Chan Brewster MD at 244              [facility] Progress Note      Patient Name: Filomena Liu  : 1960  MRN: [id number]  Primary Care Physician:  Fernando Dunn MD  Date of admission: 2024    Subjective     Interval History:   F/u SANDRO    Still confused, but less lethargic than yesterday  Breathing is comfortable on 1 L/min  UOP not fully recorded      Review of Systems:   Unable to obtain reliably    Objective     Vitals:   Temp:  [97.6 °F (36.4 °C)-99 °F (37.2 °C)] 98.4 °F (36.9 °C)  Heart Rate:  [76-95] 76  Resp:  [18-20] 18  BP: (112-140)/(68-79) 112/68  Flow (L/min):  [1-2] 1    Intake/Output Summary (Last 24 hours) at 2024  Last data filed at 2024 1551  Gross per 24 hour   Intake 0 ml   Output 1600 ml   Net -1600 ml       Physical Exam:    General: confused WF, overweight, chr ill, NAD, jaundiced  Psychiatric: Blunted   Neck: supple, no JVD  Lungs: Diminished breath sounds; not labored   Heart: RRR, normal S1 and S2  Abdomen: soft, no grimacing, distended, BS +  Extremities: no edema, cyanosis or clubbing  Skin: Warm and dry    Scheduled Meds:     atenolol, 25 mg, Oral, Daily  budesonide-formoterol, 2 puff, Inhalation, BID - RT  bumetanide, 1 mg, Oral, BID  cefTRIAXone, 2,000 mg, Intravenous, Q24H  folic acid, 1 mg, Oral, Daily  insulin lispro, 2-9 Units, Subcutaneous, 4x Daily AC & at Bedtime  ipratropium-albuterol, 3 mL, Nebulization, 4x Daily  LORazepam, 1 mg, Oral,  TID  methylPREDNISolone sodium succinate, 40 mg, Intravenous, Q12H  nicotine, 1 patch, Transdermal, Q24H  pantoprazole, 40 mg, Intravenous, Q12H  rOPINIRole, 1 mg, Oral, Nightly  sodium chloride, 10 mL, Intravenous, Q12H  spironolactone, 25 mg, Oral, Daily  thiamine, 100 mg, Oral, Daily      IV Meds:        Results Reviewed:   I have personally reviewed the results from the time of this admission to 8/11/2024 19:24 EDT     Results from last 7 days   Lab Units 08/11/24  0554 08/10/24  0520 08/09/24  1535 08/09/24  0511   SODIUM mmol/L 141 143  --  138   POTASSIUM mmol/L 3.1* 3.7 4.4 3.2*   CHLORIDE mmol/L 90* 95*  --  89*   CO2 mmol/L 41.0* 36.1*  --  34.0*   BUN mg/dL 45* 48*  --  60*   CREATININE mg/dL 1.30* 1.40*  --  1.79*   CALCIUM mg/dL 9.8 9.9  --  9.6   BILIRUBIN mg/dL 1.8* 2.3*  --  2.2*   ALK PHOS U/L 111 108  --  117   ALT (SGPT) U/L 40* 47*  --  52*   AST (SGOT) U/L 66* 85*  --  108*   GLUCOSE mg/dL 150* 134*  --  100*     Estimated Creatinine Clearance: 41.2 mL/min (A) (by C-G formula based on SCr of 1.3 mg/dL (H)).  Results from last 7 days   Lab Units 08/11/24  0554 08/10/24  0520 08/09/24  0511 08/06/24  0833 08/05/24  0921   MAGNESIUM mg/dL 1.8  --   --   --  2.8*   PHOSPHORUS mg/dL 2.7 2.8 3.1   < >  --     < > = values in this interval not displayed.         Results from last 7 days   Lab Units 08/11/24  1550 08/11/24  0752 08/11/24  0020 08/10/24  1620 08/10/24  0520   WBC 10*3/mm3 8.82 13.47* 12.74* 7.60 10.51   HEMOGLOBIN g/dL 9.2* 9.1* 9.1* 8.8* 8.6*   PLATELETS 10*3/mm3 98* 100* 91* 68* 71*     Results from last 7 days   Lab Units 08/05/24  0921   INR  1.40*       Assessment / Plan     ASSESSMENT:  Non olig SANDRO - improving (peak 3.5; was 0.7 in July 2023).  Multifactorial prerenal azotemia more likely than HRS in assoc with GIB, severe anemia, diuretic use and impaired compensation via ACE/NSAIDs (though states motrin use scant).  Potassium stable; likely metabolic alkalosis.  UA bland and Eunice  low < 20.  US: no hydronephrosis.    Vol overload, improving; CXR 8/10 with decreasing pulmonary congestion.    Hypotension - resolved, stopped midodrine, tolerating atenolol  Anemia, hgb mid 8s.   Endoscopy been on hold.  TSAT ok 21%.     ETOH abuse  Acute encephalopathy - poss withdrawal    Acute hypoxic resp failure   Rash - has vasculitic appearance but chronic in nature.  Sent DOV/ANCA.  Low C3/C4 noted    PLAN:  1.  Hold Bumex given elevated bicarbonate level  2.  Diamox for 2 doses  2.  Surveillance labs      Chan Brewster MD  24  19:24 EDT    [facility]  [phone]    Electronically signed by Chan Brewster MD at 24 1928       Marshal Lara MD at 24 1444              Name: Filomena Liu ADMIT: 2024   : 1960  PCP: Fernando Dunn MD    MRN: [id number] LOS: 5 days   AGE/SEX: 64 y.o. female  ROOM: [facility]     Subjective   Subjective   Patient's mentation improved today.  About to have a speech evaluation coughing with dinner last night.   at bedside    Review of Systems  Chronic back pain, otherwise no complaints.    Objective   Objective   Vital Signs  Temp:  [97.6 °F (36.4 °C)-99 °F (37.2 °C)] 98.4 °F (36.9 °C)  Heart Rate:  [70-95] 84  Resp:  [18-22] 20  BP: (112-140)/(48-94) 112/68  SpO2:  [92 %-100 %] 94 %  on  Flow (L/min):  [1-2] 1;   Device (Oxygen Therapy): nasal cannula  Body mass index is 34.92 kg/m².  Physical Exam  Constitutional:       General: She is not in acute distress.     Appearance: She is ill-appearing.   Cardiovascular:      Rate and Rhythm: Normal rate and regular rhythm.   Pulmonary:      Effort: Pulmonary effort is normal. No respiratory distress.      Breath sounds: Wheezing present.   Abdominal:      General: Abdomen is flat. There is no distension.      Tenderness: There is no abdominal tenderness.   Musculoskeletal:         General: No swelling or deformity. Normal range of motion.   Skin:      General: Skin is warm and dry.   Neurological:      General: No focal deficit present.      Mental Status: She is alert. She is disoriented.         Results Review     I reviewed the patient's new clinical results.  Results from last 7 days   Lab Units 08/11/24  0752 08/11/24  0020 08/10/24  1620 08/10/24  0520   WBC 10*3/mm3 13.47* 12.74* 7.60 10.51   HEMOGLOBIN g/dL 9.1* 9.1* 8.8* 8.6*   PLATELETS 10*3/mm3 100* 91* 68* 71*     Results from last 7 days   Lab Units 08/11/24  0554 08/10/24  0520 08/09/24  1535 08/09/24  0511 08/08/24  0805   SODIUM mmol/L 141 143  --  138 135*   POTASSIUM mmol/L 3.1* 3.7 4.4 3.2* 3.9   CHLORIDE mmol/L 90* 95*  --  89* 90*   CO2 mmol/L 41.0* 36.1*  --  34.0* 32.0*   BUN mg/dL 45* 48*  --  60* 59*   CREATININE mg/dL 1.30* 1.40*  --  1.79* 2.07*   GLUCOSE mg/dL 150* 134*  --  100* 160*   Estimated Creatinine Clearance: 41.2 mL/min (A) (by C-G formula based on SCr of 1.3 mg/dL (H)).  Results from last 7 days   Lab Units 08/11/24  0554 08/10/24  0520 08/09/24  0511 08/08/24  0805   ALBUMIN g/dL 3.6 3.9 3.8 3.8   BILIRUBIN mg/dL 1.8* 2.3* 2.2* 2.4*   ALK PHOS U/L 111 108 117 113   AST (SGOT) U/L 66* 85* 108* 123*   ALT (SGPT) U/L 40* 47* 52* 57*     Results from last 7 days   Lab Units 08/11/24  0554 08/10/24  0520 08/09/24  0511 08/08/24  0805 08/05/24  1606 08/05/24  0921   CALCIUM mg/dL 9.8 9.9 9.6 9.4   < > 8.7   ALBUMIN g/dL 3.6 3.9 3.8 3.8   < > 3.6   MAGNESIUM mg/dL 1.8  --   --   --   --  2.8*   PHOSPHORUS mg/dL 2.7 2.8 3.1 4.4   < >  --     < > = values in this interval not displayed.     Results from last 7 days   Lab Units 08/06/24  0010 08/05/24  1954 08/05/24  1641   LACTATE mmol/L 1.4 2.9* 3.2*     SARS-CoV-2 BREEZY   Date Value Ref Range Status   11/10/2020 Not Detected Not Detected Final     Glucose   Date/Time Value Ref Range Status   08/11/2024 1105 177 (H) 70 - 130 mg/dL Final   08/11/2024 0609 145 (H) 70 - 130 mg/dL Final   08/10/2024 2055 197 (H) 70 - 130 mg/dL Final    08/10/2024 1702 167 (H) 70 - 130 mg/dL Final   08/10/2024 1133 257 (H) 70 - 130 mg/dL Final   08/10/2024 1132 267 (H) 70 - 130 mg/dL Final   08/10/2024 0854 132 (H) 70 - 130 mg/dL Final       XR Chest 1 View  Narrative: XR CHEST 1 VW-     HISTORY: Female who is 64 years-old, pulmonary edema, follow-up     TECHNIQUE: Frontal view of the chest     COMPARISON: 8/7/2024     FINDINGS: The heart is enlarged. Pulmonary vasculature is mildly less  congested. Sternotomy wires are noted. No focal pulmonary consolidation,  pleural effusion, or pneumothorax. No acute osseous process.     Impression: Mildly decreased pulmonary vascular congestion.     This report was finalized on 8/10/2024 6:50 AM by Dr. Harpreet Mistry M.D on Workstation: [email or web address]       I reviewed the patient's daily medications.  Scheduled Medications  atenolol, 25 mg, Oral, Daily  budesonide-formoterol, 2 puff, Inhalation, BID - RT  bumetanide, 1 mg, Oral, BID  cefTRIAXone, 2,000 mg, Intravenous, Q24H  folic acid, 1 mg, Oral, Daily  insulin lispro, 2-9 Units, Subcutaneous, 4x Daily AC & at Bedtime  ipratropium-albuterol, 3 mL, Nebulization, 4x Daily  LORazepam, 1 mg, Oral, TID  methylPREDNISolone sodium succinate, 40 mg, Intravenous, Q12H  nicotine, 1 patch, Transdermal, Q24H  pantoprazole, 40 mg, Intravenous, Q12H  potassium chloride, 10 mEq, Intravenous, Q1H  rOPINIRole, 1 mg, Oral, Nightly  sodium chloride, 10 mL, Intravenous, Q12H  spironolactone, 25 mg, Oral, Daily  thiamine, 100 mg, Oral, Daily    Infusions   Diet  Diet: Liquid; Clear Liquid; Fluid Consistency: Nectar Thick        I have personally reviewed:  [x]  Laboratory   [x]  Microbiology   [x]  Radiology   []  EKG/Telemetry   []  Cardiology/Vascular   []  Pathology   [x]  Records     Assessment/Plan     Active Hospital Problems    Diagnosis  POA    **Acute GI bleeding [K92.2]  Yes    Hyperkalemia [E87.5]  Unknown    Acute renal failure [N17.9]  Unknown    Alcoholism [F10.20]  Unknown     Acute exacerbation of chronic obstructive pulmonary disease (COPD) [J44.1]  Unknown    Acute blood loss anemia [D62]  Unknown      Resolved Hospital Problems   No resolved problems to display.       64 y.o. female admitted with Acute GI bleeding.    Melena  Cirrhosis  -Hemoglobin stable  -PPI IV twice daily  -GI consulted-plan for an MRI with and without for HCC screening when clinically improved.  Also plan for EGD  -SBP prophylaxis with ceftriaxone     SANDRO, improved  Hypokalemia  -Renal following-continue Bumex 1 mg twice daily and Aldactone     Alcohol use disorder  -Continue thiamine, folic acid, CIWA protocol  -Decrease Ativan to 1 mg every 8 hours for now.    Acute COPD exacerbation COPD, not on home oxygen  Hypoxia  -Continue Symbicort, scheduled DuoNeb.  Pulmonology following.    Chronic back pain-continue home hydrocodone.    Leukocytosis-suspect from steroids.  Afebrile and no new infectious symptoms.  On SBP prophylaxis with ceftriaxone.  Infectious workup with white blood cell count increasing Kommor fever.    SCDs for DVT prophylaxis.  Full code.  Discussed with patient, family, and nursing staff.  Anticipate discharge home with  vs SNU facility in 2-3 days.    Expected Discharge Date: 8/13/2024; Expected Discharge Time:       Marshal Lara MD  [facility]  08/11/24  14:48 EDT            Electronically signed by Marshal Lara MD at 08/11/24 1456       Greta Gandara APRN at 08/11/24 1217          Gastroenterology   Inpatient Progress Note    Reason for Follow Up: Upper GI bleed    Subjective     Interval History:   Patient confused.   at bedside.    Current Facility-Administered Medications:     acetaminophen (TYLENOL) tablet 650 mg, 650 mg, Oral, Q4H PRN, 650 mg at 08/10/24 6758 **OR** acetaminophen (TYLENOL) 160 MG/5ML oral solution 650 mg, 650 mg, Oral, Q4H PRN **OR** acetaminophen (TYLENOL) suppository 650 mg, 650 mg, Rectal, Q4H PRN, Mustapha Subramanian MD    atenolol  (TENORMIN) tablet 25 mg, 25 mg, Oral, Daily, Maxwell Torres MD, 25 mg at 08/10/24 0849    sennosides-docusate (PERICOLACE) 8.6-50 MG per tablet 2 tablet, 2 tablet, Oral, BID PRN, 2 tablet at 08/10/24 2107 **AND** polyethylene glycol (MIRALAX) packet 17 g, 17 g, Oral, Daily PRN **AND** bisacodyl (DULCOLAX) EC tablet 5 mg, 5 mg, Oral, Daily PRN **AND** bisacodyl (DULCOLAX) suppository 10 mg, 10 mg, Rectal, Daily PRN, Mustapha Subramanian MD    budesonide-formoterol (SYMBICORT) 160-4.5 MCG/ACT inhaler 2 puff, 2 puff, Inhalation, BID - RT, 2 puff at 08/11/24 0801 **AND** [DISCONTINUED] tiotropium (SPIRIVA RESPIMAT) 2.5 mcg/act aerosol solution inhaler, 2 puff, Inhalation, Daily - RT, Mustapha Subramanian MD, 2 puff at 08/11/24 0800    bumetanide (BUMEX) tablet 1 mg, 1 mg, Oral, BID, Chan Brewster MD, 1 mg at 08/10/24 1709    cefTRIAXone (ROCEPHIN) 2,000 mg in sodium chloride 0.9 % 100 mL MBP, 2,000 mg, Intravenous, Q24H, Marshal Lara MD, Last Rate: 200 mL/hr at 08/10/24 1235, 2,000 mg at 08/10/24 1235    dextrose (D50W) (25 g/50 mL) IV injection 25 g, 25 g, Intravenous, Q15 Min PRN, Alejandra Mendoza APRN    dextrose (GLUTOSE) oral gel 15 g, 15 g, Oral, Q15 Min PRN, Brook Mendozaa A, APRN    folic acid (FOLVITE) tablet 1 mg, 1 mg, Oral, Daily, Shukri Romero MD, 1 mg at 08/10/24 0849    glucagon (GLUCAGEN) injection 1 mg, 1 mg, Intramuscular, Q15 Min PRN, Alejandra Mendoza, TAYLER    HYDROcodone-acetaminophen (NORCO) 7.5-325 MG per tablet 1 tablet, 1 tablet, Oral, Q8H PRN, Mustapha Subramanian MD, 1 tablet at 08/11/24 0723    insulin lispro (HUMALOG/ADMELOG) injection 2-9 Units, 2-9 Units, Subcutaneous, 4x Daily AC & at Bedtime, Maxwell Torres MD, 2 Units at 08/10/24 2107    ipratropium-albuterol (DUO-NEB) nebulizer solution 3 mL, 3 mL, Nebulization, 4x Daily, Mustapha Subramanian MD, 3 mL at 08/11/24 1206    ipratropium-albuterol (DUO-NEB) nebulizer solution 3 mL, 3 mL, Nebulization, Q4H PRN, Mustapha Subramanian MD, 3 mL at 08/11/24  0441    ipratropium-albuterol (DUO-NEB) nebulizer solution 3 mL, 3 mL, Nebulization, Q4H PRN, Hillary Gallo, TAYLER    LORazepam (ATIVAN) tablet 1 mg, 1 mg, Oral, Q1H PRN **OR** LORazepam (ATIVAN) injection 1 mg, 1 mg, Intravenous, Q1H PRN **OR** LORazepam (ATIVAN) tablet 2 mg, 2 mg, Oral, Q1H PRN, 2 mg at 08/11/24 0238 **OR** LORazepam (ATIVAN) injection 2 mg, 2 mg, Intravenous, Q1H PRN, 2 mg at 08/11/24 0401 **OR** LORazepam (ATIVAN) injection 2 mg, 2 mg, Intravenous, Q15 Min PRN **OR** LORazepam (ATIVAN) injection 2 mg, 2 mg, Intramuscular, Q15 Min PRN, Maxwell Torres MD    LORazepam (ATIVAN) tablet 1 mg, 1 mg, Oral, TID, Marshal Lara MD    Magnesium Standard Dose Replacement - Follow Nurse / BPA Driven Protocol, , Does not apply, PRN, Shukri Romero MD    methylPREDNISolone sodium succinate (SOLU-Medrol) injection 40 mg, 40 mg, Intravenous, Q12H, Daniel Torres MD    nicotine (NICODERM CQ) 21 MG/24HR patch 1 patch, 1 patch, Transdermal, Q24H, Mustapha Subramanian MD, 1 patch at 08/11/24 0239    ondansetron ODT (ZOFRAN-ODT) disintegrating tablet 4 mg, 4 mg, Oral, Q6H PRN **OR** ondansetron (ZOFRAN) injection 4 mg, 4 mg, Intravenous, Q6H PRN, Mustapha Subramanian MD    pantoprazole (PROTONIX) injection 40 mg, 40 mg, Intravenous, Q12H, Maxwell Torres MD, 40 mg at 08/11/24 0959    potassium chloride 10 mEq in 100 mL IVPB, 10 mEq, Intravenous, Q1H, Chan Brewster MD    Potassium Replacement - Follow Nurse / BPA Driven Protocol, , Does not apply, PRN, Chan Brewster MD    rOPINIRole (REQUIP) tablet 1 mg, 1 mg, Oral, Nightly, Mustapha Subramanian MD, 1 mg at 08/10/24 2108    [COMPLETED] Insert Peripheral IV, , , Once **AND** sodium chloride 0.9 % flush 10 mL, 10 mL, Intravenous, PRN, Shukri Romero MD    sodium chloride 0.9 % flush 10 mL, 10 mL, Intravenous, Q12H, Mustapha Subramanian MD, 10 mL at 08/11/24 1000    sodium chloride 0.9 % flush 10 mL, 10 mL, Intravenous, PRN, Mustapha Subramanian MD, 10 mL at  08/06/24 0839    sodium chloride 0.9 % infusion 40 mL, 40 mL, Intravenous, PRN, Mustapha Subramanian MD    spironolactone (ALDACTONE) tablet 25 mg, 25 mg, Oral, Daily, Fernando Baker MD, 25 mg at 08/10/24 0857    [COMPLETED] thiamine (B-1) injection 200 mg, 200 mg, Intravenous, Q8H, 200 mg at 08/10/24 1020 **FOLLOWED BY** thiamine (VITAMIN B-1) tablet 100 mg, 100 mg, Oral, Daily, Shukri Romero MD, 100 mg at 08/10/24 1438  Review of Systems:    Review of systems could not be obtained due to  patient confusion.    Objective     Vital Signs  Temp:  [97.6 °F (36.4 °C)-99 °F (37.2 °C)] 99 °F (37.2 °C)  Heart Rate:  [70-95] 84  Resp:  [18-22] 20  BP: (114-140)/(48-94) 136/73  Body mass index is 34.92 kg/m².    Intake/Output Summary (Last 24 hours) at 8/11/2024 1217  Last data filed at 8/11/2024 0732  Gross per 24 hour   Intake --   Output 2500 ml   Net -2500 ml     I/O this shift:  In: -   Out: 350 [Urine:350]     Physical Exam:   General: patient sleepy, but opens eyes to voice   Eyes: + scleral icterus   Skin: warm and dry, + jaundice   Abdomen: soft, nontender, nondistended; normal bowel sounds, no masses palpated, no periumbical lymphadenopathy   Psychiatric: Unable to assess as patient is confused and sleepy     Results Review:     I reviewed the patient's new clinical results.  I reviewed the patient's new imaging results and agree with the interpretation.  I reviewed the patient's other test results and agree with the interpretation    Results from last 7 days   Lab Units 08/11/24  0752 08/11/24  0020 08/10/24  1620   WBC 10*3/mm3 13.47* 12.74* 7.60   HEMOGLOBIN g/dL 9.1* 9.1* 8.8*   HEMATOCRIT % 29.4* 29.0* 28.5*   PLATELETS 10*3/mm3 100* 91* 68*     Results from last 7 days   Lab Units 08/11/24  0554 08/10/24  0520 08/09/24  1535 08/09/24  0511   SODIUM mmol/L 141 143  --  138   POTASSIUM mmol/L 3.1* 3.7 4.4 3.2*   CHLORIDE mmol/L 90* 95*  --  89*   CO2 mmol/L 41.0* 36.1*  --  34.0*   BUN mg/dL 45* 48*   --  60*   CREATININE mg/dL 1.30* 1.40*  --  1.79*   CALCIUM mg/dL 9.8 9.9  --  9.6   BILIRUBIN mg/dL 1.8* 2.3*  --  2.2*   ALK PHOS U/L 111 108  --  117   ALT (SGPT) U/L 40* 47*  --  52*   AST (SGOT) U/L 66* 85*  --  108*   GLUCOSE mg/dL 150* 134*  --  100*     Results from last 7 days   Lab Units 08/05/24  0921   INR  1.40*     Lab Results   Lab Value Date/Time    LIPASE 31 03/14/2023 1237    LIPASE 14 05/11/2022 0059    LIPASE 21 (L) 06/01/2020 0149    LIPASE 22 (L) 05/12/2020 1235    LIPASE 29 01/09/2020 1229    LIPASE 23 02/27/2018 1221       Radiology:  XR Chest 1 View   Final Result   Mildly decreased pulmonary vascular congestion.       This report was finalized on 8/10/2024 6:50 AM by Dr. Harpreet Mistry M.D on Workstation: [id number]          XR Abdomen KUB   Final Result      XR Chest 1 View   Final Result   Persistent vascular congestion.       This report was finalized on 8/7/2024 3:35 AM by Dr. Radha Laguna M.D on Workstation: [id number]          XR Chest 1 View   Final Result   Cardiomegaly with pulmonary vascular congestion and edema,   follow-up advised.       This report was finalized on 8/6/2024 6:40 PM by Dr. Harpreet Mistry M.D on Workstation: [id number]          US Renal Bilateral   Final Result   No hydronephrosis or echogenic nephrolithiasis.           This report was finalized on 8/5/2024 8:25 PM by Dr. Harpreet Mistry M.D on Workstation: [id number]          US Liver   Final Result   1.  Hepatic steatosis. Otherwise, evaluation of the liver is   nondiagnostic likely due to combination of patient body habitus and   degree of steatosis. Therefore, underlying focal hepatic   malignancy/abnormality cannot be excluded. Further screening for HCC   should be performed with multiphase MRI with and without contrast.   2.  No definite ascites is visualized on provided images.       This report was finalized on 8/5/2024 6:51 PM by Dr. David Briceño M.D   on Workstation: [id number]      "     XR Chest 1 View   Final Result          Assessment & Plan     Active Hospital Problems    Diagnosis     **Acute GI bleeding     Hyperkalemia     Acute renal failure     Alcoholism     Acute exacerbation of chronic obstructive pulmonary disease (COPD)     Acute blood loss anemia        Assessment:  GI bleed.  Melanotic stool-resolved  Anemia-Hgb stable at 9.1  EtOH abuse  EtOH cirrhosis  SANDRO  Possible withdrawal  Mild thrombocytopenia  COPD with acute respiratory distress    These problems are new to me.  Plan:  Patient remains confused.  Hgb stable, continue to monitor lab work  Recheck ammonia level in am.  May need to consider the addition of Xifaxan  MELD score high at 29 based upon lab work 8/5  To consider EGD once stable, likely early this week  Continue PPI therapy  We will continue to follow    I discussed the patients findings and my recommendations with patient, family, and consulting provider.    TAYLER Bernard APRN  [facility] [place]  [address] [place] [address]  Office: [phone]      Electronically signed by Greta Gandara APRN at 08/11/24 1402       Daniel Torres MD at 08/11/24 1048            Daily Progress Note.   [address]  8/11/2024    Patient:  Name:  Filomena Liu  MRN:  [id number]  1960  64 y.o.  female         CC: follow up copd ae, etoh abuse wd, gib    Interval History:  Drowsy but more alert than prior days complains of chronic back pain.  Does complain of some wheezing tightness and cough.    Physical Exam:  /73 (BP Location: Right arm, Patient Position: Lying)   Pulse 95   Temp 99 °F (37.2 °C) (Oral)   Resp 20   Ht 152.4 cm (60\")   Wt 81.1 kg (178 lb 12.7 oz)   SpO2 93%   BMI 34.92 kg/m²   Body mass index is 34.92 kg/m².    Intake/Output Summary (Last 24 hours) at 8/11/2024 1048  Last data filed at 8/11/2024 0732  Gross per 24 hour   Intake -- "   Output 2500 ml   Net -2500 ml     General appearance: ill drowsy  Eyes: anicteric sclerae, moist conjunctivae; no lidlag;    HENT: Atraumatic; oropharynx clear with moist mucous membranes    Neck: Trachea midline;  supple large neck  Lungs: Positive expiratory wheeze, with normal respiratory effort and no intercostal retractions  CV: RRR, no rub   Abdomen: Soft, nontender; BS+obese  Extremities: No peripheral edema or ext lad  Skin: WWP no diffuse visible rash  Psych/neuro: drowsy will awaken moves all ext in will answer questions seemingly appropriate    Data Review:  Notable Labs:  Results from last 7 days   Lab Units 08/11/24  0752 08/11/24  0020 08/10/24  1620 08/10/24  0520 08/09/24  2356 08/09/24  1535 08/09/24  0511   WBC 10*3/mm3 13.47* 12.74* 7.60 10.51 8.94 6.26 9.44   HEMOGLOBIN g/dL 9.1* 9.1* 8.8* 8.6* 8.5* 8.5* 8.7*   PLATELETS 10*3/mm3 100* 91* 68* 71* 65* 66* 76*     Results from last 7 days   Lab Units 08/11/24  0554 08/10/24  0520 08/09/24  1535 08/09/24  0511 08/08/24  0805 08/07/24  0728 08/07/24  0251 08/06/24  1519 08/06/24  0833 08/05/24  1606 08/05/24  0921   SODIUM mmol/L 141 143  --  138 135*  --  128* 125* 121*   < > 130*   POTASSIUM mmol/L 3.1* 3.7 4.4 3.2* 3.9 4.9 5.8* 5.2 5.8*   < > 6.0*   CHLORIDE mmol/L 90* 95*  --  89* 90*  --  90* 89* 89*   < > 88*   CO2 mmol/L 41.0* 36.1*  --  34.0* 32.0*  --  22.0 20.5* 21.0*   < > 18.4*   BUN mg/dL 45* 48*  --  60* 59*  --  59* 57* 51*   < > 39*   CREATININE mg/dL 1.30* 1.40*  --  1.79* 2.07*  --  2.57* 2.95* 3.04*   < > 3.50*   GLUCOSE mg/dL 150* 134*  --  100* 160*  --  186* 165* 183*   < > 65   CALCIUM mg/dL 9.8 9.9  --  9.6 9.4  --  8.1* 8.0* 8.0*   < > 8.7   MAGNESIUM mg/dL 1.8  --   --   --   --   --   --   --   --   --  2.8*   PHOSPHORUS mg/dL 2.7 2.8  --  3.1 4.4  --  5.3*  --  4.8*  --   --     < > = values in this interval not displayed.   Estimated Creatinine Clearance: 41.2 mL/min (A) (by C-G formula based on SCr of 1.3 mg/dL  (H)).    Results from last 7 days   Lab Units 08/11/24  0752 08/11/24  0554 08/11/24  0020 08/10/24  1620 08/10/24  0520 08/09/24  1535 08/09/24  0511 08/07/24  1616 08/07/24  0251 08/06/24  1519 08/06/24  0833 08/06/24  0010 08/05/24  1954 08/05/24  1641   AST (SGOT) U/L  --  66*  --   --  85*  --  108*   < > 143*  --   --   --   --   --    ALT (SGPT) U/L  --  40*  --   --  47*  --  52*   < > 51*  --   --   --   --   --    C3 COMPLEMENT mg/dl  --   --   --   --   --   --   --   --  69.0*  --   --   --   --   --    C4 COMPLEMENT mg/dl  --   --   --   --   --   --   --   --  6.0*  --   --   --   --   --    LACTATE mmol/L  --   --   --   --   --   --   --   --   --   --   --  1.4 2.9* 3.2*   FERRITIN ng/mL  --   --   --   --   --   --   --   --   --   --  68.60  --   --   --    PLATELETS 10*3/mm3 100*  --  91* 68* 71*   < > 76*   < > 75*   < > 119* 75*  --   --     < > = values in this interval not displayed.       Results from last 7 days   Lab Units 08/07/24  0726 08/06/24  1937   PH, ARTERIAL pH units 7.386 7.344*   PCO2, ARTERIAL mm Hg 46.8* 46.6*   PO2 ART mm Hg 85.4 96.3   HCO3 ART mmol/L 28.1* 25.4       Imaging:  Reviewed chest images personally from past 3 days    ASSESSMENT  /  PLAN:  COPD with acute exacerbation  Acute respiratory distress  SANDRO  Cirrhosis with possible GI bleed  Chronic back pain  Alcohol abuse with withdrawal  BENY intolerant of CPAP  Moderate mitral valve regurgitation  Anemia  Thrombocytopenia  Obesity    Following for COPD with ae mgmt:  Solumedrol 40 daily will change to twice daily she does have wheeze today  Symbicort   Spiriva stopped  Scheduled DuoNebs  Discussed with bedside nursing.    Otherwise care per admitting hospitalist service.  Discussed discussed with family member at bedside.  All questions answered from a pulmonary perspective  Electronically signed by Daniel Torres MD, 08/11/24, 11:14 AM EDT.               Electronically signed by Daniel Torres MD at  08/11/24 1114       Consult Notes (last 48 hours)  Notes from 08/10/24 1437 through 08/12/24 1437   No notes of this type exist for this encounter.

## 2024-08-12 NOTE — NURSING NOTE
"Access follow-up regards ETOH use:    Pt found up in recliner. She stated she is \"irritated\" due to \"being told no to everything\". Pt further stated she \"can't find my phone.. this is the first time I've sat up... I can't take a bath or shower... all bruising is from nursing and surgery... I have to take a piss in bed... no one has gotten me up... two people could have held me up...\".     She answered writer's orientation questions correctly. Chart indicates pt has had intermittent confusion. Pt receiving scheduled Ativan. She denied any current cravings for ETOH.    She denied current SI/Hi/AVH. Stated her hospital room reminded her of of a \"house of horrors\".  Pt rated current depression and anxiety levels both 8/10 (10 being the worst.     Pt stated her last AA meeting was a month ago. She stated she wants to quit and will \"deal with it\" when she gets home and will go back to AA meetings. Encouraged pt to think about whether AA meetings will be enough to keep her sober and to have a plan for sobriety will be during the hours/days she cannot attend AA.    Access following.  "

## 2024-08-12 NOTE — THERAPY TREATMENT NOTE
Patient Name: Filomena Liu  : 1960    MRN: [id number]                              Today's Date: 2024       Admit Date: 2024    Visit Dx:     ICD-10-CM ICD-9-CM   1. Acute blood loss anemia  D62 285.1   2. Gastrointestinal hemorrhage, unspecified gastrointestinal hemorrhage type  K92.2 578.9   3. Acute exacerbation of chronic obstructive pulmonary disease (COPD)  J44.1 491.21   4. Alcoholism  F10.20 303.90   5. Acute renal failure, unspecified acute renal failure type  N17.9 584.9   6. Hyperkalemia  E87.5 276.7     Patient Active Problem List   Diagnosis    Mediastinal mass    Cervical stenosis of spinal canal    Cervical radiculopathy    Cellulitis/abscess of left foot    HTN (hypertension)    History of MRSA infection    Anxiety    Peroneal tenosynovitis    Fracture of navicular bone of left foot    Tobacco use    Non compliance with medical treatment    Screening for colon cancer    Osteoporosis    Shoulder arthritis    Primary localized osteoarthrosis of left shoulder region    History of adenomatous polyp of colon    Diverticulosis    Acute GI bleeding    Hyperkalemia    Acute renal failure    Alcoholism    Acute exacerbation of chronic obstructive pulmonary disease (COPD)    Acute blood loss anemia     Past Medical History:   Diagnosis Date    Ankle pain     Ankle wound     LEFT    Anxiety     Arthritis of back     Arthritis of neck     Asthma     Benign tumor of thymus     REMOVED    Bruises easily     Cataract     COPD (chronic obstructive pulmonary disease)     CTS (carpal tunnel syndrome) Surgery     DDD (degenerative disc disease), cervical     Depression     Fracture of wrist 2917    GERD (gastroesophageal reflux disease)     Hip arthrosis Unknown    History of MRSA infection     LEFT ANKLE TREAT [facility]  5YEARS GO    Hyperlipidemia     Hypertension     Knee sprain 2023    Knee swelling Unknown    Shoulder arthritis 2023    Shoulder pain, left 2023    Sleep  apnea     NO MACHINE USE    Spinal stenosis     Spondylolisthesis of cervical region      Past Surgical History:   Procedure Laterality Date    BACK SURGERY      cervical disc surgery with fusion-    CHOLECYSTECTOMY      COLONOSCOPY      COLONOSCOPY N/A 11/12/2020    Procedure: COLONOSCOPY, polypectomy;  Surgeon: Filomena Mckeon MD;  Location: [facility] OR;  Service: Gastroenterology;  Laterality: N/A;  Diverticulosis; Hepatic flexure polyp x 1; Polyp at 60cm x 1; Polyp at 50cm x 1- hot snare;    COLONOSCOPY N/A 4/15/2024    Procedure: COLONOSCOPY into sigmoid colon with hot snare polypectomy;  Surgeon: Leatha Johns MD;  Location: [facility] ENDOSCOPY;  Service: General;  Laterality: N/A;  pre- history of polyps  post- diverticulosis, polyp    HAND SURGERY  2000    HYSTERECTOMY      INCISION AND DRAINAGE LEG Left 11/17/2018    Procedure: Incision and Drainage of Left ankle;  Surgeon: Maxwell Lanier MD;  Location: [facility] MAIN OR;  Service: Orthopedics    NECK SURGERY  2000    SIGMOIDOSCOPY N/A 3/28/2024    Procedure: SIGMOIDOSCOPY FLEXIBLE;  Surgeon: Leatha Johns MD;  Location: [facility] ENDOSCOPY;  Service: General;  Laterality: N/A;  pre: h/o colon polyps  post: poor prep, diverticulosis    SKIN BIOPSY      SKIN GRAFT SPLIT THICKNESS N/A 02/12/2019    Procedure: debridement SKIN GRAFT SPLIT THICKNESS left ankle wound;  Surgeon: Barry Worthy MD;  [place] [facility] [place] [facility]  Service: Plastics    TOTAL SHOULDER ARTHROPLASTY Left 7/20/2023    Procedure: Total  shoulder arthroplasty;  Surgeon: Maxwell Lanier MD;  [place] [facility] [place] [facility] [place];  Service: Orthopedics;  Laterality: Left;    TOTAL THYMECTOMY      TRIGGER POINT INJECTION  2000    WRIST FRACTURE SURGERY      CARPAL TUNNEL      General Information       Row Name 08/12/24 1458          OT Time and Intention    Document Type therapy note (daily note)  -MM     Mode of Treatment co-treatment;physical therapy;occupational therapy  falls risk  -MM        Row Name 08/12/24 1458          General Information    Patient Profile Reviewed yes  -MM     Existing Precautions/Restrictions fall  -MM       Row Name 08/12/24 1458          Cognition    Orientation Status (Cognition) oriented x 3  -MM       Row Name 08/12/24 1458          Safety Issues, Functional Mobility    Safety Issues Affecting Function (Mobility) impulsivity;safety precaution awareness;safety precautions follow-through/compliance;sequencing abilities;problem-solving  -MM     Impairments Affecting Function (Mobility) endurance/activity tolerance;strength;balance;cognition  -MM     Cognitive Impairments, Mobility Safety/Performance problem-solving/reasoning;safety precaution follow-through;safety precaution awareness;sequencing abilities  -MM               User Key  (r) = Recorded By, (t) = Taken By, (c) = Cosigned By      Initials Name Provider Type    MM Roopa Marina OT Occupational Therapist                     Mobility/ADL's       Row Name 08/12/24 1459          Bed Mobility    Bed Mobility supine-sit  -MM     Supine-Sit [place] (Bed Mobility) verbal cues;moderate assist (50% patient effort);1 person assist  -MM     Assistive Device (Bed Mobility) head of bed elevated;bed rails  -MM     Comment, (Bed Mobility) SBA at EOB  -MM       Row Name 08/12/24 1459          Transfers    Transfers sit-stand transfer  -MM       Row Name 08/12/24 1459          Sit-Stand Transfer    Sit-Stand Cooper (Transfers) verbal cues;minimum assist (75% patient effort)  -MM     Assistive Device (Sit-Stand Transfers) walker, front-wheeled  -MM       Row Name 08/12/24 1459          Functional Mobility    Functional Mobility- Ind. Level minimum assist (75% patient effort)  -MM     Functional Mobility- Device walker, front-wheeled  -MM     Functional Mobility- Comment short household distances, remains high falls risk  -MM       Row Name 08/12/24 1459          Activities of Daily Living    BADL Assessment/Intervention  lower body dressing  -MM       Row Name 08/12/24 1459          Lower Body Dressing Assessment/Training    [place] Level (Lower Body Dressing) lower body dressing skills;doff;don;socks;dependent (less than 25% patient effort)  -MM               User Key  (r) = Recorded By, (t) = Taken By, (c) = Cosigned By      Initials Name Provider Type    MM Roopa Marina OT Occupational Therapist                   Obj/Interventions       Row Name 08/12/24 1501          Motor Skills    Motor Skills functional endurance  -MM     Functional Endurance improving  -MM       Row Name 08/12/24 1501          Balance    Balance Assessment sitting static balance;sitting dynamic balance;standing static balance;standing dynamic balance;sit to stand dynamic balance  -MM     Static Sitting Balance standby assist  -MM     Dynamic Sitting Balance standby assist  -MM     Position, Sitting Balance sitting edge of bed;unsupported  -MM     Sit to Stand Dynamic Balance minimal assist  -MM     Static Standing Balance minimal assist;contact guard  -MM     Dynamic Standing Balance minimal assist  -MM     Position/Device Used, Standing Balance supported;walker, front-wheeled  -MM     Balance Interventions sitting;standing;sit to stand;static;supported;dynamic;minimal challenge;weight shifting activity;occupation based/functional task  -MM     Comment, Balance unsteady  -MM               User Key  (r) = Recorded By, (t) = Taken By, (c) = Cosigned By      Initials Name Provider Type    MM Roopa Marina OT Occupational Therapist                   Goals/Plan    No documentation.                  Clinical Impression       Row Name 08/12/24 1502          Pain Assessment    Pretreatment Pain Rating 0/10 - no pain  -MM     Posttreatment Pain Rating 0/10 - no pain  -MM       Row Name 08/12/24 1502          Plan of Care Review    Plan of Care Reviewed With patient  -MM     Progress improving  -MM     Outcome Evaluation pt seen for OT this date, still  presents with sequencing difficulties, some confusion noted throughout session, however following commands. She is eager to get out of bed, min A for STS and functional mobility this date using rwx, LBD with max A due to weakness. She demo's unsteadiness and continues to be high falls risk, education provided on safety at d/c. She reports her significant other is at home if needed for assist. continue to progress as pt able to tolerate.  -MM       Row Name 08/12/24 1502          Therapy Plan Review/Discharge Plan (OT)    Anticipated Discharge Disposition (OT) skilled nursing facility;home with 24/7 care;home with home health  -MM       Row Name 08/12/24 1502          Vital Signs    O2 Delivery Pre Treatment room air  -MM       Row Name 08/12/24 1502          Positioning and Restraints    Pre-Treatment Position in bed  -MM     Post Treatment Position chair  -MM     In Chair notified nsg;reclined;call light within reach;encouraged to call for assist;exit alarm on  -MM               User Key  (r) = Recorded By, (t) = Taken By, (c) = Cosigned By      Initials Name Provider Type    Roopa Bojorquez, OT Occupational Therapist                   Outcome Measures       Row Name 08/12/24 1504          How much help from another is currently needed...    Putting on and taking off regular lower body clothing? 2  -MM     Bathing (including washing, rinsing, and drying) 2  -MM     Toileting (which includes using toilet bed pan or urinal) 2  -MM     Putting on and taking off regular upper body clothing 3  -MM     Taking care of personal grooming (such as brushing teeth) 3  -MM     Eating meals 3  -MM     AM-PAC 6 Clicks Score (OT) 15  -MM       Row Name 08/12/24 1036 08/12/24 0825       How much help from another person do you currently need...    Turning from your back to your side while in flat bed without using bedrails? 4  -EJ 3  -DS (r) EO (t) DS (c)    Moving from lying on back to sitting on the side of a flat bed without  bedrails? 2  -EJ 2  -DS (r) EO (t) DS (c)    Moving to and from a bed to a chair (including a wheelchair)? 3  -EJ 1  -DS (r) EO (t) DS (c)    Standing up from a chair using your arms (e.g., wheelchair, bedside chair)? 3  -EJ 1  -DS (r) EO (t) DS (c)    Climbing 3-5 steps with a railing? 2  -EJ 1  -DS (r) EO (t) DS (c)    To walk in hospital room? 3  -EJ 1  -DS (r) EO (t) DS (c)    AM-PAC 6 Clicks Score (PT) 17  -EJ 9  -DS (r) EO (t)    Highest Level of Mobility Goal 5 --> Static standing  -EJ 3 --> Sit at edge of bed  -DS (r) EO (t)      Row Name 08/12/24 1504          Functional Assessment    Outcome Measure Options AM-PAC 6 Clicks Daily Activity (OT)  -MM               User Key  (r) = Recorded By, (t) = Taken By, (c) = Cosigned By      Initials Name Provider Type    EJ Dorcas Alexander, PT Physical Therapist    Vania Bernstein, RN Registered Nurse    Roopa Bojorquez, OT Occupational Therapist    Kady Joy, Nursing Student Nursing Student                    Occupational Therapy Education       Title: PT OT SLP Therapies (In Progress)       Topic: Occupational Therapy (In Progress)       Point: ADL training (Done)       Description:   Instruct learner(s) on proper safety adaptation and remediation techniques during self care or transfers.   Instruct in proper use of assistive devices.                  Learning Progress Summary             Patient Acceptance, E, DU by NT at 8/8/2024 1733   Family Acceptance, E, DU by NT at 8/8/2024 1733                         Point: Home exercise program (Not Started)       Description:   Instruct learner(s) on appropriate technique for monitoring, assisting and/or progressing therapeutic exercises/activities.                  Learner Progress:  Not documented in this visit.              Point: Precautions (Not Started)       Description:   Instruct learner(s) on prescribed precautions during self-care and functional transfers.                  Learner Progress:  Not  documented in this visit.              Point: Body mechanics (Not Started)       Description:   Instruct learner(s) on proper positioning and spine alignment during self-care, functional mobility activities and/or exercises.                  Learner Progress:  Not documented in this visit.                              User Key       Initials Effective Dates Name Provider Type Discipline    NT 07/03/24 -  Alissa Hoyt, RN Registered Nurse Nurse                  OT Recommendation and Plan     Plan of Care Review  Plan of Care Reviewed With: patient  Progress: improving  Outcome Evaluation: pt seen for OT this date, still presents with sequencing difficulties, some confusion noted throughout session, however following commands. She is eager to get out of bed, min A for STS and functional mobility this date using rwx, LBD with max A due to weakness. She demo's unsteadiness and continues to be high falls risk, education provided on safety at d/c. She reports her significant other is at home if needed for assist. continue to progress as pt able to tolerate.     Time Calculation:         Time Calculation- OT       Row Name 08/12/24 1505             Time Calculation- OT    OT Start Time 0953  -MM      OT Stop Time 1007  -MM      OT Time Calculation (min) 14 min  -MM      Total Timed Code Minutes- OT 14 minute(s)  -MM      OT Received On 08/12/24  -MM      OT - Next Appointment 08/13/24  -MM         Timed Charges    32337 - OT Therapeutic Activity Minutes 14  -MM         Total Minutes    Timed Charges Total Minutes 14  -MM       Total Minutes 14  -MM                User Key  (r) = Recorded By, (t) = Taken By, (c) = Cosigned By      Initials Name Provider Type    MM Roopa Marina OT Occupational Therapist                  Therapy Charges for Today       Code Description Service Date Service Provider Modifiers Qty    [id number]  OT THERAPEUTIC ACT EA 15 MIN 8/12/2024 Roopa Marina OT GO 1                 Roopa Marina  OT  8/12/2024

## 2024-08-12 NOTE — SIGNIFICANT NOTE
08/12/24 1077   OTHER   Discipline speech language pathologist   Rehab Time/Intention   Session Not Performed other (see comments)  (GI cleared patient for barium and participation in VFSS. Will proceed with VFSS.)

## 2024-08-12 NOTE — PROGRESS NOTES
[facility]  Inpatient Progress Note    Reason for Follow Up: Melena    Subjective     Interval History:   Melena has resolved    Current Facility-Administered Medications:     acetaminophen (TYLENOL) tablet 650 mg, 650 mg, Oral, Q4H PRN, 650 mg at 08/10/24 2158 **OR** acetaminophen (TYLENOL) 160 MG/5ML oral solution 650 mg, 650 mg, Oral, Q4H PRN **OR** acetaminophen (TYLENOL) suppository 650 mg, 650 mg, Rectal, Q4H PRN, Mustapha Subramanian MD    atenolol (TENORMIN) tablet 25 mg, 25 mg, Oral, Daily, Maxwell Torres MD, 25 mg at 08/12/24 0827    sennosides-docusate (PERICOLACE) 8.6-50 MG per tablet 2 tablet, 2 tablet, Oral, BID PRN, 2 tablet at 08/12/24 0828 **AND** polyethylene glycol (MIRALAX) packet 17 g, 17 g, Oral, Daily PRN **AND** bisacodyl (DULCOLAX) EC tablet 5 mg, 5 mg, Oral, Daily PRN **AND** bisacodyl (DULCOLAX) suppository 10 mg, 10 mg, Rectal, Daily PRN, Mustapha Subramanian MD    budesonide-formoterol (SYMBICORT) 160-4.5 MCG/ACT inhaler 2 puff, 2 puff, Inhalation, BID - RT, 2 puff at 08/12/24 0808 **AND** [DISCONTINUED] tiotropium (SPIRIVA RESPIMAT) 2.5 mcg/act aerosol solution inhaler, 2 puff, Inhalation, Daily - RT, Mustapha Subramanian MD, 2 puff at 08/11/24 0800    cefTRIAXone (ROCEPHIN) 2,000 mg in sodium chloride 0.9 % 100 mL MBP, 2,000 mg, Intravenous, Q24H, Marshal Lara MD, Last Rate: 200 mL/hr at 08/11/24 1233, 2,000 mg at 08/11/24 1233    dextrose (D50W) (25 g/50 mL) IV injection 25 g, 25 g, Intravenous, Q15 Min PRN, Alejandra Mendoza APRN    dextrose (GLUTOSE) oral gel 15 g, 15 g, Oral, Q15 Min PRN, Alejandra Mendoza APRN    folic acid (FOLVITE) tablet 1 mg, 1 mg, Oral, Daily, Shukri Romero MD, 1 mg at 08/12/24 0827    glucagon (GLUCAGEN) injection 1 mg, 1 mg, Intramuscular, Q15 Min PRN, Alejandra Mendoza APRN    HYDROcodone-acetaminophen (NORCO) 7.5-325 MG per tablet 1 tablet, 1 tablet, Oral, Q8H PRN, Mustapha Subramanian MD, 1 tablet at 08/12/24 1412    insulin lispro  (HUMALOG/ADMELOG) injection 2-9 Units, 2-9 Units, Subcutaneous, 4x Daily AC & at Bedtime, Maxwell Torres MD, 4 Units at 08/12/24 1141    ipratropium-albuterol (DUO-NEB) nebulizer solution 3 mL, 3 mL, Nebulization, 4x Daily, Mustapha Subramanian MD, 3 mL at 08/12/24 1202    ipratropium-albuterol (DUO-NEB) nebulizer solution 3 mL, 3 mL, Nebulization, Q4H PRN, Mustapha Subramanian MD, 3 mL at 08/11/24 0441    ipratropium-albuterol (DUO-NEB) nebulizer solution 3 mL, 3 mL, Nebulization, Q4H PRN, Hillary Gallo APRN, 3 mL at 08/12/24 0440    LORazepam (ATIVAN) tablet 1 mg, 1 mg, Oral, TID, Marshal Lara MD, 1 mg at 08/12/24 0827    Magnesium Standard Dose Replacement - Follow Nurse / BPA Driven Protocol, , Does not apply, PRN, Shukri Romero MD    magnesium sulfate 2g/50 mL (PREMIX) infusion, 2 g, Intravenous, Q2H, Marshal Lara MD, 2 g at 08/12/24 1320    methylPREDNISolone sodium succinate (SOLU-Medrol) injection 40 mg, 40 mg, Intravenous, Q12H, Daniel Torres MD, 40 mg at 08/12/24 0827    nicotine (NICODERM CQ) 21 MG/24HR patch 1 patch, 1 patch, Transdermal, Q24H, Mustapha Subramanian MD, 1 patch at 08/12/24 0123    ondansetron ODT (ZOFRAN-ODT) disintegrating tablet 4 mg, 4 mg, Oral, Q6H PRN **OR** ondansetron (ZOFRAN) injection 4 mg, 4 mg, Intravenous, Q6H PRN, Mustapha Subramanian MD    pantoprazole (PROTONIX) injection 40 mg, 40 mg, Intravenous, Q12H, Maxwell Torres MD, 40 mg at 08/12/24 0827    potassium chloride (K-DUR,KLOR-CON) ER tablet 40 mEq, 40 mEq, Oral, Q4H, Marshal Lara MD, 40 mEq at 08/12/24 1141    Potassium Replacement - Follow Nurse / BPA Driven Protocol, , Does not apply, PRN, Chan Brewster MD    rOPINIRole (REQUIP) tablet 1 mg, 1 mg, Oral, Nightly, Subramanian, Mustapha HUTCHINS MD, 1 mg at 08/11/24 2130    [COMPLETED] Insert Peripheral IV, , , Once **AND** sodium chloride 0.9 % flush 10 mL, 10 mL, Intravenous, PRN, Shukri Romero MD    sodium chloride 0.9 % flush 10 mL, 10 mL, Intravenous, Q12H, Beard,  Mustapha HUTCHINS MD, 10 mL at 08/12/24 0828    sodium chloride 0.9 % flush 10 mL, 10 mL, Intravenous, PRN, Mustapha Subramanian MD, 10 mL at 08/06/24 0839    sodium chloride 0.9 % infusion 40 mL, 40 mL, Intravenous, PRN, Mustapha Subramanian MD    spironolactone (ALDACTONE) tablet 25 mg, 25 mg, Oral, Daily, Fernando Baker MD, 25 mg at 08/12/24 0827    [COMPLETED] thiamine (B-1) injection 200 mg, 200 mg, Intravenous, Q8H, 200 mg at 08/10/24 1020 **FOLLOWED BY** thiamine (VITAMIN B-1) tablet 100 mg, 100 mg, Oral, Daily, Shukri Romero MD, 100 mg at 08/12/24 0827  Review of Systems:    There is weakness and fatigue all other systems reviewed and negative    Objective     Vital Signs  Temp:  [97.9 °F (36.6 °C)-98.7 °F (37.1 °C)] 97.9 °F (36.6 °C)  Heart Rate:  [72-89] 80  Resp:  [16-18] 16  BP: ()/(51-79) 88/51  Body mass index is 34.92 kg/m².    Intake/Output Summary (Last 24 hours) at 8/12/2024 1417  Last data filed at 8/12/2024 0825  Gross per 24 hour   Intake 830 ml   Output 1500 ml   Net -670 ml     I/O this shift:  In: 240 [P.O.:240]  Out: -      Physical Exam:   General: patient awake, alert and cooperative   Eyes: Normal lids and lashes, no scleral icterus   Neck: supple, normal ROM   Skin: warm and dry, not jaundiced   Cardiovascular: regular rhythm and rate, no murmurs auscultated   Pulm: clear to auscultation bilaterally, regular and unlabored   Abdomen: soft, nontender, nondistended; normal bowel sounds   Extremities: no rash or edema   Psychiatric: Normal mood and behavior; memory intact     Results Review:     I reviewed the patient's new clinical results.    Results from last 7 days   Lab Units 08/12/24  0806 08/11/24  2347 08/11/24  1550   WBC 10*3/mm3 14.81* 11.04* 8.82   HEMOGLOBIN g/dL 9.3* 9.3* 9.2*   HEMATOCRIT % 30.8* 30.3* 30.6*   PLATELETS 10*3/mm3 146 122* 98*     Results from last 7 days   Lab Units 08/12/24  0806 08/11/24  2348 08/11/24  0554 08/10/24  0520   SODIUM mmol/L 134*  --  141 143    POTASSIUM mmol/L 3.6 3.8 3.1* 3.7   CHLORIDE mmol/L 88*  --  90* 95*   CO2 mmol/L 33.9*  --  41.0* 36.1*   BUN mg/dL 40*  --  45* 48*   CREATININE mg/dL 1.41*  --  1.30* 1.40*   CALCIUM mg/dL 9.6  --  9.8 9.9   BILIRUBIN mg/dL 1.7*  --  1.8* 2.3*   ALK PHOS U/L 114  --  111 108   ALT (SGPT) U/L 42*  --  40* 47*   AST (SGOT) U/L 58*  --  66* 85*   GLUCOSE mg/dL 129*  --  150* 134*         Lab Results   Lab Value Date/Time    LIPASE 31 03/14/2023 1237    LIPASE 14 05/11/2022 0059    LIPASE 21 (L) 06/01/2020 0149    LIPASE 22 (L) 05/12/2020 1235    LIPASE 29 01/09/2020 1229    LIPASE 23 02/27/2018 1221       Radiology:  XR Chest 1 View   Final Result   Mildly decreased pulmonary vascular congestion.       This report was finalized on 8/10/2024 6:50 AM by Dr. Harpreet Mistry M.D on Workstation: [id number]          XR Abdomen KUB   Final Result      XR Chest 1 View   Final Result   Persistent vascular congestion.       This report was finalized on 8/7/2024 3:35 AM by Dr. Radha Laguna M.D on Workstation: [id number]          XR Chest 1 View   Final Result   Cardiomegaly with pulmonary vascular congestion and edema,   follow-up advised.       This report was finalized on 8/6/2024 6:40 PM by Dr. Harpreet Mistry M.D on Workstation: [id number]          US Renal Bilateral   Final Result   No hydronephrosis or echogenic nephrolithiasis.           This report was finalized on 8/5/2024 8:25 PM by Dr. Harpreet Mistry M.D on Workstation: [id number]          US Liver   Final Result   1.  Hepatic steatosis. Otherwise, evaluation of the liver is   nondiagnostic likely due to combination of patient body habitus and   degree of steatosis. Therefore, underlying focal hepatic   malignancy/abnormality cannot be excluded. Further screening for HCC   should be performed with multiphase MRI with and without contrast.   2.  No definite ascites is visualized on provided images.       This report was finalized on 8/5/2024 6:51  PM by Dr. David Briceño M.D   on Workstation: [id number]          XR Chest 1 View   Final Result          Assessment & Plan     Active Hospital Problems    Diagnosis     **Acute GI bleeding     Hyperkalemia     Acute renal failure     Alcoholism     Acute exacerbation of chronic obstructive pulmonary disease (COPD)     Acute blood loss anemia      Assessment:  GI bleed.  Melena has resolved  Anemia-Hgb stable at 9.2  EtOH abuse  EtOH cirrhosis  SANDRO  Possible withdrawal  Mild thrombocytopenia  COPD with acute respiratory distress     These problems are new to me.  Plan:  Patient remains confused.  Hgb stable, continue to monitor lab work  MELD score high at 29 based upon lab work 8/5  To consider EGD once stable, likely later this week  Continue PPI therapy  We will continue to follow  I discussed the patients findings and my recommendations with patient and nursing staff.    Garth Constantino MD

## 2024-08-12 NOTE — PLAN OF CARE
Goal Outcome Evaluation:  Plan of Care Reviewed With: patient        Progress: improving  Outcome Evaluation: Pt more alert and oriented this morning. Pt on 1L NC. Purewick in place. K: 3.8 after replacement. Pt c/o pain, PRN pain med given. VSS. Rested well.

## 2024-08-12 NOTE — PROGRESS NOTES
Name: Filomena Liu ADMIT: 2024   : 1960  PCP: Fernando Dunn MD    MRN: [id number] LOS: 6 days   AGE/SEX: 64 y.o. female  ROOM: [facility]     Subjective   Subjective   Patient's mentation continues to improve.  Patient is currently having some of her chronic pain.  Sitting up in chair.    Review of Systems  Chronic back pain, otherwise no complaints.     Objective   Objective   Vital Signs  Temp:  [97.9 °F (36.6 °C)-98.7 °F (37.1 °C)] 97.9 °F (36.6 °C)  Heart Rate:  [72-89] 80  Resp:  [16-18] 16  BP: ()/(51-79) 88/51  SpO2:  [92 %-100 %] 92 %  on  Flow (L/min):  [1] 1;   Device (Oxygen Therapy): nasal cannula  Body mass index is 34.92 kg/m².  Physical Exam  Constitutional:       General: She is not in acute distress.     Appearance: She is ill-appearing.   Cardiovascular:      Rate and Rhythm: Normal rate and regular rhythm.   Pulmonary:      Effort: Pulmonary effort is normal. No respiratory distress.      Breath sounds: Wheezing present.   Abdominal:      General: Abdomen is flat. There is no distension.      Tenderness: There is no abdominal tenderness.   Musculoskeletal:         General: No swelling or deformity. Normal range of motion.   Skin:     General: Skin is warm and dry.   Neurological:      General: No focal deficit present.      Mental Status: She is alert. She is disoriented.         Results Review     I reviewed the patient's new clinical results.  Results from last 7 days   Lab Units 24  0806 24  2347 24  1550 24  0752   WBC 10*3/mm3 14.81* 11.04* 8.82 13.47*   HEMOGLOBIN g/dL 9.3* 9.3* 9.2* 9.1*   PLATELETS 10*3/mm3 146 122* 98* 100*     Results from last 7 days   Lab Units 24  0806 24  2348 24  0554 08/10/24  0520 24  1535 24  0511   SODIUM mmol/L 134*  --  141 143  --  138   POTASSIUM mmol/L 3.6 3.8 3.1* 3.7   < > 3.2*   CHLORIDE mmol/L 88*  --  90* 95*  --  89*   CO2 mmol/L 33.9*  --  41.0* 36.1*  --  34.0*   BUN  mg/dL 40*  --  45* 48*  --  60*   CREATININE mg/dL 1.41*  --  1.30* 1.40*  --  1.79*   GLUCOSE mg/dL 129*  --  150* 134*  --  100*    < > = values in this interval not displayed.   Estimated Creatinine Clearance: 38 mL/min (A) (by C-G formula based on SCr of 1.41 mg/dL (H)).  Results from last 7 days   Lab Units 08/12/24  0806 08/11/24  0554 08/10/24  0520 08/09/24  0511   ALBUMIN g/dL 3.7 3.6 3.9 3.8   BILIRUBIN mg/dL 1.7* 1.8* 2.3* 2.2*   ALK PHOS U/L 114 111 108 117   AST (SGOT) U/L 58* 66* 85* 108*   ALT (SGPT) U/L 42* 40* 47* 52*     Results from last 7 days   Lab Units 08/12/24  0806 08/11/24  0554 08/10/24  0520 08/09/24  0511 08/08/24  0805   CALCIUM mg/dL 9.6 9.8 9.9 9.6 9.4   ALBUMIN g/dL 3.7 3.6 3.9 3.8 3.8   MAGNESIUM mg/dL 1.4* 1.8  --   --   --    PHOSPHORUS mg/dL  --  2.7 2.8 3.1 4.4     Results from last 7 days   Lab Units 08/06/24  0010 08/05/24  1954 08/05/24  1641   LACTATE mmol/L 1.4 2.9* 3.2*     SARS-CoV-2 BREEZY   Date Value Ref Range Status   11/10/2020 Not Detected Not Detected Final     Glucose   Date/Time Value Ref Range Status   08/12/2024 1056 200 (H) 70 - 130 mg/dL Final   08/12/2024 0627 209 (H) 70 - 130 mg/dL Final   08/11/2024 2038 106 70 - 130 mg/dL Final   08/11/2024 1619 214 (H) 70 - 130 mg/dL Final   08/11/2024 1105 177 (H) 70 - 130 mg/dL Final   08/11/2024 0609 145 (H) 70 - 130 mg/dL Final   08/10/2024 2055 197 (H) 70 - 130 mg/dL Final       XR Chest 1 View  Narrative: XR CHEST 1 VW-     HISTORY: Female who is 64 years-old, pulmonary edema, follow-up     TECHNIQUE: Frontal view of the chest     COMPARISON: 8/7/2024     FINDINGS: The heart is enlarged. Pulmonary vasculature is mildly less  congested. Sternotomy wires are noted. No focal pulmonary consolidation,  pleural effusion, or pneumothorax. No acute osseous process.     Impression: Mildly decreased pulmonary vascular congestion.     This report was finalized on 8/10/2024 6:50 AM by Dr. Harpreet Mistry M.D on Workstation:  [id number]       I reviewed the patient's daily medications.  Scheduled Medications  atenolol, 25 mg, Oral, Daily  budesonide-formoterol, 2 puff, Inhalation, BID - RT  cefTRIAXone, 2,000 mg, Intravenous, Q24H  folic acid, 1 mg, Oral, Daily  insulin lispro, 2-9 Units, Subcutaneous, 4x Daily AC & at Bedtime  ipratropium-albuterol, 3 mL, Nebulization, 4x Daily  LORazepam, 1 mg, Oral, TID  magnesium sulfate, 2 g, Intravenous, Q2H  methylPREDNISolone sodium succinate, 40 mg, Intravenous, Q12H  nicotine, 1 patch, Transdermal, Q24H  pantoprazole, 40 mg, Intravenous, Q12H  potassium chloride ER, 40 mEq, Oral, Q4H  rOPINIRole, 1 mg, Oral, Nightly  sodium chloride, 10 mL, Intravenous, Q12H  spironolactone, 25 mg, Oral, Daily  thiamine, 100 mg, Oral, Daily    Infusions   Diet  Diet: Liquid; Clear Liquid; Fluid Consistency: Nectar Thick         I have personally reviewed:  [x]  Laboratory   [x]  Microbiology   [x]  Radiology   []  EKG/Telemetry   []  Cardiology/Vascular   []  Pathology   []  Records     Assessment/Plan     Active Hospital Problems    Diagnosis  POA    **Acute GI bleeding [K92.2]  Yes    Hyperkalemia [E87.5]  Unknown    Acute renal failure [N17.9]  Unknown    Alcoholism [F10.20]  Unknown    Acute exacerbation of chronic obstructive pulmonary disease (COPD) [J44.1]  Unknown    Acute blood loss anemia [D62]  Unknown      Resolved Hospital Problems   No resolved problems to display.       64 y.o. female admitted with Acute GI bleeding.    Melena  Cirrhosis  -Hemoglobin stable  -PPI IV twice daily  -GI consulted-plan for an MRI with and without for HCC screening when clinically improved.  Also plan for EGD  -SBP prophylaxis with ceftriaxone  -Ammonia okay    Dysphagia  -Speech following, patient okay for VFSS     SANDRO, improved  Hypokalemia  -Renal following-held Bumex yesterday and gave a few doses of Diamox.     Alcohol use disorder  -Continue thiamine, folic acid, CIWA protocol  -Decrease Ativan to 1 mg every 8  hours for now.  Changed to twice daily tomorrow.    Acute COPD exacerbation COPD, not on home oxygen  Hypoxia  -Continue Symbicort, scheduled DuoNeb.  Pulmonology following.    Chronic back pain-continue home hydrocodone.    Leukocytosis-Stable. Suspect from steroids.  Afebrile and no new infectious symptoms.  On SBP prophylaxis with ceftriaxone.     SCDs for DVT prophylaxis.  Full code.  Discussed with patient, family, and nursing staff.  Anticipate discharge home with HH vs SNU facility in 2-3 days.    Expected Discharge Date: 8/13/2024; Expected Discharge Time:       Marshal Lara MD  [facility]  08/12/24  14:10 EDT

## 2024-08-12 NOTE — PLAN OF CARE
Goal Outcome Evaluation:  Plan of Care Reviewed With: patient        Progress: improving  Outcome Evaluation: pt seen for OT this date, still presents with sequencing difficulties, some confusion noted throughout session, however following commands. She is eager to get out of bed, min A for STS and functional mobility this date using rwx, LBD with max A due to weakness. She demo's unsteadiness and continues to be high falls risk, education provided on safety at d/c. She reports her significant other is at home if needed for assist. continue to progress as pt able to tolerate.      Anticipated Discharge Disposition (OT): skilled nursing facility, home with 24/7 care, home with home health

## 2024-08-12 NOTE — CASE MANAGEMENT/SOCIAL WORK
Continued Stay Note  [facility]     Patient Name: Filomena Liu  MRN: [id number]  Today's Date: 8/12/2024    Admit Date: 8/5/2024    Plan: Follow for HH vs. SNF   Discharge Plan       Row Name 08/12/24 1527       Plan    Plan Follow for HH vs. SNF    Plan Comments CCP met with patient and patient's sister at bedside. Patient gave CCP permission to discuss d/c plans with sister present. CCP discussed current PT progress and discussed SNF. Patient wants to return home at discharge and does not want SNF. Patient's significant other works 3 days a week and is gone during the day. CCP discussed patient will need assistance at home while significant other is at work. CCP discussed home health services as well. Patient's sister would like to discuss with patient and her significant other more on d/c options. CCP provided list of SNF/HH. CCP will follow and assist as needed. Ines VENCES                   Discharge Codes    No documentation.                 Expected Discharge Date and Time       Expected Discharge Date Expected Discharge Time    Aug 16, 2024               VANGIE Carpio

## 2024-08-12 NOTE — PLAN OF CARE
Goal Outcome Evaluation:  Plan of Care Reviewed With: patient           Outcome Evaluation: Pt seen for PT this am. She is progressing w mobility, but still seems to have some confusion/processing difficulties. Pt able to transition to EOB w mod A. She stood w min A using Rwx w cues for proper hand placement. Pt able to ambulate approx 20 ft in room w min A and Rwx. She has some unsteadiness and requires assist w direction and walker management at times. Pt up in chair at end of session. Pt is eager to go home. She remains a fall risk at this time and will likely need assistance at home. Will continue to progress mobility as tolerated.      Anticipated Discharge Disposition (PT): home with 24/7 care, skilled nursing facility

## 2024-08-13 ENCOUNTER — APPOINTMENT (OUTPATIENT)
Dept: GENERAL RADIOLOGY | Facility: HOSPITAL | Age: 64
DRG: 190 | End: 2024-08-13
Payer: MEDICARE

## 2024-08-13 LAB
ALBUMIN SERPL-MCNC: 3.8 G/DL (ref 3.5–5.2)
ALBUMIN/GLOB SERPL: 1.2 G/DL
ALP SERPL-CCNC: 131 U/L (ref 39–117)
ALT SERPL W P-5'-P-CCNC: 44 U/L (ref 1–33)
ANION GAP SERPL CALCULATED.3IONS-SCNC: 13 MMOL/L (ref 5–15)
AST SERPL-CCNC: 53 U/L (ref 1–32)
BASOPHILS # BLD AUTO: 0.02 10*3/MM3 (ref 0–0.2)
BASOPHILS NFR BLD AUTO: 0.2 % (ref 0–1.5)
BILIRUB SERPL-MCNC: 1.6 MG/DL (ref 0–1.2)
BUN SERPL-MCNC: 40 MG/DL (ref 8–23)
BUN/CREAT SERPL: 28.6 (ref 7–25)
CALCIUM SPEC-SCNC: 9.9 MG/DL (ref 8.6–10.5)
CHLORIDE SERPL-SCNC: 90 MMOL/L (ref 98–107)
CO2 SERPL-SCNC: 29 MMOL/L (ref 22–29)
CREAT SERPL-MCNC: 1.4 MG/DL (ref 0.57–1)
DEPRECATED RDW RBC AUTO: 52.7 FL (ref 37–54)
EGFRCR SERPLBLD CKD-EPI 2021: 42.1 ML/MIN/1.73
EOSINOPHIL # BLD AUTO: 0.02 10*3/MM3 (ref 0–0.4)
EOSINOPHIL NFR BLD AUTO: 0.2 % (ref 0.3–6.2)
ERYTHROCYTE [DISTWIDTH] IN BLOOD BY AUTOMATED COUNT: 17.5 % (ref 12.3–15.4)
GLOBULIN UR ELPH-MCNC: 3.2 GM/DL
GLUCOSE BLDC GLUCOMTR-MCNC: 129 MG/DL (ref 70–130)
GLUCOSE BLDC GLUCOMTR-MCNC: 138 MG/DL (ref 70–130)
GLUCOSE BLDC GLUCOMTR-MCNC: 158 MG/DL (ref 70–130)
GLUCOSE BLDC GLUCOMTR-MCNC: 188 MG/DL (ref 70–130)
GLUCOSE SERPL-MCNC: 96 MG/DL (ref 65–99)
HCT VFR BLD AUTO: 30.3 % (ref 34–46.6)
HGB BLD-MCNC: 9.4 G/DL (ref 12–15.9)
IMM GRANULOCYTES # BLD AUTO: 0.36 10*3/MM3 (ref 0–0.05)
IMM GRANULOCYTES NFR BLD AUTO: 2.7 % (ref 0–0.5)
LYMPHOCYTES # BLD AUTO: 0.82 10*3/MM3 (ref 0.7–3.1)
LYMPHOCYTES NFR BLD AUTO: 6.2 % (ref 19.6–45.3)
MAGNESIUM SERPL-MCNC: 3 MG/DL (ref 1.6–2.4)
MCH RBC QN AUTO: 26.2 PG (ref 26.6–33)
MCHC RBC AUTO-ENTMCNC: 31 G/DL (ref 31.5–35.7)
MCV RBC AUTO: 84.4 FL (ref 79–97)
MONOCYTES # BLD AUTO: 1.72 10*3/MM3 (ref 0.1–0.9)
MONOCYTES NFR BLD AUTO: 13 % (ref 5–12)
NEUTROPHILS NFR BLD AUTO: 10.3 10*3/MM3 (ref 1.7–7)
NEUTROPHILS NFR BLD AUTO: 77.7 % (ref 42.7–76)
NRBC BLD AUTO-RTO: 0 /100 WBC (ref 0–0.2)
NT-PROBNP SERPL-MCNC: 129 PG/ML (ref 0–900)
PLATELET # BLD AUTO: 170 10*3/MM3 (ref 140–450)
PMV BLD AUTO: 10.7 FL (ref 6–12)
POTASSIUM SERPL-SCNC: 3.7 MMOL/L (ref 3.5–5.2)
PROT SERPL-MCNC: 7 G/DL (ref 6–8.5)
RBC # BLD AUTO: 3.59 10*6/MM3 (ref 3.77–5.28)
SODIUM SERPL-SCNC: 132 MMOL/L (ref 136–145)
WBC NRBC COR # BLD AUTO: 13.24 10*3/MM3 (ref 3.4–10.8)

## 2024-08-13 PROCEDURE — 94761 N-INVAS EAR/PLS OXIMETRY MLT: CPT

## 2024-08-13 PROCEDURE — 74230 X-RAY XM SWLNG FUNCJ C+: CPT

## 2024-08-13 PROCEDURE — 82948 REAGENT STRIP/BLOOD GLUCOSE: CPT

## 2024-08-13 PROCEDURE — 83880 ASSAY OF NATRIURETIC PEPTIDE: CPT | Performed by: INTERNAL MEDICINE

## 2024-08-13 PROCEDURE — 99232 SBSQ HOSP IP/OBS MODERATE 35: CPT | Performed by: NURSE PRACTITIONER

## 2024-08-13 PROCEDURE — 94799 UNLISTED PULMONARY SVC/PX: CPT

## 2024-08-13 PROCEDURE — 97530 THERAPEUTIC ACTIVITIES: CPT

## 2024-08-13 PROCEDURE — 80053 COMPREHEN METABOLIC PANEL: CPT | Performed by: STUDENT IN AN ORGANIZED HEALTH CARE EDUCATION/TRAINING PROGRAM

## 2024-08-13 PROCEDURE — 71046 X-RAY EXAM CHEST 2 VIEWS: CPT

## 2024-08-13 PROCEDURE — 25010000002 CEFTRIAXONE PER 250 MG: Performed by: STUDENT IN AN ORGANIZED HEALTH CARE EDUCATION/TRAINING PROGRAM

## 2024-08-13 PROCEDURE — 25010000002 METHYLPREDNISOLONE PER 40 MG: Performed by: INTERNAL MEDICINE

## 2024-08-13 PROCEDURE — 92611 MOTION FLUOROSCOPY/SWALLOW: CPT

## 2024-08-13 PROCEDURE — 83735 ASSAY OF MAGNESIUM: CPT | Performed by: STUDENT IN AN ORGANIZED HEALTH CARE EDUCATION/TRAINING PROGRAM

## 2024-08-13 PROCEDURE — 97535 SELF CARE MNGMENT TRAINING: CPT

## 2024-08-13 PROCEDURE — 94664 DEMO&/EVAL PT USE INHALER: CPT

## 2024-08-13 PROCEDURE — 63710000001 INSULIN LISPRO (HUMAN) PER 5 UNITS: Performed by: INTERNAL MEDICINE

## 2024-08-13 RX ORDER — LORAZEPAM 1 MG/1
1 TABLET ORAL 2 TIMES DAILY
Status: DISCONTINUED | OUTPATIENT
Start: 2024-08-13 | End: 2024-08-14

## 2024-08-13 RX ORDER — IPRATROPIUM BROMIDE AND ALBUTEROL SULFATE 2.5; .5 MG/3ML; MG/3ML
3 SOLUTION RESPIRATORY (INHALATION)
Status: DISCONTINUED | OUTPATIENT
Start: 2024-08-13 | End: 2024-08-16 | Stop reason: HOSPADM

## 2024-08-13 RX ORDER — PREDNISONE 20 MG/1
40 TABLET ORAL
Status: DISCONTINUED | OUTPATIENT
Start: 2024-08-14 | End: 2024-08-16 | Stop reason: HOSPADM

## 2024-08-13 RX ADMIN — IPRATROPIUM BROMIDE AND ALBUTEROL SULFATE 3 ML: .5; 3 SOLUTION RESPIRATORY (INHALATION) at 06:24

## 2024-08-13 RX ADMIN — IPRATROPIUM BROMIDE AND ALBUTEROL SULFATE 3 ML: .5; 3 SOLUTION RESPIRATORY (INHALATION) at 15:35

## 2024-08-13 RX ADMIN — PANTOPRAZOLE SODIUM 40 MG: 40 INJECTION, POWDER, FOR SOLUTION INTRAVENOUS at 08:58

## 2024-08-13 RX ADMIN — IPRATROPIUM BROMIDE AND ALBUTEROL SULFATE 3 ML: .5; 3 SOLUTION RESPIRATORY (INHALATION) at 10:35

## 2024-08-13 RX ADMIN — LORAZEPAM 1 MG: 1 TABLET ORAL at 08:58

## 2024-08-13 RX ADMIN — BUDESONIDE AND FORMOTEROL FUMARATE DIHYDRATE 2 PUFF: 160; 4.5 AEROSOL RESPIRATORY (INHALATION) at 20:47

## 2024-08-13 RX ADMIN — HYDROCODONE BITARTRATE AND ACETAMINOPHEN 1 TABLET: 7.5; 325 TABLET ORAL at 19:40

## 2024-08-13 RX ADMIN — Medication 100 MG: at 08:58

## 2024-08-13 RX ADMIN — BARIUM SULFATE 50 ML: 400 SUSPENSION ORAL at 11:18

## 2024-08-13 RX ADMIN — BARIUM SULFATE 4 ML: 980 POWDER, FOR SUSPENSION ORAL at 11:18

## 2024-08-13 RX ADMIN — ATENOLOL 25 MG: 25 TABLET ORAL at 08:58

## 2024-08-13 RX ADMIN — NICOTINE 1 PATCH: 21 PATCH, EXTENDED RELEASE TRANSDERMAL at 23:59

## 2024-08-13 RX ADMIN — IPRATROPIUM BROMIDE AND ALBUTEROL SULFATE 3 ML: .5; 3 SOLUTION RESPIRATORY (INHALATION) at 20:47

## 2024-08-13 RX ADMIN — NICOTINE 1 PATCH: 21 PATCH, EXTENDED RELEASE TRANSDERMAL at 00:15

## 2024-08-13 RX ADMIN — CEFTRIAXONE 2000 MG: 2 INJECTION, POWDER, FOR SOLUTION INTRAMUSCULAR; INTRAVENOUS at 15:08

## 2024-08-13 RX ADMIN — BUDESONIDE AND FORMOTEROL FUMARATE DIHYDRATE 2 PUFF: 160; 4.5 AEROSOL RESPIRATORY (INHALATION) at 06:25

## 2024-08-13 RX ADMIN — BARIUM SULFATE 55 ML: 0.81 POWDER, FOR SUSPENSION ORAL at 11:18

## 2024-08-13 RX ADMIN — SPIRONOLACTONE 25 MG: 25 TABLET, FILM COATED ORAL at 08:59

## 2024-08-13 RX ADMIN — PANTOPRAZOLE SODIUM 40 MG: 40 INJECTION, POWDER, FOR SOLUTION INTRAVENOUS at 19:40

## 2024-08-13 RX ADMIN — Medication 10 ML: at 09:08

## 2024-08-13 RX ADMIN — Medication 10 ML: at 19:41

## 2024-08-13 RX ADMIN — FOLIC ACID 1 MG: 1 TABLET ORAL at 08:58

## 2024-08-13 RX ADMIN — HYDROCODONE BITARTRATE AND ACETAMINOPHEN 1 TABLET: 7.5; 325 TABLET ORAL at 11:33

## 2024-08-13 RX ADMIN — ROPINIROLE 1 MG: 1 TABLET, FILM COATED ORAL at 19:40

## 2024-08-13 RX ADMIN — LORAZEPAM 1 MG: 1 TABLET ORAL at 19:40

## 2024-08-13 RX ADMIN — METHYLPREDNISOLONE SODIUM SUCCINATE 40 MG: 40 INJECTION, POWDER, FOR SOLUTION INTRAMUSCULAR; INTRAVENOUS at 08:58

## 2024-08-13 RX ADMIN — INSULIN LISPRO 2 UNITS: 100 INJECTION, SOLUTION INTRAVENOUS; SUBCUTANEOUS at 17:12

## 2024-08-13 RX ADMIN — BARIUM SULFATE 1 TEASPOON(S): 0.6 CREAM ORAL at 11:18

## 2024-08-13 RX ADMIN — METHYLPREDNISOLONE SODIUM SUCCINATE 40 MG: 40 INJECTION, POWDER, FOR SOLUTION INTRAMUSCULAR; INTRAVENOUS at 19:40

## 2024-08-13 RX ADMIN — IPRATROPIUM BROMIDE AND ALBUTEROL SULFATE 3 ML: .5; 3 SOLUTION RESPIRATORY (INHALATION) at 03:55

## 2024-08-13 NOTE — PROGRESS NOTES
[facility]  Inpatient Progress Note    Reason for Follow Up: Melena    Subjective     Interval History:     No acute events overnight.  Patient seen and examined at bedside.  Per brother gives collateral medical information.  Patient getting ready to go for video swallowing study.  She reports tolerating diet well today.  More oriented to place and time.  She passed a brown stool this morning.  No rectal bleeding.      Current Facility-Administered Medications:     acetaminophen (TYLENOL) tablet 650 mg, 650 mg, Oral, Q4H PRN, 650 mg at 08/10/24 2158 **OR** acetaminophen (TYLENOL) 160 MG/5ML oral solution 650 mg, 650 mg, Oral, Q4H PRN **OR** acetaminophen (TYLENOL) suppository 650 mg, 650 mg, Rectal, Q4H PRN, Mustapha Subramanian MD    atenolol (TENORMIN) tablet 25 mg, 25 mg, Oral, Daily, Maxwell Torres MD, 25 mg at 08/13/24 0858    sennosides-docusate (PERICOLACE) 8.6-50 MG per tablet 2 tablet, 2 tablet, Oral, BID PRN, 2 tablet at 08/12/24 0828 **AND** polyethylene glycol (MIRALAX) packet 17 g, 17 g, Oral, Daily PRN **AND** bisacodyl (DULCOLAX) EC tablet 5 mg, 5 mg, Oral, Daily PRN **AND** bisacodyl (DULCOLAX) suppository 10 mg, 10 mg, Rectal, Daily PRN, Mustapha Subramanian MD    budesonide-formoterol (SYMBICORT) 160-4.5 MCG/ACT inhaler 2 puff, 2 puff, Inhalation, BID - RT, 2 puff at 08/13/24 0625 **AND** [DISCONTINUED] tiotropium (SPIRIVA RESPIMAT) 2.5 mcg/act aerosol solution inhaler, 2 puff, Inhalation, Daily - RT, Mustapha Subramanian MD, 2 puff at 08/11/24 0800    cefTRIAXone (ROCEPHIN) 2,000 mg in sodium chloride 0.9 % 100 mL MBP, 2,000 mg, Intravenous, Q24H, Marshal Lara MD, Last Rate: 200 mL/hr at 08/12/24 1540, 2,000 mg at 08/12/24 1540    dextrose (D50W) (25 g/50 mL) IV injection 25 g, 25 g, Intravenous, Q15 Min PRN, Alejandra Mendoza APRN    dextrose (GLUTOSE) oral gel 15 g, 15 g, Oral, Q15 Min PRN, Alejandra Mendoza APRN    folic acid (FOLVITE) tablet 1 mg, 1 mg, Oral, Daily, Shukri Romero,  MD, 1 mg at 08/13/24 0858    glucagon (GLUCAGEN) injection 1 mg, 1 mg, Intramuscular, Q15 Min PRN, Alejandra Mendoza APRN    HYDROcodone-acetaminophen (NORCO) 7.5-325 MG per tablet 1 tablet, 1 tablet, Oral, Q8H PRN, Mustapha Subramanian MD, 1 tablet at 08/13/24 1133    insulin lispro (HUMALOG/ADMELOG) injection 2-9 Units, 2-9 Units, Subcutaneous, 4x Daily AC & at Bedtime, Maxwell Torres MD, 6 Units at 08/12/24 1724    ipratropium-albuterol (DUO-NEB) nebulizer solution 3 mL, 3 mL, Nebulization, Q4H PRN, Mustapha Subramanian MD, 3 mL at 08/11/24 0441    ipratropium-albuterol (DUO-NEB) nebulizer solution 3 mL, 3 mL, Nebulization, Q4H PRN, Hillary Gallo APRN, 3 mL at 08/12/24 0440    ipratropium-albuterol (DUO-NEB) nebulizer solution 3 mL, 3 mL, Nebulization, Q4H - RT, Jacques Asencio MD, 3 mL at 08/13/24 1035    LORazepam (ATIVAN) tablet 1 mg, 1 mg, Oral, BID, Marshal Lara MD, 1 mg at 08/13/24 0858    Magnesium Standard Dose Replacement - Follow Nurse / BPA Driven Protocol, , Does not apply, PRN, Shukri Romero MD    methylPREDNISolone sodium succinate (SOLU-Medrol) injection 40 mg, 40 mg, Intravenous, Q12H, Daniel Torres MD, 40 mg at 08/13/24 0858    nicotine (NICODERM CQ) 21 MG/24HR patch 1 patch, 1 patch, Transdermal, Q24H, Mustapha Subramanian MD, 1 patch at 08/13/24 0015    ondansetron ODT (ZOFRAN-ODT) disintegrating tablet 4 mg, 4 mg, Oral, Q6H PRN **OR** ondansetron (ZOFRAN) injection 4 mg, 4 mg, Intravenous, Q6H PRN, Mustapha Subramanian MD    pantoprazole (PROTONIX) injection 40 mg, 40 mg, Intravenous, Q12H, Maxwell Torres MD, 40 mg at 08/13/24 0858    Potassium Replacement - Follow Nurse / BPA Driven Protocol, , Does not apply, PRN, Chan Brewster MD    rOPINIRole (REQUIP) tablet 1 mg, 1 mg, Oral, Nightly, Mustapha Subramanian MD, 1 mg at 08/12/24 2051    [COMPLETED] Insert Peripheral IV, , , Once **AND** sodium chloride 0.9 % flush 10 mL, 10 mL, Intravenous, PRN, Shukri Romero MD    sodium chloride 0.9 %  flush 10 mL, 10 mL, Intravenous, Q12H, Mustapha Subramanian MD, 10 mL at 08/13/24 0908    sodium chloride 0.9 % flush 10 mL, 10 mL, Intravenous, PRN, Mustapha Subramanian MD, 10 mL at 08/06/24 0839    sodium chloride 0.9 % infusion 40 mL, 40 mL, Intravenous, PRN, Mustapha Subramanian MD    spironolactone (ALDACTONE) tablet 25 mg, 25 mg, Oral, Daily, Fernando Baker MD, 25 mg at 08/13/24 0859    [COMPLETED] thiamine (B-1) injection 200 mg, 200 mg, Intravenous, Q8H, 200 mg at 08/10/24 1020 **FOLLOWED BY** thiamine (VITAMIN B-1) tablet 100 mg, 100 mg, Oral, Daily, Shukri Romero MD, 100 mg at 08/13/24 0858  Review of Systems:    All systems were reviewed and negative except for:  Gastrointestinal: positive for  See HPI    Objective     Vital Signs  Temp:  [98.4 °F (36.9 °C)-98.8 °F (37.1 °C)] 98.8 °F (37.1 °C)  Heart Rate:  [76-92] 87  Resp:  [16-20] 18  BP: (109-118)/(66-71) 118/67  Body mass index is 33.18 kg/m².    Intake/Output Summary (Last 24 hours) at 8/13/2024 1236  Last data filed at 8/13/2024 0406  Gross per 24 hour   Intake 222 ml   Output 250 ml   Net -28 ml     No intake/output data recorded.     Physical Exam:   General: patient awake, alert and cooperative   Abdomen: soft, nontender, nondistended; normal bowel sounds      Results Review:     I reviewed the patient's new clinical results.    Results from last 7 days   Lab Units 08/12/24  2350 08/12/24  1552 08/12/24  0806   WBC 10*3/mm3 13.24* 14.42* 14.81*   HEMOGLOBIN g/dL 9.4* 9.4* 9.3*   HEMATOCRIT % 30.3* 30.5* 30.8*   PLATELETS 10*3/mm3 170 168 146     Results from last 7 days   Lab Units 08/13/24  0810 08/12/24  1932 08/12/24  0806 08/11/24  2348 08/11/24  0554   SODIUM mmol/L 132*  --  134*  --  141   POTASSIUM mmol/L 3.7 3.8 3.6   < > 3.1*   CHLORIDE mmol/L 90*  --  88*  --  90*   CO2 mmol/L 29.0  --  33.9*  --  41.0*   BUN mg/dL 40*  --  40*  --  45*   CREATININE mg/dL 1.40*  --  1.41*  --  1.30*   CALCIUM mg/dL 9.9  --  9.6  --  9.8   BILIRUBIN mg/dL  1.6*  --  1.7*  --  1.8*   ALK PHOS U/L 131*  --  114  --  111   ALT (SGPT) U/L 44*  --  42*  --  40*   AST (SGOT) U/L 53*  --  58*  --  66*   GLUCOSE mg/dL 96  --  129*  --  150*    < > = values in this interval not displayed.         Lab Results   Lab Value Date/Time    LIPASE 31 03/14/2023 1237    LIPASE 14 05/11/2022 0059    LIPASE 21 (L) 06/01/2020 0149    LIPASE 22 (L) 05/12/2020 1235    LIPASE 29 01/09/2020 1229    LIPASE 23 02/27/2018 1221       Radiology:  XR Chest 1 View   Final Result   Mildly decreased pulmonary vascular congestion.       This report was finalized on 8/10/2024 6:50 AM by Dr. Harpreet Mistry M.D on Workstation: [id number]          XR Abdomen KUB   Final Result      XR Chest 1 View   Final Result   Persistent vascular congestion.       This report was finalized on 8/7/2024 3:35 AM by Dr. Radha Laguna M.D on Workstation: [id number]          XR Chest 1 View   Final Result   Cardiomegaly with pulmonary vascular congestion and edema,   follow-up advised.       This report was finalized on 8/6/2024 6:40 PM by Dr. Harpreet Mistry M.D on Workstation: [id number]          US Renal Bilateral   Final Result   No hydronephrosis or echogenic nephrolithiasis.           This report was finalized on 8/5/2024 8:25 PM by Dr. Harpreet Mistry M.D on Workstation: [id number]          US Liver   Final Result   1.  Hepatic steatosis. Otherwise, evaluation of the liver is   nondiagnostic likely due to combination of patient body habitus and   degree of steatosis. Therefore, underlying focal hepatic   malignancy/abnormality cannot be excluded. Further screening for HCC   should be performed with multiphase MRI with and without contrast.   2.  No definite ascites is visualized on provided images.       This report was finalized on 8/5/2024 6:51 PM by Dr. David Briceño M.D   on Workstation: [id number]          XR Chest 1 View   Final Result      XR Chest 2 View    (Results Pending)   FL Video Swallow  Single Contrast    (Results Pending)       Assessment & Plan     Active Hospital Problems    Diagnosis     **Acute GI bleeding     Hyperkalemia     Acute renal failure     Alcoholism     Acute exacerbation of chronic obstructive pulmonary disease (COPD)     Acute blood loss anemia        Assessment:  GI bleed. Resolved melena.   Anemia - stable  Alcohol related liver cirrhosis  Elevated liver function tests  Possible withdrawal  Mild thrombocytopenia  COPD with acute respiratory distress    Plan:  Hemoglobin stable -continue to monitor and transfuse as needed per primary team  Continue twice daily dosing PPI therapy  Plans for EGD likely end of the week  Continue ceftriaxone for SBP prophylaxis  Ammonia normal  Continue Solu-Medrol and Aldactone  Await results of swallowing study  CBC, CMP and PT/INR in the morning - update DF based on AM labs    I discussed the patients findings and my recommendations with patient and family.    Fiona Flowers, APRN

## 2024-08-13 NOTE — PROGRESS NOTES
[facility] Progress Note      Patient Name: Filomena Liu  : 1960  MRN: [id number]  Primary Care Physician:  Fernando Dunn MD  Date of admission: 2024    Subjective     Interval History:   F/u SANDRO    More awake than yesterday, and speech more fluent but she perseverates  Remains disoriented  Breathing is comfortable on 1 L/min  UOP yesterday 1.9 L      Review of Systems:   Unable to obtain reliably    Objective     Vitals:   Temp:  [97.9 °F (36.6 °C)-98.7 °F (37.1 °C)] 98.4 °F (36.9 °C)  Heart Rate:  [76-89] 80  Resp:  [16] 16  BP: ()/(51-76) 113/66  Flow (L/min):  [1] 1    Intake/Output Summary (Last 24 hours) at 2024 2251  Last data filed at 2024 0825  Gross per 24 hour   Intake 476 ml   Output 600 ml   Net -124 ml       Physical Exam:    General: confused WF, overweight, chr ill, NAD, jaundiced  Psychiatric: Blunted   Neck: supple, no JVD  Lungs: Diminished breath sounds; not labored   Heart: RRR, normal S1 and S2  Abdomen: soft, no grimacing, distended, BS +  Extremities: no edema, cyanosis or clubbing  Skin: Warm and dry    Scheduled Meds:     atenolol, 25 mg, Oral, Daily  budesonide-formoterol, 2 puff, Inhalation, BID - RT  cefTRIAXone, 2,000 mg, Intravenous, Q24H  folic acid, 1 mg, Oral, Daily  insulin lispro, 2-9 Units, Subcutaneous, 4x Daily AC & at Bedtime  ipratropium-albuterol, 3 mL, Nebulization, Q4H - RT  LORazepam, 1 mg, Oral, TID  methylPREDNISolone sodium succinate, 40 mg, Intravenous, Q12H  nicotine, 1 patch, Transdermal, Q24H  pantoprazole, 40 mg, Intravenous, Q12H  rOPINIRole, 1 mg, Oral, Nightly  sodium chloride, 10 mL, Intravenous, Q12H  spironolactone, 25 mg, Oral, Daily  thiamine, 100 mg, Oral, Daily      IV Meds:        Results Reviewed:   I have personally reviewed the results from the time of this admission to 2024 22:51 EDT     Results from last 7 days   Lab Units 24  1932 24  0806 24  2703  08/11/24  0554 08/10/24  0520   SODIUM mmol/L  --  134*  --  141 143   POTASSIUM mmol/L 3.8 3.6 3.8 3.1* 3.7   CHLORIDE mmol/L  --  88*  --  90* 95*   CO2 mmol/L  --  33.9*  --  41.0* 36.1*   BUN mg/dL  --  40*  --  45* 48*   CREATININE mg/dL  --  1.41*  --  1.30* 1.40*   CALCIUM mg/dL  --  9.6  --  9.8 9.9   BILIRUBIN mg/dL  --  1.7*  --  1.8* 2.3*   ALK PHOS U/L  --  114  --  111 108   ALT (SGPT) U/L  --  42*  --  40* 47*   AST (SGOT) U/L  --  58*  --  66* 85*   GLUCOSE mg/dL  --  129*  --  150* 134*     Estimated Creatinine Clearance: 38 mL/min (A) (by C-G formula based on SCr of 1.41 mg/dL (H)).  Results from last 7 days   Lab Units 08/12/24  0806 08/11/24  0554 08/10/24  0520 08/09/24  0511   MAGNESIUM mg/dL 1.4* 1.8  --   --    PHOSPHORUS mg/dL  --  2.7 2.8 3.1         Results from last 7 days   Lab Units 08/12/24  1552 08/12/24  0806 08/11/24  2347 08/11/24  1550 08/11/24  0752   WBC 10*3/mm3 14.42* 14.81* 11.04* 8.82 13.47*   HEMOGLOBIN g/dL 9.4* 9.3* 9.3* 9.2* 9.1*   PLATELETS 10*3/mm3 168 146 122* 98* 100*             Assessment / Plan     ASSESSMENT:  Non olig SANDRO - improving (peak 3.5; was 0.7 in July 2023).  Multifactorial prerenal azotemia in assoc with GIB, severe anemia, diuretic use and impaired compensation via ACE/NSAIDs (though states motrin use scant).  Potassium stable; low magnesium; likely metabolic alkalosis; s/p Diamox for 2 doses 8/11 and 8/12.  UA bland and Eunice low < 20.  US: no hydronephrosis.    Vol overload, improving; CXR 8/10 with decreasing pulmonary congestion.    Hypotension - resolved, stopped midodrine, tolerating atenolol  Anemia.  TSAT ok 21%.     ETOH abuse  Acute encephalopathy - poss withdrawal    Acute hypoxic resp failure   Rash - vasculitic appearance but chronic in nature.  DOV positive with titer 1:160 and complements low, but urine completely bland.  ANCA panel negative    PLAN:  1.  Hold Bumex still   2.  Magnesium replaced per protocol  3.  Surveillance  labs      Chan Brewster MD  08/12/24  22:51 EDT    [facility]  [phone]

## 2024-08-13 NOTE — PLAN OF CARE
Goal Outcome Evaluation:  Plan of Care Reviewed With: patient        Progress: improving  Outcome Evaluation: Pt seen for PT this afternoon. She appears to be doing much better today and showing improvement w mobility. Pt requiring less assistance w all activity. She was able to transition to EOB w SBA/CGA. She stood w SBA/CGA using rwx. Pt able to increase ambulation distance today. She ambulated approx 50 ft and then took brief seated rest break. After rest break, pt ambulated an additional 200 ft w SBA and rwx. No unsteadiness noted.  Pt returned to bed at end of session w all  needs in reach. Will continue to progress activity as tolerated.      Anticipated Discharge Disposition (PT): home with 24/7 care

## 2024-08-13 NOTE — PLAN OF CARE
Goal Outcome Evaluation:  Plan of Care Reviewed With: patient        Progress: improving  Outcome Evaluation: pt seen for OT this, overall doing better, improved balance with ADLs this date as well as decreased shaking noted. She is able to complete toileting hygiene with SBA, transfer with SBA/CGA. She demo improved activity tolerance with functional mobility, only x1 seated rest break required for community distances using rwx. She will continue to benefit from skilled OT to address goals and maximize (I), plans SNF per pt report      Anticipated Discharge Disposition (OT): skilled nursing facility

## 2024-08-13 NOTE — PLAN OF CARE
Goal Outcome Evaluation:  Plan of Care Reviewed With: patient           Outcome Evaluation: VFSS completed. Pt had a delayed swallow. Trace, shallow inconsistent penetration was noted with thin, pureed, soft/mixed. No aspiration observed. No coughing occurred during the study. Pt did have a significant coughing episode prior to the study. Recommend (when cleared for solids) soft, ground, no mixed w/ thins; meds whole/crushed in pureed; upright for meals and 30 min after; slow rate; small bites/sips; NO STRAWS; periodic throat clear/swallow during meals and after. ST to follow.      Anticipated Discharge Disposition (SLP): unknown          SLP Swallowing Diagnosis: mild, oral dysphagia, pharyngeal dysphagia (08/13/24 1100)

## 2024-08-13 NOTE — THERAPY TREATMENT NOTE
Patient Name: Filomena Liu  : 1960    MRN: [id number]                              Today's Date: 2024       Admit Date: 2024    Visit Dx:     ICD-10-CM ICD-9-CM   1. Acute blood loss anemia  D62 285.1   2. Gastrointestinal hemorrhage, unspecified gastrointestinal hemorrhage type  K92.2 578.9   3. Acute exacerbation of chronic obstructive pulmonary disease (COPD)  J44.1 491.21   4. Alcoholism  F10.20 303.90   5. Acute renal failure, unspecified acute renal failure type  N17.9 584.9   6. Hyperkalemia  E87.5 276.7     Patient Active Problem List   Diagnosis    Mediastinal mass    Cervical stenosis of spinal canal    Cervical radiculopathy    Cellulitis/abscess of left foot    HTN (hypertension)    History of MRSA infection    Anxiety    Peroneal tenosynovitis    Fracture of navicular bone of left foot    Tobacco use    Non compliance with medical treatment    Screening for colon cancer    Osteoporosis    Shoulder arthritis    Primary localized osteoarthrosis of left shoulder region    History of adenomatous polyp of colon    Diverticulosis    Acute GI bleeding    Hyperkalemia    Acute renal failure    Alcoholism    Acute exacerbation of chronic obstructive pulmonary disease (COPD)    Acute blood loss anemia     Past Medical History:   Diagnosis Date    Ankle pain     Ankle wound     LEFT    Anxiety     Arthritis of back     Arthritis of neck     Asthma     Benign tumor of thymus     REMOVED    Bruises easily     Cataract     COPD (chronic obstructive pulmonary disease)     CTS (carpal tunnel syndrome) Surgery     DDD (degenerative disc disease), cervical     Depression     Fracture of wrist 2917    GERD (gastroesophageal reflux disease)     Hip arthrosis Unknown    History of MRSA infection     LEFT ANKLE TREAT [facility]  5YEARS GO    Hyperlipidemia     Hypertension     Knee sprain 2023    Knee swelling Unknown    Shoulder arthritis 2023    Shoulder pain, left 2023    Sleep  apnea     NO MACHINE USE    Spinal stenosis     Spondylolisthesis of cervical region      Past Surgical History:   Procedure Laterality Date    BACK SURGERY      cervical disc surgery with fusion-    CHOLECYSTECTOMY      COLONOSCOPY      COLONOSCOPY N/A 11/12/2020    Procedure: COLONOSCOPY, polypectomy;  Surgeon: Filomena Mckeon MD;  Location: [facility] OR;  Service: Gastroenterology;  Laterality: N/A;  Diverticulosis; Hepatic flexure polyp x 1; Polyp at 60cm x 1; Polyp at 50cm x 1- hot snare;    COLONOSCOPY N/A 4/15/2024    Procedure: COLONOSCOPY into sigmoid colon with hot snare polypectomy;  Surgeon: Leatha Johns MD;  Location: [facility] ENDOSCOPY;  Service: General;  Laterality: N/A;  pre- history of polyps  post- diverticulosis, polyp    HAND SURGERY  2000    HYSTERECTOMY      INCISION AND DRAINAGE LEG Left 11/17/2018    Procedure: Incision and Drainage of Left ankle;  Surgeon: Maxwell Lanier MD;  Location: [facility] MAIN OR;  Service: Orthopedics    NECK SURGERY  2000    SIGMOIDOSCOPY N/A 3/28/2024    Procedure: SIGMOIDOSCOPY FLEXIBLE;  Surgeon: Leatha Johns MD;  Location: [facility] ENDOSCOPY;  Service: General;  Laterality: N/A;  pre: h/o colon polyps  post: poor prep, diverticulosis    SKIN BIOPSY      SKIN GRAFT SPLIT THICKNESS N/A 02/12/2019    Procedure: debridement SKIN GRAFT SPLIT THICKNESS left ankle wound;  Surgeon: Barry Worthy MD;  [place] [facility] [place] [facility] [place]  Service: Plastics    TOTAL SHOULDER ARTHROPLASTY Left 7/20/2023    Procedure: Total  shoulder arthroplasty;  Surgeon: Maxwell Lanier MD;  [place] [facility] [place] [facility] [place]  Service: Orthopedics;  Laterality: Left;    TOTAL THYMECTOMY      TRIGGER POINT INJECTION  2000    WRIST FRACTURE SURGERY      CARPAL TUNNEL      General Information       Row Name 08/13/24 1511          Physical Therapy Time and Intention    Document Type therapy note (daily note)  -EJ     Mode of Treatment physical therapy  -EJ               User Key  (r) =  Recorded By, (t) = Taken By, (c) = Cosigned By      Initials Name Provider Type    Dorcas Lopez PT Physical Therapist                   Mobility       Row Name 08/13/24 1511          Bed Mobility    Bed Mobility sit-supine  -EJ     Supine-Sit [place] (Bed Mobility) verbal cues;standby assist;contact guard  -EJ     Sit-Supine [place] (Bed Mobility) verbal cues;minimum assist (75% patient effort)  -EJ     Assistive Device (Bed Mobility) head of bed elevated;bed rails  -EJ     Comment, (Bed Mobility) assist w BLE back into bed  -EJ       Row Name 08/13/24 1511          Sit-Stand Transfer    Sit-Stand [place] (Transfers) verbal cues;standby assist;contact guard  -EJ     Assistive Device (Sit-Stand Transfers) walker, front-wheeled  -EJ       Row Name 08/13/24 1511          Gait/Stairs (Locomotion)    [place] Level (Gait) verbal cues;standby assist  -EJ     Assistive Device (Gait) walker, front-wheeled  -EJ     Distance in Feet (Gait) 240  -EJ     Deviations/Abnormal Patterns (Gait) dayanara decreased;stride length decreased  -EJ     Comment, (Gait/Stairs) slow pace, but steady, one brief seated rest break, no LOB noted  -EJ               User Key  (r) = Recorded By, (t) = Taken By, (c) = Cosigned By      Initials Name Provider Type    Dorcas Lopez, PT Physical Therapist                   Obj/Interventions    No documentation.                  Goals/Plan    No documentation.                  Clinical Impression       Row Name 08/13/24 1512          Pain    Pretreatment Pain Rating 0/10 - no pain  -EJ     Posttreatment Pain Rating 0/10 - no pain  -EJ       Row Name 08/13/24 1512          Plan of Care Review    Plan of Care Reviewed With patient  -EJ     Progress improving  -EJ     Outcome Evaluation Pt seen for PT this afternoon. She appears to be doing much better today and showing improvement w mobility. Pt requiring less assistance w all activity. She was able to transition to EOB w  SBA/CGA. She stood w SBA/CGA using rwx. Pt able to increase ambulation distance today. She ambulated approx 50 ft and then took brief seated rest break. After rest break, pt ambulated an additional 200 ft w SBA and rwx. No unsteadiness noted.  Pt returned to bed at end of session w all  needs in reach. Will continue to progress activity as tolerated.  -EJ       Row Name 08/13/24 1512          Positioning and Restraints    Pre-Treatment Position in bed  -EJ     Post Treatment Position bed  -EJ     In Bed notified nsg;supine;call light within reach;encouraged to call for assist;exit alarm on  -EJ               User Key  (r) = Recorded By, (t) = Taken By, (c) = Cosigned By      Initials Name Provider Type    Dorcas Lopez, PT Physical Therapist                   Outcome Measures       Row Name 08/13/24 1515 08/13/24 0853       How much help from another person do you currently need...    Turning from your back to your side while in flat bed without using bedrails? 4  -EJ 4 (P)   -EO    Moving from lying on back to sitting on the side of a flat bed without bedrails? 3  -EJ 3 (P)   -EO    Moving to and from a bed to a chair (including a wheelchair)? 3  -EJ 3 (P)   -EO    Standing up from a chair using your arms (e.g., wheelchair, bedside chair)? 3  -EJ 3 (P)   -EO    Climbing 3-5 steps with a railing? 3  -EJ 2 (P)   -EO    To walk in hospital room? 3  -EJ 3 (P)   -EO    AM-PAC 6 Clicks Score (PT) 19  -EJ 18 (P)   -EO    Highest Level of Mobility Goal 6 --> Walk 10 steps or more  -EJ 6 --> Walk 10 steps or more (P)   -EO              User Key  (r) = Recorded By, (t) = Taken By, (c) = Cosigned By      Initials Name Provider Type    Dorcas Lopez, PT Physical Therapist    Kady Joy, Nursing Student Nursing Student                                 Physical Therapy Education       Title: PT OT SLP Therapies (In Progress)       Topic: Physical Therapy (In Progress)       Point: Mobility training (In  Progress)       Learning Progress Summary             Patient Acceptance, E,TB, NR by CB at 8/8/2024 1145                         Point: Home exercise program (In Progress)       Learning Progress Summary             Patient Acceptance, E,TB, NR by CB at 8/8/2024 1145                         Point: Body mechanics (Done)       Learning Progress Summary             Patient Acceptance, E, DU by NT at 8/8/2024 1733   Family Acceptance, E, DU by NT at 8/8/2024 1733                         Point: Precautions (Not Started)       Learner Progress:  Not documented in this visit.                              User Key       Initials Effective Dates Name Provider Type Discipline    CB 10/22/21 -  Mariia Hidalgo, PT Physical Therapist PT    NT 07/03/24 -  Alissa Hoyt, RN Registered Nurse Nurse                  PT Recommendation and Plan     Plan of Care Reviewed With: patient  Progress: improving  Outcome Evaluation: Pt seen for PT this afternoon. She appears to be doing much better today and showing improvement w mobility. Pt requiring less assistance w all activity. She was able to transition to EOB w SBA/CGA. She stood w SBA/CGA using rwx. Pt able to increase ambulation distance today. She ambulated approx 50 ft and then took brief seated rest break. After rest break, pt ambulated an additional 200 ft w SBA and rwx. No unsteadiness noted.  Pt returned to bed at end of session w all  needs in reach. Will continue to progress activity as tolerated.     Time Calculation:         PT Charges       Row Name 08/13/24 1516             Time Calculation    Start Time 1437  -EJ      Stop Time 1502  -EJ      Time Calculation (min) 25 min  -EJ      PT Received On 08/13/24  -EJ      PT - Next Appointment 08/14/24  -EJ         Timed Charges    37868 - PT Therapeutic Activity Minutes 25  -EJ         Total Minutes    Timed Charges Total Minutes 25  -EJ       Total Minutes 25  -EJ                User Key  (r) = Recorded By, (t) = Taken By,  (c) = Cosigned By      Initials Name Provider Type    Dorcas Lopez, PT Physical Therapist                  Therapy Charges for Today       Code Description Service Date Service Provider Modifiers Qty    59310689152 HC PT THERAPEUTIC ACT EA 15 MIN 8/12/2024 Dorcas Alexander, PT GP 1    64849572755 HC PT THERAPEUTIC ACT EA 15 MIN 8/13/2024 Dorcas Alexander, PT GP 2            PT G-Codes  Outcome Measure Options: AM-PAC 6 Clicks Daily Activity (OT)  AM-PAC 6 Clicks Score (PT): 19  AM-PAC 6 Clicks Score (OT): 15  Modified Slater Scale: 5 - Severe disability.  Bedridden, incontinent, and requiring constant nursing care and attention.  PT Discharge Summary  Anticipated Discharge Disposition (PT): home with 24/7 care    Dorcas Alexander, PT  8/13/2024

## 2024-08-13 NOTE — THERAPY TREATMENT NOTE
Patient Name: Filomena Liu  : 1960    MRN: [id number]                              Today's Date: 2024       Admit Date: 2024    Visit Dx:     ICD-10-CM ICD-9-CM   1. Acute blood loss anemia  D62 285.1   2. Gastrointestinal hemorrhage, unspecified gastrointestinal hemorrhage type  K92.2 578.9   3. Acute exacerbation of chronic obstructive pulmonary disease (COPD)  J44.1 491.21   4. Alcoholism  F10.20 303.90   5. Acute renal failure, unspecified acute renal failure type  N17.9 584.9   6. Hyperkalemia  E87.5 276.7     Patient Active Problem List   Diagnosis    Mediastinal mass    Cervical stenosis of spinal canal    Cervical radiculopathy    Cellulitis/abscess of left foot    HTN (hypertension)    History of MRSA infection    Anxiety    Peroneal tenosynovitis    Fracture of navicular bone of left foot    Tobacco use    Non compliance with medical treatment    Screening for colon cancer    Osteoporosis    Shoulder arthritis    Primary localized osteoarthrosis of left shoulder region    History of adenomatous polyp of colon    Diverticulosis    Acute GI bleeding    Hyperkalemia    Acute renal failure    Alcoholism    Acute exacerbation of chronic obstructive pulmonary disease (COPD)    Acute blood loss anemia     Past Medical History:   Diagnosis Date    Ankle pain     Ankle wound     LEFT    Anxiety     Arthritis of back     Arthritis of neck     Asthma     Benign tumor of thymus     REMOVED    Bruises easily     Cataract     COPD (chronic obstructive pulmonary disease)     CTS (carpal tunnel syndrome) Surgery     DDD (degenerative disc disease), cervical     Depression     Fracture of wrist 2917    GERD (gastroesophageal reflux disease)     Hip arthrosis Unknown    History of MRSA infection     LEFT ANKLE TREAT [facility]  5YEARS GO    Hyperlipidemia     Hypertension     Knee sprain 2023    Knee swelling Unknown    Shoulder arthritis 2023    Shoulder pain, left 2023    Sleep  apnea     NO MACHINE USE    Spinal stenosis     Spondylolisthesis of cervical region      Past Surgical History:   Procedure Laterality Date    BACK SURGERY      cervical disc surgery with fusion-    CHOLECYSTECTOMY      COLONOSCOPY      COLONOSCOPY N/A 11/12/2020    Procedure: COLONOSCOPY, polypectomy;  Surgeon: Filomena Mckeon MD;  Location: [facility] OR;  Service: Gastroenterology;  Laterality: N/A;  Diverticulosis; Hepatic flexure polyp x 1; Polyp at 60cm x 1; Polyp at 50cm x 1- hot snare;    COLONOSCOPY N/A 4/15/2024    Procedure: COLONOSCOPY into sigmoid colon with hot snare polypectomy;  Surgeon: Leatha Johns MD;  Location: [facility] ENDOSCOPY;  Service: General;  Laterality: N/A;  pre- history of polyps  post- diverticulosis, polyp    HAND SURGERY  2000    HYSTERECTOMY      INCISION AND DRAINAGE LEG Left 11/17/2018    Procedure: Incision and Drainage of Left ankle;  Surgeon: Maxwell Lanier MD;  Location: [facility] MAIN OR;  Service: Orthopedics    NECK SURGERY  2000    SIGMOIDOSCOPY N/A 3/28/2024    Procedure: SIGMOIDOSCOPY FLEXIBLE;  Surgeon: Leahta Johns MD;  Location: [facility] ENDOSCOPY;  Service: General;  Laterality: N/A;  pre: h/o colon polyps  post: poor prep, diverticulosis    SKIN BIOPSY      SKIN GRAFT SPLIT THICKNESS N/A 02/12/2019    Procedure: debridement SKIN GRAFT SPLIT THICKNESS left ankle wound;  Surgeon: Barry Worthy MD;  [facility];  Service: Plastics    TOTAL SHOULDER ARTHROPLASTY Left 7/20/2023    Procedure: Total  shoulder arthroplasty;  Surgeon: Maxwell Lanier MD;  [place] [facility] [place] [facility]  Service: Orthopedics;  Laterality: Left;    TOTAL THYMECTOMY      TRIGGER POINT INJECTION  2000    WRIST FRACTURE SURGERY      CARPAL TUNNEL      General Information       Row Name 08/13/24 2648          OT Time and Intention    Document Type therapy note (daily note)  -MM     Mode of Treatment occupational therapy  -MM       Row Name 08/13/24 3992           General Information    Patient Profile Reviewed yes  -MM     Existing Precautions/Restrictions fall  -MM       Row Name 08/13/24 1530          Cognition    Orientation Status (Cognition) oriented x 3  -MM       Row Name 08/13/24 1530          Safety Issues, Functional Mobility    Safety Issues Affecting Function (Mobility) safety precaution awareness  -MM     Impairments Affecting Function (Mobility) endurance/activity tolerance;strength;cognition  -MM               User Key  (r) = Recorded By, (t) = Taken By, (c) = Cosigned By      Initials Name Provider Type    MM Roopa Marina OT Occupational Therapist                     Mobility/ADL's       Row Name 08/13/24 1530          Bed Mobility    Bed Mobility supine-sit;sit-supine  -MM     Supine-Sit [place] (Bed Mobility) verbal cues;standby assist;contact guard  -MM     Sit-Supine [place] (Bed Mobility) verbal cues;minimum assist (75% patient effort)  -MM     Assistive Device (Bed Mobility) head of bed elevated;bed rails  -MM     Comment, (Bed Mobility) BLE back into bed  -MM       Row Name 08/13/24 1530          Transfers    Transfers sit-stand transfer;toilet transfer  -MM       Row Name 08/13/24 1530          Sit-Stand Transfer    Sit-Stand [place] (Transfers) verbal cues;standby assist;contact guard  -MM     Assistive Device (Sit-Stand Transfers) walker, front-wheeled  -MM       Row Name 08/13/24 1530          Toilet Transfer    Type (Toilet Transfer) sit-stand;stand-sit  -MM     [place] Level (Toilet Transfer) standby assist;contact guard  -MM     Assistive Device (Toilet Transfer) commode;grab bars/safety frame;walker, front-wheeled  -MM       Row Name 08/13/24 1530          Functional Mobility    Functional Mobility- Ind. Level contact guard assist;standby assist  -MM     Functional Mobility- Device walker, front-wheeled  -MM     Functional Mobility- Comment increased mobility into hallway/community distances  -MM       Row Name 08/13/24  1530          Activities of Daily Living    BADL Assessment/Intervention lower body dressing;toileting  -MM       Row Name 08/13/24 1530          Lower Body Dressing Assessment/Training    [place] Level (Lower Body Dressing) lower body dressing skills;doff;don;socks;minimum assist (75% patient effort)  -MM     Position (Lower Body Dressing) edge of bed sitting  -MM       Row Name 08/13/24 1530          Toileting Assessment/Training    [place] Level (Toileting) toileting skills;adjust/manage clothing;change pad/brief;perform perineal hygiene;standby assist  -MM     Assistive Devices (Toileting) commode  -MM               User Key  (r) = Recorded By, (t) = Taken By, (c) = Cosigned By      Initials Name Provider Type    MM Roopa Marina OT Occupational Therapist                   Obj/Interventions       Row Name 08/13/24 1533          Motor Skills    Motor Skills functional endurance  -MM     Functional Endurance improving  -MM       Row Name 08/13/24 1533          Balance    Balance Assessment sitting static balance;sitting dynamic balance;sit to stand dynamic balance;standing static balance;standing dynamic balance  -MM     Static Sitting Balance standby assist  -MM     Dynamic Sitting Balance standby assist  -MM     Position, Sitting Balance sitting edge of bed;sitting in chair  -MM     Sit to Stand Dynamic Balance standby assist;contact guard  -MM     Static Standing Balance standby assist  -MM     Dynamic Standing Balance contact guard  -MM     Position/Device Used, Standing Balance supported;walker, front-wheeled  -MM     Balance Interventions occupation based/functional task  -MM               User Key  (r) = Recorded By, (t) = Taken By, (c) = Cosigned By      Initials Name Provider Type    MM Roopa Marina OT Occupational Therapist                   Goals/Plan    No documentation.                  Clinical Impression       Row Name 08/13/24 1534          Pain Assessment    Pretreatment Pain Rating  0/10 - no pain  -MM     Posttreatment Pain Rating 0/10 - no pain  -MM       Row Name 08/13/24 1534          Plan of Care Review    Plan of Care Reviewed With patient  -MM     Progress improving  -MM     Outcome Evaluation pt seen for OT this, overall doing better, improved balance with ADLs this date as well as decreased shaking noted. She is able to complete toileting hygiene with SBA, transfer with SBA/CGA. She demo improved activity tolerance with functional mobility, only x1 seated rest break required for community distances using rwx. She will continue to benefit from skilled OT to address goals and maximize (I), plans SNF per pt report  -MM       Row Name 08/13/24 1534          Therapy Plan Review/Discharge Plan (OT)    Anticipated Discharge Disposition (OT) skilled nursing facility  -       Row Name 08/13/24 1534          Vital Signs    O2 Delivery Pre Treatment room air  -MM       Row Name 08/13/24 1534          Positioning and Restraints    Pre-Treatment Position in bed  -MM     Post Treatment Position bed  -MM     In Bed notified nsg;fowlers;call light within reach;encouraged to call for assist;exit alarm on;side rails up x3  -MM               User Key  (r) = Recorded By, (t) = Taken By, (c) = Cosigned By      Initials Name Provider Type    Roopa Bojorquez OT Occupational Therapist                   Outcome Measures       Row Name 08/13/24 1537          How much help from another is currently needed...    Putting on and taking off regular lower body clothing? 3  -MM     Bathing (including washing, rinsing, and drying) 3  -MM     Toileting (which includes using toilet bed pan or urinal) 3  -MM     Putting on and taking off regular upper body clothing 3  -MM     Taking care of personal grooming (such as brushing teeth) 4  -MM     Eating meals 4  -MM     AM-PAC 6 Clicks Score (OT) 20  -MM       Row Name 08/13/24 1515 08/13/24 0873       How much help from another person do you currently need...    Turning  from your back to your side while in flat bed without using bedrails? 4  -EJ 4  -DS (r) EO (t) DS (c)    Moving from lying on back to sitting on the side of a flat bed without bedrails? 3  -EJ 3  -DS (r) EO (t) DS (c)    Moving to and from a bed to a chair (including a wheelchair)? 3  -EJ 3  -DS (r) EO (t) DS (c)    Standing up from a chair using your arms (e.g., wheelchair, bedside chair)? 3  -EJ 3  -DS (r) EO (t) DS (c)    Climbing 3-5 steps with a railing? 3  -EJ 2  -DS (r) EO (t) DS (c)    To walk in hospital room? 3  -EJ 3  -DS (r) EO (t) DS (c)    AM-PAC 6 Clicks Score (PT) 19  -EJ 18  -DS (r) EO (t)    Highest Level of Mobility Goal 6 --> Walk 10 steps or more  -EJ 6 --> Walk 10 steps or more  -DS (r) EO (t)      Row Name 08/13/24 1537          Functional Assessment    Outcome Measure Options AM-PAC 6 Clicks Daily Activity (OT)  -MM               User Key  (r) = Recorded By, (t) = Taken By, (c) = Cosigned By      Initials Name Provider Type    Dorcas Lopez, PT Physical Therapist    Vania Bernstein, RN Registered Nurse    Roopa Bojorquez OT Occupational Therapist    Kady Joy, Nursing Student Nursing Student                    Occupational Therapy Education       Title: PT OT SLP Therapies (In Progress)       Topic: Occupational Therapy (In Progress)       Point: ADL training (Done)       Description:   Instruct learner(s) on proper safety adaptation and remediation techniques during self care or transfers.   Instruct in proper use of assistive devices.                  Learning Progress Summary             Patient Acceptance, E, DU by NT at 8/8/2024 1733   Family Acceptance, E, DU by NT at 8/8/2024 1733                         Point: Home exercise program (Not Started)       Description:   Instruct learner(s) on appropriate technique for monitoring, assisting and/or progressing therapeutic exercises/activities.                  Learner Progress:  Not documented in this visit.               Point: Precautions (Not Started)       Description:   Instruct learner(s) on prescribed precautions during self-care and functional transfers.                  Learner Progress:  Not documented in this visit.              Point: Body mechanics (Not Started)       Description:   Instruct learner(s) on proper positioning and spine alignment during self-care, functional mobility activities and/or exercises.                  Learner Progress:  Not documented in this visit.                              User Key       Initials Effective Dates Name Provider Type Discipline    NT 07/03/24 -  Alissa Hoyt RN Registered Nurse Nurse                  OT Recommendation and Plan     Plan of Care Review  Plan of Care Reviewed With: patient  Progress: improving  Outcome Evaluation: pt seen for OT this, overall doing better, improved balance with ADLs this date as well as decreased shaking noted. She is able to complete toileting hygiene with SBA, transfer with SBA/CGA. She demo improved activity tolerance with functional mobility, only x1 seated rest break required for community distances using rwx. She will continue to benefit from skilled OT to address goals and maximize (I), plans SNF per pt report     Time Calculation:         Time Calculation- OT       Row Name 08/13/24 1537             Time Calculation- OT    OT Start Time 1439  -MM      OT Stop Time 1503  -MM      OT Time Calculation (min) 24 min  -MM      Total Timed Code Minutes- OT 24 minute(s)  -MM      OT Received On 08/13/24  -MM      OT - Next Appointment 08/14/24  -MM         Timed Charges    77722 - OT Therapeutic Activity Minutes 14  -MM      88715 - OT Self Care/Mgmt Minutes 10  -MM         Total Minutes    Timed Charges Total Minutes 24  -MM       Total Minutes 24  -MM                User Key  (r) = Recorded By, (t) = Taken By, (c) = Cosigned By      Initials Name Provider Type    Roopa Bojorquez OT Occupational Therapist                  Therapy Charges  for Today       Code Description Service Date Service Provider Modifiers Qty    96077708788 HC OT THERAPEUTIC ACT EA 15 MIN 8/12/2024 Roopa Marina OT GO 1    77179374087 HC OT THERAPEUTIC ACT EA 15 MIN 8/13/2024 Roopa Marina OT GO 1    83124922750 HC OT SELF CARE/MGMT/TRAIN EA 15 MIN 8/13/2024 Roopa Marina OT GO 1                 Roopa Marina OT  8/13/2024

## 2024-08-13 NOTE — PLAN OF CARE
Goal Outcome Evaluation:              Outcome Evaluation: (P) Pt notably more alert and oriented. Swallow study completed, diet adjusted to mechanical ground with thin liquids. CXR also completed. C/o back pain treated per MAR. Ambulating to BR with walker. BG monitored and treated. IV abx given. VSS.

## 2024-08-13 NOTE — PLAN OF CARE
Goal Outcome Evaluation:  Plan of Care Reviewed With: patient        Progress: improving  Outcome Evaluation: All needs met. Rested well. Up w/ assist x 1 and walker. Clear liquid diet w/ NTL. Pills crushed in applesauce. VSS. Disoriented to situation only. Nicotine patch to L arm. SR. RA while awake and 1L NC for sleep. Norco PRN. Blood glucose monitoring.

## 2024-08-13 NOTE — PROGRESS NOTES
Name: Filomena Liu ADMIT: 2024   : 1960  PCP: Fernando Dunn MD    MRN: [id number] LOS: 7 days   AGE/SEX: 64 y.o. female  ROOM: [facility]     Subjective   Subjective   Patient's mentation continues to improve.  Just came back from her VFSS where she says the barium tasted good and like ice cream.  Otherwise feeling better today.  Does have some of her chronic back pain.  Breathing feels better today.    Review of Systems  Chronic back pain, otherwise no complaints.     Objective   Objective   Vital Signs  Temp:  [97.9 °F (36.6 °C)-98.8 °F (37.1 °C)] 98.8 °F (37.1 °C)  Heart Rate:  [76-92] 87  Resp:  [16-20] 18  BP: ()/(51-71) 118/67  SpO2:  [92 %-100 %] 96 %  on  Flow (L/min):  [1] 1;   Device (Oxygen Therapy): room air  Body mass index is 33.18 kg/m².  Physical Exam  Constitutional:       General: She is not in acute distress.     Appearance: She is ill-appearing.   Cardiovascular:      Rate and Rhythm: Normal rate and regular rhythm.   Pulmonary:      Effort: Pulmonary effort is normal. No respiratory distress.      Breath sounds: Wheezing present.   Abdominal:      General: Abdomen is flat. There is no distension.      Tenderness: There is no abdominal tenderness.   Musculoskeletal:         General: No swelling or deformity. Normal range of motion.   Skin:     General: Skin is warm and dry.   Neurological:      General: No focal deficit present.      Mental Status: She is alert. She is disoriented.         Results Review     I reviewed the patient's new clinical results.  Results from last 7 days   Lab Units 24  2350 24  1552 24  0806 24  2347   WBC 10*3/mm3 13.24* 14.42* 14.81* 11.04*   HEMOGLOBIN g/dL 9.4* 9.4* 9.3* 9.3*   PLATELETS 10*3/mm3 170 168 146 122*     Results from last 7 days   Lab Units 24  0810 24  1932 24  0806 24  2348 24  0554 08/10/24  0520   SODIUM mmol/L 132*  --  134*  --  141 143   POTASSIUM mmol/L 3.7 3.8 3.6  3.8 3.1* 3.7   CHLORIDE mmol/L 90*  --  88*  --  90* 95*   CO2 mmol/L 29.0  --  33.9*  --  41.0* 36.1*   BUN mg/dL 40*  --  40*  --  45* 48*   CREATININE mg/dL 1.40*  --  1.41*  --  1.30* 1.40*   GLUCOSE mg/dL 96  --  129*  --  150* 134*   Estimated Creatinine Clearance: 37.2 mL/min (A) (by C-G formula based on SCr of 1.4 mg/dL (H)).  Results from last 7 days   Lab Units 08/13/24  0810 08/12/24  0806 08/11/24  0554 08/10/24  0520   ALBUMIN g/dL 3.8 3.7 3.6 3.9   BILIRUBIN mg/dL 1.6* 1.7* 1.8* 2.3*   ALK PHOS U/L 131* 114 111 108   AST (SGOT) U/L 53* 58* 66* 85*   ALT (SGPT) U/L 44* 42* 40* 47*     Results from last 7 days   Lab Units 08/13/24  0810 08/12/24  0806 08/11/24  0554 08/10/24  0520 08/09/24  0511 08/08/24  0805   CALCIUM mg/dL 9.9 9.6 9.8 9.9 9.6 9.4   ALBUMIN g/dL 3.8 3.7 3.6 3.9 3.8 3.8   MAGNESIUM mg/dL 3.0* 1.4* 1.8  --   --   --    PHOSPHORUS mg/dL  --   --  2.7 2.8 3.1 4.4           SARS-CoV-2 BREEZY   Date Value Ref Range Status   11/10/2020 Not Detected Not Detected Final     Glucose   Date/Time Value Ref Range Status   08/13/2024 0629 138 (H) 70 - 130 mg/dL Final   08/12/2024 1950 136 (H) 70 - 130 mg/dL Final   08/12/2024 1613 259 (H) 70 - 130 mg/dL Final   08/12/2024 1056 200 (H) 70 - 130 mg/dL Final   08/12/2024 0627 209 (H) 70 - 130 mg/dL Final   08/11/2024 2038 106 70 - 130 mg/dL Final   08/11/2024 1619 214 (H) 70 - 130 mg/dL Final       XR Chest 1 View  Narrative: XR CHEST 1 VW-     HISTORY: Female who is 64 years-old, pulmonary edema, follow-up     TECHNIQUE: Frontal view of the chest     COMPARISON: 8/7/2024     FINDINGS: The heart is enlarged. Pulmonary vasculature is mildly less  congested. Sternotomy wires are noted. No focal pulmonary consolidation,  pleural effusion, or pneumothorax. No acute osseous process.     Impression: Mildly decreased pulmonary vascular congestion.     This report was finalized on 8/10/2024 6:50 AM by Dr. Harpreet Mistry M.D on Workstation: [email or web address]       I  reviewed the patient's daily medications.  Scheduled Medications  atenolol, 25 mg, Oral, Daily  budesonide-formoterol, 2 puff, Inhalation, BID - RT  cefTRIAXone, 2,000 mg, Intravenous, Q24H  folic acid, 1 mg, Oral, Daily  insulin lispro, 2-9 Units, Subcutaneous, 4x Daily AC & at Bedtime  ipratropium-albuterol, 3 mL, Nebulization, Q4H - RT  LORazepam, 1 mg, Oral, BID  methylPREDNISolone sodium succinate, 40 mg, Intravenous, Q12H  nicotine, 1 patch, Transdermal, Q24H  pantoprazole, 40 mg, Intravenous, Q12H  rOPINIRole, 1 mg, Oral, Nightly  sodium chloride, 10 mL, Intravenous, Q12H  spironolactone, 25 mg, Oral, Daily  thiamine, 100 mg, Oral, Daily    Infusions   Diet  Diet: Liquid; Clear Liquid; Fluid Consistency: Nectar Thick         I have personally reviewed:  [x]  Laboratory   [x]  Microbiology   [x]  Radiology   []  EKG/Telemetry   []  Cardiology/Vascular   []  Pathology   []  Records     Assessment/Plan     Active Hospital Problems    Diagnosis  POA    **Acute GI bleeding [K92.2]  Yes    Hyperkalemia [E87.5]  Unknown    Acute renal failure [N17.9]  Unknown    Alcoholism [F10.20]  Unknown    Acute exacerbation of chronic obstructive pulmonary disease (COPD) [J44.1]  Unknown    Acute blood loss anemia [D62]  Unknown      Resolved Hospital Problems   No resolved problems to display.       64 y.o. female admitted with Acute GI bleeding.    Melena  Cirrhosis  -Hemoglobin stable  -PPI IV twice daily  -GI consulted-plan for an MRI with and without for HCC screening when clinically improved.  Also plan for EGD at some point  -SBP prophylaxis with ceftriaxone  -Ammonia okay    Dysphagia  -VFSS done this morning, final results pending     SANDRO, improved  Hypokalemia  -Renal following-holding Bumex for now     Alcohol use disorder  -Continue thiamine, folic acid, CIWA protocol  -Weaning up scheduled Ativan.  1 mg twice daily today.    Acute COPD exacerbation COPD, not on home oxygen  Hypoxia  -Continue Symbicort, scheduled  DuoNeb.  Pulmonology following.    Chronic back pain-continue home hydrocodone.    Leukocytosis-improving. Suspect from steroids.  Afebrile and no new infectious symptoms.  On SBP prophylaxis with ceftriaxone.     SCDs for DVT prophylaxis.  Full code.  Discussed with patient, family, and nursing staff.  Anticipate discharge home with HH vs SNU facility in 2-3 days.  Patient wants to go home and not to rehab.  Discussed with her that this is probably not a good idea,  has talked to family and family is discussing disposition    Expected Discharge Date: 8/16/2024; Expected Discharge Time:  3:00 PM      Marshal Lara MD  [facility]  08/13/24  12:00 EDT

## 2024-08-13 NOTE — NURSING NOTE
Access follow up regarding ETOH: chart reviewed. Last CIWA score of 4, some intermittent confusion overnight. Progressing towards goal. Appeared to rest well overnight. CIWA protocol Ativan discontinued. Following for support and encouragement of PRAVEEN outpatient treatment.

## 2024-08-13 NOTE — PROGRESS NOTES
[facility]  [phone]  Dr. Jacques Asencio    Subjective:  LOS: 7    Chief Complaint: COPD exacerbation    Patient is tearful about her multiple health issues.  However looks comfortable and currently not on oxygen.    Objective   Vital Signs past 24hrs  Temp range: Temp (24hrs), Av.6 °F (37 °C), Min:98.4 °F (36.9 °C), Max:98.8 °F (37.1 °C)    BP range: BP: (109-120)/(66-71) 120/66  Pulse range: Heart Rate:  [75-92] 75  Resp rate range: Resp:  [16-20] 18  Device (Oxygen Therapy): room airFlow (L/min):  [1] 1  Oxygen range:SpO2:  [92 %-100 %] 92 %   Mechanical Ventilator:     Physical Exam  Constitutional:       Appearance: She is obese.   Eyes:      Pupils: Pupils are equal, round, and reactive to light.   Cardiovascular:      Rate and Rhythm: Normal rate and regular rhythm.      Heart sounds: No murmur heard.  Pulmonary:      Effort: Pulmonary effort is normal.      Breath sounds: Decreased breath sounds present. No wheezing.   Abdominal:      General: Bowel sounds are normal.      Palpations: Abdomen is soft. There is no mass.      Tenderness: There is no abdominal tenderness.   Musculoskeletal:         General: No swelling.   Neurological:      Mental Status: She is alert.       Results Review:    I have reviewed the laboratory and imaging data since the last note by [facility] physician.  My annotations are noted in assessment and plan.      Result Review:  I have personally reviewed the results from last note by [facility] physician to 2024 15:57 EDT and agree with these findings:  [x]  Laboratory list / accordion  [x]  Microbiology  [x]  Radiology  []  EKG/Telemetry   []  Cardiology/Vascular   []  Pathology  []  Old records  []  Other:    Medication Review:  I have reviewed the current MAR.  My annotations are noted in assessment and plan.    atenolol, 25 mg, Oral, Daily  budesonide-formoterol, 2 puff, Inhalation, BID - RT  cefTRIAXone, 2,000 mg, Intravenous, Q24H  folic acid, 1 mg, Oral,  Daily  insulin lispro, 2-9 Units, Subcutaneous, 4x Daily AC & at Bedtime  ipratropium-albuterol, 3 mL, Nebulization, Q4H - RT  LORazepam, 1 mg, Oral, BID  methylPREDNISolone sodium succinate, 40 mg, Intravenous, Q12H  nicotine, 1 patch, Transdermal, Q24H  pantoprazole, 40 mg, Intravenous, Q12H  rOPINIRole, 1 mg, Oral, Nightly  sodium chloride, 10 mL, Intravenous, Q12H  spironolactone, 25 mg, Oral, Daily  thiamine, 100 mg, Oral, Daily           Lines, Drains & Airways       Active LDAs       Name Placement date Placement time Site Days    Peripheral IV 08/09/24 1017 Left;Upper Arm 08/09/24  1017  Arm  3    External Urinary Catheter 08/06/24  1900  --  5                  Isolation status: No active isolations    Dietary Orders (From admission, onward)       Start     Ordered    08/13/24 1236  Diet: Regular/House; No Mixed Consistencies, No Straw; Texture: Mechanical Ground (NDD 2); Fluid Consistency: Thin (IDDSI 0)  Diet Effective Now        References:    Diet Order Crosswalk   Question Answer Comment   Diets: Regular/House    Diet Modifiers / Additional Diets: No Mixed Consistencies    Diet Modifiers / Additional Diets: No Straw    Texture: Mechanical Ground (NDD 2)    Fluid Consistency: Thin (IDDSI 0)        08/13/24 1237                    [facility] Problems  Acute exacerbation of COPD  Obesity  BENY, intolerant of CPAP  Current cigarette smoker  Relevant Medical Diagnoses  Suspected GI bleed  Anemia suspected acute blood loss  Cirrhosis  Alcohol abuse  Acute alcohol withdrawal  Moderate mitral regurgitation        Plan of Treatment    Wheezing improved.  Switch to oral prednisone in the morning. CxR clear.    Would benefit from resuming treatment of obstructive sleep apnea with CPAP as outpatient.    Patient be counseled to quit smoking prior to discharge.    No objections from pulmonary perspective for GI scopes.    Jacques Asencio MD  08/13/24  15:57 EDT      Part of this note may be an electronic  transcription/translation of spoken language to printed text using the Dragon Dictation System.

## 2024-08-13 NOTE — MBS/VFSS/FEES
Acute Care - Speech Language Pathology   Swallow Initial Evaluation [facility]     Patient Name: Filomena Liu  : 1960  MRN: [id number]  Today's Date: 2024               Admit Date: 2024    Visit Dx:     ICD-10-CM ICD-9-CM   1. Acute blood loss anemia  D62 285.1   2. Gastrointestinal hemorrhage, unspecified gastrointestinal hemorrhage type  K92.2 578.9   3. Acute exacerbation of chronic obstructive pulmonary disease (COPD)  J44.1 491.21   4. Alcoholism  F10.20 303.90   5. Acute renal failure, unspecified acute renal failure type  N17.9 584.9   6. Hyperkalemia  E87.5 276.7     Patient Active Problem List   Diagnosis    Mediastinal mass    Cervical stenosis of spinal canal    Cervical radiculopathy    Cellulitis/abscess of left foot    HTN (hypertension)    History of MRSA infection    Anxiety    Peroneal tenosynovitis    Fracture of navicular bone of left foot    Tobacco use    Non compliance with medical treatment    Screening for colon cancer    Osteoporosis    Shoulder arthritis    Primary localized osteoarthrosis of left shoulder region    History of adenomatous polyp of colon    Diverticulosis    Acute GI bleeding    Hyperkalemia    Acute renal failure    Alcoholism    Acute exacerbation of chronic obstructive pulmonary disease (COPD)    Acute blood loss anemia     Past Medical History:   Diagnosis Date    Ankle pain     Ankle wound     LEFT    Anxiety     Arthritis of back     Arthritis of neck     Asthma     Benign tumor of thymus     REMOVED    Bruises easily     Cataract     COPD (chronic obstructive pulmonary disease)     CTS (carpal tunnel syndrome) Surgery     DDD (degenerative disc disease), cervical     Depression     Fracture of wrist 2917    GERD (gastroesophageal reflux disease)     Hip arthrosis Unknown    History of MRSA infection     LEFT ANKLE TREAT [facility]  5YEARS GO    Hyperlipidemia     Hypertension     Knee sprain 2023    Knee swelling Unknown    Shoulder  arthritis 06/05/2023    Shoulder pain, left 07/07/2023    Sleep apnea     NO MACHINE USE    Spinal stenosis     Spondylolisthesis of cervical region      Past Surgical History:   Procedure Laterality Date    BACK SURGERY      cervical disc surgery with fusion-    CHOLECYSTECTOMY      COLONOSCOPY      COLONOSCOPY N/A 11/12/2020    Procedure: COLONOSCOPY, polypectomy;  Surgeon: Filomena Mckeon MD;  Location:  [facility] OR;  Service: Gastroenterology;  Laterality: N/A;  Diverticulosis; Hepatic flexure polyp x 1; Polyp at 60cm x 1; Polyp at 50cm x 1- hot snare;    COLONOSCOPY N/A 4/15/2024    Procedure: COLONOSCOPY into sigmoid colon with hot snare polypectomy;  Surgeon: Leatha Johns MD;  [facility];  Service: General;  Laterality: N/A;  pre- history of polyps  post- diverticulosis, polyp    HAND SURGERY  2000    HYSTERECTOMY      INCISION AND DRAINAGE LEG Left 11/17/2018    Procedure: Incision and Drainage of Left ankle;  Surgeon: Maxwell Lanier MD;  Location: [facility] MAIN OR;  Service: Orthopedics    NECK SURGERY  2000    SIGMOIDOSCOPY N/A 3/28/2024    Procedure: SIGMOIDOSCOPY FLEXIBLE;  Surgeon: Leatha Johns MD;  [facility];  Service: General;  Laterality: N/A;  pre: h/o colon polyps  post: poor prep, diverticulosis    SKIN BIOPSY      SKIN GRAFT SPLIT THICKNESS N/A 02/12/2019    Procedure: debridement SKIN GRAFT SPLIT THICKNESS left ankle wound;  Surgeon: Barry Worthy MD;  [facility];  Service: Plastics    TOTAL SHOULDER ARTHROPLASTY Left 7/20/2023    Procedure: Total  shoulder arthroplasty;  Surgeon: Maxwell Lanier MD;  [facility];  Service: Orthopedics;  Laterality: Left;    TOTAL THYMECTOMY      TRIGGER POINT INJECTION  2000    WRIST FRACTURE SURGERY      CARPAL TUNNEL       SLP Recommendation and Plan  SLP Swallowing Diagnosis: mild, oral dysphagia, pharyngeal dysphagia (08/13/24 1100)  SLP Diet Recommendation: soft to chew  textures, ground, no mixed consistencies, thin liquids, other (see comments) (when cleared by GI) (08/13/24 1100)  Recommended Precautions and Strategies: upright posture during/after eating, small bites of food and sips of liquid, no straw, volitional throat clear (08/13/24 1100)  SLP Rec. for Method of Medication Administration: meds whole, meds crushed, with puree (08/13/24 1100)     Monitor for Signs of Aspiration: yes, notify SLP if any concerns (08/13/24 1100)     Swallow Criteria for Skilled Therapeutic Interventions Met: demonstrates skilled criteria (08/13/24 1100)  Anticipated Discharge Disposition (SLP): unknown (08/13/24 1100)  Rehab Potential/Prognosis, Swallowing: good, to achieve stated therapy goals (08/13/24 1100)  Therapy Frequency (Swallow): PRN (08/13/24 1100)  Predicted Duration Therapy Intervention (Days): until discharge (08/13/24 1100)  Oral Care Recommendations: Oral Care BID/PRN (08/13/24 1100)                                        Plan of Care Reviewed With: patient  Outcome Evaluation: VFSS completed. Pt had a delayed swallow. Trace, shallow inconsistent penetration was noted with thin, pureed, soft/mixed. No aspiration observed. No coughing occurred during the study. Pt did have a significant coughing episode prior to the study. Recommend (when cleared for solids) soft, ground, no mixed w/ thins; meds whole/crushed in pureed; upright for meals and 30 min after; slow rate; small bites/sips; NO STRAWS; periodic throat clear/swallow during meals and after. ST to follow.      SWALLOW EVALUATION (Last 72 Hours)       SLP Adult Swallow Evaluation       Row Name 08/13/24 1100 08/11/24 1100                Rehab Evaluation    Document Type evaluation  -AW re-evaluation  -AW       Subjective Information complains of;pain  -AW complains of;pain  -AW       Patient Observations alert;cooperative;agree to therapy  -AW alert;cooperative;agree to therapy  -AW       Patient/Family/Caregiver  Comments/Observations --  present.  -AW       Patient Effort good  -AW good  -AW       Symptoms Noted During/After Treatment none  -AW fatigue  -AW       Oral Care -- teeth brushed - regular toothbrush  -AW          General Information    Patient Profile Reviewed yes  -AW yes  -AW       Pertinent History Of Current Problem Pt admitted with GI bleed, COPD exacerbation, SANDRO, alcohol withdrawal; h/o ETOH abuse GI cleared pt for VFSS.  -AW Pt admitted with GI bleed, COPD exacerbation, SANDRO, alcohol withdrawal; h/o ETOH abuse.  -AW       Current Method of Nutrition nectar/syrup-thick liquids;full liquids  -AW NPO;other (see comments)  eval on 8/8 recommended thin liquid diet; changed to NTL diet due to coughing  -AW       Precautions/Limitations, Vision WFL;for purposes of eval  -AW WFL;for purposes of eval  -AW       Precautions/Limitations, Hearing WFL;for purposes of eval  -AW WFL  -AW       Prior Level of Function-Communication WFL  -AW unknown  -AW       Prior Level of Function-Swallowing no diet consistency restrictions;other (see comments)  Pt reported her throat closed up x2 requiring ice to open it up, occurring ~3 weeks prior to hospitalization  -AW no diet consistency restrictions  -AW       Plans/Goals Discussed with patient;agreed upon  -AW patient;spouse/S.O.;agreed upon  -AW       Barriers to Rehab medically complex  -AW none identified  -AW       Patient's Goals for Discharge return to regular diet  -AW return to PO diet  -AW       Family Goals for Discharge -- family did not state  -AW          Pain    Additional Documentation Pain Scale: Numbers Pre/Post-Treatment (Group)  -AW Pain Scale: Numbers Pre/Post-Treatment (Group)  -AW          Pain Scale: Numbers Pre/Post-Treatment    Pretreatment Pain Rating 8/10  -AW 8/10  -AW       Posttreatment Pain Rating 8/10  -AW 8/10  -AW       Pain Location - back  -AW back  -AW       Pain Intervention(s) Repositioned  -AW Repositioned;Nursing Notified  -AW           Oral Motor Structure and Function    Dentition Assessment natural, present and adequate  -AW natural, present and adequate  -AW       Secretion Management WNL/WFL  -AW WNL/WFL  -AW       Mucosal Quality moist, healthy  -AW moist, healthy  -AW          Oral Musculature and Cranial Nerve Assessment    Oral Motor General Assessment WFL  -AW --          General Eating/Swallowing Observations    Respiratory Support Currently in Use -- nasal cannula  -AW       Eating/Swallowing Skills self-fed;fed by SLP  -AW self-fed;fed by SLP  -AW       Positioning During Eating upright in bed  -AW upright in bed  -AW       Utensils Used -- spoon;cup;straw  -AW       Consistencies Trialed -- ice chips;thin liquids;nectar/syrup-thick liquids  -AW          Clinical Swallow Eval    Clinical Swallow Evaluation Summary -- Swallow reevaluated. Pt had slight wheezing noted during eval. RT to give breathing treatment after eval. Pt tolerated ice chips with no s/s. Pt had delayed coughing in 1/5 teaspoon sips of thin and 1/4 cup sips of thin. The delayed coughing was significant with pt getting very red and taking extra time to clear. Suspect silent penetration leading to aspiration and coughing or backflow from esophagus leading to posterior penetration/aspiration triggering coughing. No s/s were noted with NTL (cup/straw) with pt taking 4 oz. Pt's voice remained clear throughout the eval. Discussed with pt and . Recommend clear nectar thick liquid diet (pt not cleared for solids); meds w/ NTL or in pureed; upright for meals and 30 min after; feed only when alert; slow rate; small sips. If s/s are noted, make NPO. Pt safe for ice chips between meals (wait 30 min) after oral care.  Recommend VFSS when pt is cleared for barium. GI plans an EGD this week. ST to follow.  -AW          MBS/VFSS    Utensils Used spoon;cup;straw  -AW --       Consistencies Trialed regular textures;soft to chew textures;mixed consistency;pureed;thin  liquids;nectar/syrup-thick liquids  -AW --          MBS/VFSS Interpretation    VFSS Summary VFSS completed with Eugenia LESTER. Pt exhibited mild oropharyngeal dysphagia characterized by mistiming/delayed swallow initiation w/ delayed cricopharyngeal (CP) opening. Pt had slow oral transit and premature spillage of all consistencies past the valleculae or to the pyriforms. No penetration or aspiration occurred with nectar thick (cup). Pt had inconsistent trace shallow penetration with thins via cup/straw, pureed (1/3 trials), and soft/mixed (1/3 trials). No penetration or aspiration occurred with regular. Pt was impulsive with drinking during the study despite cues. Pt demonstrated a prominent CP with delayed opening. Of note, pt had a significant coughing spell prior to initation of the study. No coughing occurred during the study. Discussed strategies with pt after the study.  -AW --          SLP Communication to Radiology    Summary Statement VFSS completed with Eugenia LESTER. Pt exhibited mild oropharyngeal dysphagia characterized by mistiming/delayed swallow initiation w/ delayed cricopharyngeal (CP) opening. Pt had slow oral transit and premature spillage of all consistencies past the valleculae or to the pyriforms. No penetration or aspiration occurred with nectar thick (cup). Pt had inconsistent trace shallow penetration with thins via cup/straw, pureed (1/3 trials), and soft/mixed (1/3 trials). No penetration or aspiration occurred with regular. Pt was impulsive with drinking during the study despite cues. Pt demonstrated a prominent CP with delayed opening.  -AW --          SLP Evaluation Clinical Impression    SLP Swallowing Diagnosis mild;oral dysphagia;pharyngeal dysphagia  -AW oral dysphagia;suspected pharyngeal dysphagia  -AW       Functional Impact risk of aspiration/pneumonia  -AW risk of aspiration/pneumonia  -AW       Rehab Potential/Prognosis, Swallowing good, to achieve stated therapy goals   -AW good, to achieve stated therapy goals  -AW       Swallow Criteria for Skilled Therapeutic Interventions Met demonstrates skilled criteria  -AW demonstrates skilled criteria  -AW          Recommendations    Therapy Frequency (Swallow) PRN  -AW PRN  -AW       Predicted Duration Therapy Intervention (Days) until discharge  -AW until discharge  -AW       SLP Diet Recommendation soft to chew textures;ground;no mixed consistencies;thin liquids;other (see comments)  when cleared by GI  -AW nectar thick liquids;ice chips between meals after oral care, with supervision  -AW       Recommended Diagnostics -- VFSS (MBS)  -AW       Recommended Precautions and Strategies upright posture during/after eating;small bites of food and sips of liquid;no straw;volitional throat clear  -AW upright posture during/after eating;small bites of food and sips of liquid;other (see comments)  feed only when alert  -AW       Oral Care Recommendations Oral Care BID/PRN  -AW Oral Care BID/PRN  -AW       SLP Rec. for Method of Medication Administration meds whole;meds crushed;with puree  -AW meds whole;meds crushed;with thick liquids;with puree;as tolerated  -AW       Monitor for Signs of Aspiration yes;notify SLP if any concerns  -AW yes;notify SLP if any concerns;other (see comments)  make NPO  -AW       Anticipated Discharge Disposition (SLP) unknown  -AW unknown  -AW                 User Key  (r) = Recorded By, (t) = Taken By, (c) = Cosigned By      Initials Name Effective Dates    Lorraine Apple, SLP 08/28/23 -                     EDUCATION  The patient has been educated in the following areas:   Dysphagia (Swallowing Impairment) Oral Care/Hydration Modified Diet Instruction.                Time Calculation:    Time Calculation- SLP       Row Name 08/13/24 1203             Time Calculation- SLP    SLP Start Time 1100  -AW      SLP Received On 08/13/24  -AW                User Key  (r) = Recorded By, (t) = Taken By, (c) = Cosigned By       Initials Name Provider Type    AW Lorraine Beasley, SLP Speech and Language Pathologist                    Therapy Charges for Today       Code Description Service Date Service Provider Modifiers Qty    [id number] HC ST MOTION FLUORO EVAL SWALLOW 6 8/13/2024 Lorraine Beasley, SLP GN 1                 RUPESH Mcgovern  8/13/2024

## 2024-08-14 PROBLEM — K21.9 GASTROESOPHAGEAL REFLUX DISEASE: Status: ACTIVE | Noted: 2024-08-05

## 2024-08-14 PROBLEM — K70.30 ALCOHOLIC CIRRHOSIS OF LIVER WITHOUT ASCITES: Status: ACTIVE | Noted: 2024-08-05

## 2024-08-14 PROBLEM — K92.1 MELENA: Status: ACTIVE | Noted: 2024-08-05

## 2024-08-14 PROBLEM — R13.10 DYSPHAGIA: Status: ACTIVE | Noted: 2024-08-05

## 2024-08-14 LAB
ALBUMIN SERPL-MCNC: 3.8 G/DL (ref 3.5–5.2)
ALBUMIN/GLOB SERPL: 1.2 G/DL
ALP SERPL-CCNC: 159 U/L (ref 39–117)
ALT SERPL W P-5'-P-CCNC: 45 U/L (ref 1–33)
ANION GAP SERPL CALCULATED.3IONS-SCNC: 14.4 MMOL/L (ref 5–15)
AST SERPL-CCNC: 58 U/L (ref 1–32)
BASOPHILS # BLD MANUAL: 0.19 10*3/MM3 (ref 0–0.2)
BASOPHILS NFR BLD MANUAL: 1 % (ref 0–1.5)
BILIRUB SERPL-MCNC: 1.5 MG/DL (ref 0–1.2)
BUN SERPL-MCNC: 41 MG/DL (ref 8–23)
BUN/CREAT SERPL: 28.7 (ref 7–25)
CALCIUM SPEC-SCNC: 9.8 MG/DL (ref 8.6–10.5)
CHLORIDE SERPL-SCNC: 91 MMOL/L (ref 98–107)
CO2 SERPL-SCNC: 26.6 MMOL/L (ref 22–29)
CREAT SERPL-MCNC: 1.43 MG/DL (ref 0.57–1)
DEPRECATED RDW RBC AUTO: 52.1 FL (ref 37–54)
EGFRCR SERPLBLD CKD-EPI 2021: 41 ML/MIN/1.73
EOSINOPHIL # BLD MANUAL: 0.19 10*3/MM3 (ref 0–0.4)
EOSINOPHIL NFR BLD MANUAL: 1 % (ref 0.3–6.2)
ERYTHROCYTE [DISTWIDTH] IN BLOOD BY AUTOMATED COUNT: 17.3 % (ref 12.3–15.4)
GLOBULIN UR ELPH-MCNC: 3.2 GM/DL
GLUCOSE BLDC GLUCOMTR-MCNC: 135 MG/DL (ref 70–130)
GLUCOSE BLDC GLUCOMTR-MCNC: 175 MG/DL (ref 70–130)
GLUCOSE BLDC GLUCOMTR-MCNC: 231 MG/DL (ref 70–130)
GLUCOSE BLDC GLUCOMTR-MCNC: 303 MG/DL (ref 70–130)
GLUCOSE SERPL-MCNC: 101 MG/DL (ref 65–99)
HCT VFR BLD AUTO: 33.9 % (ref 34–46.6)
HGB BLD-MCNC: 10.5 G/DL (ref 12–15.9)
HYPOCHROMIA BLD QL: ABNORMAL
INR PPP: 1.29 (ref 0.9–1.1)
LYMPHOCYTES # BLD MANUAL: 0.77 10*3/MM3 (ref 0.7–3.1)
LYMPHOCYTES NFR BLD MANUAL: 15.3 % (ref 5–12)
MCH RBC QN AUTO: 26.3 PG (ref 26.6–33)
MCHC RBC AUTO-ENTMCNC: 31 G/DL (ref 31.5–35.7)
MCV RBC AUTO: 84.8 FL (ref 79–97)
MONOCYTES # BLD: 2.87 10*3/MM3 (ref 0.1–0.9)
NEUTROPHILS # BLD AUTO: 14.75 10*3/MM3 (ref 1.7–7)
NEUTROPHILS NFR BLD MANUAL: 78.6 % (ref 42.7–76)
PHOSPHATE SERPL-MCNC: 2.9 MG/DL (ref 2.5–4.5)
PLAT MORPH BLD: NORMAL
PLATELET # BLD AUTO: 290 10*3/MM3 (ref 140–450)
PMV BLD AUTO: 10.9 FL (ref 6–12)
POLYCHROMASIA BLD QL SMEAR: ABNORMAL
POTASSIUM SERPL-SCNC: 3.4 MMOL/L (ref 3.5–5.2)
PROT SERPL-MCNC: 7 G/DL (ref 6–8.5)
PROTHROMBIN TIME: 16.3 SECONDS (ref 11.7–14.2)
RBC # BLD AUTO: 4 10*6/MM3 (ref 3.77–5.28)
SODIUM SERPL-SCNC: 132 MMOL/L (ref 136–145)
VARIANT LYMPHS NFR BLD MANUAL: 4.1 % (ref 19.6–45.3)
WBC MORPH BLD: NORMAL
WBC NRBC COR # BLD AUTO: 18.77 10*3/MM3 (ref 3.4–10.8)

## 2024-08-14 PROCEDURE — 94664 DEMO&/EVAL PT USE INHALER: CPT

## 2024-08-14 PROCEDURE — 94799 UNLISTED PULMONARY SVC/PX: CPT

## 2024-08-14 PROCEDURE — 97530 THERAPEUTIC ACTIVITIES: CPT

## 2024-08-14 PROCEDURE — 25010000002 CEFTRIAXONE PER 250 MG: Performed by: STUDENT IN AN ORGANIZED HEALTH CARE EDUCATION/TRAINING PROGRAM

## 2024-08-14 PROCEDURE — 82948 REAGENT STRIP/BLOOD GLUCOSE: CPT

## 2024-08-14 PROCEDURE — 63710000001 INSULIN LISPRO (HUMAN) PER 5 UNITS: Performed by: INTERNAL MEDICINE

## 2024-08-14 PROCEDURE — 63710000001 PREDNISONE PER 1 MG: Performed by: INTERNAL MEDICINE

## 2024-08-14 PROCEDURE — 84100 ASSAY OF PHOSPHORUS: CPT | Performed by: INTERNAL MEDICINE

## 2024-08-14 PROCEDURE — 99232 SBSQ HOSP IP/OBS MODERATE 35: CPT | Performed by: NURSE PRACTITIONER

## 2024-08-14 PROCEDURE — 92526 ORAL FUNCTION THERAPY: CPT

## 2024-08-14 PROCEDURE — 85610 PROTHROMBIN TIME: CPT | Performed by: NURSE PRACTITIONER

## 2024-08-14 PROCEDURE — 94761 N-INVAS EAR/PLS OXIMETRY MLT: CPT

## 2024-08-14 PROCEDURE — 85025 COMPLETE CBC W/AUTO DIFF WBC: CPT | Performed by: NURSE PRACTITIONER

## 2024-08-14 PROCEDURE — 80053 COMPREHEN METABOLIC PANEL: CPT | Performed by: STUDENT IN AN ORGANIZED HEALTH CARE EDUCATION/TRAINING PROGRAM

## 2024-08-14 PROCEDURE — 85007 BL SMEAR W/DIFF WBC COUNT: CPT | Performed by: NURSE PRACTITIONER

## 2024-08-14 RX ORDER — HYDROXYZINE HYDROCHLORIDE 25 MG/1
25 TABLET, FILM COATED ORAL 3 TIMES DAILY PRN
Status: DISCONTINUED | OUTPATIENT
Start: 2024-08-14 | End: 2024-08-16 | Stop reason: HOSPADM

## 2024-08-14 RX ORDER — POTASSIUM CHLORIDE 750 MG/1
40 TABLET, FILM COATED, EXTENDED RELEASE ORAL EVERY 4 HOURS
Status: DISPENSED | OUTPATIENT
Start: 2024-08-14 | End: 2024-08-14

## 2024-08-14 RX ADMIN — Medication 100 MG: at 08:52

## 2024-08-14 RX ADMIN — PREDNISONE 40 MG: 20 TABLET ORAL at 08:52

## 2024-08-14 RX ADMIN — SPIRONOLACTONE 25 MG: 25 TABLET, FILM COATED ORAL at 08:52

## 2024-08-14 RX ADMIN — IPRATROPIUM BROMIDE AND ALBUTEROL SULFATE 3 ML: .5; 3 SOLUTION RESPIRATORY (INHALATION) at 08:13

## 2024-08-14 RX ADMIN — BUDESONIDE AND FORMOTEROL FUMARATE DIHYDRATE 2 PUFF: 160; 4.5 AEROSOL RESPIRATORY (INHALATION) at 19:38

## 2024-08-14 RX ADMIN — FOLIC ACID 1 MG: 1 TABLET ORAL at 08:52

## 2024-08-14 RX ADMIN — ROPINIROLE 1 MG: 1 TABLET, FILM COATED ORAL at 20:41

## 2024-08-14 RX ADMIN — Medication 10 ML: at 20:42

## 2024-08-14 RX ADMIN — PANTOPRAZOLE SODIUM 40 MG: 40 INJECTION, POWDER, FOR SOLUTION INTRAVENOUS at 20:41

## 2024-08-14 RX ADMIN — IPRATROPIUM BROMIDE AND ALBUTEROL SULFATE 3 ML: .5; 3 SOLUTION RESPIRATORY (INHALATION) at 11:58

## 2024-08-14 RX ADMIN — PANTOPRAZOLE SODIUM 40 MG: 40 INJECTION, POWDER, FOR SOLUTION INTRAVENOUS at 08:52

## 2024-08-14 RX ADMIN — IPRATROPIUM BROMIDE AND ALBUTEROL SULFATE 3 ML: .5; 3 SOLUTION RESPIRATORY (INHALATION) at 19:37

## 2024-08-14 RX ADMIN — HYDROCODONE BITARTRATE AND ACETAMINOPHEN 1 TABLET: 7.5; 325 TABLET ORAL at 13:39

## 2024-08-14 RX ADMIN — Medication 10 ML: at 09:28

## 2024-08-14 RX ADMIN — CEFTRIAXONE 2000 MG: 2 INJECTION, POWDER, FOR SOLUTION INTRAMUSCULAR; INTRAVENOUS at 13:42

## 2024-08-14 RX ADMIN — HYDROCODONE BITARTRATE AND ACETAMINOPHEN 1 TABLET: 7.5; 325 TABLET ORAL at 21:45

## 2024-08-14 RX ADMIN — INSULIN LISPRO 7 UNITS: 100 INJECTION, SOLUTION INTRAVENOUS; SUBCUTANEOUS at 20:41

## 2024-08-14 RX ADMIN — BUDESONIDE AND FORMOTEROL FUMARATE DIHYDRATE 2 PUFF: 160; 4.5 AEROSOL RESPIRATORY (INHALATION) at 08:13

## 2024-08-14 RX ADMIN — ATENOLOL 25 MG: 25 TABLET ORAL at 08:52

## 2024-08-14 RX ADMIN — LORAZEPAM 1 MG: 1 TABLET ORAL at 08:52

## 2024-08-14 RX ADMIN — INSULIN LISPRO 2 UNITS: 100 INJECTION, SOLUTION INTRAVENOUS; SUBCUTANEOUS at 17:10

## 2024-08-14 RX ADMIN — INSULIN LISPRO 4 UNITS: 100 INJECTION, SOLUTION INTRAVENOUS; SUBCUTANEOUS at 11:42

## 2024-08-14 RX ADMIN — NICOTINE 1 PATCH: 21 PATCH, EXTENDED RELEASE TRANSDERMAL at 20:41

## 2024-08-14 RX ADMIN — HYDROCODONE BITARTRATE AND ACETAMINOPHEN 1 TABLET: 7.5; 325 TABLET ORAL at 04:16

## 2024-08-14 RX ADMIN — HYDROXYZINE HYDROCHLORIDE 25 MG: 25 TABLET ORAL at 17:15

## 2024-08-14 RX ADMIN — IPRATROPIUM BROMIDE AND ALBUTEROL SULFATE 3 ML: .5; 3 SOLUTION RESPIRATORY (INHALATION) at 15:24

## 2024-08-14 RX ADMIN — POTASSIUM CHLORIDE 40 MEQ: 750 TABLET, EXTENDED RELEASE ORAL at 11:42

## 2024-08-14 NOTE — PLAN OF CARE
Goal Outcome Evaluation:           Progress: improving  Outcome Evaluation: patient alert and oriented X4. on 1L nc during the night. standby assist with walker. pain managed with medication. vss.

## 2024-08-14 NOTE — PLAN OF CARE
Goal Outcome Evaluation:  Plan of Care Reviewed With: patient        Progress: improving  Outcome Evaluation: Pt seen for PT this afternoon. She is still doing well today and progressing w mobility. She is SBA with all mobility, ambulating approx 200 ft without using AD.She does exhibit slow pace, but is steady. She did lightly hold onto handrails in hallway at times. Pt in BR at end of session. SHe is concerned about returning home and being tempted to drink if she is by herself as her  works 3 days a week. Therapist offered encouragement. Pt plans for EGD tomorrow. Will follow up and continue to progress as tolerated.      Anticipated Discharge Disposition (PT): home with 24/7 care

## 2024-08-14 NOTE — PROGRESS NOTES
Name: Filomena Liu ADMIT: 2024   : 1960  PCP: Fernando Dunn MD    MRN: [id number] LOS: 8 days   AGE/SEX: 64 y.o. female  ROOM: [facility]     Subjective   Subjective   Patient's mentation continues to improve.   at bedside.  Patient feels much better today.  Agreeable to rehab.  Eager to have her EGD    Review of Systems  Chronic back pain, otherwise no complaints.     Objective   Objective   Vital Signs  Temp:  [98.1 °F (36.7 °C)-98.8 °F (37.1 °C)] 98.8 °F (37.1 °C)  Heart Rate:  [75-92] 82  Resp:  [18] 18  BP: (101-118)/(67-71) 118/69  SpO2:  [92 %-97 %] 95 %  on  Flow (L/min):  [1] 1;   Device (Oxygen Therapy): room air  Body mass index is 33.18 kg/m².  Physical Exam  Constitutional:       General: She is not in acute distress.     Appearance: She is ill-appearing.   Cardiovascular:      Rate and Rhythm: Normal rate and regular rhythm.   Pulmonary:      Effort: Pulmonary effort is normal. No respiratory distress.      Breath sounds: Wheezing present.   Abdominal:      General: Abdomen is flat. There is no distension.      Tenderness: There is no abdominal tenderness.   Musculoskeletal:         General: No swelling or deformity. Normal range of motion.   Skin:     General: Skin is warm and dry.   Neurological:      General: No focal deficit present.      Mental Status: She is alert. She is disoriented.         Results Review     I reviewed the patient's new clinical results.  Results from last 7 days   Lab Units 24  0649 24  2350 24  1552 24  0806   WBC 10*3/mm3 18.77* 13.24* 14.42* 14.81*   HEMOGLOBIN g/dL 10.5* 9.4* 9.4* 9.3*   PLATELETS 10*3/mm3 290 170 168 146     Results from last 7 days   Lab Units 24  0649 24  0810 24  1932 24  0806 24  2348 24  0554   SODIUM mmol/L 132* 132*  --  134*  --  141   POTASSIUM mmol/L 3.4* 3.7 3.8 3.6   < > 3.1*   CHLORIDE mmol/L 91* 90*  --  88*  --  90*   CO2 mmol/L 26.6 29.0  --  33.9*  --   41.0*   BUN mg/dL 41* 40*  --  40*  --  45*   CREATININE mg/dL 1.43* 1.40*  --  1.41*  --  1.30*   GLUCOSE mg/dL 101* 96  --  129*  --  150*    < > = values in this interval not displayed.   Estimated Creatinine Clearance: 36.5 mL/min (A) (by C-G formula based on SCr of 1.43 mg/dL (H)).  Results from last 7 days   Lab Units 08/14/24  0649 08/13/24  0810 08/12/24  0806 08/11/24  0554   ALBUMIN g/dL 3.8 3.8 3.7 3.6   BILIRUBIN mg/dL 1.5* 1.6* 1.7* 1.8*   ALK PHOS U/L 159* 131* 114 111   AST (SGOT) U/L 58* 53* 58* 66*   ALT (SGPT) U/L 45* 44* 42* 40*     Results from last 7 days   Lab Units 08/14/24  0649 08/13/24  0810 08/12/24  0806 08/11/24  0554 08/10/24  0520 08/09/24  0511   CALCIUM mg/dL 9.8 9.9 9.6 9.8 9.9 9.6   ALBUMIN g/dL 3.8 3.8 3.7 3.6 3.9 3.8   MAGNESIUM mg/dL  --  3.0* 1.4* 1.8  --   --    PHOSPHORUS mg/dL 2.9  --   --  2.7 2.8 3.1           SARS-CoV-2 BREEZY   Date Value Ref Range Status   11/10/2020 Not Detected Not Detected Final     Glucose   Date/Time Value Ref Range Status   08/14/2024 1105 231 (H) 70 - 130 mg/dL Final   08/14/2024 0619 135 (H) 70 - 130 mg/dL Final   08/13/2024 2025 129 70 - 130 mg/dL Final   08/13/2024 1621 188 (H) 70 - 130 mg/dL Final   08/13/2024 1212 158 (H) 70 - 130 mg/dL Final   08/13/2024 0629 138 (H) 70 - 130 mg/dL Final   08/12/2024 1950 136 (H) 70 - 130 mg/dL Final       FL Video Swallow Single Contrast  VIDEO SWALLOWING EXAMINATION BY SPEECH PATHOLOGY     Clinical: Dysphasia     Reference Air Kerma: 18.23 mGy     Video swallowing examination performed under the direction of speech  pathology. Please refer to speech pathology report for full information     Speech pathology summary: TAYLER Shah present.     Penetration was seen.     By electronically signing this report, I, the supervising radiologist,  attest that I was not present for the procedure(s) but agree with this  final edited report.        This report was finalized on 8/13/2024 4:49 PM by Dr. David Briceño,  M.D  on Workstation: [id number]     XR Chest 2 View  Narrative: XR CHEST 2 VW-     HISTORY: Female who is 64 years-old, pulmonary vascular congestion,  follow-up     TECHNIQUE: Frontal lateral views of the chest     COMPARISON: 8/10/2024     FINDINGS: The heart size is borderline. Aorta is tortuous, calcified.  Sternotomy wires are present. Pulmonary vasculature is unremarkable.  Minimal likely atelectasis or scarring at the left lateral costophrenic  angle. No focal pulmonary consolidation, pleural effusion, or  pneumothorax. No acute osseous process.     Impression: No focal pulmonary consolidation or obvious edema.  Borderline heart size. Tortuous aorta. Follow-up as clinically  indicated.           This report was finalized on 8/13/2024 2:10 PM by Dr. Harpreet Mistry M.D on Workstation: [id number]       I reviewed the patient's daily medications.  Scheduled Medications  atenolol, 25 mg, Oral, Daily  budesonide-formoterol, 2 puff, Inhalation, BID - RT  cefTRIAXone, 2,000 mg, Intravenous, Q24H  folic acid, 1 mg, Oral, Daily  insulin lispro, 2-9 Units, Subcutaneous, 4x Daily AC & at Bedtime  ipratropium-albuterol, 3 mL, Nebulization, 4x Daily - RT  LORazepam, 1 mg, Oral, BID  nicotine, 1 patch, Transdermal, Q24H  pantoprazole, 40 mg, Intravenous, Q12H  potassium chloride ER, 40 mEq, Oral, Q4H  predniSONE, 40 mg, Oral, Daily With Breakfast  rOPINIRole, 1 mg, Oral, Nightly  sodium chloride, 10 mL, Intravenous, Q12H  spironolactone, 25 mg, Oral, Daily  thiamine, 100 mg, Oral, Daily    Infusions   Diet  Diet: Regular/House; No Mixed Consistencies, No Straw; Texture: Mechanical Ground (NDD 2); Fluid Consistency: Thin (IDDSI 0)  NPO Diet NPO Type: Strict NPO         I have personally reviewed:  [x]  Laboratory   [x]  Microbiology   [x]  Radiology   []  EKG/Telemetry   []  Cardiology/Vascular   []  Pathology   []  Records     Assessment/Plan     Active Hospital Problems    Diagnosis  POA    **Acute GI bleeding [K92.2]   Yes    Hyperkalemia [E87.5]  Unknown    Acute renal failure [N17.9]  Unknown    Alcoholism [F10.20]  Unknown    Acute exacerbation of chronic obstructive pulmonary disease (COPD) [J44.1]  Unknown    Acute blood loss anemia [D62]  Unknown    Melena [K92.1]  Unknown    Gastroesophageal reflux disease [K21.9]  Unknown    Dysphagia [R13.10]  Unknown    Alcoholic cirrhosis of liver without ascites [K70.30]  Unknown      Resolved Hospital Problems   No resolved problems to display.       64 y.o. female admitted with Acute GI bleeding.    Melena  Anemia  Cirrhosis  -Hemoglobin stable, up trended  -PPI IV twice daily  -GI consulted-EGD tomorrow  -SBP prophylaxis with ceftriaxone  -Ammonia okay    Dysphagia  -Patient on modified diet.  This is improved with improvement of her mental status  -EGD tomorrow     SANDRO, improved  Hypokalemia  -Renal following-holding Bumex for now     Alcohol use disorder  -Continue thiamine, folic acid, CIWA protocol  -Stop scheduled Ativan.  Add hydroxyzine    Acute COPD exacerbation COPD, not on home oxygen  Hypoxia  -Continue Symbicort, scheduled DuoNeb.  Pulmonology following.    Chronic back pain-continue home hydrocodone.    Leukocytosis-  Suspect from steroids.  Afebrile and no new infectious symptoms.  On SBP prophylaxis with ceftriaxone.  Clinically continues to improve every day    SCDs for DVT prophylaxis.  Full code.  Discussed with patient, family, and nursing staff.  Anticipate discharge to SNU facility in 1-2 days.  Pending EGD and placement.    Expected Discharge Date: 8/16/2024; Expected Discharge Time: 12:00 PM      Marshal Lara MD  [facility]  08/14/24  14:25 EDT

## 2024-08-14 NOTE — PLAN OF CARE
Goal Outcome Evaluation: EGD planned for tomorrow. Pt will be NPO at midnight.

## 2024-08-14 NOTE — PLAN OF CARE
Problem: Alcohol Withdrawal  Goal: Alcohol Withdrawal Symptom Control  Outcome: Ongoing, Progressing     Problem: Fall Injury Risk  Goal: Absence of Fall and Fall-Related Injury  Outcome: Ongoing, Progressing  Intervention: Identify and Manage Contributors  Recent Flowsheet Documentation  Taken 8/14/2024 1638 by Eden Burden RN  Medication Review/Management: medications reviewed  Taken 8/14/2024 1421 by Eden Burden RN  Medication Review/Management: medications reviewed  Taken 8/14/2024 1241 by Eden Burden RN  Medication Review/Management: medications reviewed  Taken 8/14/2024 1055 by Eden Burden RN  Medication Review/Management: medications reviewed  Taken 8/14/2024 0851 by Eden Burden RN  Medication Review/Management: medications reviewed  Intervention: Promote Injury-Free Environment  Recent Flowsheet Documentation  Taken 8/14/2024 1638 by Eden Burden RN  Safety Promotion/Fall Prevention:   activity supervised   assistive device/personal items within reach   clutter free environment maintained   fall prevention program maintained   nonskid shoes/slippers when out of bed   room organization consistent   safety round/check completed  Taken 8/14/2024 1421 by Eden Burden RN  Safety Promotion/Fall Prevention:   activity supervised   assistive device/personal items within reach   clutter free environment maintained   fall prevention program maintained   nonskid shoes/slippers when out of bed   room organization consistent   safety round/check completed  Taken 8/14/2024 1241 by Eden Burden RN  Safety Promotion/Fall Prevention:   activity supervised   assistive device/personal items within reach   clutter free environment maintained   fall prevention program maintained   nonskid shoes/slippers when out of bed   room organization consistent   safety round/check completed  Taken 8/14/2024 1055 by Eden Burden RN  Safety Promotion/Fall  Prevention:   activity supervised   assistive device/personal items within reach   clutter free environment maintained   fall prevention program maintained   nonskid shoes/slippers when out of bed   room organization consistent   safety round/check completed  Taken 8/14/2024 0851 by Eden Burden RN  Safety Promotion/Fall Prevention:   assistive device/personal items within reach   activity supervised   clutter free environment maintained   fall prevention program maintained   nonskid shoes/slippers when out of bed   room organization consistent   safety round/check completed     Problem: Anemia  Goal: Anemia Symptom Improvement  Intervention: Monitor and Manage Anemia  Recent Flowsheet Documentation  Taken 8/14/2024 1638 by Eden Burden RN  Safety Promotion/Fall Prevention:   activity supervised   assistive device/personal items within reach   clutter free environment maintained   fall prevention program maintained   nonskid shoes/slippers when out of bed   room organization consistent   safety round/check completed  Taken 8/14/2024 1421 by Eden Burden RN  Safety Promotion/Fall Prevention:   activity supervised   assistive device/personal items within reach   clutter free environment maintained   fall prevention program maintained   nonskid shoes/slippers when out of bed   room organization consistent   safety round/check completed  Taken 8/14/2024 1241 by Eden Burden RN  Safety Promotion/Fall Prevention:   activity supervised   assistive device/personal items within reach   clutter free environment maintained   fall prevention program maintained   nonskid shoes/slippers when out of bed   room organization consistent   safety round/check completed  Taken 8/14/2024 1055 by Eden Burden, RN  Safety Promotion/Fall Prevention:   activity supervised   assistive device/personal items within reach   clutter free environment maintained   fall prevention program maintained    nonskid shoes/slippers when out of bed   room organization consistent   safety round/check completed  Taken 8/14/2024 0851 by Eden Burden RN  Safety Promotion/Fall Prevention:   assistive device/personal items within reach   activity supervised   clutter free environment maintained   fall prevention program maintained   nonskid shoes/slippers when out of bed   room organization consistent   safety round/check completed     Problem: Substance Misuse (Alcohol Withdrawal)  Goal: Readiness for Change Identified  Intervention: Promote Psychosocial Wellbeing  Recent Flowsheet Documentation  Taken 8/14/2024 1421 by Eden Burden RN  Family/Support System Care:   self-care encouraged   support provided  Taken 8/14/2024 0851 by Eden Burden RN  Family/Support System Care: self-care encouraged     Problem: Adult Inpatient Plan of Care  Goal: Absence of Hospital-Acquired Illness or Injury  Intervention: Identify and Manage Fall Risk  Recent Flowsheet Documentation  Taken 8/14/2024 1638 by Eden Burden RN  Safety Promotion/Fall Prevention:   activity supervised   assistive device/personal items within reach   clutter free environment maintained   fall prevention program maintained   nonskid shoes/slippers when out of bed   room organization consistent   safety round/check completed  Taken 8/14/2024 1421 by Eden Burden, RN  Safety Promotion/Fall Prevention:   activity supervised   assistive device/personal items within reach   clutter free environment maintained   fall prevention program maintained   nonskid shoes/slippers when out of bed   room organization consistent   safety round/check completed  Taken 8/14/2024 1241 by Eden Burden, RN  Safety Promotion/Fall Prevention:   activity supervised   assistive device/personal items within reach   clutter free environment maintained   fall prevention program maintained   nonskid shoes/slippers when out of bed   room organization  consistent   safety round/check completed  Taken 8/14/2024 1055 by Eden Burden RN  Safety Promotion/Fall Prevention:   activity supervised   assistive device/personal items within reach   clutter free environment maintained   fall prevention program maintained   nonskid shoes/slippers when out of bed   room organization consistent   safety round/check completed  Taken 8/14/2024 0851 by Eden Burden RN  Safety Promotion/Fall Prevention:   assistive device/personal items within reach   activity supervised   clutter free environment maintained   fall prevention program maintained   nonskid shoes/slippers when out of bed   room organization consistent   safety round/check completed  Intervention: Prevent Skin Injury  Recent Flowsheet Documentation  Taken 8/14/2024 1638 by Eden Burden RN  Body Position: position changed independently  Taken 8/14/2024 1421 by Eden Burden RN  Body Position: position changed independently  Taken 8/14/2024 1241 by Eden Burden RN  Body Position: position changed independently  Taken 8/14/2024 1055 by Eden Burden RN  Body Position: position changed independently  Taken 8/14/2024 0851 by Eden Burden RN  Body Position: position changed independently  Intervention: Prevent and Manage VTE (Venous Thromboembolism) Risk  Recent Flowsheet Documentation  Taken 8/14/2024 1421 by Eden Burden RN  Activity Management: ambulated outside room  VTE Prevention/Management: patient refused intervention  Range of Motion: active ROM (range of motion) encouraged  Taken 8/14/2024 1241 by Eden Burden RN  Activity Management: ambulated to bathroom  Taken 8/14/2024 0851 by Eden Burden RN  VTE Prevention/Management: patient refused intervention  Range of Motion: active ROM (range of motion) encouraged  Intervention: Prevent Infection  Recent Flowsheet Documentation  Taken 8/14/2024 1638 by Eden Burden  RN  Infection Prevention:   hand hygiene promoted   rest/sleep promoted  Taken 8/14/2024 1421 by Eden Burden RN  Infection Prevention:   hand hygiene promoted   rest/sleep promoted  Taken 8/14/2024 1241 by Eden Burden RN  Infection Prevention:   hand hygiene promoted   rest/sleep promoted  Taken 8/14/2024 1055 by Eden Buredn RN  Infection Prevention:   hand hygiene promoted   rest/sleep promoted  Taken 8/14/2024 0851 by Eden Burden RN  Infection Prevention:   hand hygiene promoted   rest/sleep promoted  Goal: Optimal Comfort and Wellbeing  Intervention: Provide Person-Centered Care  Recent Flowsheet Documentation  Taken 8/14/2024 1421 by Eden Burden RN  Trust Relationship/Rapport:   care explained   choices provided   thoughts/feelings acknowledged  Taken 8/14/2024 0851 by Eden Burden RN  Trust Relationship/Rapport:   care explained   choices provided   thoughts/feelings acknowledged     Problem: Skin Injury Risk Increased  Goal: Skin Health and Integrity  Intervention: Optimize Skin Protection  Recent Flowsheet Documentation  Taken 8/14/2024 1421 by Eden Burden RN  Head of Bed (HOB) Positioning: HOB elevated  Taken 8/14/2024 1241 by Eden Burden RN  Head of Bed (HOB) Positioning: HOB elevated     Problem: Restraint, Nonviolent  Goal: Absence of Harm or Injury  Intervention: Implement Least Restrictive Safety Strategies  Recent Flowsheet Documentation  Taken 8/14/2024 1638 by Eden Burden RN  Medical Device Protection: IV pole/bag removed from visual field  Taken 8/14/2024 1421 by Eden Burden RN  Medical Device Protection: IV pole/bag removed from visual field  Diversional Activities:   smartphone   television  Taken 8/14/2024 1241 by Eden Burden RN  Medical Device Protection: IV pole/bag removed from visual field  Taken 8/14/2024 1055 by Eden Burden RN  Medical Device Protection: IV pole/bag removed  from visual field  Taken 8/14/2024 0851 by Eden Burden RN  Diversional Activities:   smartphone   television  Intervention: Protect Dignity, Rights, and Personal Wellbeing  Recent Flowsheet Documentation  Taken 8/14/2024 1421 by Eden Burden RN  Trust Relationship/Rapport:   care explained   choices provided   thoughts/feelings acknowledged  Taken 8/14/2024 0851 by Eden Burden RN  Trust Relationship/Rapport:   care explained   choices provided   thoughts/feelings acknowledged  Intervention: Protect Skin and Joint Integrity  Recent Flowsheet Documentation  Taken 8/14/2024 1638 by Eden Burden RN  Body Position: position changed independently  Taken 8/14/2024 1421 by Eden Burden RN  Body Position: position changed independently  Range of Motion: active ROM (range of motion) encouraged  Taken 8/14/2024 1241 by Eden Burden RN  Body Position: position changed independently  Taken 8/14/2024 1055 by Eden Burden RN  Body Position: position changed independently  Taken 8/14/2024 0851 by Eden Burden RN  Body Position: position changed independently  Range of Motion: active ROM (range of motion) encouraged   Goal Outcome Evaluation:

## 2024-08-14 NOTE — THERAPY TREATMENT NOTE
Patient Name: Filomena Liu  : 1960    MRN: [id number]                              Today's Date: 2024       Admit Date: 2024    Visit Dx:     ICD-10-CM ICD-9-CM   1. Acute blood loss anemia  D62 285.1   2. Gastrointestinal hemorrhage, unspecified gastrointestinal hemorrhage type  K92.2 578.9   3. Acute exacerbation of chronic obstructive pulmonary disease (COPD)  J44.1 491.21   4. Alcoholism  F10.20 303.90   5. Acute renal failure, unspecified acute renal failure type  N17.9 584.9   6. Hyperkalemia  E87.5 276.7   7. Melena  K92.1 578.1   8. Gastroesophageal reflux disease, unspecified whether esophagitis present  K21.9 530.81   9. Dysphagia, unspecified type  R13.10 787.20   10. Alcoholic cirrhosis of liver without ascites  K70.30 571.2     Patient Active Problem List   Diagnosis    Mediastinal mass    Cervical stenosis of spinal canal    Cervical radiculopathy    Cellulitis/abscess of left foot    HTN (hypertension)    History of MRSA infection    Anxiety    Peroneal tenosynovitis    Fracture of navicular bone of left foot    Tobacco use    Non compliance with medical treatment    Screening for colon cancer    Osteoporosis    Shoulder arthritis    Primary localized osteoarthrosis of left shoulder region    History of adenomatous polyp of colon    Diverticulosis    Acute GI bleeding    Hyperkalemia    Acute renal failure    Alcoholism    Acute exacerbation of chronic obstructive pulmonary disease (COPD)    Acute blood loss anemia    Melena    Gastroesophageal reflux disease    Dysphagia    Alcoholic cirrhosis of liver without ascites     Past Medical History:   Diagnosis Date    Ankle pain     Ankle wound     LEFT    Anxiety     Arthritis of back     Arthritis of neck     Asthma     Benign tumor of thymus     REMOVED    Bruises easily     Cataract     COPD (chronic obstructive pulmonary disease)     CTS (carpal tunnel syndrome) Surgery     DDD (degenerative disc disease), cervical      Depression     Fracture of wrist 2917    GERD (gastroesophageal reflux disease)     Hip arthrosis Unknown    History of MRSA infection     LEFT ANKLE TREAT [facility]  5YEARS GO    Hyperlipidemia     Hypertension     Knee sprain June 2023    Knee swelling Unknown    Shoulder arthritis 06/05/2023    Shoulder pain, left 07/07/2023    Sleep apnea     NO MACHINE USE    Spinal stenosis     Spondylolisthesis of cervical region      Past Surgical History:   Procedure Laterality Date    BACK SURGERY      cervical disc surgery with fusion-    CHOLECYSTECTOMY      COLONOSCOPY      COLONOSCOPY N/A 11/12/2020    Procedure: COLONOSCOPY, polypectomy;  Surgeon: Filomena Mckeon MD;  Location: [facility] OR;  Service: Gastroenterology;  Laterality: N/A;  Diverticulosis; Hepatic flexure polyp x 1; Polyp at 60cm x 1; Polyp at 50cm x 1- hot snare;    COLONOSCOPY N/A 4/15/2024    Procedure: COLONOSCOPY into sigmoid colon with hot snare polypectomy;  Surgeon: Leatha Johns MD;  Location: [facility] ENDOSCOPY;  Service: General;  Laterality: N/A;  pre- history of polyps  post- diverticulosis, polyp    HAND SURGERY  2000    HYSTERECTOMY      INCISION AND DRAINAGE LEG Left 11/17/2018    Procedure: Incision and Drainage of Left ankle;  Surgeon: Maxwell Lanier MD;  Location: [facility] MAIN OR;  Service: Orthopedics    NECK SURGERY  2000    SIGMOIDOSCOPY N/A 3/28/2024    Procedure: SIGMOIDOSCOPY FLEXIBLE;  Surgeon: Leatha Johns MD;  Location: [facility] ENDOSCOPY;  Service: General;  Laterality: N/A;  pre: h/o colon polyps  post: poor prep, diverticulosis    SKIN BIOPSY      SKIN GRAFT SPLIT THICKNESS N/A 02/12/2019    Procedure: debridement SKIN GRAFT SPLIT THICKNESS left ankle wound;  Surgeon: Barry Worthy MD;  Location: [facility] OR [facility];  Service: Plastics    TOTAL SHOULDER ARTHROPLASTY Left 7/20/2023    Procedure: Total  shoulder arthroplasty;  Surgeon: Maxwell Lanier MD;  Location: [facility] OR [facility];  Service: Orthopedics;   Laterality: Left;    TOTAL THYMECTOMY      TRIGGER POINT INJECTION  2000    WRIST FRACTURE SURGERY      CARPAL TUNNEL      General Information       Row Name 08/14/24 1423          Physical Therapy Time and Intention    Document Type therapy note (daily note)  -EJ     Mode of Treatment physical therapy  -EJ               User Key  (r) = Recorded By, (t) = Taken By, (c) = Cosigned By      Initials Name Provider Type    Dorcas Lopez, PT Physical Therapist                   Mobility       Row Name 08/14/24 1424          Bed Mobility    Supine-Sit Dolores (Bed Mobility) standby assist  -EJ     Sit-Supine Dolores (Bed Mobility) not tested  -EJ     Assistive Device (Bed Mobility) head of bed elevated;bed rails  -EJ       Row Name 08/14/24 1424          Sit-Stand Transfer    Sit-Stand Dolores (Transfers) standby assist  -EJ     Comment, (Sit-Stand Transfer) no AD  -EJ       Row Name 08/14/24 1424          Gait/Stairs (Locomotion)    Dolores Level (Gait) verbal cues;standby assist  -EJ     Distance in Feet (Gait) 200  -EJ     Deviations/Abnormal Patterns (Gait) dayanara decreased;stride length decreased  -EJ     Bilateral Gait Deviations heel strike decreased  -EJ     Comment, (Gait/Stairs) no AD, slow pace but steady, lightly held onto handrails in hallways at times  -EJ               User Key  (r) = Recorded By, (t) = Taken By, (c) = Cosigned By      Initials Name Provider Type    Dorcas Lopez, PT Physical Therapist                   Obj/Interventions    No documentation.                  Goals/Plan    No documentation.                  Clinical Impression       Row Name 08/14/24 1425          Pain    Pretreatment Pain Rating 0/10 - no pain  -EJ       [facility] Name 08/14/24 142          Plan of Care Review    Plan of Care Reviewed With patient  -EJ     Outcome Evaluation Pt seen for PT this afternoon. She is still doing well today and progressing w mobility. She is SBA with all mobility,  ambulating approx 200 ft without using AD.She does exhibit slow pace, but is steady. She did lightly hold onto handrails in hallway at times. Pt in BR at end of session. SHe is concerned about returning home and being tempted to drink if she is by herself as her  works 3 days a week. Therapist offered encouragement. Pt plans for EGD tomorrow. Will follow up and continue to progress as tolerated.  -EJ       Row Name 08/14/24 1420          Positioning and Restraints    Pre-Treatment Position in bed  -EJ     Post Treatment Position bathroom  -EJ     Bathroom notified nsg;sitting;call light within reach;encouraged to call for assist  -EJ               User Key  (r) = Recorded By, (t) = Taken By, (c) = Cosigned By      Initials Name Provider Type    Dorcas Lopez PT Physical Therapist                   Outcome Measures       Row Name 08/14/24 1420          How much help from another person do you currently need...    Turning from your back to your side while in flat bed without using bedrails? 4  -EJ     Moving from lying on back to sitting on the side of a flat bed without bedrails? 4  -EJ     Moving to and from a bed to a chair (including a wheelchair)? 4  -EJ     Standing up from a chair using your arms (e.g., wheelchair, bedside chair)? 4  -EJ     Climbing 3-5 steps with a railing? 3  -EJ     To walk in hospital room? 4  -EJ     AM-PAC 6 Clicks Score (PT) 23  -EJ     Highest Level of Mobility Goal 7 --> Walk 25 feet or more  -EJ               User Key  (r) = Recorded By, (t) = Taken By, (c) = Cosigned By      Initials Name Provider Type    Dorcas Lopez PT Physical Therapist                                 Physical Therapy Education       Title: PT OT SLP Therapies (In Progress)       Topic: Physical Therapy (In Progress)       Point: Mobility training (In Progress)       Learning Progress Summary             Patient Acceptance, E,TB, NR by LY at 8/8/2024 9816                         Point:  Home exercise program (In Progress)       Learning Progress Summary             Patient Acceptance, E,TB, NR by CB at 8/8/2024 1145                         Point: Body mechanics (Done)       Learning Progress Summary             Patient Acceptance, E, DU by NT at 8/8/2024 1733   Family Acceptance, E, DU by NT at 8/8/2024 1733                         Point: Precautions (Not Started)       Learner Progress:  Not documented in this visit.                              User Key       Initials Effective Dates Name Provider Type Discipline    CB 10/22/21 -  Mariia Hidalgo, PT Physical Therapist PT    NT 07/03/24 -  Alissa Hoyt, RN Registered Nurse Nurse                  PT Recommendation and Plan     Plan of Care Reviewed With: patient  Progress: improving  Outcome Evaluation: Pt seen for PT this afternoon. She is still doing well today and progressing w mobility. She is SBA with all mobility, ambulating approx 200 ft without using AD.She does exhibit slow pace, but is steady. She did lightly hold onto handrails in hallway at times. Pt in BR at end of session. SHe is concerned about returning home and being tempted to drink if she is by herself as her  works 3 days a week. Therapist offered encouragement. Pt plans for EGD tomorrow. Will follow up and continue to progress as tolerated.     Time Calculation:         PT Charges       Row Name 08/14/24 1429             Time Calculation    Start Time 1357  -EJ      Stop Time 1414  -EJ      Time Calculation (min) 17 min  -EJ      PT Received On 08/14/24  -EJ      PT - Next Appointment 08/15/24  -EJ                User Key  (r) = Recorded By, (t) = Taken By, (c) = Cosigned By      Initials Name Provider Type    EJ Dorcas Alexander, PT Physical Therapist                  Therapy Charges for Today       Code Description Service Date Service Provider Modifiers Qty    87510391229  PT THERAPEUTIC ACT EA 15 MIN 8/13/2024 Dorcas Alexander, PT GP 2    [id number] HC PT  THERAPEUTIC ACT EA 15 MIN 8/14/2024 Dorcas Alexander, PT GP 1            PT G-Codes  Outcome Measure Options: AM-PAC 6 Clicks Daily Activity (OT)  AM-PAC 6 Clicks Score (PT): 23  AM-PAC 6 Clicks Score (OT): 20  Modified Ev Scale: 5 - Severe disability.  Bedridden, incontinent, and requiring constant nursing care and attention.  PT Discharge Summary  Anticipated Discharge Disposition (PT): home with 24/7 care    Dorcas Alexander, PT  8/14/2024

## 2024-08-14 NOTE — NURSING NOTE
Access center follow up regarding ETOH: chart reviewed. Mentation improved overnight from previous shift, reportedly A&Ox4. Pt had previously made a statement about going to AA meetings following discharge, however she stated she did not want [facility] resources. Access will briefly follow and encouragement tx goals. Following.

## 2024-08-14 NOTE — PLAN OF CARE
Goal Outcome Evaluation:  Plan of Care Reviewed With: patient, spouse, sibling           Outcome Evaluation: VFSS f/u completed with pt, , and sister. Discussed results, diet, strategies, and reviewed in detail the images from the VFSS on 8/13. Pt and family verbalized understanding, questions answered. Pt is having an EGD tomorrow. ST to follow.      Anticipated Discharge Disposition (SLP): unknown

## 2024-08-14 NOTE — PROGRESS NOTES
[facility] Progress Note      Patient Name: Filomena Liu  : 1960  MRN: [id number]  Primary Care Physician:  Fernando Dunn MD  Date of admission: 2024    Subjective     Interval History:   F/u SANDRO    Awake but still confused and blunted  Breathing is comfortable on 1 L/min  UOP yesterday not fully recorded      Review of Systems:   Unable to obtain reliably    Objective     Vitals:   Temp:  [98.4 °F (36.9 °C)-98.8 °F (37.1 °C)] 98.6 °F (37 °C)  Heart Rate:  [75-92] 78  Resp:  [16-20] 18  BP: (101-120)/(66-71) 101/69  Flow (L/min):  [1] 1    Intake/Output Summary (Last 24 hours) at 2024  Last data filed at 2024 0406  Gross per 24 hour   Intake 222 ml   Output 250 ml   Net -28 ml       Physical Exam:    General: confused WF, overweight, chr ill, NAD, jaundiced  Psychiatric: Blunted   Neck: supple, no JVD  Lungs: Diminished breath sounds; not labored   Heart: RRR, normal S1 and S2  Abdomen: soft, no grimacing, distended, BS +  Extremities: no edema, cyanosis or clubbing  Skin: Warm and dry    Scheduled Meds:     atenolol, 25 mg, Oral, Daily  budesonide-formoterol, 2 puff, Inhalation, BID - RT  cefTRIAXone, 2,000 mg, Intravenous, Q24H  folic acid, 1 mg, Oral, Daily  insulin lispro, 2-9 Units, Subcutaneous, 4x Daily AC & at Bedtime  ipratropium-albuterol, 3 mL, Nebulization, 4x Daily - RT  LORazepam, 1 mg, Oral, BID  nicotine, 1 patch, Transdermal, Q24H  pantoprazole, 40 mg, Intravenous, Q12H  [START ON 2024] predniSONE, 40 mg, Oral, Daily With Breakfast  rOPINIRole, 1 mg, Oral, Nightly  sodium chloride, 10 mL, Intravenous, Q12H  spironolactone, 25 mg, Oral, Daily  thiamine, 100 mg, Oral, Daily      IV Meds:        Results Reviewed:   I have personally reviewed the results from the time of this admission to 2024 20:42 EDT     Results from last 7 days   Lab Units 24  0810 24  1932 24  0806 24  2348 24  0554   SODIUM  mmol/L 132*  --  134*  --  141   POTASSIUM mmol/L 3.7 3.8 3.6   < > 3.1*   CHLORIDE mmol/L 90*  --  88*  --  90*   CO2 mmol/L 29.0  --  33.9*  --  41.0*   BUN mg/dL 40*  --  40*  --  45*   CREATININE mg/dL 1.40*  --  1.41*  --  1.30*   CALCIUM mg/dL 9.9  --  9.6  --  9.8   BILIRUBIN mg/dL 1.6*  --  1.7*  --  1.8*   ALK PHOS U/L 131*  --  114  --  111   ALT (SGPT) U/L 44*  --  42*  --  40*   AST (SGOT) U/L 53*  --  58*  --  66*   GLUCOSE mg/dL 96  --  129*  --  150*    < > = values in this interval not displayed.     Estimated Creatinine Clearance: 37.2 mL/min (A) (by C-G formula based on SCr of 1.4 mg/dL (H)).  Results from last 7 days   Lab Units 08/13/24  0810 08/12/24  0806 08/11/24  0554 08/10/24  0520 08/09/24  0511   MAGNESIUM mg/dL 3.0* 1.4* 1.8  --   --    PHOSPHORUS mg/dL  --   --  2.7 2.8 3.1         Results from last 7 days   Lab Units 08/12/24  2350 08/12/24  1552 08/12/24  0806 08/11/24  2347 08/11/24  1550   WBC 10*3/mm3 13.24* 14.42* 14.81* 11.04* 8.82   HEMOGLOBIN g/dL 9.4* 9.4* 9.3* 9.3* 9.2*   PLATELETS 10*3/mm3 170 168 146 122* 98*             Assessment / Plan     ASSESSMENT:  Non olig SANDRO, stable and at plateau (peak 3.5; was 0.7 in July 2023).  Multifactorial prerenal azotemia in assoc with GIB, severe anemia, diuretic use and impaired compensation via ACE/NSAIDs (though states motrin use scant).  Potassium stable; improved serum bicarbonate s/p Diamox for 2 doses 8/11 and 8/12.  UA bland and Eunice low < 20.  US: no hydronephrosis.    Vol overload, improving; CXR 8/10 with decreasing pulmonary congestion.    Hypotension - resolved, stopped midodrine, tolerating atenolol  Anemia.  TSAT ok 21%.     ETOH abuse  Acute encephalopathy and alcohol withdrawal  Acute hypoxic resp failure   Rash - vasculitic appearance but chronic in nature.  DOV positive with titer 1:160 and complements low, but urine completely bland.  ANCA panel negative    PLAN:  1.  Hold Bumex still   2.  Surveillance  labs      Chan Brewster MD  08/13/24  20:42 EDT    [facility]  [phone]

## 2024-08-14 NOTE — THERAPY DISCHARGE NOTE
Acute Care - Occupational Therapy Discharge  [facility]    Patient Name: Filomena Liu  : 1960    MRN: [id number]                              Today's Date: 2024       Admit Date: 2024    Visit Dx:     ICD-10-CM ICD-9-CM   1. Acute blood loss anemia  D62 285.1   2. Gastrointestinal hemorrhage, unspecified gastrointestinal hemorrhage type  K92.2 578.9   3. Acute exacerbation of chronic obstructive pulmonary disease (COPD)  J44.1 491.21   4. Alcoholism  F10.20 303.90   5. Acute renal failure, unspecified acute renal failure type  N17.9 584.9   6. Hyperkalemia  E87.5 276.7   7. Melena  K92.1 578.1   8. Gastroesophageal reflux disease, unspecified whether esophagitis present  K21.9 530.81   9. Dysphagia, unspecified type  R13.10 787.20   10. Alcoholic cirrhosis of liver without ascites  K70.30 571.2     Patient Active Problem List   Diagnosis    Mediastinal mass    Cervical stenosis of spinal canal    Cervical radiculopathy    Cellulitis/abscess of left foot    HTN (hypertension)    History of MRSA infection    Anxiety    Peroneal tenosynovitis    Fracture of navicular bone of left foot    Tobacco use    Non compliance with medical treatment    Screening for colon cancer    Osteoporosis    Shoulder arthritis    Primary localized osteoarthrosis of left shoulder region    History of adenomatous polyp of colon    Diverticulosis    Acute GI bleeding    Hyperkalemia    Acute renal failure    Alcoholism    Acute exacerbation of chronic obstructive pulmonary disease (COPD)    Acute blood loss anemia    Melena    Gastroesophageal reflux disease    Dysphagia    Alcoholic cirrhosis of liver without ascites     Past Medical History:   Diagnosis Date    Ankle pain     Ankle wound     LEFT    Anxiety     Arthritis of back     Arthritis of neck     Asthma     Benign tumor of thymus     REMOVED    Bruises easily     Cataract     COPD (chronic obstructive pulmonary disease)     CTS (carpal tunnel  syndrome) Surgery 1999    DDD (degenerative disc disease), cervical     Depression     Fracture of wrist 2917    GERD (gastroesophageal reflux disease)     Hip arthrosis Unknown    History of MRSA infection     LEFT ANKLE TREAT [facility]  5YEARS GO    Hyperlipidemia     Hypertension     Knee sprain June 2023    Knee swelling Unknown    Shoulder arthritis 06/05/2023    Shoulder pain, left 07/07/2023    Sleep apnea     NO MACHINE USE    Spinal stenosis     Spondylolisthesis of cervical region      Past Surgical History:   Procedure Laterality Date    BACK SURGERY      cervical disc surgery with fusion-    CHOLECYSTECTOMY      COLONOSCOPY      COLONOSCOPY N/A 11/12/2020    Procedure: COLONOSCOPY, polypectomy;  Surgeon: Filomena Mckeon MD;  Location: [facility] OR;  Service: Gastroenterology;  Laterality: N/A;  Diverticulosis; Hepatic flexure polyp x 1; Polyp at 60cm x 1; Polyp at 50cm x 1- hot snare;    COLONOSCOPY N/A 4/15/2024    Procedure: COLONOSCOPY into sigmoid colon with hot snare polypectomy;  Surgeon: Leatha Johns MD;  Location: [facility] ENDOSCOPY;  Service: General;  Laterality: N/A;  pre- history of polyps  post- diverticulosis, polyp    HAND SURGERY  2000    HYSTERECTOMY      INCISION AND DRAINAGE LEG Left 11/17/2018    Procedure: Incision and Drainage of Left ankle;  Surgeon: Maxwell Lanier MD;  Location: [facility] MAIN OR;  Service: Orthopedics    NECK SURGERY  2000    SIGMOIDOSCOPY N/A 3/28/2024    Procedure: SIGMOIDOSCOPY FLEXIBLE;  Surgeon: Leatha Johns MD;  Location: [facility] ENDOSCOPY;  Service: General;  Laterality: N/A;  pre: h/o colon polyps  post: poor prep, diverticulosis    SKIN BIOPSY      SKIN GRAFT SPLIT THICKNESS N/A 02/12/2019    Procedure: debridement SKIN GRAFT SPLIT THICKNESS left ankle wound;  Surgeon: Barry Worthy MD;  [facility];  Service: Plastics    TOTAL SHOULDER ARTHROPLASTY Left 7/20/2023    Procedure: Total  shoulder arthroplasty;  Surgeon: Yolande  Maxwell MONAE MD;  [facility] [place] [facility];  Service: Orthopedics;  Laterality: Left;    TOTAL THYMECTOMY      TRIGGER POINT INJECTION  2000    WRIST FRACTURE SURGERY      CARPAL TUNNEL      General Information       Row Name 08/14/24 1428          OT Time and Intention    Document Type therapy note (daily note);discharge treatment  -MM     Mode of Treatment occupational therapy  -MM       Row Name 08/14/24 1428          General Information    Patient Profile Reviewed yes  -MM     Existing Precautions/Restrictions fall  -MM       Row Name 08/14/24 1428          Living Environment    People in Home spouse  spouse works 3 days/week  -MM       Row Name 08/14/24 1428          Cognition    Orientation Status (Cognition) oriented x 4  -MM       Row Name 08/14/24 1428          Safety Issues, Functional Mobility    Impairments Affecting Function (Mobility) endurance/activity tolerance  -MM               User Key  (r) = Recorded By, (t) = Taken By, (c) = Cosigned By      Initials Name Provider Type    MM Roopa Marina OT Occupational Therapist                   Mobility/ADL's       Row Name 08/14/24 1429          Bed Mobility    Bed Mobility supine-sit  -MM     Supine-Sit [place] (Bed Mobility) standby assist  -MM     Sit-Supine [place] (Bed Mobility) not tested  -MM     Comment, (Bed Mobility) up in BR at exit, RN aware and pt knows to pull red cord when needing to get up  -MM       Row Name 08/14/24 1429          Transfers    Transfers sit-stand transfer;toilet transfer  -MM       Row Name 08/14/24 1429          Sit-Stand Transfer    Sit-Stand [place] (Transfers) standby assist  -MM       Row Name 08/14/24 1429          Toilet Transfer    Type (Toilet Transfer) stand-sit  -MM     [place] Level (Toilet Transfer) supervision  -MM     Assistive Device (Toilet Transfer) commode;grab bars/safety frame  -MM       Row Name 08/14/24 1429          Functional Mobility    Functional Mobility- Ind. Level standby  assist;supervision required  -MM     Functional Mobility- Comment no AD, pt reports she may be interested in rwx to have at home if needed  -MM       Row Name 08/14/24 1429          Activities of Daily Living    BADL Assessment/Intervention toileting;lower body dressing  -MM       Row Name 08/14/24 1429          Lower Body Dressing Assessment/Training    [place] Level (Lower Body Dressing) socks;pants/bottoms;standby assist  -MM       Row Name 08/14/24 1429          Toileting Assessment/Training    [place] Level (Toileting) toileting skills;adjust/manage clothing;change pad/brief;perform perineal hygiene;modified independence  -MM     Assistive Devices (Toileting) commode  -MM       Row Name 08/14/24 1429          Self-Feeding Assessment/Training    [place] Level (Feeding) feeding skills;scoop food and bring to mouth;independent  -MM               User Key  (r) = Recorded By, (t) = Taken By, (c) = Cosigned By      Initials Name Provider Type    MM Roopa Marina OT Occupational Therapist                   Obj/Interventions       Row Name 08/14/24 1430          Motor Skills    Motor Skills functional endurance  -MM     Functional Endurance improving  -MM       Row Name 08/14/24 1430          Balance    Balance Assessment sitting static balance;sitting dynamic balance;sit to stand dynamic balance;standing static balance;standing dynamic balance  -MM     Static Sitting Balance modified independence  -MM     Dynamic Sitting Balance modified independence;supervision  -MM     Position, Sitting Balance sitting edge of bed  -MM     Sit to Stand Dynamic Balance standby assist  -MM     Static Standing Balance standby assist;supervision  -MM     Dynamic Standing Balance standby assist  -MM     Position/Device Used, Standing Balance unsupported  -MM     Balance Interventions occupation based/functional task  -MM               User Key  (r) = Recorded By, (t) = Taken By, (c) = Cosigned By      Initials Name  Provider Type    Roopa Bojorquez OT Occupational Therapist                   Goals/Plan    No documentation.                  Clinical Impression       Row Name 08/14/24 1431          Pain Assessment    Pretreatment Pain Rating 0/10 - no pain  -MM     Posttreatment Pain Rating 0/10 - no pain  -MM       Row Name 08/14/24 1431          Plan of Care Review    Plan of Care Reviewed With patient  -MM     Progress improving  -MM     Outcome Evaluation pt seen for OT, she is doing better, moving better with improved balance and overall activity tolerance required for ADLs. She did not require use of AD this date SBA throughout, no seated rest breaks with func mob. Demo transfer into  to commode with SPV. She reports her spouse works 3 days/week, she is not concerned about being home alone during that time physically, however concerns about being tempted to drink while at home. No further acute OT needs as pt is safe to d/c home with spouse and HH at d/c.  -MM       [facility] Name 08/14/24 1431          Therapy Plan Review/Discharge Plan (OT)    Anticipated Discharge Disposition (OT) home;home with assist  -MM       Row Name 08/14/24 1431          Vital Signs    O2 Delivery Pre Treatment room air  -MM       Row Name 08/14/24 1431          Positioning and Restraints    Pre-Treatment Position in bed  -MM     Post Treatment Position bathroom  -     Bathroom notified nsg;call light within reach  -MM               User Key  (r) = Recorded By, (t) = Taken By, (c) = Cosigned By      Initials Name Provider Type    Roopa Bojorquez OT Occupational Therapist                   Outcome Measures       Row Name 08/14/24 1434          How much help from another is currently needed...    Putting on and taking off regular lower body clothing? 4  -MM     Bathing (including washing, rinsing, and drying) 4  -MM     Toileting (which includes using toilet bed pan or urinal) 4  -MM     Putting on and taking off regular upper body clothing 4  -MM      Taking care of personal grooming (such as brushing teeth) 4  -MM     Eating meals 4  -MM     AM-PAC 6 Clicks Score (OT) 24  -MM       Row Name 08/14/24 1428          How much help from another person do you currently need...    Turning from your back to your side while in flat bed without using bedrails? 4  -EJ     Moving from lying on back to sitting on the side of a flat bed without bedrails? 4  -EJ     Moving to and from a bed to a chair (including a wheelchair)? 4  -EJ     Standing up from a chair using your arms (e.g., wheelchair, bedside chair)? 4  -EJ     Climbing 3-5 steps with a railing? 3  -EJ     To walk in hospital room? 4  -EJ     AM-PAC 6 Clicks Score (PT) 23  -EJ     Highest Level of Mobility Goal 7 --> Walk 25 feet or more  -EJ       Row Name 08/14/24 1434          Functional Assessment    Outcome Measure Options AM-PAC 6 Clicks Daily Activity (OT)  -MM               User Key  (r) = Recorded By, (t) = Taken By, (c) = Cosigned By      Initials Name Provider Type    Dorcas Lopez, PT Physical Therapist    Roopa Bojorquez OT Occupational Therapist                  Occupational Therapy Education       Title: PT OT SLP Therapies (In Progress)       Topic: Occupational Therapy (In Progress)       Point: ADL training (Done)       Description:   Instruct learner(s) on proper safety adaptation and remediation techniques during self care or transfers.   Instruct in proper use of assistive devices.                  Learning Progress Summary             Patient Acceptance, E, DU by NT at 8/8/2024 1733   Family Acceptance, E, DU by NT at 8/8/2024 1733                         Point: Home exercise program (Not Started)       Description:   Instruct learner(s) on appropriate technique for monitoring, assisting and/or progressing therapeutic exercises/activities.                  Learner Progress:  Not documented in this visit.              Point: Precautions (Not Started)       Description:   Instruct  learner(s) on prescribed precautions during self-care and functional transfers.                  Learner Progress:  Not documented in this visit.              Point: Body mechanics (Not Started)       Description:   Instruct learner(s) on proper positioning and spine alignment during self-care, functional mobility activities and/or exercises.                  Learner Progress:  Not documented in this visit.                              User Key       Initials Effective Dates Name Provider Type Discipline    NT 07/03/24 -  Alissa Hoyt RN Registered Nurse Nurse                  OT Recommendation and Plan     Plan of Care Review  Plan of Care Reviewed With: patient  Progress: improving  Outcome Evaluation: pt seen for OT, she is doing better, moving better with improved balance and overall activity tolerance required for ADLs. She did not require use of AD this date SBA throughout, no seated rest breaks with func mob. Demo transfer into BR to commode with SPV. She reports her spouse works 3 days/week, she is not concerned about being home alone during that time physically, however concerns about being tempted to drink while at home. No further acute OT needs as pt is safe to d/c home with spouse and HH at d/c.  Plan of Care Reviewed With: patient  Outcome Evaluation: pt seen for OT, she is doing better, moving better with improved balance and overall activity tolerance required for ADLs. She did not require use of AD this date SBA throughout, no seated rest breaks with func mob. Demo transfer into BR to commode with SPV. She reports her spouse works 3 days/week, she is not concerned about being home alone during that time physically, however concerns about being tempted to drink while at home. No further acute OT needs as pt is safe to d/c home with spouse and HH at d/c.     Time Calculation:         Time Calculation- OT       Row Name 08/14/24 8149             Time Calculation- OT    OT Start Time 1357  -MM       OT Stop Time 1414  -MM      OT Time Calculation (min) 17 min  -MM      Total Timed Code Minutes- OT 17 minute(s)  -MM      OT Received On 08/14/24  -MM         Timed Charges    09877 - OT Therapeutic Activity Minutes 17  -MM         Total Minutes    Timed Charges Total Minutes 17  -MM       Total Minutes 17  -MM                User Key  (r) = Recorded By, (t) = Taken By, (c) = Cosigned By      Initials Name Provider Type    Roopa Bojorquez OT Occupational Therapist                  Therapy Charges for Today       Code Description Service Date Service Provider Modifiers Qty    42976382809 HC OT THERAPEUTIC ACT EA 15 MIN 8/13/2024 Roopa Marina OT GO 1    41387592195 HC OT SELF CARE/MGMT/TRAIN EA 15 MIN 8/13/2024 Roopa Marina OT GO 1    13720946516 HC OT THERAPEUTIC ACT EA 15 MIN 8/14/2024 Roopa Marina OT GO 1               OT Discharge Summary  Anticipated Discharge Disposition (OT): home, home with assist    Roopa Marina OT  8/14/2024

## 2024-08-14 NOTE — THERAPY TREATMENT NOTE
Acute Care - Speech Language Pathology   Swallow Treatment Note [facility]     Patient Name: Filomena Liu  : 1960  MRN: [id number]  Today's Date: 2024               Admit Date: 2024    Visit Dx:     ICD-10-CM ICD-9-CM   1. Acute blood loss anemia  D62 285.1   2. Gastrointestinal hemorrhage, unspecified gastrointestinal hemorrhage type  K92.2 578.9   3. Acute exacerbation of chronic obstructive pulmonary disease (COPD)  J44.1 491.21   4. Alcoholism  F10.20 303.90   5. Acute renal failure, unspecified acute renal failure type  N17.9 584.9   6. Hyperkalemia  E87.5 276.7   7. Melena  K92.1 578.1   8. Gastroesophageal reflux disease, unspecified whether esophagitis present  K21.9 530.81   9. Dysphagia, unspecified type  R13.10 787.20   10. Alcoholic cirrhosis of liver without ascites  K70.30 571.2     Patient Active Problem List   Diagnosis    Mediastinal mass    Cervical stenosis of spinal canal    Cervical radiculopathy    Cellulitis/abscess of left foot    HTN (hypertension)    History of MRSA infection    Anxiety    Peroneal tenosynovitis    Fracture of navicular bone of left foot    Tobacco use    Non compliance with medical treatment    Screening for colon cancer    Osteoporosis    Shoulder arthritis    Primary localized osteoarthrosis of left shoulder region    History of adenomatous polyp of colon    Diverticulosis    Acute GI bleeding    Hyperkalemia    Acute renal failure    Alcoholism    Acute exacerbation of chronic obstructive pulmonary disease (COPD)    Acute blood loss anemia    Melena    Gastroesophageal reflux disease    Dysphagia    Alcoholic cirrhosis of liver without ascites     Past Medical History:   Diagnosis Date    Ankle pain     Ankle wound     LEFT    Anxiety     Arthritis of back     Arthritis of neck     Asthma     Benign tumor of thymus     REMOVED    Bruises easily     Cataract     COPD (chronic obstructive pulmonary disease)     CTS (carpal tunnel syndrome)  Surgery 1999    DDD (degenerative disc disease), cervical     Depression     Fracture of wrist 2917    GERD (gastroesophageal reflux disease)     Hip arthrosis Unknown    History of MRSA infection     LEFT ANKLE TREAT [facility]  5YEARS GO    Hyperlipidemia     Hypertension     Knee sprain June 2023    Knee swelling Unknown    Shoulder arthritis 06/05/2023    Shoulder pain, left 07/07/2023    Sleep apnea     NO MACHINE USE    Spinal stenosis     Spondylolisthesis of cervical region      Past Surgical History:   Procedure Laterality Date    BACK SURGERY      cervical disc surgery with fusion-    CHOLECYSTECTOMY      COLONOSCOPY      COLONOSCOPY N/A 11/12/2020    Procedure: COLONOSCOPY, polypectomy;  Surgeon: Filomena Mckeon MD;  Location: [facility] OR;  Service: Gastroenterology;  Laterality: N/A;  Diverticulosis; Hepatic flexure polyp x 1; Polyp at 60cm x 1; Polyp at 50cm x 1- hot snare;    COLONOSCOPY N/A 4/15/2024    Procedure: COLONOSCOPY into sigmoid colon with hot snare polypectomy;  Surgeon: Leatha Johns MD;  Location[facility] END[facility]COPY;  Service: General;  Laterality: N/A;  pre- history of polyps  post- diverticulosis, polyp    HAND SURGERY  2000    HYSTERECTOMY      INCISION AND DRAINAGE LEG Left 11/17/2018    Procedure: Incision and Drainage of Left ankle;  Surgeon: Maxwell Lanier MD;  Location: [facility] MAIN OR;  Service: Orthopedics    NECK SURGERY  2000    SIGMOIDOSCOPY N/A 3/28/2024    Procedure: SIGMOIDOSCOPY FLEXIBLE;  Surgeon: Leatha Johns MD;  Location: [facility] ENDOSCOPY;  Service: General;  Laterality: N/A;  pre: h/o colon polyps  post: poor prep, diverticulosis    SKIN BIOPSY      SKIN GRAFT SPLIT THICKNESS N/A 02/12/2019    Procedure: debridement SKIN GRAFT SPLIT THICKNESS left ankle wound;  Surgeon: Barry Worthy MD;  [facility];  Service: Plastics    TOTAL SHOULDER ARTHROPLASTY Left 7/20/2023    Procedure: Total  shoulder arthroplasty;  Surgeon: Maxwell Lanier  MD;  Location: [facility] OR [facility];  Service: Orthopedics;  Laterality: Left;    TOTAL THYMECTOMY      TRIGGER POINT INJECTION  2000    WRIST FRACTURE SURGERY      CARPAL TUNNEL       SLP Recommendation and Plan                                                                               Plan of Care Reviewed With: patient, spouse, sibling  Outcome Evaluation: VFSS f/u completed with pt, , and sister. Discussed results, diet, strategies, and reviewed in detail the images from the VFSS on 8/13. Pt and family verbalized understanding, questions answered. Pt is having an EGD tomorrow. ST to follow.      SWALLOW EVALUATION (Last 72 Hours)       SLP Adult Swallow Evaluation       Row Name 08/14/24 1305 08/13/24 1100                Rehab Evaluation    Document Type therapy note (daily note)  -AW evaluation  -AW       Subjective Information no complaints  -AW complains of;pain  -AW       Patient Observations alert;cooperative;agree to therapy  -AW alert;cooperative;agree to therapy  -AW       Patient/Family/Caregiver Comments/Observations Pt's  and sister present.  -AW --       Patient Effort good  -AW good  -AW       Symptoms Noted During/After Treatment none  -AW none  -AW          General Information    Patient Profile Reviewed -- yes  -AW       Pertinent History Of Current Problem -- Pt admitted with GI bleed, COPD exacerbation, SANDRO, alcohol withdrawal; h/o ETOH abuse GI cleared pt for VFSS.  -AW       Current Method of Nutrition -- nectar/syrup-thick liquids;full liquids  -AW       Precautions/Limitations, Vision -- WFL;for purposes of eval  -AW       Precautions/Limitations, Hearing -- WFL;for purposes of eval  -AW       Prior Level of Function-Communication -- WFL  -AW       Prior Level of Function-Swallowing -- no diet consistency restrictions;other (see comments)  Pt reported her throat closed up x2 requiring ice to open it up, occurring ~3 weeks prior to hospitalization  -AW       Plans/Goals Discussed  with -- patient;agreed upon  -AW       Barriers to Rehab -- medically complex  -AW       Patient's Goals for Discharge -- return to regular diet  -AW          Pain    Additional Documentation -- Pain Scale: Numbers Pre/Post-Treatment (Group)  -AW          Pain Scale: Numbers Pre/Post-Treatment    Pretreatment Pain Rating -- 8/10  -AW       Posttreatment Pain Rating -- 8/10  -AW       Pain Location - -- back  -AW       Pain Intervention(s) -- Repositioned  -AW          Oral Motor Structure and Function    Dentition Assessment -- natural, present and adequate  -AW       Secretion Management -- WNL/WFL  -AW       Mucosal Quality -- moist, healthy  -AW          Oral Musculature and Cranial Nerve Assessment    Oral Motor General Assessment -- WFL  -AW          General Eating/Swallowing Observations    Eating/Swallowing Skills -- self-fed;fed by SLP  -AW       Positioning During Eating -- upright in bed  -AW          MBS/VFSS    Utensils Used -- spoon;cup;straw  -AW       Consistencies Trialed -- regular textures;soft to chew textures;mixed consistency;pureed;thin liquids;nectar/syrup-thick liquids  -AW          MBS/VFSS Interpretation    VFSS Summary -- VFSS completed with Eugenia LESTER. Pt exhibited mild oropharyngeal dysphagia characterized by mistiming/delayed swallow initiation w/ delayed cricopharyngeal (CP) opening. Pt had slow oral transit and premature spillage of all consistencies past the valleculae or to the pyriforms. No penetration or aspiration occurred with nectar thick (cup). Pt had inconsistent trace shallow penetration with thins via cup/straw, pureed (1/3 trials), and soft/mixed (1/3 trials). No penetration or aspiration occurred with regular. Pt was impulsive with drinking during the study despite cues. Pt demonstrated a prominent CP with delayed opening. Of note, pt had a significant coughing spell prior to initation of the study. No coughing occurred during the study. Discussed strategies with pt  after the study.  -AW          SLP Communication to Radiology    Summary Statement -- VFSS completed with Eugenia LESTER. Pt exhibited mild oropharyngeal dysphagia characterized by mistiming/delayed swallow initiation w/ delayed cricopharyngeal (CP) opening. Pt had slow oral transit and premature spillage of all consistencies past the valleculae or to the pyriforms. No penetration or aspiration occurred with nectar thick (cup). Pt had inconsistent trace shallow penetration with thins via cup/straw, pureed (1/3 trials), and soft/mixed (1/3 trials). No penetration or aspiration occurred with regular. Pt was impulsive with drinking during the study despite cues. Pt demonstrated a prominent CP with delayed opening.  -AW          SLP Evaluation Clinical Impression    SLP Swallowing Diagnosis -- mild;oral dysphagia;pharyngeal dysphagia  -AW       Functional Impact -- risk of aspiration/pneumonia  -AW       Rehab Potential/Prognosis, Swallowing -- good, to achieve stated therapy goals  -AW       Swallow Criteria for Skilled Therapeutic Interventions Met -- demonstrates skilled criteria  -AW          Recommendations    Therapy Frequency (Swallow) -- PRN  -AW       Predicted Duration Therapy Intervention (Days) -- until discharge  -AW       SLP Diet Recommendation -- soft to chew textures;ground;no mixed consistencies;thin liquids;other (see comments)  when cleared by GI  -AW       Recommended Precautions and Strategies -- upright posture during/after eating;small bites of food and sips of liquid;no straw;volitional throat clear  -AW       Oral Care Recommendations -- Oral Care BID/PRN  -AW       SLP Rec. for Method of Medication Administration -- meds whole;meds crushed;with puree  -AW       Monitor for Signs of Aspiration -- yes;notify SLP if any concerns  -AW       Anticipated Discharge Disposition (SLP) -- unknown  -AW          (LTG) Swallow    Progress/Outcomes (Swallow Long Term Goal) good progress toward goal  -AW  --       Comment (Swallow Long Term Goal) VFSS f/u completed with pt, , and sister. Discussed results, diet, strategies, and reviewed in detail the images from the VFSS on 8/13. Pt and family verbalized understanding, questions answered. Pt is having an EGD tomorrow. ST to follow.  -AW --                 User Key  (r) = Recorded By, (t) = Taken By, (c) = Cosigned By      Initials Name Effective Dates    Lorraine Apple SLP 08/28/23 -                     EDUCATION  The patient has been educated in the following areas:   Dysphagia (Swallowing Impairment) Oral Care/Hydration Modified Diet Instruction.        SLP GOALS       Row Name 08/14/24 1305             (LTG) Swallow    Progress/Outcomes (Swallow Long Term Goal) good progress toward goal  -AW      Comment (Swallow Long Term Goal) VFSS f/u completed with pt, , and sister. Discussed results, diet, strategies, and reviewed in detail the images from the VFSS on 8/13. Pt and family verbalized understanding, questions answered. Pt is having an EGD tomorrow. ST to follow.  -AW                User Key  (r) = Recorded By, (t) = Taken By, (c) = Cosigned By      Initials Name Provider Type    Lorraine Apple SLP Speech and Language Pathologist                         Time Calculation:    Time Calculation- SLP       Row Name 08/14/24 1511             Time Calculation- SLP    SLP Start Time 1300  -RAMON      SLP Received On 08/14/24  -AW                User Key  (r) = Recorded By, (t) = Taken By, (c) = Cosigned By      Initials Name Provider Type    Lorraine Apple SLP Speech and Language Pathologist                    Therapy Charges for Today       Code Description Service Date Service Provider Modifiers Qty    70660853467 HC ST MOTION FLUORO EVAL SWALLOW 6 8/13/2024 Lorraine Beasley SLP GN 1    89144082088 HC ST TREATMENT SWALLOW 4 8/14/2024 Lorraine Beasley SLP GN 1                 RUPESH Mcgovern  8/14/2024

## 2024-08-14 NOTE — PROGRESS NOTES
[facility] Progress Note      Patient Name: Filomena Liu  : 1960  MRN: [id number]  Primary Care Physician:  Fernando Dunn MD  Date of admission: 2024    Subjective     Interval History:   F/u SANDRO    More awake and appropriate today  Breathing is comfortable on RA; appetite improving  UOP yesterday not fully recorded; weight is unreliable  Denies N/V, though does complain of constipation  Plan for EGD tomorrow noted    Review of Systems:   Unable to obtain reliably    Objective     Vitals:   Temp:  [98.1 °F (36.7 °C)-98.8 °F (37.1 °C)] 98.8 °F (37.1 °C)  Heart Rate:  [75-92] 82  Resp:  [18] 18  BP: (101-118)/(67-71) 118/69  Flow (L/min):  [1] 1    Intake/Output Summary (Last 24 hours) at 2024 1455  Last data filed at 2024 1356  Gross per 24 hour   Intake 716 ml   Output --   Net 716 ml       Physical Exam:    General: Alert, Oriented x4, overweight, chr ill, NAD, jaundiced  Psychiatric: Appropriate affect  Neck: supple, no JVD  Lungs: Diminished breath sounds; not labored, on RA   Heart: RRR, normal S1 and S2  Abdomen: soft, not tender, distended, BS +, obese  Extremities: no edema, cyanosis or clubbing  Skin: Warm and dry, diffuse ecchymoses and non-blanching petechial rash on all extremities     Scheduled Meds:     atenolol, 25 mg, Oral, Daily  budesonide-formoterol, 2 puff, Inhalation, BID - RT  cefTRIAXone, 2,000 mg, Intravenous, Q24H  folic acid, 1 mg, Oral, Daily  insulin lispro, 2-9 Units, Subcutaneous, 4x Daily AC & at Bedtime  ipratropium-albuterol, 3 mL, Nebulization, 4x Daily - RT  nicotine, 1 patch, Transdermal, Q24H  pantoprazole, 40 mg, Intravenous, Q12H  potassium chloride ER, 40 mEq, Oral, Q4H  predniSONE, 40 mg, Oral, Daily With Breakfast  rOPINIRole, 1 mg, Oral, Nightly  sodium chloride, 10 mL, Intravenous, Q12H  spironolactone, 25 mg, Oral, Daily  thiamine, 100 mg, Oral, Daily      IV Meds:        Results Reviewed:   I have personally  reviewed the results from the time of this admission to 8/14/2024 14:55 EDT     Results from last 7 days   Lab Units 08/14/24  0649 08/13/24  0810 08/12/24  1932 08/12/24  0806   SODIUM mmol/L 132* 132*  --  134*   POTASSIUM mmol/L 3.4* 3.7 3.8 3.6   CHLORIDE mmol/L 91* 90*  --  88*   CO2 mmol/L 26.6 29.0  --  33.9*   BUN mg/dL 41* 40*  --  40*   CREATININE mg/dL 1.43* 1.40*  --  1.41*   CALCIUM mg/dL 9.8 9.9  --  9.6   BILIRUBIN mg/dL 1.5* 1.6*  --  1.7*   ALK PHOS U/L 159* 131*  --  114   ALT (SGPT) U/L 45* 44*  --  42*   AST (SGOT) U/L 58* 53*  --  58*   GLUCOSE mg/dL 101* 96  --  129*     Estimated Creatinine Clearance: 36.5 mL/min (A) (by C-G formula based on SCr of 1.43 mg/dL (H)).  Results from last 7 days   Lab Units 08/14/24  0649 08/13/24  0810 08/12/24  0806 08/11/24  0554 08/10/24  0520   MAGNESIUM mg/dL  --  3.0* 1.4* 1.8  --    PHOSPHORUS mg/dL 2.9  --   --  2.7 2.8         Results from last 7 days   Lab Units 08/14/24  0649 08/12/24  2350 08/12/24  1552 08/12/24  0806 08/11/24  2347   WBC 10*3/mm3 18.77* 13.24* 14.42* 14.81* 11.04*   HEMOGLOBIN g/dL 10.5* 9.4* 9.4* 9.3* 9.3*   PLATELETS 10*3/mm3 290 170 168 146 122*     Results from last 7 days   Lab Units 08/14/24  0649   INR  1.29*         Assessment / Plan     ASSESSMENT:  SANDRO, with UOP not recorded, stable and at plateau (peak 3.5; was 0.7 in July 2023).  Multifactorial prerenal azotemia in assoc with GIB, severe anemia, diuretic use and impaired compensation via ACE/NSAIDs (though states motrin use scant).  Potassium low; improved serum bicarbonate s/p Diamox for 2 doses 8/11 and 8/12.  UA bland and Eunice low < 20.  US: no hydronephrosis.    Vol overload, improving; CXR 8/10 with decreasing pulmonary congestion.    Hypotension - resolved, stopped midodrine, tolerating atenolol  Anemia.  TSAT ok 21%.     ETOH abuse  Acute encephalopathy and alcohol withdrawal  Acute hypoxic resp failure   Rash - vasculitic appearance but chronic in nature.  DOV  positive with titer 1:160 and complements low, but urine completely bland.  ANCA panel negative  Transaminitis     PLAN:  Continue to hold Bumex given EGD tomorrow  Replace potassium  Surveillance labs      Chan Brewster MD   08/14/24  14:55 EDT    [facility]  [phone]

## 2024-08-14 NOTE — PROGRESS NOTES
[facility]  Inpatient Progress Note    Reason for Follow Up: Melena    Subjective     Interval History:     No acute events overnight.  She has been passing brown stool.  Reports some mild esophageal dysphagia today.  She feels like PPI therapy is helping.      Video swallow study yesterday with speech therapy:    Pt had a delayed swallow. Trace, shallow inconsistent penetration was noted with thin, pureed, soft/mixed. No aspiration observed. No coughing occurred during the study. Pt did have a significant coughing episode prior to the study. Recommend (when cleared for solids) soft, ground, no mixed w/ thins; meds whole/crushed in pureed; upright for meals and 30 min after; slow rate; small bites/sips; NO STRAWS; periodic throat clear/swallow during meals and after.       Current Facility-Administered Medications:     acetaminophen (TYLENOL) tablet 650 mg, 650 mg, Oral, Q4H PRN, 650 mg at 08/10/24 2158 **OR** acetaminophen (TYLENOL) 160 MG/5ML oral solution 650 mg, 650 mg, Oral, Q4H PRN **OR** acetaminophen (TYLENOL) suppository 650 mg, 650 mg, Rectal, Q4H PRN, Mustapha Subramanian MD    atenolol (TENORMIN) tablet 25 mg, 25 mg, Oral, Daily, Maxwell Torres MD, 25 mg at 08/13/24 0858    sennosides-docusate (PERICOLACE) 8.6-50 MG per tablet 2 tablet, 2 tablet, Oral, BID PRN, 2 tablet at 08/12/24 0828 **AND** polyethylene glycol (MIRALAX) packet 17 g, 17 g, Oral, Daily PRN **AND** bisacodyl (DULCOLAX) EC tablet 5 mg, 5 mg, Oral, Daily PRN **AND** bisacodyl (DULCOLAX) suppository 10 mg, 10 mg, Rectal, Daily PRN, Mustapha Subramanian MD    budesonide-formoterol (SYMBICORT) 160-4.5 MCG/ACT inhaler 2 puff, 2 puff, Inhalation, BID - RT, 2 puff at 08/14/24 0813 **AND** [DISCONTINUED] tiotropium (SPIRIVA RESPIMAT) 2.5 mcg/act aerosol solution inhaler, 2 puff, Inhalation, Daily - RT, Mustapha Subramanian MD, 2 puff at 08/11/24 0800    cefTRIAXone (ROCEPHIN) 2,000 mg in sodium chloride 0.9 % 100 mL MBP, 2,000 mg,  Intravenous, Q24H, Marshal Lara MD, Last Rate: 200 mL/hr at 08/13/24 1508, 2,000 mg at 08/13/24 1508    dextrose (D50W) (25 g/50 mL) IV injection 25 g, 25 g, Intravenous, Q15 Min PRN, Alejandra Mendoza APRN    dextrose (GLUTOSE) oral gel 15 g, 15 g, Oral, Q15 Min PRN, Alejandra Mendoza APRN    folic acid (FOLVITE) tablet 1 mg, 1 mg, Oral, Daily, Shukri Romero MD, 1 mg at 08/13/24 0858    glucagon (GLUCAGEN) injection 1 mg, 1 mg, Intramuscular, Q15 Min PRN, Alejandra Mendoza APRN    HYDROcodone-acetaminophen (NORCO) 7.5-325 MG per tablet 1 tablet, 1 tablet, Oral, Q8H PRN, Mustapha Subramanian MD, 1 tablet at 08/14/24 0416    insulin lispro (HUMALOG/ADMELOG) injection 2-9 Units, 2-9 Units, Subcutaneous, 4x Daily AC & at Bedtime, Maxwell Torres MD, 2 Units at 08/13/24 1712    ipratropium-albuterol (DUO-NEB) nebulizer solution 3 mL, 3 mL, Nebulization, Q4H PRN, Mustapha Subramanian MD, 3 mL at 08/11/24 0441    ipratropium-albuterol (DUO-NEB) nebulizer solution 3 mL, 3 mL, Nebulization, Q4H PRN, Hillary Gallo APRN, 3 mL at 08/12/24 0440    ipratropium-albuterol (DUO-NEB) nebulizer solution 3 mL, 3 mL, Nebulization, 4x Daily - RT, Jacques Asencio MD, 3 mL at 08/14/24 0813    LORazepam (ATIVAN) tablet 1 mg, 1 mg, Oral, BID, Marshal Lara MD, 1 mg at 08/13/24 1940    Magnesium Standard Dose Replacement - Follow Nurse / BPA Driven Protocol, , Does not apply, PRN, Shukri Romero MD    nicotine (NICODERM CQ) 21 MG/24HR patch 1 patch, 1 patch, Transdermal, Q24H, Mustapha Subramanian MD, 1 patch at 08/13/24 1873    ondansetron ODT (ZOFRAN-ODT) disintegrating tablet 4 mg, 4 mg, Oral, Q6H PRN **OR** ondansetron (ZOFRAN) injection 4 mg, 4 mg, Intravenous, Q6H PRN, Mustapha Subramanian MD    pantoprazole (PROTONIX) injection 40 mg, 40 mg, Intravenous, Q12H, Maxwell Torres MD, 40 mg at 08/13/24 1940    Potassium Replacement - Follow Nurse / BPA Driven Protocol, , Does not apply, PRN, Chan Brewster MD    predniSONE (DELTASONE)  tablet 40 mg, 40 mg, Oral, Daily With Breakfast, Jacques Asencio MD    rOPINIRole (REQUIP) tablet 1 mg, 1 mg, Oral, Nightly, Mustapha Subramanian MD, 1 mg at 08/13/24 1940    [COMPLETED] Insert Peripheral IV, , , Once **AND** sodium chloride 0.9 % flush 10 mL, 10 mL, Intravenous, PRN, Shukri Romero MD    sodium chloride 0.9 % flush 10 mL, 10 mL, Intravenous, Q12H, Mustapha Subramanian MD, 10 mL at 08/13/24 1941    sodium chloride 0.9 % flush 10 mL, 10 mL, Intravenous, PRN, Mustapha Subramanian MD, 10 mL at 08/06/24 0839    sodium chloride 0.9 % infusion 40 mL, 40 mL, Intravenous, PRN, Mustapha Subramanian MD    spironolactone (ALDACTONE) tablet 25 mg, 25 mg, Oral, Daily, Fernando Baker MD, 25 mg at 08/13/24 0859    [COMPLETED] thiamine (B-1) injection 200 mg, 200 mg, Intravenous, Q8H, 200 mg at 08/10/24 1020 **FOLLOWED BY** thiamine (VITAMIN B-1) tablet 100 mg, 100 mg, Oral, Daily, Shukri Romero MD, 100 mg at 08/13/24 0858  Review of Systems:    All systems were reviewed and negative except for:  Gastrointestinal: positive for  difficulty / pain with swallowing    Objective     Vital Signs  Temp:  [98.1 °F (36.7 °C)-98.8 °F (37.1 °C)] 98.6 °F (37 °C)  Heart Rate:  [75-92] 92  Resp:  [18] 18  BP: (101-120)/(66-71) 104/67  Body mass index is 33.18 kg/m².  No intake or output data in the 24 hours ending 08/14/24 0823  No intake/output data recorded.     Physical Exam:   General: patient awake, alert and cooperative   Pulm: clear to auscultation bilaterally, regular and unlabored      Results Review:     I reviewed the patient's new clinical results.    Results from last 7 days   Lab Units 08/14/24  0649 08/12/24  2350 08/12/24  1552   WBC 10*3/mm3 18.77* 13.24* 14.42*   HEMOGLOBIN g/dL 10.5* 9.4* 9.4*   HEMATOCRIT % 33.9* 30.3* 30.5*   PLATELETS 10*3/mm3 290 170 168     Results from last 7 days   Lab Units 08/14/24  0649 08/13/24  0810 08/12/24  1932 08/12/24  0806   SODIUM mmol/L 132* 132*  --  134*   POTASSIUM mmol/L 3.4* 3.7  3.8 3.6   CHLORIDE mmol/L 91* 90*  --  88*   CO2 mmol/L 26.6 29.0  --  33.9*   BUN mg/dL 41* 40*  --  40*   CREATININE mg/dL 1.43* 1.40*  --  1.41*   CALCIUM mg/dL 9.8 9.9  --  9.6   BILIRUBIN mg/dL 1.5* 1.6*  --  1.7*   ALK PHOS U/L 159* 131*  --  114   ALT (SGPT) U/L 45* 44*  --  42*   AST (SGOT) U/L 58* 53*  --  58*   GLUCOSE mg/dL 101* 96  --  129*     Results from last 7 days   Lab Units 08/14/24  0649   INR  1.29*     Lab Results   Lab Value Date/Time    LIPASE 31 03/14/2023 1237    LIPASE 14 05/11/2022 0059    LIPASE 21 (L) 06/01/2020 0149    LIPASE 22 (L) 05/12/2020 1235    LIPASE 29 01/09/2020 1229    LIPASE 23 02/27/2018 1221       Radiology:  XR Chest 2 View   Final Result   No focal pulmonary consolidation or obvious edema.   Borderline heart size. Tortuous aorta. Follow-up as clinically   indicated.               This report was finalized on 8/13/2024 2:10 PM by Dr. Harpreet Mistry M.D on Workstation: [id number]          FL Video Swallow Single Contrast   Final Result      XR Chest 1 View   Final Result   Mildly decreased pulmonary vascular congestion.       This report was finalized on 8/10/2024 6:50 AM by Dr. Harpreet Mistry M.D on Workstation: [id number]          XR Abdomen KUB   Final Result      XR Chest 1 View   Final Result   Persistent vascular congestion.       This report was finalized on 8/7/2024 3:35 AM by Dr. Radha Laguna M.D on Workstation: [id number]          XR Chest 1 View   Final Result   Cardiomegaly with pulmonary vascular congestion and edema,   follow-up advised.       This report was finalized on 8/6/2024 6:40 PM by Dr. Harpreet Mistry M.D on Workstation: [id number]          US Renal Bilateral   Final Result   No hydronephrosis or echogenic nephrolithiasis.           This report was finalized on 8/5/2024 8:25 PM by Dr. Harpreet Mistry M.D on Workstation: [id number]          US Liver   Final Result   1.  Hepatic steatosis. Otherwise, evaluation of the liver is    nondiagnostic likely due to combination of patient body habitus and   degree of steatosis. Therefore, underlying focal hepatic   malignancy/abnormality cannot be excluded. Further screening for HCC   should be performed with multiphase MRI with and without contrast.   2.  No definite ascites is visualized on provided images.       This report was finalized on 8/5/2024 6:51 PM by Dr. David Briceño M.D   on Workstation: [email or web address]          XR Chest 1 View   Final Result          Assessment & Plan     Active Hospital Problems    Diagnosis     **Acute GI bleeding     Hyperkalemia     Acute renal failure     Alcoholism     Acute exacerbation of chronic obstructive pulmonary disease (COPD)     Acute blood loss anemia        Assessment:  GI bleed. Resolved melena.   Anemia - stable  Alcohol related liver cirrhosis - DF improved currently 21.3  Elevated liver function tests  GERD  Dysphagia  COPD with acute respiratory distress    Plan:  Stable hemoglobin continue to monitor and transfuse as needed per primary team  Continue twice daily dosing IV PPI therapy  Schedule EGD tomorrow with Dr. Constantino for further evaluation of dysphagia and prior episodes of melena  N.p.o. after midnight  Continue steroids, Aldactone and ceftriaxone for SBP prophylaxis  CBC, CMP and PT/INR in the morning    I discussed the patients findings and my recommendations with patient and Dr. Constantino .    Fiona Flowers, APRN

## 2024-08-14 NOTE — PLAN OF CARE
Goal Outcome Evaluation:  Plan of Care Reviewed With: patient        Progress: improving  Outcome Evaluation: pt seen for OT, she is doing better, moving better with improved balance and overall activity tolerance required for ADLs. She did not require use of AD this date SBA throughout, no seated rest breaks with func mob. Demo transfer into  to commode with SPV. She reports her spouse works 3 days/week, she is not concerned about being home alone during that time physically, however concerns about being tempted to drink while at home. No further acute OT needs as pt is safe to d/c home with spouse and HH at d/c.      Anticipated Discharge Disposition (OT): home, home with assist

## 2024-08-15 ENCOUNTER — ANESTHESIA EVENT (OUTPATIENT)
Dept: GASTROENTEROLOGY | Facility: HOSPITAL | Age: 64
End: 2024-08-15
Payer: MEDICARE

## 2024-08-15 ENCOUNTER — ANESTHESIA (OUTPATIENT)
Dept: GASTROENTEROLOGY | Facility: HOSPITAL | Age: 64
End: 2024-08-15
Payer: MEDICARE

## 2024-08-15 LAB
ALBUMIN SERPL-MCNC: 3.2 G/DL (ref 3.5–5.2)
ALBUMIN/GLOB SERPL: 1.3 G/DL
ALP SERPL-CCNC: 130 U/L (ref 39–117)
ALT SERPL W P-5'-P-CCNC: 40 U/L (ref 1–33)
ANION GAP SERPL CALCULATED.3IONS-SCNC: 13.2 MMOL/L (ref 5–15)
AST SERPL-CCNC: 51 U/L (ref 1–32)
BILIRUB SERPL-MCNC: 1.1 MG/DL (ref 0–1.2)
BUN SERPL-MCNC: 35 MG/DL (ref 8–23)
BUN/CREAT SERPL: 32.1 (ref 7–25)
CALCIUM SPEC-SCNC: 8.8 MG/DL (ref 8.6–10.5)
CHLORIDE SERPL-SCNC: 97 MMOL/L (ref 98–107)
CO2 SERPL-SCNC: 23.8 MMOL/L (ref 22–29)
CREAT SERPL-MCNC: 1.09 MG/DL (ref 0.57–1)
DEPRECATED RDW RBC AUTO: 48.8 FL (ref 37–54)
EGFRCR SERPLBLD CKD-EPI 2021: 56.8 ML/MIN/1.73
ERYTHROCYTE [DISTWIDTH] IN BLOOD BY AUTOMATED COUNT: 16.9 % (ref 12.3–15.4)
GLOBULIN UR ELPH-MCNC: 2.5 GM/DL
GLUCOSE BLDC GLUCOMTR-MCNC: 170 MG/DL (ref 70–130)
GLUCOSE BLDC GLUCOMTR-MCNC: 196 MG/DL (ref 70–130)
GLUCOSE BLDC GLUCOMTR-MCNC: 196 MG/DL (ref 70–130)
GLUCOSE BLDC GLUCOMTR-MCNC: 74 MG/DL (ref 70–130)
GLUCOSE BLDC GLUCOMTR-MCNC: 83 MG/DL (ref 70–130)
GLUCOSE SERPL-MCNC: 76 MG/DL (ref 65–99)
HCT VFR BLD AUTO: 27.6 % (ref 34–46.6)
HGB BLD-MCNC: 8.6 G/DL (ref 12–15.9)
MAGNESIUM SERPL-MCNC: 1.8 MG/DL (ref 1.6–2.4)
MCH RBC QN AUTO: 25.7 PG (ref 26.6–33)
MCHC RBC AUTO-ENTMCNC: 31.2 G/DL (ref 31.5–35.7)
MCV RBC AUTO: 82.6 FL (ref 79–97)
PLATELET # BLD AUTO: 196 10*3/MM3 (ref 140–450)
PMV BLD AUTO: 10.6 FL (ref 6–12)
POTASSIUM SERPL-SCNC: 2.9 MMOL/L (ref 3.5–5.2)
POTASSIUM SERPL-SCNC: 5.2 MMOL/L (ref 3.5–5.2)
PROT SERPL-MCNC: 5.7 G/DL (ref 6–8.5)
RBC # BLD AUTO: 3.34 10*6/MM3 (ref 3.77–5.28)
SODIUM SERPL-SCNC: 134 MMOL/L (ref 136–145)
WBC NRBC COR # BLD AUTO: 12.74 10*3/MM3 (ref 3.4–10.8)

## 2024-08-15 PROCEDURE — 80053 COMPREHEN METABOLIC PANEL: CPT | Performed by: STUDENT IN AN ORGANIZED HEALTH CARE EDUCATION/TRAINING PROGRAM

## 2024-08-15 PROCEDURE — 25010000002 GLYCOPYRROLATE 0.2 MG/ML SOLUTION: Performed by: NURSE ANESTHETIST, CERTIFIED REGISTERED

## 2024-08-15 PROCEDURE — 97530 THERAPEUTIC ACTIVITIES: CPT

## 2024-08-15 PROCEDURE — 43235 EGD DIAGNOSTIC BRUSH WASH: CPT | Performed by: INTERNAL MEDICINE

## 2024-08-15 PROCEDURE — 25010000002 CEFTRIAXONE PER 250 MG: Performed by: STUDENT IN AN ORGANIZED HEALTH CARE EDUCATION/TRAINING PROGRAM

## 2024-08-15 PROCEDURE — 94799 UNLISTED PULMONARY SVC/PX: CPT

## 2024-08-15 PROCEDURE — 84132 ASSAY OF SERUM POTASSIUM: CPT | Performed by: INTERNAL MEDICINE

## 2024-08-15 PROCEDURE — 25810000003 SODIUM CHLORIDE 0.9 % SOLUTION: Performed by: INTERNAL MEDICINE

## 2024-08-15 PROCEDURE — 63710000001 INSULIN LISPRO (HUMAN) PER 5 UNITS: Performed by: INTERNAL MEDICINE

## 2024-08-15 PROCEDURE — 83735 ASSAY OF MAGNESIUM: CPT | Performed by: INTERNAL MEDICINE

## 2024-08-15 PROCEDURE — 85027 COMPLETE CBC AUTOMATED: CPT | Performed by: STUDENT IN AN ORGANIZED HEALTH CARE EDUCATION/TRAINING PROGRAM

## 2024-08-15 PROCEDURE — 82948 REAGENT STRIP/BLOOD GLUCOSE: CPT

## 2024-08-15 PROCEDURE — 63710000001 PREDNISONE PER 1 MG: Performed by: INTERNAL MEDICINE

## 2024-08-15 PROCEDURE — 94761 N-INVAS EAR/PLS OXIMETRY MLT: CPT

## 2024-08-15 PROCEDURE — 94664 DEMO&/EVAL PT USE INHALER: CPT

## 2024-08-15 PROCEDURE — 0DJ08ZZ INSPECTION OF UPPER INTESTINAL TRACT, VIA NATURAL OR ARTIFICIAL OPENING ENDOSCOPIC: ICD-10-PCS | Performed by: INTERNAL MEDICINE

## 2024-08-15 PROCEDURE — 25010000002 PROPOFOL 10 MG/ML EMULSION: Performed by: NURSE ANESTHETIST, CERTIFIED REGISTERED

## 2024-08-15 PROCEDURE — 94660 CPAP INITIATION&MGMT: CPT

## 2024-08-15 RX ORDER — PROPOFOL 10 MG/ML
VIAL (ML) INTRAVENOUS CONTINUOUS PRN
Status: DISCONTINUED | OUTPATIENT
Start: 2024-08-15 | End: 2024-08-15 | Stop reason: SURG

## 2024-08-15 RX ORDER — POTASSIUM CHLORIDE 750 MG/1
40 TABLET, FILM COATED, EXTENDED RELEASE ORAL EVERY 4 HOURS
Status: COMPLETED | OUTPATIENT
Start: 2024-08-15 | End: 2024-08-15

## 2024-08-15 RX ORDER — LACTULOSE 10 G/15ML
20 SOLUTION ORAL DAILY PRN
Status: DISCONTINUED | OUTPATIENT
Start: 2024-08-15 | End: 2024-08-16 | Stop reason: HOSPADM

## 2024-08-15 RX ORDER — SODIUM CHLORIDE 9 MG/ML
30 INJECTION, SOLUTION INTRAVENOUS CONTINUOUS PRN
Status: DISCONTINUED | OUTPATIENT
Start: 2024-08-15 | End: 2024-08-16 | Stop reason: HOSPADM

## 2024-08-15 RX ORDER — PANTOPRAZOLE SODIUM 40 MG/1
40 TABLET, DELAYED RELEASE ORAL
Status: DISCONTINUED | OUTPATIENT
Start: 2024-08-16 | End: 2024-08-16 | Stop reason: HOSPADM

## 2024-08-15 RX ORDER — LIDOCAINE HYDROCHLORIDE 20 MG/ML
INJECTION, SOLUTION INFILTRATION; PERINEURAL AS NEEDED
Status: DISCONTINUED | OUTPATIENT
Start: 2024-08-15 | End: 2024-08-15 | Stop reason: SURG

## 2024-08-15 RX ORDER — GLYCOPYRROLATE 0.2 MG/ML
INJECTION INTRAMUSCULAR; INTRAVENOUS AS NEEDED
Status: DISCONTINUED | OUTPATIENT
Start: 2024-08-15 | End: 2024-08-15 | Stop reason: SURG

## 2024-08-15 RX ORDER — ALUMINA, MAGNESIA, AND SIMETHICONE 2400; 2400; 240 MG/30ML; MG/30ML; MG/30ML
15 SUSPENSION ORAL ONCE
Status: COMPLETED | OUTPATIENT
Start: 2024-08-15 | End: 2024-08-15

## 2024-08-15 RX ADMIN — Medication 10 ML: at 08:12

## 2024-08-15 RX ADMIN — CEFTRIAXONE 2000 MG: 2 INJECTION, POWDER, FOR SOLUTION INTRAMUSCULAR; INTRAVENOUS at 12:45

## 2024-08-15 RX ADMIN — SODIUM CHLORIDE 30 ML/HR: 9 INJECTION, SOLUTION INTRAVENOUS at 09:30

## 2024-08-15 RX ADMIN — IPRATROPIUM BROMIDE AND ALBUTEROL SULFATE 3 ML: .5; 3 SOLUTION RESPIRATORY (INHALATION) at 08:29

## 2024-08-15 RX ADMIN — POTASSIUM CHLORIDE 40 MEQ: 750 TABLET, EXTENDED RELEASE ORAL at 06:17

## 2024-08-15 RX ADMIN — IPRATROPIUM BROMIDE AND ALBUTEROL SULFATE 3 ML: .5; 3 SOLUTION RESPIRATORY (INHALATION) at 15:03

## 2024-08-15 RX ADMIN — SPIRONOLACTONE 25 MG: 25 TABLET, FILM COATED ORAL at 12:12

## 2024-08-15 RX ADMIN — NICOTINE 1 PATCH: 21 PATCH, EXTENDED RELEASE TRANSDERMAL at 21:41

## 2024-08-15 RX ADMIN — POTASSIUM CHLORIDE 40 MEQ: 750 TABLET, EXTENDED RELEASE ORAL at 16:30

## 2024-08-15 RX ADMIN — ATENOLOL 25 MG: 25 TABLET ORAL at 08:12

## 2024-08-15 RX ADMIN — Medication 100 MG: at 12:14

## 2024-08-15 RX ADMIN — HYDROCODONE BITARTRATE AND ACETAMINOPHEN 1 TABLET: 7.5; 325 TABLET ORAL at 21:46

## 2024-08-15 RX ADMIN — BUDESONIDE AND FORMOTEROL FUMARATE DIHYDRATE 2 PUFF: 160; 4.5 AEROSOL RESPIRATORY (INHALATION) at 22:13

## 2024-08-15 RX ADMIN — PROPOFOL 80 MG: 10 INJECTION, EMULSION INTRAVENOUS at 11:19

## 2024-08-15 RX ADMIN — IPRATROPIUM BROMIDE AND ALBUTEROL SULFATE 3 ML: .5; 3 SOLUTION RESPIRATORY (INHALATION) at 22:13

## 2024-08-15 RX ADMIN — Medication 10 ML: at 21:40

## 2024-08-15 RX ADMIN — HYDROCODONE BITARTRATE AND ACETAMINOPHEN 1 TABLET: 7.5; 325 TABLET ORAL at 13:40

## 2024-08-15 RX ADMIN — INSULIN LISPRO 2 UNITS: 100 INJECTION, SOLUTION INTRAVENOUS; SUBCUTANEOUS at 21:40

## 2024-08-15 RX ADMIN — LIDOCAINE HYDROCHLORIDE 60 MG: 20 INJECTION, SOLUTION INFILTRATION; PERINEURAL at 11:18

## 2024-08-15 RX ADMIN — BUDESONIDE AND FORMOTEROL FUMARATE DIHYDRATE 2 PUFF: 160; 4.5 AEROSOL RESPIRATORY (INHALATION) at 08:29

## 2024-08-15 RX ADMIN — HYDROCODONE BITARTRATE AND ACETAMINOPHEN 1 TABLET: 7.5; 325 TABLET ORAL at 05:33

## 2024-08-15 RX ADMIN — PANTOPRAZOLE SODIUM 40 MG: 40 INJECTION, POWDER, FOR SOLUTION INTRAVENOUS at 12:14

## 2024-08-15 RX ADMIN — POTASSIUM CHLORIDE 40 MEQ: 750 TABLET, EXTENDED RELEASE ORAL at 12:14

## 2024-08-15 RX ADMIN — PROPOFOL 180 MCG/KG/MIN: 10 INJECTION, EMULSION INTRAVENOUS at 11:18

## 2024-08-15 RX ADMIN — IPRATROPIUM BROMIDE AND ALBUTEROL SULFATE 3 ML: .5; 3 SOLUTION RESPIRATORY (INHALATION) at 01:39

## 2024-08-15 RX ADMIN — PREDNISONE 40 MG: 20 TABLET ORAL at 12:14

## 2024-08-15 RX ADMIN — GLYCOPYRROLATE 0.1 MG: 0.2 INJECTION INTRAMUSCULAR; INTRAVENOUS at 11:18

## 2024-08-15 RX ADMIN — ROPINIROLE 1 MG: 1 TABLET, FILM COATED ORAL at 21:40

## 2024-08-15 RX ADMIN — FOLIC ACID 1 MG: 1 TABLET ORAL at 12:13

## 2024-08-15 RX ADMIN — ALUMINUM HYDROXIDE, MAGNESIUM HYDROXIDE, DIMETHICONE 15 ML: 400; 400; 40 SUSPENSION ORAL at 16:30

## 2024-08-15 RX ADMIN — INSULIN LISPRO 2 UNITS: 100 INJECTION, SOLUTION INTRAVENOUS; SUBCUTANEOUS at 17:37

## 2024-08-15 NOTE — SIGNIFICANT NOTE
"   08/15/24 1035   Peripheral IV 08/15/24 1035 Anterior;Proximal;Right Forearm   Placement date: If unknown, DO NOT use \"Add Comment\" note/Placement time: If unknown, DO NOT use \"Add Comment\" note: 08/15/24 1035   Hand Hygiene Completed: Yes  Size (Gauge): (c) 20 G  Orientation: Anterior;Proximal;Right  Location: Forearm  Site Pre...   Site Assessment Clean;Dry;Intact   Dressing Type Border Dressing;Transparent   Line Status Blood return noted;Infusing   Dressing Status Clean;Dry;Intact   Dressing Intervention New dressing   Reason Not Rotated Not due   Phlebitis 0-->no symptoms     Ultrasound Inserted IV Site: Prox RFA    Catheter Length: 1.88in    Diameter: 0.35 cm    Depth: 0.5 cm      Vascular Access Score= 5  1) Palpable / Visible / Dis  2) Palpable / Viasible / Not Distended  3) Easily Palpable / Not Visibile  4) Poorly Palpable / Visible  5) Poorly / Nonpalpable / NV   "

## 2024-08-15 NOTE — NURSING NOTE
Access have been following regarding ETOH: no acute changes overnight. Pt is scheduled for an EGD today. CIWAs of 0 overnight. Available to provide PRAVEEN resources should she desire. Following.

## 2024-08-15 NOTE — PLAN OF CARE
Goal Outcome Evaluation:           Progress: (P) improving  Outcome Evaluation: (P) Pt is A&Ox4. RA. NSR. EGD completed and negative. Advanced to soft to chew diet, no straw. Up ad claudio c walker. C/o pain treated per MAR. IV abx given. BG monitored and treated. Potassium replaced. VSS.

## 2024-08-15 NOTE — ANESTHESIA POSTPROCEDURE EVALUATION
Patient: Filomena Liu    Procedure Summary       Date: 08/15/24 Room / Location: [facility] ENDOSCOPY 8 / [facility]COPY    Anesthesia Start: 0936 Anesthesia Stop: 1133    Procedure: ESOPHAGOGASTRODUODENOSCOPY (Esophagus) Diagnosis:       Melena      Gastroesophageal reflux disease, unspecified whether esophagitis present      Dysphagia, unspecified type      Alcoholic cirrhosis of liver without ascites      (Melena [K92.1])      (Gastroesophageal reflux disease, unspecified whether esophagitis present [K21.9])      (Dysphagia, unspecified type [R13.10])      (Alcoholic cirrhosis of liver without ascites [K70.30])    Surgeons: Garth Constantino MD Provider: Tato Stubbs MD    Anesthesia Type: MAC ASA Status: 3            Anesthesia Type: MAC    Vitals  Vitals Value Taken Time   /75 08/15/24 1146   Temp     Pulse 76 08/15/24 1147   Resp 14 08/15/24 1120   SpO2 96 % 08/15/24 1147   Vitals shown include unfiled device data.        Post Anesthesia Care and Evaluation    Patient location during evaluation: PACU  Patient participation: complete - patient participated  Level of consciousness: awake and alert  Pain management: adequate    Airway patency: patent  Anesthetic complications: No anesthetic complications    Cardiovascular status: acceptable  Respiratory status: acceptable  Hydration status: acceptable    Comments: --------------------            08/15/24               1120     --------------------   BP:      92/63    Pulse:      73       Resp:       14       Temp:                SpO2:      94%      --------------------

## 2024-08-15 NOTE — PROGRESS NOTES
[facility]  [phone]  Dr. Jacques Asencio    Subjective:  LOS: 8    Chief Complaint: COPD exacerbation    Doing well respiratory status. Denies cough, soa or wheezing.    Objective   Vital Signs past 24hrs  Temp range: Temp (24hrs), Av.5 °F (36.9 °C), Min:98.1 °F (36.7 °C), Max:98.8 °F (37.1 °C)    BP range: BP: (104-127)/(67-72) 127/72  Pulse range: Heart Rate:  [78-92] 86  Resp rate range: Resp:  [18] 18  Device (Oxygen Therapy): room airFlow (L/min):  [1] 1  Oxygen range:SpO2:  [93 %-98 %] 98 %   Mechanical Ventilator:     Physical Exam  Constitutional:       Appearance: She is obese.   Eyes:      Pupils: Pupils are equal, round, and reactive to light.   Cardiovascular:      Rate and Rhythm: Normal rate and regular rhythm.      Heart sounds: No murmur heard.  Pulmonary:      Effort: Pulmonary effort is normal.      Breath sounds: Decreased breath sounds present. No wheezing.   Abdominal:      General: Bowel sounds are normal.      Palpations: Abdomen is soft. There is no mass.      Tenderness: There is no abdominal tenderness.   Musculoskeletal:         General: No swelling.   Neurological:      Mental Status: She is alert.       Results Review:    I have reviewed the laboratory and imaging data since the last note by [facility] physician.  My annotations are noted in assessment and plan.      Result Review:  I have personally reviewed the results from last note by [facility] physician to 2024 21:29 EDT and agree with these findings:  [x]  Laboratory list / accordion  [x]  Microbiology  [x]  Radiology  []  EKG/Telemetry   []  Cardiology/Vascular   []  Pathology  []  Old records  []  Other:    Medication Review:  I have reviewed the current MAR.  My annotations are noted in assessment and plan.    atenolol, 25 mg, Oral, Daily  budesonide-formoterol, 2 puff, Inhalation, BID - RT  cefTRIAXone, 2,000 mg, Intravenous, Q24H  folic acid, 1 mg, Oral, Daily  insulin lispro, 2-9 Units, Subcutaneous, 4x Daily  AC & at Bedtime  ipratropium-albuterol, 3 mL, Nebulization, 4x Daily - RT  nicotine, 1 patch, Transdermal, Q24H  pantoprazole, 40 mg, Intravenous, Q12H  predniSONE, 40 mg, Oral, Daily With Breakfast  rOPINIRole, 1 mg, Oral, Nightly  sodium chloride, 10 mL, Intravenous, Q12H  spironolactone, 25 mg, Oral, Daily  thiamine, 100 mg, Oral, Daily           Lines, Drains & Airways       Active LDAs       Name Placement date Placement time Site Days    Peripheral IV 08/09/24 1017 Left;Upper Arm 08/09/24  1017  Arm  3    External Urinary Catheter 08/06/24  1900  --  5                  Isolation status: No active isolations    Dietary Orders (From admission, onward)       Start     Ordered    08/15/24 0001  NPO Diet NPO Type: Strict NPO  Diet Effective Midnight        Question:  NPO Type  Answer:  Strict NPO    08/14/24 0958    08/13/24 1236  Diet: Regular/House; No Mixed Consistencies, No Straw; Texture: Mechanical Ground (NDD 2); Fluid Consistency: Thin (IDDSI 0)  Diet Effective Now        References:    Diet Order Crosswalk   Question Answer Comment   Diets: Regular/House    Diet Modifiers / Additional Diets: No Mixed Consistencies    Diet Modifiers / Additional Diets: No Straw    Texture: Mechanical Ground (NDD 2)    Fluid Consistency: Thin (IDDSI 0)        08/13/24 1237                    PCCM Problems  Acute exacerbation of COPD  Obesity  BENY, intolerant of CPAP  Current cigarette smoker  Relevant Medical Diagnoses  Suspected GI bleed  Anemia suspected acute blood loss  Cirrhosis  Alcohol abuse  Acute alcohol withdrawal  Moderate mitral regurgitation        Plan of Treatment    Now on oral prednisone, taper off.    Would benefit from resuming treatment of obstructive sleep apnea with CPAP as outpatient.    Patient be counseled to quit smoking prior to discharge.    No objections from pulmonary perspective for GI scopes.    Jacques Asencio MD  08/14/24  21:29 EDT      Part of this note may be an electronic  transcription/translation of spoken language to printed text using the Dragon Dictation System.

## 2024-08-15 NOTE — ANESTHESIA PREPROCEDURE EVALUATION
Anesthesia Evaluation     Patient summary reviewed and Nursing notes reviewed                Airway   Mallampati: II  Small opening  Dental      Pulmonary    (+) a smoker Current, COPD, asthma,sleep apnea  Cardiovascular     ECG reviewed  Rhythm: regular  Rate: normal    (+) hypertension, valvular problems/murmurs MR, hyperlipidemia      Neuro/Psych  (+) numbness, psychiatric history Anxiety  GI/Hepatic/Renal/Endo    (+) GERD, GI bleeding , liver disease, renal disease- CRI    Musculoskeletal     Abdominal    Substance History - negative use     OB/GYN negative ob/gyn ROS         Other   arthritis,                   Anesthesia Plan    ASA 3     MAC     intravenous induction     Anesthetic plan, risks, benefits, and alternatives have been provided, discussed and informed consent has been obtained with: patient.    CODE STATUS:    Level Of Support Discussed With: Patient  Code Status (Patient has no pulse and is not breathing): CPR (Attempt to Resuscitate)  Medical Interventions (Patient has pulse or is breathing): Full Support

## 2024-08-15 NOTE — PROGRESS NOTES
[facility] Progress Note      Patient Name: Filomena Liu  : 1960  MRN: [id number]  Primary Care Physician:  Fernando Dunn MD  Date of admission: 2024    Subjective     Interval History:   F/u SANDRO    Awake, alert, says she feels great.   No n/v/d. Currently having lunch/good appetite.   UOP not recorded last 24 hrs. No urinary symptoms.    No SOB or chest pain.  EGD earlier today with no abnormal findings.     Review of Systems:   Unable to obtain reliably    Objective     Vitals:   Temp:  [98.2 °F (36.8 °C)-98.8 °F (37.1 °C)] 98.2 °F (36.8 °C)  Heart Rate:  [73-96] 73  Resp:  [14-18] 18  BP: ()/(52-75) 122/66  Flow (L/min):  [1-2] 1    Intake/Output Summary (Last 24 hours) at 8/15/2024 1158  Last data filed at 8/15/2024 1124  Gross per 24 hour   Intake 680 ml   Output --   Net 680 ml       Physical Exam:    General: Alert, Oriented x4, overweight, chr ill, NAD, jaundiced  Psychiatric: Appropriate affect  Neck: supple, no JVD  Lungs: Diminished breath sounds; crackles at bases; not labored, on RA   Heart: RRR, normal S1 and S2  Abdomen: soft, not tender, distended, BS +, obese  Extremities: no edema, cyanosis or clubbing  Skin: Warm and dry, diffuse ecchymoses and non-blanching petechial rash on all extremities     Scheduled Meds:     atenolol, 25 mg, Oral, Daily  budesonide-formoterol, 2 puff, Inhalation, BID - RT  cefTRIAXone, 2,000 mg, Intravenous, Q24H  folic acid, 1 mg, Oral, Daily  insulin lispro, 2-9 Units, Subcutaneous, 4x Daily AC & at Bedtime  ipratropium-albuterol, 3 mL, Nebulization, 4x Daily - RT  nicotine, 1 patch, Transdermal, Q24H  pantoprazole, 40 mg, Intravenous, Q12H  potassium chloride ER, 40 mEq, Oral, Q4H  predniSONE, 40 mg, Oral, Daily With Breakfast  rOPINIRole, 1 mg, Oral, Nightly  sodium chloride, 10 mL, Intravenous, Q12H  spironolactone, 25 mg, Oral, Daily  thiamine, 100 mg, Oral, Daily      IV Meds:   sodium chloride, 30 mL/hr, Last  Rate: 0  (08/15/24 1000)        Results Reviewed:   I have personally reviewed the results from the time of this admission to 8/15/2024 11:58 EDT     Results from last 7 days   Lab Units 08/15/24  0443 08/14/24  0649 08/13/24  0810   SODIUM mmol/L 134* 132* 132*   POTASSIUM mmol/L 2.9* 3.4* 3.7   CHLORIDE mmol/L 97* 91* 90*   CO2 mmol/L 23.8 26.6 29.0   BUN mg/dL 35* 41* 40*   CREATININE mg/dL 1.09* 1.43* 1.40*   CALCIUM mg/dL 8.8 9.8 9.9   BILIRUBIN mg/dL 1.1 1.5* 1.6*   ALK PHOS U/L 130* 159* 131*   ALT (SGPT) U/L 40* 45* 44*   AST (SGOT) U/L 51* 58* 53*   GLUCOSE mg/dL 76 101* 96     Estimated Creatinine Clearance: 47.8 mL/min (A) (by C-G formula based on SCr of 1.09 mg/dL (H)).  Results from last 7 days   Lab Units 08/15/24  0443 08/14/24  0649 08/13/24  0810 08/12/24  0806 08/11/24  0554 08/11/24  0554 08/10/24  0520   MAGNESIUM mg/dL 1.8  --  3.0* 1.4*   < > 1.8  --    PHOSPHORUS mg/dL  --  2.9  --   --   --  2.7 2.8    < > = values in this interval not displayed.         Results from last 7 days   Lab Units 08/15/24  0443 08/14/24  0649 08/12/24  2350 08/12/24  1552 08/12/24  0806   WBC 10*3/mm3 12.74* 18.77* 13.24* 14.42* 14.81*   HEMOGLOBIN g/dL 8.6* 10.5* 9.4* 9.4* 9.3*   PLATELETS 10*3/mm3 196 290 170 168 146     Results from last 7 days   Lab Units 08/14/24  0649   INR  1.29*         Assessment / Plan     ASSESSMENT:  SANDRO, with UOP not recorded, improving.  Multifactorial prerenal azotemia in assoc with GIB, severe anemia, diuretic use and impaired compensation via ACE/NSAIDs (though states motrin use scant).  Potassium low; improved serum bicarbonate s/p Diamox for 2 doses 8/11 and 8/12.  UA bland and Eunice low < 20.  US: no hydronephrosis.    Vol overload, improving; CXR 8/10 with decreasing pulmonary congestion.    Hypotension - resolved, stopped midodrine, tolerating atenolol  Anemia.  TSAT ok 21%.     ETOH abuse: Access following.   Acute encephalopathy and alcohol withdrawal  Acute hypoxic resp  failure   Rash - vasculitic appearance but chronic in nature.  DOV positive with titer 1:160 and complements low, but urine completely bland.  ANCA panel negative  Transaminitis     PLAN:  Restart PO bumetanide 1 mg twice daily tomorrow provided potassium is normal   Aggressive potassium replacement  Surveillance labs      Chan Brewster MD   08/15/24  11:58 EDT    [facility]  [phone]

## 2024-08-15 NOTE — ANESTHESIA POSTPROCEDURE EVALUATION
Patient: Filomena Liu    Procedure Summary       Date: 08/15/24 Room / Location: [facility] ENDOSCOPY 8 / [facility] ENDOSCOPY    Anesthesia Start: 1115 Anesthesia Stop: 1133    Procedure: ESOPHAGOGASTRODUODENOSCOPY (Esophagus) Diagnosis:       Melena      Gastroesophageal reflux disease, unspecified whether esophagitis present      Dysphagia, unspecified type      Alcoholic cirrhosis of liver without ascites      (Melena [K92.1])      (Gastroesophageal reflux disease, unspecified whether esophagitis present [K21.9])      (Dysphagia, unspecified type [R13.10])      (Alcoholic cirrhosis of liver without ascites [K70.30])    Surgeons: Garth Constantino MD Provider: Tato Stubbs MD    Anesthesia Type: MAC ASA Status: 3            Anesthesia Type: MAC    Vitals  Vitals Value Taken Time   /66 08/15/24 1150   Temp     Pulse 73 08/15/24 1150   Resp 18 08/15/24 1140   SpO2 94 % 08/15/24 1150           Post Anesthesia Care and Evaluation    Patient location during evaluation: PACU  Patient participation: complete - patient participated  Level of consciousness: awake and alert  Pain management: adequate    Airway patency: patent  Anesthetic complications: No anesthetic complications    Cardiovascular status: acceptable  Respiratory status: acceptable  Hydration status: acceptable    Comments: --------------------            08/15/24               1150     --------------------   BP:       122/66     Pulse:      73       Resp:                Temp:                SpO2:      94%      --------------------

## 2024-08-15 NOTE — PROGRESS NOTES
Name: Filomena Liu ADMIT: 2024   : 1960  PCP: Fernando Dunn MD    MRN: [id number] LOS: 9 days   AGE/SEX: 64 y.o. female  ROOM: [facility]     Subjective   Subjective   Feels better in general.  A little bloated as she has not had a bowel movement in 5 days.  Does not feel she can eat very much because of this.       Objective   Objective   Vital Signs  Temp:  [98.2 °F (36.8 °C)-99.3 °F (37.4 °C)] 99.3 °F (37.4 °C)  Heart Rate:  [73-96] 73  Resp:  [14-18] 18  BP: ()/(52-75) 119/71  SpO2:  [94 %-98 %] 94 %  on  Flow (L/min):  [1-2] 1;   Device (Oxygen Therapy): room air  Body mass index is 33.18 kg/m².  Physical Exam  Constitutional:       General: She is not in acute distress.     Appearance: She is ill-appearing.   Cardiovascular:      Rate and Rhythm: Normal rate and regular rhythm.   Pulmonary:      Effort: Pulmonary effort is normal. No respiratory distress.      Breath sounds: Wheezing present.   Abdominal:      General: Abdomen is flat. There is no distension.      Tenderness: There is no abdominal tenderness.   Musculoskeletal:         General: No swelling or deformity. Normal range of motion.   Skin:     General: Skin is warm and dry.   Neurological:      General: No focal deficit present.      Mental Status: She is alert. She is disoriented.         Results Review     I reviewed the patient's new clinical results.  Results from last 7 days   Lab Units 08/15/24  0443 24  0649 24  2350 24  1552   WBC 10*3/mm3 12.74* 18.77* 13.24* 14.42*   HEMOGLOBIN g/dL 8.6* 10.5* 9.4* 9.4*   PLATELETS 10*3/mm3 196 290 170 168     Results from last 7 days   Lab Units 08/15/24  0443 24  0649 24  0810 24  1932 24  0806   SODIUM mmol/L 134* 132* 132*  --  134*   POTASSIUM mmol/L 2.9* 3.4* 3.7 3.8 3.6   CHLORIDE mmol/L 97* 91* 90*  --  88*   CO2 mmol/L 23.8 26.6 29.0  --  33.9*   BUN mg/dL 35* 41* 40*  --  40*   CREATININE mg/dL 1.09* 1.43* 1.40*  --  1.41*    GLUCOSE mg/dL 76 101* 96  --  129*   Estimated Creatinine Clearance: 47.8 mL/min (A) (by C-G formula based on SCr of 1.09 mg/dL (H)).  Results from last 7 days   Lab Units 08/15/24  0443 08/14/24  0649 08/13/24  0810 08/12/24  0806   ALBUMIN g/dL 3.2* 3.8 3.8 3.7   BILIRUBIN mg/dL 1.1 1.5* 1.6* 1.7*   ALK PHOS U/L 130* 159* 131* 114   AST (SGOT) U/L 51* 58* 53* 58*   ALT (SGPT) U/L 40* 45* 44* 42*     Results from last 7 days   Lab Units 08/15/24  0443 08/14/24  0649 08/13/24  0810 08/12/24  0806 08/11/24  0554 08/10/24  0520 08/09/24  0511   CALCIUM mg/dL 8.8 9.8 9.9 9.6 9.8 9.9 9.6   ALBUMIN g/dL 3.2* 3.8 3.8 3.7 3.6 3.9 3.8   MAGNESIUM mg/dL 1.8  --  3.0* 1.4* 1.8  --   --    PHOSPHORUS mg/dL  --  2.9  --   --  2.7 2.8 3.1         Glucose   Date/Time Value Ref Range Status   08/15/2024 1210 74 70 - 130 mg/dL Final   08/15/2024 0601 83 70 - 130 mg/dL Final   08/14/2024 2021 303 (H) 70 - 130 mg/dL Final   08/14/2024 1637 175 (H) 70 - 130 mg/dL Final   08/14/2024 1105 231 (H) 70 - 130 mg/dL Final   08/14/2024 0619 135 (H) 70 - 130 mg/dL Final   08/13/2024 2025 129 70 - 130 mg/dL Final       FL Video Swallow Single Contrast  VIDEO SWALLOWING EXAMINATION BY SPEECH PATHOLOGY     Clinical: Dysphasia     Reference Air Kerma: 18.23 mGy     Video swallowing examination performed under the direction of speech  pathology. Please refer to speech pathology report for full information     Speech pathology summary: Eugenia Munroe, APRN present.     Penetration was seen.     By electronically signing this report, I, the supervising radiologist,  attest that I was not present for the procedure(s) but agree with this  final edited report.        This report was finalized on 8/13/2024 4:49 PM by Dr. David Briceño M.D  on Workstation: [id number]     XR Chest 2 View  Narrative: XR CHEST 2 VW-     HISTORY: Female who is 64 years-old, pulmonary vascular congestion,  follow-up     TECHNIQUE: Frontal lateral views of the chest      COMPARISON: 8/10/2024     FINDINGS: The heart size is borderline. Aorta is tortuous, calcified.  Sternotomy wires are present. Pulmonary vasculature is unremarkable.  Minimal likely atelectasis or scarring at the left lateral costophrenic  angle. No focal pulmonary consolidation, pleural effusion, or  pneumothorax. No acute osseous process.     Impression: No focal pulmonary consolidation or obvious edema.  Borderline heart size. Tortuous aorta. Follow-up as clinically  indicated.           This report was finalized on 8/13/2024 2:10 PM by Dr. Harpreet Mistry M.D on Workstation: [id number]       I reviewed the patient's daily medications.  Scheduled Medications  aluminum-magnesium hydroxide-simethicone, 15 mL, Oral, Once  atenolol, 25 mg, Oral, Daily  budesonide-formoterol, 2 puff, Inhalation, BID - RT  cefTRIAXone, 2,000 mg, Intravenous, Q24H  folic acid, 1 mg, Oral, Daily  insulin lispro, 2-9 Units, Subcutaneous, 4x Daily AC & at Bedtime  ipratropium-albuterol, 3 mL, Nebulization, 4x Daily - RT  nicotine, 1 patch, Transdermal, Q24H  [START ON 8/16/2024] pantoprazole, 40 mg, Oral, Q AM  potassium chloride ER, 40 mEq, Oral, Q4H  predniSONE, 40 mg, Oral, Daily With Breakfast  rOPINIRole, 1 mg, Oral, Nightly  sodium chloride, 10 mL, Intravenous, Q12H  spironolactone, 25 mg, Oral, Daily  thiamine, 100 mg, Oral, Daily    Infusions  sodium chloride, 30 mL/hr, Last Rate: 0  (08/15/24 1000)    Diet  Diet: Regular/House; No Straw, No Mixed Consistencies; Texture: Soft to Chew (NDD 3); Soft to Chew: Whole Meat; Fluid Consistency: Thin (IDDSI 0)         I have personally reviewed:  [x]  Laboratory   [x]  Microbiology   [x]  Radiology   []  EKG/Telemetry   []  Cardiology/Vascular   []  Pathology   []  Records   CIWA (last 3 days)        Date/Time CIWA-Ar Score    08/15/24 1406 0     08/14/24 2001 0     08/14/24 1421 0     08/14/24 0851 0                       Assessment/Plan     Active Hospital Problems    Diagnosis  POA     **Acute GI bleeding [K92.2]  Yes    Hyperkalemia [E87.5]  Unknown    Acute renal failure [N17.9]  Unknown    Alcoholism [F10.20]  Unknown    Acute exacerbation of chronic obstructive pulmonary disease (COPD) [J44.1]  Unknown    Acute blood loss anemia [D62]  Unknown    Melena [K92.1]  Unknown    Gastroesophageal reflux disease [K21.9]  Unknown    Dysphagia [R13.10]  Unknown    Alcoholic cirrhosis of liver without ascites [K70.30]  Unknown      Resolved Hospital Problems   No resolved problems to display.       64 y.o. female admitted with Acute GI bleeding.    EGD done earlier today was negative.  Will discontinue IV Protonix but will keep her on oral PPI as she is receiving steroids for COPD exacerbation.  Pulmonology on board.  Respiratory status seems improved/stable.  Creatinine seems to be improving as well.  She has severe hypokalemia this morning with potassium 2.9.  This is being replaced per protocol.  Will give her a dose of lactulose to see if can help with her constipation.  Reassess potassium and renal function in AM.  See how she tolerates a diet.  Possible discharge tomorrow if all agree.       Melena  Anemia  Cirrhosis  -Hemoglobin down from yesterday  -EGD negative  -Change Protonix to oral  -SBP prophylaxis with ceftriaxone  -GI following    Dysphagia  -Patient on modified diet.       SANDRO, improved  Hypokalemia  -Renal following-holding Bumex for now     Alcohol use disorder  -Continue thiamine, folic acid  -She has been in the hospital now 10 days.  Will cancel CIWA protocol orders    Acute COPD exacerbation COPD, not on home oxygen  Hypoxia  -Continue Symbicort, scheduled DuoNeb.  Pulmonology following.    Chronic back pain-continue home hydrocodone.      SCDs for DVT prophylaxis.  Full code.  Discussed with patient and care team on multidisciplinary rounds.  Anticipate discharge home with HH vs SNU facility in 1-2 days.    Expected Discharge Date: 8/16/2024; Expected Discharge Time: 12:00  PM      Miah Bonilla MD  [facility]  08/15/24  15:40 EDT

## 2024-08-15 NOTE — DISCHARGE PLACEMENT REQUEST
"Filomena Liu P \"Trang\" (64 y.o. Female)       Date of Birth   1960    Social Security Number       Address   [address] [facility] [address]    Home Phone   [phone]    MRN   [id number]       Taoism   Religious    Marital Status                               Admission Date   8/5/24    Admission Type   Emergency    Admitting Provider   Marshal Lara MD    Attending Provider   Miah Bonilla MD    Department, Room/Bed   [address], S614/1       Discharge Date       Discharge Disposition       Discharge Destination                                 Attending Provider: Miah Bonilla MD    Allergies: No Known Allergies    Isolation: None   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)   Code Status: CPR    Ht: 152.4 cm (60\")   Wt: 77.1 kg (169 lb 14.4 oz)    Admission Cmt: None   Principal Problem: Acute GI bleeding [K92.2]                   Active Insurance as of 8/5/2024       Primary Coverage       Payor Plan Insurance Group Employer/Plan Group    ANTH MEDICARE REPLACEMENT ANTH MEDICARE ADVANTAGE [id number]       Payor Plan Address Payor Plan Phone Number Payor Plan Fax Number Effective Dates    [address] [phone]  1/1/2024 - None Entered    [facility] [address]         Subscriber Name Subscriber Birth Date Member ID       FILOMENA LIU 1960 [id number]                     Emergency Contacts        (Rel.) Home Phone Work Phone Mobile Phone    Shaheen Abrams (Brother) -- -- [phone]    FAMARTINEZ ELIZABETH (Significant Other) -- -- [phone]    JagMarguerite (Daughter) -- -- [phone]                "

## 2024-08-15 NOTE — PLAN OF CARE
Goal Outcome Evaluation:  Plan of Care Reviewed With: patient        Progress: improving  Outcome Evaluation: Patient alert and oriented X4 on 2L nc at HS. Up ad claudio. bathed in sink. NSR. NPO after midnight. consent signed. pain medication provided. VSS

## 2024-08-15 NOTE — PROGRESS NOTES
[facility]  [phone]  Dr. Jacques Asencio    Subjective:  LOS: 9    Chief Complaint: COPD exacerbation    Denies any respiratory problems.  Had endoscopy today.  On room air.    Objective   Vital Signs past 24hrs  Temp range: Temp (24hrs), Av.6 °F (37 °C), Min:98.2 °F (36.8 °C), Max:99.3 °F (37.4 °C)    BP range: BP: ()/(52-75) 119/71  Pulse range: Heart Rate:  [73-96] 73  Resp rate range: Resp:  [14-18] 18  Device (Oxygen Therapy): room airFlow (L/min):  [1-2] 1  Oxygen range:SpO2:  [94 %-98 %] 94 %   Mechanical Ventilator:     Physical Exam  Constitutional:       Appearance: She is obese.   Eyes:      Pupils: Pupils are equal, round, and reactive to light.   Cardiovascular:      Rate and Rhythm: Normal rate and regular rhythm.      Heart sounds: No murmur heard.  Pulmonary:      Effort: Pulmonary effort is normal.      Breath sounds: Decreased breath sounds present. No wheezing.   Abdominal:      General: Bowel sounds are normal.      Palpations: Abdomen is soft. There is no mass.      Tenderness: There is no abdominal tenderness.   Musculoskeletal:         General: No swelling.   Neurological:      Mental Status: She is alert.       Results Review:    I have reviewed the laboratory and imaging data since the last note by [facility] physician.  My annotations are noted in assessment and plan.      Result Review:  I have personally reviewed the results from last note by [facility] physician to 8/15/2024 17:46 EDT and agree with these findings:  [x]  Laboratory list / accordion  [x]  Microbiology  [x]  Radiology  []  EKG/Telemetry   []  Cardiology/Vascular   []  Pathology  []  Old records  []  Other:    Medication Review:  I have reviewed the current MAR.  My annotations are noted in assessment and plan.    atenolol, 25 mg, Oral, Daily  budesonide-formoterol, 2 puff, Inhalation, BID - RT  cefTRIAXone, 2,000 mg, Intravenous, Q24H  insulin lispro, 2-9 Units, Subcutaneous, 4x Daily AC & at  Bedtime  ipratropium-albuterol, 3 mL, Nebulization, 4x Daily - RT  nicotine, 1 patch, Transdermal, Q24H  [START ON 8/16/2024] pantoprazole, 40 mg, Oral, Q AM  predniSONE, 40 mg, Oral, Daily With Breakfast  rOPINIRole, 1 mg, Oral, Nightly  sodium chloride, 10 mL, Intravenous, Q12H  spironolactone, 25 mg, Oral, Daily        sodium chloride, 30 mL/hr, Last Rate: 0  (08/15/24 1000)      Lines, Drains & Airways       Active LDAs       Name Placement date Placement time Site Days    Peripheral IV 08/09/24 1017 Left;Upper Arm 08/09/24  1017  Arm  3    External Urinary Catheter 08/06/24  1900  --  5                  Isolation status: No active isolations    Dietary Orders (From admission, onward)       Start     Ordered    08/15/24 1236  Diet: Regular/House; No Straw, No Mixed Consistencies; Texture: Soft to Chew (NDD 3); Soft to Chew: Whole Meat; Fluid Consistency: Thin (IDDSI 0)  Diet Effective Now        References:    Diet Order Crosswalk   Question Answer Comment   Diets: Regular/House    Diet Modifiers / Additional Diets: No Straw    Diet Modifiers / Additional Diets: No Mixed Consistencies    Texture: Soft to Chew (NDD 3)    Soft to Chew: Whole Meat    Fluid Consistency: Thin (IDDSI 0)        08/15/24 1235                    [facility] Problems  Acute exacerbation of COPD  Obesity  BENY, intolerant of CPAP  Current cigarette smoker  Relevant Medical Diagnoses  Suspected GI bleed  Anemia suspected acute blood loss  Cirrhosis  Alcohol abuse  Acute alcohol withdrawal  Moderate mitral regurgitation        Plan of Treatment    Now on oral prednisone, taper off.    Would benefit from resuming treatment of obstructive sleep apnea with CPAP as outpatient.    Patient be counseled to quit smoking prior to discharge.    Pulmonary status is stable.  She plans to follow-up with Dr. Mccullough at [facility] pulmonary.  We will sign off.  Please call back as needed.    Jacques Asencio MD  08/15/24  17:46 EDT      Part of this note may be an  electronic transcription/translation of spoken language to printed text using the Dragon Dictation System.

## 2024-08-15 NOTE — PROGRESS NOTES
Continued Stay Note  [facility]     Patient Name: Filomena Liu  MRN: [id number]  Today's Date: 8/15/2024    Admit Date: 8/5/2024    Plan: Return home with S/O and [facility] following - referral pending   Discharge Plan       Row Name 08/15/24 1331       Plan    Plan Return home with S/O and [facility] following - referral pending    Patient/Family in Agreement with Plan yes    Plan Comments Spoke with patient and S/O Parveen, at bedside.  Explained to patient that she will not qualify for SNF - ambulated 200' with PT and no AD.  She states her sister and brother will help some when S/O is working.  She would like  to follow - given list of providers, she would like to use Located within [facility] - left message.  [facility] continues to follow.  Fernando MALDONADO                    Expected Discharge Date and Time       Expected Discharge Date Expected Discharge Time    Aug 16, 2024               Becky S. [place], RN

## 2024-08-15 NOTE — PLAN OF CARE
Goal Outcome Evaluation:  Plan of Care Reviewed With: patient        Progress: no change  Outcome Evaluation: Pt was seen for PT tx this PM. Pt was in bed and sat up to EOB w/ mod I. Pt stood w/ SBA. Pt then amb 250'+ w/ CGA / SV and no AD. Pt was mildly unsteady at times and reaching for rails in hallway for support. NO overt LOB occurred. Pt was seated EOB at end of session and noted to be up ad claudio in room. PT will prog as pt jennifer.      Anticipated Discharge Disposition (PT): home with home health, home with 24/7 care

## 2024-08-15 NOTE — ANESTHESIA POSTPROCEDURE EVALUATION
Patient: Filomena Liu    Procedure Summary       Date: 08/15/24 Room / Location: [facility] ENDOSCOPY 8 / [facility] ENDOSCOPY    Anesthesia Start: 0936 Anesthesia Stop: 1133    Procedure: ESOPHAGOGASTRODUODENOSCOPY (Esophagus) Diagnosis:       Melena      Gastroesophageal reflux disease, unspecified whether esophagitis present      Dysphagia, unspecified type      Alcoholic cirrhosis of liver without ascites      (Melena [K92.1])      (Gastroesophageal reflux disease, unspecified whether esophagitis present [K21.9])      (Dysphagia, unspecified type [R13.10])      (Alcoholic cirrhosis of liver without ascites [K70.30])    Surgeons: Garth Constantino MD Provider: Tato Stubbs MD    Anesthesia Type: MAC ASA Status: 3            Anesthesia Type: MAC    Vitals  Vitals Value Taken Time   /75 08/15/24 1146   Temp     Pulse 76 08/15/24 1147   Resp 14 08/15/24 1120   SpO2 96 % 08/15/24 1147   Vitals shown include unfiled device data.        Post Anesthesia Care and Evaluation    Patient location during evaluation: PACU  Patient participation: complete - patient participated  Level of consciousness: awake and alert  Pain management: adequate    Airway patency: patent  Anesthetic complications: No anesthetic complications    Cardiovascular status: acceptable  Respiratory status: acceptable  Hydration status: acceptable    Comments: --------------------            08/15/24               1120     --------------------   BP:     (!) 72/52    Pulse:      73       Resp:       14       Temp:                SpO2:      94%      --------------------

## 2024-08-15 NOTE — THERAPY TREATMENT NOTE
Patient Name: Filomena Liu  : 1960    MRN: [id number]                              Today's Date: 8/15/2024       Admit Date: 2024    Visit Dx:     ICD-10-CM ICD-9-CM   1. Acute blood loss anemia  D62 285.1   2. Gastrointestinal hemorrhage, unspecified gastrointestinal hemorrhage type  K92.2 578.9   3. Acute exacerbation of chronic obstructive pulmonary disease (COPD)  J44.1 491.21   4. Alcoholism  F10.20 303.90   5. Acute renal failure, unspecified acute renal failure type  N17.9 584.9   6. Hyperkalemia  E87.5 276.7   7. Melena  K92.1 578.1   8. Gastroesophageal reflux disease, unspecified whether esophagitis present  K21.9 530.81   9. Dysphagia, unspecified type  R13.10 787.20   10. Alcoholic cirrhosis of liver without ascites  K70.30 571.2     Patient Active Problem List   Diagnosis    Mediastinal mass    Cervical stenosis of spinal canal    Cervical radiculopathy    Cellulitis/abscess of left foot    HTN (hypertension)    History of MRSA infection    Anxiety    Peroneal tenosynovitis    Fracture of navicular bone of left foot    Tobacco use    Non compliance with medical treatment    Screening for colon cancer    Osteoporosis    Shoulder arthritis    Primary localized osteoarthrosis of left shoulder region    History of adenomatous polyp of colon    Diverticulosis    Acute GI bleeding    Hyperkalemia    Acute renal failure    Alcoholism    Acute exacerbation of chronic obstructive pulmonary disease (COPD)    Acute blood loss anemia    Melena    Gastroesophageal reflux disease    Dysphagia    Alcoholic cirrhosis of liver without ascites     Past Medical History:   Diagnosis Date    Ankle pain     Ankle wound     LEFT    Anxiety     Arthritis of back     Arthritis of neck     Asthma     Benign tumor of thymus     REMOVED    Bruises easily     Cataract     COPD (chronic obstructive pulmonary disease)     CTS (carpal tunnel syndrome) Surgery     DDD (degenerative disc disease), cervical      Depression     Fracture of wrist 2917    GERD (gastroesophageal reflux disease)     Hip arthrosis Unknown    History of MRSA infection     LEFT ANKLE TREAT [facility]  5YEARS GO    Hyperlipidemia     Hypertension     Knee sprain June 2023    Knee swelling Unknown    Shoulder arthritis 06/05/2023    Shoulder pain, left 07/07/2023    Sleep apnea     NO MACHINE USE    Spinal stenosis     Spondylolisthesis of cervical region      Past Surgical History:   Procedure Laterality Date    BACK SURGERY      cervical disc surgery with fusion-    CHOLECYSTECTOMY      COLONOSCOPY      COLONOSCOPY N/A 11/12/2020    Procedure: COLONOSCOPY, polypectomy;  Surgeon: Filomena Mckeon MD;  Location: [facility] OR;  Service: Gastroenterology;  Laterality: N/A;  Diverticulosis; Hepatic flexure polyp x 1; Polyp at 60cm x 1; Polyp at 50cm x 1- hot snare;    COLONOSCOPY N/A 4/15/2024    Procedure: COLONOSCOPY into sigmoid colon with hot snare polypectomy;  Surgeon: Leatha Johns MD;  Location: [facility] ENDOSCOPY;  Service: General;  Laterality: N/A;  pre- history of polyps  post- diverticulosis, polyp    HAND SURGERY  2000    HYSTERECTOMY      INCISION AND DRAINAGE LEG Left 11/17/2018    Procedure: Incision and Drainage of Left ankle;  Surgeon: Maxwell Lanier MD;  Location: [facility] MAIN OR;  Service: Orthopedics    NECK SURGERY  2000    SIGMOIDOSCOPY N/A 3/28/2024    Procedure: SIGMOIDOSCOPY FLEXIBLE;  Surgeon: Leatha Johns MD;  Location: [facility] ENDOSCOPY;  Service: General;  Laterality: N/A;  pre: h/o colon polyps  post: poor prep, diverticulosis    SKIN BIOPSY      SKIN GRAFT SPLIT THICKNESS N/A 02/12/2019    Procedure: debridement SKIN GRAFT SPLIT THICKNESS left ankle wound;  Surgeon: Barry Worthy MD;  Location: [facility] OR OSC;  Service: Plastics    TOTAL SHOULDER ARTHROPLASTY Left 7/20/2023    Procedure: Total  shoulder arthroplasty;  Surgeon: Maxwell Lanier MD;  Location: [facility] OR [facility];  Service: Orthopedics;   Laterality: Left;    TOTAL THYMECTOMY      TRIGGER POINT INJECTION  2000    WRIST FRACTURE SURGERY      CARPAL TUNNEL      General Information       Row Name 08/15/24 1543          Physical Therapy Time and Intention    Document Type therapy note (daily note)  -PH     Mode of Treatment physical therapy  -PH       Row Name 08/15/24 1543          General Information    Existing Precautions/Restrictions fall  -PH     Barriers to Rehab medically complex  -PH       Row Name 08/15/24 1543          Cognition    Orientation Status (Cognition) oriented x 4  -PH       Row Name 08/15/24 1543          Safety Issues, Functional Mobility    Impairments Affecting Function (Mobility) endurance/activity tolerance;balance  -PH     Comment, Safety Issues/Impairments (Mobility) gt belt and non skid socks donned  -PH               User Key  (r) = Recorded By, (t) = Taken By, (c) = Cosigned By      Initials Name Provider Type    PH Filomena Sanchez PTA Physical Therapist Assistant                   Mobility       Row Name 08/15/24 1543          Bed Mobility    Bed Mobility supine-sit  -PH     All Activities, [place] (Bed Mobility) --  -PH     Supine-Sit [place] (Bed Mobility) modified independence  -PH       Row Name 08/15/24 1543          Sit-Stand Transfer    Sit-Stand [place] (Transfers) standby assist  -PH     Assistive Device (Sit-Stand Transfers) other (see comments)  no AD  -PH       Row Name 08/15/24 1543          Gait/Stairs (Locomotion)    [place] Level (Gait) contact guard;supervision  -PH     Assistive Device (Gait) other (see comments)  no AD  -PH     Distance in Feet (Gait) 250  -PH     Deviations/Abnormal Patterns (Gait) dayanara decreased;stride length decreased  -PH     Bilateral Gait Deviations heel strike decreased  -PH     [place] Level (Stairs) not tested  -PH     Comment, (Gait/Stairs) slow; mild unsteadiness at times w/ no overt LOB; cues to avoid use of handrails in hallway for  assist  -PH               User Key  (r) = Recorded By, (t) = Taken By, (c) = Cosigned By      Initials Name Provider Type     Filomena Sanchez PTA Physical Therapist Assistant                   Obj/Interventions       Row Name 08/15/24 1545          Balance    Balance Assessment sitting static balance;standing static balance  -PH     Static Sitting Balance modified independence  -PH     Static Standing Balance standby assist  -PH     Dynamic Standing Balance other (see comments)  no AD  -PH               User Key  (r) = Recorded By, (t) = Taken By, (c) = Cosigned By      Initials Name Provider Type     Filomena Sanchez PTA Physical Therapist Assistant                   Goals/Plan    No documentation.                  Clinical Impression       Row Name 08/15/24 1545          Pain    Pretreatment Pain Rating 0/10 - no pain  -PH     Posttreatment Pain Rating 0/10 - no pain  -PH     Additional Documentation Pain Scale: Numbers Pre/Post-Treatment (Group)  -PH       Row Name 08/15/24 1545          Plan of Care Review    Plan of Care Reviewed With patient  -PH     Progress no change  -PH     Outcome Evaluation Pt was seen for PT tx this PM. Pt was in bed and sat up to EOB w/ mod I. Pt stood w/ SBA. Pt then amb 250'+ w/ CGA / SV and no AD. Pt was mildly unsteady at times and reaching for rails in hallway for support. NO overt LOB occurred. Pt was seated EOB at end of session and noted to be up ad claudio in room. PT will prog as pt jennifer.  -PH       Row Name 08/15/24 1541          Vital Signs    O2 Delivery Pre Treatment room air  -PH     O2 Delivery Intra Treatment room air  -PH     O2 Delivery Post Treatment room air  -PH       Row Name 08/15/24 1542          Positioning and Restraints    Pre-Treatment Position in bed  -PH     Post Treatment Position bed  -PH     In Bed sitting EOB;call light within reach;encouraged to call for assist  -PH               User Key  (r) = Recorded By, (t) = Taken By, (c) =  Cosigned By      Initials Name Provider Type    PH Filomena Sanchez PTA Physical Therapist Assistant                   Outcome Measures       Row Name 08/15/24 1547 08/15/24 1406       How much help from another person do you currently need...    Turning from your back to your side while in flat bed without using bedrails? 4  -PH 4  -DS (r) EO (t) DS (c)    Moving from lying on back to sitting on the side of a flat bed without bedrails? 4  -PH 4  -DS (r) EO (t) DS (c)    Moving to and from a bed to a chair (including a wheelchair)? 4  -PH 4  -DS (r) EO (t) DS (c)    Standing up from a chair using your arms (e.g., wheelchair, bedside chair)? 4  -PH 4  -DS (r) EO (t) DS (c)    Climbing 3-5 steps with a railing? 3  -PH 3  -DS (r) EO (t) DS (c)    To walk in hospital room? 3  -PH 4  -DS (r) EO (t) DS (c)    AM-PAC 6 Clicks Score (PT) 22  -PH 23  -DS (r) EO (t)    Highest Level of Mobility Goal 7 --> Walk 25 feet or more  -PH 7 --> Walk 25 feet or more  -DS (r) EO (t)      Row Name 08/15/24 0809          How much help from another person do you currently need...    Turning from your back to your side while in flat bed without using bedrails? 4  -DS (r) EO (t) DS (c)     Moving from lying on back to sitting on the side of a flat bed without bedrails? 4  -DS (r) EO (t) DS (c)     Moving to and from a bed to a chair (including a wheelchair)? 4  -DS (r) EO (t) DS (c)     Standing up from a chair using your arms (e.g., wheelchair, bedside chair)? 4  -DS (r) EO (t) DS (c)     Climbing 3-5 steps with a railing? 3  -DS (r) EO (t) DS (c)     To walk in hospital room? 4  -DS (r) EO (t) DS (c)     AM-PAC 6 Clicks Score (PT) 23  -DS (r) EO (t)     Highest Level of Mobility Goal 7 --> Walk 25 feet or more  -DS (r) EO (t)       Row Name 08/15/24 1543          Functional Assessment    Outcome Measure Options AM-PAC 6 Clicks Basic Mobility (PT)  -PH               User Key  (r) = Recorded By, (t) = Taken By, (c) = Cosigned By       Initials Name Provider Type    DS Vania Dodson, RN Registered Nurse     Filomena Sanchez PTA Physical Therapist Assistant    EO Kady Matthews, Nursing Student Nursing Student                                 Physical Therapy Education       Title: PT OT SLP Therapies (In Progress)       Topic: Physical Therapy (In Progress)       Point: Mobility training (Done)       Learning Progress Summary             Patient Acceptance, E, VU,DU by  at 8/15/2024 1548    Acceptance, E,TB, NR by CB at 8/8/2024 1145                         Point: Home exercise program (In Progress)       Learning Progress Summary             Patient Acceptance, E,TB, NR by CB at 8/8/2024 1145                         Point: Body mechanics (Done)       Learning Progress Summary             Patient Acceptance, E, VU,DU by PH at 8/15/2024 1548    Acceptance, E, DU by NT at 8/8/2024 1733   Family Acceptance, E, DU by NT at 8/8/2024 1733                         Point: Precautions (Done)       Learning Progress Summary             Patient Acceptance, E, VU,DU by  at 8/15/2024 1548                                         User Key       Initials Effective Dates Name Provider Type Discipline     06/16/21 -  Filomena Sanchez PTA Physical Therapist Assistant PT    CB 10/22/21 -  Mariia Hidalgo PT Physical Therapist PT    NT 07/03/24 -  Alissa Hoyt, RN Registered Nurse Nurse                  PT Recommendation and Plan     Plan of Care Reviewed With: patient  Progress: no change  Outcome Evaluation: Pt was seen for PT tx this PM. Pt was in bed and sat up to EOB w/ mod I. Pt stood w/ SBA. Pt then amb 250'+ w/ CGA / SV and no AD. Pt was mildly unsteady at times and reaching for rails in hallway for support. NO overt LOB occurred. Pt was seated EOB at end of session and noted to be up ad claudio in room. PT will prog as pt jennifer.     Time Calculation:         PT Charges       Row Name 08/15/24 8142             Time Calculation    Start Time 1514   -PH      Stop Time 1519  -PH      Time Calculation (min) 8 min  -PH      PT Received On 08/15/24  -PH      PT - Next Appointment 08/16/24  -PH         Timed Charges    93204 - PT Therapeutic Activity Minutes 8  -PH         Total Minutes    Timed Charges Total Minutes 8  -PH       Total Minutes 8  -PH                User Key  (r) = Recorded By, (t) = Taken By, (c) = Cosigned By      Initials Name Provider Type    PH Filomena Sanchez PTA Physical Therapist Assistant                  Therapy Charges for Today       Code Description Service Date Service Provider Modifiers Qty    [id number]  PT THERAPEUTIC ACT EA 15 MIN 8/15/2024 Filomena Sanchez PTA GP 1            PT G-Codes  Outcome Measure Options: AM-PAC 6 Clicks Basic Mobility (PT)  AM-PAC 6 Clicks Score (PT): 22  AM-PAC 6 Clicks Score (OT): 24  Modified [place] Scale: 5 - Severe disability.  Bedridden, incontinent, and requiring constant nursing care and attention.  PT Discharge Summary  Anticipated Discharge Disposition (PT): home with home health, home with 24/7 care    Filomena Sanchez PTA  8/15/2024

## 2024-08-16 ENCOUNTER — DOCUMENTATION (OUTPATIENT)
Dept: HOME HEALTH SERVICES | Facility: HOME HEALTHCARE | Age: 64
End: 2024-08-16
Payer: COMMERCIAL

## 2024-08-16 ENCOUNTER — TRANSCRIBE ORDERS (OUTPATIENT)
Dept: HOME HEALTH SERVICES | Facility: HOME HEALTHCARE | Age: 64
End: 2024-08-16
Payer: COMMERCIAL

## 2024-08-16 ENCOUNTER — HOME HEALTH ADMISSION (OUTPATIENT)
Dept: HOME HEALTH SERVICES | Facility: HOME HEALTHCARE | Age: 64
End: 2024-08-16
Payer: COMMERCIAL

## 2024-08-16 ENCOUNTER — READMISSION MANAGEMENT (OUTPATIENT)
Dept: CALL CENTER | Facility: HOSPITAL | Age: 64
End: 2024-08-16
Payer: MEDICARE

## 2024-08-16 ENCOUNTER — TELEPHONE (OUTPATIENT)
Dept: GASTROENTEROLOGY | Facility: CLINIC | Age: 64
End: 2024-08-16
Payer: MEDICARE

## 2024-08-16 VITALS
RESPIRATION RATE: 20 BRPM | OXYGEN SATURATION: 97 % | BODY MASS INDEX: 33.36 KG/M2 | SYSTOLIC BLOOD PRESSURE: 105 MMHG | DIASTOLIC BLOOD PRESSURE: 67 MMHG | HEART RATE: 88 BPM | HEIGHT: 60 IN | TEMPERATURE: 98.1 F | WEIGHT: 169.9 LBS

## 2024-08-16 DIAGNOSIS — N17.9 AKI (ACUTE KIDNEY INJURY): ICD-10-CM

## 2024-08-16 DIAGNOSIS — J44.1 COPD WITH ACUTE EXACERBATION: Primary | ICD-10-CM

## 2024-08-16 DIAGNOSIS — D62 ACUTE BLOOD LOSS ANEMIA: ICD-10-CM

## 2024-08-16 LAB
ALBUMIN SERPL-MCNC: 3.3 G/DL (ref 3.5–5.2)
ALBUMIN/GLOB SERPL: 1.3 G/DL
ALP SERPL-CCNC: 139 U/L (ref 39–117)
ALT SERPL W P-5'-P-CCNC: 49 U/L (ref 1–33)
ANION GAP SERPL CALCULATED.3IONS-SCNC: 9.6 MMOL/L (ref 5–15)
AST SERPL-CCNC: 60 U/L (ref 1–32)
BILIRUB SERPL-MCNC: 1.2 MG/DL (ref 0–1.2)
BUN SERPL-MCNC: 29 MG/DL (ref 8–23)
BUN/CREAT SERPL: 27.4 (ref 7–25)
CALCIUM SPEC-SCNC: 8.9 MG/DL (ref 8.6–10.5)
CHLORIDE SERPL-SCNC: 101 MMOL/L (ref 98–107)
CO2 SERPL-SCNC: 21.4 MMOL/L (ref 22–29)
CREAT SERPL-MCNC: 1.06 MG/DL (ref 0.57–1)
DEPRECATED RDW RBC AUTO: 51.4 FL (ref 37–54)
EGFRCR SERPLBLD CKD-EPI 2021: 58.8 ML/MIN/1.73
ERYTHROCYTE [DISTWIDTH] IN BLOOD BY AUTOMATED COUNT: 17.1 % (ref 12.3–15.4)
GLOBULIN UR ELPH-MCNC: 2.6 GM/DL
GLUCOSE BLDC GLUCOMTR-MCNC: 129 MG/DL (ref 70–130)
GLUCOSE BLDC GLUCOMTR-MCNC: 171 MG/DL (ref 70–130)
GLUCOSE SERPL-MCNC: 107 MG/DL (ref 65–99)
HCT VFR BLD AUTO: 28.8 % (ref 34–46.6)
HGB BLD-MCNC: 9 G/DL (ref 12–15.9)
MAGNESIUM SERPL-MCNC: 1.9 MG/DL (ref 1.6–2.4)
MCH RBC QN AUTO: 26.5 PG (ref 26.6–33)
MCHC RBC AUTO-ENTMCNC: 31.3 G/DL (ref 31.5–35.7)
MCV RBC AUTO: 84.7 FL (ref 79–97)
PLATELET # BLD AUTO: 221 10*3/MM3 (ref 140–450)
PMV BLD AUTO: 10.9 FL (ref 6–12)
POTASSIUM SERPL-SCNC: 4.3 MMOL/L (ref 3.5–5.2)
PROT SERPL-MCNC: 5.9 G/DL (ref 6–8.5)
RBC # BLD AUTO: 3.4 10*6/MM3 (ref 3.77–5.28)
SODIUM SERPL-SCNC: 132 MMOL/L (ref 136–145)
WBC NRBC COR # BLD AUTO: 14.95 10*3/MM3 (ref 3.4–10.8)

## 2024-08-16 PROCEDURE — 99222 1ST HOSP IP/OBS MODERATE 55: CPT | Performed by: NURSE PRACTITIONER

## 2024-08-16 PROCEDURE — 80053 COMPREHEN METABOLIC PANEL: CPT | Performed by: INTERNAL MEDICINE

## 2024-08-16 PROCEDURE — 82948 REAGENT STRIP/BLOOD GLUCOSE: CPT

## 2024-08-16 PROCEDURE — 83735 ASSAY OF MAGNESIUM: CPT | Performed by: INTERNAL MEDICINE

## 2024-08-16 PROCEDURE — 63710000001 INSULIN LISPRO (HUMAN) PER 5 UNITS: Performed by: INTERNAL MEDICINE

## 2024-08-16 PROCEDURE — 63710000001 PREDNISONE PER 1 MG: Performed by: INTERNAL MEDICINE

## 2024-08-16 PROCEDURE — 85027 COMPLETE CBC AUTOMATED: CPT | Performed by: INTERNAL MEDICINE

## 2024-08-16 PROCEDURE — 94799 UNLISTED PULMONARY SVC/PX: CPT

## 2024-08-16 RX ORDER — PANTOPRAZOLE SODIUM 40 MG/1
40 TABLET, DELAYED RELEASE ORAL
Qty: 30 TABLET | Refills: 0 | Status: SHIPPED | OUTPATIENT
Start: 2024-08-17

## 2024-08-16 RX ORDER — POTASSIUM CHLORIDE 750 MG/1
20 TABLET, FILM COATED, EXTENDED RELEASE ORAL DAILY
Status: DISCONTINUED | OUTPATIENT
Start: 2024-08-17 | End: 2024-08-16 | Stop reason: HOSPADM

## 2024-08-16 RX ORDER — NICOTINE 21 MG/24HR
1 PATCH, TRANSDERMAL 24 HOURS TRANSDERMAL EVERY 24 HOURS
Qty: 28 PATCH | Refills: 0 | Status: SHIPPED | OUTPATIENT
Start: 2024-08-17

## 2024-08-16 RX ORDER — SPIRONOLACTONE 25 MG/1
25 TABLET ORAL DAILY
Qty: 30 TABLET | Refills: 0 | Status: SHIPPED | OUTPATIENT
Start: 2024-08-17

## 2024-08-16 RX ORDER — TORSEMIDE 20 MG/1
20 TABLET ORAL DAILY
Qty: 30 TABLET | Refills: 0 | Status: SHIPPED | OUTPATIENT
Start: 2024-08-16

## 2024-08-16 RX ORDER — POTASSIUM CHLORIDE 20 MEQ/1
20 TABLET, EXTENDED RELEASE ORAL DAILY
Qty: 30 TABLET | Refills: 0 | Status: SHIPPED | OUTPATIENT
Start: 2024-08-17

## 2024-08-16 RX ORDER — PREDNISONE 10 MG/1
TABLET ORAL
Qty: 18 TABLET | Refills: 0 | Status: SHIPPED | OUTPATIENT
Start: 2024-08-16 | End: 2024-08-25

## 2024-08-16 RX ORDER — TORSEMIDE 20 MG/1
20 TABLET ORAL DAILY
Status: DISCONTINUED | OUTPATIENT
Start: 2024-08-16 | End: 2024-08-16 | Stop reason: HOSPADM

## 2024-08-16 RX ADMIN — PANTOPRAZOLE SODIUM 40 MG: 40 TABLET, DELAYED RELEASE ORAL at 06:48

## 2024-08-16 RX ADMIN — SPIRONOLACTONE 25 MG: 25 TABLET, FILM COATED ORAL at 08:30

## 2024-08-16 RX ADMIN — Medication 10 ML: at 08:39

## 2024-08-16 RX ADMIN — PREDNISONE 40 MG: 20 TABLET ORAL at 08:30

## 2024-08-16 RX ADMIN — ATENOLOL 25 MG: 25 TABLET ORAL at 08:29

## 2024-08-16 RX ADMIN — IPRATROPIUM BROMIDE AND ALBUTEROL SULFATE 3 ML: .5; 3 SOLUTION RESPIRATORY (INHALATION) at 07:30

## 2024-08-16 RX ADMIN — BUDESONIDE AND FORMOTEROL FUMARATE DIHYDRATE 2 PUFF: 160; 4.5 AEROSOL RESPIRATORY (INHALATION) at 07:30

## 2024-08-16 RX ADMIN — HYDROCODONE BITARTRATE AND ACETAMINOPHEN 1 TABLET: 7.5; 325 TABLET ORAL at 06:48

## 2024-08-16 RX ADMIN — INSULIN LISPRO 2 UNITS: 100 INJECTION, SOLUTION INTRAVENOUS; SUBCUTANEOUS at 12:04

## 2024-08-16 RX ADMIN — IPRATROPIUM BROMIDE AND ALBUTEROL SULFATE 3 ML: .5; 3 SOLUTION RESPIRATORY (INHALATION) at 10:50

## 2024-08-16 NOTE — DISCHARGE PLACEMENT REQUEST
"Filomena Liu P \"Trang\" (64 y.o. Female)       Date of Birth   1960    Social Security Number       Address   [address] [facility] [address]    Home Phone   [phone]    MRN   [id number]       Confucianism   Nondenominational    Marital Status                               Admission Date   8/5/24    Admission Type   Emergency    Admitting Provider   Marshal Lara MD    Attending Provider   Miah Bonilla MD    Department, Room/Bed   [address], S[address]/1       Discharge Date       Discharge Disposition       Discharge Destination                                 Attending Provider: Miah Bonilla MD    Allergies: No Known Allergies    Isolation: None   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)   Code Status: CPR    Ht: 152.4 cm (60\")   Wt: 77.1 kg (169 lb 14.4 oz)    Admission Cmt: None   Principal Problem: Acute GI bleeding [K92.2]                   Active Insurance as of 8/5/2024       Primary Coverage       Payor Plan Insurance Group Employer/Plan Group    ANTH MEDICARE REPLACEMENT ANTH MEDICARE ADVANTAGE [id number]       Payor Plan Address Payor Plan Phone Number Payor Plan Fax Number Effective Dates    [address] [phone]  1/1/2024 - None Entered    [facility] [address]         Subscriber Name Subscriber Birth Date Member ID       FILOMENA LIU 1960 [id number]                     Emergency Contacts        (Rel.) Home Phone Work Phone Mobile Phone    Shaheen Abrams (Brother) -- -- [phone]    FAMARTINEZ ELIZABETH (Significant Other) -- -- [phone]    JagMarguerite (Daughter) -- -- [phone]                "

## 2024-08-16 NOTE — PROGRESS NOTES
[facility] Progress Note      Patient Name: Filomena Liu  : 1960  MRN: [id number]  Primary Care Physician:  Fernando Dunn MD  Date of admission: 2024    Subjective     Interval History:   F/u SANDRO    Eager to go home  Appetite fine; no N/V  UOP not recorded   No SOB or chest pain.    Review of Systems:   Unable to obtain reliably    Objective     Vitals:   Temp:  [98.1 °F (36.7 °C)-100 °F (37.8 °C)] 98.1 °F (36.7 °C)  Heart Rate:  [73-88] 88  Resp:  [14-22] 20  BP: ()/(52-75) 105/67  Flow (L/min):  [1] 1    Intake/Output Summary (Last 24 hours) at 2024 1113  Last data filed at 2024 0829  Gross per 24 hour   Intake 700 ml   Output --   Net 700 ml       Physical Exam:    General: Alert, oriented, overweight, chr ill, NAD  Psychiatric: Appropriate affect  Neck: supple, no JVD  Lungs: A few crackles at bases; not labored  Heart: RRR, normal S1 and S2  Abdomen: soft, not tender, distended, BS +  Extremities: Trace-+1 edema, no cyanosis or clubbing  Skin: Warm and dry     Scheduled Meds:     atenolol, 25 mg, Oral, Daily  budesonide-formoterol, 2 puff, Inhalation, BID - RT  cefTRIAXone, 2,000 mg, Intravenous, Q24H  insulin lispro, 2-9 Units, Subcutaneous, 4x Daily AC & at Bedtime  ipratropium-albuterol, 3 mL, Nebulization, 4x Daily - RT  nicotine, 1 patch, Transdermal, Q24H  pantoprazole, 40 mg, Oral, Q AM  predniSONE, 40 mg, Oral, Daily With Breakfast  rOPINIRole, 1 mg, Oral, Nightly  sodium chloride, 10 mL, Intravenous, Q12H  spironolactone, 25 mg, Oral, Daily      IV Meds:   sodium chloride, 30 mL/hr, Last Rate: 0  (08/15/24 1000)        Results Reviewed:   I have personally reviewed the results from the time of this admission to 2024 11:13 EDT     Results from last 7 days   Lab Units 24  0635 08/15/24  1957 08/15/24  0443 24  0649   SODIUM mmol/L 132*  --  134* 132*   POTASSIUM mmol/L 4.3 5.2 2.9* 3.4*   CHLORIDE mmol/L 101  --  97* 91*    CO2 mmol/L 21.4*  --  23.8 26.6   BUN mg/dL 29*  --  35* 41*   CREATININE mg/dL 1.06*  --  1.09* 1.43*   CALCIUM mg/dL 8.9  --  8.8 9.8   BILIRUBIN mg/dL 1.2  --  1.1 1.5*   ALK PHOS U/L 139*  --  130* 159*   ALT (SGPT) U/L 49*  --  40* 45*   AST (SGOT) U/L 60*  --  51* 58*   GLUCOSE mg/dL 107*  --  76 101*     Estimated Creatinine Clearance: 49.2 mL/min (A) (by C-G formula based on SCr of 1.06 mg/dL (H)).  Results from last 7 days   Lab Units 08/16/24  0635 08/15/24  0443 08/14/24  0649 08/13/24  0810 08/12/24  0806 08/11/24  0554 08/10/24  0520   MAGNESIUM mg/dL 1.9 1.8  --  3.0*   < > 1.8  --    PHOSPHORUS mg/dL  --   --  2.9  --   --  2.7 2.8    < > = values in this interval not displayed.         Results from last 7 days   Lab Units 08/16/24  0635 08/15/24  0443 08/14/24  0649 08/12/24  2350 08/12/24  1552   WBC 10*3/mm3 14.95* 12.74* 18.77* 13.24* 14.42*   HEMOGLOBIN g/dL 9.0* 8.6* 10.5* 9.4* 9.4*   PLATELETS 10*3/mm3 221 196 290 170 168     Results from last 7 days   Lab Units 08/14/24  0649   INR  1.29*         Assessment / Plan     ASSESSMENT:  SANDRO, with UOP not recorded, unchanged with SCR at plateau.  Multifactorial prerenal azotemia in assoc with GIB, severe anemia, diuretic use and impaired compensation via ACE/NSAIDs (though states motrin use scant).  Central volume fine; minimal edema.  Potassium stable.  UA bland and Eunice low < 20.  US: no hydronephrosis.    Vol overload, improving; CXR 8/10 with decreasing pulmonary congestion.    Hypotension - resolved, stopped midodrine, tolerating atenolol  Anemia.  TSAT ok 21%.     ETOH abuse: Access following.   Acute encephalopathy and alcohol withdrawal  Acute hypoxic resp failure   Rash - vasculitic appearance but chronic in nature.  DOV positive with titer 1:160 and complements low, but urine completely bland.  ANCA panel negative  Transaminitis     PLAN:  Torsemide 20 mg daily and KCl 20 mEq daily  Home anytime from renal view.  Will arrange follow-up in  our office in the next 1-2 weeks with chemistry panel then  Discussed with Dr. Chad Brewster MD   08/16/24  11:13 EDT    [facility]  [phone]

## 2024-08-16 NOTE — PROGRESS NOTES
Case Management Discharge Note      Final Note: Dc'd home with S/O and [facility] following              Durable Medical Equipment Coordination complete.      Service Provider Selected Services Address Phone Fax Patient Preferred    [facility] [address], [facility] [address] [phone] --                         Home Medical Care Coordination complete.      Service Provider Selected Services Address Phone Fax Patient Preferred    [facility] [address], [facility] [address] [phone] --                                 Transportation Services  Private: Car    Final Discharge Disposition Code: 06 - home with home health care

## 2024-08-16 NOTE — DISCHARGE SUMMARY
Patient Name: Filomena Liu  : 1960  MRN: [id number]    Date of Admission: 2024  Date of Discharge:  2024  Primary Care Physician: Fernando Dunn MD      Chief Complaint:   Chest Pain      Discharge Diagnoses     Active Hospital Problems    Diagnosis  POA    **Acute GI bleeding [K92.2]  Yes    Hyperkalemia [E87.5]  Unknown    Acute renal failure [N17.9]  Unknown    Alcoholism [F10.20]  Unknown    Acute exacerbation of chronic obstructive pulmonary disease (COPD) [J44.1]  Unknown    Acute blood loss anemia [D62]  Unknown    Melena [K92.1]  Unknown    Gastroesophageal reflux disease [K21.9]  Unknown    Dysphagia [R13.10]  Unknown    Alcoholic cirrhosis of liver without ascites [K70.30]  Unknown      Resolved Hospital Problems   No resolved problems to display.        Hospital Course     Ms. Liu is a 64 y.o. female with a history of COPD, alcohol abuse, sleep apnea, obesity, reflux disease, etc. who presented to [facility] initially complaining of chest pain.  Please see the admitting history and physical for further details.  She was found to have acute renal failure with hyperkalemia and severe anemia and was admitted to the hospital for further evaluation and treatment.  She was placed on Protonix drip and seen by GI for concern for acute GI bleeding.  She was also started on octreotide given history of alcoholic cirrhosis of the liver.  She developed confusion and respiratory distress therefore she was transferred to the intensive care unit.  She was started on treatment for COPD exacerbation with scheduled nebulizers and IV Solu-Medrol.  She was managed by pulmonology.  Nephrology treated her kidney failure.  She was seen by GI.  After stabilization she was clear to undergo EGD that was essentially normal.  Her hemoglobin has remained stable.  GI has cleared her for discharge home on a daily PPI.  They recommend Aldactone as well.  She will follow-up with GI in their  office in about 2 weeks.  For her COPD exacerbation plan will be to taper her off prednisone.  She is requesting a new nebulizer device which will be provided.  Per nephrology will discontinue lisinopril and thiazide diuretic and discharged home on torsemide 20 mg, Aldactone 25 mg and KCl 20 mill equivalents daily.  They will follow-up with her in their office as well.  Home health ordered per patient request.  She was counseled on the need to stop smoking.  She will go home with nicotine patches.  She will make appointment with her normal pulmonologist at [facility] for follow-up.  She needs to be reevaluated for treatment of sleep apnea.      Day of Discharge     Subjective:  Feels overall better.  No new complaints.    Physical Exam:  Temp:  [98.1 °F (36.7 °C)-100 °F (37.8 °C)] 98.1 °F (36.7 °C)  Heart Rate:  [81-88] 88  Resp:  [16-22] 20  BP: (103-119)/(62-71) 105/67  Body mass index is 33.18 kg/m².  Physical Exam  Constitutional:       General: She is not in acute distress.     Appearance: She is ill-appearing.   Cardiovascular:      Rate and Rhythm: Normal rate and regular rhythm.   Pulmonary:      Effort: Pulmonary effort is normal. No respiratory distress.      Breath sounds: Wheezing present.   Abdominal:      General: Abdomen is flat. There is no distension.      Tenderness: There is no abdominal tenderness.   Musculoskeletal:         General: No swelling or deformity. Normal range of motion.   Skin:     General: Skin is warm and dry.   Neurological:      General: No focal deficit present.      Mental Status: She is alert. She is disoriented.         Consultants     Consult Orders (all) (From admission, onward)       Start     Ordered    08/06/24 1642  Inpatient Pulmonology Consult  Once        Specialty:  Pulmonary Disease  Provider:  Maxwell Torres MD    08/06/24 1642    08/05/24 1710  Inpatient Access Center Consult  Once        Provider:  (Not yet assigned)    08/05/24 1702    08/05/24 8682   Gastroenterology (on-call MD unless specified)  Once        Specialty:  Gastroenterology  Provider:  Dilan Nunes MD    08/05/24 0952    08/05/24 0953  Nephrology (on -call MD unless specified)  Once        Specialty:  Nephrology  Provider:  Fernando Baker MD    08/05/24 0952        Procedures     ESOPHAGOGASTRODUODENOSCOPY  Dr. Garth Constantino  8/15/2024    Imaging Results (All)       Procedure Component Value Units Date/Time    FL Video Swallow Single Contrast [[id number]] Collected: 08/13/24 1604     Updated: 08/13/24 1652    Narrative:      VIDEO SWALLOWING EXAMINATION BY SPEECH PATHOLOGY     Clinical: Dysphasia     Reference Air Kerma: 18.23 mGy     Video swallowing examination performed under the direction of speech  pathology. Please refer to speech pathology report for full information     Speech pathology summary: TAYLER Shah present.     Penetration was seen.     By electronically signing this report, I, the supervising radiologist,  attest that I was not present for the procedure(s) but agree with this  final edited report.        This report was finalized on 8/13/2024 4:49 PM by Dr. Davdi Briceño M.D  on Workstation: [id number]       XR Chest 2 View [[id number]] Collected: 08/13/24 1408     Updated: 08/13/24 1414    Narrative:      XR CHEST 2 VW-     HISTORY: Female who is 64 years-old, pulmonary vascular congestion,  follow-up     TECHNIQUE: Frontal lateral views of the chest     COMPARISON: 8/10/2024     FINDINGS: The heart size is borderline. Aorta is tortuous, calcified.  Sternotomy wires are present. Pulmonary vasculature is unremarkable.  Minimal likely atelectasis or scarring at the left lateral costophrenic  angle. No focal pulmonary consolidation, pleural effusion, or  pneumothorax. No acute osseous process.       Impression:      No focal pulmonary consolidation or obvious edema.  Borderline heart size. Tortuous aorta. Follow-up as clinically  indicated.           This report was  finalized on 8/13/2024 2:10 PM by Dr. Harpreet Mistry M.D on Workstation: [id number]       XR Chest 1 View [[id number]] Collected: 08/10/24 0647     Updated: 08/10/24 0653    Narrative:      XR CHEST 1 VW-     HISTORY: Female who is 64 years-old, pulmonary edema, follow-up     TECHNIQUE: Frontal view of the chest     COMPARISON: 8/7/2024     FINDINGS: The heart is enlarged. Pulmonary vasculature is mildly less  congested. Sternotomy wires are noted. No focal pulmonary consolidation,  pleural effusion, or pneumothorax. No acute osseous process.       Impression:      Mildly decreased pulmonary vascular congestion.     This report was finalized on 8/10/2024 6:50 AM by Dr. Harpreet Mistry M.D on Workstation: [id number]       XR Abdomen KUB [[id number]] Collected: 08/07/24 0442     Updated: 08/07/24 0449    Narrative:      KUB     HISTORY: Nasogastric tube placement     COMPARISON: None available.     FINDINGS:  Nasogastric tube appears to terminate within the proximal stomach.  Contrast material is noted within the proximal stomach. Correlation with  history is recommended. Bowel gas pattern is nonobstructive. Some  additional higher density material is seen along the right side of the  abdomen, of uncertain clinical significance. Liver is suspected to be  enlarged.           This report was finalized on 8/7/2024 4:46 AM by Dr. Radha Laguna M.D on Workstation: [id number]       XR Chest 1 View [[id number]] Collected: 08/07/24 0334     Updated: 08/07/24 0338    Narrative:      SINGLE VIEW OF THE CHEST     HISTORY: Increased work of breathing     COMPARISON: August 6, 2024     FINDINGS:  There is cardiomegaly. There is vascular congestion. Patient is status  post median sternotomy. No pneumothorax or pleural effusion is seen.  Patient is suspected to have a hiatal hernia. No definite acute  infiltrates are seen.       Impression:      Persistent vascular congestion.     This report was finalized on 8/7/2024 3:35  AM by Dr. Radha Laguna M.D on Workstation: [id number]       XR Chest 1 View [[id number]] Collected: 08/06/24 1839     Updated: 08/06/24 1843    Narrative:      XR CHEST 1 VW-     HISTORY: Female who is 64 years-old, dyspnea     TECHNIQUE: Frontal views of the chest     COMPARISON: 8/5/2024     FINDINGS: The heart is enlarged, and the pulmonary vasculature is  congested. Aorta is calcified, tortuous. Sternotomy wires are present.  No focal pulmonary consolidation is noted, but prominence of  interstitial markings suggests presence of edema. No large pleural  effusion. No pneumothorax. No acute osseous process.       Impression:      Cardiomegaly with pulmonary vascular congestion and edema,  follow-up advised.     This report was finalized on 8/6/2024 6:40 PM by Dr. Harpreet Mistry M.D on Workstation: [id number]       US Renal Bilateral [[id number]] Collected: 08/05/24 2022     Updated: 08/05/24 2029    Narrative:      US RENAL BILATERAL-     INDICATIONS: Acute kidney injury     TECHNIQUE: ULTRASOUND OF THE KIDNEYS AND URINARY BLADDER.     COMPARISON: CT from 3/9/2021     FINDINGS:     The right kidney measures 9.1 centimeters, the left kidney measures 10.3  centimeters.     No renal lesion is identified. The right kidney is suboptimally  visualized as a result of body habitus, bowel gas, and difficulty with  patient positioning. No hydronephrosis or echogenic nephrolithiasis.     No ureteral jets were observed during the exam. The urinary bladder  otherwise appears unremarkable.       Impression:      No hydronephrosis or echogenic nephrolithiasis.        This report was finalized on 8/5/2024 8:25 PM by Dr. Harpreet Mistry M.D on Workstation: [id number]       US Liver [[id number]] Collected: 08/05/24 1846     Updated: 08/05/24 1854    Narrative:      Right upper quadrant abdominal sonogram     TECHNIQUE: Grayscale and color Doppler images of the right upper  quadrant of the abdomen were obtained.     HISTORY:  Alcoholic cirrhosis     COMPARISON: Abdominal sonogram 11/18/2016 and CT abdomen pelvis 3/9/2021     FINDINGS:     The gallbladder is surgically absent.     The common bile duct measures for mm in diameter. There is no  intrahepatic biliary ductal dilation.     There is hepatic steatosis. Otherwise, evaluation of the liver is  nondiagnostic due to underpenetration of the ultrasound beam, likely  related to patient body habitus and degree of hepatic steatosis.     The right kidney measures 8.8 cm in length.  The right kidney  demonstrates normal echogenicity and cortical thickness. There is no  right-sided hydronephrosis. There are no right-sided cysts, masses or  renal calculi.     The visualized portion of the pancreas is unremarkable.     Visualized portions of the aorta and IVC appear normal.       Impression:      1.  Hepatic steatosis. Otherwise, evaluation of the liver is  nondiagnostic likely due to combination of patient body habitus and  degree of steatosis. Therefore, underlying focal hepatic  malignancy/abnormality cannot be excluded. Further screening for HCC  should be performed with multiphase MRI with and without contrast.  2.  No definite ascites is visualized on provided images.     This report was finalized on 8/5/2024 6:51 PM by Dr. David Briceño M.D  on Workstation: [employer] Chest 1 View [[id number]] Collected: 08/05/24 0755     Updated: 08/05/24 0800    Narrative:      XR CHEST 1 VW-     Clinical: Chest pain     COMPARISON examination dated 2/10/2017     FINDINGS: Median sternotomy wires in position. There is mild cardiac  enlargement. No effusion, edema or acute airspace disease has developed.  Linear area of scarring versus discoid atelectasis again demonstrated at  the left lung base. The remainder is unremarkable.     CONCLUSION: No acute pulmonary process has developed.     This report was finalized on 8/5/2024 7:57 AM by Dr. Nolan Nolen M.D  on Workstation: [id number]              Results for orders placed during the hospital encounter of 08/05/24    Duplex Portal Hepatic Complete CAR    Interpretation Summary    Technically difficult and limited exam. Nonvisualization of the extrahepatic main portal vein.    No thrombus identified, but there appears to be hepatofugal in the proximal splenic vein.    All other vessels appear normal with normal flow direction and no evidence of thrombus.    Results for orders placed during the hospital encounter of 08/05/24    Adult Transthoracic Echo Complete W/ Cont if Necessary Per Protocol    Interpretation Summary    Technically difficult study.    Left ventricular systolic function is normal. Calculated left ventricular EF = 63.4%    Left ventricular diastolic function was indeterminate.    RV borderline dilated with grossly normal function.    At least moderate mitral valve regurgitation is present, may be underestimated.    Suboptima subcostal window though the IVC appears dilated.    Pertinent Labs     Results from last 7 days   Lab Units 08/16/24  0635 08/15/24  0443 08/14/24  0649 08/12/24  2350   WBC 10*3/mm3 14.95* 12.74* 18.77* 13.24*   HEMOGLOBIN g/dL 9.0* 8.6* 10.5* 9.4*   PLATELETS 10*3/mm3 221 196 290 170     Results from last 7 days   Lab Units 08/16/24  0635 08/15/24  1957 08/15/24  0443 08/14/24  0649 08/13/24  0810   SODIUM mmol/L 132*  --  134* 132* 132*   POTASSIUM mmol/L 4.3 5.2 2.9* 3.4* 3.7   CHLORIDE mmol/L 101  --  97* 91* 90*   CO2 mmol/L 21.4*  --  23.8 26.6 29.0   BUN mg/dL 29*  --  35* 41* 40*   CREATININE mg/dL 1.06*  --  1.09* 1.43* 1.40*   GLUCOSE mg/dL 107*  --  76 101* 96   EGFR mL/min/1.73 58.8*  --  56.8* 41.0* 42.1*     Results from last 7 days   Lab Units 08/16/24  0635 08/15/24  0443 08/14/24  0649 08/13/24  0810   ALBUMIN g/dL 3.3* 3.2* 3.8 3.8   BILIRUBIN mg/dL 1.2 1.1 1.5* 1.6*   ALK PHOS U/L 139* 130* 159* 131*   AST (SGOT) U/L 60* 51* 58* 53*   ALT (SGPT) U/L 49* 40* 45* 44*     Results from last 7 days  "  Lab Units 08/16/24  0635 08/15/24  0443 08/14/24  0649 08/13/24  0810 08/12/24  0806 08/11/24  0554 08/10/24  0520 08/10/24  0520   CALCIUM mg/dL 8.9 8.8 9.8 9.9 9.6 9.8  --  9.9   ALBUMIN g/dL 3.3* 3.2* 3.8 3.8 3.7 3.6  --  3.9   MAGNESIUM mg/dL 1.9 1.8  --  3.0* 1.4* 1.8   < >  --    PHOSPHORUS mg/dL  --   --  2.9  --   --  2.7  --  2.8    < > = values in this interval not displayed.     Results from last 7 days   Lab Units 08/12/24  0806 08/09/24  1505   AMMONIA umol/L 51 34     Results from last 7 days   Lab Units 08/13/24  0810   PROBNP pg/mL 129.0           Invalid input(s): \"LDLCALC\"          Test Results Pending at Discharge       Discharge Details        Discharge Medications        New Medications        Instructions Start Date   nicotine 21 MG/24HR patch  Commonly known as: NICODERM CQ   1 patch, Transdermal, Every 24 Hours   Start Date: August 17, 2024     pantoprazole 40 MG EC tablet  Commonly known as: PROTONIX   40 mg, Oral, Every Early Morning   Start Date: August 17, 2024     predniSONE 10 MG tablet  Commonly known as: DELTASONE   3 tabs po daily x 3days then 2 tabs po daily x 3days then 1 tabs po daily x 3days then stop      spironolactone 25 MG tablet  Commonly known as: ALDACTONE   25 mg, Oral, Daily   Start Date: August 17, 2024     torsemide 20 MG tablet  Commonly known as: DEMADEX   20 mg, Oral, Daily             Changes to Medications        Instructions Start Date   potassium chloride 20 MEQ CR tablet  Commonly known as: KLOR-CON M20  What changed:   medication strength  how much to take   20 mEq, Oral, Daily   Start Date: August 17, 2024            Continue These Medications        Instructions Start Date   acetaminophen 325 MG tablet  Commonly known as: TYLENOL   650 mg, Oral, 2 Times Daily PRN      albuterol sulfate  (90 Base) MCG/ACT inhaler  Commonly known as: PROVENTIL HFA;VENTOLIN HFA;PROAIR HFA   2 puffs, Inhalation, 3 times daily      atenolol 25 MG tablet  Commonly known " as: TENORMIN   25 mg, Oral, Daily      cholecalciferol 25 MCG (1000 UT) tablet  Commonly known as: VITAMIN D3   1,000 Units, Oral, Not sure of the micrograms, takes once a week when she remembers      fluticasone 50 MCG/ACT nasal spray  Commonly known as: FLONASE   1 spray, Nasal      HYDROcodone-acetaminophen 7.5-325 MG per tablet  Commonly known as: Norco   1 tablet, Oral, Every 8 Hours PRN      ipratropium-albuterol 0.5-2.5 mg/3 ml nebulizer  Commonly known as: DUO-NEB   Take 3 mL by nebulization 4 (Four) Times a Day.      rOPINIRole 1 MG tablet  Commonly known as: REQUIP   1 mg, Oral, Every Night at Bedtime      Trelegy Ellipta 200-62.5-25 MCG/ACT inhaler  Generic drug: Fluticasone-Umeclidin-Vilant   1 puff, Inhalation, Daily             Stop These Medications      furosemide 20 MG tablet  Commonly known as: LASIX     ibuprofen 200 MG tablet  Commonly known as: ADVIL,MOTRIN     lisinopril 40 MG tablet  Commonly known as: PRINIVIL,ZESTRIL     triamterene-hydrochlorothiazide 37.5-25 MG per capsule  Commonly known as: DYAZIDE              No Known Allergies    Discharge Disposition:  Home[place]Health [place] [facility] [place]      Discharge Diet:  Diet Order   Procedures    Diet: Regular/House; No Straw, No Mixed Consistencies; Texture: Soft to Chew (NDD 3); Soft to Chew: Whole Meat; Fluid Consistency: Thin (IDDSI 0)       Discharge Activity:   Activity Instructions       Activity as Tolerated              CODE STATUS:    Code Status and Medical Interventions: CPR (Attempt to Resuscitate); Full Support   Ordered at: 08/05/24 9334     Level Of Support Discussed With:    Patient     Code Status (Patient has no pulse and is not breathing):    CPR (Attempt to Resuscitate)     Medical Interventions (Patient has pulse or is breathing):    Full Support       No future appointments.  Additional Instructions for the Follow-ups that You Need to Schedule       Ambulatory Referral to Home Health (Hospital)   As directed      Face to Face Visit  Date: 8/16/2024   Follow-up provider for Plan of Care?: I treated the patient in an acute care facility and will not continue treatment after discharge.   Follow-up provider: ARIE DUNN [[id number]]   Reason/Clinical Findings: COPD   Describe mobility limitations that make leaving home difficult: Requires assistance of another to leave the home   Nursing/Therapeutic Services Requested: Physical Therapy   Frequency: 1 Week 1        Discharge Follow-up with PCP   As directed       Currently Documented PCP:    Arie Dunn MD    PCP Phone Number:    [phone]     Follow Up Details: 1 to 2 weeks (or sooner if problems)        Discharge Follow-up with PCP   As directed       Currently Documented PCP:    Arie Dunn MD    PCP Phone Number:    [phone]     Follow Up Details: 2 weeks               Contact information for follow-up providers       Arie Dunn MD .    Specialty: Family Medicine  Why: 1 to 2 weeks (or sooner if problems)  Contact information:  [address]  [facility] [address] [phone]               Arie Dunn MD .    Specialty: Family Medicine  Why: 2 weeks  Contact information:  [address]  [facility] [address]  [phone]                       Contact information for after-discharge care       Home Medical Care       [facility] .    Service: Home Health Services  Contact information:  [address] [facility] [address]  [facility] [address] [phone]                                   Additional Instructions for the Follow-ups that You Need to Schedule       Ambulatory Referral to Home Health (Hospital)   As directed      Face to Face Visit Date: 8/16/2024   Follow-up provider for Plan of Care?: I treated the patient in an acute care facility and will not continue treatment after discharge.   Follow-up provider: ARIE DUNN [[id number]]   Reason/Clinical Findings: COPD   Describe mobility limitations that make  leaving home difficult: Requires assistance of another to leave the home   Nursing/Therapeutic Services Requested: Physical Therapy   Frequency: 1 Week 1        Discharge Follow-up with PCP   As directed       Currently Documented PCP:    Fernando Dunn MD    PCP Phone Number:    [phone]     Follow Up Details: 1 to 2 weeks (or sooner if problems)        Discharge Follow-up with PCP   As directed       Currently Documented PCP:    Fernando Dunn MD    PCP Phone Number:    [phone]     Follow Up Details: 2 weeks            Time Spent on Discharge:  Greater than 30 minutes      Miah Bonilla MD  [facility]  08/16/24  11:51 EDT

## 2024-08-16 NOTE — TELEPHONE ENCOUNTER
Left vmail to schedule follow up w/lenka Perez in sept ok per bg     Ok for hub to relay and schedule

## 2024-08-16 NOTE — PROGRESS NOTES
Continued Stay Note  [facility]     Patient Name: Filomena Liu  MRN: [id number]  Today's Date: 8/16/2024    Admit Date: 8/5/2024    Plan: Return home with S/O and [facility] following   Discharge Plan       Row Name 08/16/24 1339       Plan    Plan Return home with S/O and [facility] following    Patient/Family in Agreement with Plan yes    Plan Comments Spoke with Anna/[facility] and they are aware of DC.  Spoke with Florentin, and they will supply nebulizer.  Plan remains to return home with S/O.  Fernando MALDONADO                 Expected Discharge Date and Time       Expected Discharge Date Expected Discharge Time    Aug 16, 2024               Becky S. [place], RN

## 2024-08-16 NOTE — PROGRESS NOTES
"Nutrition Services    Patient Name:  Filomena Liu  YOB: 1960  MRN: [id number]  Admit Date:  8/5/2024    Assessment Date:  08/16/24    NUTRITION SCREENING      Reason for Encounter LOS x 10   Diagnosis/Problem Acute GI bleeding  Melena  Anemia  Cirrhosis  Dysphagia   GERD  Acute renal failure   COPD  Alcoholic cirrhosis of liver without ascites        PO Diet Diet: Regular/House; No Straw, No Mixed Consistencies; Texture: Soft to Chew (NDD 3); Soft to Chew: Whole Meat; Fluid Consistency: Thin (IDDSI 0)   Supplements    PO Intake % 75% per EMR - pt reports good appetite. Tolerating diet. Really likes the food here.       Medications MAR reviewed by RD   Labs  Listed below, reviewed   Physical Findings Alert, oriented, room air, tooth/teeth missing   GI Function Last BM 8/14   Skin Status Intact, bruising       Height  Weight  BMI  Weight Trend     Height: 152.4 cm (60\")  Weight: 77.1 kg (169 lb 14.4 oz) (08/13/24 0500)  Body mass index is 33.18 kg/m².  Loss, Gain       Nutrition Problem (PES) No nutrition diagnosis at this time.       Intervention/Plan Encourage good po intake.  Will continue to monitor intakes, labs, wt.    RD to follow up per protocol.     Results from last 7 days   Lab Units 08/16/24  0635 08/15/24  1957 08/15/24  0443 08/14/24  0649   SODIUM mmol/L 132*  --  134* 132*   POTASSIUM mmol/L 4.3 5.2 2.9* 3.4*   CHLORIDE mmol/L 101  --  97* 91*   CO2 mmol/L 21.4*  --  23.8 26.6   BUN mg/dL 29*  --  35* 41*   CREATININE mg/dL 1.06*  --  1.09* 1.43*   CALCIUM mg/dL 8.9  --  8.8 9.8   BILIRUBIN mg/dL 1.2  --  1.1 1.5*   ALK PHOS U/L 139*  --  130* 159*   ALT (SGPT) U/L 49*  --  40* 45*   AST (SGOT) U/L 60*  --  51* 58*   GLUCOSE mg/dL 107*  --  76 101*     Results from last 7 days   Lab Units 08/16/24  0635 08/15/24  0443 08/14/24  0649 08/13/24  0810   MAGNESIUM mg/dL 1.9 1.8  --  3.0*   PHOSPHORUS mg/dL  --   --  2.9  --    HEMOGLOBIN g/dL 9.0* 8.6* 10.5*  --    HEMATOCRIT % 28.8* " 27.6* 33.9*  --      Lab Results   Component Value Date    HGBA1C <4.20 (L) 08/07/2024         Electronically signed by:  Roopa Beard  08/16/24 09:29 EDT

## 2024-08-16 NOTE — PROGRESS NOTES
[facility] given referral for home health needs.  All info verified with patient at bedside and she voices agreeable to home SN and PT.  Per Jhoana, auth given from PCP Dr Dunn for home health.  Call patient's cell first, if unable to reach, call Parveen, significant other as listed.  Denies any recent home health.  States D/Cing today.

## 2024-08-16 NOTE — PROGRESS NOTES
[facility]  Inpatient Progress Note    Reason for Follow Up: GI bleed    Subjective     Interval History:     Patient underwent EGD yesterday with normal findings.    No acute events overnight.  Hemoglobin 9 with stable LFTs.      Current Facility-Administered Medications:     acetaminophen (TYLENOL) tablet 650 mg, 650 mg, Oral, Q4H PRN, 650 mg at 08/10/24 2158 **OR** [DISCONTINUED] acetaminophen (TYLENOL) 160 MG/5ML oral solution 650 mg, 650 mg, Oral, Q4H PRN **OR** [DISCONTINUED] acetaminophen (TYLENOL) suppository 650 mg, 650 mg, Rectal, Q4H PRN, Garth Constantino MD    atenolol (TENORMIN) tablet 25 mg, 25 mg, Oral, Daily, Garth Constantino MD, 25 mg at 08/15/24 0812    sennosides-docusate (PERICOLACE) 8.6-50 MG per tablet 2 tablet, 2 tablet, Oral, BID PRN, 2 tablet at 08/12/24 0828 **AND** polyethylene glycol (MIRALAX) packet 17 g, 17 g, Oral, Daily PRN **AND** bisacodyl (DULCOLAX) EC tablet 5 mg, 5 mg, Oral, Daily PRN **AND** bisacodyl (DULCOLAX) suppository 10 mg, 10 mg, Rectal, Daily PRN, Garth Constantino MD    budesonide-formoterol (SYMBICORT) 160-4.5 MCG/ACT inhaler 2 puff, 2 puff, Inhalation, BID - RT, 2 puff at 08/16/24 0730 **AND** [DISCONTINUED] tiotropium (SPIRIVA RESPIMAT) 2.5 mcg/act aerosol solution inhaler, 2 puff, Inhalation, Daily - RT, Mustapha Subramanian MD, 2 puff at 08/11/24 0800    cefTRIAXone (ROCEPHIN) 2,000 mg in sodium chloride 0.9 % 100 mL MBP, 2,000 mg, Intravenous, Q24H, Marshal Lara MD, Last Rate: 200 mL/hr at 08/15/24 1245, 2,000 mg at 08/15/24 1245    dextrose (D50W) (25 g/50 mL) IV injection 25 g, 25 g, Intravenous, Q15 Min PRN, Garth Constantino MD    dextrose (GLUTOSE) oral gel 15 g, 15 g, Oral, Q15 Min PRN, Garth Constantino MD    glucagon (GLUCAGEN) injection 1 mg, 1 mg, Intramuscular, Q15 Min PRN, Garth Constantino MD    HYDROcodone-acetaminophen (NORCO) 7.5-325 MG per tablet 1 tablet, 1 tablet, Oral, Q8H PRN, Garth Constantino MD, 1 tablet at 08/16/24 0648     hydrOXYzine (ATARAX) tablet 25 mg, 25 mg, Oral, TID PRN, Garth Constantino MD, 25 mg at 08/14/24 1715    insulin lispro (HUMALOG/ADMELOG) injection 2-9 Units, 2-9 Units, Subcutaneous, 4x Daily AC & at Bedtime, Garth Constantino MD, 2 Units at 08/15/24 2140    ipratropium-albuterol (DUO-NEB) nebulizer solution 3 mL, 3 mL, Nebulization, Q4H PRN, Garth Constantino MD, 3 mL at 08/12/24 0440    ipratropium-albuterol (DUO-NEB) nebulizer solution 3 mL, 3 mL, Nebulization, 4x Daily - RT, Garth Constantino MD, 3 mL at 08/16/24 0730    lactulose (CHRONULAC) 10 GM/15ML solution 20 g, 20 g, Oral, Daily PRN, Miah Bonilla MD    nicotine (NICODERM CQ) 21 MG/24HR patch 1 patch, 1 patch, Transdermal, Q24H, Garth Constantino MD, 1 patch at 08/15/24 2141    ondansetron ODT (ZOFRAN-ODT) disintegrating tablet 4 mg, 4 mg, Oral, Q6H PRN **OR** ondansetron (ZOFRAN) injection 4 mg, 4 mg, Intravenous, Q6H PRN, Garth Constantino MD    pantoprazole (PROTONIX) EC tablet 40 mg, 40 mg, Oral, Q AM, Miah Bonilla MD, 40 mg at 08/16/24 0648    Polyvinyl Alcohol-Povidone PF (HYPOTEARS) 1.4-0.6 % ophthalmic solution 1 drop, 1 drop, Both Eyes, Q1H PRN, Garth Constantino MD    Potassium Replacement - Follow Nurse / BPA Driven Protocol, , Does not apply, PRN, Garth Constantino MD    predniSONE (DELTASONE) tablet 40 mg, 40 mg, Oral, Daily With Breakfast, Garth Constantino MD, 40 mg at 08/15/24 1214    rOPINIRole (REQUIP) tablet 1 mg, 1 mg, Oral, Nightly, Garth Constantino MD, 1 mg at 08/15/24 2140    sodium chloride 0.9 % flush 10 mL, 10 mL, Intravenous, Q12H, Garth Constantino MD, 10 mL at 08/15/24 2140    sodium chloride 0.9 % flush 10 mL, 10 mL, Intravenous, PRN, Garth Constantino MD, 10 mL at 08/06/24 0839    sodium chloride 0.9 % infusion 40 mL, 40 mL, Intravenous, PRN, Garth Constantino MD    sodium chloride 0.9 % infusion, 30 mL/hr, Intravenous, Continuous PRN, Garth Constantino MD, Last Rate: 0 mL/hr at 08/15/24 1000, Restarted at 08/15/24 1116     spironolactone (ALDACTONE) tablet 25 mg, 25 mg, Oral, Daily, Garth Constantino MD, 25 mg at 08/15/24 1212  Review of Systems:    The following systems were reviewed and negative;  gastrointestinal    Objective     Vital Signs  Temp:  [98.1 °F (36.7 °C)-100 °F (37.8 °C)] 98.1 °F (36.7 °C)  Heart Rate:  [73-86] 85  Resp:  [14-22] 18  BP: ()/(52-75) 105/67  Body mass index is 33.18 kg/m².    Intake/Output Summary (Last 24 hours) at 8/16/2024 0825  Last data filed at 8/15/2024 1700  Gross per 24 hour   Intake 560 ml   Output --   Net 560 ml     No intake/output data recorded.     Physical Exam:   General: patient awake, alert and cooperative   Abdomen: soft, nontender, nondistended; normal bowel sounds      Results Review:     I reviewed the patient's new clinical results.    Results from last 7 days   Lab Units 08/16/24  0635 08/15/24  0443 08/14/24  0649   WBC 10*3/mm3 14.95* 12.74* 18.77*   HEMOGLOBIN g/dL 9.0* 8.6* 10.5*   HEMATOCRIT % 28.8* 27.6* 33.9*   PLATELETS 10*3/mm3 221 196 290     Results from last 7 days   Lab Units 08/16/24  0635 08/15/24  1957 08/15/24  0443 08/14/24  0649   SODIUM mmol/L 132*  --  134* 132*   POTASSIUM mmol/L 4.3 5.2 2.9* 3.4*   CHLORIDE mmol/L 101  --  97* 91*   CO2 mmol/L 21.4*  --  23.8 26.6   BUN mg/dL 29*  --  35* 41*   CREATININE mg/dL 1.06*  --  1.09* 1.43*   CALCIUM mg/dL 8.9  --  8.8 9.8   BILIRUBIN mg/dL 1.2  --  1.1 1.5*   ALK PHOS U/L 139*  --  130* 159*   ALT (SGPT) U/L 49*  --  40* 45*   AST (SGOT) U/L 60*  --  51* 58*   GLUCOSE mg/dL 107*  --  76 101*     Results from last 7 days   Lab Units 08/14/24  0649   INR  1.29*     Lab Results   Lab Value Date/Time    LIPASE 31 03/14/2023 1237    LIPASE 14 05/11/2022 0059    LIPASE 21 (L) 06/01/2020 0149    LIPASE 22 (L) 05/12/2020 1235    LIPASE 29 01/09/2020 1229    LIPASE 23 02/27/2018 1221       Radiology:  XR Chest 2 View   Final Result   No focal pulmonary consolidation or obvious edema.   Borderline heart size.  Tortuous aorta. Follow-up as clinically   indicated.               This report was finalized on 8/13/2024 2:10 PM by Dr. Harpreet Mistry M.D on Workstation: [id number]          FL Video Swallow Single Contrast   Final Result      XR Chest 1 View   Final Result   Mildly decreased pulmonary vascular congestion.       This report was finalized on 8/10/2024 6:50 AM by Dr. Harpreet Mistry M.D on Workstation: [id number]          XR Abdomen KUB   Final Result      XR Chest 1 View   Final Result   Persistent vascular congestion.       This report was finalized on 8/7/2024 3:35 AM by Dr. Radha Laguna M.D on Workstation: [id number]          XR Chest 1 View   Final Result   Cardiomegaly with pulmonary vascular congestion and edema,   follow-up advised.       This report was finalized on 8/6/2024 6:40 PM by Dr. Harpreet Mistry M.D on Workstation: [id number]          US Renal Bilateral   Final Result   No hydronephrosis or echogenic nephrolithiasis.           This report was finalized on 8/5/2024 8:25 PM by Dr. Harpreet Mistry M.D on Workstation: [id number]          US Liver   Final Result   1.  Hepatic steatosis. Otherwise, evaluation of the liver is   nondiagnostic likely due to combination of patient body habitus and   degree of steatosis. Therefore, underlying focal hepatic   malignancy/abnormality cannot be excluded. Further screening for HCC   should be performed with multiphase MRI with and without contrast.   2.  No definite ascites is visualized on provided images.       This report was finalized on 8/5/2024 6:51 PM by Dr. David Briceño M.D   on Workstation: [id number]          XR Chest 1 View   Final Result          Assessment & Plan     Active Hospital Problems    Diagnosis     **Acute GI bleeding     Hyperkalemia     Acute renal failure     Alcoholism     Acute exacerbation of chronic obstructive pulmonary disease (COPD)     Acute blood loss anemia     Melena     Gastroesophageal reflux disease      Dysphagia     Alcoholic cirrhosis of liver without ascites        Assessment:  EtOH related liver neurosis  Elevated liver function test  Anemia-stable  GERD  COPD    Plan:  Continue once daily PPI  Continue prednisone and Aldactone  Recommend she follow-up with our office 2 weeks after hospital discharge  We will sign off but remain available if needed    I discussed the patients findings and my recommendations with patient and Dr. Constantino .    Fiona Flowers, APRN

## 2024-08-16 NOTE — PLAN OF CARE
Goal Outcome Evaluation:  Plan of Care Reviewed With: patient        Progress: improving  Outcome Evaluation: Pt A&O. PRN pain med given for back pain. Pt on 1L NC. Up ad claudio. Discharge today. VSS

## 2024-08-16 NOTE — SIGNIFICANT NOTE
08/16/24 0732   CPAP/BiPAP Settings   Mode of Delivery standby  (Patient stated she has not  been wearing V-60 at night, and said she will not be wearing machine.  V--60 pulled from room.)   Equipment Type V-60   Skin Integrity intact   Device Skin Pressure Protection other (see comments)

## 2024-08-17 ENCOUNTER — HOME CARE VISIT (OUTPATIENT)
Dept: HOME HEALTH SERVICES | Facility: HOME HEALTHCARE | Age: 64
End: 2024-08-17
Payer: COMMERCIAL

## 2024-08-17 VITALS
DIASTOLIC BLOOD PRESSURE: 68 MMHG | HEART RATE: 84 BPM | TEMPERATURE: 97.9 F | HEIGHT: 60 IN | OXYGEN SATURATION: 98 % | WEIGHT: 175 LBS | SYSTOLIC BLOOD PRESSURE: 116 MMHG | RESPIRATION RATE: 18 BRPM | BODY MASS INDEX: 34.36 KG/M2

## 2024-08-17 PROCEDURE — G0299 HHS/HOSPICE OF RN EA 15 MIN: HCPCS

## 2024-08-17 NOTE — OUTREACH NOTE
Prep Survey      Flowsheet Row Responses   [facility] facility patient discharged from? [place]   Is LACE score < 7 ? No   Eligibility Readm Mgmt   Discharge diagnosis Acute GI bleeding   Does the patient have one of the following disease processes/diagnoses(primary or secondary)? Other   Does the patient have Home health ordered? Yes   What is the Home health agency?   [facility]   Is there a DME ordered? Yes   What D[facility] was ordered[facility]   Prep survey completed? Yes            HECTOR DUMONT - Registered Nurse          
To go home

## 2024-08-18 NOTE — HOME HEALTH
"SOC Note: Reviewed hospital DC instructions with patient. Instructed on med changes and compliance with times and doses of med reg with therapeutic effect. Instructed to ambulate with cane and assist x 1. Instructed to continue to use incentive spirometer, cough and deep breathing exercises 4 times daily for 5-7 days. Patient able to voice back understanding on instructions provided.    Home Health ordered for: SN 1w1, 2w3, 1w3, 2PRN, PT/OT    Reason for Hosp/Primary Dx/Co-morbidities: Admission to [facility]: 8/5/2024 - 8/16/2024 Chief Complaint: Chest Pain  Discharge Diagnoses: Acute GI bleeding, Hyperkalemia, Acute renal failure, Alcoholism, Acute exacerbation of chronic obstructive pulmonary disease (COPD), Acute blood loss anemia, Melena, Gastroesophageal reflux disease, Dysphagia, Alcoholic cirrhosis of liver without ascites     SN FOC is COPD with comorbities of  Alcoholism, anemia, Gastroesophageal reflux disease, Dysphagia, Alcoholic cirrhosis of liver without ascites. SN to provide skilled care of Medication education, Pain management, Cardiopulmonary assessments, Neurovascular assessments    Patient's goal(s): \"To breath easier\"    Current Functional status/mobility/DME: Amb with cane    HB status/Living Arrangements: Lives in house with  who is PCG    Skin Integrity/wound status: Intact, bruising noted with lab draws, IV and steroid    Code Status: Full    Fall Risk/Safety concerns: Mod    Educated on Emergency Plan, steps to take prior to going to the ER and when to Call Home Health First:  Yes     Medication issues/Concerns: Added: nicotine (NICODERM CQ) 21 MG/24HR patch Place 1 patch on the skin as directed by provider Daily. Added: pantoprazole (PROTONIX) 40 MG EC tablet Take 1 tablet by mouth Every Morning. Added: predniSONE (DELTASONE) 10 MG tablet Take 3 tablets by mouth Daily for 3 days, THEN 2 tablets Daily for 3 days, THEN 1 tablet Daily for 3 days. Added: spironolactone (ALDACTONE) 25 MG " tablet Take 1 tablet by mouth Daily. Added: torsemide (DEMADEX) 20 MG tablet Take 1 tablet by mouth Daily. Changed: potassium chloride (KLOR-CON M20) 20 MEQ CR tablet Take 1 tablet by mouth Daily.     Additional Problems/Concerns: None    SDOH Barriers (i.e. caregiver concerns, social isolation, transportation, food insecurity, environment, income etc.)/Need for MSW: None

## 2024-08-19 ENCOUNTER — READMISSION MANAGEMENT (OUTPATIENT)
Dept: CALL CENTER | Facility: HOSPITAL | Age: 64
End: 2024-08-19
Payer: MEDICARE

## 2024-08-19 NOTE — OUTREACH NOTE
Medical Week 1 Survey      Flowsheet Row Responses   [facility] patient discharged from? [place]   Does the patient have one of the following disease processes/diagnoses(primary or secondary)? Other   Week 1 attempt successful? Yes   Call start time 1811   Call end time 1826   Discharge diagnosis Acute GI bleeding   Person spoke with today (if not patient) and relationship pt   Meds reviewed with patient/caregiver? Yes   Is the patient having any side effects they believe may be caused by any medication additions or changes? No   Does the patient have all medications ordered at discharge? Yes   Is the patient taking all medications as directed (includes completed medication regime)? Yes   Does the patient have a primary care provider?  Yes   Does the patient have an appointment with their PCP within 7 days of discharge? No   Nursing Interventions Educated patient on importance of making appointment, Advised patient to make appointment   Has the patient kept scheduled appointments due by today? N/A   What is the Home health agency?  [facility]   Has home health visited the patient within 72 hours of discharge? Yes   Psychosocial issues? No   Did the patient receive a copy of their discharge instructions? Yes   Nursing interventions Reviewed instructions with patient   What is the patient's perception of their health status since discharge? Improving   Is the patient/caregiver able to teach back signs and symptoms related to disease process for when to call PCP? Yes   Is the patient/caregiver able to teach back signs and symptoms related to disease process for when to call 911? Yes   Is the patient/caregiver able to teach back the hierarchy of who to call/visit for symptoms/problems? PCP, Specialist, Home health nurse, Urgent Care, ED, 911 Yes   If the patient is a current smoker, are they able to teach back resources for cessation? Not a smoker   Week 1 call completed? Yes   Would this patient benefit  from a Referral to [facility] Social Work? No   Is the patient interested in additional calls from an ambulatory ? No   Wrap up additional comments Pt denies any chest pain, and blood with BMs. Pt is SOB and advised to use neb albuteral and then wait 15 minutes and do trelegy inhaler. Reviewed AVS/meds with pt. Pt advised to make a PCP fu appt. Pt verified GI/pulmonary fu appts. HH, PT visits.   Call end time 1826            Brandy CARRASQUILLO - Registered Nurse

## 2024-08-20 ENCOUNTER — HOME CARE VISIT (OUTPATIENT)
Dept: HOME HEALTH SERVICES | Facility: HOME HEALTHCARE | Age: 64
End: 2024-08-20
Payer: COMMERCIAL

## 2024-08-21 ENCOUNTER — HOME CARE VISIT (OUTPATIENT)
Dept: HOME HEALTH SERVICES | Facility: HOME HEALTHCARE | Age: 64
End: 2024-08-21
Payer: COMMERCIAL

## 2024-08-21 VITALS
TEMPERATURE: 97.4 F | DIASTOLIC BLOOD PRESSURE: 72 MMHG | RESPIRATION RATE: 20 BRPM | SYSTOLIC BLOOD PRESSURE: 120 MMHG | OXYGEN SATURATION: 97 % | HEART RATE: 102 BPM

## 2024-08-21 PROCEDURE — G0151 HHCP-SERV OF PT,EA 15 MIN: HCPCS

## 2024-08-22 ENCOUNTER — HOME CARE VISIT (OUTPATIENT)
Dept: HOME HEALTH SERVICES | Facility: HOME HEALTHCARE | Age: 64
End: 2024-08-22
Payer: COMMERCIAL

## 2024-08-22 VITALS
WEIGHT: 183 LBS | SYSTOLIC BLOOD PRESSURE: 118 MMHG | TEMPERATURE: 98.6 F | OXYGEN SATURATION: 96 % | DIASTOLIC BLOOD PRESSURE: 64 MMHG | RESPIRATION RATE: 20 BRPM | BODY MASS INDEX: 35.74 KG/M2 | HEART RATE: 88 BPM

## 2024-08-22 PROCEDURE — G0162 HHC RN E&M PLAN SVS, 15 MIN: HCPCS

## 2024-08-26 ENCOUNTER — HOME CARE VISIT (OUTPATIENT)
Dept: HOME HEALTH SERVICES | Facility: HOME HEALTHCARE | Age: 64
End: 2024-08-26
Payer: COMMERCIAL

## 2024-08-26 PROCEDURE — G0151 HHCP-SERV OF PT,EA 15 MIN: HCPCS

## 2024-08-27 ENCOUNTER — READMISSION MANAGEMENT (OUTPATIENT)
Dept: CALL CENTER | Facility: HOSPITAL | Age: 64
End: 2024-08-27
Payer: MEDICARE

## 2024-08-27 VITALS
OXYGEN SATURATION: 98 % | HEART RATE: 92 BPM | DIASTOLIC BLOOD PRESSURE: 72 MMHG | SYSTOLIC BLOOD PRESSURE: 110 MMHG | RESPIRATION RATE: 18 BRPM

## 2024-08-27 NOTE — OUTREACH NOTE
Medical Week 2 Survey      Flowsheet Row Responses   [facility] patient discharged from? [place]   Does the patient have one of the following disease processes/diagnoses(primary or secondary)? Other   Week 2 attempt successful? No   Unsuccessful attempts Attempt 1            Emilee DUMONT - Registered Nurse

## 2024-08-28 ENCOUNTER — HOME CARE VISIT (OUTPATIENT)
Dept: HOME HEALTH SERVICES | Facility: HOME HEALTHCARE | Age: 64
End: 2024-08-28
Payer: COMMERCIAL

## 2024-09-03 ENCOUNTER — HOME CARE VISIT (OUTPATIENT)
Dept: HOME HEALTH SERVICES | Facility: HOME HEALTHCARE | Age: 64
End: 2024-09-03
Payer: COMMERCIAL

## 2024-09-03 PROCEDURE — G0151 HHCP-SERV OF PT,EA 15 MIN: HCPCS

## 2024-09-04 ENCOUNTER — HOME CARE VISIT (OUTPATIENT)
Dept: HOME HEALTH SERVICES | Facility: HOME HEALTHCARE | Age: 64
End: 2024-09-04
Payer: COMMERCIAL

## 2024-09-04 VITALS
TEMPERATURE: 97.1 F | SYSTOLIC BLOOD PRESSURE: 110 MMHG | OXYGEN SATURATION: 98 % | HEART RATE: 100 BPM | BODY MASS INDEX: 34.18 KG/M2 | DIASTOLIC BLOOD PRESSURE: 62 MMHG | WEIGHT: 175 LBS

## 2024-09-04 PROCEDURE — G0495 RN CARE TRAIN/EDU IN HH: HCPCS

## 2024-09-05 ENCOUNTER — TRANSCRIBE ORDERS (OUTPATIENT)
Dept: ADMINISTRATIVE | Facility: HOSPITAL | Age: 64
End: 2024-09-05
Payer: MEDICARE

## 2024-09-05 DIAGNOSIS — D50.8 OTHER IRON DEFICIENCY ANEMIA: Primary | ICD-10-CM

## 2024-09-05 DIAGNOSIS — Z87.448 HISTORY OF KIDNEY PROBLEMS: ICD-10-CM

## 2024-09-05 DIAGNOSIS — D64.9 ANEMIA, UNSPECIFIED TYPE: ICD-10-CM

## 2024-09-09 ENCOUNTER — HOME CARE VISIT (OUTPATIENT)
Dept: HOME HEALTH SERVICES | Facility: HOME HEALTHCARE | Age: 64
End: 2024-09-09
Payer: COMMERCIAL

## 2024-09-09 VITALS
RESPIRATION RATE: 18 BRPM | OXYGEN SATURATION: 92 % | TEMPERATURE: 96.8 F | DIASTOLIC BLOOD PRESSURE: 72 MMHG | HEART RATE: 85 BPM | SYSTOLIC BLOOD PRESSURE: 102 MMHG

## 2024-09-09 PROBLEM — D50.8 OTHER IRON DEFICIENCY ANEMIAS: Status: ACTIVE | Noted: 2024-09-09

## 2024-09-09 PROCEDURE — G0151 HHCP-SERV OF PT,EA 15 MIN: HCPCS

## 2024-09-13 ENCOUNTER — HOSPITAL ENCOUNTER (OUTPATIENT)
Dept: INFUSION THERAPY | Facility: HOSPITAL | Age: 64
Discharge: HOME | End: 2024-09-13
Payer: MEDICARE

## 2024-09-13 VITALS
TEMPERATURE: 96 F | HEART RATE: 84 BPM | SYSTOLIC BLOOD PRESSURE: 115 MMHG | OXYGEN SATURATION: 100 % | DIASTOLIC BLOOD PRESSURE: 69 MMHG | RESPIRATION RATE: 18 BRPM

## 2024-09-13 DIAGNOSIS — D50.8 OTHER IRON DEFICIENCY ANEMIAS: Primary | ICD-10-CM

## 2024-09-13 PROCEDURE — 96374 THER/PROPH/DIAG INJ IV PUSH: CPT

## 2024-09-13 PROCEDURE — 25010000002 FERUMOXYTOL 510 MG/17ML SOLUTION 17 ML VIAL: Performed by: NURSE PRACTITIONER

## 2024-09-13 RX ADMIN — FERUMOXYTOL 510 MG: 510 INJECTION INTRAVENOUS at 11:39

## 2024-09-20 ENCOUNTER — HOSPITAL ENCOUNTER (OUTPATIENT)
Dept: INFUSION THERAPY | Facility: HOSPITAL | Age: 64
Discharge: HOME | End: 2024-09-20
Payer: MEDICARE

## 2024-09-23 ENCOUNTER — HOSPITAL ENCOUNTER (OUTPATIENT)
Dept: CARDIOLOGY | Facility: HOSPITAL | Age: 64
Discharge: HOME | End: 2024-09-23
Admitting: FAMILY MEDICINE
Payer: MEDICARE

## 2024-09-23 ENCOUNTER — TRANSCRIBE ORDERS (OUTPATIENT)
Dept: ADMINISTRATIVE | Facility: HOSPITAL | Age: 64
End: 2024-09-23
Payer: MEDICARE

## 2024-09-23 DIAGNOSIS — R60.0 LOCALIZED EDEMA: Primary | ICD-10-CM

## 2024-09-23 DIAGNOSIS — R60.0 LOCALIZED EDEMA: ICD-10-CM

## 2024-09-23 LAB

## 2024-09-23 PROCEDURE — 93970 EXTREMITY STUDY: CPT | Performed by: SURGERY

## 2024-09-23 PROCEDURE — 93970 EXTREMITY STUDY: CPT

## 2024-09-25 ENCOUNTER — TELEPHONE (OUTPATIENT)
Dept: ONCOLOGY | Facility: CLINIC | Age: 64
End: 2024-09-25
Payer: MEDICARE

## 2024-09-25 ENCOUNTER — LAB (OUTPATIENT)
Dept: OTHER | Facility: HOSPITAL | Age: 64
End: 2024-09-25
Payer: MEDICARE

## 2024-09-25 ENCOUNTER — CONSULT (OUTPATIENT)
Dept: ONCOLOGY | Facility: CLINIC | Age: 64
End: 2024-09-25
Payer: MEDICARE

## 2024-09-25 VITALS
RESPIRATION RATE: 16 BRPM | OXYGEN SATURATION: 100 % | SYSTOLIC BLOOD PRESSURE: 111 MMHG | DIASTOLIC BLOOD PRESSURE: 74 MMHG | WEIGHT: 180.4 LBS | HEIGHT: 60 IN | TEMPERATURE: 97.9 F | BODY MASS INDEX: 35.42 KG/M2 | HEART RATE: 74 BPM

## 2024-09-25 DIAGNOSIS — F10.10 ETOH ABUSE: ICD-10-CM

## 2024-09-25 DIAGNOSIS — R23.3 EASY BRUISING: ICD-10-CM

## 2024-09-25 DIAGNOSIS — D64.9 ANEMIA, UNSPECIFIED TYPE: Primary | ICD-10-CM

## 2024-09-25 DIAGNOSIS — F17.200 SMOKER: ICD-10-CM

## 2024-09-25 DIAGNOSIS — K14.8 TONGUE LESION: ICD-10-CM

## 2024-09-25 PROBLEM — K90.9 MALABSORPTION OF IRON: Status: ACTIVE | Noted: 2024-09-25

## 2024-09-25 LAB
BASOPHILS # BLD AUTO: 0.06 10*3/MM3 (ref 0–0.2)
BASOPHILS NFR BLD AUTO: 0.7 % (ref 0–1.5)
DAT POLY-SP REAG RBC QL: NEGATIVE
DEPRECATED RDW RBC AUTO: 72.7 FL (ref 37–54)
EOSINOPHIL # BLD AUTO: 0.3 10*3/MM3 (ref 0–0.4)
EOSINOPHIL NFR BLD AUTO: 3.6 % (ref 0.3–6.2)
ERYTHROCYTE [DISTWIDTH] IN BLOOD BY AUTOMATED COUNT: 22.8 % (ref 12.3–15.4)
FERRITIN SERPL-MCNC: 194.6 NG/ML (ref 13–150)
FOLATE SERPL-MCNC: 15.17 NG/ML (ref 4.78–24.2)
HAPTOGLOB SERPL-MCNC: 29 MG/DL (ref 30–200)
HCT VFR BLD AUTO: 28.4 % (ref 34–46.6)
HGB BLD-MCNC: 8.5 G/DL (ref 12–15.9)
HGB RETIC QN AUTO: 24.7 PG (ref 29.8–36.1)
IMM GRANULOCYTES # BLD AUTO: 0.02 10*3/MM3 (ref 0–0.05)
IMM GRANULOCYTES NFR BLD AUTO: 0.2 % (ref 0–0.5)
IMM RETICS NFR: 17.9 % (ref 3–15.8)
IRON 24H UR-MRATE: 31 MCG/DL (ref 37–145)
IRON SATN MFR SERPL: 8 % (ref 20–50)
LDH SERPL-CCNC: 239 U/L (ref 135–214)
LYMPHOCYTES # BLD AUTO: 1.67 10*3/MM3 (ref 0.7–3.1)
LYMPHOCYTES NFR BLD AUTO: 19.8 % (ref 19.6–45.3)
MCH RBC QN AUTO: 26.4 PG (ref 26.6–33)
MCHC RBC AUTO-ENTMCNC: 29.9 G/DL (ref 31.5–35.7)
MCV RBC AUTO: 88.2 FL (ref 79–97)
MONOCYTES # BLD AUTO: 0.9 10*3/MM3 (ref 0.1–0.9)
MONOCYTES NFR BLD AUTO: 10.7 % (ref 5–12)
NEUTROPHILS NFR BLD AUTO: 5.49 10*3/MM3 (ref 1.7–7)
NEUTROPHILS NFR BLD AUTO: 65 % (ref 42.7–76)
NRBC BLD AUTO-RTO: 0 /100 WBC (ref 0–0.2)
PLATELET # BLD AUTO: 156 10*3/MM3 (ref 140–450)
PMV BLD AUTO: 10.9 FL (ref 6–12)
RBC # BLD AUTO: 3.22 10*6/MM3 (ref 3.77–5.28)
RETICS # AUTO: 0.15 10*6/MM3 (ref 0.02–0.13)
RETICS/RBC NFR AUTO: 4.7 % (ref 0.7–1.9)
TIBC SERPL-MCNC: 389 MCG/DL (ref 298–536)
TRANSFERRIN SERPL-MCNC: 261 MG/DL (ref 200–360)
VIT B12 BLD-MCNC: 718 PG/ML (ref 211–946)
WBC NRBC COR # BLD AUTO: 8.44 10*3/MM3 (ref 3.4–10.8)

## 2024-09-25 PROCEDURE — 85732 THROMBOPLASTIN TIME PARTIAL: CPT | Performed by: STUDENT IN AN ORGANIZED HEALTH CARE EDUCATION/TRAINING PROGRAM

## 2024-09-25 PROCEDURE — 85730 THROMBOPLASTIN TIME PARTIAL: CPT | Performed by: STUDENT IN AN ORGANIZED HEALTH CARE EDUCATION/TRAINING PROGRAM

## 2024-09-25 PROCEDURE — 85611 PROTHROMBIN TEST: CPT | Performed by: STUDENT IN AN ORGANIZED HEALTH CARE EDUCATION/TRAINING PROGRAM

## 2024-09-25 PROCEDURE — 86880 COOMBS TEST DIRECT: CPT | Performed by: STUDENT IN AN ORGANIZED HEALTH CARE EDUCATION/TRAINING PROGRAM

## 2024-09-25 PROCEDURE — 85025 COMPLETE CBC W/AUTO DIFF WBC: CPT | Performed by: STUDENT IN AN ORGANIZED HEALTH CARE EDUCATION/TRAINING PROGRAM

## 2024-09-25 PROCEDURE — 82607 VITAMIN B-12: CPT | Performed by: STUDENT IN AN ORGANIZED HEALTH CARE EDUCATION/TRAINING PROGRAM

## 2024-09-25 PROCEDURE — 85610 PROTHROMBIN TIME: CPT | Performed by: STUDENT IN AN ORGANIZED HEALTH CARE EDUCATION/TRAINING PROGRAM

## 2024-09-25 PROCEDURE — 85046 RETICYTE/HGB CONCENTRATE: CPT | Performed by: STUDENT IN AN ORGANIZED HEALTH CARE EDUCATION/TRAINING PROGRAM

## 2024-09-25 PROCEDURE — 84466 ASSAY OF TRANSFERRIN: CPT | Performed by: STUDENT IN AN ORGANIZED HEALTH CARE EDUCATION/TRAINING PROGRAM

## 2024-09-25 PROCEDURE — 83010 ASSAY OF HAPTOGLOBIN QUANT: CPT | Performed by: STUDENT IN AN ORGANIZED HEALTH CARE EDUCATION/TRAINING PROGRAM

## 2024-09-25 PROCEDURE — 82728 ASSAY OF FERRITIN: CPT | Performed by: STUDENT IN AN ORGANIZED HEALTH CARE EDUCATION/TRAINING PROGRAM

## 2024-09-25 PROCEDURE — 36415 COLL VENOUS BLD VENIPUNCTURE: CPT

## 2024-09-25 PROCEDURE — 83540 ASSAY OF IRON: CPT | Performed by: STUDENT IN AN ORGANIZED HEALTH CARE EDUCATION/TRAINING PROGRAM

## 2024-09-25 PROCEDURE — 83615 LACTATE (LD) (LDH) ENZYME: CPT | Performed by: STUDENT IN AN ORGANIZED HEALTH CARE EDUCATION/TRAINING PROGRAM

## 2024-09-25 PROCEDURE — 82746 ASSAY OF FOLIC ACID SERUM: CPT | Performed by: STUDENT IN AN ORGANIZED HEALTH CARE EDUCATION/TRAINING PROGRAM

## 2024-09-26 ENCOUNTER — TELEPHONE (OUTPATIENT)
Dept: ONCOLOGY | Facility: CLINIC | Age: 64
End: 2024-09-26
Payer: MEDICARE

## 2024-09-26 LAB
APTT PPP: 29.3 SECONDS (ref 22.7–35.4)
COAG MIXING STUDY INTERP: ABNORMAL
NORMAL PLASMA PT: 13.5 SECONDS (ref 11.7–14.2)
PROTHROMBIN TIME: 16.9 SECONDS (ref 11.7–14.2)

## 2024-10-01 ENCOUNTER — TELEPHONE (OUTPATIENT)
Dept: ONCOLOGY | Facility: CLINIC | Age: 64
End: 2024-10-01
Payer: MEDICARE

## 2024-10-01 NOTE — TELEPHONE ENCOUNTER
"  Caller: Filomena Liu \"Trang\"    Relationship: Self    Best call back number: [phone]      What was the call regarding: PATIENT CALLED STATES DR SALAZAR WANTED HER PCP TO PRESCRIBE A STRONGER ANTIBIOTIC BUT PCP OFFICE HAS NOT HEARD FROM US TO DO THAT     PLEASE CALL PATIENT TO ADVISE      "

## 2024-10-01 NOTE — TELEPHONE ENCOUNTER
Called patient, let her know per Dr. Asencio that she would need a antibiotic if she developed a fever or infectious symptoms. Pt v/u and states she is seeing her PCP on 10/3.

## 2024-10-04 ENCOUNTER — INFUSION (OUTPATIENT)
Dept: ONCOLOGY | Facility: HOSPITAL | Age: 64
End: 2024-10-04
Payer: MEDICARE

## 2024-10-04 VITALS
DIASTOLIC BLOOD PRESSURE: 57 MMHG | RESPIRATION RATE: 16 BRPM | WEIGHT: 182.4 LBS | BODY MASS INDEX: 35.81 KG/M2 | HEART RATE: 71 BPM | TEMPERATURE: 98 F | SYSTOLIC BLOOD PRESSURE: 96 MMHG | OXYGEN SATURATION: 95 %

## 2024-10-04 DIAGNOSIS — D50.8 OTHER IRON DEFICIENCY ANEMIAS: ICD-10-CM

## 2024-10-04 DIAGNOSIS — K90.9 MALABSORPTION OF IRON: Primary | ICD-10-CM

## 2024-10-04 PROCEDURE — 25010000002 FERUMOXYTOL 510 MG/17ML SOLUTION 17 ML VIAL: Performed by: NURSE PRACTITIONER

## 2024-10-04 PROCEDURE — 96374 THER/PROPH/DIAG INJ IV PUSH: CPT

## 2024-10-04 PROCEDURE — 96375 TX/PRO/DX INJ NEW DRUG ADDON: CPT

## 2024-10-04 RX ORDER — ACETAMINOPHEN 325 MG/1
650 TABLET ORAL ONCE
Status: COMPLETED | OUTPATIENT
Start: 2024-10-04 | End: 2024-10-04

## 2024-10-04 RX ORDER — SODIUM CHLORIDE 9 MG/ML
20 INJECTION, SOLUTION INTRAVENOUS ONCE
Status: DISCONTINUED | OUTPATIENT
Start: 2024-10-04 | End: 2024-10-04 | Stop reason: HOSPADM

## 2024-10-04 RX ORDER — CETIRIZINE HYDROCHLORIDE 10 MG/1
10 TABLET ORAL ONCE
Status: COMPLETED | OUTPATIENT
Start: 2024-10-04 | End: 2024-10-04

## 2024-10-04 RX ORDER — FAMOTIDINE 10 MG/ML
20 INJECTION, SOLUTION INTRAVENOUS ONCE
Status: COMPLETED | OUTPATIENT
Start: 2024-10-04 | End: 2024-10-04

## 2024-10-04 RX ADMIN — ACETAMINOPHEN 650 MG: 325 TABLET ORAL at 11:20

## 2024-10-04 RX ADMIN — FAMOTIDINE 20 MG: 10 INJECTION INTRAVENOUS at 11:27

## 2024-10-04 RX ADMIN — CETIRIZINE HYDROCHLORIDE 10 MG: 10 TABLET, FILM COATED ORAL at 11:20

## 2024-10-04 RX ADMIN — FERUMOXYTOL 510 MG: 510 INJECTION INTRAVENOUS at 11:59

## 2024-10-18 ENCOUNTER — INFUSION (OUTPATIENT)
Dept: ONCOLOGY | Facility: HOSPITAL | Age: 64
End: 2024-10-18
Payer: MEDICARE

## 2024-10-18 VITALS
DIASTOLIC BLOOD PRESSURE: 83 MMHG | SYSTOLIC BLOOD PRESSURE: 149 MMHG | TEMPERATURE: 97.8 F | OXYGEN SATURATION: 95 % | BODY MASS INDEX: 34.32 KG/M2 | HEART RATE: 96 BPM | RESPIRATION RATE: 16 BRPM | WEIGHT: 174.8 LBS

## 2024-10-18 DIAGNOSIS — K90.9 MALABSORPTION OF IRON: Primary | ICD-10-CM

## 2024-10-18 DIAGNOSIS — D50.8 OTHER IRON DEFICIENCY ANEMIAS: ICD-10-CM

## 2024-10-18 PROCEDURE — 25010000002 FERUMOXYTOL 510 MG/17ML SOLUTION 17 ML VIAL: Performed by: NURSE PRACTITIONER

## 2024-10-18 PROCEDURE — 96374 THER/PROPH/DIAG INJ IV PUSH: CPT

## 2024-10-18 PROCEDURE — 96375 TX/PRO/DX INJ NEW DRUG ADDON: CPT

## 2024-10-18 RX ORDER — FAMOTIDINE 10 MG/ML
20 INJECTION, SOLUTION INTRAVENOUS ONCE
Status: COMPLETED | OUTPATIENT
Start: 2024-10-18 | End: 2024-10-18

## 2024-10-18 RX ORDER — ACETAMINOPHEN 325 MG/1
650 TABLET ORAL ONCE
Status: COMPLETED | OUTPATIENT
Start: 2024-10-18 | End: 2024-10-18

## 2024-10-18 RX ORDER — CETIRIZINE HYDROCHLORIDE 10 MG/1
10 TABLET ORAL ONCE
Status: COMPLETED | OUTPATIENT
Start: 2024-10-18 | End: 2024-10-18

## 2024-10-18 RX ADMIN — FAMOTIDINE 20 MG: 10 INJECTION INTRAVENOUS at 13:25

## 2024-10-18 RX ADMIN — CETIRIZINE HYDROCHLORIDE 10 MG: 10 TABLET, FILM COATED ORAL at 13:09

## 2024-10-18 RX ADMIN — FERUMOXYTOL 510 MG: 510 INJECTION INTRAVENOUS at 13:36

## 2024-10-18 RX ADMIN — ACETAMINOPHEN 650 MG: 325 TABLET ORAL at 13:09

## 2024-10-22 NOTE — OP NOTE
Impression:     Celio is a 7 year old male whose development and behavior are consistent with:    1. ADHD, predominantly inattentive type    2. Sensory processing difficulty    3. Pediatric feeding disorder, chronic    4. Fine motor delay      At this time, Celio does not meet the Diagnostic and Statistical Manual of Mental Disorders-5th Edition (DSM-5) criteria for autism spectrum disorder (ASD). In order to meet the criteria for a diagnosis of autism, there must be persistent deficits in three areas of social communication and in two areas of restricted, repetitive behaviors. Celio exhibits some features commonly associated with autism spectrum disorder, but these symptoms are currently better explained by the developmental challenges associated with ADHD and sensory processing challenges.     It is important to note that children with ADHD occasionally miss other people's social cues due to their inability to focus and pay attention to the other person or conversation. Celio will need to be closely monitored for improvement of his social skills as his ADHD symptoms become more manageable with the help of appropriate medication, school accommodations, community therapies, and home supports.  Although he did not meet the criteria for a diagnosis of autism during this evaluation, he should be re-evaluated in 2-3 years because this diagnosis cannot be ruled-out at this time.     Celio benefits from the following home, community, and school-based interventions. Recommendations are as follows:     Recommendations:    - Family is encouraged to share this note with Celio's teachers and therapists.    Medications:   - Discussed initiating a trial of Focalin XR 5mg daily. Discussed with parents that initially, Celio should take the medication 7 days per week (even on the weekends) for the first 2 weeks to a month to help his body adjust to the medication quicker. The medication should be given in the morning with a  Melanoma Excision Procedure Note    Indications: patient presents with a chronic wound of the left ankle.  Allegedly started as a spider bite which subsequently became infected and developed skin loss.  Treated with dressings and now presents with clean granulating wound left ankle  .    Pre-operative Diagnosis: Chronic wound left ankle    Post-operative Diagnosis: Chronic wound left ankle with exposed peroneal tendons    Surgeon: Barry Worthy MD     Assistants: None    Anesthesia: General anesthesia    Procedure Details   The patient was seen in the Holding Room. The patient concurred with the proposed plan, giving informed consent.  The site of surgery properly noted/marked. The patient was taken to Operating Room, identified and staff verified the following Procedure(s): Debridement wound left ankle, local advancement flaps and split skin grafting  A Time Out was held and the above information confirmed.    The patient was placed lying supine.  The left leg and left thigh were prepped and draped in sterile fashion.  One-half percent Marcaine with epinephrine was used to anesthetize the skin surrounding an area on the left thigh measuring 6 x 4 cm. the left ankle wound was debridement by removing the non-granulating portions of the exposed peroneal tendons and elevating the anterior and posterior skin.  Local advancement flaps were created anteriorly and posteriorly by freeing the anterior and posterior skin and subcutaneous tissue from the deeper attachments and advancing it over as much of the wound as possible.   A split thickness skin graft was harvested from the left anterolateral thigh, fenestrated and cut to shape and sewn into the defect on the lateral aspect of the left ankle with 5-0 and 6-0 Vicryl. Bacitracin ointment was applied to Adaptic which was then placed over the skin graft.  This in turn was covered with saline soaked sterile gauze 4 x 4's cut to exactly match the skin graft.  The  filling breakfast. Celio will be observed by parents during activities that require sustained attention. Prior to our next visit, parents should ask teachers how Celio is doing.   - Discussed with parents that it is common for children to not be hungry at lunchtime while taking this medication. It is reasonable to then give Celio a filling dinner and a supper (or a bedtime snack) to make up for the skipped lunch. Some kids may experience stomachaches, headaches, or trouble falling asleep when starting this medication - this should all improve within 2-3 weeks. If medication doses are skipped initially, it may take longer for side effects to fade. Some kids may be a little irritable when the medication wears off; this is normal, but please let me know if the irritability is severe. If Celio experiences increased anxiety on the medication, please let me know. If the anxiety is severe, do not give the next dose. Please call if any questions or concerns arise. The next follow up appointment should be made for 1 month after starting this medication.     School:   Discussed that Celio continues to meet criteria for receiving an appropriate educational classification under Section 504 of the Rehabilitation Act and the reauthorized Individuals with Disabilities Education Act (IDEA) due to the pattern of cognitive and behavioral difficulties he demonstrates that are associated with ADHD. Given this diagnosis, Celio continues to qualify to receive appropriate accommodations and support services for learning. Specifically, Celio demonstrates attention and executive difficulties requiring services addressing focus, sustained attention, impulsivity, and independent work. Consequently, his Individualized Education Plan (IEP) should emphasize the following supports and interventions for his cognitive difficulties:    - He should be seated away from distractions and near the teacher. They should be cued when it is important  foot and leg and ankle were wrapped with 2 rolls of cast padding and then placed in a fiberglass cast with the ankle at 90°.  At the end of the operation, all sponge, instrument, and needle counts were correct.    Findings:  Chronic left ankle wound    Estimated Blood Loss:  Minimal      Specimens: None      Complications:  None; patient tolerated the procedure well.           Disposition: PACU - hemodynamically stable.           Condition: stable    Attending Attestation: I was present and scrubbed for the entire procedure.    Barry Worthy MD   for them to pay attention and redirected when they appear to be off task or losing focus.  - He will require structure in the form of consistent routines and schedules as well as visual reminders and verbal prompts.  - Larger tasks should be broken down into smaller parts and instructions should be presented in a brief, step-by-step manner.  - He should be encouraged to check the accuracy of their work.  - He will also continue to require speech and language services as well as occupational therapy.    Additional classroom accommodations to manage ADHD to consider:  Preferential seating  Scheduled motor breaks  Nonverbal cues to pay attention  Verbal directions and clearly stated steps  Provision of verbal rewards or points to improve motivation   Extended time on exams and quizzes    Behavioral Therapy for ADHD:   There are a few different options to look for behavioral therapy to address challenges associated with ADHD:    1) Participate in virtual parent training (alone or with follow up behavior therapy in person) at the [facility]: call [phone] and ask to be placed on the list for the next available parent training.     2) Consider an online program such as:  - ZOILA: Parent to Parent Family Training on ADHD: https://zoila.org/gjibiq-kh-xjzpuy/  - Everyday Parenting: the ABCs of Child Rearing by Joaquín Armas (free course online): https://www.coursera.org/learn/everyday?parenting  - Triple P (Positive Parenting Program) at https://www.triplep-parenting.com/us/parenting-courses/triple-p-online/   - Essentials for Parenting (includes videos): https://www.cdc.gov/parents/essentials/index.htm  - 1?2?3 Magic (positive parenting video and workbook suitable for children 12 years of age and younger): www.123magic.com    3) Some options in the [place] area to look into:  - [facility] [place], [phone] ([place])   - [facility], [phone] - use  play-based approaches  - [facility] at roberto [email or web address] or [phone]  - [facility] intake at [phone] ([place]) - are not taking new patients at this time, but may later   - Tuesday[facility] Child, Lalit N Jeanna Caruso., [phone], https://www.tuesdaysSouthern Ohio Medical Centercago.org/ for children 6 and under ([place])  - [facility], [phone], https://www.[facility].org/our-services/mklabqogbm-yvadnvqbhsxsl-ndhxsyvi/disruptive-behaviors ([place], [place], [place], [place])  - [facility], [phone], https://www.[facility].[employer]/ ([place])  - [facility], [phone], https://www.71lbs.[employer]/ ([place])  - [facility], [phone] ([place])  - [facility], [phone], https://www.[facility].[employer]/ ([place])  - [facility], [phone], https://www.[facility].org/clinical-services/ ([place])    4) Parents can also consider an ADHD  for their child. They can read more about ADHD coaches and get their contact information at this website: https://www.adhdcoaches.org/     5) Contact your insurance and ask what behavioral health providers are in your plan, and then contact those providers and ask which of them work with children.     6) Consider looking online for a psychologist who works with families for challenging behaviors:  - https://.apa.org/  - https://www.psychologytoday.com/us   - https://www.cdc.gov/ncbddd/adhd/behavior?therapy.html   - https://www.cdc.gov/ncbddd/adhd/documents/behavior?therapy?finding?a?therapist.pdf    7) Helpful resource:   - CDC’s website on evidence?based behavioral interventions for ADHD: https://www.cdc.gov/ncbddd/adhd/treatment.html    Other Therapies:   - Recommend occupational therapy in the outpatient setting. Orders were placed today to do these therapies at an [facility] hospital or outside of [facility].  For [facility], parents are asked to schedule these appointments through the [employer] isidra or to call:   [facility] Raisa Flanagan - [phone]  [facility] [place] ([facility]) - [phone] (ask for \"intake\") or call [phone] directly  [facility] [place] - [phone]  [facility] Belgica Swain - [phone]  [facility] [place] - [phone]  [facility] [place] - [phone]  [facility] [place] - [phone]  [facility] [place] - [phone] (same as [place] number)  [facility] - [phone]  For outside of [facility], parents are asked to please call their insurance provider for a list of therapists in their network.    ADHD Resources:     ADHD Books:  - Taking Charge of ADHD, 3rd Edition: The Complete, Authoritative Guide for Parents by Leon Mishra   - Rewards for Kids: Ready to Use Charts and Activities for Positive Parenting by Jolanta Lua   - Smart But Scattered: The Revolutionary \"Executive Skills\" Approach to Helping Kids Reach Their Potential by Ashli Morin and Suleiman Maki   - Parenting Children With ADHD: 10 Lessons That Medicine Cannot Teach by Darryl Zamudio    - Personal Space Camp by Rufina Fortune   - Rewards for Kids: Ready to Use Charts and Activities for Positive Parenting by Jolanta Lua   - Steps to [place]: Teaching Everyday Skills to Children with Special Needs by Jesse Horton and Joaquín Garcia    ADHD Websites:  - www.darcy.org  - www.additudemag.com  - www.understood.org  - www.ldonline.org/     ADHD Books for Kids:   - ADHD in HD by Drake Carter  - The Survival Guide for Kids with ADHD by Anjel Beaver  - Learning to Slow Down and Pay Attention by Svetlana Murillo and Erica Marte  - Thriving with ADHD Workbook for Kids by Verónica Gautam  - Carloz's Busy Brain: My ADHD Toolkit Book  by Erin Schoenfelder Gonzalez  - Understanding My ADHD by Darlene Aguilar    Accessing Books for Free:   - One way to access books for free is through the [facility].  They offer easy access to 800+ ebooks and thousands of online children’s health articles for parents, teens, and children. Check out their website at: [facility]/[facility] and register online to borrow [facility] materials and eBooks. Registration is free!    Amber Yanes D.O.  Developmental and Behavioral Pediatrician  [facility]  [address], [address] [place] [address]  Ph: [phone]  Fax: [phone]

## 2024-11-25 RX ORDER — ATENOLOL 25 MG/1
25 TABLET ORAL DAILY
COMMUNITY

## 2024-11-25 NOTE — H&P
[facility]   PREOPERATIVE HISTORY AND PHYSICAL    Patient Name:Filomena Liu  : 1960  MRN: [id number]  Primary Care Physician: Fernando Dunn MD  Date of admission: (Not on file)    Subjective   Subjective     Chief Complaint: preoperative evaluation    HPI  Filomena Liu is a 64 y.o. female who presents for preoperative evaluation. She is scheduled for WIDE LOCAL EXCISION OF TONGUE LESION (N/A).       Personal History     Past Medical History:   Diagnosis Date    Ankle pain     Ankle wound     LEFT    Anxiety     Arthritis of back     Arthritis of neck     Asthma     Benign tumor of thymus     REMOVED    Bruises easily     Cataract     COPD (chronic obstructive pulmonary disease)     CTS (carpal tunnel syndrome) Surgery     DDD (degenerative disc disease), cervical     Depression     Fracture of wrist 291    GERD (gastroesophageal reflux disease)     Hip arthrosis Unknown    History of MRSA infection     LEFT ANKLE TREAT [facility]  5YEARS GO    Hyperlipidemia     Hypertension     Knee sprain 2023    Knee swelling Unknown    Shoulder arthritis 2023    Shoulder pain, left 2023    Sleep apnea     NO MACHINE USE    Spinal stenosis     Spondylolisthesis of cervical region        Past Surgical History:   Procedure Laterality Date    BACK SURGERY      cervical disc surgery with fusion-    CHOLECYSTECTOMY      COLONOSCOPY      COLONOSCOPY N/A 2020    Procedure: COLONOSCOPY, polypectomy;  Surgeon: Filomena Mckeon MD;  [facility];  Service: Gastroenterology;  Laterality: N/A;  Diverticulosis; Hepatic flexure polyp x 1; Polyp at 60cm x 1; Polyp at 50cm x 1- hot snare;    COLONOSCOPY N/A 4/15/2024    Procedure: COLONOSCOPY into sigmoid colon with hot snare polypectomy;  Surgeon: Leatha Johns MD;  Location: [facility] ENDOSCOPY;  Service: General;  Laterality: N/A;  pre- history of polyps  post- diverticulosis, polyp    ENDOSCOPY N/A 8/15/2024    Procedure:  ESOPHAGOGASTRODUODENOSCOPY;  Surgeon: Garth Constantino MD;  Location: [facility] ENDOSCOPY;  Service: Gastroenterology;  Laterality: N/A;  PRE- CIRRHOSIS, DARK STOOL  POST- NORMAL    HAND SURGERY  2000    HYSTERECTOMY      INCISION AND DRAINAGE LEG Left 11/17/2018    Procedure: Incision and Drainage of Left ankle;  Surgeon: Maxwell Lanier MD;  Location: [facility] MAIN OR;  Service: Orthopedics    NECK SURGERY  2000    SIGMOIDOSCOPY N/A 3/28/2024    Procedure: SIGMOIDOSCOPY FLEXIBLE;  Surgeon: Leatha Johns MD;  Location: [facility] ENDOSCOPY;  Service: General;  Laterality: N/A;  pre: h/o colon polyps  post: poor prep, diverticulosis    SKIN BIOPSY      SKIN GRAFT SPLIT THICKNESS N/A 02/12/2019    Procedure: debridement SKIN GRAFT SPLIT THICKNESS left ankle wound;  Surgeon: Barry Worthy MD;  Location: [facility];  Service: Plastics    TOTAL SHOULDER ARTHROPLASTY Left 7/20/2023    Procedure: Total  shoulder arthroplasty;  Surgeon: Maxwell Lanier MD;  Location: [facility];  Service: Orthopedics;  Laterality: Left;    TOTAL THYMECTOMY      TRIGGER POINT INJECTION  2000    WRIST FRACTURE SURGERY      CARPAL TUNNEL       Family History: Her family history includes Alzheimer's disease in her father; Emphysema in her mother.     Social History: She  reports that she has been smoking cigarettes. She started smoking about 44 years ago. She has a 11.2 pack-year smoking history. She has been exposed to tobacco smoke. She has never used smokeless tobacco. She reports that she does not currently use alcohol. She reports that she does not use drugs.    Home Medications:  Fluticasone-Umeclidin-Vilant, HYDROcodone-acetaminophen, albuterol sulfate HFA, atenolol, cholecalciferol, fluticasone, ipratropium-albuterol, pantoprazole, potassium chloride, rOPINIRole, spironolactone, and torsemide    Allergies:  She has No Known Allergies.    Objective    Objective     Vitals:       ENT Physical  Exam  Constitutional  Appearance: patient appears well-developed, well-nourished and well-groomed,  Communication/Voice: communication appropriate for developmental age; vocal quality normal;  Head and Face  Appearance: head appears normal, face appears normal and face appears atraumatic;  Palpation: facial palpation normal;  Salivary: glands normal;  Ear  Hearing: intact;  Auricles: right auricle normal; left auricle normal;  External Mastoids: right external mastoid normal; left external mastoid normal;  Ear Canals: right ear canal normal; left ear canal normal;  Tympanic Membranes: right tympanic membrane normal; left tympanic membrane normal;  Nose  External Nose: nares patent bilaterally; external nose normal;  Internal Nose: nasal mucosa normal; septum normal; bilateral inferior turbinates normal;  Oral Cavity/Oropharynx  Lips: normal;  Tongue: tongue lesion present;  Oral mucosa: normal;  Hard palate: normal;  Soft palate: normal;  Tonsils: normal;  Base of Tongue: normal;  Posterior pharyngeal wall: normal;  Neck  Neck: neck normal; neck palpation normal;  Thyroid: thyroid normal;  Respiratory  Inspection: breathing unlabored; normal breathing rate;  Auscultation: breath sounds are clear;  Cardiovascular  Inspection: extremities are warm and well perfused; no peripheral edema present;  Auscultation: regular rate and rhythm;    Assessment & Plan   Assessment / Plan     Brief Patient Summary:  Filomena Liu is a 64 y.o. female who presents for preoperative evaluation.    Pre-Op Diagnosis Codes:      * Open wound of tongue and floor of mouth, complicated [S01.502A]    Active Hospital Problems:  There are no active hospital problems to display for this patient.    Plan:   Procedure(s):  WIDE LOCAL EXCISION OF TONGUE LESION    ORAL CAVITY BIOPSY: The risks, benefits, and alternatives of the operation including but not limited to pain, numbness, bleeding, infection, scarring, change in the ability to eat or  speak and risks of the anesthesia, were discussed full with the patient and questions were answered. No guarantees were made or implied. Possible need for further therapy depending on the pathologic outcome was discussed.      El Mercedes MD

## 2024-11-26 ENCOUNTER — HOSPITAL ENCOUNTER (OUTPATIENT)
Facility: SURGERY CENTER | Age: 64
Setting detail: HOSPITAL OUTPATIENT SURGERY
Discharge: HOME | End: 2024-11-26
Attending: OTOLARYNGOLOGY | Admitting: OTOLARYNGOLOGY
Payer: MEDICARE

## 2024-11-26 ENCOUNTER — ANESTHESIA EVENT (OUTPATIENT)
Dept: SURGERY | Facility: SURGERY CENTER | Age: 64
End: 2024-11-26
Payer: MEDICARE

## 2024-11-26 ENCOUNTER — ANESTHESIA (OUTPATIENT)
Dept: SURGERY | Facility: SURGERY CENTER | Age: 64
End: 2024-11-26
Payer: MEDICARE

## 2024-11-26 VITALS
BODY MASS INDEX: 32.98 KG/M2 | RESPIRATION RATE: 16 BRPM | SYSTOLIC BLOOD PRESSURE: 134 MMHG | OXYGEN SATURATION: 97 % | HEART RATE: 76 BPM | WEIGHT: 168 LBS | TEMPERATURE: 97.4 F | DIASTOLIC BLOOD PRESSURE: 80 MMHG | HEIGHT: 60 IN

## 2024-11-26 DIAGNOSIS — K14.8 LESION OF TONGUE: Primary | ICD-10-CM

## 2024-11-26 PROCEDURE — 41110 EXCISION OF TONGUE LESION: CPT | Performed by: OTOLARYNGOLOGY

## 2024-11-26 PROCEDURE — 25010000002 FENTANYL CITRATE (PF) 50 MCG/ML SOLUTION: Performed by: NURSE ANESTHETIST, CERTIFIED REGISTERED

## 2024-11-26 PROCEDURE — 25010000002 FENTANYL CITRATE (PF) 50 MCG/ML SOLUTION: Performed by: ANESTHESIOLOGY

## 2024-11-26 PROCEDURE — 25010000002 LIDOCAINE PF 2% 2 % SOLUTION: Performed by: NURSE ANESTHETIST, CERTIFIED REGISTERED

## 2024-11-26 PROCEDURE — 25010000002 PROPOFOL 10 MG/ML EMULSION: Performed by: NURSE ANESTHETIST, CERTIFIED REGISTERED

## 2024-11-26 PROCEDURE — 25010000002 LIDOCAINE 1% - EPINEPHRINE 1:100000 1 %-1:100000 SOLUTION: Performed by: OTOLARYNGOLOGY

## 2024-11-26 PROCEDURE — 25010000002 DEXAMETHASONE SODIUM PHOSPHATE 20 MG/5ML SOLUTION: Performed by: NURSE ANESTHETIST, CERTIFIED REGISTERED

## 2024-11-26 PROCEDURE — 25010000002 ONDANSETRON PER 1 MG: Performed by: NURSE ANESTHETIST, CERTIFIED REGISTERED

## 2024-11-26 PROCEDURE — 25810000003 LACTATED RINGERS PER 1000 ML: Performed by: OTOLARYNGOLOGY

## 2024-11-26 PROCEDURE — 25010000002 SUGAMMADEX 200 MG/2ML SOLUTION: Performed by: NURSE ANESTHETIST, CERTIFIED REGISTERED

## 2024-11-26 PROCEDURE — 25010000002 MIDAZOLAM PER 1 MG: Performed by: ANESTHESIOLOGY

## 2024-11-26 PROCEDURE — 88305 TISSUE EXAM BY PATHOLOGIST: CPT | Performed by: OTOLARYNGOLOGY

## 2024-11-26 RX ORDER — FENTANYL CITRATE 50 UG/ML
50 INJECTION, SOLUTION INTRAMUSCULAR; INTRAVENOUS ONCE AS NEEDED
Status: COMPLETED | OUTPATIENT
Start: 2024-11-26 | End: 2024-11-26

## 2024-11-26 RX ORDER — OXYCODONE AND ACETAMINOPHEN 5; 325 MG/1; MG/1
2 TABLET ORAL ONCE
Status: COMPLETED | OUTPATIENT
Start: 2024-11-26 | End: 2024-11-26

## 2024-11-26 RX ORDER — ONDANSETRON 2 MG/ML
INJECTION INTRAMUSCULAR; INTRAVENOUS AS NEEDED
Status: DISCONTINUED | OUTPATIENT
Start: 2024-11-26 | End: 2024-11-26 | Stop reason: SURG

## 2024-11-26 RX ORDER — DEXAMETHASONE SODIUM PHOSPHATE 4 MG/ML
INJECTION, SOLUTION INTRA-ARTICULAR; INTRALESIONAL; INTRAMUSCULAR; INTRAVENOUS; SOFT TISSUE AS NEEDED
Status: DISCONTINUED | OUTPATIENT
Start: 2024-11-26 | End: 2024-11-26 | Stop reason: SURG

## 2024-11-26 RX ORDER — LIDOCAINE HYDROCHLORIDE 10 MG/ML
0.5 INJECTION, SOLUTION INFILTRATION; PERINEURAL ONCE AS NEEDED
Status: DISCONTINUED | OUTPATIENT
Start: 2024-11-26 | End: 2024-11-26 | Stop reason: HOSPADM

## 2024-11-26 RX ORDER — FENTANYL CITRATE 0.05 MG/ML
INJECTION, SOLUTION INTRAMUSCULAR; INTRAVENOUS AS NEEDED
Status: DISCONTINUED | OUTPATIENT
Start: 2024-11-26 | End: 2024-11-26 | Stop reason: SURG

## 2024-11-26 RX ORDER — DIPHENHYDRAMINE, LIDOCAINE, NYSTATIN
5 KIT ORAL 4 TIMES DAILY
COMMUNITY

## 2024-11-26 RX ORDER — PROPOFOL 10 MG/ML
VIAL (ML) INTRAVENOUS AS NEEDED
Status: DISCONTINUED | OUTPATIENT
Start: 2024-11-26 | End: 2024-11-26 | Stop reason: SURG

## 2024-11-26 RX ORDER — MAGNESIUM HYDROXIDE 1200 MG/15ML
LIQUID ORAL AS NEEDED
Status: DISCONTINUED | OUTPATIENT
Start: 2024-11-26 | End: 2024-11-26 | Stop reason: HOSPADM

## 2024-11-26 RX ORDER — SODIUM CHLORIDE 0.9 % (FLUSH) 0.9 %
3 SYRINGE (ML) INJECTION EVERY 12 HOURS SCHEDULED
Status: DISCONTINUED | OUTPATIENT
Start: 2024-11-26 | End: 2024-11-26 | Stop reason: HOSPADM

## 2024-11-26 RX ORDER — OXYCODONE AND ACETAMINOPHEN 7.5; 325 MG/1; MG/1
1 TABLET ORAL EVERY 4 HOURS PRN
Qty: 30 TABLET | Refills: 0 | Status: SHIPPED | OUTPATIENT
Start: 2024-11-26

## 2024-11-26 RX ORDER — PROMETHAZINE HYDROCHLORIDE 25 MG/1
25 TABLET ORAL EVERY 6 HOURS PRN
Qty: 15 TABLET | Refills: 0 | Status: SHIPPED | OUTPATIENT
Start: 2024-11-26

## 2024-11-26 RX ORDER — MIDAZOLAM HYDROCHLORIDE 1 MG/ML
1 INJECTION, SOLUTION INTRAMUSCULAR; INTRAVENOUS
Status: COMPLETED | OUTPATIENT
Start: 2024-11-26 | End: 2024-11-26

## 2024-11-26 RX ORDER — FAMOTIDINE 10 MG/ML
20 INJECTION, SOLUTION INTRAVENOUS ONCE
Status: COMPLETED | OUTPATIENT
Start: 2024-11-26 | End: 2024-11-26

## 2024-11-26 RX ORDER — SODIUM CHLORIDE, SODIUM LACTATE, POTASSIUM CHLORIDE, CALCIUM CHLORIDE 600; 310; 30; 20 MG/100ML; MG/100ML; MG/100ML; MG/100ML
9 INJECTION, SOLUTION INTRAVENOUS CONTINUOUS
Status: DISCONTINUED | OUTPATIENT
Start: 2024-11-26 | End: 2024-11-26 | Stop reason: HOSPADM

## 2024-11-26 RX ORDER — ROCURONIUM BROMIDE 10 MG/ML
INJECTION, SOLUTION INTRAVENOUS AS NEEDED
Status: DISCONTINUED | OUTPATIENT
Start: 2024-11-26 | End: 2024-11-26 | Stop reason: SURG

## 2024-11-26 RX ORDER — LIDOCAINE HYDROCHLORIDE 20 MG/ML
INJECTION, SOLUTION INFILTRATION; PERINEURAL AS NEEDED
Status: DISCONTINUED | OUTPATIENT
Start: 2024-11-26 | End: 2024-11-26 | Stop reason: SURG

## 2024-11-26 RX ORDER — SODIUM CHLORIDE 0.9 % (FLUSH) 0.9 %
3-10 SYRINGE (ML) INJECTION AS NEEDED
Status: DISCONTINUED | OUTPATIENT
Start: 2024-11-26 | End: 2024-11-26 | Stop reason: HOSPADM

## 2024-11-26 RX ORDER — SODIUM CHLORIDE 0.9 % (FLUSH) 0.9 %
10 SYRINGE (ML) INJECTION AS NEEDED
Status: DISCONTINUED | OUTPATIENT
Start: 2024-11-26 | End: 2024-11-26 | Stop reason: HOSPADM

## 2024-11-26 RX ORDER — LIDOCAINE HYDROCHLORIDE AND EPINEPHRINE 10; 10 MG/ML; UG/ML
INJECTION, SOLUTION INFILTRATION; PERINEURAL AS NEEDED
Status: DISCONTINUED | OUTPATIENT
Start: 2024-11-26 | End: 2024-11-26 | Stop reason: HOSPADM

## 2024-11-26 RX ORDER — SODIUM CHLORIDE, SODIUM LACTATE, POTASSIUM CHLORIDE, CALCIUM CHLORIDE 600; 310; 30; 20 MG/100ML; MG/100ML; MG/100ML; MG/100ML
30 INJECTION, SOLUTION INTRAVENOUS CONTINUOUS
Status: DISCONTINUED | OUTPATIENT
Start: 2024-11-26 | End: 2024-11-26 | Stop reason: HOSPADM

## 2024-11-26 RX ADMIN — MIDAZOLAM 1 MG: 1 INJECTION INTRAMUSCULAR; INTRAVENOUS at 09:10

## 2024-11-26 RX ADMIN — ROCURONIUM BROMIDE 30 MG: 50 INJECTION INTRAVENOUS at 09:44

## 2024-11-26 RX ADMIN — DEXAMETHASONE SODIUM PHOSPHATE 10 MG: 4 INJECTION, SOLUTION INTRAMUSCULAR; INTRAVENOUS at 09:53

## 2024-11-26 RX ADMIN — OXYCODONE HYDROCHLORIDE AND ACETAMINOPHEN 1.5 TABLET: 5; 325 TABLET ORAL at 11:02

## 2024-11-26 RX ADMIN — SODIUM CHLORIDE, POTASSIUM CHLORIDE, SODIUM LACTATE AND CALCIUM CHLORIDE 30 ML/HR: 600; 310; 30; 20 INJECTION, SOLUTION INTRAVENOUS at 08:35

## 2024-11-26 RX ADMIN — ONDANSETRON 4 MG: 2 INJECTION INTRAMUSCULAR; INTRAVENOUS at 10:18

## 2024-11-26 RX ADMIN — FENTANYL CITRATE 50 MCG: 50 INJECTION INTRAMUSCULAR; INTRAVENOUS at 10:24

## 2024-11-26 RX ADMIN — FAMOTIDINE 20 MG: 10 INJECTION, SOLUTION INTRAVENOUS at 08:44

## 2024-11-26 RX ADMIN — SUGAMMADEX 200 MG: 100 INJECTION, SOLUTION INTRAVENOUS at 10:18

## 2024-11-26 RX ADMIN — PROPOFOL 150 MG: 10 INJECTION, EMULSION INTRAVENOUS at 09:43

## 2024-11-26 RX ADMIN — FENTANYL CITRATE 50 MCG: 50 INJECTION INTRAMUSCULAR; INTRAVENOUS at 10:35

## 2024-11-26 RX ADMIN — LIDOCAINE HYDROCHLORIDE 100 MG: 20 INJECTION, SOLUTION EPIDURAL; INFILTRATION; INTRACAUDAL; PERINEURAL at 09:43

## 2024-11-26 RX ADMIN — MIDAZOLAM 1 MG: 1 INJECTION INTRAMUSCULAR; INTRAVENOUS at 08:45

## 2024-11-26 NOTE — OP NOTE
TONGUE LESION EXCISION/BIOPSY  Progress Note    Filomena Liu  11/26/2024    Pre-op Diagnosis:   Open wound of tongue and floor of mouth, complicated [S01.502A]       Post-Op Diagnosis Codes:     * Open wound of tongue and floor of mouth, complicated [S01.502A]    Procedure/CPT® Codes:        Procedure(s):  WIDE LOCAL EXCISION OF TONGUE LESION              Surgeon(s):  El Mercedes MD    Anesthesia: General    Staff:   Circulator: Anna Byrnes RN  Scrub Person: Celine García RN         Estimated Blood Loss: minimal    Urine Voided: * No values recorded between 11/26/2024  9:37 AM and 11/26/2024 10:15 AM *    Specimens:                Specimens       ID Source Type Tests Collected By Collected At Frozen?    A Tongue Tissue TISSUE PATHOLOGY EXAM   El Mercedes MD 11/26/24 0959 No    Description: tongue lesion left side - *stitch is superior    Comment: tongue lesion left side - *stitch is superior                  Drains: * No LDAs found *    Findings: approximately 2 cm left lateral tongue lesion excised and closed primarily    OPERATIVE REPORT: The patient was taken to the operating room placed in the supine position and placed under general endotracheal anesthesia.  The side-biting mouthgag was inserted and the mouth was opened.  The tongue was grasped with a Ray-Dee Dee and injected with 1% lidocaine with epinephrine.  After allowing time for vasoconstriction to occur the left lateral tongue lesion was identified and marked.  This was approximately 2 cm in biggest dimension.  The Bovie electrocautery was then used to excise this from the underlying musculature.  Hemostasis was assured with the Bovie electrocautery.  The irrigation was then performed and the incision was closed with 4-0 chromic suture in a vertical mattress interrupted fashion.  Following completion of this, the procedure was complete.  The patient was allowed to awaken from anesthesia and taken to the recovery room in satisfactory  condition.    Complications: none          El Mercedes MD     Date: 11/26/2024  Time: 10:15 EST

## 2024-11-26 NOTE — ANESTHESIA POSTPROCEDURE EVALUATION
"Patient: Filomena Liu    Procedure Summary       Date: 11/26/24 Room / Location[facility]    Anesthesia Start: 0937 Anesthesia Stop: 1029    Procedure: WIDE LOCAL EXCISION OF TONGUE LESION (Tongue) Diagnosis:       Open wound of tongue and floor of mouth, complicated      (Open wound of tongue and floor of mouth, complicated [S01.502A])    Surgeons: El Mercedes MD Provider: Gabe Rueda MD    Anesthesia Type: general ASA Status: 3            Anesthesia Type: general    Vitals  Vitals Value Taken Time   /76 11/26/24 1057   Temp 36.3 °C (97.4 °F) 11/26/24 1040   Pulse 72 11/26/24 1057   Resp 16 11/26/24 1057   SpO2 96 % 11/26/24 1057           Post Anesthesia Care and Evaluation    Patient location during evaluation: bedside  Patient participation: complete - patient participated  Level of consciousness: awake and alert  Pain management: adequate    Airway patency: patent  Anesthetic complications: No anesthetic complications    Cardiovascular status: acceptable  Respiratory status: acceptable  Hydration status: acceptable    Comments: /76   Pulse 72   Temp 36.3 °C (97.4 °F) (Temporal)   Resp 16   Ht 152.4 cm (60\")   Wt 76.2 kg (168 lb)   SpO2 96%   BMI 32.81 kg/m²     "

## 2024-11-26 NOTE — ANESTHESIA PREPROCEDURE EVALUATION
Anesthesia Evaluation     Patient summary reviewed and Nursing notes reviewed                Airway   Mallampati: II  Small opening  Dental      Pulmonary    (+) a smoker Current, COPD, asthma,sleep apnea  Cardiovascular     ECG reviewed  Rhythm: regular  Rate: normal    (+) hypertension, valvular problems/murmurs MR, hyperlipidemia      Neuro/Psych  (+) numbness, psychiatric history Anxiety  GI/Hepatic/Renal/Endo    (+) GERD, GI bleeding , liver disease, renal disease- CRI    Musculoskeletal     Abdominal    Substance History - negative use     OB/GYN negative ob/gyn ROS         Other   arthritis,                       Anesthesia Plan    ASA 3     general     intravenous induction     Anesthetic plan, risks, benefits, and alternatives have been provided, discussed and informed consent has been obtained with: patient.      CODE STATUS:

## 2024-11-26 NOTE — ANESTHESIA PROCEDURE NOTES
Airway  Urgency: elective    Date/Time: 11/26/2024 9:45 AM  Airway not difficult    General Information and Staff    Patient location during procedure: OR  CRNA/CAA: Ester Crum CRNA    Indications and Patient Condition    Preoxygenated: yes  Mask difficulty assessment: 1 - vent by mask    Final Airway Details  Final airway type: endotracheal airway      Successful airway: ETT  Cuffed: yes   Successful intubation technique: direct laryngoscopy  Endotracheal tube insertion site: oral  Blade: Giovanni  Blade size: 3  ETT size (mm): 6.0  Cormack-Lehane Classification: grade I - full view of glottis  Placement verified by: chest auscultation and capnometry   Measured from: teeth  ETT/EBT  to teeth (cm): 19  Number of attempts at approach: 1  Assessment: lips, teeth, and gum same as pre-op and atraumatic intubation    Additional Comments  Teeth, tongue, lips, and gums in preop condition. VSS throughout. Easy mask/smooth easy airway. Tube visualized through cords.

## 2024-11-27 LAB
CYTO UR: NORMAL
LAB AP CASE REPORT: NORMAL
LAB AP CLINICAL INFORMATION: NORMAL
PATH REPORT.FINAL DX SPEC: NORMAL
PATH REPORT.GROSS SPEC: NORMAL

## 2024-12-12 ENCOUNTER — TELEPHONE (OUTPATIENT)
Dept: ONCOLOGY | Facility: CLINIC | Age: 64
End: 2024-12-12
Payer: MEDICARE

## 2024-12-12 NOTE — TELEPHONE ENCOUNTER
"  Caller: Filomena Liu \"Trang\"    Relationship: Self    Best call back number: [phone]    What is the best time to reach you: ANY    Who are you requesting to speak with (clinical staff, provider,  specific staff member): CLINICAL     What was the call regarding: TRANG HAS AN APPOINTMENT FOR LAB AND FOLLOW UP ON 12-19    SHE HAD HER LABS AT HER PCP THIS WEEK    SHE IS WANTING TO SEE IF SHE HAS THOSE FAXED CAN SHE SKIP THE LABS    PLEASE ADVISE       "

## 2024-12-12 NOTE — TELEPHONE ENCOUNTER
Called patient, let her know that she did not have iron labs drawn at her PCP and would needs these labs drawn for Dr. Asencio. Pt xuan.u

## 2024-12-18 ENCOUNTER — TELEPHONE (OUTPATIENT)
Dept: ONCOLOGY | Facility: CLINIC | Age: 64
End: 2024-12-18

## 2024-12-18 NOTE — TELEPHONE ENCOUNTER
"    Caller: Filomena Liu \"Trang\"    Relationship to patient: Self    Best call back number: [phone]     Type of visit: LAB AND F/U    Requested date: ANY DAY, [facility]    If rescheduling, when is the original appointment: 12/19    "

## 2025-03-06 ENCOUNTER — APPOINTMENT (OUTPATIENT)
Dept: CT IMAGING | Facility: HOSPITAL | Age: 65
DRG: 439 | End: 2025-03-06
Payer: MEDICARE

## 2025-03-06 ENCOUNTER — APPOINTMENT (OUTPATIENT)
Dept: GENERAL RADIOLOGY | Facility: HOSPITAL | Age: 65
DRG: 439 | End: 2025-03-06
Payer: MEDICARE

## 2025-03-06 ENCOUNTER — HOSPITAL ENCOUNTER (INPATIENT)
Facility: HOSPITAL | Age: 65
LOS: 2 days | Discharge: HOME | DRG: 439 | End: 2025-03-08
Attending: EMERGENCY MEDICINE | Admitting: HOSPITALIST
Payer: MEDICARE

## 2025-03-06 DIAGNOSIS — K57.92 ACUTE DIVERTICULITIS: ICD-10-CM

## 2025-03-06 DIAGNOSIS — F10.939 ALCOHOL WITHDRAWAL SYNDROME WITH COMPLICATION: ICD-10-CM

## 2025-03-06 DIAGNOSIS — J44.1 COPD EXACERBATION: ICD-10-CM

## 2025-03-06 DIAGNOSIS — N17.9 ACUTE KIDNEY INJURY: ICD-10-CM

## 2025-03-06 DIAGNOSIS — K85.20 ALCOHOL-INDUCED ACUTE PANCREATITIS, UNSPECIFIED COMPLICATION STATUS: Primary | ICD-10-CM

## 2025-03-06 DIAGNOSIS — R07.9 CHEST PAIN, UNSPECIFIED TYPE: ICD-10-CM

## 2025-03-06 DIAGNOSIS — K70.30 ALCOHOLIC CIRRHOSIS OF LIVER WITHOUT ASCITES: ICD-10-CM

## 2025-03-06 DIAGNOSIS — E16.2 HYPOGLYCEMIA: ICD-10-CM

## 2025-03-06 PROBLEM — E87.20 LACTIC ACIDOSIS: Status: ACTIVE | Noted: 2025-03-06

## 2025-03-06 PROBLEM — J44.9 COPD (CHRONIC OBSTRUCTIVE PULMONARY DISEASE): Status: ACTIVE | Noted: 2024-08-05

## 2025-03-06 PROBLEM — K85.90 ACUTE PANCREATITIS: Status: ACTIVE | Noted: 2025-03-06

## 2025-03-06 LAB
ALBUMIN SERPL-MCNC: 3.6 G/DL (ref 3.5–5.2)
ALBUMIN/GLOB SERPL: 1.2 G/DL
ALP SERPL-CCNC: 133 U/L (ref 39–117)
ALT SERPL W P-5'-P-CCNC: 24 U/L (ref 1–33)
AMPHET+METHAMPHET UR QL: NEGATIVE
AMPHETAMINES UR QL: NEGATIVE
ANION GAP SERPL CALCULATED.3IONS-SCNC: 20.2 MMOL/L (ref 5–15)
APTT PPP: 34.3 SECONDS (ref 22.7–35.4)
AST SERPL-CCNC: 69 U/L (ref 1–32)
B PARAPERT DNA SPEC QL NAA+PROBE: NOT DETECTED
B PERT DNA SPEC QL NAA+PROBE: NOT DETECTED
BARBITURATES UR QL SCN: NEGATIVE
BASOPHILS # BLD AUTO: 0.02 10*3/MM3 (ref 0–0.2)
BASOPHILS NFR BLD AUTO: 0.2 % (ref 0–1.5)
BENZODIAZ UR QL SCN: NEGATIVE
BILIRUB SERPL-MCNC: 4.1 MG/DL (ref 0–1.2)
BILIRUB UR QL STRIP: NEGATIVE
BUN SERPL-MCNC: 54 MG/DL (ref 8–23)
BUN/CREAT SERPL: 34.2 (ref 7–25)
BUPRENORPHINE SERPL-MCNC: NEGATIVE NG/ML
C PNEUM DNA NPH QL NAA+NON-PROBE: NOT DETECTED
CALCIUM SPEC-SCNC: 8.8 MG/DL (ref 8.6–10.5)
CANNABINOIDS SERPL QL: NEGATIVE
CHLORIDE SERPL-SCNC: 90 MMOL/L (ref 98–107)
CLARITY UR: CLEAR
CO2 SERPL-SCNC: 25.8 MMOL/L (ref 22–29)
COCAINE UR QL: NEGATIVE
COLOR UR: YELLOW
CREAT SERPL-MCNC: 1.58 MG/DL (ref 0.57–1)
D-LACTATE SERPL-SCNC: 4 MMOL/L (ref 0.5–2)
D-LACTATE SERPL-SCNC: 4.5 MMOL/L (ref 0.5–2)
DEPRECATED RDW RBC AUTO: 43.7 FL (ref 37–54)
EGFRCR SERPLBLD CKD-EPI 2021: 36.4 ML/MIN/1.73
EOSINOPHIL # BLD AUTO: 0.05 10*3/MM3 (ref 0–0.4)
EOSINOPHIL NFR BLD AUTO: 0.5 % (ref 0.3–6.2)
ERYTHROCYTE [DISTWIDTH] IN BLOOD BY AUTOMATED COUNT: 13.6 % (ref 12.3–15.4)
ETHANOL BLD-MCNC: 21 MG/DL (ref 0–10)
ETHANOL UR QL: 0.02 %
FENTANYL UR-MCNC: NEGATIVE NG/ML
FLUAV SUBTYP SPEC NAA+PROBE: NOT DETECTED
FLUBV RNA ISLT QL NAA+PROBE: NOT DETECTED
GEN 5 1HR TROPONIN T REFLEX: 34 NG/L
GLOBULIN UR ELPH-MCNC: 3 GM/DL
GLUCOSE BLDC GLUCOMTR-MCNC: 112 MG/DL (ref 70–130)
GLUCOSE BLDC GLUCOMTR-MCNC: 118 MG/DL (ref 70–130)
GLUCOSE BLDC GLUCOMTR-MCNC: 186 MG/DL (ref 70–130)
GLUCOSE SERPL-MCNC: 46 MG/DL (ref 65–99)
GLUCOSE UR STRIP-MCNC: NEGATIVE MG/DL
HADV DNA SPEC NAA+PROBE: NOT DETECTED
HCOV 229E RNA SPEC QL NAA+PROBE: NOT DETECTED
HCOV HKU1 RNA SPEC QL NAA+PROBE: NOT DETECTED
HCOV NL63 RNA SPEC QL NAA+PROBE: NOT DETECTED
HCOV OC43 RNA SPEC QL NAA+PROBE: NOT DETECTED
HCT VFR BLD AUTO: 30.3 % (ref 34–46.6)
HGB BLD-MCNC: 9.9 G/DL (ref 12–15.9)
HGB UR QL STRIP.AUTO: NEGATIVE
HMPV RNA NPH QL NAA+NON-PROBE: NOT DETECTED
HPIV1 RNA ISLT QL NAA+PROBE: NOT DETECTED
HPIV2 RNA SPEC QL NAA+PROBE: NOT DETECTED
HPIV3 RNA NPH QL NAA+PROBE: NOT DETECTED
HPIV4 P GENE NPH QL NAA+PROBE: NOT DETECTED
IMM GRANULOCYTES # BLD AUTO: 0.04 10*3/MM3 (ref 0–0.05)
IMM GRANULOCYTES NFR BLD AUTO: 0.4 % (ref 0–0.5)
INR PPP: 1.63 (ref 0.9–1.1)
KETONES UR QL STRIP: ABNORMAL
LEUKOCYTE ESTERASE UR QL STRIP.AUTO: NEGATIVE
LIPASE SERPL-CCNC: 193 U/L (ref 13–60)
LYMPHOCYTES # BLD AUTO: 1.67 10*3/MM3 (ref 0.7–3.1)
LYMPHOCYTES NFR BLD AUTO: 18.2 % (ref 19.6–45.3)
M PNEUMO IGG SER IA-ACNC: NOT DETECTED
MAGNESIUM SERPL-MCNC: 3.1 MG/DL (ref 1.6–2.4)
MCH RBC QN AUTO: 29.6 PG (ref 26.6–33)
MCHC RBC AUTO-ENTMCNC: 32.7 G/DL (ref 31.5–35.7)
MCV RBC AUTO: 90.4 FL (ref 79–97)
METHADONE UR QL SCN: NEGATIVE
MONOCYTES # BLD AUTO: 1.01 10*3/MM3 (ref 0.1–0.9)
MONOCYTES NFR BLD AUTO: 11 % (ref 5–12)
NEUTROPHILS NFR BLD AUTO: 6.41 10*3/MM3 (ref 1.7–7)
NEUTROPHILS NFR BLD AUTO: 69.7 % (ref 42.7–76)
NITRITE UR QL STRIP: NEGATIVE
NRBC BLD AUTO-RTO: 0 /100 WBC (ref 0–0.2)
NT-PROBNP SERPL-MCNC: 571 PG/ML (ref 0–900)
OPIATES UR QL: POSITIVE
OXYCODONE UR QL SCN: POSITIVE
PCP UR QL SCN: NEGATIVE
PH UR STRIP.AUTO: 6.5 [PH] (ref 5–8)
PLATELET # BLD AUTO: 81 10*3/MM3 (ref 140–450)
PMV BLD AUTO: 10.7 FL (ref 6–12)
POTASSIUM SERPL-SCNC: 4.4 MMOL/L (ref 3.5–5.2)
PROCALCITONIN SERPL-MCNC: 0.14 NG/ML (ref 0–0.25)
PROT SERPL-MCNC: 6.6 G/DL (ref 6–8.5)
PROT UR QL STRIP: NEGATIVE
PROTHROMBIN TIME: 19.4 SECONDS (ref 11.7–14.2)
QT INTERVAL: 413 MS
QTC INTERVAL: 442 MS
RBC # BLD AUTO: 3.35 10*6/MM3 (ref 3.77–5.28)
RHINOVIRUS RNA SPEC NAA+PROBE: NOT DETECTED
RSV RNA NPH QL NAA+NON-PROBE: NOT DETECTED
SARS-COV-2 RNA NPH QL NAA+NON-PROBE: NOT DETECTED
SODIUM SERPL-SCNC: 136 MMOL/L (ref 136–145)
SODIUM UR-SCNC: 22 MMOL/L
SP GR UR STRIP: 1.01 (ref 1–1.03)
TRICYCLICS UR QL SCN: NEGATIVE
TROPONIN T % DELTA: -6
TROPONIN T NUMERIC DELTA: -2 NG/L
TROPONIN T SERPL HS-MCNC: 36 NG/L
UROBILINOGEN UR QL STRIP: ABNORMAL
WBC NRBC COR # BLD AUTO: 9.2 10*3/MM3 (ref 3.4–10.8)

## 2025-03-06 PROCEDURE — 83735 ASSAY OF MAGNESIUM: CPT | Performed by: EMERGENCY MEDICINE

## 2025-03-06 PROCEDURE — 25010000002 PIPERACILLIN SOD-TAZOBACTAM PER 1 G: Performed by: EMERGENCY MEDICINE

## 2025-03-06 PROCEDURE — 87040 BLOOD CULTURE FOR BACTERIA: CPT | Performed by: EMERGENCY MEDICINE

## 2025-03-06 PROCEDURE — 82077 ASSAY SPEC XCP UR&BREATH IA: CPT | Performed by: EMERGENCY MEDICINE

## 2025-03-06 PROCEDURE — 94761 N-INVAS EAR/PLS OXIMETRY MLT: CPT

## 2025-03-06 PROCEDURE — 84145 PROCALCITONIN (PCT): CPT | Performed by: EMERGENCY MEDICINE

## 2025-03-06 PROCEDURE — 82948 REAGENT STRIP/BLOOD GLUCOSE: CPT

## 2025-03-06 PROCEDURE — 93010 ELECTROCARDIOGRAM REPORT: CPT | Performed by: INTERNAL MEDICINE

## 2025-03-06 PROCEDURE — 94664 DEMO&/EVAL PT USE INHALER: CPT

## 2025-03-06 PROCEDURE — 81003 URINALYSIS AUTO W/O SCOPE: CPT | Performed by: EMERGENCY MEDICINE

## 2025-03-06 PROCEDURE — 25510000001 IOPAMIDOL 61 % SOLUTION: Performed by: EMERGENCY MEDICINE

## 2025-03-06 PROCEDURE — 83690 ASSAY OF LIPASE: CPT | Performed by: EMERGENCY MEDICINE

## 2025-03-06 PROCEDURE — 93005 ELECTROCARDIOGRAM TRACING: CPT | Performed by: EMERGENCY MEDICINE

## 2025-03-06 PROCEDURE — 36415 COLL VENOUS BLD VENIPUNCTURE: CPT | Performed by: EMERGENCY MEDICINE

## 2025-03-06 PROCEDURE — 85730 THROMBOPLASTIN TIME PARTIAL: CPT | Performed by: EMERGENCY MEDICINE

## 2025-03-06 PROCEDURE — 84484 ASSAY OF TROPONIN QUANT: CPT | Performed by: EMERGENCY MEDICINE

## 2025-03-06 PROCEDURE — 25010000002 ONDANSETRON PER 1 MG: Performed by: EMERGENCY MEDICINE

## 2025-03-06 PROCEDURE — 99291 CRITICAL CARE FIRST HOUR: CPT

## 2025-03-06 PROCEDURE — 0202U NFCT DS 22 TRGT SARS-COV-2: CPT | Performed by: EMERGENCY MEDICINE

## 2025-03-06 PROCEDURE — 74177 CT ABD & PELVIS W/CONTRAST: CPT

## 2025-03-06 PROCEDURE — 94799 UNLISTED PULMONARY SVC/PX: CPT

## 2025-03-06 PROCEDURE — 25010000002 LORAZEPAM PER 2 MG: Performed by: EMERGENCY MEDICINE

## 2025-03-06 PROCEDURE — 25810000003 SODIUM CHLORIDE 0.9 % SOLUTION: Performed by: EMERGENCY MEDICINE

## 2025-03-06 PROCEDURE — 71045 X-RAY EXAM CHEST 1 VIEW: CPT

## 2025-03-06 PROCEDURE — 94640 AIRWAY INHALATION TREATMENT: CPT

## 2025-03-06 PROCEDURE — 63710000001 REVEFENACIN 175 MCG/3ML SOLUTION: Performed by: HOSPITALIST

## 2025-03-06 PROCEDURE — 25010000002 THIAMINE HCL 200 MG/2ML SOLUTION: Performed by: HOSPITALIST

## 2025-03-06 PROCEDURE — 84300 ASSAY OF URINE SODIUM: CPT | Performed by: HOSPITALIST

## 2025-03-06 PROCEDURE — 83880 ASSAY OF NATRIURETIC PEPTIDE: CPT | Performed by: EMERGENCY MEDICINE

## 2025-03-06 PROCEDURE — 80053 COMPREHEN METABOLIC PANEL: CPT | Performed by: EMERGENCY MEDICINE

## 2025-03-06 PROCEDURE — 83605 ASSAY OF LACTIC ACID: CPT | Performed by: EMERGENCY MEDICINE

## 2025-03-06 PROCEDURE — 80307 DRUG TEST PRSMV CHEM ANLYZR: CPT | Performed by: EMERGENCY MEDICINE

## 2025-03-06 PROCEDURE — 25810000003 SODIUM CHLORIDE 0.9 % SOLUTION: Performed by: HOSPITALIST

## 2025-03-06 PROCEDURE — 85025 COMPLETE CBC W/AUTO DIFF WBC: CPT | Performed by: EMERGENCY MEDICINE

## 2025-03-06 PROCEDURE — 85610 PROTHROMBIN TIME: CPT | Performed by: EMERGENCY MEDICINE

## 2025-03-06 PROCEDURE — 25010000002 PIPERACILLIN SOD-TAZOBACTAM PER 1 G: Performed by: HOSPITALIST

## 2025-03-06 RX ORDER — SODIUM CHLORIDE 0.9 % (FLUSH) 0.9 %
10 SYRINGE (ML) INJECTION EVERY 12 HOURS SCHEDULED
Status: DISCONTINUED | OUTPATIENT
Start: 2025-03-06 | End: 2025-03-08 | Stop reason: HOSPADM

## 2025-03-06 RX ORDER — LORAZEPAM 2 MG/ML
1 INJECTION INTRAMUSCULAR
Status: DISCONTINUED | OUTPATIENT
Start: 2025-03-06 | End: 2025-03-08 | Stop reason: HOSPADM

## 2025-03-06 RX ORDER — HYDROCODONE BITARTRATE AND ACETAMINOPHEN 7.5; 325 MG/1; MG/1
1 TABLET ORAL EVERY 6 HOURS PRN
Status: DISCONTINUED | OUTPATIENT
Start: 2025-03-06 | End: 2025-03-08 | Stop reason: HOSPADM

## 2025-03-06 RX ORDER — PANTOPRAZOLE SODIUM 40 MG/1
40 TABLET, DELAYED RELEASE ORAL
Status: DISCONTINUED | OUTPATIENT
Start: 2025-03-07 | End: 2025-03-08 | Stop reason: HOSPADM

## 2025-03-06 RX ORDER — LORAZEPAM 1 MG/1
1 TABLET ORAL
Status: DISCONTINUED | OUTPATIENT
Start: 2025-03-06 | End: 2025-03-08 | Stop reason: HOSPADM

## 2025-03-06 RX ORDER — LORAZEPAM 2 MG/ML
1 INJECTION INTRAMUSCULAR ONCE
Status: COMPLETED | OUTPATIENT
Start: 2025-03-06 | End: 2025-03-06

## 2025-03-06 RX ORDER — SODIUM CHLORIDE 9 MG/ML
100 INJECTION, SOLUTION INTRAVENOUS CONTINUOUS
Status: ACTIVE | OUTPATIENT
Start: 2025-03-06 | End: 2025-03-07

## 2025-03-06 RX ORDER — AMOXICILLIN 250 MG
2 CAPSULE ORAL 2 TIMES DAILY PRN
Status: DISCONTINUED | OUTPATIENT
Start: 2025-03-06 | End: 2025-03-08 | Stop reason: HOSPADM

## 2025-03-06 RX ORDER — BISACODYL 10 MG
10 SUPPOSITORY, RECTAL RECTAL DAILY PRN
Status: DISCONTINUED | OUTPATIENT
Start: 2025-03-06 | End: 2025-03-08 | Stop reason: HOSPADM

## 2025-03-06 RX ORDER — ATENOLOL 25 MG/1
25 TABLET ORAL DAILY
Status: DISCONTINUED | OUTPATIENT
Start: 2025-03-06 | End: 2025-03-08 | Stop reason: HOSPADM

## 2025-03-06 RX ORDER — SODIUM CHLORIDE 0.9 % (FLUSH) 0.9 %
10 SYRINGE (ML) INJECTION AS NEEDED
Status: DISCONTINUED | OUTPATIENT
Start: 2025-03-06 | End: 2025-03-08 | Stop reason: HOSPADM

## 2025-03-06 RX ORDER — HYDROXYZINE HYDROCHLORIDE 10 MG/1
25 TABLET, FILM COATED ORAL 3 TIMES DAILY PRN
Status: DISCONTINUED | OUTPATIENT
Start: 2025-03-06 | End: 2025-03-08 | Stop reason: HOSPADM

## 2025-03-06 RX ORDER — LISINOPRIL 40 MG/1
40 TABLET ORAL DAILY
COMMUNITY

## 2025-03-06 RX ORDER — ROPINIROLE 1 MG/1
1 TABLET, FILM COATED ORAL NIGHTLY
Status: DISCONTINUED | OUTPATIENT
Start: 2025-03-06 | End: 2025-03-08 | Stop reason: HOSPADM

## 2025-03-06 RX ORDER — ACETAMINOPHEN 325 MG/1
650 TABLET ORAL EVERY 4 HOURS PRN
Status: DISCONTINUED | OUTPATIENT
Start: 2025-03-06 | End: 2025-03-08 | Stop reason: HOSPADM

## 2025-03-06 RX ORDER — BISACODYL 5 MG/1
5 TABLET, DELAYED RELEASE ORAL DAILY PRN
Status: DISCONTINUED | OUTPATIENT
Start: 2025-03-06 | End: 2025-03-08 | Stop reason: HOSPADM

## 2025-03-06 RX ORDER — IPRATROPIUM BROMIDE AND ALBUTEROL SULFATE 2.5; .5 MG/3ML; MG/3ML
3 SOLUTION RESPIRATORY (INHALATION) ONCE
Status: COMPLETED | OUTPATIENT
Start: 2025-03-06 | End: 2025-03-06

## 2025-03-06 RX ORDER — LORAZEPAM 2 MG/ML
2 INJECTION INTRAMUSCULAR
Status: DISCONTINUED | OUTPATIENT
Start: 2025-03-06 | End: 2025-03-08 | Stop reason: HOSPADM

## 2025-03-06 RX ORDER — ONDANSETRON 4 MG/1
4 TABLET, ORALLY DISINTEGRATING ORAL EVERY 6 HOURS PRN
Status: DISCONTINUED | OUTPATIENT
Start: 2025-03-06 | End: 2025-03-08 | Stop reason: HOSPADM

## 2025-03-06 RX ORDER — DEXTROSE MONOHYDRATE AND SODIUM CHLORIDE 5; .45 G/100ML; G/100ML
100 INJECTION, SOLUTION INTRAVENOUS CONTINUOUS
Status: DISCONTINUED | OUTPATIENT
Start: 2025-03-06 | End: 2025-03-06

## 2025-03-06 RX ORDER — SODIUM CHLORIDE, SODIUM LACTATE, POTASSIUM CHLORIDE, CALCIUM CHLORIDE 600; 310; 30; 20 MG/100ML; MG/100ML; MG/100ML; MG/100ML
100 INJECTION, SOLUTION INTRAVENOUS CONTINUOUS
Status: DISCONTINUED | OUTPATIENT
Start: 2025-03-06 | End: 2025-03-06

## 2025-03-06 RX ORDER — LORAZEPAM 1 MG/1
2 TABLET ORAL
Status: DISCONTINUED | OUTPATIENT
Start: 2025-03-06 | End: 2025-03-08 | Stop reason: HOSPADM

## 2025-03-06 RX ORDER — BUDESONIDE AND FORMOTEROL FUMARATE DIHYDRATE 160; 4.5 UG/1; UG/1
2 AEROSOL RESPIRATORY (INHALATION)
Status: DISCONTINUED | OUTPATIENT
Start: 2025-03-06 | End: 2025-03-08 | Stop reason: HOSPADM

## 2025-03-06 RX ORDER — NICOTINE 21 MG/24HR
1 PATCH, TRANSDERMAL 24 HOURS TRANSDERMAL
Status: DISCONTINUED | OUTPATIENT
Start: 2025-03-06 | End: 2025-03-08 | Stop reason: HOSPADM

## 2025-03-06 RX ORDER — IOPAMIDOL 612 MG/ML
85 INJECTION, SOLUTION INTRAVASCULAR
Status: COMPLETED | OUTPATIENT
Start: 2025-03-06 | End: 2025-03-06

## 2025-03-06 RX ORDER — HYDROCODONE BITARTRATE AND ACETAMINOPHEN 7.5; 325 MG/1; MG/1
1 TABLET ORAL EVERY 6 HOURS PRN
COMMUNITY

## 2025-03-06 RX ORDER — POLYETHYLENE GLYCOL 3350 17 G/17G
17 POWDER, FOR SOLUTION ORAL DAILY PRN
Status: DISCONTINUED | OUTPATIENT
Start: 2025-03-06 | End: 2025-03-08 | Stop reason: HOSPADM

## 2025-03-06 RX ORDER — ONDANSETRON 2 MG/ML
4 INJECTION INTRAMUSCULAR; INTRAVENOUS ONCE
Status: COMPLETED | OUTPATIENT
Start: 2025-03-06 | End: 2025-03-06

## 2025-03-06 RX ORDER — SODIUM CHLORIDE 9 MG/ML
40 INJECTION, SOLUTION INTRAVENOUS AS NEEDED
Status: DISCONTINUED | OUTPATIENT
Start: 2025-03-06 | End: 2025-03-08 | Stop reason: HOSPADM

## 2025-03-06 RX ORDER — PANTOPRAZOLE SODIUM 40 MG/10ML
80 INJECTION, POWDER, LYOPHILIZED, FOR SOLUTION INTRAVENOUS ONCE
Status: COMPLETED | OUTPATIENT
Start: 2025-03-06 | End: 2025-03-06

## 2025-03-06 RX ORDER — IPRATROPIUM BROMIDE AND ALBUTEROL SULFATE 2.5; .5 MG/3ML; MG/3ML
3 SOLUTION RESPIRATORY (INHALATION)
Status: DISCONTINUED | OUTPATIENT
Start: 2025-03-06 | End: 2025-03-08 | Stop reason: HOSPADM

## 2025-03-06 RX ORDER — THIAMINE HYDROCHLORIDE 100 MG/ML
200 INJECTION, SOLUTION INTRAMUSCULAR; INTRAVENOUS EVERY 8 HOURS SCHEDULED
Status: DISCONTINUED | OUTPATIENT
Start: 2025-03-06 | End: 2025-03-08 | Stop reason: HOSPADM

## 2025-03-06 RX ADMIN — NICOTINE 1 PATCH: 21 PATCH TRANSDERMAL at 18:11

## 2025-03-06 RX ADMIN — REVEFENACIN 175 MCG: 175 SOLUTION RESPIRATORY (INHALATION) at 21:37

## 2025-03-06 RX ADMIN — SODIUM CHLORIDE 1000 ML: 9 INJECTION, SOLUTION INTRAVENOUS at 08:59

## 2025-03-06 RX ADMIN — FOLIC ACID 1 MG: 5 INJECTION, SOLUTION INTRAMUSCULAR; INTRAVENOUS; SUBCUTANEOUS at 20:11

## 2025-03-06 RX ADMIN — BUDESONIDE AND FORMOTEROL FUMARATE DIHYDRATE 2 PUFF: 160; 4.5 AEROSOL RESPIRATORY (INHALATION) at 21:43

## 2025-03-06 RX ADMIN — PIPERACILLIN AND TAZOBACTAM 3.38 G: 3; .375 INJECTION, POWDER, FOR SOLUTION INTRAVENOUS at 13:01

## 2025-03-06 RX ADMIN — THIAMINE HYDROCHLORIDE 200 MG: 100 INJECTION, SOLUTION INTRAMUSCULAR; INTRAVENOUS at 21:17

## 2025-03-06 RX ADMIN — IPRATROPIUM BROMIDE AND ALBUTEROL SULFATE 3 ML: .5; 3 SOLUTION RESPIRATORY (INHALATION) at 11:24

## 2025-03-06 RX ADMIN — DEXTROSE MONOHYDRATE 500 ML: 100 INJECTION, SOLUTION INTRAVENOUS at 10:15

## 2025-03-06 RX ADMIN — SODIUM CHLORIDE 100 ML/HR: 9 INJECTION, SOLUTION INTRAVENOUS at 18:31

## 2025-03-06 RX ADMIN — ONDANSETRON 4 MG: 2 INJECTION, SOLUTION INTRAMUSCULAR; INTRAVENOUS at 09:00

## 2025-03-06 RX ADMIN — LORAZEPAM 1 MG: 2 INJECTION INTRAMUSCULAR; INTRAVENOUS at 09:01

## 2025-03-06 RX ADMIN — ROPINIROLE 1 MG: 1 TABLET, FILM COATED ORAL at 20:11

## 2025-03-06 RX ADMIN — Medication 10 ML: at 20:12

## 2025-03-06 RX ADMIN — PANTOPRAZOLE SODIUM 80 MG: 40 INJECTION, POWDER, FOR SOLUTION INTRAVENOUS at 09:00

## 2025-03-06 RX ADMIN — PIPERACILLIN AND TAZOBACTAM 3.38 G: 3; .375 INJECTION, POWDER, FOR SOLUTION INTRAVENOUS at 18:14

## 2025-03-06 RX ADMIN — HYDROXYZINE HYDROCHLORIDE 25 MG: 10 TABLET ORAL at 18:30

## 2025-03-06 RX ADMIN — IOPAMIDOL 85 ML: 612 INJECTION, SOLUTION INTRAVENOUS at 10:41

## 2025-03-06 RX ADMIN — HYDROCODONE BITARTRATE AND ACETAMINOPHEN 1 TABLET: 7.5; 325 TABLET ORAL at 21:17

## 2025-03-06 RX ADMIN — IPRATROPIUM BROMIDE AND ALBUTEROL SULFATE 3 ML: .5; 3 SOLUTION RESPIRATORY (INHALATION) at 21:36

## 2025-03-06 NOTE — ED NOTES
Nursing report ED to floor  Filomena Liu  64 y.o.  female    HPI :  HPI  Stated Reason for Visit: cough, cp, ETOH problem  History Obtained From: EMS, patient    Chief Complaint  Chief Complaint   Patient presents with    Chest Pain    Cough       Admitting doctor:   Brooks Clinton MD    Admitting diagnosis:   The primary encounter diagnosis was Alcohol-induced acute pancreatitis, unspecified complication status. Diagnoses of Acute diverticulitis, Acute kidney injury, Alcoholic cirrhosis of liver without ascites, COPD exacerbation, Chest pain, unspecified type, Hypoglycemia, and Alcohol withdrawal syndrome with complication were also pertinent to this visit.    Code status:   Current Code Status       Date Active Code Status Order ID Comments User Context       Prior            Allergies:   Patient has no known allergies.    Isolation:   No active isolations    Intake and Output  No intake or output data in the 24 hours ending 03/06/25 1302    Weight:   There were no vitals filed for this visit.    Most recent vitals:   Vitals:    03/06/25 0819 03/06/25 1124   BP: 140/46    Pulse: 68 87   Resp: 20 16   Temp: 99.7 °F (37.6 °C)    TempSrc: Oral    SpO2: 97% 93%       Active LDAs/IV Access:   Lines, Drains & Airways       Active LDAs       Name Placement date Placement time Site Days    Peripheral IV 03/06/25 0820 Right Antecubital 03/06/25  0820  Antecubital  less than 1                    Labs (abnormal labs have a star):   Labs Reviewed   COMPREHENSIVE METABOLIC PANEL - Abnormal; Notable for the following components:       Result Value    Glucose 46 (*)     BUN 54 (*)     Creatinine 1.58 (*)     Chloride 90 (*)     AST (SGOT) 69 (*)     Alkaline Phosphatase 133 (*)     Total Bilirubin 4.1 (*)     BUN/Creatinine Ratio 34.2 (*)     Anion Gap 20.2 (*)     eGFR 36.4 (*)     All other components within normal limits    Narrative:     GFR Categories in Chronic Kidney Disease (CKD)      GFR Category           GFR (mL/min/1.73)    Interpretation  G1                     90 or greater         Normal or high (1)  G2                      60-89                Mild decrease (1)  G3a                   45-59                Mild to moderate decrease  G3b                   30-44                Moderate to severe decrease  G4                    15-29                Severe decrease  G5                    14 or less           Kidney failure          (1)In the absence of evidence of kidney disease, neither GFR category G1 or G2 fulfill the criteria for CKD.    eGFR calculation 2021 CKD-EPI creatinine equation, which does not include race as a factor   PROTIME-INR - Abnormal; Notable for the following components:    Protime 19.4 (*)     INR 1.63 (*)     All other components within normal limits   LIPASE - Abnormal; Notable for the following components:    Lipase 193 (*)     All other components within normal limits   URINALYSIS W/ MICROSCOPIC IF INDICATED (NO CULTURE) - Abnormal; Notable for the following components:    Ketones, UA Trace (*)     Urobilinogen, UA 2.0 E.U./dL (*)     All other components within normal limits    Narrative:     Urine microscopic not indicated.   TROPONIN - Abnormal; Notable for the following components:    HS Troponin T 36 (*)     All other components within normal limits    Narrative:     High Sensitive Troponin T Reference Range:  <14.0 ng/L- Negative Female for AMI  <22.0 ng/L- Negative Male for AMI  >=14 - Abnormal Female indicating possible myocardial injury.  >=22 - Abnormal Male indicating possible myocardial injury.   Clinicians would have to utilize clinical acumen, EKG, Troponin, and serial changes to determine if it is an Acute Myocardial Infarction or myocardial injury due to an underlying chronic condition.        MAGNESIUM - Abnormal; Notable for the following components:    Magnesium 3.1 (*)     All other components within normal limits   ETHANOL - Abnormal; Notable for the following  components:    Ethanol 21 (*)     All other components within normal limits   URINE DRUG SCREEN - Abnormal; Notable for the following components:    Opiate Screen Positive (*)     Oxycodone Screen, Urine Positive (*)     All other components within normal limits    Narrative:     Cutoff For Drugs Screened:    Amphetamines               500 ng/ml  Barbiturates               200 ng/ml  Benzodiazepines            150 ng/ml  Cocaine                    150 ng/ml  Methadone                  200 ng/ml  Opiates                    100 ng/ml  Phencyclidine               25 ng/ml  THC                         50 ng/ml  Methamphetamine            500 ng/ml  Tricyclic Antidepressants  300 ng/ml  Oxycodone                  100 ng/ml  Buprenorphine               10 ng/ml    The normal value for all drugs tested is negative. This report includes unconfirmed screening results, with the cutoff values listed, to be used for medical treatment purposes only.  Unconfirmed results must not be used for non-medical purposes such as employment or legal testing.  Clinical consideration should be applied to any drug of abuse test, particularly when unconfirmed results are used.     CBC WITH AUTO DIFFERENTIAL - Abnormal; Notable for the following components:    RBC 3.35 (*)     Hemoglobin 9.9 (*)     Hematocrit 30.3 (*)     Platelets 81 (*)     Lymphocyte % 18.2 (*)     Monocytes, Absolute 1.01 (*)     All other components within normal limits   HIGH SENSITIVITIY TROPONIN T 1HR - Abnormal; Notable for the following components:    HS Troponin T 34 (*)     All other components within normal limits    Narrative:     High Sensitive Troponin T Reference Range:  <14.0 ng/L- Negative Female for AMI  <22.0 ng/L- Negative Male for AMI  >=14 - Abnormal Female indicating possible myocardial injury.  >=22 - Abnormal Male indicating possible myocardial injury.   Clinicians would have to utilize clinical acumen, EKG, Troponin, and serial changes to determine  if it is an Acute Myocardial Infarction or myocardial injury due to an underlying chronic condition.        POCT GLUCOSE FINGERSTICK - Abnormal; Notable for the following components:    Glucose 186 (*)     All other components within normal limits   RESPIRATORY PANEL PCR W/ COVID-19 (SARS-COV-2), NP SWAB IN UTM/VTP, 2 HR TAT - Normal    Narrative:     In the setting of a positive respiratory panel with a viral infection PLUS a negative procalcitonin without other underlying concern for bacterial infection, consider observing off antibiotics or discontinuation of antibiotics and continue supportive care. If the respiratory panel is positive for atypical bacterial infection (Bordetella pertussis, Chlamydophila pneumoniae, or Mycoplasma pneumoniae), consider antibiotic de-escalation to target atypical bacterial infection.   APTT - Normal   BNP (IN-HOUSE) - Normal    Narrative:     This assay is used as an aid in the diagnosis of individuals suspected of having heart failure. It can be used as an aid in the diagnosis of acute decompensated heart failure (ADHF) in patients presenting with signs and symptoms of ADHF to the emergency department (ED). In addition, NT-proBNP of <300 pg/mL indicates ADHF is not likely.    Age Range Result Interpretation  NT-proBNP Concentration (pg/mL:      <50             Positive            >450                   Gray                 300-450                    Negative             <300    50-75           Positive            >900                  Gray                300-900                  Negative            <300      >75             Positive            >1800                  Gray                300-1800                  Negative            <300   FENTANYL, URINE - Normal    Narrative:     Negative Threshold:      Fentanyl 5 ng/mL     The normal value for the drug tested is negative. This report includes final unconfirmed screening results to be used for medical treatment purposes only.  "Unconfirmed results must not be used for non-medical purposes such as employment or legal testing. Clinical consideration should be applied to any drug of abuse test, particularly when unconfirmed results are used.          PROCALCITONIN - Normal    Narrative:     As a Marker for Sepsis (Non-Neonates):    1. <0.5 ng/mL represents a low risk of severe sepsis and/or septic shock.  2. >2 ng/mL represents a high risk of severe sepsis and/or septic shock.    As a Marker for Lower Respiratory Tract Infections that require antibiotic therapy:    PCT on Admission    Antibiotic Therapy       6-12 Hrs later    >0.5                Strongly Recommended  >0.25 - <0.5        Recommended   0.1 - 0.25          Discouraged              Remeasure/reassess PCT  <0.1                Strongly Discouraged     Remeasure/reassess PCT    As 28 day mortality risk marker: \"Change in Procalcitonin Result\" (>80% or <=80%) if Day 0 (or Day 1) and Day 4 values are available. Refer to http://www.[facility]-pct-calculator.com    Change in PCT <=80%  A decrease of PCT levels below or equal to 80% defines a positive change in PCT test result representing a higher risk for 28-day all-cause mortality of patients diagnosed with severe sepsis for septic shock.    Change in PCT >80%  A decrease of PCT levels of more than 80% defines a negative change in PCT result representing a lower risk for 28-day all-cause mortality of patients diagnosed with severe sepsis or septic shock.      BLOOD CULTURE   BLOOD CULTURE   LACTIC ACID, PLASMA   CBC AND DIFFERENTIAL    Narrative:     The following orders were created for panel order CBC & Differential.  Procedure                               Abnormality         Status                     ---------                               -----------         ------                     CBC Auto Differential[[id number]]        Abnormal            Final result                 Please view results for these tests on the individual " orders.       EKG:   ECG 12 Lead Dyspnea   Preliminary Result   HEART RATE=69  bpm   RR Fuelwwjj=084  ms   AR Qfyswlrz=397  ms   P Horizontal Axis=3  deg   P Front Axis=79  deg   QRSD Interval=84  ms   QT Etwcgewp=871  ms   VSlD=130  ms   QRS Axis=87  deg   T Wave Axis=99  deg   - ABNORMAL ECG -   Sinus rhythm   Borderline right axis deviation   Abnormal R-wave progression, early transition   Nonspecific repol abnormality, diffuse leads   Date and Time of Study:2025-03-06 09:21:01          Meds given in ED:   Medications   sodium chloride 0.9 % flush 10 mL (has no administration in time range)   piperacillin-tazobactam (ZOSYN) 3.375 g IVPB in 100 mL NS MBP (CD) (3.375 g Intravenous New Bag 3/6/25 1301)   sodium chloride 0.9 % bolus 1,000 mL (0 mL Intravenous Stopped 3/6/25 1302)   LORazepam (ATIVAN) injection 1 mg (1 mg Intravenous Given 3/6/25 0901)   ondansetron (ZOFRAN) injection 4 mg (4 mg Intravenous Given 3/6/25 0900)   pantoprazole (PROTONIX) injection 80 mg (80 mg Intravenous Given 3/6/25 0900)   dextrose (D10W) 10% bolus 500 mL (0 mL Intravenous Stopped 3/6/25 1302)   iopamidol (ISOVUE-300) 61 % injection 85 mL (85 mL Intravenous Given 3/6/25 1041)   ipratropium-albuterol (DUO-NEB) nebulizer solution 3 mL (3 mL Nebulization Given 3/6/25 1124)       Imaging results:  CT Abdomen Pelvis With Contrast    Result Date: 3/6/2025   1. Diffusely heterogeneous hepatic attenuation with nodular liver contours, likely reflecting hepatocellular disease/cirrhosis. 2. Patchy peripancreatic fat stranding, which could be related to acute pancreatitis. Nonspecific high attenuation 1 cm x 0.7 cm ovoid mass in the posterior head of the pancreas. No evidence of pancreatic necrosis, splenic vein thrombosis, or pseudocyst. Could consider further evaluation with MRI/MRCP if clinically indicated. 3. Colonic diverticula with mild sigmoid colon wall thickening and pericolonic fat stranding suggesting mild acute uncomplicated  diverticulitis. 4. Status post cholecystectomy with mild extrahepatic biliary dilatation that could be related to the postoperative state. Occlusion from a stone or a small mass in the posterior pancreatic head is not entirely excluded. Attention on MRI. 5. Trace free fluid in the pelvis. 6. Calcified and noncalcified atherosclerotic disease in the abdominal aorta with a fusiform 3.5 cm infrarenal abdominal aortic aneurysm.  The critical results were discussed over the phone with the ordering ER physician, Dr. Chance Franks, at 11:57 a.m. on 3/6/2025.  This report was finalized on 3/6/2025 12:08 PM by Marv Meng MD on Workstation: [id number]      XR Chest 1 View    Result Date: 3/6/2025  No evidence for active disease in the chest.    This report was finalized on 3/6/2025 11:12 AM by Luisito Tatum M.D on Workstation: [id number]       Ambulatory status:   - x1    Social issues:   Social History     Socioeconomic History    Marital status:    Tobacco Use    Smoking status: Every Day     Current packs/day: 0.25     Average packs/day: 0.3 packs/day for 45.2 years (11.3 ttl pk-yrs)     Types: Cigarettes     Start date: 1/1/1980     Passive exposure: Current    Smokeless tobacco: Never    Tobacco comments:     I have cut down recently and will abstain starting 7/10/23   Vaping Use    Vaping status: Never Used   Substance and Sexual Activity    Alcohol use: Not Currently    Drug use: No    Sexual activity: Yes     Partners: Male     Birth control/protection: Post-menopausal, Tubal ligation, Hysterectomy       Peripheral Neurovascular  Peripheral Neurovascular (Adult)  Peripheral Neurovascular WDL: WDL    Neuro Cognitive  Neuro Cognitive (Adult)  Cognitive/Neuro/Behavioral WDL: WDL    Learning  Learning Assessment  Learning Readiness and Ability: no barriers identified    Respiratory  Respiratory  Airway WDL: WDL  Respiratory WDL  Respiratory WDL: .WDL except, cough    Abdominal Pain       Pain  Assessments  Pain (Adult)  (0-10) Pain Rating: Rest: 8  (0-10) Pain Rating: Activity: 8  Pain Location: chest    NIH Stroke Scale       Pako Hernández RN  03/06/25 13:02 EST

## 2025-03-06 NOTE — H&P
"    Patient Name:  Filomena Liu  YOB: 1960  MRN:  [id number]  Admit Date:  3/6/2025  Patient Care Team:  Fernando Dunn MD as PCP - General (Family Medicine)  Nam Calderon APRN as Referring Physician (Nurse Practitioner)  Alina Asencio MD as Consulting Physician (Hematology and Oncology)      Subjective   History Present Illness     Chief Complaint   Patient presents with    Chest Pain    Cough       Ms. Liu is a 64 y.o. with history of alcohol abuse, COPD, sleep apnea, GERD, cirrhosis and other issues who presented to the hospital complaining of \"withdrawals\".  She says that she has not had anything to drink since last night and was feeling shaky and tremulous.  She usually drinks about a pint of alcohol per day.  She has been having some left lower quadrant abdominal pain off and on for several weeks.  She reports some nausea, lack of appetite and vomiting.  She had some chest pain associated with the vomiting which is not currently bothering her.  She has had alcohol withdrawals before but never any history of seizures.  She says she is anxious and is requesting Ativan by name, says she needs that and a pain pill so that she can sleep all night.    She does note that she had some bleeding at home over the last couple days which she thought was from her vagina.  Has not recurred today      Review of Systems   Constitutional:  Negative for chills, fatigue and fever.   HENT:  Negative for congestion and trouble swallowing.    Eyes:  Negative for visual disturbance.   Respiratory:  Negative for cough, chest tightness and shortness of breath.    Cardiovascular:  Positive for chest pain. Negative for palpitations and leg swelling.   Gastrointestinal:  Positive for abdominal pain, nausea and vomiting. Negative for abdominal distention, constipation and diarrhea.   Genitourinary:  Negative for difficulty urinating, dysuria and frequency.   Musculoskeletal:  Negative for arthralgias. "   Skin:  Negative for rash.   Neurological:  Positive for tremors. Negative for dizziness and headaches.   Psychiatric/Behavioral:  Negative for confusion. The patient is nervous/anxious.         Personal History     Past Medical History:   Diagnosis Date    Ankle pain     Ankle wound     LEFT    Anxiety     Arthritis of back 2000    Arthritis of neck 2000    Asthma     Benign tumor of thymus     REMOVED    Bruises easily     Cataract     COPD (chronic obstructive pulmonary disease)     CTS (carpal tunnel syndrome) Surgery 1999    DDD (degenerative disc disease), cervical     Depression     Fracture of wrist 2917    GERD (gastroesophageal reflux disease)     Hip arthrosis Unknown    History of MRSA infection     LEFT ANKLE TREAT [facility]  5YEARS GO    Hyperlipidemia     Hypertension     Knee sprain June 2023    Knee swelling Unknown    Shoulder arthritis 06/05/2023    Shoulder pain, left 07/07/2023    Sleep apnea     NO MACHINE USE    Spinal stenosis     Spondylolisthesis of cervical region      Past Surgical History:   Procedure Laterality Date    BACK SURGERY      cervical disc surgery with fusion-    CHOLECYSTECTOMY      COLONOSCOPY      COLONOSCOPY N/A 11/12/2020    Procedure: COLONOSCOPY, polypectomy;  Surgeon: Filomena Mckeon MD;  Location: [facility];  Service: Gastroenterology;  Laterality: N/A;  Diverticulosis; Hepatic flexure polyp x 1; Polyp at 60cm x 1; Polyp at 50cm x 1- hot snare;    COLONOSCOPY N/A 4/15/2024    Procedure: COLONOSCOPY into sigmoid colon with hot snare polypectomy;  Surgeon: Leatha Johns MD;  Location: [facility] ENDOSCOPY;  Service: General;  Laterality: N/A;  pre- history of polyps  post- diverticulosis, polyp    ENDOSCOPY N/A 8/15/2024    Procedure: ESOPHAGOGASTRODUODENOSCOPY;  Surgeon: Garth Constantino MD;  Location: [facility] ENDOSCOPY;  Service: Gastroenterology;  Laterality: N/A;  PRE- CIRRHOSIS, DARK STOOL  POST- NORMAL    HAND SURGERY  2000    HYSTERECTOMY      INCISION AND  DRAINAGE LEG Left 11/17/2018    Procedure: Incision and Drainage of Left ankle;  Surgeon: Maxwell Lanier MD;  Location: [facility] MAIN OR;  Service: Orthopedics    NECK SURGERY  2000    SIGMOIDOSCOPY N/A 3/28/2024    Procedure: SIGMOIDOSCOPY FLEXIBLE;  Surgeon: Leatha Johns MD;  Location: [facility] ENDOSCOPY;  Service: General;  Laterality: N/A;  pre: h/o colon polyps  post: poor prep, diverticulosis    SKIN BIOPSY      SKIN GRAFT SPLIT THICKNESS N/A 02/12/2019    Procedure: debridement SKIN GRAFT SPLIT THICKNESS left ankle wound;  Surgeon: Barry Worthy MD;  Location: [facility] OR [facility];  Service: Plastics    TONGUE LESION EXCISION/BIOPSY N/A 11/26/2024    Procedure: WIDE LOCAL EXCISION OF TONGUE LESION;  Surgeon: El Mercedes MD;  Location: [facility] MAIN OR;  Service: ENT;  Laterality: N/A;    TOTAL SHOULDER ARTHROPLASTY Left 7/20/2023    Procedure: Total  shoulder arthroplasty;  Surgeon: Maxwell Lanier MD;  Location: [facility];  Service: Orthopedics;  Laterality: Left;    TOTAL THYMECTOMY      TRIGGER POINT INJECTION  2000    WRIST FRACTURE SURGERY      CARPAL TUNNEL     Family History   Problem Relation Age of Onset    Emphysema Mother     Alzheimer's disease Father     Malig Hyperthermia Neg Hx      Social History     Tobacco Use    Smoking status: Every Day     Current packs/day: 0.25     Average packs/day: 0.3 packs/day for 45.2 years (11.3 ttl pk-yrs)     Types: Cigarettes     Start date: 1/1/1980     Passive exposure: Current    Smokeless tobacco: Never   Vaping Use    Vaping status: Never Used   Substance Use Topics    Alcohol use: Yes     Alcohol/week: 10.0 standard drinks of alcohol     Types: 10 Drinks containing 0.5 oz of alcohol per week     Comment: pt reports drinking a pint a day    Drug use: No     No current facility-administered medications on file prior to encounter.     Current Outpatient Medications on File Prior to Encounter   Medication Sig Dispense Refill    albuterol  (PROVENTIL HFA;VENTOLIN HFA) 108 (90 Base) MCG/ACT inhaler Inhale 2 puffs 3 times a day. Indications: Spasm of Lung Air Passages      atenolol (TENORMIN) 25 MG tablet Take 1 tablet by mouth Daily.      Cholecalciferol 25 MCG (1000 UT) tablet Take 1 tablet by mouth 1 (One) Time Per Week. Indications: Vitamin D Deficiency      HYDROcodone-acetaminophen (NORCO) 7.5-325 MG per tablet Take 1 tablet by mouth Every 6 (Six) Hours As Needed for Moderate Pain.      ipratropium-albuterol (DUO-NEB) 0.5-2.5 mg/3 ml nebulizer Take 3 mL by nebulization 4 (Four) Times a Day. Indications: Chronic Obstructive Lung Disease      pantoprazole (PROTONIX) 40 MG EC tablet Take 1 tablet by mouth Every Morning. 30 tablet 0    potassium chloride (KLOR-CON M20) 20 MEQ CR tablet Take 1 tablet by mouth Daily. 30 tablet 0    rOPINIRole (REQUIP) 1 MG tablet Take 1 tablet by mouth every night at bedtime. Indications: Restless Leg Syndrome      spironolactone (ALDACTONE) 25 MG tablet Take 1 tablet by mouth Daily. 30 tablet 0    torsemide (DEMADEX) 20 MG tablet Take 1 tablet by mouth Daily. (Patient taking differently: Take 2 tablets by mouth Daily. PER NEPHROLOGY 8/23  Indications: Cardiac Failure, Edema, High Blood Pressure) 30 tablet 0    Trelegy Ellipta 200-62.5-25 MCG/ACT aerosol powder  Inhale 1 puff Daily. Indications: Asthma      fluticasone (FLONASE) 50 MCG/ACT nasal spray Administer 1 spray into the nostril(s) as directed by provider Daily. Indications: Stuffy Nose      lisinopril (PRINIVIL,ZESTRIL) 40 MG tablet Take 1 tablet by mouth Daily.      naloxone (NARCAN) 4 MG/0.1ML nasal spray Call 911. Don't prime. [place] in 1 nostril for overdose. Repeat in 2-3 minutes in other nostril if no or minimal breathing/responsiveness. (Patient not taking: Reported on 3/6/2025) 2 each 0    nystatin susp + lidocaine viscous (MAGIC MOUTHWASH) oral suspension Swish and swallow 5 mL 4 (Four) Times a Day.      oxyCODONE-acetaminophen (Percocet) 7.5-325 MG per  tablet Take 1 tablet by mouth Every 4 (Four) Hours As Needed for Severe Pain or Moderate Pain. 30 tablet 0    promethazine (PHENERGAN) 25 MG tablet Take 1 tablet by mouth Every 6 (Six) Hours As Needed for Nausea or Vomiting. 15 tablet 0     No Known Allergies    Objective    Objective     Vital Signs  Temp:  [99 °F (37.2 °C)-99.7 °F (37.6 °C)] 99 °F (37.2 °C)  Heart Rate:  [67-87] 67  Resp:  [16-20] 18  BP: (102-140)/(46-57) 120/55  SpO2:  [91 %-97 %] 97 %  on   ;   Device (Oxygen Therapy): room air  There is no height or weight on file to calculate BMI.    Physical Exam  Vitals reviewed.   Constitutional:       General: She is not in acute distress.     Appearance: She is obese.   Cardiovascular:      Rate and Rhythm: Normal rate and regular rhythm.   Pulmonary:      Effort: No respiratory distress.      Breath sounds: Normal breath sounds. No wheezing.   Abdominal:      General: There is no distension.      Palpations: Abdomen is soft.      Tenderness: There is abdominal tenderness (LLQ). There is no guarding or rebound.   Musculoskeletal:      Right lower leg: No edema.      Left lower leg: No edema.   Skin:     General: Skin is warm and dry.   Neurological:      Mental Status: She is alert and oriented to person, place, and time.      Comments: Minimal tremor   Psychiatric:         Mood and Affect: Mood normal.      Comments: Flat affect         Results Review:  I reviewed the patient's new clinical results.  I reviewed the patient's new imaging results and agree with the interpretation.  I reviewed the patient's other test results and agree with the interpretation  I personally viewed and interpreted the patient's EKG/Telemetry data  Discussed with ED provider.    Lab Results (last 24 hours)       Procedure Component Value Units Date/Time    CBC & Differential [[id number]]  (Abnormal) Collected: 03/06/25 0857    Specimen: Blood Updated: 03/06/25 0943    Narrative:      The following orders were created for panel  order CBC & Differential.  Procedure                               Abnormality         Status                     ---------                               -----------         ------                     CBC Auto Differential[[id number]]        Abnormal            Final result                 Please view results for these tests on the individual orders.    Comprehensive Metabolic Panel [[id number]]  (Abnormal) Collected: 03/06/25 0857    Specimen: Blood Updated: 03/06/25 1006     Glucose 46 mg/dL      BUN 54 mg/dL      Creatinine 1.58 mg/dL      Sodium 136 mmol/L      Potassium 4.4 mmol/L      Chloride 90 mmol/L      CO2 25.8 mmol/L      Calcium 8.8 mg/dL      Total Protein 6.6 g/dL      Albumin 3.6 g/dL      ALT (SGPT) 24 U/L      AST (SGOT) 69 U/L      Alkaline Phosphatase 133 U/L      Total Bilirubin 4.1 mg/dL      Globulin 3.0 gm/dL      A/G Ratio 1.2 g/dL      BUN/Creatinine Ratio 34.2     Anion Gap 20.2 mmol/L      eGFR 36.4 mL/min/1.73     Narrative:      GFR Categories in Chronic Kidney Disease (CKD)      GFR Category          GFR (mL/min/1.73)    Interpretation  G1                     90 or greater         Normal or high (1)  G2                      60-89                Mild decrease (1)  G3a                   45-59                Mild to moderate decrease  G3b                   30-44                Moderate to severe decrease  G4                    15-29                Severe decrease  G5                    14 or less           Kidney failure          (1)In the absence of evidence of kidney disease, neither GFR category G1 or G2 fulfill the criteria for CKD.    eGFR calculation 2021 CKD-EPI creatinine equation, which does not include race as a factor    Protime-INR [[id number]]  (Abnormal) Collected: 03/06/25 0857    Specimen: Blood Updated: 03/06/25 0934     Protime 19.4 Seconds      INR 1.63    Lipase [[id number]]  (Abnormal) Collected: 03/06/25 0857    Specimen: Blood Updated: 03/06/25 1048     Lipase 193 U/L      aPTT [[id number]]  (Normal) Collected: 03/06/25 0857    Specimen: Blood Updated: 03/06/25 0934     PTT 34.3 seconds     BNP [[id number]]  (Normal) Collected: 03/06/25 0857    Specimen: Blood Updated: 03/06/25 0948     proBNP 571.0 pg/mL     Narrative:      This assay is used as an aid in the diagnosis of individuals suspected of having heart failure. It can be used as an aid in the diagnosis of acute decompensated heart failure (ADHF) in patients presenting with signs and symptoms of ADHF to the emergency department (ED). In addition, NT-proBNP of <300 pg/mL indicates ADHF is not likely.    Age Range Result Interpretation  NT-proBNP Concentration (pg/mL:      <50             Positive            >450                   Gray                 300-450                    Negative             <300    50-75           Positive            >900                  Gray                300-900                  Negative            <300      >75             Positive            >1800                  Gray                300-1800                  Negative            <300    High Sensitivity Troponin T [[id number]]  (Abnormal) Collected: 03/06/25 0857    Specimen: Blood Updated: 03/06/25 0948     HS Troponin T 36 ng/L     Narrative:      High Sensitive Troponin T Reference Range:  <14.0 ng/L- Negative Female for AMI  <22.0 ng/L- Negative Male for AMI  >=14 - Abnormal Female indicating possible myocardial injury.  >=22 - Abnormal Male indicating possible myocardial injury.   Clinicians would have to utilize clinical acumen, EKG, Troponin, and serial changes to determine if it is an Acute Myocardial Infarction or myocardial injury due to an underlying chronic condition.         Magnesium [[id number]]  (Abnormal) Collected: 03/06/25 0857    Specimen: Blood Updated: 03/06/25 0951     Magnesium 3.1 mg/dL     Ethanol [[id number]]  (Abnormal) Collected: 03/06/25 0857    Specimen: Blood Updated: 03/06/25 1048     Ethanol 21 mg/dL      Ethanol %  0.021 %     CBC Auto Differential [[id number]]  (Abnormal) Collected: 03/06/25 0857    Specimen: Blood Updated: 03/06/25 0937     WBC 9.20 10*3/mm3      RBC 3.35 10*6/mm3      Hemoglobin 9.9 g/dL      Hematocrit 30.3 %      MCV 90.4 fL      MCH 29.6 pg      MCHC 32.7 g/dL      RDW 13.6 %      RDW-SD 43.7 fl      MPV 10.7 fL      Platelets 81 10*3/mm3      Neutrophil % 69.7 %      Lymphocyte % 18.2 %      Monocyte % 11.0 %      Eosinophil % 0.5 %      Basophil % 0.2 %      Immature Grans % 0.4 %      Neutrophils, Absolute 6.41 10*3/mm3      Lymphocytes, Absolute 1.67 10*3/mm3      Monocytes, Absolute 1.01 10*3/mm3      Eosinophils, Absolute 0.05 10*3/mm3      Basophils, Absolute 0.02 10*3/mm3      Immature Grans, Absolute 0.04 10*3/mm3      nRBC 0.0 /100 WBC     Urinalysis With Microscopic If Indicated (No Culture) - Urine, Clean Catch [[id number]]  (Abnormal) Collected: 03/06/25 0858    Specimen: Urine, Clean Catch Updated: 03/06/25 0922     Color, UA Yellow     Appearance, UA Clear     pH, UA 6.5     Specific Gravity, UA 1.009     Glucose, UA Negative     Ketones, UA Trace     Bilirubin, UA Negative     Blood, UA Negative     Protein, UA Negative     Leuk Esterase, UA Negative     Nitrite, UA Negative     Urobilinogen, UA 2.0 E.U./dL    Narrative:      Urine microscopic not indicated.    Urine Drug Screen - Urine, Clean Catch [[id number]]  (Abnormal) Collected: 03/06/25 0858    Specimen: Urine, Clean Catch Updated: 03/06/25 0935     THC, Screen, Urine Negative     Phencyclidine (PCP), Urine Negative     Cocaine Screen, Urine Negative     Methamphetamine, Ur Negative     Opiate Screen Positive     Amphetamine Screen, Urine Negative     Benzodiazepine Screen, Urine Negative     Tricyclic Antidepressants Screen Negative     Methadone Screen, Urine Negative     Barbiturates Screen, Urine Negative     Oxycodone Screen, Urine Positive     Buprenorphine, Screen, Urine Negative    Narrative:      Cutoff For Drugs  Screened:    Amphetamines               500 ng/ml  Barbiturates               200 ng/ml  Benzodiazepines            150 ng/ml  Cocaine                    150 ng/ml  Methadone                  200 ng/ml  Opiates                    100 ng/ml  Phencyclidine               25 ng/ml  THC                         50 ng/ml  Methamphetamine            500 ng/ml  Tricyclic Antidepressants  300 ng/ml  Oxycodone                  100 ng/ml  Buprenorphine               10 ng/ml    The normal value for all drugs tested is negative. This report includes unconfirmed screening results, with the cutoff values listed, to be used for medical treatment purposes only.  Unconfirmed results must not be used for non-medical purposes such as employment or legal testing.  Clinical consideration should be applied to any drug of abuse test, particularly when unconfirmed results are used.      Fentanyl, Urine - Urine, Clean Catch [[id number]]  (Normal) Collected: 03/06/25 0858    Specimen: Urine, Clean Catch Updated: 03/06/25 0945     Fentanyl, Urine Negative    Narrative:      Negative Threshold:      Fentanyl 5 ng/mL     The normal value for the drug tested is negative. This report includes final unconfirmed screening results to be used for medical treatment purposes only. Unconfirmed results must not be used for non-medical purposes such as employment or legal testing. Clinical consideration should be applied to any drug of abuse test, particularly when unconfirmed results are used.           Respiratory Panel PCR w/COVID-19(SARS-CoV-2) YANNA/ZACK/TIFFANY/PAD/COR/WILLY In-House, NP Swab in [facility], 2 HR TAT - Swab, Nasopharynx [[id number]]  (Normal) Collected: 03/06/25 0906    Specimen: Swab from Nasopharynx Updated: 03/06/25 1012     ADENOVIRUS, PCR Not Detected     Coronavirus 229E Not Detected     Coronavirus HKU1 Not Detected     Coronavirus NL63 Not Detected     Coronavirus OC43 Not Detected     COVID19 Not Detected     Human Metapneumovirus Not  Detected     Human Rhinovirus/Enterovirus Not Detected     Influenza A PCR Not Detected     Influenza B PCR Not Detected     Parainfluenza Virus 1 Not Detected     Parainfluenza Virus 2 Not Detected     Parainfluenza Virus 3 Not Detected     Parainfluenza Virus 4 Not Detected     RSV, PCR Not Detected     Bordetella pertussis pcr Not Detected     Bordetella parapertussis PCR Not Detected     Chlamydophila pneumoniae PCR Not Detected     Mycoplasma pneumo by PCR Not Detected    Narrative:      In the setting of a positive respiratory panel with a viral infection PLUS a negative procalcitonin without other underlying concern for bacterial infection, consider observing off antibiotics or discontinuation of antibiotics and continue supportive care. If the respiratory panel is positive for atypical bacterial infection (Bordetella pertussis, Chlamydophila pneumoniae, or Mycoplasma pneumoniae), consider antibiotic de-escalation to target atypical bacterial infection.    High Sensitivity Troponin T 1Hr [[id number]]  (Abnormal) Collected: 03/06/25 1015    Specimen: Blood Updated: 03/06/25 1046     HS Troponin T 34 ng/L      Troponin T Numeric Delta -2 ng/L      Troponin T % Delta -6    Narrative:      High Sensitive Troponin T Reference Range:  <14.0 ng/L- Negative Female for AMI  <22.0 ng/L- Negative Male for AMI  >=14 - Abnormal Female indicating possible myocardial injury.  >=22 - Abnormal Male indicating possible myocardial injury.   Clinicians would have to utilize clinical acumen, EKG, Troponin, and serial changes to determine if it is an Acute Myocardial Infarction or myocardial injury due to an underlying chronic condition.         Procalcitonin [[id number]]  (Normal) Collected: 03/06/25 1015    Specimen: Blood Updated: 03/06/25 1239     Procalcitonin 0.14 ng/mL     Narrative:      As a Marker for Sepsis (Non-Neonates):    1. <0.5 ng/mL represents a low risk of severe sepsis and/or septic shock.  2. >2 ng/mL represents  "a high risk of severe sepsis and/or septic shock.    As a Marker for Lower Respiratory Tract Infections that require antibiotic therapy:    PCT on Admission    Antibiotic Therapy       6-12 Hrs later    >0.5                Strongly Recommended  >0.25 - <0.5        Recommended   0.1 - 0.25          Discouraged              Remeasure/reassess PCT  <0.1                Strongly Discouraged     Remeasure/reassess PCT    As 28 day mortality risk marker: \"Change in Procalcitonin Result\" (>80% or <=80%) if Day 0 (or Day 1) and Day 4 values are available. Refer to http://www.[facility]-pct-calculator.com    Change in PCT <=80%  A decrease of PCT levels below or equal to 80% defines a positive change in PCT test result representing a higher risk for 28-day all-cause mortality of patients diagnosed with severe sepsis for septic shock.    Change in PCT >80%  A decrease of PCT levels of more than 80% defines a negative change in PCT result representing a lower risk for 28-day all-cause mortality of patients diagnosed with severe sepsis or septic shock.       POC Glucose Once [[id number]]  (Abnormal) Collected: 03/06/25 1105    Specimen: Blood Updated: 03/06/25 1107     Glucose 186 mg/dL     Blood Culture - Blood, Arm, Left [[id number]] Collected: 03/06/25 1254    Specimen: Blood from Arm, Left Updated: 03/06/25 1302    Blood Culture - Blood, Arm, Right [[id number]] Collected: 03/06/25 1254    Specimen: Blood from Arm, Right Updated: 03/06/25 1303    Lactic Acid, Plasma [[id number]]  (Abnormal) Collected: 03/06/25 1254    Specimen: Blood Updated: 03/06/25 1329     Lactate 4.5 mmol/L     POC Glucose Once [[id number]]  (Normal) Collected: 03/06/25 1611    Specimen: Blood Updated: 03/06/25 1612     Glucose 118 mg/dL     STAT Lactic Acid, Reflex [[id number]]  (Abnormal) Collected: 03/06/25 1648    Specimen: Blood Updated: 03/06/25 1737     Lactate 4.0 mmol/L             Imaging Results (Last 24 Hours)       Procedure Component Value Units " Date/Time    CT Abdomen Pelvis With Contrast [[id number]] Collected: 03/06/25 1145     Updated: 03/06/25 1211    Narrative:      CT ABDOMEN PELVIS W CONTRAST-     DATE OF EXAM: 3/6/2025 10:12 AM     INDICATION: Abdominal pain with nausea vomiting and diarrhea.  Pain is  mainly in the lower abdomen.  She does report vaginal bleeding also  reports history of hysterectomy..     COMPARISON: Chest radiograph 3/6/2025. Abdomen radiograph 8/7/2024. CT  abdomen pelvis 3/9/2021.     TECHNIQUE: Multiple contiguous axial images were acquired through the  abdomen and pelvis following the intravenous administration of 85 mL of  Isovue-300. Reformatted coronal and sagittal sequences were also  reviewed. Radiation dose reduction techniques were utilized, including  automated exposure control and exposure modulation based on body size.     FINDINGS:  Multifocal bibasilar subsegmental atelectasis and/or scarring. Partially  imaged cardiomegaly, calcified coronary artery disease, and aortic valve  calcifications.     Diffusely heterogeneous hepatic attenuation with nodular liver contours,  likely reflecting hepatocellular disease/cirrhosis. Status post  cholecystectomy. Mild extrahepatic biliary duct dilatation with the  common duct measuring up to 10 mm in diameter, which could be related to  the postoperative state. The spleen is unremarkable. Patchy  peripancreatic fat stranding, which could reflect pancreatitis.  Nonspecific ovoid 1 cm x 0.7 cm ovoid high attenuation mass in the  posterior pancreatic head. No evidence of pancreatic necrosis, splenic  vein thrombosis, or pseudocyst. The adrenal glands and kidneys are  unremarkable. Urinary bladder is nondistended. Status post hysterectomy.  The adnexa are not definitively identified.     Mild diffuse gastric wall thickening is likely accentuated by  underdistention but could reflect a nonspecific gastritis. Large fluid  and air-filled duodenal diverticulum. Mild colorectal stool.  Extensive  colonic diverticula with mild sigmoid colon wall thickening and  pericolonic fat stranding that could represent acute diverticulitis. No  extraluminal air or adjacent loculated fluid collection to suggest  abscess. No bowel obstruction. The appendix is normal.     Trace free fluid in the pelvis. No free intraperitoneal air. No  pathologically enlarged lymph nodes in the abdomen or pelvis. Calcified  and noncalcified atherosclerotic disease in the abdominal aorta and its  distal branches with enlarged fusiform 3.5 cm infrarenal abdominal  aortic aneurysm.     Diffuse osteopenia. Mild thoracolumbar levoscoliosis and multilevel  spondylosis. Mild to moderate bilateral hip joint DJD. No acute osseous  abnormality or concerning osseous lesion.       Impression:         1. Diffusely heterogeneous hepatic attenuation with nodular liver  contours, likely reflecting hepatocellular disease/cirrhosis.  2. Patchy peripancreatic fat stranding, which could be related to acute  pancreatitis. Nonspecific high attenuation 1 cm x 0.7 cm ovoid mass in  the posterior head of the pancreas. No evidence of pancreatic necrosis,  splenic vein thrombosis, or pseudocyst. Could consider further  evaluation with MRI/MRCP if clinically indicated.  3. Colonic diverticula with mild sigmoid colon wall thickening and  pericolonic fat stranding suggesting mild acute uncomplicated  diverticulitis.  4. Status post cholecystectomy with mild extrahepatic biliary dilatation  that could be related to the postoperative state. Occlusion from a stone  or a small mass in the posterior pancreatic head is not entirely  excluded. Attention on MRI.  5. Trace free fluid in the pelvis.  6. Calcified and noncalcified atherosclerotic disease in the abdominal  aorta with a fusiform 3.5 cm infrarenal abdominal aortic aneurysm.     The critical results were discussed over the phone with the ordering ER  physician, Dr. Chance Franks, at 11:57 a.m. on  3/6/2025.     This report was finalized on 3/6/2025 12:08 PM by Marv Meng MD on  Workstation: [id number]       XR Chest 1 View [[id number]] Collected: 03/06/25 0954     Updated: 03/06/25 1115    Narrative:      XR CHEST 1 VW-     HISTORY: Shortness of breath and chest pain     COMPARISON: Two-view chest 08/13/2024, AP chest 08/10/2024     FINDINGS: Heart size upper normal. Sternotomy wires are present. There  are aortic vascular calcifications. Lungs appear clear and there is no  evidence for pulmonary edema or pleural effusion. Cervical spine plate  and screws are evident. There is a left shoulder arthroplasty.       Impression:      No evidence for active disease in the chest.           This report was finalized on 3/6/2025 11:12 AM by Luisito Tatum M.D  on Workstation: [id number]                   ECG 12 Lead Dyspnea   Preliminary Result   HEART RATE=69  bpm   RR Qdpxjogf=401  ms   PA Oyeescsk=016  ms   P Horizontal Axis=3  deg   P Front Axis=79  deg   QRSD Interval=84  ms   QT Wcrjnkvc=081  ms   RGlV=750  ms   QRS Axis=87  deg   T Wave Axis=99  deg   - ABNORMAL ECG -   Sinus rhythm   Borderline right axis deviation   Abnormal R-wave progression, early transition   Nonspecific repol abnormality, diffuse leads   Date and Time of Study:2025-03-06 09:21:01      Telemetry Scan   Final Result           Assessment/Plan     Active Hospital Problems    Diagnosis  POA    **Acute pancreatitis [K85.90]  Yes    Diverticulitis [K57.92]  Yes    Hypoglycemia [E16.2]  Yes    Lactic acidosis [E87.20]  Yes    Alcoholic cirrhosis of liver without ascites [K70.30]  Yes    COPD (chronic obstructive pulmonary disease) [J44.9]  Yes    SANDRO (acute kidney injury) [N17.9]  Yes    HTN (hypertension) [I10]  Yes    Anxiety [F41.9]  Yes      Resolved Hospital Problems   No resolved problems to display.       Ms. Liu is a 64 y.o. female with history of alcohol abuse, COPD, cirrhosis admitted with concern for withdrawals but also  having abdominal pain with abnormal CT as discussed above as well as lactic acidosis without signs of sepsis otherwise    Lactic acidosis is due to her liver disease.  She is not septic.  She does not need serial checks as I think it will always be elevated.    She does have some potential infection noted in the sigmoid colon with some diverticulitis on CT though exam is relatively unremarkable.  Will continue antibiotics for now but again I do not think she is septic    She does have some alcoholic pancreatitis noted and some abnormal findings about the pancreas on CT.  Asking GI to see.  She does not think she can tolerate an MRI and is requesting that an ERCP be done instead.  I will defer this to GI.  Check CA 19-9 and keep her n.p.o. with IV fluids.  Would prefer to keep her on oral pain meds based on benign exam but will add IV pain meds for breakthrough    She was hypoglycemic initially but treated in ED.  Probably has poor nutritional stores.  Was going to give dextrose with fluids but these have been canceled by GI. Will give additional dextrose prn    Reviewed home meds and reconciled as appropriate.  Holding diuretics for now.  She may have some mild SANDRO though baseline is probably not far from this.  Will ask nephrology to see given history of cirrhosis.    Continue thiamine and CIWA protocol for withdrawals.  She is requesting Ativan by name.  I told her we would use something else for anxiety and that we would not use Ativan for that purpose, only for CIWA appropriate reasons.    Will monitor for recurrence of vaginal bleeding.  Consult GYN if it does    Discussed with nurse    SCDs  Disposition will be several days    Brooks Clinton MD  [facility]  03/06/25  17:44 EST

## 2025-03-06 NOTE — ED NOTES
Nursing report ED to floor  Filomena Liu  64 y.o.  female    HPI :  HPI  Stated Reason for Visit: cough, cp, ETOH problem  History Obtained From: EMS, patient    Chief Complaint  Chief Complaint   Patient presents with    Chest Pain    Cough       Admitting doctor:   Brooks Clinton MD    Admitting diagnosis:   The primary encounter diagnosis was Alcohol-induced acute pancreatitis, unspecified complication status. Diagnoses of Acute diverticulitis, Acute kidney injury, Alcoholic cirrhosis of liver without ascites, COPD exacerbation, Chest pain, unspecified type, Hypoglycemia, and Alcohol withdrawal syndrome with complication were also pertinent to this visit.    Code status:   Current Code Status       Date Active Code Status Order ID Comments User Context       Prior            Allergies:   Patient has no known allergies.    Isolation:   No active isolations    Intake and Output  No intake or output data in the 24 hours ending 03/06/25 1350    Weight:   There were no vitals filed for this visit.    Most recent vitals:   Vitals:    03/06/25 0819 03/06/25 1124 03/06/25 1231   BP: 140/46  102/57   Pulse: 68 87 85   Resp: 20 16    Temp: 99.7 °F (37.6 °C)     TempSrc: Oral     SpO2: 97% 93% 91%       Active LDAs/IV Access:   Lines, Drains & Airways       Active LDAs       Name Placement date Placement time Site Days    Peripheral IV 03/06/25 0820 Right Antecubital 03/06/25  0820  Antecubital  less than 1                    Labs (abnormal labs have a star):   Labs Reviewed   COMPREHENSIVE METABOLIC PANEL - Abnormal; Notable for the following components:       Result Value    Glucose 46 (*)     BUN 54 (*)     Creatinine 1.58 (*)     Chloride 90 (*)     AST (SGOT) 69 (*)     Alkaline Phosphatase 133 (*)     Total Bilirubin 4.1 (*)     BUN/Creatinine Ratio 34.2 (*)     Anion Gap 20.2 (*)     eGFR 36.4 (*)     All other components within normal limits    Narrative:     GFR Categories in Chronic Kidney Disease  (CKD)      GFR Category          GFR (mL/min/1.73)    Interpretation  G1                     90 or greater         Normal or high (1)  G2                      60-89                Mild decrease (1)  G3a                   45-59                Mild to moderate decrease  G3b                   30-44                Moderate to severe decrease  G4                    15-29                Severe decrease  G5                    14 or less           Kidney failure          (1)In the absence of evidence of kidney disease, neither GFR category G1 or G2 fulfill the criteria for CKD.    eGFR calculation 2021 CKD-EPI creatinine equation, which does not include race as a factor   PROTIME-INR - Abnormal; Notable for the following components:    Protime 19.4 (*)     INR 1.63 (*)     All other components within normal limits   LIPASE - Abnormal; Notable for the following components:    Lipase 193 (*)     All other components within normal limits   URINALYSIS W/ MICROSCOPIC IF INDICATED (NO CULTURE) - Abnormal; Notable for the following components:    Ketones, UA Trace (*)     Urobilinogen, UA 2.0 E.U./dL (*)     All other components within normal limits    Narrative:     Urine microscopic not indicated.   TROPONIN - Abnormal; Notable for the following components:    HS Troponin T 36 (*)     All other components within normal limits    Narrative:     High Sensitive Troponin T Reference Range:  <14.0 ng/L- Negative Female for AMI  <22.0 ng/L- Negative Male for AMI  >=14 - Abnormal Female indicating possible myocardial injury.  >=22 - Abnormal Male indicating possible myocardial injury.   Clinicians would have to utilize clinical acumen, EKG, Troponin, and serial changes to determine if it is an Acute Myocardial Infarction or myocardial injury due to an underlying chronic condition.        MAGNESIUM - Abnormal; Notable for the following components:    Magnesium 3.1 (*)     All other components within normal limits   ETHANOL - Abnormal;  Notable for the following components:    Ethanol 21 (*)     All other components within normal limits   URINE DRUG SCREEN - Abnormal; Notable for the following components:    Opiate Screen Positive (*)     Oxycodone Screen, Urine Positive (*)     All other components within normal limits    Narrative:     Cutoff For Drugs Screened:    Amphetamines               500 ng/ml  Barbiturates               200 ng/ml  Benzodiazepines            150 ng/ml  Cocaine                    150 ng/ml  Methadone                  200 ng/ml  Opiates                    100 ng/ml  Phencyclidine               25 ng/ml  THC                         50 ng/ml  Methamphetamine            500 ng/ml  Tricyclic Antidepressants  300 ng/ml  Oxycodone                  100 ng/ml  Buprenorphine               10 ng/ml    The normal value for all drugs tested is negative. This report includes unconfirmed screening results, with the cutoff values listed, to be used for medical treatment purposes only.  Unconfirmed results must not be used for non-medical purposes such as employment or legal testing.  Clinical consideration should be applied to any drug of abuse test, particularly when unconfirmed results are used.     CBC WITH AUTO DIFFERENTIAL - Abnormal; Notable for the following components:    RBC 3.35 (*)     Hemoglobin 9.9 (*)     Hematocrit 30.3 (*)     Platelets 81 (*)     Lymphocyte % 18.2 (*)     Monocytes, Absolute 1.01 (*)     All other components within normal limits   HIGH SENSITIVITIY TROPONIN T 1HR - Abnormal; Notable for the following components:    HS Troponin T 34 (*)     All other components within normal limits    Narrative:     High Sensitive Troponin T Reference Range:  <14.0 ng/L- Negative Female for AMI  <22.0 ng/L- Negative Male for AMI  >=14 - Abnormal Female indicating possible myocardial injury.  >=22 - Abnormal Male indicating possible myocardial injury.   Clinicians would have to utilize clinical acumen, EKG, Troponin, and  serial changes to determine if it is an Acute Myocardial Infarction or myocardial injury due to an underlying chronic condition.        LACTIC ACID, PLASMA - Abnormal; Notable for the following components:    Lactate 4.5 (*)     All other components within normal limits   POCT GLUCOSE FINGERSTICK - Abnormal; Notable for the following components:    Glucose 186 (*)     All other components within normal limits   RESPIRATORY PANEL PCR W/ COVID-19 (SARS-COV-2), NP SWAB IN UTM/VTP, 2 HR TAT - Normal    Narrative:     In the setting of a positive respiratory panel with a viral infection PLUS a negative procalcitonin without other underlying concern for bacterial infection, consider observing off antibiotics or discontinuation of antibiotics and continue supportive care. If the respiratory panel is positive for atypical bacterial infection (Bordetella pertussis, Chlamydophila pneumoniae, or Mycoplasma pneumoniae), consider antibiotic de-escalation to target atypical bacterial infection.   APTT - Normal   BNP (IN-HOUSE) - Normal    Narrative:     This assay is used as an aid in the diagnosis of individuals suspected of having heart failure. It can be used as an aid in the diagnosis of acute decompensated heart failure (ADHF) in patients presenting with signs and symptoms of ADHF to the emergency department (ED). In addition, NT-proBNP of <300 pg/mL indicates ADHF is not likely.    Age Range Result Interpretation  NT-proBNP Concentration (pg/mL:      <50             Positive            >450                   Gray                 300-450                    Negative             <300    50-75           Positive            >900                  Gray                300-900                  Negative            <300      >75             Positive            >1800                  Gray                300-1800                  Negative            <300   FENTANYL, URINE - Normal    Narrative:     Negative Threshold:      Fentanyl 5  "ng/mL     The normal value for the drug tested is negative. This report includes final unconfirmed screening results to be used for medical treatment purposes only. Unconfirmed results must not be used for non-medical purposes such as employment or legal testing. Clinical consideration should be applied to any drug of abuse test, particularly when unconfirmed results are used.          PROCALCITONIN - Normal    Narrative:     As a Marker for Sepsis (Non-Neonates):    1. <0.5 ng/mL represents a low risk of severe sepsis and/or septic shock.  2. >2 ng/mL represents a high risk of severe sepsis and/or septic shock.    As a Marker for Lower Respiratory Tract Infections that require antibiotic therapy:    PCT on Admission    Antibiotic Therapy       6-12 Hrs later    >0.5                Strongly Recommended  >0.25 - <0.5        Recommended   0.1 - 0.25          Discouraged              Remeasure/reassess PCT  <0.1                Strongly Discouraged     Remeasure/reassess PCT    As 28 day mortality risk marker: \"Change in Procalcitonin Result\" (>80% or <=80%) if Day 0 (or Day 1) and Day 4 values are available. Refer to http://www.[facility]pct-calculator.com    Change in PCT <=80%  A decrease of PCT levels below or equal to 80% defines a positive change in PCT test result representing a higher risk for 28-day all-cause mortality of patients diagnosed with severe sepsis for septic shock.    Change in PCT >80%  A decrease of PCT levels of more than 80% defines a negative change in PCT result representing a lower risk for 28-day all-cause mortality of patients diagnosed with severe sepsis or septic shock.      BLOOD CULTURE   BLOOD CULTURE   LACTIC ACID, REFLEX   CBC AND DIFFERENTIAL    Narrative:     The following orders were created for panel order CBC & Differential.  Procedure                               Abnormality         Status                     ---------                               -----------         ------      "                CBC Auto Differential[[id number]]        Abnormal            Final result                 Please view results for these tests on the individual orders.       EKG:   ECG 12 Lead Dyspnea   Preliminary Result   HEART RATE=69  bpm   RR Tctckfrf=047  ms   PA Kvnhavzu=254  ms   P Horizontal Axis=3  deg   P Front Axis=79  deg   QRSD Interval=84  ms   QT Blksmaor=531  ms   CCuA=030  ms   QRS Axis=87  deg   T Wave Axis=99  deg   - ABNORMAL ECG -   Sinus rhythm   Borderline right axis deviation   Abnormal R-wave progression, early transition   Nonspecific repol abnormality, diffuse leads   Date and Time of Study:2025-03-06 09:21:01          Meds given in ED:   Medications   sodium chloride 0.9 % flush 10 mL (has no administration in time range)   sodium chloride 0.9 % bolus 1,000 mL (0 mL Intravenous Stopped 3/6/25 1302)   LORazepam (ATIVAN) injection 1 mg (1 mg Intravenous Given 3/6/25 0901)   ondansetron (ZOFRAN) injection 4 mg (4 mg Intravenous Given 3/6/25 0900)   pantoprazole (PROTONIX) injection 80 mg (80 mg Intravenous Given 3/6/25 0900)   dextrose (D10W) 10% bolus 500 mL (0 mL Intravenous Stopped 3/6/25 1302)   iopamidol (ISOVUE-300) 61 % injection 85 mL (85 mL Intravenous Given 3/6/25 1041)   ipratropium-albuterol (DUO-NEB) nebulizer solution 3 mL (3 mL Nebulization Given 3/6/25 1124)   piperacillin-tazobactam (ZOSYN) 3.375 g IVPB in 100 mL NS MBP (CD) (3.375 g Intravenous New Bag 3/6/25 1301)       Imaging results:  CT Abdomen Pelvis With Contrast    Result Date: 3/6/2025   1. Diffusely heterogeneous hepatic attenuation with nodular liver contours, likely reflecting hepatocellular disease/cirrhosis. 2. Patchy peripancreatic fat stranding, which could be related to acute pancreatitis. Nonspecific high attenuation 1 cm x 0.7 cm ovoid mass in the posterior head of the pancreas. No evidence of pancreatic necrosis, splenic vein thrombosis, or pseudocyst. Could consider further evaluation with MRI/MRCP if  clinically indicated. 3. Colonic diverticula with mild sigmoid colon wall thickening and pericolonic fat stranding suggesting mild acute uncomplicated diverticulitis. 4. Status post cholecystectomy with mild extrahepatic biliary dilatation that could be related to the postoperative state. Occlusion from a stone or a small mass in the posterior pancreatic head is not entirely excluded. Attention on MRI. 5. Trace free fluid in the pelvis. 6. Calcified and noncalcified atherosclerotic disease in the abdominal aorta with a fusiform 3.5 cm infrarenal abdominal aortic aneurysm.  The critical results were discussed over the phone with the ordering ER physician, Dr. Chance Franks, at 11:57 a.m. on 3/6/2025.  This report was finalized on 3/6/2025 12:08 PM by Marv Meng MD on Workstation: [id number]      XR Chest 1 View    Result Date: 3/6/2025  No evidence for active disease in the chest.    This report was finalized on 3/6/2025 11:12 AM by Luiisto Tatum M.D on Workstation: [id number]       Ambulatory status:   - x1    Social issues:   Social History     Socioeconomic History    Marital status:    Tobacco Use    Smoking status: Every Day     Current packs/day: 0.25     Average packs/day: 0.3 packs/day for 45.2 years (11.3 ttl pk-yrs)     Types: Cigarettes     Start date: 1/1/1980     Passive exposure: Current    Smokeless tobacco: Never    Tobacco comments:     I have cut down recently and will abstain starting 7/10/23   Vaping Use    Vaping status: Never Used   Substance and Sexual Activity    Alcohol use: Not Currently    Drug use: No    Sexual activity: Yes     Partners: Male     Birth control/protection: Post-menopausal, Tubal ligation, Hysterectomy       Peripheral Neurovascular  Peripheral Neurovascular (Adult)  Peripheral Neurovascular WDL: WDL    Neuro Cognitive  Neuro Cognitive (Adult)  Cognitive/Neuro/Behavioral WDL: WDL    Learning  Learning Assessment  Learning Readiness and Ability: no barriers  identified    Respiratory  Respiratory  Airway WDL: WDL  Respiratory WDL  Respiratory WDL: .WDL except, cough    Abdominal Pain       Pain Assessments  Pain (Adult)  (0-10) Pain Rating: Rest: 8  (0-10) Pain Rating: Activity: 8  Pain Location: chest    NIH Stroke Scale       Pako Hernández RN  03/06/25 13:50 EST

## 2025-03-06 NOTE — ED NOTES
"Pt to ed from home via EMS    Pt c/o chest pain and cough. Pt called ems due to her bg being 49. When ems arrived it was 86. Pt also reports she has been detoxing ETOH for 2 days, but had a \"swig\" at midnight.   "

## 2025-03-06 NOTE — ED PROVIDER NOTES
" EMERGENCY DEPARTMENT ENCOUNTER    Room Number:  S612/1  Date of encounter:  3/6/2025  PCP: Fernando Dunn MD  Historian: Patient  Relevant information and history provided by sources other than the patient will be included below and in the ED Course.  Review of pertinent past medical records may also be included in record below and ED Course.    HPI:  Chief Complaint: \"Help me with my serious withdrawal\"  A complete HPI/ROS/PMH/PSH/SH/FH are unobtainable due to: Not applicable  Context: Filomena Liu is a 64 y.o. female who presents to the ED c/o this is a patient that has a long history of alcohol abuse.  She usually drinks 1 pint of whiskey a day.  Over the past several days she has decreased the amount of whiskey that she is drank.  Her last drink was about 11 PM last night.  She started vomiting yesterday and diarrhea.  She has had some abdominal pain mainly in the lower abdomen.  She does also report some vaginal bleeding.  She does report that she has had a hysterectomy.  She does have a history of cirrhosis from alcohol abuse.  On the vomiting she reports no blood in the vomit or any dark coffee ground emesis.  She does report some chest pain.  It is in the lower portion of her chest it is the center comes up to the midportion it is associated with vomiting and she believes that it could be related to the vomiting.  It started after she had vomiting.  Pain is not severe.  It has been constant.  She also reports shortness of breath.  She has a long history of COPD and has had a mild increase in cough that is dry.  She reports no fevers or chills.  Does not take any blood thinning medicine.        Previous Episodes: Yes with withdrawals in the past.  Current Symptoms: See above    MEDICAL HISTORY REVIEWED    This is a patient that has a history of alcoholism, COPD, history of GI bleed in the past as well as alcoholic cirrhosis.  She also does have a history of iron deficiency anemia history of chronic " kidney disease.  Did review her medicine list.    PAST MEDICAL HISTORY  Active Ambulatory Problems     Diagnosis Date Noted    Mediastinal mass 12/14/2016    Cervical stenosis of spinal canal 12/14/2016    Cervical radiculopathy 12/14/2016    Cellulitis/abscess of left foot 11/12/2018    HTN (hypertension) 11/13/2018    History of MRSA infection 11/13/2018    Anxiety 11/13/2018    Peroneal tenosynovitis 11/14/2018    Fracture of navicular bone of left foot 11/14/2018    Tobacco use 11/16/2018    Non compliance with medical treatment 11/17/2018    Screening for colon cancer 10/27/2020    Osteoporosis 10/31/2020    Shoulder arthritis 06/05/2023    Primary localized osteoarthrosis of left shoulder region 06/07/2023    History of adenomatous polyp of colon 01/19/2024    Diverticulosis 03/28/2024    Acute GI bleeding 08/05/2024    Hyperkalemia 08/05/2024    Acute renal failure 08/05/2024    Alcoholism 08/05/2024    Acute exacerbation of chronic obstructive pulmonary disease (COPD) 08/05/2024    Acute blood loss anemia 08/05/2024    Melena 08/05/2024    Gastroesophageal reflux disease 08/05/2024    Dysphagia 08/05/2024    Alcoholic cirrhosis of liver without ascites 08/05/2024    Other iron deficiency anemias 09/09/2024    Malabsorption of iron 09/25/2024     Resolved Ambulatory Problems     Diagnosis Date Noted    No Resolved Ambulatory Problems     Past Medical History:   Diagnosis Date    Ankle pain     Ankle wound     Arthritis of back 2000    Arthritis of neck 2000    Asthma     Benign tumor of thymus     Bruises easily     Cataract     COPD (chronic obstructive pulmonary disease)     CTS (carpal tunnel syndrome) Surgery 1999    DDD (degenerative disc disease), cervical     Depression     Fracture of wrist 2917    GERD (gastroesophageal reflux disease)     Hip arthrosis Unknown    Hyperlipidemia     Hypertension     Knee sprain June 2023    Knee swelling Unknown    Shoulder pain, left 07/07/2023    Sleep apnea      Spinal stenosis     Spondylolisthesis of cervical region          PAST SURGICAL HISTORY  Past Surgical History:   Procedure Laterality Date    BACK SURGERY      cervical disc surgery with fusion-    CHOLECYSTECTOMY      COLONOSCOPY      COLONOSCOPY N/A 11/12/2020    Procedure: COLONOSCOPY, polypectomy;  Surgeon: Filomena Mckeon MD;  Location: [facility] OR;  Service: Gastroenterology;  Laterality: N/A;  Diverticulosis; Hepatic flexure polyp x 1; Polyp at 60cm x 1; Polyp at 50cm x 1- hot snare;    COLONOSCOPY N/A 4/15/2024    Procedure: COLONOSCOPY into sigmoid colon with hot snare polypectomy;  Surgeon: Leatha Johns MD;  Location: [facility] ENDOSCOPY;  Service: General;  Laterality: N/A;  pre- history of polyps  post- diverticulosis, polyp    ENDOSCOPY N/A 8/15/2024    Procedure: ESOPHAGOGASTRODUODENOSCOPY;  Surgeon: Garth Constantino MD;  Location: [facility] ENDOSCOPY;  Service: Gastroenterology;  Laterality: N/A;  PRE- CIRRHOSIS, DARK STOOL  POST- NORMAL    HAND SURGERY  2000    HYSTERECTOMY      INCISION AND DRAINAGE LEG Left 11/17/2018    Procedure: Incision and Drainage of Left ankle;  Surgeon: Maxwell Lanier MD;  Location: [facility] OR;  Service: Orthopedics    NECK SURGERY  2000    SIGMOIDOSCOPY N/A 3/28/2024    Procedure: SIGMOIDOSCOPY FLEXIBLE;  Surgeon: Leatha Johns MD;  Location: [facility] ENDOSCOPY;  Service: General;  Laterality: N/A;  pre: h/o colon polyps  post: poor prep, diverticulosis    SKIN BIOPSY      SKIN GRAFT SPLIT THICKNESS N/A 02/12/2019    Procedure: debridement SKIN GRAFT SPLIT THICKNESS left ankle wound;  Surgeon: Barry Worthy MD;  Location: [facility] OR [facility];  Service: Plastics    TONGUE LESION EXCISION/BIOPSY N/A 11/26/2024    Procedure: WIDE LOCAL EXCISION OF TONGUE LESION;  Surgeon: El Mercedes MD;  Location: [facility] MAIN OR;  Service: ENT;  Laterality: N/A;    TOTAL SHOULDER ARTHROPLASTY Left 7/20/2023    Procedure: Total  shoulder arthroplasty;  Surgeon:  Maxwell Lanier MD;  Location: [facility] OR [facility];  Service: Orthopedics;  Laterality: Left;    TOTAL THYMECTOMY      TRIGGER POINT INJECTION  2000    WRIST FRACTURE SURGERY      CARPAL TUNNEL         FAMILY HISTORY  Family History   Problem Relation Age of Onset    Emphysema Mother     Alzheimer's disease Father     Malig Hyperthermia Neg Hx          SOCIAL HISTORY  Social History     Socioeconomic History    Marital status:    Tobacco Use    Smoking status: Every Day     Current packs/day: 0.25     Average packs/day: 0.3 packs/day for 45.2 years (11.3 ttl pk-yrs)     Types: Cigarettes     Start date: 1/1/1980     Passive exposure: Current    Smokeless tobacco: Never   Vaping Use    Vaping status: Never Used   Substance and Sexual Activity    Alcohol use: Yes     Alcohol/week: 10.0 standard drinks of alcohol     Types: 10 Drinks containing 0.5 oz of alcohol per week     Comment: pt reports drinking a pint a day    Drug use: No    Sexual activity: Yes     Partners: Male     Birth control/protection: Post-menopausal, Tubal ligation, Hysterectomy         ALLERGIES  Patient has no known allergies.        REVIEW OF SYSTEMS  Review of Systems     All systems reviewed and negative except for those discussed in HPI.       PHYSICAL EXAM    I have reviewed the triage vital signs and nursing notes.    ED Triage Vitals [03/06/25 0819]   Temp Heart Rate Resp BP SpO2   99.7 °F (37.6 °C) 68 20 140/46 97 %      Temp src Heart Rate Source Patient Position BP Location FiO2 (%)   Oral Monitor -- -- --       GENERAL: This patient looks older than her stated age.  She looks anxious.  Vital signs on my initial evaluation heart rate is 70s to 80s.  Blood pressure is normal O2 sat is 98% on room air.  Temperature is 99.7.  She does have some retching on my exam.  There is a very small amount of gastric contents.  No gross blood that I see.  Very little emesis is produced.  HENT: nares patent  Head/neck/ face are symmetric without  gross deformity, signs of trauma, or swelling  EYES: no scleral icterus, no conjunctival pallor.  NECK: Supple, no meningismus  CV: regular rhythm, regular rate with intact distal pulses.  RESPIRATORY: normal effort and no respiratory distress.  She has a mild cough on exam but is worse with deep breathing.  She has mild expiratory wheeze.  ABDOMEN: soft and obese.  Location of pain is diffuse but more prominent in the lower abdomen.  She does also have some pain in the mid epigastric that radiates to the lower portion of her chest.  MUSCULOSKELETAL: no deformity.  Intact distal pulses to upper and lower extremities that are equal strong and symmetric.  No coolness cyanosis or pallor.  NEURO: alert and appropriate, moves all extremities, follows commands.  No focal motor or sensory changes.  SKIN: warm, dry    Vital signs and nursing notes reviewed.        LAB RESULTS  Recent Results (from the past 24 hours)   Comprehensive Metabolic Panel    Collection Time: 03/06/25  8:57 AM    Specimen: Blood   Result Value Ref Range    Glucose 46 (C) 65 - 99 mg/dL    BUN 54 (H) 8 - 23 mg/dL    Creatinine 1.58 (H) 0.57 - 1.00 mg/dL    Sodium 136 136 - 145 mmol/L    Potassium 4.4 3.5 - 5.2 mmol/L    Chloride 90 (L) 98 - 107 mmol/L    CO2 25.8 22.0 - 29.0 mmol/L    Calcium 8.8 8.6 - 10.5 mg/dL    Total Protein 6.6 6.0 - 8.5 g/dL    Albumin 3.6 3.5 - 5.2 g/dL    ALT (SGPT) 24 1 - 33 U/L    AST (SGOT) 69 (H) 1 - 32 U/L    Alkaline Phosphatase 133 (H) 39 - 117 U/L    Total Bilirubin 4.1 (H) 0.0 - 1.2 mg/dL    Globulin 3.0 gm/dL    A/G Ratio 1.2 g/dL    BUN/Creatinine Ratio 34.2 (H) 7.0 - 25.0    Anion Gap 20.2 (H) 5.0 - 15.0 mmol/L    eGFR 36.4 (L) >60.0 mL/min/1.73   Protime-INR    Collection Time: 03/06/25  8:57 AM    Specimen: Blood   Result Value Ref Range    Protime 19.4 (H) 11.7 - 14.2 Seconds    INR 1.63 (H) 0.90 - 1.10   Lipase    Collection Time: 03/06/25  8:57 AM    Specimen: Blood   Result Value Ref Range    Lipase 193 (H)  13 - 60 U/L   aPTT    Collection Time: 03/06/25  8:57 AM    Specimen: Blood   Result Value Ref Range    PTT 34.3 22.7 - 35.4 seconds   BNP    Collection Time: 03/06/25  8:57 AM    Specimen: Blood   Result Value Ref Range    proBNP 571.0 0.0 - 900.0 pg/mL   High Sensitivity Troponin T    Collection Time: 03/06/25  8:57 AM    Specimen: Blood   Result Value Ref Range    HS Troponin T 36 (H) <14 ng/L   Magnesium    Collection Time: 03/06/25  8:57 AM    Specimen: Blood   Result Value Ref Range    Magnesium 3.1 (H) 1.6 - 2.4 mg/dL   Ethanol    Collection Time: 03/06/25  8:57 AM    Specimen: Blood   Result Value Ref Range    Ethanol 21 (H) 0 - 10 mg/dL    Ethanol % 0.021 %   CBC Auto Differential    Collection Time: 03/06/25  8:57 AM    Specimen: Blood   Result Value Ref Range    WBC 9.20 3.40 - 10.80 10*3/mm3    RBC 3.35 (L) 3.77 - 5.28 10*6/mm3    Hemoglobin 9.9 (L) 12.0 - 15.9 g/dL    Hematocrit 30.3 (L) 34.0 - 46.6 %    MCV 90.4 79.0 - 97.0 fL    MCH 29.6 26.6 - 33.0 pg    MCHC 32.7 31.5 - 35.7 g/dL    RDW 13.6 12.3 - 15.4 %    RDW-SD 43.7 37.0 - 54.0 fl    MPV 10.7 6.0 - 12.0 fL    Platelets 81 (L) 140 - 450 10*3/mm3    Neutrophil % 69.7 42.7 - 76.0 %    Lymphocyte % 18.2 (L) 19.6 - 45.3 %    Monocyte % 11.0 5.0 - 12.0 %    Eosinophil % 0.5 0.3 - 6.2 %    Basophil % 0.2 0.0 - 1.5 %    Immature Grans % 0.4 0.0 - 0.5 %    Neutrophils, Absolute 6.41 1.70 - 7.00 10*3/mm3    Lymphocytes, Absolute 1.67 0.70 - 3.10 10*3/mm3    Monocytes, Absolute 1.01 (H) 0.10 - 0.90 10*3/mm3    Eosinophils, Absolute 0.05 0.00 - 0.40 10*3/mm3    Basophils, Absolute 0.02 0.00 - 0.20 10*3/mm3    Immature Grans, Absolute 0.04 0.00 - 0.05 10*3/mm3    nRBC 0.0 0.0 - 0.2 /100 WBC   Urinalysis With Microscopic If Indicated (No Culture) - Urine, Clean Catch    Collection Time: 03/06/25  8:58 AM    Specimen: Urine, Clean Catch   Result Value Ref Range    Color, UA Yellow Yellow, Straw    Appearance, UA Clear Clear    pH, UA 6.5 5.0 - 8.0    Specific  Gravity, UA 1.009 1.005 - 1.030    Glucose, UA Negative Negative    Ketones, UA Trace (A) Negative    Bilirubin, UA Negative Negative    Blood, UA Negative Negative    Protein, UA Negative Negative    Leuk Esterase, UA Negative Negative    Nitrite, UA Negative Negative    Urobilinogen, UA 2.0 E.U./dL (A) 0.2 - 1.0 E.U./dL   Urine Drug Screen - Urine, Clean Catch    Collection Time: 03/06/25  8:58 AM    Specimen: Urine, Clean Catch   Result Value Ref Range    THC, Screen, Urine Negative Negative    Phencyclidine (PCP), Urine Negative Negative    Cocaine Screen, Urine Negative Negative    Methamphetamine, Ur Negative Negative    Opiate Screen Positive (A) Negative    Amphetamine Screen, Urine Negative Negative    Benzodiazepine Screen, Urine Negative Negative    Tricyclic Antidepressants Screen Negative Negative    Methadone Screen, Urine Negative Negative    Barbiturates Screen, Urine Negative Negative    Oxycodone Screen, Urine Positive (A) Negative    Buprenorphine, Screen, Urine Negative Negative   Fentanyl, Urine - Urine, Clean Catch    Collection Time: 03/06/25  8:58 AM    Specimen: Urine, Clean Catch   Result Value Ref Range    Fentanyl, Urine Negative Negative   Respiratory Panel PCR w/COVID-19(SARS-CoV-2) YANNA/ZACK/TIFFANY/PAD/COR/WILLY In-House, NP Swab in [facility], 2 HR TAT - Swab, Nasopharynx    Collection Time: 03/06/25  9:06 AM    Specimen: Nasopharynx; Swab   Result Value Ref Range    ADENOVIRUS, PCR Not Detected Not Detected    Coronavirus 229E Not Detected Not Detected    Coronavirus HKU1 Not Detected Not Detected    Coronavirus NL63 Not Detected Not Detected    Coronavirus OC43 Not Detected Not Detected    COVID19 Not Detected Not Detected - Ref. Range    Human Metapneumovirus Not Detected Not Detected    Human Rhinovirus/Enterovirus Not Detected Not Detected    Influenza A PCR Not Detected Not Detected    Influenza B PCR Not Detected Not Detected    Parainfluenza Virus 1 Not Detected Not Detected     Parainfluenza Virus 2 Not Detected Not Detected    Parainfluenza Virus 3 Not Detected Not Detected    Parainfluenza Virus 4 Not Detected Not Detected    RSV, PCR Not Detected Not Detected    Bordetella pertussis pcr Not Detected Not Detected    Bordetella parapertussis PCR Not Detected Not Detected    Chlamydophila pneumoniae PCR Not Detected Not Detected    Mycoplasma pneumo by PCR Not Detected Not Detected   ECG 12 Lead Dyspnea    Collection Time: 03/06/25  9:21 AM   Result Value Ref Range    QT Interval 413 ms    QTC Interval 442 ms   High Sensitivity Troponin T 1Hr    Collection Time: 03/06/25 10:15 AM    Specimen: Blood   Result Value Ref Range    HS Troponin T 34 (H) <14 ng/L    Troponin T Numeric Delta -2 ng/L    Troponin T % Delta -6 Abnormal if >/= 20%   Procalcitonin    Collection Time: 03/06/25 10:15 AM    Specimen: Blood   Result Value Ref Range    Procalcitonin 0.14 0.00 - 0.25 ng/mL   POC Glucose Once    Collection Time: 03/06/25 11:05 AM    Specimen: Blood   Result Value Ref Range    Glucose 186 (H) 70 - 130 mg/dL   Lactic Acid, Plasma    Collection Time: 03/06/25 12:54 PM    Specimen: Blood   Result Value Ref Range    Lactate 4.5 (C) 0.5 - 2.0 mmol/L   POC Glucose Once    Collection Time: 03/06/25  4:11 PM    Specimen: Blood   Result Value Ref Range    Glucose 118 70 - 130 mg/dL       Ordered the above labs and independently reviewed the results.        RADIOLOGY  CT Abdomen Pelvis With Contrast    Result Date: 3/6/2025  CT ABDOMEN PELVIS W CONTRAST-  DATE OF EXAM: 3/6/2025 10:12 AM  INDICATION: Abdominal pain with nausea vomiting and diarrhea.  Pain is mainly in the lower abdomen.  She does report vaginal bleeding also reports history of hysterectomy..  COMPARISON: Chest radiograph 3/6/2025. Abdomen radiograph 8/7/2024. CT abdomen pelvis 3/9/2021.  TECHNIQUE: Multiple contiguous axial images were acquired through the abdomen and pelvis following the intravenous administration of 85 mL of Isovue-300.  Reformatted coronal and sagittal sequences were also reviewed. Radiation dose reduction techniques were utilized, including automated exposure control and exposure modulation based on body size.  FINDINGS: Multifocal bibasilar subsegmental atelectasis and/or scarring. Partially imaged cardiomegaly, calcified coronary artery disease, and aortic valve calcifications.  Diffusely heterogeneous hepatic attenuation with nodular liver contours, likely reflecting hepatocellular disease/cirrhosis. Status post cholecystectomy. Mild extrahepatic biliary duct dilatation with the common duct measuring up to 10 mm in diameter, which could be related to the postoperative state. The spleen is unremarkable. Patchy peripancreatic fat stranding, which could reflect pancreatitis. Nonspecific ovoid 1 cm x 0.7 cm ovoid high attenuation mass in the posterior pancreatic head. No evidence of pancreatic necrosis, splenic vein thrombosis, or pseudocyst. The adrenal glands and kidneys are unremarkable. Urinary bladder is nondistended. Status post hysterectomy. The adnexa are not definitively identified.  Mild diffuse gastric wall thickening is likely accentuated by underdistention but could reflect a nonspecific gastritis. Large fluid and air-filled duodenal diverticulum. Mild colorectal stool. Extensive colonic diverticula with mild sigmoid colon wall thickening and pericolonic fat stranding that could represent acute diverticulitis. No extraluminal air or adjacent loculated fluid collection to suggest abscess. No bowel obstruction. The appendix is normal.  Trace free fluid in the pelvis. No free intraperitoneal air. No pathologically enlarged lymph nodes in the abdomen or pelvis. Calcified and noncalcified atherosclerotic disease in the abdominal aorta and its distal branches with enlarged fusiform 3.5 cm infrarenal abdominal aortic aneurysm.  Diffuse osteopenia. Mild thoracolumbar levoscoliosis and multilevel spondylosis. Mild to moderate  bilateral hip joint DJD. No acute osseous abnormality or concerning osseous lesion.       1. Diffusely heterogeneous hepatic attenuation with nodular liver contours, likely reflecting hepatocellular disease/cirrhosis. 2. Patchy peripancreatic fat stranding, which could be related to acute pancreatitis. Nonspecific high attenuation 1 cm x 0.7 cm ovoid mass in the posterior head of the pancreas. No evidence of pancreatic necrosis, splenic vein thrombosis, or pseudocyst. Could consider further evaluation with MRI/MRCP if clinically indicated. 3. Colonic diverticula with mild sigmoid colon wall thickening and pericolonic fat stranding suggesting mild acute uncomplicated diverticulitis. 4. Status post cholecystectomy with mild extrahepatic biliary dilatation that could be related to the postoperative state. Occlusion from a stone or a small mass in the posterior pancreatic head is not entirely excluded. Attention on MRI. 5. Trace free fluid in the pelvis. 6. Calcified and noncalcified atherosclerotic disease in the abdominal aorta with a fusiform 3.5 cm infrarenal abdominal aortic aneurysm.  The critical results were discussed over the phone with the ordering ER physician, Dr. Chance Franks, at 11:57 a.m. on 3/6/2025.  This report was finalized on 3/6/2025 12:08 PM by Marv Meng MD on Workstation: [id number]      XR Chest 1 View    Result Date: 3/6/2025  XR CHEST 1 VW-  HISTORY: Shortness of breath and chest pain  COMPARISON: Two-view chest 08/13/2024, AP chest 08/10/2024  FINDINGS: Heart size upper normal. Sternotomy wires are present. There are aortic vascular calcifications. Lungs appear clear and there is no evidence for pulmonary edema or pleural effusion. Cervical spine plate and screws are evident. There is a left shoulder arthroplasty.      No evidence for active disease in the chest.    This report was finalized on 3/6/2025 11:12 AM by Luisito Tatum M.D on Workstation: [id number]       I ordered the  above noted radiological studies. Reviewed by me and discussed with radiologist.  See dictation for official radiology interpretation.      PROCEDURES    Critical Care    Performed by: Chance Franks MD  Authorized by: Chance Franks MD    Critical care provider statement:     Critical care time (minutes): 40.    Critical care time was exclusive of:  Separately billable procedures and treating other patients    Critical care was necessary to treat or prevent imminent or life-threatening deterioration of the following conditions:  Renal failure, hepatic failure and metabolic crisis    Critical care was time spent personally by me on the following activities:  Blood draw for specimens, development of treatment plan with patient or surrogate, discussions with consultants, evaluation of patient's response to treatment, examination of patient, obtaining history from patient or surrogate, pulse oximetry, re-evaluation of patient's condition, review of old charts, ordering and review of radiographic studies, ordering and review of laboratory studies and ordering and performing treatments and interventions    I assumed direction of critical care for this patient from another provider in my specialty: no      Care discussed with: admitting provider          MEDICATIONS GIVEN IN ER    Medications   sodium chloride 0.9 % flush 10 mL (has no administration in time range)   sodium chloride 0.9 % bolus 1,000 mL (0 mL Intravenous Stopped 3/6/25 1302)   LORazepam (ATIVAN) injection 1 mg (1 mg Intravenous Given 3/6/25 0901)   ondansetron (ZOFRAN) injection 4 mg (4 mg Intravenous Given 3/6/25 0900)   pantoprazole (PROTONIX) injection 80 mg (80 mg Intravenous Given 3/6/25 0900)   dextrose (D10W) 10% bolus 500 mL (0 mL Intravenous Stopped 3/6/25 1302)   iopamidol (ISOVUE-300) 61 % injection 85 mL (85 mL Intravenous Given 3/6/25 1041)   ipratropium-albuterol (DUO-NEB) nebulizer solution 3 mL (3 mL Nebulization Given 3/6/25 1124)    piperacillin-tazobactam (ZOSYN) 3.375 g IVPB in 100 mL NS MBP (CD) (0 g Intravenous Stopped 3/6/25 1403)         All labs have been independently reviewed by me.  All radiology studies have been reviewed by me and I discussed with radiologist dictating the report when indicated below.  All EKG's independently viewed and interpreted by me.  Discussion below represents my analysis of pertinent findings related to patient's condition, differential diagnosis, treatment plan and final disposition.        PROGRESS, DATA ANALYSIS, CONSULTS, AND MEDICAL DECISION MAKING    Differential diagnosis includes   - hepatobiliary pathology such as cholecystitis, cholangitis, and symptomatic cholelithiasis  -PUD  -Mesenteric ischemia  - Pancreatitis  - Dyspepsia  - Small bowel or large bowel obstruction  - Appendicitis  - Diverticulitis  - UTI including pyelonephritis  - Ureteral stone  - Zoster  - Colitis, including infectious and ischemic  - Atypical ACS  This patient has a long history of alcohol abuse and alcoholism.  She believes she is going to withdrawal.  She is anxious.  She is requesting medicine specifically Ativan to help her symptoms.  Will also give her some IV fluids.  Informed her of my clinical concerns of the test that we will order all questions answered at this time.      ED Course as of 03/06/25 1638   Thu Mar 06, 2025   1113 My own independent interpretation of the EKG that was done at 9:21 AM reveals a rate of 69 it is sinus rhythm narrow complex normal axis there are some nonspecific ST and T wave changes there is a wavy baseline on this from arm fact.  This is the best EKG the tach could get.  Compared to the previous EKG that was done on 8/6/2024 and overall looks fairly similar. [MM]   1113 Opiate Screen(!): Positive  Did not give the patient any opiates here. [MM]   1113 Oxycodone Screen, Urine(!): Positive [MM]   1113 Glucose(!): 186 [MM]   1113 HS Troponin T(!): 34 [MM]   1113 Troponin T Numeric Delta:  -2 [MM]   1113 Troponin T % Delta: -6 [MM]   1113 Lipase(!): 193 [MM]   1114 Creatinine(!): 1.58  Has acute kidney injury likely from the vomiting and not holding liquid down. [MM]   1114 AST (SGOT)(!): 69 [MM]   1114 Alkaline Phosphatase(!): 133 [MM]   1114 Total Bilirubin(!): 4.1  Acute elevation in bilirubin.  She does have chronic elevation on her other liver function test. [MM]   1114 Anion Gap(!): 20.2 [MM]   1114 Hemoglobin(!): 9.9  Chronic anemia [MM]   1114 INR(!): 1.63  Patient has alcoholic cirrhosis and thus likely the cause of elevation of INR.  She is not on any anticoagulants. [MM]   1115 No evidence of active disease seen in the chest. [MM]   1115 I anticipate this patient has alcohol disease and longstanding alcoholism.  This is making her [MM]   1115 Liver disease and cirrhosis worse.  She likely has vomiting and diarrhea associated with that.  She likely has acute kidney injury really because of lack of volume intake.  That is also likely the cause of the elevation of the anion gap.  I do not anticipate this is any cardiac primary etiology of her symptoms.  EKG shows no acute change.  Patient's troponin and delta troponin is stable.  Is likely elevated because of the acute kidney injury and from her overall comorbidities.  She likely with the vomiting probably has developed a little bit of exacerbation of her COPD.  Longstanding COPD patient.  Will give her a breathing treatment here.  She will ultimately need to be admitted to the hospital.  Awaiting for the CT scan report from radiologist. [MM]   1207 I discussed the results of the CT scan of the abdomen pelvis with Dr. LOCO and for radiology.  The patient has cirrhosis.  She has fat stranding around the pancreas there is a little nodule at the head of the pancreas which is nonspecific.  Probably needs an MRCP potentially at a later date.  Could be potentially have some mild pancreatitis.  She is status postcholecystectomy.  Her biliary ducts  are approximately 10 mm which is typical for someone that had cholecystectomy.  She does have acute on chronic sigmoid diverticulitis.  There is no abscess there is no free air.  She also has a 3.5 cm AAA there is no rupture or dissection.  Please see complete dictated report from radiologist [MM]   1254 I have reevaluated this patient.  She again stated that it was 2 days ago where she had some bright red rectal bleeding it was a small amount.  Has had no bleeding since that time.  She has had some diarrhea and loose stools.  I informed her and her sister at bedside about the results of the lab work and CT scan.  Informed her about the need for admission.  She is hemodynamically stable on repeat abdominal exam she has pain in the left lower quadrant of her abdomen.  Has mild midepigastric discomfort but has no guarding or rebound.  She is not have a surgical abdomen.  Her symptoms and pain have improved with treatment here.  All questions answered. [MM]   1256 I did discuss the case with Dr. Clinton who is on for [facility].  Informed him of the patient's presenting symptoms and results of the lab work and the CT scan.  He will consult GI.  All questions answered at this time. [MM]      ED Course User Index  [MM] Chance Franks MD       AS OF 16:38 EST VITALS:    BP - (P) 120/55  HR - (P) 67  TEMP - (P) 99 °F (37.2 °C) ((P) Oral)  02 SATS - (P) 97%    SOCIAL DETERMINANTS OF HEALTH THAT IMPACT OR LIMIT CARE (For example..Homelessness,safe discharge, inability to obtain care, follow up, or prescriptions):      DIAGNOSIS  Final diagnoses:   Alcohol-induced acute pancreatitis, unspecified complication status   Acute diverticulitis   Acute kidney injury   Alcoholic cirrhosis of liver without ascites   COPD exacerbation   Chest pain, unspecified type   Hypoglycemia   Alcohol withdrawal syndrome with complication         DISPOSITION  I have reviewed the test results with my patient and explained the current treatment plan.   I answered all of the patient's questions.  The patient will be admitted to monitor bed at this time.  The patient is not hypotensive and is tolerating their current disease condition well enough for a monitored bed at this time.  The patient's current condition does not require intensive care treatment at this time.            DICTATED UTILIZING DRAGON DICTATION    Note Disclaimer: At [facility], we believe that sharing information builds trust and better relationships. You are receiving this note because you recently visited [facility]. It is possible you will see health information before a provider has talked with you about it. This kind of information can be easy to misunderstand. To help you fully understand what it means for your health, we urge you to discuss this note with your provider.       Chance Franks MD  03/06/25 0145

## 2025-03-07 ENCOUNTER — APPOINTMENT (OUTPATIENT)
Dept: MRI IMAGING | Facility: HOSPITAL | Age: 65
DRG: 439 | End: 2025-03-07
Payer: COMMERCIAL

## 2025-03-07 LAB
ALBUMIN SERPL-MCNC: 2.9 G/DL (ref 3.5–5.2)
ALBUMIN/GLOB SERPL: 1 G/DL
ALP SERPL-CCNC: 119 U/L (ref 39–117)
ALT SERPL W P-5'-P-CCNC: 22 U/L (ref 1–33)
ANION GAP SERPL CALCULATED.3IONS-SCNC: 13.4 MMOL/L (ref 5–15)
AST SERPL-CCNC: 91 U/L (ref 1–32)
BILIRUB SERPL-MCNC: 3 MG/DL (ref 0–1.2)
BUN SERPL-MCNC: 43 MG/DL (ref 8–23)
BUN/CREAT SERPL: 24.7 (ref 7–25)
CALCIUM SPEC-SCNC: 8.5 MG/DL (ref 8.6–10.5)
CANCER AG19-9 SERPL-ACNC: 91.1 U/ML
CHLORIDE SERPL-SCNC: 100 MMOL/L (ref 98–107)
CHLORIDE UR-SCNC: <20 MMOL/L
CO2 SERPL-SCNC: 28.6 MMOL/L (ref 22–29)
CREAT SERPL-MCNC: 1.74 MG/DL (ref 0.57–1)
CREAT UR-MCNC: 45.3 MG/DL
DEPRECATED RDW RBC AUTO: 47.2 FL (ref 37–54)
EGFRCR SERPLBLD CKD-EPI 2021: 32.4 ML/MIN/1.73
ERYTHROCYTE [DISTWIDTH] IN BLOOD BY AUTOMATED COUNT: 14.1 % (ref 12.3–15.4)
GLOBULIN UR ELPH-MCNC: 3 GM/DL
GLUCOSE BLDC GLUCOMTR-MCNC: 101 MG/DL (ref 70–130)
GLUCOSE BLDC GLUCOMTR-MCNC: 124 MG/DL (ref 70–130)
GLUCOSE BLDC GLUCOMTR-MCNC: 130 MG/DL (ref 70–130)
GLUCOSE BLDC GLUCOMTR-MCNC: 130 MG/DL (ref 70–130)
GLUCOSE SERPL-MCNC: 113 MG/DL (ref 65–99)
HCT VFR BLD AUTO: 29.6 % (ref 34–46.6)
HGB BLD-MCNC: 9.4 G/DL (ref 12–15.9)
LIPASE SERPL-CCNC: 126 U/L (ref 13–60)
MAGNESIUM SERPL-MCNC: 2.7 MG/DL (ref 1.6–2.4)
MCH RBC QN AUTO: 29.3 PG (ref 26.6–33)
MCHC RBC AUTO-ENTMCNC: 31.8 G/DL (ref 31.5–35.7)
MCV RBC AUTO: 92.2 FL (ref 79–97)
PHOSPHATE SERPL-MCNC: 3 MG/DL (ref 2.5–4.5)
PLATELET # BLD AUTO: 57 10*3/MM3 (ref 140–450)
PMV BLD AUTO: 10.1 FL (ref 6–12)
POTASSIUM SERPL-SCNC: 3.9 MMOL/L (ref 3.5–5.2)
PROT ?TM UR-MCNC: 6.9 MG/DL
PROT SERPL-MCNC: 5.9 G/DL (ref 6–8.5)
PROT/CREAT UR: 152.3 MG/G CREA (ref 0–200)
RBC # BLD AUTO: 3.21 10*6/MM3 (ref 3.77–5.28)
SODIUM SERPL-SCNC: 142 MMOL/L (ref 136–145)
SODIUM UR-SCNC: 40 MMOL/L
WBC NRBC COR # BLD AUTO: 5.11 10*3/MM3 (ref 3.4–10.8)

## 2025-03-07 PROCEDURE — 94761 N-INVAS EAR/PLS OXIMETRY MLT: CPT

## 2025-03-07 PROCEDURE — A9577 INJ MULTIHANCE: HCPCS | Performed by: HOSPITALIST

## 2025-03-07 PROCEDURE — 63710000001 REVEFENACIN 175 MCG/3ML SOLUTION: Performed by: HOSPITALIST

## 2025-03-07 PROCEDURE — 83735 ASSAY OF MAGNESIUM: CPT | Performed by: HOSPITALIST

## 2025-03-07 PROCEDURE — 84300 ASSAY OF URINE SODIUM: CPT | Performed by: INTERNAL MEDICINE

## 2025-03-07 PROCEDURE — 84156 ASSAY OF PROTEIN URINE: CPT | Performed by: INTERNAL MEDICINE

## 2025-03-07 PROCEDURE — 94664 DEMO&/EVAL PT USE INHALER: CPT

## 2025-03-07 PROCEDURE — 85027 COMPLETE CBC AUTOMATED: CPT | Performed by: HOSPITALIST

## 2025-03-07 PROCEDURE — 82570 ASSAY OF URINE CREATININE: CPT | Performed by: INTERNAL MEDICINE

## 2025-03-07 PROCEDURE — 74183 MRI ABD W/O CNTR FLWD CNTR: CPT

## 2025-03-07 PROCEDURE — 84100 ASSAY OF PHOSPHORUS: CPT | Performed by: HOSPITALIST

## 2025-03-07 PROCEDURE — 99221 1ST HOSP IP/OBS SF/LOW 40: CPT | Performed by: NURSE PRACTITIONER

## 2025-03-07 PROCEDURE — 25010000002 PIPERACILLIN SOD-TAZOBACTAM PER 1 G: Performed by: HOSPITALIST

## 2025-03-07 PROCEDURE — 82436 ASSAY OF URINE CHLORIDE: CPT | Performed by: INTERNAL MEDICINE

## 2025-03-07 PROCEDURE — 94799 UNLISTED PULMONARY SVC/PX: CPT

## 2025-03-07 PROCEDURE — 25810000003 SODIUM CHLORIDE 0.9 % SOLUTION: Performed by: HOSPITALIST

## 2025-03-07 PROCEDURE — 83690 ASSAY OF LIPASE: CPT | Performed by: HOSPITALIST

## 2025-03-07 PROCEDURE — 25010000002 THIAMINE PER 100 MG: Performed by: HOSPITALIST

## 2025-03-07 PROCEDURE — 25010000002 LORAZEPAM PER 2 MG: Performed by: HOSPITALIST

## 2025-03-07 PROCEDURE — 82948 REAGENT STRIP/BLOOD GLUCOSE: CPT

## 2025-03-07 PROCEDURE — 25510000002 GADOBENATE DIMEGLUMINE 529 MG/ML SOLUTION: Performed by: HOSPITALIST

## 2025-03-07 PROCEDURE — 80053 COMPREHEN METABOLIC PANEL: CPT | Performed by: HOSPITALIST

## 2025-03-07 PROCEDURE — 86301 IMMUNOASSAY TUMOR CA 19-9: CPT | Performed by: HOSPITALIST

## 2025-03-07 RX ORDER — IPRATROPIUM BROMIDE AND ALBUTEROL SULFATE 2.5; .5 MG/3ML; MG/3ML
3 SOLUTION RESPIRATORY (INHALATION) EVERY 6 HOURS PRN
Status: DISCONTINUED | OUTPATIENT
Start: 2025-03-07 | End: 2025-03-08 | Stop reason: HOSPADM

## 2025-03-07 RX ADMIN — BUDESONIDE AND FORMOTEROL FUMARATE DIHYDRATE 2 PUFF: 160; 4.5 AEROSOL RESPIRATORY (INHALATION) at 20:24

## 2025-03-07 RX ADMIN — THIAMINE HYDROCHLORIDE 200 MG: 100 INJECTION, SOLUTION INTRAMUSCULAR; INTRAVENOUS at 05:32

## 2025-03-07 RX ADMIN — NICOTINE 1 PATCH: 21 PATCH TRANSDERMAL at 09:00

## 2025-03-07 RX ADMIN — BUDESONIDE AND FORMOTEROL FUMARATE DIHYDRATE 2 PUFF: 160; 4.5 AEROSOL RESPIRATORY (INHALATION) at 10:00

## 2025-03-07 RX ADMIN — PANTOPRAZOLE SODIUM 40 MG: 40 TABLET, DELAYED RELEASE ORAL at 05:32

## 2025-03-07 RX ADMIN — IPRATROPIUM BROMIDE AND ALBUTEROL SULFATE 3 ML: .5; 3 SOLUTION RESPIRATORY (INHALATION) at 20:21

## 2025-03-07 RX ADMIN — IPRATROPIUM BROMIDE AND ALBUTEROL SULFATE 3 ML: .5; 3 SOLUTION RESPIRATORY (INHALATION) at 09:55

## 2025-03-07 RX ADMIN — HYDROXYZINE HYDROCHLORIDE 25 MG: 10 TABLET ORAL at 17:11

## 2025-03-07 RX ADMIN — ACETAMINOPHEN 650 MG: 325 TABLET, FILM COATED ORAL at 01:15

## 2025-03-07 RX ADMIN — ATENOLOL 25 MG: 25 TABLET ORAL at 08:59

## 2025-03-07 RX ADMIN — GADOBENATE DIMEGLUMINE 15 ML: 529 INJECTION, SOLUTION INTRAVENOUS at 03:57

## 2025-03-07 RX ADMIN — REVEFENACIN 175 MCG: 175 SOLUTION RESPIRATORY (INHALATION) at 09:59

## 2025-03-07 RX ADMIN — THIAMINE HYDROCHLORIDE 200 MG: 100 INJECTION, SOLUTION INTRAMUSCULAR; INTRAVENOUS at 22:05

## 2025-03-07 RX ADMIN — Medication 10 ML: at 20:12

## 2025-03-07 RX ADMIN — THIAMINE HYDROCHLORIDE 200 MG: 100 INJECTION, SOLUTION INTRAMUSCULAR; INTRAVENOUS at 12:58

## 2025-03-07 RX ADMIN — SODIUM CHLORIDE 3.38 G: 9 INJECTION, SOLUTION INTRAVENOUS at 01:05

## 2025-03-07 RX ADMIN — HYDROCODONE BITARTRATE AND ACETAMINOPHEN 1 TABLET: 7.5; 325 TABLET ORAL at 05:44

## 2025-03-07 RX ADMIN — Medication 10 ML: at 09:00

## 2025-03-07 RX ADMIN — ROPINIROLE 1 MG: 1 TABLET, FILM COATED ORAL at 20:12

## 2025-03-07 RX ADMIN — SODIUM CHLORIDE 100 ML/HR: 9 INJECTION, SOLUTION INTRAVENOUS at 12:50

## 2025-03-07 RX ADMIN — HYDROCODONE BITARTRATE AND ACETAMINOPHEN 1 TABLET: 7.5; 325 TABLET ORAL at 12:49

## 2025-03-07 RX ADMIN — IPRATROPIUM BROMIDE AND ALBUTEROL SULFATE 3 ML: .5; 3 SOLUTION RESPIRATORY (INHALATION) at 15:20

## 2025-03-07 RX ADMIN — SODIUM CHLORIDE 3.38 G: 9 INJECTION, SOLUTION INTRAVENOUS at 08:59

## 2025-03-07 RX ADMIN — LORAZEPAM 1 MG: 2 INJECTION INTRAMUSCULAR; INTRAVENOUS at 03:38

## 2025-03-07 RX ADMIN — FOLIC ACID 1 MG: 5 INJECTION, SOLUTION INTRAMUSCULAR; INTRAVENOUS; SUBCUTANEOUS at 09:00

## 2025-03-07 RX ADMIN — HYDROCODONE BITARTRATE AND ACETAMINOPHEN 1 TABLET: 7.5; 325 TABLET ORAL at 18:42

## 2025-03-07 RX ADMIN — SODIUM CHLORIDE 3.38 G: 9 INJECTION, SOLUTION INTRAVENOUS at 15:31

## 2025-03-07 RX ADMIN — HYDROXYZINE HYDROCHLORIDE 25 MG: 10 TABLET ORAL at 08:59

## 2025-03-07 NOTE — DISCHARGE PLACEMENT REQUEST
"Filomena Liu P \"Trang\" (64 y.o. Female)       Date of Birth   1960    Social Security Number       Address   [address]  [place] [facility] [address]    Home Phone   [phone]    MRN   [id number]       Christian   Yazidi    Marital Status                               Admission Date   3/6/25    Admission Type   Emergency    Admitting Provider   Brooks Clinton MD    Attending Provider   Chan Garzon MD    Department, Room/Bed   [address], S[address]/1       Discharge Date       Discharge Disposition       Discharge Destination                                 Attending Provider: Chan Garzon MD    Allergies: No Known Allergies    Isolation: None   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)   Code Status: CPR    Ht: 152.4 cm (60\")   Wt: --    Admission Cmt: None   Principal Problem: Acute pancreatitis [K85.90]                   Active Insurance as of 3/6/2025       Primary Coverage       Payor Plan Insurance Group Employer/Plan Group    [facility] MEDICARE REPLACEMENT ANTHEM MEDICARE ADVANTAGE O [id number]       Payor Plan Address Payor Plan Phone Number Payor Plan Fax Number Effective Dates    [address] [phone]  1/1/2024 - None Entered    [facility] [address]         Subscriber Name Subscriber Birth Date Member ID       FILOMENA LIU 1960 [id number]                     Emergency Contacts        (Rel.) Home Phone Work Phone Mobile Phone    FAW MARTINEZ LANDRY (Significant Other) [phone] -- [phone]                "

## 2025-03-07 NOTE — PROGRESS NOTES
Nutrition Services    Patient Name:  Filomena Liu  YOB: 1960  MRN: [id number]  Admit Date:  3/6/2025Assessment Date:  03/07/25    NUTRITION SCREENING      Reason for Encounter MST score 2+   Diagnosis/Problem Acute pancreatitis   Vomiting, diarrhea, abdominal pain    PMH Alcohol abuse, cirrhosis, CKD, COPD, HTN, diverticulosis, dysphagia    PO Diet Diet: Liquid; Clear Liquid; Fluid Consistency: Thin (IDDSI 0)   Supplements n/a   PO Intake % clears       Medications MAR reviewed by RD   Labs  Listed below, reviewed   Physical Findings Alert, oriented    GI Function BM 3/5, abd pain, diarrhea   Skin Status Intact        Height  Weight  BMI  Weight Trend           There is no height or weight on file to calculate BMI.  Unknown  I have asked RN to obtain updated weight. Last weight on file is from November 2024       Nutrition Problem (PES) Inadequate oral intake related to pancreatitis as evidence by NPO/clears.       Intervention/Plan Boost breeze TID   FU once diet advances for nutritional interview/NFPE   Updated weight   Replace electrolytes per protocol    RD to follow up per protocol.     Results from last 7 days   Lab Units 03/07/25  0552 03/06/25  0857   SODIUM mmol/L 142 136   POTASSIUM mmol/L 3.9 4.4   CHLORIDE mmol/L 100 90*   CO2 mmol/L 28.6 25.8   BUN mg/dL 43* 54*   CREATININE mg/dL 1.74* 1.58*   CALCIUM mg/dL 8.5* 8.8   BILIRUBIN mg/dL 3.0* 4.1*   ALK PHOS U/L 119* 133*   ALT (SGPT) U/L 22 24   AST (SGOT) U/L 91* 69*   GLUCOSE mg/dL 113* 46*     Results from last 7 days   Lab Units 03/07/25 0552 03/06/25  0857   MAGNESIUM mg/dL 2.7* 3.1*   PHOSPHORUS mg/dL 3.0  --    HEMOGLOBIN g/dL 9.4* 9.9*   HEMATOCRIT % 29.6* 30.3*     Lab Results   Component Value Date    HGBA1C <4.20 (L) 08/07/2024         Electronically signed by:  Jhoana Joshi RD  03/07/25 14:59 EST

## 2025-03-07 NOTE — CONSULTS
[facility] Consult Note      Patient Name: Filomena Liu  : 1960  MRN: [id number]  Primary Care Physician:  Fernando Dunn MD  Referring Physician: Brooks Baltazar*  Date of admission: 3/6/2025    Subjective     Reason for Consult: Acute kidney injury on chronic kidney disease    HPI:   Filomena Liu is a 64 y.o. female patient was admitted with the chest pain and abdominal pain noted to have evidence of acute pancreatitis she has been heavily drinking has not drank for the last 20 hours.  Complaining of insomnia and restlessness.  She was seen in our office by my partner Dr. Brewster, inn 2024, at that time her creatinine was around 1.6, baseline creatinine around 1.1-1.4 in the past.  He is diagnosed with chronic kidney disease stage IIIa, hypertension, patient has alcoholism.  And COPD, obstructive sleep apnea and chronic back pain    At present she is feeling shaky tremulous having insomnia having abdominal pain.  Review of Systems:   14 point review of systems is otherwise negative except for mentioned above on HPI    Personal History     Past Medical History:   Diagnosis Date    Ankle pain     Ankle wound     LEFT    Anxiety     Arthritis of back     Arthritis of neck     Asthma     Benign tumor of thymus     REMOVED    Bruises easily     Cataract     COPD (chronic obstructive pulmonary disease)     CTS (carpal tunnel syndrome) Surgery     DDD (degenerative disc disease), cervical     Depression     Fracture of wrist 2917    GERD (gastroesophageal reflux disease)     Hip arthrosis Unknown    History of MRSA infection     LEFT ANKLE TREAT [facility]  5YEARS GO    Hyperlipidemia     Hypertension     Knee sprain 2023    Knee swelling Unknown    Shoulder arthritis 2023    Shoulder pain, left 2023    Sleep apnea     NO MACHINE USE    Spinal stenosis     Spondylolisthesis of cervical region        Past Surgical History:   Procedure  Laterality Date    BACK SURGERY      cervical disc surgery with fusion-    CHOLECYSTECTOMY      COLONOSCOPY      COLONOSCOPY N/A 11/12/2020    Procedure: COLONOSCOPY, polypectomy;  Surgeon: Filomena Mckeon MD;  Location: [facility] OR;  Service: Gastroenterology;  Laterality: N/A;  Diverticulosis; Hepatic flexure polyp x 1; Polyp at 60cm x 1; Polyp at 50cm x 1- hot snare;    COLONOSCOPY N/A 4/15/2024    Procedure: COLONOSCOPY into sigmoid colon with hot snare polypectomy;  Surgeon: Leatha Johns MD;  Location: [facility] ENDOSCOPY;  Service: General;  Laterality: N/A;  pre- history of polyps  post- diverticulosis, polyp    ENDOSCOPY N/A 8/15/2024    Procedure: ESOPHAGOGASTRODUODENOSCOPY;  Surgeon: Garth Constantino MD;  Location: [facility] ENDOSCOPY;  Service: Gastroenterology;  Laterality: N/A;  PRE- CIRRHOSIS, DARK STOOL  POST- NORMAL    HAND SURGERY  2000    HYSTERECTOMY      INCISION AND DRAINAGE LEG Left 11/17/2018    Procedure: Incision and Drainage of Left ankle;  Surgeon: Maxwell Lanier MD;  Location: [facility] OR;  Service: Orthopedics    NECK SURGERY  2000    SIGMOIDOSCOPY N/A 3/28/2024    Procedure: SIGMOIDOSCOPY FLEXIBLE;  Surgeon: Leatha Johns MD;  Location: [facility] ENDOSCOPY;  Service: General;  Laterality: N/A;  pre: h/o colon polyps  post: poor prep, diverticulosis    SKIN BIOPSY      SKIN GRAFT SPLIT THICKNESS N/A 02/12/2019    Procedure: debridement SKIN GRAFT SPLIT THICKNESS left ankle wound;  Surgeon: Barry Worthy MD;  Location: [facility] OR [facility];  Service: Plastics    TONGUE LESION EXCISION/BIOPSY N/A 11/26/2024    Procedure: WIDE LOCAL EXCISION OF TONGUE LESION;  Surgeon: El Mercedes MD;  Location: [facility] OR;  Service: ENT;  Laterality: N/A;    TOTAL SHOULDER ARTHROPLASTY Left 7/20/2023    Procedure: Total  shoulder arthroplasty;  Surgeon: Maxwell Lanier MD;  Location: [facility];  Service: Orthopedics;  Laterality: Left;    TOTAL THYMECTOMY      TRIGGER POINT  INJECTION  2000    WRIST FRACTURE SURGERY      CARPAL TUNNEL       Family History: family history includes Alzheimer's disease in her father; Emphysema in her mother.    Social History:  reports that she has been smoking cigarettes. She started smoking about 45 years ago. She has a 11.3 pack-year smoking history. She has been exposed to tobacco smoke. She has never used smokeless tobacco. She reports current alcohol use of about 10.0 standard drinks of alcohol per week. She reports that she does not use drugs.    Home Medications:  Prior to Admission medications    Medication Sig Start Date End Date Taking? Authorizing Provider   albuterol (PROVENTIL HFA;VENTOLIN HFA) 108 (90 Base) MCG/ACT inhaler Inhale 2 puffs 3 times a day. Indications: Spasm of Lung Air Passages 1/1/24  Yes Trevin Liu MD   atenolol (TENORMIN) 25 MG tablet Take 1 tablet by mouth Daily.   Yes Trevin Liu MD   Cholecalciferol 25 MCG (1000 UT) tablet Take 1 tablet by mouth 1 (One) Time Per Week. Indications: Vitamin D Deficiency 1/1/24  Yes Trevin Liu MD   HYDROcodone-acetaminophen (NORCO) 7.5-325 MG per tablet Take 1 tablet by mouth Every 6 (Six) Hours As Needed for Moderate Pain.   Yes Trevin Liu MD   ipratropium-albuterol (DUO-NEB) 0.5-2.5 mg/3 ml nebulizer Take 3 mL by nebulization 4 (Four) Times a Day. Indications: Chronic Obstructive Lung Disease 8/1/24  Yes Trevin Liu MD   pantoprazole (PROTONIX) 40 MG EC tablet Take 1 tablet by mouth Every Morning. 8/17/24  Yes Miah Bonilla MD   potassium chloride (KLOR-CON M20) 20 MEQ CR tablet Take 1 tablet by mouth Daily. 8/17/24  Yes Miah Bonilla MD   rOPINIRole (REQUIP) 1 MG tablet Take 1 tablet by mouth every night at bedtime. Indications: Restless Leg Syndrome 1/2/24  Yes Trevin Liu MD   spironolactone (ALDACTONE) 25 MG tablet Take 1 tablet by mouth Daily. 8/17/24  Yes Miah Bonilla MD   torsemide (DEMADEX) 20 MG tablet Take 1  tablet by mouth Daily.  Patient taking differently: Take 2 tablets by mouth Daily. PER NEPHROLOGY 8/23  Indications: Cardiac Failure, Edema, High Blood Pressure 8/16/24  Yes Miah Bonilla MD Trelegy Ellipta 200-62.5-25 MCG/ACT aerosol powder  Inhale 1 puff Daily. Indications: Asthma 5/8/23  Yes Trevin Liu MD   fluticasone (FLONASE) 50 MCG/ACT nasal spray Administer 1 spray into the nostril(s) as directed by provider Daily. Indications: Stuffy Nose 1/1/24   ProviderTrevin MD   lisinopril (PRINIVIL,ZESTRIL) 40 MG tablet Take 1 tablet by mouth Daily.    ProviderTrevin MD   naloxone (NARCAN) 4 MG/0.1ML nasal spray Call 911. Don't prime. [place] in 1 nostril for overdose. Repeat in 2-3 minutes in other nostril if no or minimal breathing/responsiveness.  Patient not taking: Reported on 3/6/2025 11/26/24   El Mercedes MD   nystatin susp + lidocaine viscous (MAGIC MOUTHWASH) oral suspension Swish and swallow 5 mL 4 (Four) Times a Day.    Provider, MD Trevin   oxyCODONE-acetaminophen (Percocet) 7.5-325 MG per tablet Take 1 tablet by mouth Every 4 (Four) Hours As Needed for Severe Pain or Moderate Pain. 11/26/24   El Mercedes MD   promethazine (PHENERGAN) 25 MG tablet Take 1 tablet by mouth Every 6 (Six) Hours As Needed for Nausea or Vomiting. 11/26/24   El Mercedes MD       Allergies:  No Known Allergies    Objective     Vitals:   Temp:  [98.4 °F (36.9 °C)-99.1 °F (37.3 °C)] 99 °F (37.2 °C)  Heart Rate:  [67-91] 91  Resp:  [16-18] 18  BP: (102-133)/(54-81) 118/73    Intake/Output Summary (Last 24 hours) at 3/7/2025 1014  Last data filed at 3/7/2025 0822  Gross per 24 hour   Intake --   Output 200 ml   Net -200 ml       Physical Exam:   Constitutional: Awake, alert, no acute distress.  Chronically ill  HEENT: Sclera anicteric, no conjunctival injection, oral mucosa slightly dry and she has plethoric facies  Neck: No JVD  Respiratory: Clear to auscultation bilaterally,  nonlabored respiration  Cardiovascular: RRR, no murmurs, no rubs or gallops, no carotid bruit  Gastrointestinal: Epigastric tenderness, present bowel sounds auscultation  : No palpable bladder  Musculoskeletal: No edema, no clubbing or cyanosis  Psychiatric: Flat affect and somewhat anxious , cooperative  Neurologic: Oriented x3, moving all extremities, normal speech and mental status  Skin: Warm and dry       Scheduled Meds:     atenolol, 25 mg, Oral, Daily  budesonide-formoterol, 2 puff, Inhalation, BID - RT   And  revefenacin, 175 mcg, Nebulization, Daily - RT  folic acid 1 mg in sodium chloride 0.9 % 50 mL IVPB, 1 mg, Intravenous, Daily  ipratropium-albuterol, 3 mL, Nebulization, 4x Daily - RT  nicotine, 1 patch, Transdermal, Q24H  pantoprazole, 40 mg, Oral, Q AM  piperacillin-tazobactam, 3.375 g, Intravenous, Q8H  rOPINIRole, 1 mg, Oral, Nightly  sodium chloride, 10 mL, Intravenous, Q12H  thiamine (B-1) IV, 200 mg, Intravenous, Q8H   Followed by  [START ON 3/12/2025] thiamine, 100 mg, Oral, Daily      IV Meds:   sodium chloride, 100 mL/hr, Last Rate: 100 mL/hr (03/06/25 1831)        Results Reviewed:   I have personally reviewed the results from the time of this admission to 3/7/2025 10:14 EST     Lab Results   Component Value Date    GLUCOSE 113 (H) 03/07/2025    CALCIUM 8.5 (L) 03/07/2025     03/07/2025    K 3.9 03/07/2025    CO2 28.6 03/07/2025     03/07/2025    BUN 43 (H) 03/07/2025    CREATININE 1.74 (H) 03/07/2025    EGFRIFAFRI >60 03/14/2023    EGFRIFNONA 83 02/07/2019    BCR 24.7 03/07/2025    ANIONGAP 13.4 03/07/2025      Lab Results   Component Value Date    MG 2.7 (H) 03/07/2025    PHOS 3.0 03/07/2025    ALBUMIN 2.9 (L) 03/07/2025           Assessment / Plan       Acute pancreatitis    HTN (hypertension)    Anxiety    SANDRO (acute kidney injury)    COPD (chronic obstructive pulmonary disease)    Alcoholic cirrhosis of liver without ascites    Diverticulitis    Hypoglycemia    Lactic  acidosis      ASSESSMENT:  Acute kidney injury on chronic kidney disease stage IIIa, associate with poor oral intake and probably with acute pancreatitis she had increased amylase and lipase  Alcoholism with concern about possible withdrawal  Hypertension with chronic kidney disease stable.  History of cirrhosis  Lactic acidosis, her total CO2 28.6 and anion gap is normal.  COPD  Hypertension with chronic kidney disease reasonably controlled.    PLAN:  Check random urine for sodium, chloride and protein to creatinine  Continue IV fluid  Primary team managing potential alcohol withdrawal  Surveillance labs    Reviewed the chart and other providers notes, reviewed labs.  I discussed the case with the patient and he voiced with understanding.      Thank you for involving us in the care of Filomena Liu.  Please feel free to call with any questions.    Mark Cabezas MD  03/07/25  10:14 [facility]    [facility]  [phone]      Please note that portions of this note were completed with a voice recognition program.

## 2025-03-07 NOTE — PLAN OF CARE
Problem: Adult Inpatient Plan of Care  Goal: Plan of Care Review  Outcome: Progressing  Flowsheets (Taken 3/7/2025 8378)  Progress: improving  Outcome Evaluation: Pt A&Ox4, RA, sinus rhythm on monitor, monitoring blood sugar, CIWA protocol, assistx1 to BSC, went for MRI this shift, 1 dose of Ativan given before MRI, on IVFand IV abx, no other complaints overnight, plan of care continues.  Plan of Care Reviewed With: patient   Goal Outcome Evaluation:  Plan of Care Reviewed With: patient        Progress: improving  Outcome Evaluation: Pt A&Ox4, RA, sinus rhythm on monitor, monitoring blood sugar, CIWA protocol, assistx1 to [facility], went for MRI this shift, 1 dose of Ativan given before MRI, on IVFand IV abx, no other complaints overnight, plan of care continues.

## 2025-03-07 NOTE — PAYOR COMM NOTE
"Filomena Liu \"Trang\" (64 y.o. Female)     ATTN: NURSE REVIEWER  RE: INITIAL INPT AUTH CLINICALS   REF# [id number]  PLS REPLY TO WILBERTO GREER [phone] OR FAX# [phone]      Date of Birth   1960    Social Security Number       Address   [address] [place] [facility] [address]    Home Phone   [phone]    MRN   [id number]       Christian   Sabianism    Marital Status                               Admission Date   3/6/25    Admission Type   Emergency    Admitting Provider   Brooks Clinton MD    Attending Provider   Chan Garzon MD    Department, Room/Bed   [address], 12/1       Discharge Date       Discharge Disposition       Discharge Destination                                 Attending Provider: Chan Garzon MD    Allergies: No Known Allergies    Isolation: None   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)   Code Status: CPR    Ht: 152.4 cm (60\")   Wt: --    Admission Cmt: None   Principal Problem: Acute pancreatitis [K85.90]                   Active Insurance as of 3/6/2025       Primary Coverage       Payor Plan Insurance Group Employer/Plan Group    ANTHEM MEDICARE REPLACEMENT ANTHEM MEDICARE ADVANTAGE HMO [id number]       Payor Plan Address Payor Plan Phone Number Payor Plan Fax Number Effective Dates    [address] [phone]  1/1/2024 - None Entered    [facility] [id number]         Subscriber Name Subscriber Birth Date Member ID       FILOMENA LIU 1960 [id number]                     Emergency Contacts        (Rel.) Home Phone Work Phone Mobile Phone    FAW MARTINEZ LANDRY (Significant Other) [phone] -- [phone]                 History & Physical        Brooks Clinton MD at 03/06/25 1743              Patient Name:  Filomena Liu  YOB: 1960  MRN:  [id number]  Admit Date:  3/6/2025  Patient Care Team:  Fernando Dunn MD as PCP - General (Family " "Medicine)  Nam Calderon APRN as Referring Physician (Nurse Practitioner)  Alina Asencio MD as Consulting Physician (Hematology and Oncology)      Subjective  History Present Illness     Chief Complaint   Patient presents with    Chest Pain    Cough       Ms. Liu is a 64 y.o. with history of alcohol abuse, COPD, sleep apnea, GERD, cirrhosis and other issues who presented to the hospital complaining of \"withdrawals\".  She says that she has not had anything to drink since last night and was feeling shaky and tremulous.  She usually drinks about a pint of alcohol per day.  She has been having some left lower quadrant abdominal pain off and on for several weeks.  She reports some nausea, lack of appetite and vomiting.  She had some chest pain associated with the vomiting which is not currently bothering her.  She has had alcohol withdrawals before but never any history of seizures.  She says she is anxious and is requesting Ativan by name, says she needs that and a pain pill so that she can sleep all night.    She does note that she had some bleeding at home over the last couple days which she thought was from her vagina.  Has not recurred today      Review of Systems   Constitutional:  Negative for chills, fatigue and fever.   HENT:  Negative for congestion and trouble swallowing.    Eyes:  Negative for visual disturbance.   Respiratory:  Negative for cough, chest tightness and shortness of breath.    Cardiovascular:  Positive for chest pain. Negative for palpitations and leg swelling.   Gastrointestinal:  Positive for abdominal pain, nausea and vomiting. Negative for abdominal distention, constipation and diarrhea.   Genitourinary:  Negative for difficulty urinating, dysuria and frequency.   Musculoskeletal:  Negative for arthralgias.   Skin:  Negative for rash.   Neurological:  Positive for tremors. Negative for dizziness and headaches.   Psychiatric/Behavioral:  Negative for confusion. The patient is " nervous/anxious.         Personal History     Past Medical History:   Diagnosis Date    Ankle pain     Ankle wound     LEFT    Anxiety     Arthritis of back 2000    Arthritis of neck 2000    Asthma     Benign tumor of thymus     REMOVED    Bruises easily     Cataract     COPD (chronic obstructive pulmonary disease)     CTS (carpal tunnel syndrome) Surgery 1999    DDD (degenerative disc disease), cervical     Depression     Fracture of wrist 2917    GERD (gastroesophageal reflux disease)     Hip arthrosis Unknown    History of MRSA infection     LEFT ANKLE TREAT [facility]  5YEARS GO    Hyperlipidemia     Hypertension     Knee sprain June 2023    Knee swelling Unknown    Shoulder arthritis 06/05/2023    Shoulder pain, left 07/07/2023    Sleep apnea     NO MACHINE USE    Spinal stenosis     Spondylolisthesis of cervical region      Past Surgical History:   Procedure Laterality Date    BACK SURGERY      cervical disc surgery with fusion-    CHOLECYSTECTOMY      COLONOSCOPY      COLONOSCOPY N/A 11/12/2020    Procedure: COLONOSCOPY, polypectomy;  Surgeon: Filomena Mckeon MD;  Location: [facility] OR;  Service: Gastroenterology;  Laterality: N/A;  Diverticulosis; Hepatic flexure polyp x 1; Polyp at 60cm x 1; Polyp at 50cm x 1- hot snare;    COLONOSCOPY N/A 4/15/2024    Procedure: COLONOSCOPY into sigmoid colon with hot snare polypectomy;  Surgeon: Leatha Johns MD;  Location: [facility] ENDOSCOPY;  Service: General;  Laterality: N/A;  pre- history of polyps  post- diverticulosis, polyp    ENDOSCOPY N/A 8/15/2024    Procedure: ESOPHAGOGASTRODUODENOSCOPY;  Surgeon: Garth Constantino MD;  Location: [facility] ENDOSCOPY;  Service: Gastroenterology;  Laterality: N/A;  PRE- CIRRHOSIS, DARK STOOL  POST- NORMAL    HAND SURGERY  2000    HYSTERECTOMY      INCISION AND DRAINAGE LEG Left 11/17/2018    Procedure: Incision and Drainage of Left ankle;  Surgeon: Maxwell Lanier MD;  Location: [facility] OR;  Service: Orthopedics    NECK SURGERY   2000    SIGMOIDOSCOPY N/A 3/28/2024    Procedure: SIGMOIDOSCOPY FLEXIBLE;  Surgeon: Leatha Johns MD;  Location: [facility] ENDOSCOPY;  Service: General;  Laterality: N/A;  pre: h/o colon polyps  post: poor prep, diverticulosis    SKIN BIOPSY      SKIN GRAFT SPLIT THICKNESS N/A 02/12/2019    Procedure: debridement SKIN GRAFT SPLIT THICKNESS left ankle wound;  Surgeon: Barry Worthy MD;  Location:  [facility];  Service: Plastics    TONGUE LESION EXCISION/BIOPSY N/A 11/26/2024    Procedure: WIDE LOCAL EXCISION OF TONGUE LESION;  Surgeon: El Mercedes MD;  Location[place] [facility];  Service: ENT;  Laterality: N/A;    TOTAL SHOULDER ARTHROPLASTY Left 7/20/2023    Procedure: Total  shoulder arthroplasty;  Surgeon: Maxwell Lanier MD;  Location: [facility];  Service: Orthopedics;  Laterality: Left;    TOTAL THYMECTOMY      TRIGGER POINT INJECTION  2000    WRIST FRACTURE SURGERY      CARPAL TUNNEL     Family History   Problem Relation Age of Onset    Emphysema Mother     Alzheimer's disease Father     Malig Hyperthermia Neg Hx      Social History     Tobacco Use    Smoking status: Every Day     Current packs/day: 0.25     Average packs/day: 0.3 packs/day for 45.2 years (11.3 ttl pk-yrs)     Types: Cigarettes     Start date: 1/1/1980     Passive exposure: Current    Smokeless tobacco: Never   Vaping Use    Vaping status: Never Used   Substance Use Topics    Alcohol use: Yes     Alcohol/week: 10.0 standard drinks of alcohol     Types: 10 Drinks containing 0.5 oz of alcohol per week     Comment: pt reports drinking a pint a day    Drug use: No     No current facility-administered medications on file prior to encounter.     Current Outpatient Medications on File Prior to Encounter   Medication Sig Dispense Refill    albuterol (PROVENTIL HFA;VENTOLIN HFA) 108 (90 Base) MCG/ACT inhaler Inhale 2 puffs 3 times a day. Indications: Spasm of Lung Air Passages      atenolol (TENORMIN) 25 MG tablet Take 1  tablet by mouth Daily.      Cholecalciferol 25 MCG (1000 UT) tablet Take 1 tablet by mouth 1 (One) Time Per Week. Indications: Vitamin D Deficiency      HYDROcodone-acetaminophen (NORCO) 7.5-325 MG per tablet Take 1 tablet by mouth Every 6 (Six) Hours As Needed for Moderate Pain.      ipratropium-albuterol (DUO-NEB) 0.5-2.5 mg/3 ml nebulizer Take 3 mL by nebulization 4 (Four) Times a Day. Indications: Chronic Obstructive Lung Disease      pantoprazole (PROTONIX) 40 MG EC tablet Take 1 tablet by mouth Every Morning. 30 tablet 0    potassium chloride (KLOR-CON M20) 20 MEQ CR tablet Take 1 tablet by mouth Daily. 30 tablet 0    rOPINIRole (REQUIP) 1 MG tablet Take 1 tablet by mouth every night at bedtime. Indications: Restless Leg Syndrome      spironolactone (ALDACTONE) 25 MG tablet Take 1 tablet by mouth Daily. 30 tablet 0    torsemide (DEMADEX) 20 MG tablet Take 1 tablet by mouth Daily. (Patient taking differently: Take 2 tablets by mouth Daily. PER NEPHROLOGY 8/23  Indications: Cardiac Failure, Edema, High Blood Pressure) 30 tablet 0    Trelegy Ellipta 200-62.5-25 MCG/ACT aerosol powder  Inhale 1 puff Daily. Indications: Asthma      fluticasone (FLONASE) 50 MCG/ACT nasal spray Administer 1 spray into the nostril(s) as directed by provider Daily. Indications: Stuffy Nose      lisinopril (PRINIVIL,ZESTRIL) 40 MG tablet Take 1 tablet by mouth Daily.      naloxone (NARCAN) 4 MG/0.1ML nasal spray Call 911. Don't prime. Gallion in 1 nostril for overdose. Repeat in 2-3 minutes in other nostril if no or minimal breathing/responsiveness. (Patient not taking: Reported on 3/6/2025) 2 each 0    nystatin susp + lidocaine viscous (MAGIC MOUTHWASH) oral suspension Swish and swallow 5 mL 4 (Four) Times a Day.      oxyCODONE-acetaminophen (Percocet) 7.5-325 MG per tablet Take 1 tablet by mouth Every 4 (Four) Hours As Needed for Severe Pain or Moderate Pain. 30 tablet 0    promethazine (PHENERGAN) 25 MG tablet Take 1 tablet by mouth  Every 6 (Six) Hours As Needed for Nausea or Vomiting. 15 tablet 0     No Known Allergies    Objective   Objective     Vital Signs  Temp:  [99 °F (37.2 °C)-99.7 °F (37.6 °C)] 99 °F (37.2 °C)  Heart Rate:  [67-87] 67  Resp:  [16-20] 18  BP: (102-140)/(46-57) 120/55  SpO2:  [91 %-97 %] 97 %  on   ;   Device (Oxygen Therapy): room air  There is no height or weight on file to calculate BMI.    Physical Exam  Vitals reviewed.   Constitutional:       General: She is not in acute distress.     Appearance: She is obese.   Cardiovascular:      Rate and Rhythm: Normal rate and regular rhythm.   Pulmonary:      Effort: No respiratory distress.      Breath sounds: Normal breath sounds. No wheezing.   Abdominal:      General: There is no distension.      Palpations: Abdomen is soft.      Tenderness: There is abdominal tenderness (LLQ). There is no guarding or rebound.   Musculoskeletal:      Right lower leg: No edema.      Left lower leg: No edema.   Skin:     General: Skin is warm and dry.   Neurological:      Mental Status: She is alert and oriented to person, place, and time.      Comments: Minimal tremor   Psychiatric:         Mood and Affect: Mood normal.      Comments: Flat affect         Results Review:  I reviewed the patient's new clinical results.  I reviewed the patient's new imaging results and agree with the interpretation.  I reviewed the patient's other test results and agree with the interpretation  I personally viewed and interpreted the patient's EKG/Telemetry data  Discussed with ED provider.    Lab Results (last 24 hours)       Procedure Component Value Units Date/Time    CBC & Differential [[id number]]  (Abnormal) Collected: 03/06/25 0857    Specimen: Blood Updated: 03/06/25 0937    Narrative:      The following orders were created for panel order CBC & Differential.  Procedure                               Abnormality         Status                     ---------                               -----------          ------                     CBC Auto Differential[[id number]]        Abnormal            Final result                 Please view results for these tests on the individual orders.    Comprehensive Metabolic Panel [[id number]]  (Abnormal) Collected: 03/06/25 0857    Specimen: Blood Updated: 03/06/25 1006     Glucose 46 mg/dL      BUN 54 mg/dL      Creatinine 1.58 mg/dL      Sodium 136 mmol/L      Potassium 4.4 mmol/L      Chloride 90 mmol/L      CO2 25.8 mmol/L      Calcium 8.8 mg/dL      Total Protein 6.6 g/dL      Albumin 3.6 g/dL      ALT (SGPT) 24 U/L      AST (SGOT) 69 U/L      Alkaline Phosphatase 133 U/L      Total Bilirubin 4.1 mg/dL      Globulin 3.0 gm/dL      A/G Ratio 1.2 g/dL      BUN/Creatinine Ratio 34.2     Anion Gap 20.2 mmol/L      eGFR 36.4 mL/min/1.73     Narrative:      GFR Categories in Chronic Kidney Disease (CKD)      GFR Category          GFR (mL/min/1.73)    Interpretation  G1                     90 or greater         Normal or high (1)  G2                      60-89                Mild decrease (1)  G3a                   45-59                Mild to moderate decrease  G3b                   30-44                Moderate to severe decrease  G4                    15-29                Severe decrease  G5                    14 or less           Kidney failure          (1)In the absence of evidence of kidney disease, neither GFR category G1 or G2 fulfill the criteria for CKD.    eGFR calculation 2021 CKD-EPI creatinine equation, which does not include race as a factor    Protime-INR [[id number]]  (Abnormal) Collected: 03/06/25 0857    Specimen: Blood Updated: 03/06/25 0934     Protime 19.4 Seconds      INR 1.63    Lipase [[id number]]  (Abnormal) Collected: 03/06/25 0857    Specimen: Blood Updated: 03/06/25 1048     Lipase 193 U/L     aPTT [[id number]]  (Normal) Collected: 03/06/25 0857    Specimen: Blood Updated: 03/06/25 0934     PTT 34.3 seconds     BNP [[id number]]  (Normal) Collected: 03/06/25  0857    Specimen: Blood Updated: 03/06/25 0948     proBNP 571.0 pg/mL     Narrative:      This assay is used as an aid in the diagnosis of individuals suspected of having heart failure. It can be used as an aid in the diagnosis of acute decompensated heart failure (ADHF) in patients presenting with signs and symptoms of ADHF to the emergency department (ED). In addition, NT-proBNP of <300 pg/mL indicates ADHF is not likely.    Age Range Result Interpretation  NT-proBNP Concentration (pg/mL:      <50             Positive            >450                   Gray                 300-450                    Negative             <300    50-75           Positive            >900                  Gray                300-900                  Negative            <300      >75             Positive            >1800                  Gray                300-1800                  Negative            <300    High Sensitivity Troponin T [[id number]]  (Abnormal) Collected: 03/06/25 0857    Specimen: Blood Updated: 03/06/25 0948     HS Troponin T 36 ng/L     Narrative:      High Sensitive Troponin T Reference Range:  <14.0 ng/L- Negative Female for AMI  <22.0 ng/L- Negative Male for AMI  >=14 - Abnormal Female indicating possible myocardial injury.  >=22 - Abnormal Male indicating possible myocardial injury.   Clinicians would have to utilize clinical acumen, EKG, Troponin, and serial changes to determine if it is an Acute Myocardial Infarction or myocardial injury due to an underlying chronic condition.         Magnesium [[id number]]  (Abnormal) Collected: 03/06/25 0857    Specimen: Blood Updated: 03/06/25 0951     Magnesium 3.1 mg/dL     Ethanol [[id number]]  (Abnormal) Collected: 03/06/25 0857    Specimen: Blood Updated: 03/06/25 1048     Ethanol 21 mg/dL      Ethanol % 0.021 %     CBC Auto Differential [[id number]]  (Abnormal) Collected: 03/06/25 0857    Specimen: Blood Updated: 03/06/25 0937     WBC 9.20 10*3/mm3      RBC 3.35 10*6/mm3       Hemoglobin 9.9 g/dL      Hematocrit 30.3 %      MCV 90.4 fL      MCH 29.6 pg      MCHC 32.7 g/dL      RDW 13.6 %      RDW-SD 43.7 fl      MPV 10.7 fL      Platelets 81 10*3/mm3      Neutrophil % 69.7 %      Lymphocyte % 18.2 %      Monocyte % 11.0 %      Eosinophil % 0.5 %      Basophil % 0.2 %      Immature Grans % 0.4 %      Neutrophils, Absolute 6.41 10*3/mm3      Lymphocytes, Absolute 1.67 10*3/mm3      Monocytes, Absolute 1.01 10*3/mm3      Eosinophils, Absolute 0.05 10*3/mm3      Basophils, Absolute 0.02 10*3/mm3      Immature Grans, Absolute 0.04 10*3/mm3      nRBC 0.0 /100 WBC     Urinalysis With Microscopic If Indicated (No Culture) - Urine, Clean Catch [[id number]]  (Abnormal) Collected: 03/06/25 0858    Specimen: Urine, Clean Catch Updated: 03/06/25 0922     Color, UA Yellow     Appearance, UA Clear     pH, UA 6.5     Specific Gravity, UA 1.009     Glucose, UA Negative     Ketones, UA Trace     Bilirubin, UA Negative     Blood, UA Negative     Protein, UA Negative     Leuk Esterase, UA Negative     Nitrite, UA Negative     Urobilinogen, UA 2.0 E.U./dL    Narrative:      Urine microscopic not indicated.    Urine Drug Screen - Urine, Clean Catch [[id number]]  (Abnormal) Collected: 03/06/25 0858    Specimen: Urine, Clean Catch Updated: 03/06/25 0935     THC, Screen, Urine Negative     Phencyclidine (PCP), Urine Negative     Cocaine Screen, Urine Negative     Methamphetamine, Ur Negative     Opiate Screen Positive     Amphetamine Screen, Urine Negative     Benzodiazepine Screen, Urine Negative     Tricyclic Antidepressants Screen Negative     Methadone Screen, Urine Negative     Barbiturates Screen, Urine Negative     Oxycodone Screen, Urine Positive     Buprenorphine, Screen, Urine Negative    Narrative:      Cutoff For Drugs Screened:    Amphetamines               500 ng/ml  Barbiturates               200 ng/ml  Benzodiazepines            150 ng/ml  Cocaine                    150 ng/ml  Methadone                   200 ng/ml  Opiates                    100 ng/ml  Phencyclidine               25 ng/ml  THC                         50 ng/ml  Methamphetamine            500 ng/ml  Tricyclic Antidepressants  300 ng/ml  Oxycodone                  100 ng/ml  Buprenorphine               10 ng/ml    The normal value for all drugs tested is negative. This report includes unconfirmed screening results, with the cutoff values listed, to be used for medical treatment purposes only.  Unconfirmed results must not be used for non-medical purposes such as employment or legal testing.  Clinical consideration should be applied to any drug of abuse test, particularly when unconfirmed results are used.      Fentanyl, Urine - Urine, Clean Catch [[id number]]  (Normal) Collected: 03/06/25 0858    Specimen: Urine, Clean Catch Updated: 03/06/25 0945     Fentanyl, Urine Negative    Narrative:      Negative Threshold:      Fentanyl 5 ng/mL     The normal value for the drug tested is negative. This report includes final unconfirmed screening results to be used for medical treatment purposes only. Unconfirmed results must not be used for non-medical purposes such as employment or legal testing. Clinical consideration should be applied to any drug of abuse test, particularly when unconfirmed results are used.           Respiratory Panel PCR w/COVID-19(SARS-CoV-2) [facility] In-House, NP Swab in UTM/VTM, 2 HR TAT - Swab, Nasopharynx [[id number]]  (Normal) Collected: 03/06/25 0906    Specimen: Swab from Nasopharynx Updated: 03/06/25 1012     ADENOVIRUS, PCR Not Detected     Coronavirus 229E Not Detected     Coronavirus HKU1 Not Detected     Coronavirus NL63 Not Detected     Coronavirus OC43 Not Detected     COVID19 Not Detected     Human Metapneumovirus Not Detected     Human Rhinovirus/Enterovirus Not Detected     Influenza A PCR Not Detected     Influenza B PCR Not Detected     Parainfluenza Virus 1 Not Detected     Parainfluenza Virus 2  Not Detected     Parainfluenza Virus 3 Not Detected     Parainfluenza Virus 4 Not Detected     RSV, PCR Not Detected     Bordetella pertussis pcr Not Detected     Bordetella parapertussis PCR Not Detected     Chlamydophila pneumoniae PCR Not Detected     Mycoplasma pneumo by PCR Not Detected    Narrative:      In the setting of a positive respiratory panel with a viral infection PLUS a negative procalcitonin without other underlying concern for bacterial infection, consider observing off antibiotics or discontinuation of antibiotics and continue supportive care. If the respiratory panel is positive for atypical bacterial infection (Bordetella pertussis, Chlamydophila pneumoniae, or Mycoplasma pneumoniae), consider antibiotic de-escalation to target atypical bacterial infection.    High Sensitivity Troponin T 1Hr [[id number]]  (Abnormal) Collected: 03/06/25 1015    Specimen: Blood Updated: 03/06/25 1046     HS Troponin T 34 ng/L      Troponin T Numeric Delta -2 ng/L      Troponin T % Delta -6    Narrative:      High Sensitive Troponin T Reference Range:  <14.0 ng/L- Negative Female for AMI  <22.0 ng/L- Negative Male for AMI  >=14 - Abnormal Female indicating possible myocardial injury.  >=22 - Abnormal Male indicating possible myocardial injury.   Clinicians would have to utilize clinical acumen, EKG, Troponin, and serial changes to determine if it is an Acute Myocardial Infarction or myocardial injury due to an underlying chronic condition.         Procalcitonin [[id number]]  (Normal) Collected: 03/06/25 1015    Specimen: Blood Updated: 03/06/25 1239     Procalcitonin 0.14 ng/mL     Narrative:      As a Marker for Sepsis (Non-Neonates):    1. <0.5 ng/mL represents a low risk of severe sepsis and/or septic shock.  2. >2 ng/mL represents a high risk of severe sepsis and/or septic shock.    As a Marker for Lower Respiratory Tract Infections that require antibiotic therapy:    PCT on Admission    Antibiotic Therapy        "6-12 Hrs later    >0.5                Strongly Recommended  >0.25 - <0.5        Recommended   0.1 - 0.25          Discouraged              Remeasure/reassess PCT  <0.1                Strongly Discouraged     Remeasure/reassess PCT    As 28 day mortality risk marker: \"Change in Procalcitonin Result\" (>80% or <=80%) if Day 0 (or Day 1) and Day 4 values are available. Refer to http://[email or web address]-pct-[email or web address]    Change in PCT <=80%  A decrease of PCT levels below or equal to 80% defines a positive change in PCT test result representing a higher risk for 28-day all-cause mortality of patients diagnosed with severe sepsis for septic shock.    Change in PCT >80%  A decrease of PCT levels of more than 80% defines a negative change in PCT result representing a lower risk for 28-day all-cause mortality of patients diagnosed with severe sepsis or septic shock.       POC Glucose Once [[id number]]  (Abnormal) Collected: 03/06/25 1105    Specimen: Blood Updated: 03/06/25 1107     Glucose 186 mg/dL     Blood Culture - Blood, Arm, Left [[id number]] Collected: 03/06/25 1254    Specimen: Blood from Arm, Left Updated: 03/06/25 1302    Blood Culture - Blood, Arm, Right [[id number]] Collected: 03/06/25 1254    Specimen: Blood from Arm, Right Updated: 03/06/25 1303    Lactic Acid, Plasma [[id number]]  (Abnormal) Collected: 03/06/25 1254    Specimen: Blood Updated: 03/06/25 1329     Lactate 4.5 mmol/L     POC Glucose Once [[id number]]  (Normal) Collected: 03/06/25 1611    Specimen: Blood Updated: 03/06/25 1612     Glucose 118 mg/dL     STAT Lactic Acid, Reflex [[id number]]  (Abnormal) Collected: 03/06/25 1648    Specimen: Blood Updated: 03/06/25 1737     Lactate 4.0 mmol/L             Imaging Results (Last 24 Hours)       Procedure Component Value Units Date/Time    CT Abdomen Pelvis With Contrast [[id number]] Collected: 03/06/25 1145     Updated: 03/06/25 1211    Narrative:      CT ABDOMEN PELVIS W CONTRAST-     DATE OF EXAM: 3/6/2025 " 10:12 AM     INDICATION: Abdominal pain with nausea vomiting and diarrhea.  Pain is  mainly in the lower abdomen.  She does report vaginal bleeding also  reports history of hysterectomy..     COMPARISON: Chest radiograph 3/6/2025. Abdomen radiograph 8/7/2024. CT  abdomen pelvis 3/9/2021.     TECHNIQUE: Multiple contiguous axial images were acquired through the  abdomen and pelvis following the intravenous administration of 85 mL of  Isovue-300. Reformatted coronal and sagittal sequences were also  reviewed. Radiation dose reduction techniques were utilized, including  automated exposure control and exposure modulation based on body size.     FINDINGS:  Multifocal bibasilar subsegmental atelectasis and/or scarring. Partially  imaged cardiomegaly, calcified coronary artery disease, and aortic valve  calcifications.     Diffusely heterogeneous hepatic attenuation with nodular liver contours,  likely reflecting hepatocellular disease/cirrhosis. Status post  cholecystectomy. Mild extrahepatic biliary duct dilatation with the  common duct measuring up to 10 mm in diameter, which could be related to  the postoperative state. The spleen is unremarkable. Patchy  peripancreatic fat stranding, which could reflect pancreatitis.  Nonspecific ovoid 1 cm x 0.7 cm ovoid high attenuation mass in the  posterior pancreatic head. No evidence of pancreatic necrosis, splenic  vein thrombosis, or pseudocyst. The adrenal glands and kidneys are  unremarkable. Urinary bladder is nondistended. Status post hysterectomy.  The adnexa are not definitively identified.     Mild diffuse gastric wall thickening is likely accentuated by  underdistention but could reflect a nonspecific gastritis. Large fluid  and air-filled duodenal diverticulum. Mild colorectal stool. Extensive  colonic diverticula with mild sigmoid colon wall thickening and  pericolonic fat stranding that could represent acute diverticulitis. No  extraluminal air or adjacent  loculated fluid collection to suggest  abscess. No bowel obstruction. The appendix is normal.     Trace free fluid in the pelvis. No free intraperitoneal air. No  pathologically enlarged lymph nodes in the abdomen or pelvis. Calcified  and noncalcified atherosclerotic disease in the abdominal aorta and its  distal branches with enlarged fusiform 3.5 cm infrarenal abdominal  aortic aneurysm.     Diffuse osteopenia. Mild thoracolumbar levoscoliosis and multilevel  spondylosis. Mild to moderate bilateral hip joint DJD. No acute osseous  abnormality or concerning osseous lesion.       Impression:         1. Diffusely heterogeneous hepatic attenuation with nodular liver  contours, likely reflecting hepatocellular disease/cirrhosis.  2. Patchy peripancreatic fat stranding, which could be related to acute  pancreatitis. Nonspecific high attenuation 1 cm x 0.7 cm ovoid mass in  the posterior head of the pancreas. No evidence of pancreatic necrosis,  splenic vein thrombosis, or pseudocyst. Could consider further  evaluation with MRI/MRCP if clinically indicated.  3. Colonic diverticula with mild sigmoid colon wall thickening and  pericolonic fat stranding suggesting mild acute uncomplicated  diverticulitis.  4. Status post cholecystectomy with mild extrahepatic biliary dilatation  that could be related to the postoperative state. Occlusion from a stone  or a small mass in the posterior pancreatic head is not entirely  excluded. Attention on MRI.  5. Trace free fluid in the pelvis.  6. Calcified and noncalcified atherosclerotic disease in the abdominal  aorta with a fusiform 3.5 cm infrarenal abdominal aortic aneurysm.     The critical results were discussed over the phone with the ordering ER  physician, Dr. Chance Franks, at 11:57 a.m. on 3/6/2025.     This report was finalized on 3/6/2025 12:08 PM by Marv Meng MD on  Workstation: [id number]       XR Chest 1 View [[id number]] Collected: 03/06/25 0954     Updated:  03/06/25 1115    Narrative:      XR CHEST 1 VW-     HISTORY: Shortness of breath and chest pain     COMPARISON: Two-view chest 08/13/2024, AP chest 08/10/2024     FINDINGS: Heart size upper normal. Sternotomy wires are present. There  are aortic vascular calcifications. Lungs appear clear and there is no  evidence for pulmonary edema or pleural effusion. Cervical spine plate  and screws are evident. There is a left shoulder arthroplasty.       Impression:      No evidence for active disease in the chest.           This report was finalized on 3/6/2025 11:12 AM by Luisito Tatum M.D  on Workstation: [id number]                   ECG 12 Lead Dyspnea   Preliminary Result   HEART RATE=69  bpm   RR Upgdkxtd=011  ms   AK Onaitieb=399  ms   P Horizontal Axis=3  deg   P Front Axis=79  deg   QRSD Interval=84  ms   QT Twpfaprz=671  ms   WUoM=376  ms   QRS Axis=87  deg   T Wave Axis=99  deg   - ABNORMAL ECG -   Sinus rhythm   Borderline right axis deviation   Abnormal R-wave progression, early transition   Nonspecific repol abnormality, diffuse leads   Date and Time of Study:2025-03-06 09:21:01      Telemetry Scan   Final Result           Assessment/Plan     Active Hospital Problems    Diagnosis  POA    **Acute pancreatitis [K85.90]  Yes    Diverticulitis [K57.92]  Yes    Hypoglycemia [E16.2]  Yes    Lactic acidosis [E87.20]  Yes    Alcoholic cirrhosis of liver without ascites [K70.30]  Yes    COPD (chronic obstructive pulmonary disease) [J44.9]  Yes    SANDRO (acute kidney injury) [N17.9]  Yes    HTN (hypertension) [I10]  Yes    Anxiety [F41.9]  Yes      Resolved Hospital Problems   No resolved problems to display.       Ms. Liu is a 64 y.o. female with history of alcohol abuse, COPD, cirrhosis admitted with concern for withdrawals but also having abdominal pain with abnormal CT as discussed above as well as lactic acidosis without signs of sepsis otherwise    Lactic acidosis is due to her liver disease.  She is not  septic.  She does not need serial checks as I think it will always be elevated.    She does have some potential infection noted in the sigmoid colon with some diverticulitis on CT though exam is relatively unremarkable.  Will continue antibiotics for now but again I do not think she is septic    She does have some alcoholic pancreatitis noted and some abnormal findings about the pancreas on CT.  Asking GI to see.  She does not think she can tolerate an MRI and is requesting that an ERCP be done instead.  I will defer this to GI.  Check CA 19-9 and keep her n.p.o. with IV fluids.  Would prefer to keep her on oral pain meds based on benign exam but will add IV pain meds for breakthrough    She was hypoglycemic initially but treated in ED.  Probably has poor nutritional stores.  Was going to give dextrose with fluids but these have been canceled by GI. Will give additional dextrose prn    Reviewed home meds and reconciled as appropriate.  Holding diuretics for now.  She may have some mild SANDRO though baseline is probably not far from this.  Will ask nephrology to see given history of cirrhosis.    Continue thiamine and CIWA protocol for withdrawals.  She is requesting Ativan by name.  I told her we would use something else for anxiety and that we would not use Ativan for that purpose, only for CIWA appropriate reasons.    Will monitor for recurrence of vaginal bleeding.  Consult GYN if it does    Discussed with nurse    SCDs  Disposition will be several days    Brooks Clinton MD  [facility]  03/06/25  17:44 EST    Electronically signed by Brooks Clinton MD at 03/06/25 1758       Facility-Administered Medications as of 3/7/2025   Medication Dose Route Frequency Provider Last Rate Last Admin    acetaminophen (TYLENOL) tablet 650 mg  650 mg Oral Q4H PRN Brooks Clinton MD   650 mg at 03/07/25 0115    atenolol (TENORMIN) tablet 25 mg  25 mg Oral Daily Mary Beth  Brooks Sagastume MD   25 mg at 03/07/25 0859    sennosides-docusate (PERICOLACE) 8.6-50 MG per tablet 2 tablet  2 tablet Oral BID PRN Brooks Clinton MD        And    polyethylene glycol (MIRALAX) packet 17 g  17 g Oral Daily PRN Brooks Clinton MD        And    bisacodyl (DULCOLAX) EC tablet 5 mg  5 mg Oral Daily PRN Brooks Clinton MD        And    bisacodyl (DULCOLAX) suppository 10 mg  10 mg Rectal Daily PRN Brooks Clinton MD        budesonide-formoterol (SYMBICORT) 160-4.5 MCG/ACT inhaler 2 puff  2 puff Inhalation BID - RT Brooks Clinton MD   2 puff at 03/07/25 1000    And    revefenacin (YUPELRI) nebulizer solution 175 mcg  175 mcg Nebulization Daily - RT Brooks Clinton MD   175 mcg at 03/07/25 0959    Calcium Replacement - Follow Nurse / BPA Driven Protocol   Not Applicable PRN Brooks Clinton MD        [COMPLETED] dextrose (D10W) 10% bolus 500 mL  500 mL Intravenous Once Chance Franks MD   Stopped at 03/06/25 1302    folic acid 1 mg in sodium chloride 0.9 % 50 mL IVPB  1 mg Intravenous Daily Brooks Clinton  mL/hr at 03/07/25 0900 1 mg at 03/07/25 0900    [COMPLETED] gadobenate dimeglumine (MULTIHANCE) injection 15 mL  15 mL Intravenous Once in imaging Brooks Clinton MD   15 mL at 03/07/25 0357    HYDROcodone-acetaminophen (NORCO) 7.5-325 MG per tablet 1 tablet  1 tablet Oral Q6H PRN Brooks Clinton MD   1 tablet at 03/07/25 1249    hydrOXYzine (ATARAX) tablet 25 mg  25 mg Oral TID PRN Brooks Clinton MD   25 mg at 03/07/25 0859    [COMPLETED] iopamidol (ISOVUE-300) 61 % injection 85 mL  85 mL Intravenous Once in imaging Chance Franks MD   85 mL at 03/06/25 1041    [COMPLETED] ipratropium-albuterol (DUO-NEB) nebulizer solution 3 mL  3 mL Nebulization Once Chance Franks MD   3 mL at 03/06/25 1124    ipratropium-albuterol (DUO-NEB) nebulizer solution 3 mL  3 mL Nebulization 4x  Daily - RT Brooks Clinton MD   3 mL at 03/07/25 0955    ipratropium-albuterol (DUO-NEB) nebulizer solution 3 mL  3 mL Nebulization Q6H PRN Hillary Gallo APRN        [COMPLETED] LORazepam (ATIVAN) injection 1 mg  1 mg Intravenous Once Chance Franks MD   1 mg at 03/06/25 0901    LORazepam (ATIVAN) tablet 1 mg  1 mg Oral Q1H PRN Brooks Clinton MD        Or    LORazepam (ATIVAN) injection 1 mg  1 mg Intravenous Q1H PRN Brooks Clinton MD   1 mg at 03/07/25 0338    Or    LORazepam (ATIVAN) tablet 2 mg  2 mg Oral Q1H PRN Brooks Clinton MD        Or    LORazepam (ATIVAN) injection 2 mg  2 mg Intravenous Q1H PRN Brooks Clinton MD        Or    LORazepam (ATIVAN) injection 2 mg  2 mg Intravenous Q15 Min PRN Brooks Clinton MD        Or    LORazepam (ATIVAN) injection 2 mg  2 mg Intramuscular Q15 Min PRN rBooks Clinton MD        Magnesium Standard Dose Replacement - Follow Nurse / BPA Driven Protocol   Not Applicable PRN Brooks Clinton MD        nicotine (NICODERM CQ) 21 MG/24HR patch 1 patch  1 patch Transdermal Q24H Brooks Clinton MD   1 patch at 03/07/25 0900    [COMPLETED] ondansetron (ZOFRAN) injection 4 mg  4 mg Intravenous Once Chance Franks MD   4 mg at 03/06/25 0900    ondansetron ODT (ZOFRAN-ODT) disintegrating tablet 4 mg  4 mg Oral Q6H PRN Brooks Clinton MD        pantoprazole (PROTONIX) EC tablet 40 mg  40 mg Oral Q AM Brooks Clinton MD   40 mg at 03/07/25 0532    [COMPLETED] pantoprazole (PROTONIX) injection 80 mg  80 mg Intravenous Once Chance Franks MD   80 mg at 03/06/25 0900    Phosphorus Replacement - Follow Nurse / BPA Driven Protocol   Not Applicable PRN Brooks Clinton MD        [COMPLETED] piperacillin-tazobactam (ZOSYN) 3.375 g IVPB in 100 mL NS MBP (CD)  3.375 g Intravenous Once Chance Franks MD   Stopped at 03/06/25 1403    [COMPLETED]  piperacillin-tazobactam (ZOSYN) 3.375 g IVPB in 100 mL NS MBP (CD)  3.375 g Intravenous Once Brooks Clinton MD   3.375 g at 03/06/25 1814    piperacillin-tazobactam (ZOSYN) 3.375 g IVPB in 100 mL NS MBP (CD)  3.375 g Intravenous Q8H Brooks Clinton MD   3.375 g at 03/07/25 0859    Potassium Replacement - Follow Nurse / BPA Driven Protocol   Not Applicable PRN Brooks Clinton MD        rOPINIRole (REQUIP) tablet 1 mg  1 mg Oral Nightly Brooks Clinton MD   1 mg at 03/06/25 2011    [COMPLETED] sodium chloride 0.9 % bolus 1,000 mL  1,000 mL Intravenous Once Chance Franks MD   Stopped at 03/06/25 1302    sodium chloride 0.9 % flush 10 mL  10 mL Intravenous PRN Chance Franks MD        sodium chloride 0.9 % flush 10 mL  10 mL Intravenous Q12H Brooks Clinton MD   10 mL at 03/07/25 0900    sodium chloride 0.9 % flush 10 mL  10 mL Intravenous PRN Brooks Clinton MD        sodium chloride 0.9 % infusion 40 mL  40 mL Intravenous PRN Brooks Clinton MD        sodium chloride 0.9 % infusion  100 mL/hr Intravenous Continuous Chandrika Martinez  mL/hr at 03/07/25 1250 100 mL/hr at 03/07/25 1250    thiamine (B-1) injection 200 mg  200 mg Intravenous Q8H Brooks Clinton MD   200 mg at 03/07/25 0532    Followed by    [START ON 3/12/2025] thiamine (VITAMIN B-1) tablet 100 mg  100 mg Oral Daily Brooks Clinton MD         Lab Results (last 24 hours)       Procedure Component Value Units Date/Time    Chloride, Urine, Random - Urine, Clean Catch [[id number]] Collected: 03/07/25 1132    Specimen: Urine, Clean Catch Updated: 03/07/25 1228     Chloride, Urine <20 mmol/L     Narrative:      Reference intervals for random urine have not been established.  Clinical usage is dependent upon physician's interpretation in combination with other laboratory tests.       Sodium, Urine, Random - Urine, Clean Catch [[id number]] Collected:  03/07/25 1132    Specimen: Urine, Clean Catch Updated: 03/07/25 1224     Sodium, Urine 40 mmol/L     Narrative:      Reference intervals for random urine have not been established.  Clinical usage is dependent upon physician's interpretation in combination with other laboratory tests.       Protein / Creatinine Ratio, Urine - Urine, Clean Catch [[id number]] Collected: 03/07/25 1132    Specimen: Urine, Clean Catch Updated: 03/07/25 1222     Protein/Creatinine Ratio, Urine 152.3 mg/G Crea      Creatinine, Urine 45.3 mg/dL      Total Protein, Urine 6.9 mg/dL     POC Glucose Once [[id number]]  (Normal) Collected: 03/07/25 1103    Specimen: Blood Updated: 03/07/25 1105     Glucose 101 mg/dL     Cancer Antigen 19-9 [[id number]]  (Abnormal) Collected: 03/07/25 0551    Specimen: Blood Updated: 03/07/25 0636     CA 19-9 91.1 U/mL     Narrative:      Results may be falsely decreased if patient taking Biotin.    Testing Method: [employer] Electrochemiluminescence Immunoassay(ECLIA)  Values obtained with different assay methods or kits cannot be used interchangeably.    Comprehensive Metabolic Panel [[id number]]  (Abnormal) Collected: 03/07/25 0552    Specimen: Blood Updated: 03/07/25 0636     Glucose 113 mg/dL      BUN 43 mg/dL      Creatinine 1.74 mg/dL      Sodium 142 mmol/L      Potassium 3.9 mmol/L      Chloride 100 mmol/L      CO2 28.6 mmol/L      Calcium 8.5 mg/dL      Total Protein 5.9 g/dL      Albumin 2.9 g/dL      ALT (SGPT) 22 U/L      AST (SGOT) 91 U/L      Alkaline Phosphatase 119 U/L      Total Bilirubin 3.0 mg/dL      Globulin 3.0 gm/dL      A/G Ratio 1.0 g/dL      BUN/Creatinine Ratio 24.7     Anion Gap 13.4 mmol/L      eGFR 32.4 mL/min/1.73     Narrative:      GFR Categories in Chronic Kidney Disease (CKD)      GFR Category          GFR (mL/min/1.73)    Interpretation  G1                     90 or greater         Normal or high (1)  G2                      60-89                Mild decrease (1)  G3a                    45-59                Mild to moderate decrease  G3b                   30-44                Moderate to severe decrease  G4                    15-29                Severe decrease  G5                    14 or less           Kidney failure          (1)In the absence of evidence of kidney disease, neither GFR category G1 or G2 fulfill the criteria for CKD.    eGFR calculation 2021 CKD-EPI creatinine equation, which does not include race as a factor    Magnesium [[id number]]  (Abnormal) Collected: 03/07/25 0552    Specimen: Blood Updated: 03/07/25 0636     Magnesium 2.7 mg/dL     Lipase [[id number]]  (Abnormal) Collected: 03/07/25 0552    Specimen: Blood Updated: 03/07/25 0632     Lipase 126 U/L     Phosphorus [[id number]]  (Normal) Collected: 03/07/25 0552    Specimen: Blood Updated: 03/07/25 0632     Phosphorus 3.0 mg/dL     POC Glucose Once [[id number]]  (Normal) Collected: 03/07/25 0619    Specimen: Blood Updated: 03/07/25 0620     Glucose 130 mg/dL     CBC (No Diff) [[id number]]  (Abnormal) Collected: 03/07/25 0552    Specimen: Blood Updated: 03/07/25 0607     WBC 5.11 10*3/mm3      RBC 3.21 10*6/mm3      Hemoglobin 9.4 g/dL      Hematocrit 29.6 %      MCV 92.2 fL      MCH 29.3 pg      MCHC 31.8 g/dL      RDW 14.1 %      RDW-SD 47.2 fl      MPV 10.1 fL      Platelets 57 10*3/mm3     Sodium, Urine, Random - Urine, Clean Catch [[id number]] Collected: 03/06/25 2124    Specimen: Urine, Clean Catch Updated: 03/06/25 2159     Sodium, Urine 22 mmol/L     Narrative:      Reference intervals for random urine have not been established.  Clinical usage is dependent upon physician's interpretation in combination with other laboratory tests.       POC Glucose Once [[id number]]  (Normal) Collected: 03/06/25 2129    Specimen: Blood Updated: 03/06/25 2130     Glucose 112 mg/dL     STAT Lactic Acid, Reflex [[id number]]  (Abnormal) Collected: 03/06/25 1648    Specimen: Blood Updated: 03/06/25 1737     Lactate 4.0 mmol/L     POC  Glucose Once [[id number]]  (Normal) Collected: 03/06/25 1611    Specimen: Blood Updated: 03/06/25 1612     Glucose 118 mg/dL     Lactic Acid, Plasma [[id number]]  (Abnormal) Collected: 03/06/25 1254    Specimen: Blood Updated: 03/06/25 1329     Lactate 4.5 mmol/L     Blood Culture - Blood, Arm, Right [[id number]] Collected: 03/06/25 1254    Specimen: Blood from Arm, Right Updated: 03/06/25 1303    Blood Culture - Blood, Arm, Left [[id number]] Collected: 03/06/25 1254    Specimen: Blood from Arm, Left Updated: 03/06/25 1302          Imaging Results (Last 24 Hours)       Procedure Component Value Units Date/Time    MRI abdomen w wo contrast mrcp [[id number]] Collected: 03/07/25 0950     Updated: 03/07/25 0950    Narrative:      MRI ABDOMEN W WO CONTRAST MRCP- LIVER MRI/MRCP     HISTORY: 64-year-old female with alcoholic cirrhosis, presented to the  ER complaining of withdrawals. Left lower quadrant pain and nausea.  Cholecystectomy in the past. Acute pancreatitis. Elevated CA 19-9.     TECHNIQUE: Routine liver MRI/MRCP was performed without and with IV  contrast. Compared with CT abdomen 03/06/2025.     FINDINGS:  1. The downstream common bile duct measures 6 mm and the pancreatic duct  2 mm. There is no choledocholithiasis or pancreatic divisum.     2. The cirrhotic liver has moderately extensive heterogeneous fatty  infiltration. There is heterogeneous enhancement diffusely, but no  suspicious liver lesions are seen. Follow-up liver MRI is recommended  with elevation of alpha-fetoprotein.     3. There are changes of mild acute pancreatitis. There is no  peripancreatic fluid collection. No pancreatic mass is seen. CA 19-9  elevation may be related to the acute pancreatitis. Follow-up CA 19-9  following resolution of acute pancreatitis is recommended.     4. There is a 3.6 cm infrarenal abdominal aortic aneurysm which extends  to the bifurcation.                   ECG/EMG Results (last 24 hours)       Procedure Component  "Value Units Date/Time    Telemetry Scan [[id number]] Resulted: 03/06/25     Updated: 03/06/25 1635    ECG 12 Lead Dyspnea [[id number]] Collected: 03/06/25 0921     Updated: 03/06/25 2103     QT Interval 413 ms      QTC Interval 442 ms     Narrative:      HEART RATE=69  bpm  RR Jfjrxxea=281  ms  MD Zakjbapx=107  ms  P Horizontal Axis=3  deg  P Front Axis=79  deg  QRSD Interval=84  ms  QT Pbffnmgj=268  ms  MXpO=441  ms  QRS Axis=87  deg  T Wave Axis=99  deg  - ABNORMAL ECG -  Sinus rhythm  Borderline  right axis deviation  Abnormal R-wave progression, early transition  Nonspecific  repol abnormality, diffuse leads  When compared with ECG of 06-Aug-2024 22:26:42,  No significant change  Electronically Signed By: Garth Terrell ([facility]) 2025-03-06 21:03:08  Date and Time of Study:2025-03-06 09:21:01    Telemetry Scan [[id number]] Resulted: 03/06/25     Updated: 03/07/25 0707          Orders (last 24 hrs)        Start     Ordered    03/12/25 0900  thiamine (VITAMIN B-1) tablet 100 mg  Daily        Placed in \"Followed by\" Linked Group    03/06/25 1724    03/08/25 0600  Hemoglobin A1c  Morning Draw         03/07/25 1020    03/08/25 0600  CBC & Differential  Morning Draw         03/07/25 1020    03/08/25 0600  Comprehensive Metabolic Panel  Morning Draw         03/07/25 1020    03/08/25 0600  Magnesium  Morning Draw         03/07/25 1020    03/08/25 0600  Phosphorus  Morning Draw         03/07/25 1020    03/08/25 0600  Protime-INR  Morning Draw         03/07/25 1020    03/08/25 0600  Uric Acid  Morning Draw         03/07/25 1027    03/08/25 0600  Amylase  Morning Draw         03/07/25 1028    03/08/25 0600  Lipase  Morning Draw         03/07/25 1028    03/07/25 1118  Diet: Liquid; Clear Liquid; Fluid Consistency: Thin (IDDSI 0)  Diet Effective Now         03/07/25 1118    03/07/25 1106  POC Glucose Once  PROCEDURE ONCE        Comments: Complete no more than 45 minutes prior to patient eating      03/07/25 1103    03/07/25 1028  " "Sodium, Urine, Random - Urine, Clean Catch  Once         03/07/25 1028    03/07/25 1028  Protein / Creatinine Ratio, Urine - Urine, Clean Catch  Once         03/07/25 1028    03/07/25 1028  Chloride, Urine, Random - Urine, Clean Catch  Once         03/07/25 1028    03/07/25 0621  POC Glucose Once  PROCEDURE ONCE        Comments: Complete no more than 45 minutes prior to patient eating      03/07/25 0619    03/07/25 0600  Lipase  Morning Draw         03/06/25 1723    03/07/25 0600  Comprehensive Metabolic Panel  Morning Draw         03/06/25 1723    03/07/25 0600  CBC (No Diff)  Morning Draw         03/06/25 1723    03/07/25 0600  Magnesium  Morning Draw         03/06/25 1723    03/07/25 0600  Phosphorus  Morning Draw         03/06/25 1723    03/07/25 0600  pantoprazole (PROTONIX) EC tablet 40 mg  Every Early Morning         03/06/25 1730    03/07/25 0600  Cancer Antigen 19-9  Morning Draw         03/06/25 1753    03/07/25 0430  gadobenate dimeglumine (MULTIHANCE) injection 15 mL  Once in Imaging         03/07/25 0344    03/07/25 0306  ipratropium-albuterol (DUO-NEB) nebulizer solution 3 mL  Every 6 Hours PRN         03/07/25 0306    03/07/25 0211  MRI abdomen w wo contrast mrcp  1 Time Imaging         03/06/25 1746    03/07/25 0015  piperacillin-tazobactam (ZOSYN) 3.375 g IVPB in 100 mL NS MBP (CD)  Every 8 Hours         03/06/25 1725    03/07/25 0000  MRI abdomen w contrast mrcp  1 Time Imaging,   Status:  Canceled         03/06/25 1746    03/06/25 2200  thiamine (B-1) injection 200 mg  Every 8 Hours Scheduled        Placed in \"Followed by\" Linked Group    03/06/25 1724    03/06/25 2200  POC Glucose Finger 4x Daily Before Meals & at Bedtime  4 Times Daily Before Meals & at Bedtime      Comments: Complete no more than 45 minutes prior to patient eating      03/06/25 1847    03/06/25 2131  POC Glucose Once  PROCEDURE ONCE        Comments: Complete no more than 45 minutes prior to patient eating      03/06/25 2121    " "03/06/25 2130  budesonide-formoterol (SYMBICORT) 160-4.5 MCG/ACT inhaler 2 puff  2 Times Daily - RT        Placed in \"And\" Linked Group    03/06/25 1730    03/06/25 2100  sodium chloride 0.9 % flush 10 mL  Every 12 Hours Scheduled         03/06/25 1723    03/06/25 2100  rOPINIRole (REQUIP) tablet 1 mg  Nightly         03/06/25 1730 03/06/25 2030  ipratropium-albuterol (DUO-NEB) nebulizer solution 3 mL  4 Times Daily - RT         03/06/25 1730 03/06/25 2000  Vital Signs  Every 4 Hours       03/06/25 1723    03/06/25 1948  STAT Lactic Acid, Reflex  PROCEDURE ONCE,   Status:  Canceled         03/06/25 1737 03/06/25 1900  sodium chloride 0.9 % infusion  Continuous         03/06/25 1806    03/06/25 1845  lactated ringers infusion  Continuous,   Status:  Discontinued         03/06/25 1747    03/06/25 1845  nicotine (NICODERM CQ) 21 MG/24HR patch 1 patch  Every 24 Hours Scheduled         03/06/25 1750    03/06/25 1830  atenolol (TENORMIN) tablet 25 mg  Daily         03/06/25 1730    03/06/25 1830  revefenacin (YUPELRI) nebulizer solution 175 mcg  Daily - RT        Placed in \"And\" Linked Group    03/06/25 1730 03/06/25 1815  dextrose 5 % and sodium chloride 0.45 % infusion  Continuous,   Status:  Discontinued         03/06/25 1723    03/06/25 1815  folic acid 1 mg in sodium chloride 0.9 % 50 mL IVPB  Daily         03/06/25 1724    03/06/25 1815  piperacillin-tazobactam (ZOSYN) 3.375 g IVPB in 100 mL NS MBP (CD)  Once         03/06/25 1725    03/06/25 1806  Sodium, Urine, Random - Urine, Clean Catch  Once         03/06/25 1805    03/06/25 1800  Oral Care  2 Times Daily       03/06/25 1723    03/06/25 1737  hydrOXYzine (ATARAX) tablet 25 mg  3 Times Daily PRN         03/06/25 1737    03/06/25 1731  Inpatient Nephrology Consult  Once        Specialty:  Nephrology  Provider:  Cyndi Dimas MD    03/06/25 1731    03/06/25 1730  HYDROcodone-acetaminophen (NORCO) 7.5-325 MG per tablet 1 tablet  Every 6 Hours PRN " "        03/06/25 1730    03/06/25 1726  Inpatient Gastroenterology Consult  Once        Specialty:  Gastroenterology  Provider:  Ana Guerrero MD    03/06/25 1725    03/06/25 1724  LORazepam (ATIVAN) tablet 1 mg  Every 1 Hour PRN        Placed in \"Or\" Linked Group    03/06/25 1724    03/06/25 1724  LORazepam (ATIVAN) injection 1 mg  Every 1 Hour PRN        Placed in \"Or\" Linked Group    03/06/25 1724    03/06/25 1724  LORazepam (ATIVAN) tablet 2 mg  Every 1 Hour PRN        Placed in \"Or\" Linked Group    03/06/25 1724    03/06/25 1724  LORazepam (ATIVAN) injection 2 mg  Every 1 Hour PRN        Placed in \"Or\" Linked Group    03/06/25 1724    03/06/25 1724  LORazepam (ATIVAN) injection 2 mg  Every 15 Minutes PRN        Placed in \"Or\" Linked Group    03/06/25 1724    03/06/25 1724  LORazepam (ATIVAN) injection 2 mg  Every 15 Minutes PRN        Placed in \"Or\" Linked Group    03/06/25 1724    03/06/25 1724  Initiate Alcohol Withdrawal Protocol  Once         03/06/25 1724    03/06/25 1724  Vital Signs  Per Hospital Policy/Protocol         03/06/25 1724    03/06/25 1724  Continuous Pulse Oximetry  Continuous         03/06/25 1724    03/06/25 1724  Obtain Baseline Clinical [place] Withdrawal Assessment - Ar (CIWA-Ar), Sedation Scale & Vital Signs  Once        Comments: Follow CIWA Scoring Reference Guide    03/06/25 1724    03/06/25 1724  Clinical [place] Withdrawal Assessment (CIWA-Ar)  Per Hospital Policy/Protocol         03/06/25 1724    03/06/25 1724  If CIWA-Ar Score Less Than 8 For 3 Consecutive Assessments, Monitor Every 4 Hours & Discontinue Assessment When CIWA-Ar Less Than 8 for 24 Hours  Per Hospital Policy/Protocol        Comments: Contact Provider to Restart Protocol if Any Symptoms Reappear    03/06/25 1724    03/06/25 1724  Seizure Precautions  Continuous         03/06/25 1724    03/06/25 1724  Notify Provider - Withdrawal  Continuous        Comments: Open Order Report to View Parameters Requiring " "Provider Notification    03/06/25 1724    03/06/25 1724  Notify Provider of Abnormal Lab Results  Continuous        Comments: Open Order Report to View Parameters Requiring Provider Notification    03/06/25 1724    03/06/25 1724  Notify Provider - Vitals  Continuous        Comments: Open Order Report to View Parameters Requiring Provider Notification    03/06/25 1724    03/06/25 1724  Safety Precautions  Continuous         03/06/25 1724    03/06/25 1723  ondansetron ODT (ZOFRAN-ODT) disintegrating tablet 4 mg  Every 6 Hours PRN         03/06/25 1723    03/06/25 1723  sennosides-docusate (PERICOLACE) 8.6-50 MG per tablet 2 tablet  2 Times Daily PRN        Placed in \"And\" Linked Group    03/06/25 1723    03/06/25 1723  polyethylene glycol (MIRALAX) packet 17 g  Daily PRN        Placed in \"And\" Linked Group    03/06/25 1723    03/06/25 1723  bisacodyl (DULCOLAX) EC tablet 5 mg  Daily PRN        Placed in \"And\" Linked Group    03/06/25 1723    03/06/25 1723  bisacodyl (DULCOLAX) suppository 10 mg  Daily PRN        Placed in \"And\" Linked Group    03/06/25 1723    03/06/25 1723  acetaminophen (TYLENOL) tablet 650 mg  Every 4 Hours PRN         03/06/25 1723    03/06/25 1722  Potassium Replacement - Follow Nurse / BPA Driven Protocol  As Needed         03/06/25 1723    03/06/25 1722  Magnesium Standard Dose Replacement - Follow Nurse / BPA Driven Protocol  As Needed         03/06/25 1723    03/06/25 1722  Phosphorus Replacement - Follow Nurse / BPA Driven Protocol  As Needed         03/06/25 1723    03/06/25 1722  Calcium Replacement - Follow Nurse / BPA Driven Protocol  As Needed         03/06/25 1723    03/06/25 1722  Intake & Output  Every Shift       03/06/25 1723    03/06/25 1722  Weigh Patient  Once         03/06/25 1723    03/06/25 1722  Insert Peripheral IV  Once         03/06/25 1723    03/06/25 1722  Saline Lock & Maintain IV Access  Continuous         03/06/25 1723    03/06/25 1722  Code Status and Medical " "Interventions: CPR (Attempt to Resuscitate); Full Support  Continuous         03/06/25 1723    03/06/25 1722  Place Sequential Compression Device  Once         03/06/25 1723    03/06/25 1722  Maintain Sequential Compression Device  Continuous         03/06/25 1723    03/06/25 1722  NPO Diet NPO Type: Strict NPO, Ice Chips, Sips with Meds  Diet Effective Now,   Status:  Canceled         03/06/25 1723    03/06/25 1721  sodium chloride 0.9 % flush 10 mL  As Needed         03/06/25 1723    03/06/25 1721  sodium chloride 0.9 % infusion 40 mL  As Needed         03/06/25 1723    03/06/25 1613  POC Glucose Once  PROCEDURE ONCE        Comments: Complete no more than 45 minutes prior to patient eating      03/06/25 1611    03/06/25 1554  STAT Lactic Acid, Reflex  PROCEDURE ONCE         03/06/25 1329    03/06/25 1259  Critical Care  Once        Comments: This order was created via procedure documentation    03/06/25 1258    03/06/25 1258  Inpatient Admission  Once         03/06/25 1258    03/06/25 1224  piperacillin-tazobactam (ZOSYN) 3.375 g IVPB in 100 mL NS MBP (CD)  Once         03/06/25 1208    03/06/25 1026  dextrose (D10W) 10% bolus 500 mL  Once         03/06/25 1010    03/06/25 0904  sodium chloride 0.9 % bolus 1,000 mL  Once         03/06/25 0848    03/06/25 0847  sodium chloride 0.9 % flush 10 mL  As Needed        Placed in \"And\" Linked Group    03/06/25 0848    Unscheduled  Up With Assistance  As Needed       03/06/25 1723    Unscheduled  Obtain Pre & Post Sedation Scores With Every Sedative Dose - Hold For POSS Greater Than 2 or RASS of -3 or Less  As Needed       03/06/25 1724    Pending  ECG 12 Lead  Once         Pending    Pending  High Sensitivity Troponin T  Once         Pending    --  HYDROcodone-acetaminophen (NORCO) 7.5-325 MG per tablet  Every 6 Hours PRN         03/06/25 1601    --  lisinopril (PRINIVIL,ZESTRIL) 40 MG tablet  Daily         03/06/25 1630                  Operative/Procedure Notes (last 24 " hours)  Notes from 25 1253 through 25 1253   No notes of this type exist for this encounter.          Physician Progress Notes (last 24 hours)        Chan Garzon MD at 25 1014              Name: Filomena Liu ADMIT: 3/6/2025   : 1960  PCP: Fernando Dunn MD    MRN: [id number] LOS: 1 days   AGE/SEX: 64 y.o. female  ROOM: [facility]     Subjective   Subjective   Chief Complaint   Patient presents with    Chest Pain    Cough     She is reporting anxiety and feelings of withdrawal.  Last alcohol use was the night before her presentation.  She is still having some nausea and abdominal pain intermittently.  That is improved slightly.  She is also feeling anxious waiting on answers.  Discussed the MRI which was done and waiting on results of that and GI follow-up.  Asking for pain medications and Ativan.    Objective   Objective   Vital Signs  Temp:  [98.4 °F (36.9 °C)-99.1 °F (37.3 °C)] 99 °F (37.2 °C)  Heart Rate:  [67-91] 91  Resp:  [16-18] 18  BP: (102-133)/(54-81) 118/73  SpO2:  [91 %-100 %] 92 %  on   ;   Device (Oxygen Therapy): room air  There is no height or weight on file to calculate BMI.    Physical Exam  Vitals and nursing note reviewed.   Constitutional:       General: She is not in acute distress.     Appearance: She is not diaphoretic.   HENT:      Head: Atraumatic.   Eyes:      Pupils: Pupils are equal, round, and reactive to light.   Cardiovascular:      Rate and Rhythm: Normal rate and regular rhythm.   Pulmonary:      Effort: Pulmonary effort is normal.      Breath sounds: No wheezing.   Abdominal:      General: There is no distension.      Palpations: Abdomen is soft.      Tenderness: There is abdominal tenderness.   Musculoskeletal:         General: Swelling (diffuse) present.   Skin:     General: Skin is warm and dry.   Neurological:      Mental Status: She is alert.      Cranial Nerves: No cranial nerve deficit.   Psychiatric:         Mood and Affect: Mood is  anxious.         Behavior: Behavior normal.       Results Review  I reviewed the patient's new clinical results.    Results from last 7 days   Lab Units 03/07/25  0552 03/06/25  0857   WBC 10*3/mm3 5.11 9.20   HEMOGLOBIN g/dL 9.4* 9.9*   PLATELETS 10*3/mm3 57* 81*     Results from last 7 days   Lab Units 03/07/25  0552 03/06/25  0857   SODIUM mmol/L 142 136   POTASSIUM mmol/L 3.9 4.4   CHLORIDE mmol/L 100 90*   CO2 mmol/L 28.6 25.8   BUN mg/dL 43* 54*   CREATININE mg/dL 1.74* 1.58*   GLUCOSE mg/dL 113* 46*   EGFR mL/min/1.73 32.4* 36.4*     Results from last 7 days   Lab Units 03/07/25  0552 03/06/25  0857   ALBUMIN g/dL 2.9* 3.6   BILIRUBIN mg/dL 3.0* 4.1*   ALK PHOS U/L 119* 133*   AST (SGOT) U/L 91* 69*   ALT (SGPT) U/L 22 24     Results from last 7 days   Lab Units 03/07/25  0552 03/06/25  0857   CALCIUM mg/dL 8.5* 8.8   ALBUMIN g/dL 2.9* 3.6   MAGNESIUM mg/dL 2.7* 3.1*   PHOSPHORUS mg/dL 3.0  --      Results from last 7 days   Lab Units 03/06/25  1648 03/06/25  1254 03/06/25  1015   PROCALCITONIN ng/mL  --   --  0.14   LACTATE mmol/L 4.0* 4.5*  --      Glucose   Date/Time Value Ref Range Status   03/07/2025 0619 130 70 - 130 mg/dL Final   03/06/2025 2129 112 70 - 130 mg/dL Final   03/06/2025 1611 118 70 - 130 mg/dL Final   03/06/2025 1105 186 (H) 70 - 130 mg/dL Final       CT Abdomen Pelvis With Contrast    Result Date: 3/6/2025   1. Diffusely heterogeneous hepatic attenuation with nodular liver contours, likely reflecting hepatocellular disease/cirrhosis. 2. Patchy peripancreatic fat stranding, which could be related to acute pancreatitis. Nonspecific high attenuation 1 cm x 0.7 cm ovoid mass in the posterior head of the pancreas. No evidence of pancreatic necrosis, splenic vein thrombosis, or pseudocyst. Could consider further evaluation with MRI/MRCP if clinically indicated. 3. Colonic diverticula with mild sigmoid colon wall thickening and pericolonic fat stranding suggesting mild acute uncomplicated  diverticulitis. 4. Status post cholecystectomy with mild extrahepatic biliary dilatation that could be related to the postoperative state. Occlusion from a stone or a small mass in the posterior pancreatic head is not entirely excluded. Attention on MRI. 5. Trace free fluid in the pelvis. 6. Calcified and noncalcified atherosclerotic disease in the abdominal aorta with a fusiform 3.5 cm infrarenal abdominal aortic aneurysm.  The critical results were discussed over the phone with the ordering ER physician, Dr. Chance Franks, at 11:57 a.m. on 3/6/2025.  This report was finalized on 3/6/2025 12:08 PM by Marv Meng MD on Workstation: [id number]      XR Chest 1 View    Result Date: 3/6/2025  No evidence for active disease in the chest.    This report was finalized on 3/6/2025 11:12 AM by Luisito Tatum M.D on Workstation: [id number]       I have personally reviewed all medications:  Scheduled Medications  atenolol, 25 mg, Oral, Daily  budesonide-formoterol, 2 puff, Inhalation, BID - RT   And  revefenacin, 175 mcg, Nebulization, Daily - RT  folic acid 1 mg in sodium chloride 0.9 % 50 mL IVPB, 1 mg, Intravenous, Daily  ipratropium-albuterol, 3 mL, Nebulization, 4x Daily - RT  nicotine, 1 patch, Transdermal, Q24H  pantoprazole, 40 mg, Oral, Q AM  piperacillin-tazobactam, 3.375 g, Intravenous, Q8H  rOPINIRole, 1 mg, Oral, Nightly  sodium chloride, 10 mL, Intravenous, Q12H  thiamine (B-1) IV, 200 mg, Intravenous, Q8H   Followed by  [START ON 3/12/2025] thiamine, 100 mg, Oral, Daily      Infusions  sodium chloride, 100 mL/hr, Last Rate: 100 mL/hr (03/06/25 1831)      Diet  NPO Diet NPO Type: Strict NPO, Ice Chips, Sips with Meds      Assessment/Plan     Active Hospital Problems    Diagnosis  POA    **Acute pancreatitis [K85.90]  Yes    Diverticulitis [K57.92]  Yes    Hypoglycemia [E16.2]  Yes    Lactic acidosis [E87.20]  Yes    Alcoholic cirrhosis of liver without ascites [K70.30]  Yes    COPD (chronic obstructive  pulmonary disease) [J44.9]  Yes    SANDRO (acute kidney injury) [N17.9]  Yes    HTN (hypertension) [I10]  Yes    Anxiety [F41.9]  Yes      Resolved Hospital Problems   No resolved problems to display.       64 y.o. female admitted with Acute pancreatitis.    Pancreatitis: Secondary to alcohol abuse.  She did have a questionable ovoid mass of the pancreas on CT.  MRCP does not show any mass.  Continue supportive care.  Continue CIWA.  GI consulted.  Alcoholic cirrhosis/alcohol withdrawal  Thrombocytopenia secondary to alcohol abuse: Monitor.  Hypoglycemia secondary to alcohol abuse  COPD: No acute exacerbation  SANDRO: Continue IVF and monitor.  Diuretics were held.  She does have some anasarca likely related to her chronic liver disease.  Nephrology consulted.  Anemia: Monitor  PPX: SCD  Disposition: TBD/several days    Expected Discharge Date: 3/10/2025; Expected Discharge Time:      Chan Garzon MD  [facility]  25  10:14 EST    Dictated portions of note using Dragon dictation software.  Copied text in this note has been reviewed by me and remains accurate as of 25    Electronically signed by Chan Garzon MD at 25 1019          Consult Notes (last 24 hours)        Mark Cabezas MD at 25 1013        Consult Orders    1. Inpatient Nephrology Consult [[id number]] ordered by Brooks Clinton MD at 25 1731                   [facility] Consult Note      Patient Name: Filomena Liu  : 1960  MRN: [id number]  Primary Care Physician:  Fernando Dunn MD  Referring Physician: Brooks Baltazar*  Date of admission: 3/6/2025    Subjective     Reason for Consult: Acute kidney injury on chronic kidney disease    HPI:   Filomena Liu is a 64 y.o. female patient was admitted with the chest pain and abdominal pain noted to have evidence of acute pancreatitis she has been heavily drinking has not drank for  the last 20 hours.  Complaining of insomnia and restlessness.  She was seen in our office by my partner Dr. Brewster, preethi October 2024, at that time her creatinine was around 1.6, baseline creatinine around 1.1-1.4 in the past.  He is diagnosed with chronic kidney disease stage IIIa, hypertension, patient has alcoholism.  And COPD, obstructive sleep apnea and chronic back pain    At present she is feeling shaky tremulous having insomnia having abdominal pain.  Review of Systems:   14 point review of systems is otherwise negative except for mentioned above on HPI    Personal History     Past Medical History:   Diagnosis Date    Ankle pain     Ankle wound     LEFT    Anxiety     Arthritis of back 2000    Arthritis of neck 2000    Asthma     Benign tumor of thymus     REMOVED    Bruises easily     Cataract     COPD (chronic obstructive pulmonary disease)     CTS (carpal tunnel syndrome) Surgery 1999    DDD (degenerative disc disease), cervical     Depression     Fracture of wrist 2917    GERD (gastroesophageal reflux disease)     Hip arthrosis Unknown    History of MRSA infection     LEFT ANKLE TREAT [facility]  5YEARS GO    Hyperlipidemia     Hypertension     Knee sprain June 2023    Knee swelling Unknown    Shoulder arthritis 06/05/2023    Shoulder pain, left 07/07/2023    Sleep apnea     NO MACHINE USE    Spinal stenosis     Spondylolisthesis of cervical region        Past Surgical History:   Procedure Laterality Date    BACK SURGERY      cervical disc surgery with fusion-    CHOLECYSTECTOMY      COLONOSCOPY      COLONOSCOPY N/A 11/12/2020    Procedure: COLONOSCOPY, polypectomy;  Surgeon: Filomena Mckeon MD;  Location: [facility];  Service: Gastroenterology;  Laterality: N/A;  Diverticulosis; Hepatic flexure polyp x 1; Polyp at 60cm x 1; Polyp at 50cm x 1- hot snare;    COLONOSCOPY N/A 4/15/2024    Procedure: COLONOSCOPY into sigmoid colon with hot snare polypectomy;  Surgeon: Leatha Johns MD;  Location: [facility]  ENDOSCOPY;  Service: General;  Laterality: N/A;  pre- history of polyps  post- diverticulosis, polyp    ENDOSCOPY N/A 8/15/2024    Procedure: ESOPHAGOGASTRODUODENOSCOPY;  Surgeon: Garth Constantion MD;  Location: [facility] ENDOSCOPY;  Service: Gastroenterology;  Laterality: N/A;  PRE- CIRRHOSIS, DARK STOOL  POST- NORMAL    HAND SURGERY  2000    HYSTERECTOMY      INCISION AND DRAINAGE LEG Left 11/17/2018    Procedure: Incision and Drainage of Left ankle;  Surgeon: Maxwell Lanier MD;  Location: [facility] MAIN OR;  Service: Orthopedics    NECK SURGERY  2000    SIGMOIDOSCOPY N/A 3/28/2024    Procedure: SIGMOIDOSCOPY FLEXIBLE;  Surgeon: Leatha Johns MD;  Location: [facility] ENDOSCOPY;  Service: General;  Laterality: N/A;  pre: h/o colon polyps  post: poor prep, diverticulosis    SKIN BIOPSY      SKIN GRAFT SPLIT THICKNESS N/A 02/12/2019    Procedure: debridement SKIN GRAFT SPLIT THICKNESS left ankle wound;  Surgeon: Barry Worthy MD;  Location: [facility] OR [facility];  Service: Plastics    TONGUE LESION EXCISION/BIOPSY N/A 11/26/2024    Procedure: WIDE LOCAL EXCISION OF TONGUE LESION;  Surgeon: El Mercedes MD;  Location: [facility] MAIN OR;  Service: ENT;  Laterality: N/A;    TOTAL SHOULDER ARTHROPLASTY Left 7/20/2023    Procedure: Total  shoulder arthroplasty;  Surgeon: Maxwell Lanier MD;  Location: [facility] OR [facility];  Service: Orthopedics;  Laterality: Left;    TOTAL THYMECTOMY      TRIGGER POINT INJECTION  2000    WRIST FRACTURE SURGERY      CARPAL TUNNEL       Family History: family history includes Alzheimer's disease in her father; Emphysema in her mother.    Social History:  reports that she has been smoking cigarettes. She started smoking about 45 years ago. She has a 11.3 pack-year smoking history. She has been exposed to tobacco smoke. She has never used smokeless tobacco. She reports current alcohol use of about 10.0 standard drinks of alcohol per week. She reports that she does not use drugs.    Home  Medications:  Prior to Admission medications    Medication Sig Start Date End Date Taking? Authorizing Provider   albuterol (PROVENTIL HFA;VENTOLIN HFA) 108 (90 Base) MCG/ACT inhaler Inhale 2 puffs 3 times a day. Indications: Spasm of Lung Air Passages 1/1/24  Yes Trevin Liu MD   atenolol (TENORMIN) 25 MG tablet Take 1 tablet by mouth Daily.   Yes Trevin Liu MD   Cholecalciferol 25 MCG (1000 UT) tablet Take 1 tablet by mouth 1 (One) Time Per Week. Indications: Vitamin D Deficiency 1/1/24  Yes Trevin Liu MD   HYDROcodone-acetaminophen (NORCO) 7.5-325 MG per tablet Take 1 tablet by mouth Every 6 (Six) Hours As Needed for Moderate Pain.   Yes Trevin Liu MD   ipratropium-albuterol (DUO-NEB) 0.5-2.5 mg/3 ml nebulizer Take 3 mL by nebulization 4 (Four) Times a Day. Indications: Chronic Obstructive Lung Disease 8/1/24  Yes Trevin Liu MD   pantoprazole (PROTONIX) 40 MG EC tablet Take 1 tablet by mouth Every Morning. 8/17/24  Yes Miah Bonilla MD   potassium chloride (KLOR-CON M20) 20 MEQ CR tablet Take 1 tablet by mouth Daily. 8/17/24  Yes Miah Bonilla MD   rOPINIRole (REQUIP) 1 MG tablet Take 1 tablet by mouth every night at bedtime. Indications: Restless Leg Syndrome 1/2/24  Yes Trevin Liu MD   spironolactone (ALDACTONE) 25 MG tablet Take 1 tablet by mouth Daily. 8/17/24  Yes Miah Bonilla MD   torsemide (DEMADEX) 20 MG tablet Take 1 tablet by mouth Daily.  Patient taking differently: Take 2 tablets by mouth Daily. PER NEPHROLOGY 8/23  Indications: Cardiac Failure, Edema, High Blood Pressure 8/16/24  Yes Miah Bonilla MD   Trelegy Ellipta 200-62.5-25 MCG/ACT aerosol powder  Inhale 1 puff Daily. Indications: Asthma 5/8/23  Yes Trevin Liu MD   fluticasone (FLONASE) 50 MCG/ACT nasal spray Administer 1 spray into the nostril(s) as directed by provider Daily. Indications: Stuffy Nose 1/1/24   Trevin Liu MD lisinopril  (PRINIVIL,ZESTRIL) 40 MG tablet Take 1 tablet by mouth Daily.    Provider, MD Trevin   naloxone (NARCAN) 4 MG/0.1ML nasal spray Call 911. Don't prime. [place] in 1 nostril for overdose. Repeat in 2-3 minutes in other nostril if no or minimal breathing/responsiveness.  Patient not taking: Reported on 3/6/2025 11/26/24   El Mercedes MD   nystatin susp + lidocaine viscous (MAGIC MOUTHWASH) oral suspension Swish and swallow 5 mL 4 (Four) Times a Day.    Provider, MD Trevin   oxyCODONE-acetaminophen (Percocet) 7.5-325 MG per tablet Take 1 tablet by mouth Every 4 (Four) Hours As Needed for Severe Pain or Moderate Pain. 11/26/24   El Mercedes MD   promethazine (PHENERGAN) 25 MG tablet Take 1 tablet by mouth Every 6 (Six) Hours As Needed for Nausea or Vomiting. 11/26/24   El Mercedes MD       Allergies:  No Known Allergies    Objective     Vitals:   Temp:  [98.4 °F (36.9 °C)-99.1 °F (37.3 °C)] 99 °F (37.2 °C)  Heart Rate:  [67-91] 91  Resp:  [16-18] 18  BP: (102-133)/(54-81) 118/73    Intake/Output Summary (Last 24 hours) at 3/7/2025 1014  Last data filed at 3/7/2025 0822  Gross per 24 hour   Intake --   Output 200 ml   Net -200 ml       Physical Exam:   Constitutional: Awake, alert, no acute distress.  Chronically ill  HEENT: Sclera anicteric, no conjunctival injection, oral mucosa slightly dry and she has plethoric facies  Neck: No JVD  Respiratory: Clear to auscultation bilaterally, nonlabored respiration  Cardiovascular: RRR, no murmurs, no rubs or gallops, no carotid bruit  Gastrointestinal: Epigastric tenderness, present bowel sounds auscultation  : No palpable bladder  Musculoskeletal: No edema, no clubbing or cyanosis  Psychiatric: Flat affect and somewhat anxious , cooperative  Neurologic: Oriented x3, moving all extremities, normal speech and mental status  Skin: Warm and dry       Scheduled Meds:     atenolol, 25 mg, Oral, Daily  budesonide-formoterol, 2 puff, Inhalation, BID -  RT   And  revefenacin, 175 mcg, Nebulization, Daily - RT  folic acid 1 mg in sodium chloride 0.9 % 50 mL IVPB, 1 mg, Intravenous, Daily  ipratropium-albuterol, 3 mL, Nebulization, 4x Daily - RT  nicotine, 1 patch, Transdermal, Q24H  pantoprazole, 40 mg, Oral, Q AM  piperacillin-tazobactam, 3.375 g, Intravenous, Q8H  rOPINIRole, 1 mg, Oral, Nightly  sodium chloride, 10 mL, Intravenous, Q12H  thiamine (B-1) IV, 200 mg, Intravenous, Q8H   Followed by  [START ON 3/12/2025] thiamine, 100 mg, Oral, Daily      IV Meds:   sodium chloride, 100 mL/hr, Last Rate: 100 mL/hr (03/06/25 1831)        Results Reviewed:   I have personally reviewed the results from the time of this admission to 3/7/2025 10:14 EST     Lab Results   Component Value Date    GLUCOSE 113 (H) 03/07/2025    CALCIUM 8.5 (L) 03/07/2025     03/07/2025    K 3.9 03/07/2025    CO2 28.6 03/07/2025     03/07/2025    BUN 43 (H) 03/07/2025    CREATININE 1.74 (H) 03/07/2025    EGFRIFAFRI >60 03/14/2023    EGFRIFNONA 83 02/07/2019    BCR 24.7 03/07/2025    ANIONGAP 13.4 03/07/2025      Lab Results   Component Value Date    MG 2.7 (H) 03/07/2025    PHOS 3.0 03/07/2025    ALBUMIN 2.9 (L) 03/07/2025           Assessment / Plan       Acute pancreatitis    HTN (hypertension)    Anxiety    SANDRO (acute kidney injury)    COPD (chronic obstructive pulmonary disease)    Alcoholic cirrhosis of liver without ascites    Diverticulitis    Hypoglycemia    Lactic acidosis      ASSESSMENT:  Acute kidney injury on chronic kidney disease stage IIIa, associate with poor oral intake and probably with acute pancreatitis she had increased amylase and lipase  Alcoholism with concern about possible withdrawal  Hypertension with chronic kidney disease stable.  History of cirrhosis  Lactic acidosis, her total CO2 28.6 and anion gap is normal.  COPD  Hypertension with chronic kidney disease reasonably controlled.    PLAN:  Check random urine for sodium, chloride and protein to  creatinine  Continue IV fluid  Primary team managing potential alcohol withdrawal  Surveillance labs    Reviewed the chart and other providers notes, reviewed labs.  I discussed the case with the patient and he voiced with understanding.      Thank you for involving us in the care of Filomena ELGIN Liu.  Please feel free to call with any questions.    Mark Cabezas MD  03/07/25  10:14 [facility]    [facility]  [phone]      Please note that portions of this note were completed with a voice recognition program.    Electronically signed by aMrk Cabezas MD at 03/07/25 7050

## 2025-03-07 NOTE — PROGRESS NOTES
Name: Filomena Liu ADMIT: 3/6/2025   : 1960  PCP: Fernando Dunn MD    MRN: [id number] LOS: 1 days   AGE/SEX: 64 y.o. female  ROOM: [facility]     Subjective   Subjective   Chief Complaint   Patient presents with    Chest Pain    Cough     She is reporting anxiety and feelings of withdrawal.  Last alcohol use was the night before her presentation.  She is still having some nausea and abdominal pain intermittently.  That is improved slightly.  She is also feeling anxious waiting on answers.  Discussed the MRI which was done and waiting on results of that and GI follow-up.  Asking for pain medications and Ativan.     Objective   Objective   Vital Signs  Temp:  [98.4 °F (36.9 °C)-99.1 °F (37.3 °C)] 99 °F (37.2 °C)  Heart Rate:  [67-91] 91  Resp:  [16-18] 18  BP: (102-133)/(54-81) 118/73  SpO2:  [91 %-100 %] 92 %  on   ;   Device (Oxygen Therapy): room air  There is no height or weight on file to calculate BMI.    Physical Exam  Vitals and nursing note reviewed.   Constitutional:       General: She is not in acute distress.     Appearance: She is not diaphoretic.   HENT:      Head: Atraumatic.   Eyes:      Pupils: Pupils are equal, round, and reactive to light.   Cardiovascular:      Rate and Rhythm: Normal rate and regular rhythm.   Pulmonary:      Effort: Pulmonary effort is normal.      Breath sounds: No wheezing.   Abdominal:      General: There is no distension.      Palpations: Abdomen is soft.      Tenderness: There is abdominal tenderness.   Musculoskeletal:         General: Swelling (diffuse) present.   Skin:     General: Skin is warm and dry.   Neurological:      Mental Status: She is alert.      Cranial Nerves: No cranial nerve deficit.   Psychiatric:         Mood and Affect: Mood is anxious.         Behavior: Behavior normal.       Results Review  I reviewed the patient's new clinical results.    Results from last 7 days   Lab Units 25  0552 25  0857   WBC 10*3/mm3 5.11 9.20    HEMOGLOBIN g/dL 9.4* 9.9*   PLATELETS 10*3/mm3 57* 81*     Results from last 7 days   Lab Units 03/07/25  0552 03/06/25  0857   SODIUM mmol/L 142 136   POTASSIUM mmol/L 3.9 4.4   CHLORIDE mmol/L 100 90*   CO2 mmol/L 28.6 25.8   BUN mg/dL 43* 54*   CREATININE mg/dL 1.74* 1.58*   GLUCOSE mg/dL 113* 46*   EGFR mL/min/1.73 32.4* 36.4*     Results from last 7 days   Lab Units 03/07/25  0552 03/06/25  0857   ALBUMIN g/dL 2.9* 3.6   BILIRUBIN mg/dL 3.0* 4.1*   ALK PHOS U/L 119* 133*   AST (SGOT) U/L 91* 69*   ALT (SGPT) U/L 22 24     Results from last 7 days   Lab Units 03/07/25  0552 03/06/25  0857   CALCIUM mg/dL 8.5* 8.8   ALBUMIN g/dL 2.9* 3.6   MAGNESIUM mg/dL 2.7* 3.1*   PHOSPHORUS mg/dL 3.0  --      Results from last 7 days   Lab Units 03/06/25  1648 03/06/25  1254 03/06/25  1015   PROCALCITONIN ng/mL  --   --  0.14   LACTATE mmol/L 4.0* 4.5*  --      Glucose   Date/Time Value Ref Range Status   03/07/2025 0619 130 70 - 130 mg/dL Final   03/06/2025 2129 112 70 - 130 mg/dL Final   03/06/2025 1611 118 70 - 130 mg/dL Final   03/06/2025 1105 186 (H) 70 - 130 mg/dL Final       CT Abdomen Pelvis With Contrast    Result Date: 3/6/2025   1. Diffusely heterogeneous hepatic attenuation with nodular liver contours, likely reflecting hepatocellular disease/cirrhosis. 2. Patchy peripancreatic fat stranding, which could be related to acute pancreatitis. Nonspecific high attenuation 1 cm x 0.7 cm ovoid mass in the posterior head of the pancreas. No evidence of pancreatic necrosis, splenic vein thrombosis, or pseudocyst. Could consider further evaluation with MRI/MRCP if clinically indicated. 3. Colonic diverticula with mild sigmoid colon wall thickening and pericolonic fat stranding suggesting mild acute uncomplicated diverticulitis. 4. Status post cholecystectomy with mild extrahepatic biliary dilatation that could be related to the postoperative state. Occlusion from a stone or a small mass in the posterior pancreatic head is  not entirely excluded. Attention on MRI. 5. Trace free fluid in the pelvis. 6. Calcified and noncalcified atherosclerotic disease in the abdominal aorta with a fusiform 3.5 cm infrarenal abdominal aortic aneurysm.  The critical results were discussed over the phone with the ordering ER physician, Dr. Chance Franks, at 11:57 a.m. on 3/6/2025.  This report was finalized on 3/6/2025 12:08 PM by Marv Meng MD on Workstation: [id number]      XR Chest 1 View    Result Date: 3/6/2025  No evidence for active disease in the chest.    This report was finalized on 3/6/2025 11:12 AM by Luisito Tatum M.D on Workstation: [id number]       I have personally reviewed all medications:  Scheduled Medications  atenolol, 25 mg, Oral, Daily  budesonide-formoterol, 2 puff, Inhalation, BID - RT   And  revefenacin, 175 mcg, Nebulization, Daily - RT  folic acid 1 mg in sodium chloride 0.9 % 50 mL IVPB, 1 mg, Intravenous, Daily  ipratropium-albuterol, 3 mL, Nebulization, 4x Daily - RT  nicotine, 1 patch, Transdermal, Q24H  pantoprazole, 40 mg, Oral, Q AM  piperacillin-tazobactam, 3.375 g, Intravenous, Q8H  rOPINIRole, 1 mg, Oral, Nightly  sodium chloride, 10 mL, Intravenous, Q12H  thiamine (B-1) IV, 200 mg, Intravenous, Q8H   Followed by  [START ON 3/12/2025] thiamine, 100 mg, Oral, Daily      Infusions  sodium chloride, 100 mL/hr, Last Rate: 100 mL/hr (03/06/25 1831)      Diet  NPO Diet NPO Type: Strict NPO, Ice Chips, Sips with Meds       Assessment/Plan     Active Hospital Problems    Diagnosis  POA    **Acute pancreatitis [K85.90]  Yes    Diverticulitis [K57.92]  Yes    Hypoglycemia [E16.2]  Yes    Lactic acidosis [E87.20]  Yes    Alcoholic cirrhosis of liver without ascites [K70.30]  Yes    COPD (chronic obstructive pulmonary disease) [J44.9]  Yes    SANDRO (acute kidney injury) [N17.9]  Yes    HTN (hypertension) [I10]  Yes    Anxiety [F41.9]  Yes      Resolved Hospital Problems   No resolved problems to display.       64 y.o.  female admitted with Acute pancreatitis.    Pancreatitis: Secondary to alcohol abuse.  She did have a questionable ovoid mass of the pancreas on CT.  MRCP does not show any mass.  Continue supportive care.  Continue CIWA.  GI consulted.  Alcoholic cirrhosis/alcohol withdrawal  Thrombocytopenia secondary to alcohol abuse: Monitor.  Hypoglycemia secondary to alcohol abuse  COPD: No acute exacerbation  SANDRO: Continue IVF and monitor.  Diuretics were held.  She does have some anasarca likely related to her chronic liver disease.  Nephrology consulted.  Anemia: Monitor  PPX: SCD  Disposition: TBD/several days    Expected Discharge Date: 3/10/2025; Expected Discharge Time:      Chan Garzon MD  [facility]  03/07/25  10:14 EST    Dictated portions of note using Dragon dictation software.  Copied text in this note has been reviewed by me and remains accurate as of 03/07/25

## 2025-03-07 NOTE — PROGRESS NOTES
Continued Stay Note  [facility]     Patient Name: Filomena Liu  MRN: [id number]  Today's Date: 3/7/2025    Admit Date: 3/6/2025    Plan: Return home with spouse and [facility] following   Discharge Plan       Row Name 03/07/25 1608       Plan    Plan Return home with spouse and [facility] following    Patient/Family in Agreement with Plan yes    Plan Comments Per Johanna/[facility] unable to accept due to staffing.  Per Alison/[facility] can accept.  Spoke with patient at bedside and she is agreeable.  CCP following.  Fernando MALDONADO                      Expected Discharge Date and Time       Expected Discharge Date Expected Discharge Time    Mar 10, 2025               Becky S. [place], RN

## 2025-03-07 NOTE — CONSULTS
[facility]  Initial Inpatient Consult Note    Referring Provider: A     Reason for Consultation: Abdominal pain    Subjective     History of present illness:      Thank you for allowing us to participate in the care of this patient.    64 y.o. female with a history of alcohol induced cirrhosis, COPD, hypertension, hyperlipidemia along with sleep apnea who is currently admitted for abdominal pain.    Patient seen and examined at bedside.  She reports roughly 5 months of intermittent left lower quadrant abdominal pain that radiates to the left hip.  Worse 4 days prior to coming into the hospital.  Bowels are moving every 4 days on average.  Her last bowel movement was 2 days ago.  Denies rectal bleeding or rectal pain.  Her appetite waxes and wanes.  She has chronic heartburn for which she takes Mylanta.  She reports 4 pound weight loss with symptoms.  She was drinking daily up until the time she came to the hospital.  She is tremulous today.    CT Abdomen Pelvis With Contrast (03/06/2025 10:39)    Nodular liver reflecting cirrhosis   Patchy peripancreatic fat stranding ? pancreatitis   1 cm x 0.7 cm ovoid mass in the posterior head of the pancreas   Mild sigmoid wall thickening suggestive of uncomplicated diverticulitis   Mild biliary dilation ?  Postoperative changes from cholecystectomy   Calcified atherosclerotic disease in the abdominal aorta   3.5 cm abdominal aortic aneurysm    MRI abdomen w wo contrast mrcp (03/07/2025 04:03) -no choledocholithiasis or pancreatic divisum.  Cirrhotic changes of the liver.  Mild acute pancreatitis.  No pancreatic mass seen.    Endoscopic history reviewed:    Upper GI Endoscopy (08/15/2024 09:37) -normal findings.    Colonoscopy (04/15/2024 14:02) -very tortuous sigmoid colon with extensive diverticulosis.  Sigmoid TA polyp resected.  A lot of retained stool throughout the sigmoid colon.  Barium enema recommended post procedure but not  completed.    Past Medical History:  Past Medical History:   Diagnosis Date    Ankle pain     Ankle wound     LEFT    Anxiety     Arthritis of back 2000    Arthritis of neck 2000    Asthma     Benign tumor of thymus     REMOVED    Bruises easily     Cataract     COPD (chronic obstructive pulmonary disease)     CTS (carpal tunnel syndrome) Surgery 1999    DDD (degenerative disc disease), cervical     Depression     Fracture of wrist 2917    GERD (gastroesophageal reflux disease)     Hip arthrosis Unknown    History of MRSA infection     LEFT ANKLE TREAT [facility]  5YEARS GO    Hyperlipidemia     Hypertension     Knee sprain June 2023    Knee swelling Unknown    Shoulder arthritis 06/05/2023    Shoulder pain, left 07/07/2023    Sleep apnea     NO MACHINE USE    Spinal stenosis     Spondylolisthesis of cervical region      Past Surgical History:  Past Surgical History:   Procedure Laterality Date    BACK SURGERY      cervical disc surgery with fusion-    CHOLECYSTECTOMY      COLONOSCOPY      COLONOSCOPY N/A 11/12/2020    Procedure: COLONOSCOPY, polypectomy;  Surgeon: Filomena Mckeon MD;  [facility]: [facility];  Service: Gastroenterology;  Laterality: N/A;  Diverticulosis; Hepatic flexure polyp x 1; Polyp at 60cm x 1; Polyp at 50cm x 1- hot snare;    COLONOSCOPY N/A 4/15/2024    Procedure: COLONOSCOPY into sigmoid colon with hot snare polypectomy;  Surgeon: Leatha Johns MD;  Location: [facility] ENDOSCOPY;  Service: General;  Laterality: N/A;  pre- history of polyps  post- diverticulosis, polyp    ENDOSCOPY N/A 8/15/2024    Procedure: ESOPHAGOGASTRODUODENOSCOPY;  Surgeon: Garth Constantino MD;  Location: [facility] ENDOSCOPY;  Service: Gastroenterology;  Laterality: N/A;  PRE- CIRRHOSIS, DARK STOOL  POST- NORMAL    HAND SURGERY  2000    HYSTERECTOMY      INCISION AND DRAINAGE LEG Left 11/17/2018    Procedure: Incision and Drainage of Left ankle;  Surgeon: Maxwell Lanier MD;  Location: [facility] OR;  Service: Orthopedics     NECK SURGERY  2000    SIGMOIDOSCOPY N/A 3/28/2024    Procedure: SIGMOIDOSCOPY FLEXIBLE;  Surgeon: Leatha Johns MD;  Location: [facility] ENDOSCOPY;  Service: General;  Laterality: N/A;  pre: h/o colon polyps  post: poor prep, diverticulosis    SKIN BIOPSY      SKIN GRAFT SPLIT THICKNESS N/A 02/12/2019    Procedure: debridement SKIN GRAFT SPLIT THICKNESS left ankle wound;  Surgeon: Barry Worthy MD;  Location:  [facility];  Service: Plastics    TONGUE LESION EXCISION/BIOPSY N/A 11/26/2024    Procedure: WIDE LOCAL EXCISION OF TONGUE LESION;  Surgeon: El Mercedes MD;  Location: [facility] OR;  Service: ENT;  Laterality: N/A;    TOTAL SHOULDER ARTHROPLASTY Left 7/20/2023    Procedure: Total  shoulder arthroplasty;  Surgeon: Maxwell Lanier MD;  Location:  [facility];  Service: Orthopedics;  Laterality: Left;    TOTAL THYMECTOMY      TRIGGER POINT INJECTION  2000    WRIST FRACTURE SURGERY      CARPAL TUNNEL      Social History:   Social History     Tobacco Use    Smoking status: Every Day     Current packs/day: 0.25     Average packs/day: 0.3 packs/day for 45.2 years (11.3 ttl pk-yrs)     Types: Cigarettes     Start date: 1/1/1980     Passive exposure: Current    Smokeless tobacco: Never   Substance Use Topics    Alcohol use: Yes     Alcohol/week: 10.0 standard drinks of alcohol     Types: 10 Drinks containing 0.5 oz of alcohol per week     Comment: pt reports drinking a pint a day      Family History:  Family History   Problem Relation Age of Onset    Emphysema Mother     Alzheimer's disease Father     Malig Hyperthermia Neg Hx        Home Meds:  Medications Prior to Admission   Medication Sig Dispense Refill Last Dose/Taking    albuterol (PROVENTIL HFA;VENTOLIN HFA) 108 (90 Base) MCG/ACT inhaler Inhale 2 puffs 3 times a day. Indications: Spasm of Lung Air Passages   3/6/2025 Noon    atenolol (TENORMIN) 25 MG tablet Take 1 tablet by mouth Daily.   3/5/2025    Cholecalciferol 25 MCG (1000  UT) tablet Take 1 tablet by mouth 1 (One) Time Per Week. Indications: Vitamin D Deficiency   Past Week    HYDROcodone-acetaminophen (NORCO) 7.5-325 MG per tablet Take 1 tablet by mouth Every 6 (Six) Hours As Needed for Moderate Pain.   3/5/2025 Evening    ipratropium-albuterol (DUO-NEB) 0.5-2.5 mg/3 ml nebulizer Take 3 mL by nebulization 4 (Four) Times a Day. Indications: Chronic Obstructive Lung Disease   3/6/2025    pantoprazole (PROTONIX) 40 MG EC tablet Take 1 tablet by mouth Every Morning. 30 tablet 0 3/6/2025    potassium chloride (KLOR-CON M20) 20 MEQ CR tablet Take 1 tablet by mouth Daily. 30 tablet 0 Past Month    rOPINIRole (REQUIP) 1 MG tablet Take 1 tablet by mouth every night at bedtime. Indications: Restless Leg Syndrome   3/5/2025    spironolactone (ALDACTONE) 25 MG tablet Take 1 tablet by mouth Daily. 30 tablet 0 3/5/2025    torsemide (DEMADEX) 20 MG tablet Take 1 tablet by mouth Daily. (Patient taking differently: Take 2 tablets by mouth Daily. PER NEPHROLOGY 8/23  Indications: Cardiac Failure, Edema, High Blood Pressure) 30 tablet 0 3/5/2025    Trelegy Ellipta 200-62.5-25 MCG/ACT aerosol powder  Inhale 1 puff Daily. Indications: Asthma   3/5/2025    fluticasone (FLONASE) 50 MCG/ACT nasal spray Administer 1 spray into the nostril(s) as directed by provider Daily. Indications: Stuffy Nose   More than a month    lisinopril (PRINIVIL,ZESTRIL) 40 MG tablet Take 1 tablet by mouth Daily.       naloxone (NARCAN) 4 MG/0.1ML nasal spray Call 911. Don't prime. Moneta in 1 nostril for overdose. Repeat in 2-3 minutes in other nostril if no or minimal breathing/responsiveness. (Patient not taking: Reported on 3/6/2025) 2 each 0 Not Taking    nystatin susp + lidocaine viscous (MAGIC MOUTHWASH) oral suspension Swish and swallow 5 mL 4 (Four) Times a Day.   More than a month    oxyCODONE-acetaminophen (Percocet) 7.5-325 MG per tablet Take 1 tablet by mouth Every 4 (Four) Hours As Needed for Severe Pain or Moderate  Pain. 30 tablet 0     promethazine (PHENERGAN) 25 MG tablet Take 1 tablet by mouth Every 6 (Six) Hours As Needed for Nausea or Vomiting. 15 tablet 0 Unknown     Current Meds:   atenolol, 25 mg, Oral, Daily  budesonide-formoterol, 2 puff, Inhalation, BID - RT   And  revefenacin, 175 mcg, Nebulization, Daily - RT  folic acid 1 mg in sodium chloride 0.9 % 50 mL IVPB, 1 mg, Intravenous, Daily  ipratropium-albuterol, 3 mL, Nebulization, 4x Daily - RT  nicotine, 1 patch, Transdermal, Q24H  pantoprazole, 40 mg, Oral, Q AM  piperacillin-tazobactam, 3.375 g, Intravenous, Q8H  rOPINIRole, 1 mg, Oral, Nightly  sodium chloride, 10 mL, Intravenous, Q12H  thiamine (B-1) IV, 200 mg, Intravenous, Q8H   Followed by  [START ON 3/12/2025] thiamine, 100 mg, Oral, Daily      Allergies:  No Known Allergies    Objective:  Objective     Vital Signs  Temp:  [98.4 °F (36.9 °C)-99.1 °F (37.3 °C)] 99 °F (37.2 °C)  Heart Rate:  [67-87] 77  Resp:  [16-18] 18  BP: (102-133)/(54-81) 118/73  Physical Exam:   Constitutional:    Alert, cooperative, in no acute distress    Abdomen:     Soft, nondistended, left lower quadrant tenderness noted; normal bowel sounds    Results Review:   I reviewed the patient's new clinical results.    Results from last 7 days   Lab Units 03/07/25  0552 03/06/25  0857   WBC 10*3/mm3 5.11 9.20   HEMOGLOBIN g/dL 9.4* 9.9*   HEMATOCRIT % 29.6* 30.3*   PLATELETS 10*3/mm3 57* 81*     Results from last 7 days   Lab Units 03/07/25  0552 03/06/25  0857   SODIUM mmol/L 142 136   POTASSIUM mmol/L 3.9 4.4   CHLORIDE mmol/L 100 90*   CO2 mmol/L 28.6 25.8   BUN mg/dL 43* 54*   CREATININE mg/dL 1.74* 1.58*   CALCIUM mg/dL 8.5* 8.8   BILIRUBIN mg/dL 3.0* 4.1*   ALK PHOS U/L 119* 133*   ALT (SGPT) U/L 22 24   AST (SGOT) U/L 91* 69*   GLUCOSE mg/dL 113* 46*     Results from last 7 days   Lab Units 03/06/25  0857   INR  1.63*     Lab Results   Lab Value Date/Time    LIPASE 126 (H) 03/07/2025 0552    LIPASE 193 (H) 03/06/2025 0857    LIPASE  31 03/14/2023 1237    LIPASE 14 05/11/2022 0059    LIPASE 21 (L) 06/01/2020 0149    LIPASE 22 (L) 05/12/2020 1235    LIPASE 29 01/09/2020 1229    LIPASE 23 02/27/2018 1221       Radiology:  CT Abdomen Pelvis With Contrast   Final Result       1. Diffusely heterogeneous hepatic attenuation with nodular liver   contours, likely reflecting hepatocellular disease/cirrhosis.   2. Patchy peripancreatic fat stranding, which could be related to acute   pancreatitis. Nonspecific high attenuation 1 cm x 0.7 cm ovoid mass in   the posterior head of the pancreas. No evidence of pancreatic necrosis,   splenic vein thrombosis, or pseudocyst. Could consider further   evaluation with MRI/MRCP if clinically indicated.   3. Colonic diverticula with mild sigmoid colon wall thickening and   pericolonic fat stranding suggesting mild acute uncomplicated   diverticulitis.   4. Status post cholecystectomy with mild extrahepatic biliary dilatation   that could be related to the postoperative state. Occlusion from a stone   or a small mass in the posterior pancreatic head is not entirely   excluded. Attention on MRI.   5. Trace free fluid in the pelvis.   6. Calcified and noncalcified atherosclerotic disease in the abdominal   aorta with a fusiform 3.5 cm infrarenal abdominal aortic aneurysm.       The critical results were discussed over the phone with the ordering ER   physician, Dr. Chance Franks, at 11:57 a.m. on 3/6/2025.       This report was finalized on 3/6/2025 12:08 PM by Marv Meng MD on   Workstation: [id number]          XR Chest 1 View   Final Result   No evidence for active disease in the chest.               This report was finalized on 3/6/2025 11:12 AM by Luisito Tatum M.D   on Workstation: [id number]          MRI abdomen w wo contrast mrcp    (Results Pending)       Assessment & Plan   Active Hospital Problems    Diagnosis     **Acute pancreatitis     Diverticulitis     Hypoglycemia     Lactic acidosis      Alcoholic cirrhosis of liver without ascites     COPD (chronic obstructive pulmonary disease)     SANDRO (acute kidney injury)     HTN (hypertension)     Anxiety        Assessment:  Acute pancreatitis alcohol induced  Diverticulitis  Alcoholic cirrhosis of the liver  COPD  Hypertension    Plan:  Recommend supportive care.  No indication for ERCP.  Start clear liquid diet and advance as tolerated  CBC, CMP, PT/INR and AFP in the morning  Reiterated the need for her to quit drinking alcohol.  She voiced understanding.  Continue IV antibiotics and Protonix  We will sign off but remain available if needed  She has an outpatient visit scheduled with our office in 4 weeks recommend she keep that appointment    I discussed the patients findings and my recommendations with patient and Dr. Guerrero .    Zoëon dictation used throughout this note.            TAYLER Bolton  [facility]  [address] [place] [address]  [phone] office  [phone] fax  [facility]

## 2025-03-08 ENCOUNTER — READMISSION MANAGEMENT (OUTPATIENT)
Dept: CALL CENTER | Facility: HOSPITAL | Age: 65
End: 2025-03-08
Payer: MEDICARE

## 2025-03-08 VITALS
TEMPERATURE: 98.8 F | RESPIRATION RATE: 20 BRPM | DIASTOLIC BLOOD PRESSURE: 79 MMHG | OXYGEN SATURATION: 97 % | HEART RATE: 77 BPM | SYSTOLIC BLOOD PRESSURE: 123 MMHG

## 2025-03-08 LAB
ALBUMIN SERPL-MCNC: 3.2 G/DL (ref 3.5–5.2)
ALBUMIN/GLOB SERPL: 1.2 G/DL
ALP SERPL-CCNC: 119 U/L (ref 39–117)
ALPHA-FETOPROTEIN: 5.65 NG/ML (ref 0–8.3)
ALT SERPL W P-5'-P-CCNC: 24 U/L (ref 1–33)
AMYLASE SERPL-CCNC: 82 U/L (ref 28–100)
ANION GAP SERPL CALCULATED.3IONS-SCNC: 12 MMOL/L (ref 5–15)
AST SERPL-CCNC: 87 U/L (ref 1–32)
BASOPHILS # BLD AUTO: 0.01 10*3/MM3 (ref 0–0.2)
BASOPHILS NFR BLD AUTO: 0.3 % (ref 0–1.5)
BILIRUB SERPL-MCNC: 2.6 MG/DL (ref 0–1.2)
BUN SERPL-MCNC: 22 MG/DL (ref 8–23)
BUN/CREAT SERPL: 15.1 (ref 7–25)
CALCIUM SPEC-SCNC: 8.6 MG/DL (ref 8.6–10.5)
CHLORIDE SERPL-SCNC: 104 MMOL/L (ref 98–107)
CO2 SERPL-SCNC: 25 MMOL/L (ref 22–29)
CREAT SERPL-MCNC: 1.46 MG/DL (ref 0.57–1)
DEPRECATED RDW RBC AUTO: 48.4 FL (ref 37–54)
EGFRCR SERPLBLD CKD-EPI 2021: 40 ML/MIN/1.73
EOSINOPHIL # BLD AUTO: 0.11 10*3/MM3 (ref 0–0.4)
EOSINOPHIL NFR BLD AUTO: 2.8 % (ref 0.3–6.2)
ERYTHROCYTE [DISTWIDTH] IN BLOOD BY AUTOMATED COUNT: 14.3 % (ref 12.3–15.4)
GLOBULIN UR ELPH-MCNC: 2.6 GM/DL
GLUCOSE BLDC GLUCOMTR-MCNC: 130 MG/DL (ref 70–130)
GLUCOSE BLDC GLUCOMTR-MCNC: 161 MG/DL (ref 70–130)
GLUCOSE SERPL-MCNC: 146 MG/DL (ref 65–99)
HBA1C MFR BLD: 4.8 % (ref 4.8–5.6)
HCT VFR BLD AUTO: 29.4 % (ref 34–46.6)
HGB BLD-MCNC: 9.5 G/DL (ref 12–15.9)
IMM GRANULOCYTES # BLD AUTO: 0.01 10*3/MM3 (ref 0–0.05)
IMM GRANULOCYTES NFR BLD AUTO: 0.3 % (ref 0–0.5)
INR PPP: 1.87 (ref 0.9–1.1)
LIPASE SERPL-CCNC: 92 U/L (ref 13–60)
LYMPHOCYTES # BLD AUTO: 0.78 10*3/MM3 (ref 0.7–3.1)
LYMPHOCYTES NFR BLD AUTO: 19.9 % (ref 19.6–45.3)
MAGNESIUM SERPL-MCNC: 2.5 MG/DL (ref 1.6–2.4)
MCH RBC QN AUTO: 30.7 PG (ref 26.6–33)
MCHC RBC AUTO-ENTMCNC: 32.3 G/DL (ref 31.5–35.7)
MCV RBC AUTO: 95.1 FL (ref 79–97)
MONOCYTES # BLD AUTO: 0.4 10*3/MM3 (ref 0.1–0.9)
MONOCYTES NFR BLD AUTO: 10.2 % (ref 5–12)
NEUTROPHILS NFR BLD AUTO: 2.6 10*3/MM3 (ref 1.7–7)
NEUTROPHILS NFR BLD AUTO: 66.5 % (ref 42.7–76)
NRBC BLD AUTO-RTO: 0 /100 WBC (ref 0–0.2)
PHOSPHATE SERPL-MCNC: 2.1 MG/DL (ref 2.5–4.5)
PLATELET # BLD AUTO: 50 10*3/MM3 (ref 140–450)
PMV BLD AUTO: 11 FL (ref 6–12)
POTASSIUM SERPL-SCNC: 3.6 MMOL/L (ref 3.5–5.2)
PROT SERPL-MCNC: 5.8 G/DL (ref 6–8.5)
PROTHROMBIN TIME: 21.6 SECONDS (ref 11.7–14.2)
RBC # BLD AUTO: 3.09 10*6/MM3 (ref 3.77–5.28)
SODIUM SERPL-SCNC: 141 MMOL/L (ref 136–145)
URATE SERPL-MCNC: 10.6 MG/DL (ref 2.4–5.7)
WBC NRBC COR # BLD AUTO: 3.91 10*3/MM3 (ref 3.4–10.8)

## 2025-03-08 PROCEDURE — 83690 ASSAY OF LIPASE: CPT | Performed by: INTERNAL MEDICINE

## 2025-03-08 PROCEDURE — 82948 REAGENT STRIP/BLOOD GLUCOSE: CPT

## 2025-03-08 PROCEDURE — 25010000002 THIAMINE PER 100 MG: Performed by: HOSPITALIST

## 2025-03-08 PROCEDURE — 84100 ASSAY OF PHOSPHORUS: CPT | Performed by: INTERNAL MEDICINE

## 2025-03-08 PROCEDURE — 85610 PROTHROMBIN TIME: CPT | Performed by: INTERNAL MEDICINE

## 2025-03-08 PROCEDURE — 94799 UNLISTED PULMONARY SVC/PX: CPT

## 2025-03-08 PROCEDURE — 25010000002 PIPERACILLIN SOD-TAZOBACTAM PER 1 G: Performed by: HOSPITALIST

## 2025-03-08 PROCEDURE — 83735 ASSAY OF MAGNESIUM: CPT | Performed by: INTERNAL MEDICINE

## 2025-03-08 PROCEDURE — 83036 HEMOGLOBIN GLYCOSYLATED A1C: CPT | Performed by: INTERNAL MEDICINE

## 2025-03-08 PROCEDURE — 63710000001 ONDANSETRON ODT 4 MG TABLET DISPERSIBLE: Performed by: HOSPITALIST

## 2025-03-08 PROCEDURE — 94761 N-INVAS EAR/PLS OXIMETRY MLT: CPT

## 2025-03-08 PROCEDURE — 85025 COMPLETE CBC W/AUTO DIFF WBC: CPT | Performed by: INTERNAL MEDICINE

## 2025-03-08 PROCEDURE — 94664 DEMO&/EVAL PT USE INHALER: CPT

## 2025-03-08 PROCEDURE — 84550 ASSAY OF BLOOD/URIC ACID: CPT | Performed by: INTERNAL MEDICINE

## 2025-03-08 PROCEDURE — 82105 ALPHA-FETOPROTEIN SERUM: CPT | Performed by: NURSE PRACTITIONER

## 2025-03-08 PROCEDURE — 80053 COMPREHEN METABOLIC PANEL: CPT | Performed by: INTERNAL MEDICINE

## 2025-03-08 PROCEDURE — 82150 ASSAY OF AMYLASE: CPT | Performed by: INTERNAL MEDICINE

## 2025-03-08 RX ORDER — POTASSIUM CHLORIDE 1500 MG/1
20 TABLET, EXTENDED RELEASE ORAL ONCE
Status: COMPLETED | OUTPATIENT
Start: 2025-03-08 | End: 2025-03-08

## 2025-03-08 RX ORDER — TORSEMIDE 20 MG/1
20 TABLET ORAL DAILY
Qty: 30 TABLET | Refills: 0 | Status: SHIPPED | OUTPATIENT
Start: 2025-03-08

## 2025-03-08 RX ADMIN — BUDESONIDE AND FORMOTEROL FUMARATE DIHYDRATE 2 PUFF: 160; 4.5 AEROSOL RESPIRATORY (INHALATION) at 10:04

## 2025-03-08 RX ADMIN — SODIUM CHLORIDE 3.38 G: 9 INJECTION, SOLUTION INTRAVENOUS at 08:10

## 2025-03-08 RX ADMIN — POTASSIUM CHLORIDE 20 MEQ: 1500 TABLET, EXTENDED RELEASE ORAL at 08:10

## 2025-03-08 RX ADMIN — FOLIC ACID 1 MG: 5 INJECTION, SOLUTION INTRAMUSCULAR; INTRAVENOUS; SUBCUTANEOUS at 08:29

## 2025-03-08 RX ADMIN — SODIUM CHLORIDE 3.38 G: 9 INJECTION, SOLUTION INTRAVENOUS at 00:47

## 2025-03-08 RX ADMIN — THIAMINE HYDROCHLORIDE 200 MG: 100 INJECTION, SOLUTION INTRAMUSCULAR; INTRAVENOUS at 06:23

## 2025-03-08 RX ADMIN — PANTOPRAZOLE SODIUM 40 MG: 40 TABLET, DELAYED RELEASE ORAL at 06:23

## 2025-03-08 RX ADMIN — HYDROXYZINE HYDROCHLORIDE 25 MG: 10 TABLET ORAL at 00:56

## 2025-03-08 RX ADMIN — HYDROCODONE BITARTRATE AND ACETAMINOPHEN 1 TABLET: 7.5; 325 TABLET ORAL at 06:49

## 2025-03-08 RX ADMIN — HYDROCODONE BITARTRATE AND ACETAMINOPHEN 1 TABLET: 7.5; 325 TABLET ORAL at 00:46

## 2025-03-08 RX ADMIN — ATENOLOL 25 MG: 25 TABLET ORAL at 08:10

## 2025-03-08 RX ADMIN — HYDROCODONE BITARTRATE AND ACETAMINOPHEN 1 TABLET: 7.5; 325 TABLET ORAL at 12:50

## 2025-03-08 RX ADMIN — IPRATROPIUM BROMIDE AND ALBUTEROL SULFATE 3 ML: .5; 3 SOLUTION RESPIRATORY (INHALATION) at 10:05

## 2025-03-08 RX ADMIN — NICOTINE 1 PATCH: 21 PATCH TRANSDERMAL at 08:11

## 2025-03-08 RX ADMIN — ONDANSETRON 4 MG: 4 TABLET, ORALLY DISINTEGRATING ORAL at 00:56

## 2025-03-08 RX ADMIN — Medication 10 ML: at 08:11

## 2025-03-08 NOTE — DISCHARGE SUMMARY
"    Date of Admission: 3/6/2025  Date of Discharge:  3/8/2025  Primary Care Physician: Fernando Dunn MD     Discharge Diagnosis:  Active Hospital Problems    Diagnosis  POA    **Acute pancreatitis [K85.90]  Yes    Diverticulitis [K57.92]  Yes    Hypoglycemia [E16.2]  Yes    Lactic acidosis [E87.20]  Yes    Alcoholic cirrhosis of liver without ascites [K70.30]  Yes    COPD (chronic obstructive pulmonary disease) [J44.9]  Yes    SANDRO (acute kidney injury) [N17.9]  Yes    HTN (hypertension) [I10]  Yes    Anxiety [F41.9]  Yes      Resolved Hospital Problems   No resolved problems to display.       Presenting Problem/History of Present Illness from H&P:  Acute pancreatitis [K85.90]  Hypoglycemia [E16.2]  COPD exacerbation [J44.1]  Acute kidney injury [N17.9]  Alcoholic cirrhosis of liver without ascites [K70.30]  Acute diverticulitis [K57.92]  Alcohol withdrawal syndrome with complication [F10.939]  Chest pain, unspecified type [R07.9]  Alcohol-induced acute pancreatitis, unspecified complication status [K85.20]     Ms. Liu is a 64 y.o. with history of alcohol abuse, COPD, sleep apnea, GERD, cirrhosis and other issues who presented to the hospital complaining of \"withdrawals\".  She says that she has not had anything to drink since last night and was feeling shaky and tremulous.  She usually drinks about a pint of alcohol per day.  She has been having some left lower quadrant abdominal pain off and on for several weeks.  She reports some nausea, lack of appetite and vomiting.  She had some chest pain associated with the vomiting which is not currently bothering her.  She has had alcohol withdrawals before but never any history of seizures.  She says she is anxious and is requesting Ativan by name, says she needs that and a pain pill so that she can sleep all night.     She does note that she had some bleeding at home over the last couple days which she thought was from her vagina.  Has not recurred today    Hospital " Course:  The patient is a 64 y.o. female who presented with alcoholic pancreatitis and cirrhosis.  She was admitted and underwent evaluation by gastroenterology services.  She also was evaluated by nephrology service due to CKD.  She was given fluids and home diuretics and ACE inhibitor were held.  There was a potential ovoid mass of the pancreas on CT scan.  MRCP did not show any mass.  She has had improvement in her pancreatitis symptoms and diet has been advanced.  She can discharge with planned follow-up in clinic with GI and nephrology.  Since she is tolerating oral intake better will resume her diuretics.  She can hold the lisinopril for now since she does not have an elevated blood pressure and continue to follow-up as an outpatient to determine when to resume that.    Exam Today:  Constitutional:       General: She is not in acute distress.     Appearance: She is not diaphoretic.   HENT:      Head: Atraumatic.   Eyes:      Pupils: Pupils are equal, round, and reactive to light.   Cardiovascular:      Rate and Rhythm: Normal rate and regular rhythm.   Pulmonary:      Effort: Pulmonary effort is normal.      Breath sounds: No wheezing.   Abdominal:      General: There is no distension.      Palpations: Abdomen is soft.      Tenderness: There is abdominal tenderness.   Musculoskeletal:         General: Swelling (diffuse) present.   Skin:     General: Skin is warm and dry.   Neurological:      Mental Status: She is alert.      Cranial Nerves: No cranial nerve deficit.     Results:  MRI abdomen w wo contrast mrcp  Result Date: 3/7/2025  MRI ABDOMEN W WO CONTRAST MRCP- LIVER MRI/MRCP  HISTORY: 64-year-old female with alcoholic cirrhosis, presented to the ER complaining of withdrawals. Left lower quadrant pain and nausea. Cholecystectomy in the past. Acute pancreatitis. Elevated CA 19-9.  TECHNIQUE: Routine liver MRI/MRCP was performed without and with IV contrast. Compared with CT abdomen 03/06/2025.  FINDINGS: 1.  The downstream common bile duct measures 6 mm and the pancreatic duct 2 mm. There is no choledocholithiasis or pancreatic divisum.  2. The cirrhotic liver has moderately extensive heterogeneous fatty infiltration. There is heterogeneous enhancement diffusely, but no suspicious liver lesion is seen. Follow-up liver MRI is recommended with elevation of alpha-fetoprotein.  3. There are changes of mild acute pancreatitis. There is no peripancreatic fluid collection. No pancreatic mass is seen. CA 19-9 elevation may be related to the acute pancreatitis. Follow-up CA 19-9 following resolution of acute pancreatitis is recommended.  4. There is a 3.6 cm infrarenal abdominal aortic aneurysm which extends to the bifurcation.    This report was finalized on 3/7/2025 4:58 PM by Dr. Elyse Gregory M.D on Workstation: [id number]      CT Abdomen Pelvis With Contrast  Result Date: 3/6/2025  CT ABDOMEN PELVIS W CONTRAST-  DATE OF EXAM: 3/6/2025 10:12 AM  INDICATION: Abdominal pain with nausea vomiting and diarrhea.  Pain is mainly in the lower abdomen.  She does report vaginal bleeding also reports history of hysterectomy..  COMPARISON: Chest radiograph 3/6/2025. Abdomen radiograph 8/7/2024. CT abdomen pelvis 3/9/2021.  TECHNIQUE: Multiple contiguous axial images were acquired through the abdomen and pelvis following the intravenous administration of 85 mL of Isovue-300. Reformatted coronal and sagittal sequences were also reviewed. Radiation dose reduction techniques were utilized, including automated exposure control and exposure modulation based on body size.  FINDINGS: Multifocal bibasilar subsegmental atelectasis and/or scarring. Partially imaged cardiomegaly, calcified coronary artery disease, and aortic valve calcifications.  Diffusely heterogeneous hepatic attenuation with nodular liver contours, likely reflecting hepatocellular disease/cirrhosis. Status post cholecystectomy. Mild extrahepatic biliary duct dilatation with  the common duct measuring up to 10 mm in diameter, which could be related to the postoperative state. The spleen is unremarkable. Patchy peripancreatic fat stranding, which could reflect pancreatitis. Nonspecific ovoid 1 cm x 0.7 cm ovoid high attenuation mass in the posterior pancreatic head. No evidence of pancreatic necrosis, splenic vein thrombosis, or pseudocyst. The adrenal glands and kidneys are unremarkable. Urinary bladder is nondistended. Status post hysterectomy. The adnexa are not definitively identified.  Mild diffuse gastric wall thickening is likely accentuated by underdistention but could reflect a nonspecific gastritis. Large fluid and air-filled duodenal diverticulum. Mild colorectal stool. Extensive colonic diverticula with mild sigmoid colon wall thickening and pericolonic fat stranding that could represent acute diverticulitis. No extraluminal air or adjacent loculated fluid collection to suggest abscess. No bowel obstruction. The appendix is normal.  Trace free fluid in the pelvis. No free intraperitoneal air. No pathologically enlarged lymph nodes in the abdomen or pelvis. Calcified and noncalcified atherosclerotic disease in the abdominal aorta and its distal branches with enlarged fusiform 3.5 cm infrarenal abdominal aortic aneurysm.  Diffuse osteopenia. Mild thoracolumbar levoscoliosis and multilevel spondylosis. Mild to moderate bilateral hip joint DJD. No acute osseous abnormality or concerning osseous lesion.       1. Diffusely heterogeneous hepatic attenuation with nodular liver contours, likely reflecting hepatocellular disease/cirrhosis. 2. Patchy peripancreatic fat stranding, which could be related to acute pancreatitis. Nonspecific high attenuation 1 cm x 0.7 cm ovoid mass in the posterior head of the pancreas. No evidence of pancreatic necrosis, splenic vein thrombosis, or pseudocyst. Could consider further evaluation with MRI/MRCP if clinically indicated. 3. Colonic diverticula  with mild sigmoid colon wall thickening and pericolonic fat stranding suggesting mild acute uncomplicated diverticulitis. 4. Status post cholecystectomy with mild extrahepatic biliary dilatation that could be related to the postoperative state. Occlusion from a stone or a small mass in the posterior pancreatic head is not entirely excluded. Attention on MRI. 5. Trace free fluid in the pelvis. 6. Calcified and noncalcified atherosclerotic disease in the abdominal aorta with a fusiform 3.5 cm infrarenal abdominal aortic aneurysm.  The critical results were discussed over the phone with the ordering ER physician, Dr. Chance Franks, at 11:57 a.m. on 3/6/2025.  This report was finalized on 3/6/2025 12:08 PM by Marv Meng MD on Workstation: [id number]      XR Chest 1 View  Result Date: 3/6/2025  XR CHEST 1 VW-  HISTORY: Shortness of breath and chest pain  COMPARISON: Two-view chest 08/13/2024, AP chest 08/10/2024  FINDINGS: Heart size upper normal. Sternotomy wires are present. There are aortic vascular calcifications. Lungs appear clear and there is no evidence for pulmonary edema or pleural effusion. Cervical spine plate and screws are evident. There is a left shoulder arthroplasty.      No evidence for active disease in the chest.    This report was finalized on 3/6/2025 11:12 AM by Luisito Tatum M.D on Workstation: [id number]       Procedures Performed:         Consults:   Consults       Date and Time Order Name Status Description    3/6/2025  5:31 PM Inpatient Nephrology Consult Completed     3/6/2025  5:25 PM Inpatient Gastroenterology Consult Completed     3/6/2025 12:09 PM [facility] (on-call MD unless specified) Details               Discharge Disposition:  Home or Self Care    Discharge Medications:     Discharge Medications        PAUSE taking these medications        Instructions Start Date   lisinopril 40 MG tablet  Wait to take this until your doctor or other care provider tells you to start  again.  Commonly known as: PRINIVIL,ZESTRIL   40 mg, Oral, Daily      potassium chloride 20 MEQ CR tablet  Wait to take this until your doctor or other care provider tells you to start again.  Commonly known as: KLOR-CON M20   20 mEq, Oral, Daily             Changes to Medications        Instructions Start Date   torsemide 20 MG tablet  Commonly known as: DEMADEX  What changed:   how much to take  additional instructions   20 mg, Oral, Daily             Continue These Medications        Instructions Start Date   albuterol sulfate  (90 Base) MCG/ACT inhaler  Commonly known as: PROVENTIL HFA;VENTOLIN HFA;PROAIR HFA   2 puffs, 3 times daily      atenolol 25 MG tablet  Commonly known as: TENORMIN   25 mg, Daily      cholecalciferol 25 MCG (1000 UT) tablet  Commonly known as: VITAMIN D3   1 tablet, Weekly      fluticasone 50 MCG/ACT nasal spray  Commonly known as: FLONASE   1 spray, Daily      HYDROcodone-acetaminophen 7.5-325 MG per tablet  Commonly known as: NORCO   1 tablet, Every 6 Hours PRN      ipratropium-albuterol 0.5-2.5 mg/3 ml nebulizer  Commonly known as: DUO-NEB   Take 3 mL by nebulization 4 (Four) Times a Day. Indications: Chronic Obstructive Lung Disease      oxyCODONE-acetaminophen 7.5-325 MG per tablet  Commonly known as: Percocet   1 tablet, Oral, Every 4 Hours PRN      pantoprazole 40 MG EC tablet  Commonly known as: PROTONIX   40 mg, Oral, Every Early Morning      promethazine 25 MG tablet  Commonly known as: PHENERGAN   25 mg, Oral, Every 6 Hours PRN      rOPINIRole 1 MG tablet  Commonly known as: REQUIP   1 tablet, Every Night at Bedtime      spironolactone 25 MG tablet  Commonly known as: ALDACTONE   25 mg, Oral, Daily      Trelegy Ellipta 200-62.5-25 MCG/ACT inhaler  Generic drug: Fluticasone-Umeclidin-Vilant   1 puff, Daily             Stop These Medications      naloxone 4 MG/0.1ML nasal spray  Commonly known as: NARCAN     nystatin susp + lidocaine viscous oral suspension  Commonly known  as: MAGIC MOUTHWASH              Discharge Diet:   Diet Instructions       Diet: Gastrointestinal Diets; Fat-Restricted, Low Irritant; Thin (IDDSI 0)      Discharge Diet: Gastrointestinal Diets    Gastrointestinal Diet:  Fat-Restricted  Low Irritant       Fluid Consistency: Thin (IDDSI 0)            Activity at Discharge:   Activity Instructions       Activity as Tolerated              Follow-up Appointments:   Contact information for follow-up providers       Fernando Dunn MD .    Specialty: Family Medicine  Contact information:  [address] [facility] [id number] [phone]               Fiona Flowers APRN Follow up in 4 week(s).    Specialties: Gastroenterology, Nurse Practitioner  Contact information:  [address]  [facility] [address]  [facility] [address]  [phone]               Chan Brewster MD Follow up.    Specialty: Nephrology  Contact information:  [address]  [facility] [address]  [facility] [address]  [phone]                       Contact information for after-discharge care       Home Medical Care       [facility] - [facility] .    Service: Home Health Services  Contact information:  [address] [facility] [address]  [facility] [address]  [phone]                                   Test Results Pending at Discharge:  Pending Labs       Order Current Status    Blood Culture - Blood, Arm, Left Preliminary result    Blood Culture - Blood, Arm, Right Preliminary result             Chan Garzon MD  03/08/25  12:37 EST    Time Spent on Discharge Activities: >30 minutes    Dictated portions using Dragon dictation software.

## 2025-03-08 NOTE — PLAN OF CARE
Goal Outcome Evaluation:              Outcome Evaluation: Pt A&O X4. RA. PRN medications given per MAR. Plan to return home with  this afternoon. Discussed follow up appointments and education provided. VSS

## 2025-03-08 NOTE — CASE MANAGEMENT/SOCIAL WORK
Case Management Discharge Note      Final Note: dc home with [facility]     Provided Post Acute Provider List?: N/A  N/A Provider List Comment: Has used [facility] in the past and would like  again    Selected Continued Care - Discharged on 3/8/2025 Admission date: 3/6/2025 - Discharge disposition: Home or Self Care      Destination    No services have been selected for the patient.                Durable Medical Equipment    No services have been selected for the patient.                Dialysis/Infusion    No services have been selected for the patient.                Home Medical Care Coordination complete.      Service Provider Services Address Phone Fax Patient Preferred    [facility] - [facility] [address]  [place] [address] [phone] --              Therapy    No services have been selected for the patient.                Community Resources    No services have been selected for the patient.                Community & DME    No services have been selected for the patient.                    Transportation Services  Private: Car    Final Discharge Disposition Code: 06 - home with home health care

## 2025-03-08 NOTE — PLAN OF CARE
Problem: Violence Risk or Actual  Goal: Anger and Impulse Control  Intervention: Minimize Safety Risk  Description: Listen actively, observing verbal and nonverbal cues (e.g., irritability, confusion, lack of cooperation, demanding behavior, body posture, expression); take threats seriously.  Maintain a therapeutic presence; utilize calm, empathetic tone of voice, nonjudgmental attitude and nonthreatening body language; listen carefully.  Assess and provide for unmet needs, including nutrition, comfort, hydration, hygiene, companionship and appropriate rest.  Utilize empathetic but firm and concise communication; set limits, offer choices and propose alternatives.  Remove stimuli and objects that may lead to harming self or others.  Maintain clear path to room exit; keep door open during care.  Ask directly about homicidal and suicidal intent; provide additional safety measures based on level of risk (e.g., one-on-one observation, duty to warn).  Implement least restrictive measures if attempts to de-escalate violent or injurious behaviors are unsuccessful.  Recent Flowsheet Documentation  Taken 3/8/2025 0400 by Kwesi Woods RN  Enhanced Safety Measures: bed alarm set  Taken 3/8/2025 0208 by Kwesi Woods RN  Enhanced Safety Measures: bed alarm set  Taken 3/8/2025 0046 by Kwesi Woods RN  Enhanced Safety Measures: bed alarm set  Taken 3/7/2025 2235 by Kwesi Woods RN  Enhanced Safety Measures: bed alarm set  Taken 3/7/2025 2005 by Kwesi Woods RN  Enhanced Safety Measures:   bed alarm set   room near unit station   Goal Outcome Evaluation:  Plan of Care Reviewed With: patient        Progress: improving  Outcome Evaluation: Pt A&Ox4, RA, no acute changes overnight, IV abx, VSS, plan of care continues.

## 2025-03-08 NOTE — PROGRESS NOTES
LOS: 2 days   Patient Care Team:  Fernando Dunn MD as PCP - General (Family Medicine)  Nam Calderon APRN as Referring Physician (Nurse Practitioner)  Alina Asencio MD as Consulting Physician (Hematology and Oncology)    Chief Complaint: SANDRO    Subjective     Pt without any new complaints.  Abd pain better  Tolerating po.    History taken from: patient chart RN    Objective     Vital Sign Min/Max for last 24 hours  Temp  Min: 98.2 °F (36.8 °C)  Max: 99.3 °F (37.4 °C)   BP  Min: 113/63  Max: 127/83   Pulse  Min: 77  Max: 94   Resp  Min: 16  Max: 20   SpO2  Min: 96 %  Max: 100 %   No data recorded   No data recorded         I/O this shift:  In: 240 [P.O.:240]  Out: -   I/O last 3 completed shifts:  In: 118 [P.O.:118]  Out: 300 [Urine:300]    Objective:  General Appearance:  Comfortable.    Vital signs: (most recent): Blood pressure 127/83, pulse 77, temperature 98.6 °F (37 °C), temperature source Oral, resp. rate 20, SpO2 97%.  Vital signs are normal.    Output: Producing urine.    HEENT: Normal HEENT exam.    Lungs:  Normal effort and normal respiratory rate.    Heart: Normal rate.  Regular rhythm.    Abdomen: Abdomen is soft.  Bowel sounds are normal.   There is no abdominal tenderness.     Extremities: Normal range of motion.  There is no dependent edema.    Pulses: Distal pulses are intact.    Neurological: Patient is alert and oriented to person, place and time.                Results Review:     I reviewed the patient's new clinical results.  I reviewed the patient's new imaging results and agree with the interpretation.  I reviewed the patient's other test results and agree with the interpretation    WBC WBC   Date Value Ref Range Status   03/08/2025 3.91 3.40 - 10.80 10*3/mm3 Final   03/07/2025 5.11 3.40 - 10.80 10*3/mm3 Final   03/06/2025 9.20 3.40 - 10.80 10*3/mm3 Final      HGB Hemoglobin   Date Value Ref Range Status   03/08/2025 9.5 (L) 12.0 - 15.9 g/dL Final   03/07/2025 9.4 (L) 12.0 - 15.9  "g/dL Final   03/06/2025 9.9 (L) 12.0 - 15.9 g/dL Final      HCT Hematocrit   Date Value Ref Range Status   03/08/2025 29.4 (L) 34.0 - 46.6 % Final   03/07/2025 29.6 (L) 34.0 - 46.6 % Final   03/06/2025 30.3 (L) 34.0 - 46.6 % Final      Platlets No results found for: \"LABPLAT\"   MCV MCV   Date Value Ref Range Status   03/08/2025 95.1 79.0 - 97.0 fL Final   03/07/2025 92.2 79.0 - 97.0 fL Final   03/06/2025 90.4 79.0 - 97.0 fL Final          Sodium Sodium   Date Value Ref Range Status   03/08/2025 141 136 - 145 mmol/L Final   03/07/2025 142 136 - 145 mmol/L Final   03/06/2025 136 136 - 145 mmol/L Final      Potassium Potassium   Date Value Ref Range Status   03/08/2025 3.6 3.5 - 5.2 mmol/L Final   03/07/2025 3.9 3.5 - 5.2 mmol/L Final   03/06/2025 4.4 3.5 - 5.2 mmol/L Final      Chloride Chloride   Date Value Ref Range Status   03/08/2025 104 98 - 107 mmol/L Final   03/07/2025 100 98 - 107 mmol/L Final   03/06/2025 90 (L) 98 - 107 mmol/L Final      CO2 CO2   Date Value Ref Range Status   03/08/2025 25.0 22.0 - 29.0 mmol/L Final   03/07/2025 28.6 22.0 - 29.0 mmol/L Final   03/06/2025 25.8 22.0 - 29.0 mmol/L Final      BUN BUN   Date Value Ref Range Status   03/08/2025 22 8 - 23 mg/dL Final   03/07/2025 43 (H) 8 - 23 mg/dL Final   03/06/2025 54 (H) 8 - 23 mg/dL Final      Creatinine Creatinine   Date Value Ref Range Status   03/08/2025 1.46 (H) 0.57 - 1.00 mg/dL Final   03/07/2025 1.74 (H) 0.57 - 1.00 mg/dL Final   03/06/2025 1.58 (H) 0.57 - 1.00 mg/dL Final      Calcium Calcium   Date Value Ref Range Status   03/08/2025 8.6 8.6 - 10.5 mg/dL Final   03/07/2025 8.5 (L) 8.6 - 10.5 mg/dL Final   03/06/2025 8.8 8.6 - 10.5 mg/dL Final      PO4 No results found for: \"CAPO4\"   Albumin Albumin   Date Value Ref Range Status   03/08/2025 3.2 (L) 3.5 - 5.2 g/dL Final   03/07/2025 2.9 (L) 3.5 - 5.2 g/dL Final   03/06/2025 3.6 3.5 - 5.2 g/dL Final      Magnesium Magnesium   Date Value Ref Range Status   03/08/2025 2.5 (H) 1.6 - 2.4 " mg/dL Final   03/07/2025 2.7 (H) 1.6 - 2.4 mg/dL Final   03/06/2025 3.1 (H) 1.6 - 2.4 mg/dL Final      Uric Acid Uric Acid   Date Value Ref Range Status   03/08/2025 10.6 (H) 2.4 - 5.7 mg/dL Final        Medication Review:   atenolol, 25 mg, Oral, Daily  budesonide-formoterol, 2 puff, Inhalation, BID - RT   And  revefenacin, 175 mcg, Nebulization, Daily - RT  folic acid 1 mg in sodium chloride 0.9 % 50 mL IVPB, 1 mg, Intravenous, Daily  ipratropium-albuterol, 3 mL, Nebulization, 4x Daily - RT  nicotine, 1 patch, Transdermal, Q24H  pantoprazole, 40 mg, Oral, Q AM  piperacillin-tazobactam, 3.375 g, Intravenous, Q8H  rOPINIRole, 1 mg, Oral, Nightly  sodium chloride, 10 mL, Intravenous, Q12H  thiamine (B-1) IV, 200 mg, Intravenous, Q8H   Followed by  [START ON 3/12/2025] thiamine, 100 mg, Oral, Daily          Assessment & Plan       Acute pancreatitis    HTN (hypertension)    Anxiety    SANDRO (acute kidney injury)    COPD (chronic obstructive pulmonary disease)    Alcoholic cirrhosis of liver without ascites    Diverticulitis    Hypoglycemia    Lactic acidosis      Assessment & Plan  Acute kidney injury on chronic kidney disease stage IIIa, associate with poor oral intake and probably with acute pancreatitis she had increased amylase and lipase.  Creatinine improved to 1.46 today.  Alcoholism with concern about possible withdrawal  Hypertension with chronic kidney disease stable.  History of cirrhosis  Lactic acidosis,  anion gap is normal.  COPD  Hypertension with chronic kidney disease reasonably controlled.    Balwinder David MD  03/08/25  10:54 EST

## 2025-03-09 NOTE — OUTREACH NOTE
Prep Survey      Flowsheet Row Responses   [facility] facility patient discharged from? [place]   Is LACE score < 7 ? No   Eligibility Readm Mgmt   Discharge diagnosis Acute pancreatitis   Does the patient have one of the following disease processes/diagnoses(primary or secondary)? Other   Does the patient have Home health ordered? Yes   What is the Home health agency?  [facility]    Is there a DME ordered? No   Prep survey completed? Yes            EFRAIN MOHR - Registered Nurse

## 2025-03-11 LAB
BACTERIA SPEC AEROBE CULT: NORMAL
BACTERIA SPEC AEROBE CULT: NORMAL

## 2025-03-12 ENCOUNTER — READMISSION MANAGEMENT (OUTPATIENT)
Dept: CALL CENTER | Facility: HOSPITAL | Age: 65
End: 2025-03-12
Payer: MEDICARE

## 2025-03-12 NOTE — OUTREACH NOTE
Medical Week 1 Survey      Flowsheet Row Responses   [facility] patient discharged from? [place]   Does the patient have one of the following disease processes/diagnoses(primary or secondary)? Other   Week 1 attempt successful? No   Unsuccessful attempts Attempt 1  [All numbers listed were attempted,  no answer]            Jaylyn H - Registered Nurse

## 2025-03-18 ENCOUNTER — READMISSION MANAGEMENT (OUTPATIENT)
Dept: CALL CENTER | Facility: HOSPITAL | Age: 65
End: 2025-03-18
Payer: MEDICARE

## 2025-03-18 NOTE — OUTREACH NOTE
Medical Week 1 Survey      Flowsheet Row Responses   [facility] patient discharged from? [place]   Does the patient have one of the following disease processes/diagnoses(primary or secondary)? Other   Week 1 attempt successful? No   Unsuccessful attempts Attempt 2            Chelsea MOSS - Registered Nurse

## 2025-03-26 ENCOUNTER — READMISSION MANAGEMENT (OUTPATIENT)
Dept: CALL CENTER | Facility: HOSPITAL | Age: 65
End: 2025-03-26
Payer: MEDICARE

## 2025-03-26 NOTE — OUTREACH NOTE
Medical Week 3 Survey      Flowsheet Row Responses   [facility] patient discharged from? [place]   Does the patient have one of the following disease processes/diagnoses(primary or secondary)? Other   Week 3 attempt successful? No   Unsuccessful attempts Attempt 1   Revoke Decline to participate   Revoke Decline to participate            Chelsea MOSS - Registered Nurse

## 2025-03-26 NOTE — PROGRESS NOTES
Follow-up      03/27/2025, interval history:  She was recently hospitalized with alcohol withdrawal.  Her CA 19-9 and AFP were found to be elevated.  She did undergo MRI MRCP abdomen, acute pancreatitis was noted.  CA 19-9 was felt to be elevated secondary to acute pancreatitis.  She reports she has been sober since her discharge.  She has a follow-up appointment with GI on 8 April 2025.  She denies any active bleeding or melanotic stools.    HISTORY OF PRESENT ILLNESS:  The patient is a 64 y.o. year old female with history of hypertension, hyperlipidemia, GI bleed, EtOH abuse, alcoholic liver cirrhosis with recent admission for GI bleed in August 2024.  An EGD was done on 8/15/2024 which was unremarkable.  Labs were concerning for iron deficiency anemia-hemoglobin 9, iron saturation 6, ferritin 58.  She was given IV Feraheme on 9/13/2024.  Repeat iron profile on 9/17/2024 revealed iron saturation 61%, ferritin 298.  She is referred to our clinic for further evaluation and management        MEDICATIONS    Current Outpatient Medications:     albuterol (PROVENTIL HFA;VENTOLIN HFA) 108 (90 Base) MCG/ACT inhaler, Inhale 2 puffs 3 times a day. Indications: Spasm of Lung Air Passages, Disp: , Rfl:     atenolol (TENORMIN) 25 MG tablet, Take 1 tablet by mouth Daily., Disp: , Rfl:     Cholecalciferol 25 MCG (1000 UT) tablet, Take 1 tablet by mouth 1 (One) Time Per Week. Indications: Vitamin D Deficiency, Disp: , Rfl:     fluticasone (FLONASE) 50 MCG/ACT nasal spray, Administer 1 spray into the nostril(s) as directed by provider Daily. Indications: Stuffy Nose, Disp: , Rfl:     HYDROcodone-acetaminophen (NORCO) 7.5-325 MG per tablet, Take 1 tablet by mouth Every 6 (Six) Hours As Needed for Moderate Pain., Disp: , Rfl:     ipratropium-albuterol (DUO-NEB) 0.5-2.5 mg/3 ml nebulizer, Take 3 mL by nebulization 4 (Four) Times a Day. Indications: Chronic Obstructive Lung Disease, Disp: , Rfl:     [Paused] potassium chloride  "(KLOR-CON M20) 20 MEQ CR tablet, Take 1 tablet by mouth Daily., Disp: 30 tablet, Rfl: 0    rOPINIRole (REQUIP) 1 MG tablet, Take 1 tablet by mouth every night at bedtime. Indications: Restless Leg Syndrome, Disp: , Rfl:     spironolactone (ALDACTONE) 25 MG tablet, Take 1 tablet by mouth Daily., Disp: 30 tablet, Rfl: 0    torsemide (DEMADEX) 20 MG tablet, Take 1 tablet by mouth Daily. Indications: Cardiac Failure, Edema, High Blood Pressure, Disp: 30 tablet, Rfl: 0    Trelegy Ellipta 200-62.5-25 MCG/ACT aerosol powder , Inhale 1 puff Daily. Indications: Asthma, Disp: , Rfl:     [Paused] lisinopril (PRINIVIL,ZESTRIL) 40 MG tablet, Take 1 tablet by mouth Daily., Disp: , Rfl:     oxyCODONE-acetaminophen (Percocet) 7.5-325 MG per tablet, Take 1 tablet by mouth Every 4 (Four) Hours As Needed for Severe Pain or Moderate Pain., Disp: 30 tablet, Rfl: 0    pantoprazole (PROTONIX) 40 MG EC tablet, Take 1 tablet by mouth Every Morning., Disp: 30 tablet, Rfl: 0    promethazine (PHENERGAN) 25 MG tablet, Take 1 tablet by mouth Every 6 (Six) Hours As Needed for Nausea or Vomiting., Disp: 15 tablet, Rfl: 0    ALLERGIES:   No Known Allergies      I have reviewed Past Medical, Surgical History, Social History, Meds and Allergies.     REVIEW OF SYSTEMS:  See interval History                   Vitals:    03/27/25 1045   BP: 152/81   Pulse: 83   Resp: 16   Temp: 98.3 °F (36.8 °C)   TempSrc: Oral   SpO2: 100%   Weight: 73.4 kg (161 lb 14.4 oz)   Height: 152.4 cm (60\")   PainSc: 7    PainLoc: Shoulder           3/27/2025    10:45 AM   Current Status   ECOG score 1        PHYSICAL EXAM:    Physical Exam  Constitutional:       General: She is not in acute distress.     Appearance: Normal appearance. She is not ill-appearing.   HENT:      Head: Normocephalic and atraumatic.      Mouth/Throat:      Comments: Lesion on left tongue noted  Cardiovascular:      Pulses: Normal pulses.      Heart sounds: Normal heart sounds. No murmur " heard.  Pulmonary:      Effort: Pulmonary effort is normal. No respiratory distress.      Breath sounds: Normal breath sounds.   Abdominal:      General: Bowel sounds are normal.      Palpations: Abdomen is soft.   Musculoskeletal:      Comments: Bilateral lower extremity edema  Erythema around bilateral feet without warmth or tenderness   Skin:     Findings: Bruising present.   Neurological:      Mental Status: She is alert and oriented to person, place, and time.      Comments: Uses walker for ambulation       I have reexamined the patient and the results are consistent with the previously documented exam. Alina Asencio MD        RECENT LABS:        WBC   Date Value Ref Range Status   03/27/2025 11.43 (H) 3.40 - 10.80 10*3/mm3 Final   03/08/2025 3.91 3.40 - 10.80 10*3/mm3 Final   03/07/2025 5.11 3.40 - 10.80 10*3/mm3 Final   03/06/2025 9.20 3.40 - 10.80 10*3/mm3 Final   09/25/2024 8.44 3.40 - 10.80 10*3/mm3 Final   08/16/2024 14.95 (H) 3.40 - 10.80 10*3/mm3 Final   08/15/2024 12.74 (H) 3.40 - 10.80 10*3/mm3 Final   08/14/2024 18.77 (H) 3.40 - 10.80 10*3/mm3 Final   08/12/2024 13.24 (H) 3.40 - 10.80 10*3/mm3 Final   08/12/2024 14.42 (H) 3.40 - 10.80 10*3/mm3 Final   08/12/2024 14.81 (H) 3.40 - 10.80 10*3/mm3 Final   08/11/2024 11.04 (H) 3.40 - 10.80 10*3/mm3 Final   08/11/2024 8.82 3.40 - 10.80 10*3/mm3 Final   08/11/2024 13.47 (H) 3.40 - 10.80 10*3/mm3 Final   08/11/2024 12.74 (H) 3.40 - 10.80 10*3/mm3 Final   08/10/2024 7.60 3.40 - 10.80 10*3/mm3 Final   08/10/2024 10.51 3.40 - 10.80 10*3/mm3 Final   08/09/2024 8.94 3.40 - 10.80 10*3/mm3 Final   08/09/2024 6.26 3.40 - 10.80 10*3/mm3 Final   08/09/2024 9.44 3.40 - 10.80 10*3/mm3 Final   08/09/2024 12.47 (H) 3.40 - 10.80 10*3/mm3 Final   08/08/2024 9.56 3.40 - 10.80 10*3/mm3 Final   08/08/2024 7.14 3.40 - 10.80 10*3/mm3 Final   08/08/2024 8.11 3.40 - 10.80 10*3/mm3 Final   08/07/2024 6.15 3.40 - 10.80 10*3/mm3 Final   08/07/2024 7.77 3.40 - 10.80 10*3/mm3 Final    08/07/2024 8.70 3.40 - 10.80 10*3/mm3 Final   08/06/2024 9.89 3.40 - 10.80 10*3/mm3 Final   08/06/2024 11.01 (H) 3.40 - 10.80 10*3/mm3 Final   08/06/2024 6.06 3.40 - 10.80 10*3/mm3 Final   08/05/2024 6.36 3.40 - 10.80 10*3/mm3 Final   08/05/2024 8.16 3.40 - 10.80 10*3/mm3 Final   04/18/2024 8.9 3.8 - 10.8 K/uL Final   07/07/2023 9.97 3.40 - 10.80 10*3/mm3 Final   03/14/2023 10.72 4.5 - 11.0 10*3/uL Final   05/12/2022 5.69 4.5 - 11.0 10*3/uL Final   05/11/2022 12.95 (H) 4.5 - 11.0 10*3/uL Final   05/11/2022 15.61 (H) 4.5 - 11.0 10*3/uL Final   01/26/2022 9.91 4.5 - 11.0 10*3/uL Final   09/25/2020 14.62 (H) 4.5 - 11.0 10*3/uL Final   08/03/2020 10.08 4.5 - 11.0 10*3/uL Final   06/07/2020 14.96 (H) 4.5 - 11.0 10*3/uL Final   06/02/2020 22.64 (H) 4.5 - 11.0 10*3/uL Final   06/01/2020 9.93 4.5 - 11.0 10*3/uL Final   05/12/2020 10.54 4.5 - 11.0 10*3/uL Final   01/09/2020 9.20 4.5 - 11.0 10*3/uL Final   11/02/2019 14.9 (H) 4.5 - 11.0 10*3/uL Final   10/24/2019 17.78 (H) 4.5 - 11.0 10*3/uL Final   02/07/2019 8.65 4.50 - 10.70 10*3/mm3 Final   11/19/2018 9.10 4.50 - 10.70 10*3/mm3 Final   11/18/2018 11.16 (H) 4.50 - 10.70 10*3/mm3 Final   11/13/2018 10.36 4.50 - 10.70 10*3/mm3 Final   11/12/2018 11.46 (H) 4.50 - 10.70 10*3/mm3 Final   02/27/2018 7.99 4.5 - 11.0 10*3/uL Final   02/10/2017 13.9 (H) 4.5 - 11.5 10*3/uL Final   01/31/2017 11.2 4.5 - 11.5 10*3/uL Final      Hemoglobin   Date Value Ref Range Status   03/27/2025 9.9 (L) 12.0 - 15.9 g/dL Final   03/08/2025 9.5 (L) 12.0 - 15.9 g/dL Final   03/07/2025 9.4 (L) 12.0 - 15.9 g/dL Final   03/06/2025 9.9 (L) 12.0 - 15.9 g/dL Final   09/25/2024 8.5 (L) 12.0 - 15.9 g/dL Final   08/16/2024 9.0 (L) 12.0 - 15.9 g/dL Final   08/15/2024 8.6 (L) 12.0 - 15.9 g/dL Final   08/14/2024 10.5 (L) 12.0 - 15.9 g/dL Final   08/12/2024 9.4 (L) 12.0 - 15.9 g/dL Final   08/12/2024 9.4 (L) 12.0 - 15.9 g/dL Final   08/12/2024 9.3 (L) 12.0 - 15.9 g/dL Final   08/11/2024 9.3 (L) 12.0 - 15.9 g/dL  Final   08/11/2024 9.2 (L) 12.0 - 15.9 g/dL Final   08/11/2024 9.1 (L) 12.0 - 15.9 g/dL Final   08/11/2024 9.1 (L) 12.0 - 15.9 g/dL Final   08/10/2024 8.8 (L) 12.0 - 15.9 g/dL Final   08/10/2024 8.6 (L) 12.0 - 15.9 g/dL Final   08/09/2024 8.5 (L) 12.0 - 15.9 g/dL Final   08/09/2024 8.5 (L) 12.0 - 15.9 g/dL Final   08/09/2024 8.7 (L) 12.0 - 15.9 g/dL Final   08/09/2024 9.5 (L) 12.0 - 15.9 g/dL Final   08/08/2024 8.2 (L) 12.0 - 15.9 g/dL Final   08/08/2024 8.4 (L) 12.0 - 15.9 g/dL Final   08/08/2024 8.3 (L) 12.0 - 15.9 g/dL Final   08/07/2024 7.9 (L) 12.0 - 15.9 g/dL Final   08/07/2024 7.2 (L) 12.0 - 15.9 g/dL Final   08/07/2024 7.0 (L) 12.0 - 15.9 g/dL Final   08/06/2024 7.4 (L) 12.0 - 15.9 g/dL Final   08/06/2024 7.7 (L) 12.0 - 15.9 g/dL Final   08/06/2024 6.3 (C) 12.0 - 15.9 g/dL Final   08/05/2024 7.4 (L) 12.0 - 15.9 g/dL Final   08/05/2024 7.3 (L) 12.0 - 15.9 g/dL Final   04/18/2024 11.6 11.5 - 15.5 Gram/dL Final   07/07/2023 13.7 12.0 - 15.9 g/dL Final   03/14/2023 15.0 12.0 - 16.0 g/dL Final   05/12/2022 12.1 12.0 - 16.0 g/dL Final   05/11/2022 12.8 12.0 - 16.0 g/dL Final   05/11/2022 13.6 12.0 - 16.0 g/dL Final   05/11/2022 14.2 12.0 - 16.0 g/dL Final   01/26/2022 12.7 12.0 - 16.0 g/dL Final   09/25/2020 15.3 12.0 - 16.0 g/dL Final   08/03/2020 15.8 12.0 - 16.0 g/dL Final   06/07/2020 16.7 (H) 12.0 - 16.0 g/dL Final   06/02/2020 13.3 12.0 - 16.0 g/dL Final   06/01/2020 14.9 12.0 - 16.0 g/dL Final   05/12/2020 15.1 12.0 - 16.0 g/dL Final   01/09/2020 15.3 12.0 - 16.0 g/dL Final   11/02/2019 14.8 12.0 - 16.0 g/dL Final   10/24/2019 15.4 12.0 - 16.0 g/dL Final   02/07/2019 13.9 11.9 - 15.5 g/dL Final   11/19/2018 10.5 (L) 11.9 - 15.5 g/dL Final   11/18/2018 10.7 (L) 11.9 - 15.5 g/dL Final   11/13/2018 12.5 11.9 - 15.5 g/dL Final   11/12/2018 12.8 11.9 - 15.5 g/dL Final   09/05/2018 13.9 12.0 - 17.0 g/dL Final   02/27/2018 15.8 12.0 - 16.0 g/dL Final   02/10/2017 13.4 12.0 - 15.0 g/dL Final   01/31/2017 (H) 12.0 -  15.0 g/dL Final    16.9 QA FLAGS AND/OR RANGES MODIFIED BY DEMOGRAPHIC UPDATE ON 02/09 AT 1435      Platelets   Date Value Ref Range Status   03/27/2025 152 140 - 450 10*3/mm3 Final   03/08/2025 50 (L) 140 - 450 10*3/mm3 Final   03/07/2025 57 (L) 140 - 450 10*3/mm3 Final   03/06/2025 81 (L) 140 - 450 10*3/mm3 Final   09/25/2024 156 140 - 450 10*3/mm3 Final   08/16/2024 221 140 - 450 10*3/mm3 Final   08/15/2024 196 140 - 450 10*3/mm3 Final   08/14/2024 290 140 - 450 10*3/mm3 Final   08/12/2024 170 140 - 450 10*3/mm3 Final   08/12/2024 168 140 - 450 10*3/mm3 Final   08/12/2024 146 140 - 450 10*3/mm3 Final   08/11/2024 122 (L) 140 - 450 10*3/mm3 Final   08/11/2024 98 (L) 140 - 450 10*3/mm3 Final   08/11/2024 100 (L) 140 - 450 10*3/mm3 Final   08/11/2024 91 (L) 140 - 450 10*3/mm3 Final   08/10/2024 68 (L) 140 - 450 10*3/mm3 Final   08/10/2024 71 (L) 140 - 450 10*3/mm3 Final   08/09/2024 65 (L) 140 - 450 10*3/mm3 Final   08/09/2024 66 (L) 140 - 450 10*3/mm3 Final   08/09/2024 76 (L) 140 - 450 10*3/mm3 Final   08/09/2024 71 (L) 140 - 450 10*3/mm3 Final   08/08/2024 81 (L) 140 - 450 10*3/mm3 Final   08/08/2024 71 (L) 140 - 450 10*3/mm3 Final   08/08/2024 76 (L) 140 - 450 10*3/mm3 Final   08/07/2024 63 (L) 140 - 450 10*3/mm3 Final   08/07/2024 75 (L) 140 - 450 10*3/mm3 Final   08/07/2024 75 (L) 140 - 450 10*3/mm3 Final   08/06/2024 68 (L) 140 - 450 10*3/mm3 Final   08/06/2024 119 (L) 140 - 450 10*3/mm3 Final   08/06/2024 75 (L) 140 - 450 10*3/mm3 Final   08/05/2024 103 (L) 140 - 450 10*3/mm3 Final   08/05/2024 192 140 - 450 10*3/mm3 Final   07/07/2023 189 140 - 450 10*3/mm3 Final   03/14/2023 321 140 - 440 10*3/uL Final   05/12/2022 133 (L) 140 - 440 10*3/uL Final   05/11/2022 225 140 - 440 10*3/uL Final   05/11/2022 303 140 - 440 10*3/uL Final   01/26/2022 208 140 - 440 10*3/uL Final   09/25/2020 298 140 - 440 10*3/uL Final   08/03/2020 388 140 - 440 10*3/uL Final   06/07/2020 368 140 - 440 10*3/uL Final   06/02/2020 265  140 - 440 10*3/uL Final   06/01/2020 262 140 - 440 10*3/uL Final   05/12/2020 313 140 - 440 10*3/uL Final   01/09/2020 229 140 - 440 10*3/uL Final   11/02/2019 313 140 - 440 10*3/uL Final   10/24/2019 342 140 - 440 10*3/uL Final   02/07/2019 294 140 - 500 10*3/mm3 Final   11/19/2018 273 140 - 500 10*3/mm3 Final   11/18/2018 291 140 - 500 10*3/mm3 Final   11/13/2018 357 140 - 500 10*3/mm3 Final   11/12/2018 332 140 - 500 10*3/mm3 Final   02/27/2018 356 140 - 440 10*3/uL Final   02/10/2017 244 150 - 450 10*3/uL Final   01/31/2017 313 150 - 450 10*3/uL Final      External Platelets   Date Value Ref Range Status   04/18/2024 157 140 - 400 x10(3)/uL Final       Assessment  *Iron deficiency anemia  *CKD  Labs were concerning for iron deficiency anemia-hemoglobin 9, iron saturation 6, ferritin 58.  She was given IV Feraheme on 9/13/2024.  Repeat iron profile on 9/17/2024 revealed iron saturation 61%, ferritin 298.   9/25/2024: Hemoglobin 8.5, MCV 88.2.  Iron saturation 8, ferritin 194, reticulocyte hemoglobin 24.7.      October 2024: Status post ferumoxytol  3/27/2025: WBC 11.43, hemoglobin 9.9, MCV 93.1, platelets 152,000.  Reticulocyte hemoglobin 28.8.  Iron saturation 5%, ferritin 27.7    *Elevated CA 19-9, likely in the setting of acute pancreatitis  CA 19-9 elevated to 19.9 in March 2025.  MRI MRCP showed changes of mild acute pancreatitis.    *History of GI bleed, August 2024  *Alcoholic cirrhosis  *EtOH abuse  Follows with GI    *Left lateral tongue lesion, since at least 2 months  *Tobacco abuse  November 2024 status post wide local excision left side tongue-benign tongue with benign squamous mucosa with ulceration and reactive changes.    *Easy bruising  September 2024: Coags unremarkable    Plan  Status post IV iron in October 2024  Will plan for additional IV iron transfusions  I have reached out to Trudy BARTON via secure chat to consider scopes for evaluation of iron deficiencies  GI will also be  repeating CA 19-9 to ensure resolution.  NP visit in 3 months with iron labs and CBC

## 2025-03-27 ENCOUNTER — LAB (OUTPATIENT)
Dept: LAB | Facility: HOSPITAL | Age: 65
End: 2025-03-27
Payer: MEDICARE

## 2025-03-27 ENCOUNTER — OFFICE VISIT (OUTPATIENT)
Dept: ONCOLOGY | Facility: CLINIC | Age: 65
End: 2025-03-27
Payer: MEDICARE

## 2025-03-27 ENCOUNTER — TELEPHONE (OUTPATIENT)
Dept: ONCOLOGY | Facility: CLINIC | Age: 65
End: 2025-03-27
Payer: MEDICARE

## 2025-03-27 VITALS
TEMPERATURE: 98.3 F | RESPIRATION RATE: 16 BRPM | OXYGEN SATURATION: 100 % | BODY MASS INDEX: 31.79 KG/M2 | SYSTOLIC BLOOD PRESSURE: 152 MMHG | HEIGHT: 60 IN | WEIGHT: 161.9 LBS | DIASTOLIC BLOOD PRESSURE: 81 MMHG | HEART RATE: 83 BPM

## 2025-03-27 DIAGNOSIS — D64.9 ANEMIA, UNSPECIFIED TYPE: ICD-10-CM

## 2025-03-27 DIAGNOSIS — D50.8 OTHER IRON DEFICIENCY ANEMIAS: Primary | ICD-10-CM

## 2025-03-27 DIAGNOSIS — R23.3 EASY BRUISING: ICD-10-CM

## 2025-03-27 LAB
APTT PPP: 34.6 SECONDS (ref 22.7–35.4)
BASOPHILS # BLD AUTO: 0.08 10*3/MM3 (ref 0–0.2)
BASOPHILS NFR BLD AUTO: 0.7 % (ref 0–1.5)
DEPRECATED RDW RBC AUTO: 54.5 FL (ref 37–54)
EOSINOPHIL # BLD AUTO: 0.18 10*3/MM3 (ref 0–0.4)
EOSINOPHIL NFR BLD AUTO: 1.6 % (ref 0.3–6.2)
ERYTHROCYTE [DISTWIDTH] IN BLOOD BY AUTOMATED COUNT: 16.3 % (ref 12.3–15.4)
FERRITIN SERPL-MCNC: 27.7 NG/ML (ref 13–150)
HCT VFR BLD AUTO: 32.4 % (ref 34–46.6)
HGB BLD-MCNC: 9.9 G/DL (ref 12–15.9)
HGB RETIC QN AUTO: 28.8 PG (ref 29.8–36.1)
IMM GRANULOCYTES # BLD AUTO: 0.04 10*3/MM3 (ref 0–0.05)
IMM GRANULOCYTES NFR BLD AUTO: 0.3 % (ref 0–0.5)
IMM RETICS NFR: 26.8 % (ref 3–15.8)
INR PPP: 1.53 (ref 0.9–1.1)
IRON 24H UR-MRATE: 21 MCG/DL (ref 37–145)
IRON SATN MFR SERPL: 5 % (ref 20–50)
LYMPHOCYTES # BLD AUTO: 1.66 10*3/MM3 (ref 0.7–3.1)
LYMPHOCYTES NFR BLD AUTO: 14.5 % (ref 19.6–45.3)
MCH RBC QN AUTO: 28.4 PG (ref 26.6–33)
MCHC RBC AUTO-ENTMCNC: 30.6 G/DL (ref 31.5–35.7)
MCV RBC AUTO: 93.1 FL (ref 79–97)
MONOCYTES # BLD AUTO: 1.18 10*3/MM3 (ref 0.1–0.9)
MONOCYTES NFR BLD AUTO: 10.3 % (ref 5–12)
NEUTROPHILS NFR BLD AUTO: 72.6 % (ref 42.7–76)
NEUTROPHILS NFR BLD AUTO: 8.29 10*3/MM3 (ref 1.7–7)
NRBC BLD AUTO-RTO: 0 /100 WBC (ref 0–0.2)
PLATELET # BLD AUTO: 152 10*3/MM3 (ref 140–450)
PMV BLD AUTO: 10.7 FL (ref 6–12)
PROTHROMBIN TIME: 18.4 SECONDS (ref 11.7–14.2)
RBC # BLD AUTO: 3.48 10*6/MM3 (ref 3.77–5.28)
RETICS # AUTO: 0.09 10*6/MM3 (ref 0.02–0.13)
RETICS/RBC NFR AUTO: 2.59 % (ref 0.7–1.9)
TIBC SERPL-MCNC: 435 MCG/DL (ref 298–536)
TRANSFERRIN SERPL-MCNC: 292 MG/DL (ref 200–360)
WBC NRBC COR # BLD AUTO: 11.43 10*3/MM3 (ref 3.4–10.8)

## 2025-03-27 PROCEDURE — 83540 ASSAY OF IRON: CPT

## 2025-03-27 PROCEDURE — 85610 PROTHROMBIN TIME: CPT | Performed by: STUDENT IN AN ORGANIZED HEALTH CARE EDUCATION/TRAINING PROGRAM

## 2025-03-27 PROCEDURE — 84466 ASSAY OF TRANSFERRIN: CPT

## 2025-03-27 PROCEDURE — 85025 COMPLETE CBC W/AUTO DIFF WBC: CPT

## 2025-03-27 PROCEDURE — 36415 COLL VENOUS BLD VENIPUNCTURE: CPT

## 2025-03-27 PROCEDURE — 85730 THROMBOPLASTIN TIME PARTIAL: CPT | Performed by: STUDENT IN AN ORGANIZED HEALTH CARE EDUCATION/TRAINING PROGRAM

## 2025-03-27 PROCEDURE — 85046 RETICYTE/HGB CONCENTRATE: CPT

## 2025-03-27 PROCEDURE — 82728 ASSAY OF FERRITIN: CPT

## 2025-03-27 NOTE — TELEPHONE ENCOUNTER
Attempted to reach Trang by phone. No answer. Left message to return call to my direct number [phone]

## 2025-03-28 ENCOUNTER — TELEPHONE (OUTPATIENT)
Dept: ONCOLOGY | Facility: CLINIC | Age: 65
End: 2025-03-28
Payer: MEDICARE

## 2025-03-28 PROBLEM — T45.4X5A ADVERSE EFFECT OF IRON: Status: ACTIVE | Noted: 2025-03-28

## 2025-03-28 NOTE — TELEPHONE ENCOUNTER
Received call back from Ms. Noe.  Let her know her labs show that she is low on iron again, and Dr. Asencio wants her to have IV iron again.  Let her know the Feraheme will be requested again, and scheduling will be calling to set up IV iron infusions, and RN wanted to be sure and let her know that before they call her.  Patient verbalized understanding and appreciation for the call.

## 2025-03-28 NOTE — TELEPHONE ENCOUNTER
----- Message from Hillary VERGARA sent at 3/28/2025 10:33 AM EDT -----  Marisol, I called but I didn't reach this patient yesterday to update her  ----- Message -----  From: Alina Asencio MD  Sent: 3/27/2025  11:47 AM EDT  To: Hillary Scott RN; Keturah Romero, RegS#    Iron labs just resulted-plan for additional IV iron.Hillary-could you please put in the plan for IV iron, and pleasew let the patient know??  She had last received it in October 2024Dee -she would need to be scheduled for iron in 2 weeksTY

## 2025-04-08 ENCOUNTER — TELEPHONE (OUTPATIENT)
Dept: GASTROENTEROLOGY | Facility: CLINIC | Age: 65
End: 2025-04-08
Payer: MEDICARE

## 2025-04-08 ENCOUNTER — OFFICE VISIT (OUTPATIENT)
Dept: GASTROENTEROLOGY | Facility: CLINIC | Age: 65
End: 2025-04-08
Payer: MEDICARE

## 2025-04-08 VITALS
HEIGHT: 60 IN | HEART RATE: 77 BPM | BODY MASS INDEX: 31.16 KG/M2 | SYSTOLIC BLOOD PRESSURE: 147 MMHG | TEMPERATURE: 96.2 F | DIASTOLIC BLOOD PRESSURE: 94 MMHG | WEIGHT: 158.7 LBS

## 2025-04-08 DIAGNOSIS — R97.8 ELEVATED CA 19-9 LEVEL: ICD-10-CM

## 2025-04-08 DIAGNOSIS — K74.60 CIRRHOSIS OF LIVER WITHOUT ASCITES, UNSPECIFIED HEPATIC CIRRHOSIS TYPE: ICD-10-CM

## 2025-04-08 DIAGNOSIS — D50.9 IRON DEFICIENCY ANEMIA, UNSPECIFIED IRON DEFICIENCY ANEMIA TYPE: Primary | ICD-10-CM

## 2025-04-08 PROCEDURE — 99214 OFFICE O/P EST MOD 30 MIN: CPT

## 2025-04-08 PROCEDURE — 1159F MED LIST DOCD IN RCRD: CPT

## 2025-04-08 PROCEDURE — 1160F RVW MEDS BY RX/DR IN RCRD: CPT

## 2025-04-08 PROCEDURE — 3077F SYST BP >= 140 MM HG: CPT

## 2025-04-08 PROCEDURE — 3080F DIAST BP >= 90 MM HG: CPT

## 2025-04-08 NOTE — TELEPHONE ENCOUNTER
Advised that [facility] will call with final arrival time minimum 24 hrs before procedure. IF they do not get a phone call, arrival time will stay the same as given on instructions OK FOR THE HUB TO RELAY   MD HERNANDEZ  05/01  COLON AND EGD   2 DAY PREP

## 2025-04-08 NOTE — Clinical Note
This is a 64-year-old female who presents for follow-up after recent hospital admission for alcoholic pancreatitis.  She does have a history of cirrhosis as well.  Since being discharged she has been doing really well-no further abdominal pain.  No swelling of belly or lower extremities.  She did have elevated CA 19-9 so I have rechecked this.  She is also found to have worsening iron deficiency anemia-Dr. Asencio reached out to me regarding consideration of repeat EGD and colonoscopy.  She had a colonoscopy April 2024 with very tortuous sigmoid colon and a lot of retained stool so I recommended 2-day prep for better evaluation and added EGD on as well.  Wanted to give you heads up on the history of tortuous colon.

## 2025-04-08 NOTE — PROGRESS NOTES
"Chief Complaint  Cirrhosis    Subjective          History of Present Illness    Filomena Liu is a  64 y.o. female presents for follow-up after recent hospital admission.  She will be following with Dr. Guerrero.  She is new to me.  She has a history of alcohol induced cirrhosis, COPD, hypertension, hyperlipidemia, sleep apnea who was admitted for abdominal pain.  CT abdomen pelvis completed 3/6/2025 showed nodular liver reflecting cirrhosis, patchy peripancreatic fat stranding questionable pancreatitis, 1 cm x 0.7 cm ovoid mass in posterior head of the pancreas, mild sigmoid wall thickening suggestive of uncomplicated diverticulitis, mild biliary dilation, calcified atherosclerotic disease of abdominal aorta.    MRI subsequently completed showing no choledocholithiasis or pancreatic divisum, mild acute pancreatitis.    Labs completed with Dr. Asencio on 3/27/2025 showed low iron and iron saturation and low normal ferritin.  CA 19-9 which was completed during hospitalization was elevated.  aFP completed 3/8 was within normal limits.    ------------------------------------    She reports overall she has been doing well since being discharged from the hospital.  She denies swelling of her abdomen or her lower extremities.  She states she does follow with a nephrologist and is currently on Aldactone 25 mg daily and torsemide 20 mg daily.  No further abdominal pain.  She has been tolerating diet well.  No unintentional weight loss.  No black or bloody stool.    EGD 8/15/2024 unremarkable    Colonoscopy 4/15/2024 -very tortuous sigmoid colon with extensive diverticulosis.  Sigmoid TA polyp resected.  A lot of retained stool throughout the sigmoid colon.  Barium enema recommended post procedure but not completed.         Objective   Vital Signs:   /94   Pulse 77   Temp 96.2 °F (35.7 °C)   Ht 152.4 cm (60\")   Wt 72 kg (158 lb 11.2 oz)   BMI 30.99 kg/m²       Physical Exam  Constitutional:       General: She is " not in acute distress.     Appearance: Normal appearance.   Eyes:      General: No scleral icterus.  Pulmonary:      Effort: Pulmonary effort is normal.   Abdominal:      General: Abdomen is flat. There is no distension.      Tenderness: There is no abdominal tenderness. There is no guarding.   Skin:     Coloration: Skin is not jaundiced.   Neurological:      General: No focal deficit present.      Mental Status: She is alert and oriented to person, place, and time.   Psychiatric:         Mood and Affect: Mood normal.         Behavior: Behavior normal.          Result Review :   The following data was reviewed by: Trudy Lamas PA-C on 04/08/2025:  CMP          3/6/2025    08:57 3/7/2025    05:52 3/8/2025    05:48   CMP   Glucose 46  113  146    BUN 54  43  22    Creatinine 1.58  1.74  1.46    EGFR 36.4  32.4  40.0    Sodium 136  142  141    Potassium 4.4  3.9  3.6    Chloride 90  100  104    Calcium 8.8  8.5  8.6    Total Protein 6.6  5.9  5.8    Albumin 3.6  2.9  3.2    Globulin 3.0  3.0  2.6    Total Bilirubin 4.1  3.0  2.6    Alkaline Phosphatase 133  119  119    AST (SGOT) 69  91  87    ALT (SGPT) 24  22  24    Albumin/Globulin Ratio 1.2  1.0  1.2    BUN/Creatinine Ratio 34.2  24.7  15.1    Anion Gap 20.2  13.4  12.0      CBC          3/7/2025    05:52 3/8/2025    05:48 3/27/2025    10:42   CBC   WBC 5.11  3.91  11.43    RBC 3.21  3.09  3.48    Hemoglobin 9.4  9.5  9.9    Hematocrit 29.6  29.4  32.4    MCV 92.2  95.1  93.1    MCH 29.3  30.7  28.4    MCHC 31.8  32.3  30.6    RDW 14.1  14.3  16.3    Platelets 57  50  152              Assessment:   Diagnoses and all orders for this visit:    1. Iron deficiency anemia, unspecified iron deficiency anemia type (Primary)  -     Case Request; Standing  -     Case Request    2. Cirrhosis of liver without ascites, unspecified hepatic cirrhosis type  -     Case Request; Standing  -     Case Request  -     US Liver; Future  -     CBC (No Diff); Future  -      Comprehensive Metabolic Panel; Future  -     AFP Tumor Marker; Future  -     Protime-INR; Future    3. Elevated CA 19-9 level  -     Cancer Antigen 19-9    Other orders  -     Implement Anesthesia orders day of procedure.; Standing  -     Follow Anesthesia Guidelines / Protocol; Future  -     Verify bowel prep was successful; Standing  -     Give tap water enema if bowel prep was insufficient; Standing          Plan:   -She does have iron deficiency anemia.  Discussed EGD and colonoscopy and she is agreeable with proceeding with both of these for further evaluation.  She did just have these procedures last year but anemia has worsened since then and she was not adequately prepped for colonoscopy.  We did discuss 2-day prep.  -Needs repeat labs (CBC, PT/INR, CMP, AFP) in September.  -Due for repeat ultrasound in September  -Repeat CA 19-9.  If elevated would repeat MRI pancreas protocol  -Encouraged continued abstinence from alcohol      Follow Up   Return for EGD/Colonoscopy .    Dragon dictation used throughout this note.         Trudy Lamas PA-C  [facility]  [address] [place] [address]  Office: [phone]

## 2025-04-09 ENCOUNTER — TRANSCRIBE ORDERS (OUTPATIENT)
Age: 65
End: 2025-04-09
Payer: MEDICARE

## 2025-04-09 DIAGNOSIS — I71.40 ABDOMINAL AORTIC ANEURYSM (AAA) WITHOUT RUPTURE, UNSPECIFIED PART: Primary | ICD-10-CM

## 2025-04-09 LAB — CANCER AG19-9 SERPL-ACNC: 97 U/ML (ref 0–35)

## 2025-04-10 ENCOUNTER — RESULTS FOLLOW-UP (OUTPATIENT)
Dept: GASTROENTEROLOGY | Facility: CLINIC | Age: 65
End: 2025-04-10
Payer: MEDICARE

## 2025-04-10 DIAGNOSIS — R97.8 ELEVATED CA 19-9 LEVEL: Primary | ICD-10-CM

## 2025-04-10 PROBLEM — I71.43 INFRARENAL ABDOMINAL AORTIC ANEURYSM (AAA) WITHOUT RUPTURE: Status: ACTIVE | Noted: 2025-04-10

## 2025-04-11 ENCOUNTER — TELEPHONE (OUTPATIENT)
Dept: ONCOLOGY | Facility: CLINIC | Age: 65
End: 2025-04-11
Payer: MEDICARE

## 2025-04-11 NOTE — TELEPHONE ENCOUNTER
Pt called to r/s apt for Feraheme today, she is not feeling well. She will keep apt on 04-18-25 for Feraheme and added a Feraheme apt for 04-25-25

## 2025-04-11 NOTE — TELEPHONE ENCOUNTER
Called pt and advised of Trudy RUVALCABA's note. Also number to [facility] scheduling provided so pt can arrange ct scan.  Verb understanding.

## 2025-04-11 NOTE — TELEPHONE ENCOUNTER
Call from Ms. Liu to let us know she is not feeling well and will not be coming in for Feraheme infusion today.  Patient states she will come for infusion next week, but will need this infusion rescheduled.  Let her know a message will be sent to scheduling to get infusion rescheduled.  Understanding verbalized.

## 2025-04-21 ENCOUNTER — INFUSION (OUTPATIENT)
Dept: ONCOLOGY | Facility: HOSPITAL | Age: 65
End: 2025-04-21
Payer: MEDICARE

## 2025-04-21 VITALS
TEMPERATURE: 98 F | SYSTOLIC BLOOD PRESSURE: 118 MMHG | DIASTOLIC BLOOD PRESSURE: 73 MMHG | HEART RATE: 79 BPM | WEIGHT: 155.2 LBS | RESPIRATION RATE: 16 BRPM | BODY MASS INDEX: 30.31 KG/M2 | OXYGEN SATURATION: 97 %

## 2025-04-21 DIAGNOSIS — T45.4X5A ADVERSE EFFECT OF IRON, INITIAL ENCOUNTER: ICD-10-CM

## 2025-04-21 DIAGNOSIS — D50.8 OTHER IRON DEFICIENCY ANEMIAS: Primary | ICD-10-CM

## 2025-04-21 PROCEDURE — 25010000002 FAMOTIDINE 10 MG/ML SOLUTION: Performed by: STUDENT IN AN ORGANIZED HEALTH CARE EDUCATION/TRAINING PROGRAM

## 2025-04-21 PROCEDURE — 96374 THER/PROPH/DIAG INJ IV PUSH: CPT

## 2025-04-21 PROCEDURE — 96375 TX/PRO/DX INJ NEW DRUG ADDON: CPT

## 2025-04-21 PROCEDURE — 25010000002 FERUMOXYTOL 510 MG/17ML SOLUTION 17 ML VIAL: Performed by: STUDENT IN AN ORGANIZED HEALTH CARE EDUCATION/TRAINING PROGRAM

## 2025-04-21 RX ORDER — HYDROCORTISONE SODIUM SUCCINATE 100 MG/2ML
100 INJECTION INTRAMUSCULAR; INTRAVENOUS AS NEEDED
Status: CANCELLED | OUTPATIENT
Start: 2025-04-21

## 2025-04-21 RX ORDER — CETIRIZINE HYDROCHLORIDE 10 MG/1
10 TABLET ORAL ONCE
Status: COMPLETED | OUTPATIENT
Start: 2025-04-21 | End: 2025-04-21

## 2025-04-21 RX ORDER — FAMOTIDINE 10 MG/ML
20 INJECTION, SOLUTION INTRAVENOUS ONCE
Status: COMPLETED | OUTPATIENT
Start: 2025-04-21 | End: 2025-04-21

## 2025-04-21 RX ORDER — ACETAMINOPHEN 325 MG/1
650 TABLET ORAL ONCE
Status: COMPLETED | OUTPATIENT
Start: 2025-04-21 | End: 2025-04-21

## 2025-04-21 RX ORDER — SODIUM CHLORIDE 9 MG/ML
20 INJECTION, SOLUTION INTRAVENOUS ONCE
Status: DISCONTINUED | OUTPATIENT
Start: 2025-04-21 | End: 2025-04-21 | Stop reason: HOSPADM

## 2025-04-21 RX ORDER — FAMOTIDINE 10 MG/ML
20 INJECTION, SOLUTION INTRAVENOUS AS NEEDED
Status: CANCELLED | OUTPATIENT
Start: 2025-04-21

## 2025-04-21 RX ORDER — DIPHENHYDRAMINE HYDROCHLORIDE 50 MG/ML
50 INJECTION, SOLUTION INTRAMUSCULAR; INTRAVENOUS AS NEEDED
Status: CANCELLED | OUTPATIENT
Start: 2025-04-21

## 2025-04-21 RX ADMIN — FERUMOXYTOL 510 MG: 510 INJECTION INTRAVENOUS at 11:51

## 2025-04-21 RX ADMIN — CETIRIZINE HYDROCHLORIDE 10 MG: 10 TABLET, FILM COATED ORAL at 11:31

## 2025-04-21 RX ADMIN — FAMOTIDINE 20 MG: 10 INJECTION INTRAVENOUS at 11:31

## 2025-04-21 RX ADMIN — ACETAMINOPHEN 650 MG: 325 TABLET ORAL at 11:31

## 2025-04-24 NOTE — PROGRESS NOTES
"Chief Complaint  Aortic Aneurysm    Subjective      HPI: Filomena Liu is a 64 y.o. female seen as a new patient for evaluation of abdominal aortic aneurysm, discovered incidentally after presenting to emergency room with abdominal pain, nausea and vomiting March, 2025.  Recovering alcoholic, in remission.  Has problems with alcoholic cirrhosis and chronic pancreatitis.  Has intermittent nausea and vomiting.  Did not know she had an aneurysm, but did undergo CT scan in 2023, demonstrating aneurysm.    Objective   Vital Signs:  /90   Pulse 105   Ht 152.4 cm (60\")   Wt 70.3 kg (155 lb)   BMI 30.27 kg/m²   Estimated body mass index is 30.27 kg/m² as calculated from the following:    Height as of this encounter: 152.4 cm (60\").    Weight as of this encounter: 70.3 kg (155 lb).   BMI is >= 30 and <35. (Class 1 Obesity). The following options were offered after discussion;: Information on healthy weight added to patient's after visit summary.   Filomena Liu  reports that she has been smoking cigarettes. She started smoking about 45 years ago. She has a 13.4 pack-year smoking history. She has been exposed to tobacco smoke. She has never used smokeless tobacco.. Tobacco Education/Cessation: Filomena Liu  reports that she has been smoking cigarettes. She started smoking about 45 years ago. She has a 13.4 pack-year smoking history. She has been exposed to tobacco smoke. She has never used smokeless tobacco. I have educated her on the risk of diseases from using tobacco products such as arterial disease. I advised her to quit and she is not willing to quit.      Exam: BMI 31.  No AAA palpated.     Personal review of data: Images of contrast CT scan of abdomen and pelvis 3/6/2025.  Report of CT abdomen 2023 AAA 3.5 cm.       Assessment and Plan     Diagnoses and all orders for this visit:    1. Infrarenal abdominal aortic aneurysm (AAA) without rupture (Primary)  -     Duplex Aorta IVC Iliac Graft " Complete CAR; Future    Summary: 64-year-old woman referred for initial evaluation of intact fusiform infrarenal abdominal aortic aneurysm 3.5 cm, discovered incidentally after presenting to emergency room with abdominal pain nausea and vomiting in March, 2025.  Smoker, alcoholic cirrhosis, no statin for medical reason associated with hepatic cirrhosis.  At 3.5 cm, her aneurysm is at low risk of rupture.  Does not have palpable popliteal artery aneurysms and has easily palpable pedal artery pulses.  Typically would recommend statin, but relatively contraindicated in face of hepatic cirrhosis.  Recommend abdominal aortic sonogram 1 year.  Questions answered.    Follow Up     Return in about 1 year (around 4/25/2026) for Follow-up after test.  Patient was given instructions and counseling regarding her condition or for health maintenance advice. Please see specific information pulled into the AVS if appropriate.

## 2025-04-25 ENCOUNTER — OFFICE VISIT (OUTPATIENT)
Age: 65
End: 2025-04-25
Payer: MEDICARE

## 2025-04-25 VITALS
SYSTOLIC BLOOD PRESSURE: 150 MMHG | DIASTOLIC BLOOD PRESSURE: 90 MMHG | WEIGHT: 155 LBS | HEART RATE: 105 BPM | BODY MASS INDEX: 30.43 KG/M2 | HEIGHT: 60 IN

## 2025-04-25 DIAGNOSIS — I71.43 INFRARENAL ABDOMINAL AORTIC ANEURYSM (AAA) WITHOUT RUPTURE: Primary | ICD-10-CM

## 2025-04-28 ENCOUNTER — TELEPHONE (OUTPATIENT)
Dept: ONCOLOGY | Facility: CLINIC | Age: 65
End: 2025-04-28
Payer: MEDICARE

## 2025-04-28 NOTE — TELEPHONE ENCOUNTER
"  Caller: Filomena Liu \"DE\"    Relationship: Self    Best call back number: [phone]     What is the best time to reach you: ANYTIME    Who are you requesting to speak with (clinical staff, provider,  specific staff member): CLINICAL    What was the call regarding: PATIENT JUST WANTED TO VERIFY THE LENGTH OF TIME BETWEEN INFUSIONS. SHE HAS HAD TO R/S ONCE. PLEASE CALL PATIENT TO VERIFY.     "

## 2025-04-29 NOTE — TELEPHONE ENCOUNTER
Returned call to patient. Informed her that iron infusions are typically given one week apart, but there is no harm in having a longer interval between doses, especially since she has a procedure coming up. Reviewed date and time of patient's next Feraheme infusion. Patient v/u.

## 2025-04-30 ENCOUNTER — TELEPHONE (OUTPATIENT)
Dept: GASTROENTEROLOGY | Facility: CLINIC | Age: 65
End: 2025-04-30
Payer: MEDICARE

## 2025-04-30 NOTE — TELEPHONE ENCOUNTER
"        Hub staff attempted to follow warm transfer process and was unsuccessful     Caller: Filomena Liu \"DE\"    Relationship to patient: Self    Best call back number: [phone]      Patient is needing: PT STATED [facility] CALLED AND SAID SHE COULD NOT HAVE THE SURGERY TOMORROW FOR BEING HIGH RISK, SHE HAS ALREADY STARTED THE PREP AND WANTS TO STILL HAVE IT DONE. PLS CALL PT TO DISCUSS.         "

## 2025-04-30 NOTE — TELEPHONE ENCOUNTER
"Hub staff attempted to follow warm transfer process and was unsuccessful     Caller: Filomena Liu \"DE\"    Relationship to patient: Self    Best call back number: [phone]    Patient is needing: PT WAS CALLING BACK TO SPEAK TO OLIVER. SHE'S UPSET THAT HER PROCEDURE WAS MOVED BECAUSE SHE'S ALREADY DONE HER PREP AND PAID [facility]. UNABLE TO WT, PLEASE CALL BACK.      "

## 2025-04-30 NOTE — TELEPHONE ENCOUNTER
DR DOSHI IS CANCELLING MRS. HOUSTON BLANCAS’S PROCEDURE FOR TOMORROW 5/1/25. SHE IS HOSPTIAL ONLY DUE TO HER COMPLEX MEDICAL HISTORY. (EXTENSIVE RECORDS ARE AVAILABLE ON REQUEST  BUT ARE ALL IN [facility])         ALCOHOLIC CIRRHOSIS- WHISKEY 1 PINT PER DAY  ELEVATED INR OF 1.5 DUE TO POOR LIVER FUNCTION  AAA WITH TORTOUS AORTA  CHRONIC DYSPNEA, COPD, STILL SMOKING  CHRONIC PANCREATITIS  CKD 3  BLE EDEMA R/T VOLUME OVERLOAD- TREATED WITH DIURETICS  ABNORMAL ECHO- NEVER SEEN BY CARDIOLOGY       **RECENTLY ADMITTED TO HOSPITAL 3/2025 FOR ACUTE PANCREATITIS, LACTIC ACIDOSIS, ACUTE KIDNEY INJURY, CHEST PAIN, ETOH WITHDRAWAL         The patient is aware the procedure needs to be moved to the hospital. She said she was doing a 2-day prep prior to the procedure since she has a history of not being cleaned out. She wanted to know if Dr. Guerrero could do her procedure at the hospital tomorrow or Friday.

## 2025-05-01 ENCOUNTER — OUTSIDE FACILITY SERVICE (OUTPATIENT)
Dept: GASTROENTEROLOGY | Facility: CLINIC | Age: 65
End: 2025-05-01
Payer: MEDICARE

## 2025-05-01 ENCOUNTER — TELEPHONE (OUTPATIENT)
Dept: ONCOLOGY | Facility: CLINIC | Age: 65
End: 2025-05-01
Payer: MEDICARE

## 2025-05-01 ENCOUNTER — PATIENT ROUNDING (BHMG ONLY) (OUTPATIENT)
Age: 65
End: 2025-05-01
Payer: MEDICARE

## 2025-05-01 NOTE — TELEPHONE ENCOUNTER
"  Caller: Filomena Liu \"DE\"    Relationship to patient: Self    Best call back number: [phone]    Chief complaint: PATIENT CALLED TO RESCHEDULE     Type of visit: INFUSION     Requested date: 5-12-25, 5-14-25 OR 5-15-25     If rescheduling, when is the original appointment: 5-7-25     Additional notes:LEAVE MESSAGE IF NO ANSWER         "

## 2025-05-06 RX ORDER — HYDROCODONE BITARTRATE AND ACETAMINOPHEN 10; 325 MG/1; MG/1
1 TABLET ORAL EVERY 6 HOURS
COMMUNITY
Start: 2025-04-24

## 2025-05-07 ENCOUNTER — ANESTHESIA EVENT (OUTPATIENT)
Dept: GASTROENTEROLOGY | Facility: HOSPITAL | Age: 65
End: 2025-05-07
Payer: MEDICARE

## 2025-05-07 ENCOUNTER — HOSPITAL ENCOUNTER (OUTPATIENT)
Facility: HOSPITAL | Age: 65
Setting detail: HOSPITAL OUTPATIENT SURGERY
Discharge: HOME | End: 2025-05-07
Attending: INTERNAL MEDICINE | Admitting: INTERNAL MEDICINE
Payer: MEDICARE

## 2025-05-07 ENCOUNTER — TELEPHONE (OUTPATIENT)
Dept: ONCOLOGY | Facility: CLINIC | Age: 65
End: 2025-05-07
Payer: MEDICARE

## 2025-05-07 ENCOUNTER — ANESTHESIA (OUTPATIENT)
Dept: GASTROENTEROLOGY | Facility: HOSPITAL | Age: 65
End: 2025-05-07
Payer: MEDICARE

## 2025-05-07 VITALS
OXYGEN SATURATION: 100 % | DIASTOLIC BLOOD PRESSURE: 90 MMHG | RESPIRATION RATE: 18 BRPM | SYSTOLIC BLOOD PRESSURE: 128 MMHG | HEART RATE: 74 BPM

## 2025-05-07 DIAGNOSIS — D50.9 IRON DEFICIENCY ANEMIA, UNSPECIFIED IRON DEFICIENCY ANEMIA TYPE: ICD-10-CM

## 2025-05-07 DIAGNOSIS — K74.60 CIRRHOSIS OF LIVER WITHOUT ASCITES, UNSPECIFIED HEPATIC CIRRHOSIS TYPE: ICD-10-CM

## 2025-05-07 PROCEDURE — 25010000002 PROPOFOL 10 MG/ML EMULSION

## 2025-05-07 PROCEDURE — 25810000003 SODIUM CHLORIDE 0.9 % SOLUTION: Performed by: INTERNAL MEDICINE

## 2025-05-07 PROCEDURE — 43235 EGD DIAGNOSTIC BRUSH WASH: CPT | Performed by: INTERNAL MEDICINE

## 2025-05-07 PROCEDURE — S0260 H&P FOR SURGERY: HCPCS | Performed by: INTERNAL MEDICINE

## 2025-05-07 PROCEDURE — 45385 COLONOSCOPY W/LESION REMOVAL: CPT | Performed by: INTERNAL MEDICINE

## 2025-05-07 PROCEDURE — 25010000002 LIDOCAINE 2% SOLUTION

## 2025-05-07 PROCEDURE — 88305 TISSUE EXAM BY PATHOLOGIST: CPT | Performed by: INTERNAL MEDICINE

## 2025-05-07 RX ORDER — LIDOCAINE HYDROCHLORIDE 20 MG/ML
INJECTION, SOLUTION INFILTRATION; PERINEURAL AS NEEDED
Status: DISCONTINUED | OUTPATIENT
Start: 2025-05-07 | End: 2025-05-07 | Stop reason: SURG

## 2025-05-07 RX ORDER — PROPOFOL 10 MG/ML
VIAL (ML) INTRAVENOUS AS NEEDED
Status: DISCONTINUED | OUTPATIENT
Start: 2025-05-07 | End: 2025-05-07 | Stop reason: SURG

## 2025-05-07 RX ORDER — SODIUM CHLORIDE 9 MG/ML
30 INJECTION, SOLUTION INTRAVENOUS CONTINUOUS PRN
Status: DISCONTINUED | OUTPATIENT
Start: 2025-05-07 | End: 2025-05-07 | Stop reason: HOSPADM

## 2025-05-07 RX ORDER — NYSTATIN 100000 [USP'U]/ML
500000 SUSPENSION ORAL 4 TIMES DAILY
Qty: 280 ML | Refills: 0 | Status: SHIPPED | OUTPATIENT
Start: 2025-05-07 | End: 2025-05-21

## 2025-05-07 RX ORDER — SODIUM CHLORIDE, SODIUM LACTATE, POTASSIUM CHLORIDE, CALCIUM CHLORIDE 600; 310; 30; 20 MG/100ML; MG/100ML; MG/100ML; MG/100ML
30 INJECTION, SOLUTION INTRAVENOUS CONTINUOUS PRN
Status: DISCONTINUED | OUTPATIENT
Start: 2025-05-07 | End: 2025-05-07

## 2025-05-07 RX ORDER — PANTOPRAZOLE SODIUM 40 MG/1
40 TABLET, DELAYED RELEASE ORAL 2 TIMES DAILY
Qty: 60 TABLET | Refills: 1 | Status: SHIPPED | OUTPATIENT
Start: 2025-05-07 | End: 2025-07-06

## 2025-05-07 RX ADMIN — LIDOCAINE HYDROCHLORIDE 70 MG: 20 INJECTION, SOLUTION INFILTRATION; PERINEURAL at 14:48

## 2025-05-07 RX ADMIN — PROPOFOL 140 MCG/KG/MIN: 10 INJECTION, EMULSION INTRAVENOUS at 14:49

## 2025-05-07 RX ADMIN — SODIUM CHLORIDE 30 ML/HR: 9 INJECTION, SOLUTION INTRAVENOUS at 13:43

## 2025-05-07 RX ADMIN — PROPOFOL 100 MG: 10 INJECTION, EMULSION INTRAVENOUS at 14:48

## 2025-05-07 NOTE — H&P
[facility], [place], operated by [facility]  Pre Procedure History & Physical    Chief Complaint:   Iron-deficiency anemia, cirrhosis    Subjective     HPI:   64-year-old female recently seen in the office after hospital stay in March-she has cirrhosis secondary to alcohol abuse.  She has a history of anemia-iron levels are low.  Indices are normocytic.    She had an EGD 8/15/2024-this was normal.  Colonoscopy 3/28/2024-poor prep, tortuous colon.  Tubular adenoma in the sigmoid colon.    Past Medical History:   Past Medical History:   Diagnosis Date    Alcoholism 1985    Anemia 08/01/24    Ankle pain     Ankle wound     LEFT    Anxiety     Arthritis of back 2000    Arthritis of neck 2000    Asthma 2015    Benign tumor of thymus     REMOVED    Bruises easily     Cataract 2016    Cholelithiasis 2022    Chronic kidney disease 8/5/24    Cirrhosis 8/5/24    Clotting disorder 8/5/24    COPD (chronic obstructive pulmonary disease)     CTS (carpal tunnel syndrome) Surgery 1999    DDD (degenerative disc disease), cervical     Depression     Diverticulitis of colon Unknown    Fatty liver 2022    Fracture of wrist 2017    GERD (gastroesophageal reflux disease)     GI (gastrointestinal bleed) 8/5/24    Hip arthrosis Unknown    History of anemia     History of MRSA infection     LEFT ANKLE TREAT [facility]  5YEARS GO    Hyperlipidemia ?    Hypertension ?    Knee sprain June 2023    Knee swelling Unknown    Neck strain 2000    Neuropathy     Pancreatitis 2024    Rotator cuff syndrome 2021    Scoliosis Unknown    Shoulder arthritis 06/05/2023    Shoulder pain, left 07/07/2023    Sleep apnea     NO MACHINE USE    Spinal stenosis     Spondylolisthesis of cervical region     Steatosis of liver     Tendinitis of knee 2022    Thoracic disc disorder 2000       Past Surgical History:  Past Surgical History:   Procedure Laterality Date    BACK SURGERY      cervical disc surgery with fusion-    CHOLECYSTECTOMY  1/1/20    COLONOSCOPY      COLONOSCOPY N/A  11/12/2020    Procedure: COLONOSCOPY, polypectomy;  Surgeon: Filomena Mckeon MD;  Location: [facility] OR;  Service: Gastroenterology;  Laterality: N/A;  Diverticulosis; Hepatic flexure polyp x 1; Polyp at 60cm x 1; Polyp at 50cm x 1- hot snare;    COLONOSCOPY N/A 04/15/2024    Procedure: COLONOSCOPY into sigmoid colon with hot snare polypectomy;  Surgeon: Leatha Johns MD;  Location: [facility] ENDOSCOPY;  Service: General;  Laterality: N/A;  pre- history of polyps  post- diverticulosis, polyp    ENDOSCOPY N/A 08/15/2024    Procedure: ESOPHAGOGASTRODUODENOSCOPY;  Surgeon: Garth Constantino MD;  Location: [facility] ENDOSCOPY;  Service: Gastroenterology;  Laterality: N/A;  PRE- CIRRHOSIS, DARK STOOL  POST- NORMAL    HAND SURGERY  2000    HYSTERECTOMY  1998    INCISION AND DRAINAGE LEG Left 11/17/2018    Procedure: Incision and Drainage of Left ankle;  Surgeon: Maxwell Lanier MD;  Location: [facility] OR;  Service: Orthopedics    JOINT REPLACEMENT  2023    NECK SURGERY  2000, 2005,2017    SHOULDER SURGERY  2023    SIGMOIDOSCOPY N/A 03/28/2024    Procedure: SIGMOIDOSCOPY FLEXIBLE;  Surgeon: Leatha Johns MD;  Location: [facility] ENDOSCOPY;  Service: General;  Laterality: N/A;  pre: h/o colon polyps  post: poor prep, diverticulosis    SKIN BIOPSY      SKIN GRAFT SPLIT THICKNESS N/A 02/12/2019    Procedure: debridement SKIN GRAFT SPLIT THICKNESS left ankle wound;  Surgeon: Barry Worthy MD;  Location: [facility] OR [facility];  Service: Plastics    TONGUE LESION EXCISION/BIOPSY N/A 11/26/2024    Procedure: WIDE LOCAL EXCISION OF TONGUE LESION;  Surgeon: El Mercedes MD;  Location: [facility] MAIN OR;  Service: ENT;  Laterality: N/A;    TOTAL SHOULDER ARTHROPLASTY Left 07/20/2023    Procedure: Total  shoulder arthroplasty;  Surgeon: Maxwell Lanier MD;  Location: [facility] OR [facility];  Service: Orthopedics;  Laterality: Left;    TOTAL THYMECTOMY      TRIGGER POINT INJECTION  2024-9482    UPPER GASTROINTESTINAL ENDOSCOPY   8/10/24    WRIST FRACTURE SURGERY      CARPAL TUNNEL       Family History:  Family History   Problem Relation Age of Onset    Emphysema Mother     Alzheimer's disease Father     Cancer Father     Osteoporosis Sister     Ba Hyperthermia Neg Hx        Social History:   reports that she has been smoking cigarettes. She started smoking about 45 years ago. She has a 13.8 pack-year smoking history. She has been exposed to tobacco smoke. She has never used smokeless tobacco. She reports that she does not currently use alcohol after a past usage of about 10.0 standard drinks of alcohol per week. She reports that she does not use drugs.    Medications:   Medications Prior to Admission   Medication Sig Dispense Refill Last Dose/Taking    albuterol (PROVENTIL HFA;VENTOLIN HFA) 108 (90 Base) MCG/ACT inhaler Inhale 2 puffs 3 times a day. Indications: Spasm of Lung Air Passages   5/7/2025    atenolol (TENORMIN) 25 MG tablet Take 1 tablet by mouth Every Night.   5/6/2025    Cholecalciferol 25 MCG (1000 UT) tablet Take 1 tablet by mouth 1 (One) Time Per Week. Indications: Vitamin D Deficiency   Past Week    fluticasone (FLONASE) 50 MCG/ACT nasal spray Administer 1 spray into the nostril(s) as directed by provider Daily. Indications: Stuffy Nose   5/6/2025    HYDROcodone-acetaminophen (NORCO)  MG per tablet Take 1 tablet by mouth Every 6 (Six) Hours.   5/7/2025    ipratropium-albuterol (DUO-NEB) 0.5-2.5 mg/3 ml nebulizer Take 3 mL by nebulization 4 (Four) Times a Day. Indications: Chronic Obstructive Lung Disease   5/7/2025    rOPINIRole (REQUIP) 1 MG tablet Take 1 tablet by mouth every night at bedtime. Indications: Restless Leg Syndrome   5/6/2025    spironolactone (ALDACTONE) 25 MG tablet Take 1 tablet by mouth Daily. 30 tablet 0 5/6/2025    torsemide (DEMADEX) 20 MG tablet Take 1 tablet by mouth Daily. Indications: Cardiac Failure, Edema, High Blood Pressure 30 tablet 0 5/6/2025    HYDROcodone-acetaminophen (NORCO)  7.5-325 MG per tablet Take 1 tablet by mouth Every 6 (Six) Hours As Needed for Moderate Pain.       Trelegy Ellipta 200-62.5-25 MCG/ACT aerosol powder  Inhale 1 puff Daily. Indications: Asthma   More than a month       Allergies:  Patient has no known allergies.    ROS:    Pertinent items are noted in HPI     Objective     Blood pressure 155/89, pulse 91, resp. rate 20, SpO2 97%.    Physical Exam   Constitutional: Pt is oriented to person, place, and time and well-developed, well-nourished, and in no distress.   Mouth/Throat: Oropharynx is clear and moist.   Neck: Normal range of motion.   Cardiovascular: Normal rate, regular rhythm    Pulmonary/Chest: Effort normal    Abdominal: Soft. Nontender  Skin: Skin is warm and dry.   Psychiatric: Mood, memory, affect and judgment normal.     Assessment & Plan     Diagnosis:  Iron-deficiency anemia, cirrhosis    Anticipated Surgical Procedure:  Egd/colonoscopy    The risks, benefits, and alternatives of this procedure have been discussed with the patient or the responsible party- the patient understands and agrees to proceed.

## 2025-05-07 NOTE — ANESTHESIA POSTPROCEDURE EVALUATION
Patient: Filomena Liu    Procedure Summary       Date: 05/07/25 Room / Location: [facility] ENDOSCOPY 4 / [facility] ENDOSCOPY    Anesthesia Start: 1446 Anesthesia Stop: 1533    Procedures:       COLONOSCOPY to cecum and ti with hot snare polypectomies      ESOPHAGOGASTRODUODENOSCOPY (Esophagus) Diagnosis:       Iron deficiency anemia, unspecified iron deficiency anemia type      Cirrhosis of liver without ascites, unspecified hepatic cirrhosis type      (Iron deficiency anemia, unspecified iron deficiency anemia type [D50.9])      (Cirrhosis of liver without ascites, unspecified hepatic cirrhosis type [K74.60])    Surgeons: Ana Guerrero MD Provider: Pako Garcia MD    Anesthesia Type: MAC ASA Status: 4            Anesthesia Type: MAC    Vitals  Vitals Value Taken Time   /90 05/07/25 15:57   Temp     Pulse 74 05/07/25 15:57   Resp 18 05/07/25 15:57   SpO2 100 % 05/07/25 15:57           Post Anesthesia Care and Evaluation    Patient location during evaluation: PACU  Patient participation: complete - patient participated  Level of consciousness: awake and alert  Pain management: adequate    Airway patency: patent  Anesthetic complications: No anesthetic complications    Cardiovascular status: acceptable  Respiratory status: acceptable  Hydration status: acceptable    Comments: --------------------            05/07/25               1557     --------------------   BP:       128/90     Pulse:      74       Resp:       18       SpO2:      100%     --------------------

## 2025-05-07 NOTE — TELEPHONE ENCOUNTER
Spoke with patient and she will follows up at her gastroenterologist for the labs to be drawn at that office.

## 2025-05-07 NOTE — DISCHARGE INSTRUCTIONS
For the next 24 hours patient needs to be with a responsible adult.    For 24 hours DO NOT drive, operate machinery, appliances, drink alcohol, make important decisions or sign legal documents.    Start with a light or bland diet if you are feeling sick to your stomach otherwise advance to regular diet as tolerated.    Follow recommendations on procedure report if provided by your doctor.    Call Dr Guerrero for problems [phone]    Problems may include but not limited to: large amounts of bleeding, trouble breathing, repeated vomiting, severe unrelieved pain, fever or chills.

## 2025-05-07 NOTE — TELEPHONE ENCOUNTER
"  Caller: Filomena Liu \"DE\"    Relationship: Self    Best call back number: [phone]       Who are you requesting to speak with (clinical staff, provider,  specific staff member): CLINICAL        What was the call regarding: PATIENT RETURNING MISSED CALL TO JOCELYNE"

## 2025-05-07 NOTE — ANESTHESIA PREPROCEDURE EVALUATION
Anesthesia Evaluation                  Airway   Mallampati: II  Dental      Pulmonary    (+) a smoker Current, COPD severe, asthma,sleep apnea  Cardiovascular     Rhythm: regular  Rate: normal    (+) hypertension, hyperlipidemia      Neuro/Psych  (+) numbness, psychiatric history Anxiety and Depression  GI/Hepatic/Renal/Endo    (+) obesity, GERD, GI bleeding , liver disease cirrhosis, renal disease- CRI    Musculoskeletal     Abdominal    Substance History   (+) alcohol use (years of abuse , currently abstaining)     OB/GYN          Other   arthritis,                   Anesthesia Plan    ASA 4     MAC     intravenous induction     Anesthetic plan, risks, benefits, and alternatives have been provided, discussed and informed consent has been obtained with: patient.    CODE STATUS:

## 2025-05-07 NOTE — TELEPHONE ENCOUNTER
Provider: Luis M  Caller: patient  Relationship to Patient: self  Call Back Phone Number: [phone]  Reason for Call:  patient wants to know if she have labs added on her next visit to have her pancreatis checked

## 2025-05-08 ENCOUNTER — TELEPHONE (OUTPATIENT)
Dept: GASTROENTEROLOGY | Facility: CLINIC | Age: 65
End: 2025-05-08
Payer: MEDICARE

## 2025-05-08 NOTE — TELEPHONE ENCOUNTER
Pt left a voicemail she would like to have her labs redone for the Cancer Antigen 19-9. She would like to have it done at her PCP. Please call pt back after 12 today.

## 2025-05-09 ENCOUNTER — TELEPHONE (OUTPATIENT)
Dept: GASTROENTEROLOGY | Facility: CLINIC | Age: 65
End: 2025-05-09
Payer: MEDICARE

## 2025-05-09 ENCOUNTER — OFFICE VISIT (OUTPATIENT)
Dept: ORTHOPEDIC SURGERY | Facility: CLINIC | Age: 65
End: 2025-05-09
Payer: MEDICARE

## 2025-05-09 VITALS — BODY MASS INDEX: 32.93 KG/M2 | WEIGHT: 167.7 LBS | HEIGHT: 60 IN | TEMPERATURE: 98.9 F

## 2025-05-09 DIAGNOSIS — M19.011 ARTHRITIS OF RIGHT SHOULDER: Primary | ICD-10-CM

## 2025-05-09 PROBLEM — M19.019 ARTHRITIS OF SHOULDER: Status: ACTIVE | Noted: 2025-05-09

## 2025-05-09 PROCEDURE — 99214 OFFICE O/P EST MOD 30 MIN: CPT | Performed by: ORTHOPAEDIC SURGERY

## 2025-05-09 RX ORDER — ACETAMINOPHEN 325 MG/1
1000 TABLET ORAL ONCE
OUTPATIENT
Start: 2025-05-09 | End: 2025-05-09

## 2025-05-09 RX ORDER — PREGABALIN 150 MG/1
150 CAPSULE ORAL ONCE
OUTPATIENT
Start: 2025-05-09 | End: 2025-05-09

## 2025-05-09 NOTE — TELEPHONE ENCOUNTER
I do not plan to follow the ca 19-9 unless there are new findings on the imaging that suggest that it is necessary,  It will not change our management at this time.

## 2025-05-09 NOTE — PROGRESS NOTES
Patient:Filomena Liu    YOB: 1960    Medical Record Number:[id number]    Chief Complaints:  Right shoulder pain    History of Present Illness:     64 y.o. female patient who presents for right shoulder.  She says this has been a progressively worsening issue for the past several years.  She has been treated by both her family doctor and [facility] pain management.  She has tried activity modifications, anti-inflammatories and multiple injections.  She reports that her pain is now severe.  She can hardly raise or use the arm.  She says that she is just miserable.  She is roughly 2 years out from a left shoulder replacement.  That continues to do well.  She is here to discuss potentially getting her right shoulder replaced.      Allergies:No Known Allergies    Home Medications:    Current Outpatient Medications:     albuterol (PROVENTIL HFA;VENTOLIN HFA) 108 (90 Base) MCG/ACT inhaler, Inhale 2 puffs 3 times a day. Indications: Spasm of Lung Air Passages, Disp: , Rfl:     atenolol (TENORMIN) 25 MG tablet, Take 1 tablet by mouth Every Night., Disp: , Rfl:     Cholecalciferol 25 MCG (1000 UT) tablet, Take 1 tablet by mouth 1 (One) Time Per Week. Indications: Vitamin D Deficiency, Disp: , Rfl:     fluticasone (FLONASE) 50 MCG/ACT nasal spray, Administer 1 spray into the nostril(s) as directed by provider Daily. Indications: Stuffy Nose, Disp: , Rfl:     HYDROcodone-acetaminophen (NORCO)  MG per tablet, Take 1 tablet by mouth Every 6 (Six) Hours., Disp: , Rfl:     ipratropium-albuterol (DUO-NEB) 0.5-2.5 mg/3 ml nebulizer, Take 3 mL by nebulization 4 (Four) Times a Day. Indications: Chronic Obstructive Lung Disease, Disp: , Rfl:     nystatin (MYCOSTATIN) 100,000 unit/mL suspension, Swish and swallow 5 mL 4 (Four) Times a Day for 14 days., Disp: 280 mL, Rfl: 0    pantoprazole (PROTONIX) 40 MG EC tablet, Take 1 tablet by mouth 2 (Two) Times a Day for 60 days., Disp: 60 tablet, Rfl: 1    rOPINIRole (REQUIP)  1 MG tablet, Take 1 tablet by mouth every night at bedtime. Indications: Restless Leg Syndrome, Disp: , Rfl:     spironolactone (ALDACTONE) 25 MG tablet, Take 1 tablet by mouth Daily., Disp: 30 tablet, Rfl: 0    torsemide (DEMADEX) 20 MG tablet, Take 1 tablet by mouth Daily. Indications: Cardiac Failure, Edema, High Blood Pressure, Disp: 30 tablet, Rfl: 0    Trelegy Ellipta 200-62.5-25 MCG/ACT aerosol powder , Inhale 1 puff Daily. Indications: Asthma, Disp: , Rfl:     Past Medical History:   Diagnosis Date    Alcoholism 1985    Anemia 08/01/24    Ankle pain     Ankle wound     LEFT    Anxiety     Arthritis of back 2000    Arthritis of neck 2000    Asthma 2015    Benign tumor of thymus     REMOVED    Bruises easily     Cataract 2016    Cholelithiasis 2022    Chronic kidney disease 8/5/24    Cirrhosis 8/5/24    Clotting disorder 8/5/24    COPD (chronic obstructive pulmonary disease)     CTS (carpal tunnel syndrome) Surgery 1999    DDD (degenerative disc disease), cervical     Depression     Diverticulitis of colon Unknown    Fatty liver 2022    Fracture of wrist 2017    GERD (gastroesophageal reflux disease)     GI (gastrointestinal bleed) 8/5/24    Hip arthrosis Unknown    History of anemia     History of MRSA infection     LEFT ANKLE TREAT [facility]  5YEARS GO    Hyperlipidemia ?    Hypertension ?    Knee sprain June 2023    Knee swelling Unknown    Low back strain Unknown    Lumbosacral disc disease 2024    Neck strain 2000    Neuropathy     Pancreatitis 2024    Rotator cuff syndrome 2021    Scoliosis Unknown    Shoulder arthritis 06/05/2023    Shoulder pain, left 07/07/2023    Sleep apnea     NO MACHINE USE    Spinal stenosis     Spondylolisthesis of cervical region     Steatosis of liver     Tendinitis of knee 2022    Thoracic disc disorder 2000       Past Surgical History:   Procedure Laterality Date    BACK SURGERY      cervical disc surgery with fusion-    CHOLECYSTECTOMY  1/1/20    COLONOSCOPY      COLONOSCOPY N/A  11/12/2020    Procedure: COLONOSCOPY, polypectomy;  Surgeon: Filomena Mckeon MD;  Location: [facility] OR;  Service: Gastroenterology;  Laterality: N/A;  Diverticulosis; Hepatic flexure polyp x 1; Polyp at 60cm x 1; Polyp at 50cm x 1- hot snare;    COLONOSCOPY N/A 04/15/2024    Procedure: COLONOSCOPY into sigmoid colon with hot snare polypectomy;  Surgeon: Leatha Johns MD;  Location: [facility] ENDOSCOPY;  Service: General;  Laterality: N/A;  pre- history of polyps  post- diverticulosis, polyp    COLONOSCOPY N/A 5/7/2025    Procedure: COLONOSCOPY to cecum and ti with hot snare polypectomies;  Surgeon: Ana Guerrero MD;  Location: [facility] ENDOSCOPY;  Service: Gastroenterology;  Laterality: N/A;  PRE: IRON DEFICIENCY ANEMIA  POST: diverticulosis, polyps, hemorrhoids    ENDOSCOPY N/A 08/15/2024    Procedure: ESOPHAGOGASTRODUODENOSCOPY;  Surgeon: Garth Constantino MD;  Location: [facility] ENDOSCOPY;  Service: Gastroenterology;  Laterality: N/A;  PRE- CIRRHOSIS, DARK STOOL  POST- NORMAL    ENDOSCOPY N/A 5/7/2025    Procedure: ESOPHAGOGASTRODUODENOSCOPY;  Surgeon: Ana Guerrero MD;  Location: [facility] ENDOSCOPY;  Service: Gastroenterology;  Laterality: N/A;  PRE: CIRRHOSIS  POST:portal hypertensive gastropathy; esophagitis; hiatal hernia; varices    HAND SURGERY  2000    HYSTERECTOMY  1998    INCISION AND DRAINAGE LEG Left 11/17/2018    Procedure: Incision and Drainage of Left ankle;  Surgeon: Maxwell Lanier MD;  Location: [facility] OR;  Service: Orthopedics    JOINT REPLACEMENT  2023    NECK SURGERY  2000, 2005,2017    SHOULDER SURGERY  2023    SIGMOIDOSCOPY N/A 03/28/2024    Procedure: SIGMOIDOSCOPY FLEXIBLE;  Surgeon: Leatha Johns MD;  Location: [facility] ENDOSCOPY;  Service: General;  Laterality: N/A;  pre: h/o colon polyps  post: poor prep, diverticulosis    SKIN BIOPSY      SKIN GRAFT SPLIT THICKNESS N/A 02/12/2019    Procedure: debridement SKIN GRAFT SPLIT THICKNESS left ankle wound;  Surgeon:  Barry Worthy MD;  Location: [facility] OR [facility];  Service: Plastics    TONGUE LESION EXCISION/BIOPSY N/A 11/26/2024    Procedure: WIDE LOCAL EXCISION OF TONGUE LESION;  Surgeon: El Mercedes MD;  Location: [facility] OR;  Service: ENT;  Laterality: N/A;    TOTAL SHOULDER ARTHROPLASTY Left 07/20/2023    Procedure: Total  shoulder arthroplasty;  Surgeon: Maxwell Lanier MD;  Location: [facility] OR [facility];  Service: Orthopedics;  Laterality: Left;    TOTAL THYMECTOMY      TRIGGER POINT INJECTION  [id number]    UPPER GASTROINTESTINAL ENDOSCOPY  8/10/24    WRIST FRACTURE SURGERY      CARPAL TUNNEL       Social History     Occupational History     Employer: DISABLED   Tobacco Use    Smoking status: Passive Smoke Exposure - Never Smoker     Passive exposure: Current    Smokeless tobacco: Never    Tobacco comments:     I have cut down recently and will abstain starting 7/10/23   Vaping Use    Vaping status: Never Used   Substance and Sexual Activity    Alcohol use: Not Currently     Alcohol/week: 10.0 standard drinks of alcohol     Comment: pt reports drinking a pint a day    Drug use: No    Sexual activity: Yes     Partners: Male     Birth control/protection: Post-menopausal, Tubal ligation, Hysterectomy, Surgical      Social History     Social History Narrative    Not on file       Family History   Problem Relation Age of Onset    Emphysema Mother     Alzheimer's disease Father     Cancer Father     Osteoporosis Sister     Scoliosis Sister     Malig Hyperthermia Neg Hx        Review of Systems:      Constitutional: Denies fever, shaking or chills   Eyes: Denies change in visual acuity   HEENT: Denies nasal congestion or sore throat   Respiratory: Denies cough or shortness of breath   Cardiovascular: Denies chest pain or edema  Endocrine: Denies tremors, palpitations, intolerance of heat or cold, polyuria, polydipsia.  GI: Denies abdominal pain, nausea, vomiting, bloody stools or diarrhea  : Denies frequency,  "urgency, incontinence, retention, or nocturia.  Musculoskeletal: Denies numbness, tingling or loss of motor function except as above  Integument: Denies rash, lesion or ulceration   Neurologic: Denies headache or focal weakness, deficits  Heme: Denies spontaneous or excessive bleeding, epistaxis, hematuria, melena, fatigue, enlarged or tender lymph nodes.      All other pertinent positives and negatives as noted above in HPI.    Physical Exam:64 y.o. female  Vitals:    05/09/25 1546   Temp: 98.9 °F (37.2 °C)   Weight: 76.1 kg (167 lb 11.2 oz)   Height: 152.4 cm (60\")     General:  Patient is awake and alert.  Appears in no acute distress or discomfort.    Psych:  Affect and demeanor are appropriate.    Extremities: Right shoulder is examined.  Skin is benign.  Trace bursal effusion.  No increased warmth or erythema.  Active motion is very limited and painful.  Forward elevation 70, abduction 60, external rotation 40.  Passively, I can raise her up to 150 forward elevation and she can briefly maintain it.  She has pain and weakness with resisted forward elevation and abduction.    Imaging: MRI right shoulder is brought in by the patient and reviewed.  I have independently interpreted the images.  She has end stage glenohumeral osteoarthritis with full-thickness chondral loss throughout the joint, subchondral cystic change and severe reactive marrow edema.  She has diffuse rotator cuff tendinopathy and partial-thickness tearing.  There is a split tear of the long head of the biceps and extensive glenoid labral tearing.    Assessment/Plan:  End-stage right glenohumeral osteoarthritis with extensive rotator cuff tendinopathy    We long discussion regarding her options.  We discussed continued conservative care versus arthroplasty.  We thoroughly discussed the risk, benefits and alternatives to the replacement.  She really wants to move forward the arthroplasty.  She mentioned to me that she has been diagnosed with " elevated markers concerning for pancreatic cancer.  I told her that I would recommend she put off any shoulder surgery until she gets that issue evaluated.  She has an appointment next week to get that looked at.  She says that she wants to move forward with the replacement as soon as is possible.  She is willing to get the markers worked up and investigated but she does not want to delay the scheduling process as far as getting the shoulder replaced.  She wants to go ahead and move forward and then we can always cancel any scheduled surgery if the workup shows something concerning.  I do not love that plan but it sounds like she is miserable and I am willing to try and help.  I will have my  contact her.  Will push the surgery out to roughly 6 weeks or so which gives her plenty of time to get this worked up.  I we will absolutely need to see her again prior to surgery to get some x-rays and again go over the plan.    Maxwell Lanier MD    05/09/2025

## 2025-05-13 ENCOUNTER — TELEPHONE (OUTPATIENT)
Dept: ORTHOPEDIC SURGERY | Facility: CLINIC | Age: 65
End: 2025-05-13
Payer: MEDICARE

## 2025-05-14 ENCOUNTER — INFUSION (OUTPATIENT)
Dept: ONCOLOGY | Facility: HOSPITAL | Age: 65
End: 2025-05-14
Payer: MEDICARE

## 2025-05-14 VITALS
HEART RATE: 108 BPM | BODY MASS INDEX: 31.83 KG/M2 | SYSTOLIC BLOOD PRESSURE: 152 MMHG | TEMPERATURE: 98 F | RESPIRATION RATE: 16 BRPM | DIASTOLIC BLOOD PRESSURE: 100 MMHG | OXYGEN SATURATION: 94 % | WEIGHT: 163 LBS

## 2025-05-14 DIAGNOSIS — T45.4X5A ADVERSE EFFECT OF IRON, INITIAL ENCOUNTER: ICD-10-CM

## 2025-05-14 DIAGNOSIS — D50.8 OTHER IRON DEFICIENCY ANEMIAS: Primary | ICD-10-CM

## 2025-05-14 PROCEDURE — 25010000002 FAMOTIDINE 10 MG/ML SOLUTION: Performed by: STUDENT IN AN ORGANIZED HEALTH CARE EDUCATION/TRAINING PROGRAM

## 2025-05-14 PROCEDURE — 25010000002 FERUMOXYTOL 510 MG/17ML SOLUTION 17 ML VIAL: Performed by: STUDENT IN AN ORGANIZED HEALTH CARE EDUCATION/TRAINING PROGRAM

## 2025-05-14 PROCEDURE — 96375 TX/PRO/DX INJ NEW DRUG ADDON: CPT

## 2025-05-14 PROCEDURE — 96374 THER/PROPH/DIAG INJ IV PUSH: CPT

## 2025-05-14 RX ORDER — ACETAMINOPHEN 325 MG/1
650 TABLET ORAL ONCE
Status: COMPLETED | OUTPATIENT
Start: 2025-05-14 | End: 2025-05-14

## 2025-05-14 RX ORDER — DIPHENHYDRAMINE HYDROCHLORIDE 50 MG/ML
50 INJECTION, SOLUTION INTRAMUSCULAR; INTRAVENOUS AS NEEDED
Status: CANCELLED | OUTPATIENT
Start: 2025-05-14

## 2025-05-14 RX ORDER — FAMOTIDINE 10 MG/ML
20 INJECTION, SOLUTION INTRAVENOUS ONCE
Status: COMPLETED | OUTPATIENT
Start: 2025-05-14 | End: 2025-05-14

## 2025-05-14 RX ORDER — CETIRIZINE HYDROCHLORIDE 10 MG/1
10 TABLET ORAL ONCE
Status: COMPLETED | OUTPATIENT
Start: 2025-05-14 | End: 2025-05-14

## 2025-05-14 RX ORDER — SODIUM CHLORIDE 9 MG/ML
20 INJECTION, SOLUTION INTRAVENOUS ONCE
Status: CANCELLED | OUTPATIENT
Start: 2025-05-14

## 2025-05-14 RX ORDER — FAMOTIDINE 10 MG/ML
20 INJECTION, SOLUTION INTRAVENOUS AS NEEDED
Status: CANCELLED | OUTPATIENT
Start: 2025-05-14

## 2025-05-14 RX ORDER — HYDROCORTISONE SODIUM SUCCINATE 100 MG/2ML
100 INJECTION INTRAMUSCULAR; INTRAVENOUS AS NEEDED
Status: CANCELLED | OUTPATIENT
Start: 2025-05-14

## 2025-05-14 RX ADMIN — CETIRIZINE HYDROCHLORIDE 10 MG: 10 TABLET, FILM COATED ORAL at 12:57

## 2025-05-14 RX ADMIN — ACETAMINOPHEN 650 MG: 325 TABLET ORAL at 12:57

## 2025-05-14 RX ADMIN — FAMOTIDINE 20 MG: 10 INJECTION INTRAVENOUS at 13:06

## 2025-05-14 RX ADMIN — FERUMOXYTOL 510 MG: 510 INJECTION INTRAVENOUS at 13:15

## 2025-05-16 ENCOUNTER — HOSPITAL ENCOUNTER (OUTPATIENT)
Facility: HOSPITAL | Age: 65
Discharge: HOME | End: 2025-05-16
Payer: MEDICARE

## 2025-05-16 DIAGNOSIS — R97.8 ELEVATED CA 19-9 LEVEL: ICD-10-CM

## 2025-05-16 PROCEDURE — 74178 CT ABD&PLV WO CNTR FLWD CNTR: CPT

## 2025-05-16 PROCEDURE — 25510000001 IOPAMIDOL 61 % SOLUTION

## 2025-05-16 PROCEDURE — 25510000002 DIATRIZOATE MEGLUMINE & SODIUM PER 1 ML

## 2025-05-16 RX ORDER — DIATRIZOATE MEGLUMINE AND DIATRIZOATE SODIUM 660; 100 MG/ML; MG/ML
30 SOLUTION ORAL; RECTAL
Status: COMPLETED | OUTPATIENT
Start: 2025-05-16 | End: 2025-05-16

## 2025-05-16 RX ORDER — IOPAMIDOL 612 MG/ML
100 INJECTION, SOLUTION INTRAVASCULAR
Status: COMPLETED | OUTPATIENT
Start: 2025-05-16 | End: 2025-05-16

## 2025-05-16 RX ADMIN — IOPAMIDOL 85 ML: 612 INJECTION, SOLUTION INTRAVENOUS at 11:23

## 2025-05-16 RX ADMIN — DIATRIZOATE MEGLUMINE AND DIATRIZOATE SODIUM 30 ML: 600; 100 SOLUTION ORAL; RECTAL at 10:50

## 2025-05-19 ENCOUNTER — TELEPHONE (OUTPATIENT)
Dept: GASTROENTEROLOGY | Facility: CLINIC | Age: 65
End: 2025-05-19
Payer: MEDICARE

## 2025-05-19 NOTE — TELEPHONE ENCOUNTER
Pt left a voicemail she would like the results of her ct scan. Please call pt back at [phone].

## 2025-05-20 ENCOUNTER — TELEPHONE (OUTPATIENT)
Dept: ORTHOPEDIC SURGERY | Facility: CLINIC | Age: 65
End: 2025-05-20

## 2025-05-20 NOTE — TELEPHONE ENCOUNTER
Called pt and advised that we are waiting on her results, that the ct has not yet been read.  Verb understanding.     Pt reports that she has a big should surgery coming up and she needs the results of the ct scan .  Advised we will check with ct to have them read the scan.     Called [facility] radiology and advised of the above.  They advised that the ct is being read now and the results should be back soon.     Update sent to Trudy RUVALCABA.

## 2025-05-20 NOTE — TELEPHONE ENCOUNTER
Provider:  DR. JESSICA STEWART    Caller: HOUSTON BLANCAS    Relationship to Patient: SELF        Phone Number: [phone]    Reason for Call:  PATIENT ASKED TO GIVE SAMY A MESSAGE THAT PATIENT IS WAITING ON RESULTS OF A CAT SCAN OF HER PANCREAS AND SHOULD KNOW THE RESULTS TODAY.    When was the patient last seen: 5/9/25

## 2025-05-21 ENCOUNTER — RESULTS FOLLOW-UP (OUTPATIENT)
Dept: GASTROENTEROLOGY | Facility: CLINIC | Age: 65
End: 2025-05-21
Payer: MEDICARE

## 2025-05-21 DIAGNOSIS — R97.8 ELEVATED CA 19-9 LEVEL: ICD-10-CM

## 2025-05-21 DIAGNOSIS — R93.3 ABNORMAL FINDING ON GI TRACT IMAGING: Primary | ICD-10-CM

## 2025-05-21 NOTE — TELEPHONE ENCOUNTER
Provider:  DR. JESSICA STEWART     Caller: HOUSTON BLANCAS     Relationship to Patient: SELF      Phone Number: [phone]     Reason for Call: MS BLANCAS STATED SHE JUST GOT THE RESULTS OF THE CT OF HER PANCREAS AND IT CAME BACK CLEAN, SHE IS CANCER FREE.

## 2025-05-21 NOTE — TELEPHONE ENCOUNTER
"    Hub staff attempted to follow warm transfer process and was unsuccessful     Caller: Filomena Liu \"DE\"    Relationship to patient: Self    Best call back number: [phone]      Patient is needing: PT RETURNING JEREMIAH'S PHONE CALL TO DISCUSS TEST RESULTS. PLS CALL PT TO DISCUSS.             "

## 2025-05-23 ENCOUNTER — PRE-ADMISSION TESTING (OUTPATIENT)
Dept: PREADMISSION TESTING | Facility: HOSPITAL | Age: 65
End: 2025-05-23
Payer: MEDICARE

## 2025-05-23 VITALS
DIASTOLIC BLOOD PRESSURE: 64 MMHG | BODY MASS INDEX: 32.25 KG/M2 | RESPIRATION RATE: 16 BRPM | HEIGHT: 59 IN | OXYGEN SATURATION: 98 % | SYSTOLIC BLOOD PRESSURE: 125 MMHG | WEIGHT: 160 LBS | HEART RATE: 96 BPM | TEMPERATURE: 98.1 F

## 2025-05-23 LAB
ANION GAP SERPL CALCULATED.3IONS-SCNC: 11.4 MMOL/L (ref 5–15)
BUN SERPL-MCNC: 16 MG/DL (ref 8–23)
BUN/CREAT SERPL: 15.7 (ref 7–25)
CALCIUM SPEC-SCNC: 9.6 MG/DL (ref 8.6–10.5)
CHLORIDE SERPL-SCNC: 95 MMOL/L (ref 98–107)
CO2 SERPL-SCNC: 33.6 MMOL/L (ref 22–29)
CREAT SERPL-MCNC: 1.02 MG/DL (ref 0.57–1)
DEPRECATED RDW RBC AUTO: 57.7 FL (ref 37–54)
EGFRCR SERPLBLD CKD-EPI 2021: 61.6 ML/MIN/1.73
ERYTHROCYTE [DISTWIDTH] IN BLOOD BY AUTOMATED COUNT: 17.4 % (ref 12.3–15.4)
GLUCOSE SERPL-MCNC: 113 MG/DL (ref 65–99)
HCT VFR BLD AUTO: 33.5 % (ref 34–46.6)
HGB BLD-MCNC: 11.2 G/DL (ref 12–15.9)
MCH RBC QN AUTO: 30.5 PG (ref 26.6–33)
MCHC RBC AUTO-ENTMCNC: 33.4 G/DL (ref 31.5–35.7)
MCV RBC AUTO: 91.3 FL (ref 79–97)
PLATELET # BLD AUTO: 148 10*3/MM3 (ref 140–450)
PMV BLD AUTO: 9.8 FL (ref 6–12)
POTASSIUM SERPL-SCNC: 3.3 MMOL/L (ref 3.5–5.2)
RBC # BLD AUTO: 3.67 10*6/MM3 (ref 3.77–5.28)
SODIUM SERPL-SCNC: 140 MMOL/L (ref 136–145)
WBC NRBC COR # BLD AUTO: 11.88 10*3/MM3 (ref 3.4–10.8)

## 2025-05-23 PROCEDURE — 80048 BASIC METABOLIC PNL TOTAL CA: CPT

## 2025-05-23 PROCEDURE — 36415 COLL VENOUS BLD VENIPUNCTURE: CPT

## 2025-05-23 PROCEDURE — 85027 COMPLETE CBC AUTOMATED: CPT

## 2025-05-23 RX ORDER — PREGABALIN 25 MG/1
1 CAPSULE ORAL EVERY 12 HOURS SCHEDULED
COMMUNITY
Start: 2025-05-20

## 2025-05-23 RX ORDER — OXYCODONE AND ACETAMINOPHEN 7.5; 325 MG/1; MG/1
1 TABLET ORAL EVERY 6 HOURS
COMMUNITY
Start: 2025-05-21

## 2025-05-23 NOTE — TELEPHONE ENCOUNTER
Pt was unable to get her MRI scheduled until July at [facility], requested I fax order to [facility].    Message to Niyah to auth

## 2025-05-23 NOTE — DISCHARGE INSTRUCTIONS
Take the following medications the morning of surgery:  USE INHALERS, PANTOPRAZOLE, OXYCODONE AND LYRICA IF NEEDED    If you are on an Aspirin or a Blood Thinner please clarify with the surgeon and prescribing physician if and when you are to hold the medication or if you are to continue the medication.  If you are on prescription narcotic pain medication to control your pain you may also take that medication the morning of surgery.      General Instructions:     Do not eat solid food after midnight the night before surgery.  Clear liquids day of surgery are allowed but must be stopped at least two hours before your hospital arrival time.       Allowed clear liquids      Water, sodas, and tea or coffee with no cream or milk added.       12 to 20 ounces of a clear liquid that contains carbohydrates is recommended.  If non-diabetic, have Gatorade or Powerade.  If diabetic, have G2 or Powerade Zero.     Do not have liquids red in color.  Do not consume chicken, beef, pork or vegetable broth or bouillon cubes of any variety as they are not considered clear liquids and are not allowed.      Infants may have breast milk up to four hours before surgery.  Infants drinking formula may drink formula up to six hours before surgery.   Patients who avoid smoking, chewing tobacco and alcohol for 4 weeks prior to surgery have a reduced risk of post-operative complications.  Quit smoking as many days before surgery as you can.  Do not smoke, use chewing tobacco or drink alcohol the day of surgery.   If applicable bring your C-PAP/ BI-PAP machine in with you to preop day of surgery.  Bring any papers given to you in the doctor’s office.  Wear clean comfortable clothes.  Do not wear contact lenses, false eyelashes or make-up.  Bring a case for your glasses.   Bring crutches or walker if applicable.  Remove all piercings.  Leave jewelry and any other valuables at home.  Hair extensions with metal clips must be removed prior to  surgery.  The Pre-Admission Testing nurse will instruct you to bring medications if unable to obtain an accurate list in Pre-Admission Testing.    Day of surgery you will need to let the preoperative nurse know the last time you took each of your medications.  To ensure a safe environment for patients and staff, we kindly ask that children under the age of 16 not accompany patients.  If you must bring a dependent child or dependent adult please ensure a responsible adult, other than yourself, is present to supervise them.      If you were given a blood bank ID arm band remember to bring it with you the day of surgery.    Day of surgery:  Your arrival time is approximately two hours before your scheduled surgery time.  Upon arrival, a Pre-op nurse and Anesthesiologist will review your health history, obtain vital signs, and answer questions you may have.  The only belongings needed at this time will be a list of your home medications and if applicable your C-PAP/BI-PAP machine.  A Pre-op nurse will start an IV and you may receive medication in preparation for surgery, including something to help you relax.     Please be aware that surgery does come with discomfort.  We want to make every effort to control your discomfort so please discuss any uncontrolled symptoms with your nurse.   Your doctor will most likely have prescribed pain medications.      If you are going home after surgery you will receive individualized written care instructions before being discharged.  A responsible adult must drive you to and from the hospital on the day of your surgery and ideally stay with you through the night.  Discharge prescriptions can be filled by the hospital pharmacy during regular pharmacy hours.  If you are having surgery late in the day/evening your prescription may be e-prescribed to your pharmacy.  Please verify your pharmacy hours or chose a 24 hour pharmacy to avoid not having access to your prescription because your  pharmacy has closed for the day.    If you are staying overnight following surgery, you will be transported to your hospital room following the recovery period.  [facility] has all private rooms.    If you have any questions please call Pre-Admission Testing at [phone].  Deductibles and co-payments are collected on the day of service. Please be prepared to pay the required co-pay, deductible or deposit on the day of service as defined by your plan.    Call your surgeon immediately if you experience any of the following symptoms:  Sore Throat  Shortness of Breath or difficulty breathing  Cough  Chills  Body soreness or muscle pain  Headache  Fever  New loss of taste or smell  Do not arrive for your surgery ill.  Your procedure will need to be rescheduled to another time.  You will need to call your physician before the day of surgery to avoid any unnecessary exposure to hospital staff as well as other patients.        PREVENTING INFECTION IN SHOULDER SURGICAL SITES     C. acnes is a bacteria that lives deep within follicles and pores of the skin. It is found in large numbers on the skin of the face, axilla (armpit), chest and back and is the primary bacteria to cause a surgical site infection after shoulder surgery.      Use of a Benzoyl Peroxide solution prior to shoulder surgery decreases C. acnes and reduces post-op infections.   Your surgeon has ordered 5% Benzoyl Peroxide wash to be used three times prior to your surgery.     Please read the following instructions carefully and bring this form with you the day of surgery.     General bathing instructions starting two days before your surgery:    Shower using a fresh bar of anti-bacterial soap (such as Dial) and clean washcloth.  Pay special attention to the neck, shoulder and armpit area.   Wash your hair as usual with your regular shampoo.   Rinse hair and body thoroughly with warm water (not hot water) to remove shampoo and residue.   Dry  with a clean towel.              Sleep in a clean bed with clean clothing.  Do not allow pets to sleep with you.     For 2 days before surgery, avoid shaving with a razor because the razor can irritate skin and make it easier to develop an infection.    Any areas of open skin can increase the risk of a post-operative wound infection by allowing bacteria to enter and travel throughout the body.  Notify your surgeon if you have any skin wounds / rashes even if it is not near the expected surgical site.  The area will need assessed to determine if surgery should be delayed until it is healed.      First application of 5% Benzoyl Peroxide Wash two nights before surgery:                                                                Wash neck, shoulder (front, back, side) and armpit   with warm water, rinse and dry - see picture.  Gently wash the same areas with the Benzoyl Peroxide   cleanser going away from the neck for 10-20 seconds.   Work into a full lather and leave on the skin for   2 minutes for greatest effect.  Rinse thoroughly with warm water, not hot water.                     Pat dry with a clean towel.                                                            Wash your hands thoroughly.  Do not apply lotion, powder, perfume or deodorant.   Put on clean clothes.    Second application the night before surgery:  Repeat the above steps.        Third application morning of surgery:  Repeat the above steps.    Due to shoulder pain or decreased range of motion of your shoulder and arm, you may need assistance washing under the arm or the back portion of your shoulder.     Avoid further washing the areas of the skin treated with Benzoyl Peroxide for at least 1 hour.    For your convenience, you may purchase Benzoyl Peroxide at [facility] retail pharmacy.     Warning:  Let your physician know if you are allergic to Benzoyl Peroxide or have very sensitive skin and cannot use it.   Stop using and contact your  surgeon if you experience any excessive scaling, itching, swelling, skin irritation or other signs of a reaction.  Keep out of eyes, ears, nose and mouth.  Do not apply to sunburned, irritated or broken area on the skin.  Avoid unwanted problems with bleaching effect by following these tips:  Wash hands after each use.  Avoid contact with hair, clothing, furnishings or carpeting.  Wear clean, old t-shirt or clothing to bed.   Use clean, old white pillow cases and sheets to avoid discoloring your bed linens.                                                                                                                                                                                                                                                                                   Please complete the checklist below, bring it with you to the hospital                               the day of surgery and give to the Pre-op Nurse     Preoperative Skin Prep Checklist        Patient Name Label             Enter dates and ?  boxes to indicate completed    Surgery Date: _______________ Regular Shower  Benzoyl Peroxide Wash   First Application:  2 days before____6-3-25___________  (Stop shaving all body parts)           Evening                        Evening    Second Application:  1 day  rkhdrd_____7-6-41___________        Evening                          Evening   Third Application:  Day of Eyvkfio__5[birth year]____________                           Morning                   Morning

## 2025-05-27 ENCOUNTER — TELEPHONE (OUTPATIENT)
Dept: ORTHOPEDIC SURGERY | Facility: HOSPITAL | Age: 65
End: 2025-05-27
Payer: MEDICARE

## 2025-05-27 NOTE — TELEPHONE ENCOUNTER
Risk Factor yes no   Age >75  X   BMI <20 >40  X   Patient History     Chronic Pain (2 or more meds/Pain Management) X    ETOH (more than 3 drinks Daily)  X   Uncontrolled Depression/Bipolar/Schizoaffective Disorder  X   Arrhythmias--  X   Stent placement/MI  X   DVT/PE  X   Pacemaker  X   HTN (uncontrolled or requiring more than 2 medications)  X   CHF/Retained fluids/Edema  X   Stroke with Residual   X   COPD/Asthma  X   BENY--Non-compliant with CPAP  X   Diabetes (on insulin or more than 2 meds)         A1C:  X   BPH/Urinary retention (on medication)  X   CKD X    Home environment and support     Current ambulation status (use of cane, walker, W/C, Multiple falls/weakness) X    Stairs to enter and throughout home X    Lives Alone  X   Doesn't have support at home  X   Outpatient Screening Assessment    Home needs: (Walker/BSC):  Total shoulder    ? Steps 3 with rail   Caregiver 24-48hrs post-discharge:      Discharge Plan:   Total shoulder but is requesting [facility] (Transportation issue after surgery)  [phone]  Prescriptions: Meds to Bed    Home Medications:   [] Blood thinner/anti-coag therapy--   ? BPH or diuretic-- Aldactone, Torsemide   ? BP meds-- Atenolol,   ? Pain/Anti-inflammatories--Percocet, Lyrica    Pre-op Education:  Educate patient on spinal anesthesia/pain control:  ? patient verbalize understanding  Educate patient on hospital course/timeline:  ?  patient verbalize understanding    Joint Care Class:  ?  yes [] no  Notes:   K+ 3.3  Uses a walker at home, would benefit from PT services to transition to cane before D/C as she won't be able to use her Right arm after surgery. Pt states she does have a cane at home.

## 2025-05-28 ENCOUNTER — TELEPHONE (OUTPATIENT)
Dept: ORTHOPEDIC SURGERY | Facility: CLINIC | Age: 65
End: 2025-05-28
Payer: MEDICARE

## 2025-05-28 NOTE — TELEPHONE ENCOUNTER
Gastro wants to do an MRI to get a closer look at the lesion the CT found. She was told to ask you if it was ok to have contrast with that MRI.

## 2025-05-29 DIAGNOSIS — R97.8 ELEVATED CA 19-9 LEVEL: Primary | ICD-10-CM

## 2025-05-29 DIAGNOSIS — R93.3 ABNORMAL FINDING ON GI TRACT IMAGING: ICD-10-CM

## 2025-05-29 DIAGNOSIS — F40.240 CLAUSTROPHOBIA: Primary | ICD-10-CM

## 2025-05-29 RX ORDER — LORAZEPAM 1 MG/1
1 TABLET ORAL ONCE
Qty: 1 TABLET | Refills: 0 | Status: SHIPPED | OUTPATIENT
Start: 2025-05-29 | End: 2025-05-29

## 2025-05-30 ENCOUNTER — OFFICE VISIT (OUTPATIENT)
Dept: ORTHOPEDIC SURGERY | Facility: CLINIC | Age: 65
End: 2025-05-30
Payer: MEDICARE

## 2025-05-30 VITALS — WEIGHT: 164 LBS | BODY MASS INDEX: 33.06 KG/M2 | TEMPERATURE: 98.9 F | HEIGHT: 59 IN

## 2025-05-30 DIAGNOSIS — R52 PAIN: Primary | ICD-10-CM

## 2025-05-30 RX ORDER — SULFAMETHOXAZOLE AND TRIMETHOPRIM 800; 160 MG/1; MG/1
1 TABLET ORAL
COMMUNITY
Start: 2025-05-28 | End: 2025-06-07

## 2025-05-30 NOTE — PROGRESS NOTES
History & Physical         Patient: Filomena Liu    YOB: 1960    Medical Record Number: [id number]    Chief Complaint   Patient presents with    Right Shoulder - Pre-op Exam     History of Present Illness: 64 y.o. female comes in today for upcoming shoulder replacement surgery. Reports a long history of progressively worsening pain, motion loss, and dysfunction.  Describes current pain as severe.  She says the shoulder is making her miserable.  Of note, in reviewing medical records, her PCP did recently put her on Bactrim for her leg.  There was some concern for possible cellulitis.  She has chronic edema and chronic venous stasis change.  She says the leg looks about the same as it has in the past.  She denies any fevers, chills, sweats or other complaints.  She says that both her PCP and her pulmonologist have cleared her for the surgery.  She is scheduled for an MRI of her abdomen next week prior to the surgery.  She says that her GI doctor told her that this is not an urgent issue and that she does not need to delay the surgery.    Allergies   Allergen Reactions    Wound Dressing Adhesive Other (See Comments)     SKIN TEARS AND RASH- PAPER TAPE OK       Current Outpatient Medications:     albuterol (PROVENTIL HFA;VENTOLIN HFA) 108 (90 Base) MCG/ACT inhaler, Inhale 2 puffs 3 times a day. Indications: Spasm of Lung Air Passages, Disp: , Rfl:     atenolol (TENORMIN) 25 MG tablet, Take 1 tablet by mouth Every Night., Disp: , Rfl:     Cholecalciferol 25 MCG (1000 UT) tablet, Take 1 tablet by mouth 1 (One) Time Per Week. HOLDING AS OF 5-23-25 FOR HIGH VIT D LEVELS  Indications: Vitamin D Deficiency, Disp: , Rfl:     fluticasone (FLONASE) 50 MCG/ACT nasal spray, Administer 1 spray into the nostril(s) as directed by provider Daily. Indications: Stuffy Nose, Disp: , Rfl:     ipratropium-albuterol (DUO-NEB) 0.5-2.5 mg/3 ml nebulizer, Take 3 mL by nebulization 4 (Four) Times a Day. Indications:  Chronic Obstructive Lung Disease, Disp: , Rfl:     oxyCODONE-acetaminophen (PERCOCET) 7.5-325 MG per tablet, Take 1 tablet by mouth Every 6 (Six) Hours., Disp: , Rfl:     pantoprazole (PROTONIX) 40 MG EC tablet, Take 1 tablet by mouth 2 (Two) Times a Day for 60 days. (Patient taking differently: Take 1 tablet by mouth Daily.), Disp: 60 tablet, Rfl: 1    pregabalin (LYRICA) 25 MG capsule, Take 1 capsule by mouth Every 12 (Twelve) Hours., Disp: , Rfl:     rOPINIRole (REQUIP) 1 MG tablet, Take 1 tablet by mouth every night at bedtime. Indications: Restless Leg Syndrome, Disp: , Rfl:     spironolactone (ALDACTONE) 25 MG tablet, Take 1 tablet by mouth Daily., Disp: 30 tablet, Rfl: 0    sulfamethoxazole-trimethoprim (BACTRIM DS,SEPTRA DS) 800-160 MG per tablet, Take 1 tablet by mouth., Disp: , Rfl:     torsemide (DEMADEX) 20 MG tablet, Take 1 tablet by mouth Daily. Indications: Cardiac Failure, Edema, High Blood Pressure, Disp: 30 tablet, Rfl: 0    Past Medical History:   Diagnosis Date    Abdominal aneurysm     DR. JOYCE FOLLOWING 3.5CM    Alcoholism 1985    Anemia 08/01/24    HAS HAD IRON INFUSIONS ALMOST YEARLY    Anxiety     Arthritis of back 2000    Asthma 2015    At high risk for falls     Basal cell carcinoma     Bruises easily     Chronic kidney disease 8/5/24    Cirrhosis 8/5/24    COPD (chronic obstructive pulmonary disease)     MANAGED BY PRIMARY CARE    CTS (carpal tunnel syndrome) Surgery 1999    DDD (degenerative disc disease), cervical     Depression     Diverticulitis of colon Unknown    Fatty liver 2022    GERD (gastroesophageal reflux disease)     GI (gastrointestinal bleed) 8/5/24    History of anemia     History of MRSA infection     LEFT ANKLE TREAT [facility]  5YEARS GO    Hyperlipidemia ?    Hypertension ?    Low back strain Unknown    Lower leg edema     Lumbosacral disc disease 2024    Neck strain 2000    Neuropathy     Pancreatitis 2024    Panic attacks     Scoliosis Unknown    Shoulder arthritis  06/05/2023    Shoulder pain, left 07/07/2023    Sleep apnea     NO MACHINE USE    Spinal stenosis     Spondylolisthesis of cervical region     Steatosis of liver     Tachycardia     Tendinitis of knee 2022    Thoracic disc disorder 2000    Vertigo           Past Surgical History:   Procedure Laterality Date    BACK SURGERY      cervical disc surgery with fusion-    CARPAL TUNNEL RELEASE Bilateral     CATARACT EXTRACTION      CHOLECYSTECTOMY  1/1/20    COLONOSCOPY      COLONOSCOPY N/A 11/12/2020    Procedure: COLONOSCOPY, polypectomy;  Surgeon: Filomena Mckeon MD;  Location: [facility] OR;  Service: Gastroenterology;  Laterality: N/A;  Diverticulosis; Hepatic flexure polyp x 1; Polyp at 60cm x 1; Polyp at 50cm x 1- hot snare;    COLONOSCOPY N/A 04/15/2024    Procedure: COLONOSCOPY into sigmoid colon with hot snare polypectomy;  Surgeon: Leatha Johns MD;  Location: [facility] ENDOSCOPY;  Service: General;  Laterality: N/A;  pre- history of polyps  post- diverticulosis, polyp    COLONOSCOPY N/A 05/07/2025    Procedure: COLONOSCOPY to cecum and ti with hot snare polypectomies;  Surgeon: Ana Guerrero MD;  Location: [facility] ENDOSCOPY;  Service: Gastroenterology;  Laterality: N/A;  PRE: IRON DEFICIENCY ANEMIA  POST: diverticulosis, polyps, hemorrhoids    ENDOSCOPY N/A 08/15/2024    Procedure: ESOPHAGOGASTRODUODENOSCOPY;  Surgeon: Garth Constantino MD;  Location: [facility] ENDOSCOPY;  Service: Gastroenterology;  Laterality: N/A;  PRE- CIRRHOSIS, DARK STOOL  POST- NORMAL    ENDOSCOPY N/A 05/07/2025    Procedure: ESOPHAGOGASTRODUODENOSCOPY;  Surgeon: Ana Guerrero MD;  Location: [facility] ENDOSCOPY;  Service: Gastroenterology;  Laterality: N/A;  PRE: CIRRHOSIS  POST:portal hypertensive gastropathy; esophagitis; hiatal hernia; varices    HYSTERECTOMY  1998    INCISION AND DRAINAGE LEG Left 11/17/2018    Procedure: Incision and Drainage of Left ankle;  Surgeon: Maxwell Lanier MD;  Location: [facility] OR;  Service:  Orthopedics    NECK SURGERY  2000, 2005,2017    SIGMOIDOSCOPY N/A 03/28/2024    Procedure: SIGMOIDOSCOPY FLEXIBLE;  Surgeon: Leatha Johns MD;  Location: [facility] ENDOSCOPY;  Service: General;  Laterality: N/A;  pre: h/o colon polyps  post: poor prep, diverticulosis    SKIN BIOPSY      SKIN GRAFT SPLIT THICKNESS N/A 02/12/2019    Procedure: debridement SKIN GRAFT SPLIT THICKNESS left ankle wound;  Surgeon: Barry Worthy MD;  Location:  [facility];  Service: Plastics    TONGUE LESION EXCISION/BIOPSY N/A 11/26/2024    Procedure: WIDE LOCAL EXCISION OF TONGUE LESION;  Surgeon: El Mercedes MD;  Location: [facility] OR;  Service: ENT;  Laterality: N/A;    TOTAL SHOULDER ARTHROPLASTY Left 07/20/2023    Procedure: Total  shoulder arthroplasty;  Surgeon: Maxwell Lanier MD;  Location:  [facility];  Service: Orthopedics;  Laterality: Left;    TOTAL THYMECTOMY      TRIGGER POINT INJECTION  0598-0574    UPPER GASTROINTESTINAL ENDOSCOPY  8/10/24    WRIST FRACTURE SURGERY      WRIST FRACTURE SURGERY Left           Social History     Occupational History     Employer: DISABLED   Tobacco Use    Smoking status: Never     Passive exposure: Current    Smokeless tobacco: Never    Tobacco comments:     PATIENT STATES SHE IS CURRENTLY TRYING TO QUIT. STATES SHE IS SMOKING APPROX. 4-8 CIGARETTES PER DAY AS OF 5-23-25   Vaping Use    Vaping status: Never Used   Substance and Sexual Activity    Alcohol use: Not Currently     Alcohol/week: 10.0 standard drinks of alcohol     Comment: PATIENT STATES SHE IS 80 DAYS SOBER AS OF 5-23-25    Drug use: No    Sexual activity: Yes     Partners: Male     Birth control/protection: Post-menopausal, Tubal ligation, Hysterectomy, Surgical      Social History     Social History Narrative    Not on file          Family History   Problem Relation Age of Onset    Emphysema Mother     Alzheimer's disease Father     Cancer Father     Osteoporosis Sister     Scoliosis Sister      "Malig Hyperthermia Neg Hx        Review of Systems:     Constitutional:  Denies fever, shaking or chills   Eyes:  Denies change in visual acuity   HEENT:  Denies nasal congestion or sore throat   Respiratory:  Denies cough or shortness of breath   Cardiovascular:  Denies chest pain or edema   GI:  Denies abdominal pain, nausea, vomiting, bloody stools or diarrhea   Musculoskeletal:  Denies numbness, tingling or loss of motor function except as outlined above in history of present illness.  Integument:  Denies rash, lesion or ulceration   Neurologic:  Denies headache or focal weakness, deficits      All other pertinent positives and negatives as noted above in HPI.    Physical Exam: 64 y.o. female    Vitals:    05/30/25 1335   Temp: 98.9 °F (37.2 °C)   TempSrc: Temporal   Weight: 74.4 kg (164 lb)   Height: 149.9 cm (59.02\")     General:  Patient is awake and alert.  Appears in no acute distress or discomfort.    Psych:  Affect and demeanor are appropriate.    Cardiovascular:  Regular rate and rhythm.    Lungs:  Good chest expansion.  Breathing unlabored.    Lymph:  No palpable adenopathy about neck or axilla.    Neck:  Supple.  Normal ROM.  Negative Spurling's for shoulder or arm pain.    Right upper extremity:  Skin benign and intact without evidence for swelling, masses or atrophy.  No palpable masses.  Moderate anterior tenderness.  Shoulder ROM limited and uncomfortable.  No evident instability or apprehension.  Weakness with elevation in the scapular plane and external rotation.  Deltoid fires on exam.  Good strength in the lower arm and hand.  Intact sensation throughout the arm and hand palpable radial pulse with brisk cap refill.    Right lower extremity: She has some edema in her ankle and chronic venous stasis change in the skin.  It does not look like cellulitis to me.      Diagnostic Tests:  Lab Results   Component Value Date    GLUCOSE 113 (H) 05/23/2025    CALCIUM 9.6 05/23/2025     05/23/2025    " K 3.3 (L) 05/23/2025    CO2 33.6 (H) 05/23/2025    CL 95 (L) 05/23/2025    BUN 16 05/23/2025    CREATININE 1.02 (H) 05/23/2025    EGFRIFAFRI >60 03/14/2023    EGFRIFNONA 83 02/07/2019    BCR 15.7 05/23/2025    ANIONGAP 11.4 05/23/2025     Lab Results   Component Value Date    WBC 11.88 (H) 05/23/2025    HGB 11.2 (L) 05/23/2025    HCT 33.5 (L) 05/23/2025    MCV 91.3 05/23/2025     05/23/2025     Lab Results   Component Value Date    INR 1.53 (H) 03/27/2025    INR 1.87 (H) 03/08/2025    INR 1.63 (H) 03/06/2025    PROTIME 18.4 (H) 03/27/2025    PROTIME 21.6 (H) 03/08/2025    PROTIME 19.4 (H) 03/06/2025       Imaging: AP, scapular Y and axillary views right shoulder are ordered and reviewed to evaluate her complaint and for presurgical planning.  These are compared to previous x-rays and her MRI.  She appears to have advanced glenohumeral osteoarthritis with bone-on-bone.  Her MRI confirms the same along with extensive rotator cuff tendinopathy.    Assessment:  Right shoulder osteoarthritis with rotator cuff tendinopathy    Plan: First of all, we talked about potentially delaying her surgery due to both the upcoming MRI and her leg.  She absolutely does not want to reschedule.  She says that she is miserable.  She says that she has been fully cleared by her medical team including the GI doctors and they did not consider that there was a reason to delay the surgery due to the MRI.  To me, her leg looks like chronic venous stasis change and not cellulitis.  She says that it appears no different to her that anytime in the past few years.  She had some edema when she saw her PCP but they got a duplex and it was negative.  She says the edema is now improved.  Obviously if this is getting worse then we would need to delay the surgery but as long as there is no cellulitis, I am fine to proceed.  I do suggest she continue the prescribed antibiotics just as a precaution but unless this gets worse, I am okay to  proceed.    We had a thorough discussion regarding the risks, benefits and alternatives to an arthroplasty and non-surgical management versus surgery.  I explained that surgical risks include infection, hematoma, hardware related complications including failure of fixation, loosening, fracture, persistent pain and/or loss of motion, iatrogenic nerve and/or blood vessel injury resulting in permanent weakness, numbness or dysfunction, DVT, PE, positioning related neuropraxia, and anesthesia related complications resulting in death.  We discussed the indication for a reverse as opposed to a standard total shoulder and the risks inherent to the reverse including notching, glenoid loosening, instability, and traction related neuropraxia, any of which could result in persistent pain or problems requiring further surgery.  Lastly, we discussed the rehab and all that will be expected of the patient post operatively to ensure an optimal outcome.  Obviously due to her medical history, her risk are going to be elevated.  She voiced understanding of the risks, benefits, and alternative forms of treatment that were discussed and the patient consents to proceed with the reverse arthroplasty.  She denies any history of allergies to metal or metal components.  Denies history of DVT, PE or coagulopathy.    Maxwell Lanier MD  05/30/25

## 2025-05-30 NOTE — H&P (VIEW-ONLY)
History & Physical         Patient: Filomena Liu    YOB: 1960    Medical Record Number: [id number]    Chief Complaint   Patient presents with    Right Shoulder - Pre-op Exam     History of Present Illness: 64 y.o. female comes in today for upcoming shoulder replacement surgery. Reports a long history of progressively worsening pain, motion loss, and dysfunction.  Describes current pain as severe.  She says the shoulder is making her miserable.  Of note, in reviewing medical records, her PCP did recently put her on Bactrim for her leg.  There was some concern for possible cellulitis.  She has chronic edema and chronic venous stasis change.  She says the leg looks about the same as it has in the past.  She denies any fevers, chills, sweats or other complaints.  She says that both her PCP and her pulmonologist have cleared her for the surgery.  She is scheduled for an MRI of her abdomen next week prior to the surgery.  She says that her GI doctor told her that this is not an urgent issue and that she does not need to delay the surgery.    Allergies   Allergen Reactions    Wound Dressing Adhesive Other (See Comments)     SKIN TEARS AND RASH- PAPER TAPE OK       Current Outpatient Medications:     albuterol (PROVENTIL HFA;VENTOLIN HFA) 108 (90 Base) MCG/ACT inhaler, Inhale 2 puffs 3 times a day. Indications: Spasm of Lung Air Passages, Disp: , Rfl:     atenolol (TENORMIN) 25 MG tablet, Take 1 tablet by mouth Every Night., Disp: , Rfl:     Cholecalciferol 25 MCG (1000 UT) tablet, Take 1 tablet by mouth 1 (One) Time Per Week. HOLDING AS OF 5-23-25 FOR HIGH VIT D LEVELS  Indications: Vitamin D Deficiency, Disp: , Rfl:     fluticasone (FLONASE) 50 MCG/ACT nasal spray, Administer 1 spray into the nostril(s) as directed by provider Daily. Indications: Stuffy Nose, Disp: , Rfl:     ipratropium-albuterol (DUO-NEB) 0.5-2.5 mg/3 ml nebulizer, Take 3 mL by nebulization 4 (Four) Times a Day. Indications:  Chronic Obstructive Lung Disease, Disp: , Rfl:     oxyCODONE-acetaminophen (PERCOCET) 7.5-325 MG per tablet, Take 1 tablet by mouth Every 6 (Six) Hours., Disp: , Rfl:     pantoprazole (PROTONIX) 40 MG EC tablet, Take 1 tablet by mouth 2 (Two) Times a Day for 60 days. (Patient taking differently: Take 1 tablet by mouth Daily.), Disp: 60 tablet, Rfl: 1    pregabalin (LYRICA) 25 MG capsule, Take 1 capsule by mouth Every 12 (Twelve) Hours., Disp: , Rfl:     rOPINIRole (REQUIP) 1 MG tablet, Take 1 tablet by mouth every night at bedtime. Indications: Restless Leg Syndrome, Disp: , Rfl:     spironolactone (ALDACTONE) 25 MG tablet, Take 1 tablet by mouth Daily., Disp: 30 tablet, Rfl: 0    sulfamethoxazole-trimethoprim (BACTRIM DS,SEPTRA DS) 800-160 MG per tablet, Take 1 tablet by mouth., Disp: , Rfl:     torsemide (DEMADEX) 20 MG tablet, Take 1 tablet by mouth Daily. Indications: Cardiac Failure, Edema, High Blood Pressure, Disp: 30 tablet, Rfl: 0    Past Medical History:   Diagnosis Date    Abdominal aneurysm     DR. JOYCE FOLLOWING 3.5CM    Alcoholism 1985    Anemia 08/01/24    HAS HAD IRON INFUSIONS ALMOST YEARLY    Anxiety     Arthritis of back 2000    Asthma 2015    At high risk for falls     Basal cell carcinoma     Bruises easily     Chronic kidney disease 8/5/24    Cirrhosis 8/5/24    COPD (chronic obstructive pulmonary disease)     MANAGED BY PRIMARY CARE    CTS (carpal tunnel syndrome) Surgery 1999    DDD (degenerative disc disease), cervical     Depression     Diverticulitis of colon Unknown    Fatty liver 2022    GERD (gastroesophageal reflux disease)     GI (gastrointestinal bleed) 8/5/24    History of anemia     History of MRSA infection     LEFT ANKLE TREAT [facility]  5YEARS GO    Hyperlipidemia ?    Hypertension ?    Low back strain Unknown    Lower leg edema     Lumbosacral disc disease 2024    Neck strain 2000    Neuropathy     Pancreatitis 2024    Panic attacks     Scoliosis Unknown    Shoulder arthritis  06/05/2023    Shoulder pain, left 07/07/2023    Sleep apnea     NO MACHINE USE    Spinal stenosis     Spondylolisthesis of cervical region     Steatosis of liver     Tachycardia     Tendinitis of knee 2022    Thoracic disc disorder 2000    Vertigo           Past Surgical History:   Procedure Laterality Date    BACK SURGERY      cervical disc surgery with fusion-    CARPAL TUNNEL RELEASE Bilateral     CATARACT EXTRACTION      CHOLECYSTECTOMY  1/1/20    COLONOSCOPY      COLONOSCOPY N/A 11/12/2020    Procedure: COLONOSCOPY, polypectomy;  Surgeon: Filomena Mckeon MD;  [facility]: [facility];  Service: Gastroenterology;  Laterality: N/A;  Diverticulosis; Hepatic flexure polyp x 1; Polyp at 60cm x 1; Polyp at 50cm x 1- hot snare;    COLONOSCOPY N/A 04/15/2024    Procedure: COLONOSCOPY into sigmoid colon with hot snare polypectomy;  Surgeon: Leatha Johns MD;  Location: [facility] ENDOSCOPY;  Service: General;  Laterality: N/A;  pre- history of polyps  post- diverticulosis, polyp    COLONOSCOPY N/A 05/07/2025    Procedure: COLONOSCOPY to cecum and ti with hot snare polypectomies;  Surgeon: Ana Guerrero MD;  Location: [facility] ENDOSCOPY;  Service: Gastroenterology;  Laterality: N/A;  PRE: IRON DEFICIENCY ANEMIA  POST: diverticulosis, polyps, hemorrhoids    ENDOSCOPY N/A 08/15/2024    Procedure: ESOPHAGOGASTRODUODENOSCOPY;  Surgeon: Garth Constantino MD;  Location: [facility] ENDOSCOPY;  Service: Gastroenterology;  Laterality: N/A;  PRE- CIRRHOSIS, DARK STOOL  POST- NORMAL    ENDOSCOPY N/A 05/07/2025    Procedure: ESOPHAGOGASTRODUODENOSCOPY;  Surgeon: Ana Guerrero MD;  Location: [facility] ENDOSCOPY;  Service: Gastroenterology;  Laterality: N/A;  PRE: CIRRHOSIS  POST:portal hypertensive gastropathy; esophagitis; hiatal hernia; varices    HYSTERECTOMY  1998    INCISION AND DRAINAGE LEG Left 11/17/2018    Procedure: Incision and Drainage of Left ankle;  Surgeon: Maxwell Lanier MD;  Location: [facility] OR;  Service:  Orthopedics    NECK SURGERY  2000, 2005,2017    SIGMOIDOSCOPY N/A 03/28/2024    Procedure: SIGMOIDOSCOPY FLEXIBLE;  Surgeon: Leatha Johns MD;  Location: [facility] ENDOSCOPY;  Service: General;  Laterality: N/A;  pre: h/o colon polyps  post: poor prep, diverticulosis    SKIN BIOPSY      SKIN GRAFT SPLIT THICKNESS N/A 02/12/2019    Procedure: debridement SKIN GRAFT SPLIT THICKNESS left ankle wound;  Surgeon: Barry Worthy MD;  Location:  [facility];  Service: Plastics    TONGUE LESION EXCISION/BIOPSY N/A 11/26/2024    Procedure: WIDE LOCAL EXCISION OF TONGUE LESION;  Surgeon: El Mercedes MD;  [place] [facility] [place];  Service: ENT;  Laterality: N/A;    TOTAL SHOULDER ARTHROPLASTY Left 07/20/2023    Procedure: Total  shoulder arthroplasty;  Surgeon: Maxwell Lanier MD;  Location:  [facility];  Service: Orthopedics;  Laterality: Left;    TOTAL THYMECTOMY      TRIGGER POINT INJECTION  [id number]    UPPER GASTROINTESTINAL ENDOSCOPY  8/10/24    WRIST FRACTURE SURGERY      WRIST FRACTURE SURGERY Left           Social History     Occupational History     Employer: DISABLED   Tobacco Use    Smoking status: Never     Passive exposure: Current    Smokeless tobacco: Never    Tobacco comments:     PATIENT STATES SHE IS CURRENTLY TRYING TO QUIT. STATES SHE IS SMOKING APPROX. 4-8 CIGARETTES PER DAY AS OF 5-23-25   Vaping Use    Vaping status: Never Used   Substance and Sexual Activity    Alcohol use: Not Currently     Alcohol/week: 10.0 standard drinks of alcohol     Comment: PATIENT STATES SHE IS 80 DAYS SOBER AS OF 5-23-25    Drug use: No    Sexual activity: Yes     Partners: Male     Birth control/protection: Post-menopausal, Tubal ligation, Hysterectomy, Surgical      Social History     Social History Narrative    Not on file          Family History   Problem Relation Age of Onset    Emphysema Mother     Alzheimer's disease Father     Cancer Father     Osteoporosis Sister     Scoliosis Sister      "Malig Hyperthermia Neg Hx        Review of Systems:     Constitutional:  Denies fever, shaking or chills   Eyes:  Denies change in visual acuity   HEENT:  Denies nasal congestion or sore throat   Respiratory:  Denies cough or shortness of breath   Cardiovascular:  Denies chest pain or edema   GI:  Denies abdominal pain, nausea, vomiting, bloody stools or diarrhea   Musculoskeletal:  Denies numbness, tingling or loss of motor function except as outlined above in history of present illness.  Integument:  Denies rash, lesion or ulceration   Neurologic:  Denies headache or focal weakness, deficits      All other pertinent positives and negatives as noted above in HPI.    Physical Exam: 64 y.o. female    Vitals:    05/30/25 1335   Temp: 98.9 °F (37.2 °C)   TempSrc: Temporal   Weight: 74.4 kg (164 lb)   Height: 149.9 cm (59.02\")     General:  Patient is awake and alert.  Appears in no acute distress or discomfort.    Psych:  Affect and demeanor are appropriate.    Cardiovascular:  Regular rate and rhythm.    Lungs:  Good chest expansion.  Breathing unlabored.    Lymph:  No palpable adenopathy about neck or axilla.    Neck:  Supple.  Normal ROM.  Negative Spurling's for shoulder or arm pain.    Right upper extremity:  Skin benign and intact without evidence for swelling, masses or atrophy.  No palpable masses.  Moderate anterior tenderness.  Shoulder ROM limited and uncomfortable.  No evident instability or apprehension.  Weakness with elevation in the scapular plane and external rotation.  Deltoid fires on exam.  Good strength in the lower arm and hand.  Intact sensation throughout the arm and hand palpable radial pulse with brisk cap refill.    Right lower extremity: She has some edema in her ankle and chronic venous stasis change in the skin.  It does not look like cellulitis to me.      Diagnostic Tests:  Lab Results   Component Value Date    GLUCOSE 113 (H) 05/23/2025    CALCIUM 9.6 05/23/2025     05/23/2025    " K 3.3 (L) 05/23/2025    CO2 33.6 (H) 05/23/2025    CL 95 (L) 05/23/2025    BUN 16 05/23/2025    CREATININE 1.02 (H) 05/23/2025    EGFRIFAFRI >60 03/14/2023    EGFRIFNONA 83 02/07/2019    BCR 15.7 05/23/2025    ANIONGAP 11.4 05/23/2025     Lab Results   Component Value Date    WBC 11.88 (H) 05/23/2025    HGB 11.2 (L) 05/23/2025    HCT 33.5 (L) 05/23/2025    MCV 91.3 05/23/2025     05/23/2025     Lab Results   Component Value Date    INR 1.53 (H) 03/27/2025    INR 1.87 (H) 03/08/2025    INR 1.63 (H) 03/06/2025    PROTIME 18.4 (H) 03/27/2025    PROTIME 21.6 (H) 03/08/2025    PROTIME 19.4 (H) 03/06/2025       Imaging: AP, scapular Y and axillary views right shoulder are ordered and reviewed to evaluate her complaint and for presurgical planning.  These are compared to previous x-rays and her MRI.  She appears to have advanced glenohumeral osteoarthritis with bone-on-bone.  Her MRI confirms the same along with extensive rotator cuff tendinopathy.    Assessment:  Right shoulder osteoarthritis with rotator cuff tendinopathy    Plan: First of all, we talked about potentially delaying her surgery due to both the upcoming MRI and her leg.  She absolutely does not want to reschedule.  She says that she is miserable.  She says that she has been fully cleared by her medical team including the GI doctors and they did not consider that there was a reason to delay the surgery due to the MRI.  To me, her leg looks like chronic venous stasis change and not cellulitis.  She says that it appears no different to her that anytime in the past few years.  She had some edema when she saw her PCP but they got a duplex and it was negative.  She says the edema is now improved.  Obviously if this is getting worse then we would need to delay the surgery but as long as there is no cellulitis, I am fine to proceed.  I do suggest she continue the prescribed antibiotics just as a precaution but unless this gets worse, I am okay to  proceed.    We had a thorough discussion regarding the risks, benefits and alternatives to an arthroplasty and non-surgical management versus surgery.  I explained that surgical risks include infection, hematoma, hardware related complications including failure of fixation, loosening, fracture, persistent pain and/or loss of motion, iatrogenic nerve and/or blood vessel injury resulting in permanent weakness, numbness or dysfunction, DVT, PE, positioning related neuropraxia, and anesthesia related complications resulting in death.  We discussed the indication for a reverse as opposed to a standard total shoulder and the risks inherent to the reverse including notching, glenoid loosening, instability, and traction related neuropraxia, any of which could result in persistent pain or problems requiring further surgery.  Lastly, we discussed the rehab and all that will be expected of the patient post operatively to ensure an optimal outcome.  Obviously due to her medical history, her risk are going to be elevated.  She voiced understanding of the risks, benefits, and alternative forms of treatment that were discussed and the patient consents to proceed with the reverse arthroplasty.  She denies any history of allergies to metal or metal components.  Denies history of DVT, PE or coagulopathy.    Maxwell Lanier MD  05/30/25

## 2025-06-02 ENCOUNTER — HOSPITAL ENCOUNTER (OUTPATIENT)
Dept: MRI IMAGING | Facility: HOSPITAL | Age: 65
Discharge: HOME | End: 2025-06-02
Payer: MEDICARE

## 2025-06-02 DIAGNOSIS — R97.8 ELEVATED CA 19-9 LEVEL: ICD-10-CM

## 2025-06-02 DIAGNOSIS — R93.3 ABNORMAL FINDING ON GI TRACT IMAGING: ICD-10-CM

## 2025-06-02 PROCEDURE — 25510000002 GADOBENATE DIMEGLUMINE 529 MG/ML SOLUTION

## 2025-06-02 PROCEDURE — 74183 MRI ABD W/O CNTR FLWD CNTR: CPT

## 2025-06-02 PROCEDURE — A9577 INJ MULTIHANCE: HCPCS

## 2025-06-02 RX ADMIN — GADOBENATE DIMEGLUMINE 15 ML: 529 INJECTION, SOLUTION INTRAVENOUS at 09:02

## 2025-06-05 ENCOUNTER — ANESTHESIA EVENT (OUTPATIENT)
Dept: PERIOP | Facility: HOSPITAL | Age: 65
End: 2025-06-05
Payer: MEDICARE

## 2025-06-05 ENCOUNTER — ANESTHESIA (OUTPATIENT)
Dept: PERIOP | Facility: HOSPITAL | Age: 65
End: 2025-06-05
Payer: MEDICARE

## 2025-06-05 ENCOUNTER — APPOINTMENT (OUTPATIENT)
Dept: GENERAL RADIOLOGY | Facility: HOSPITAL | Age: 65
End: 2025-06-05
Payer: MEDICARE

## 2025-06-05 ENCOUNTER — HOSPITAL ENCOUNTER (OUTPATIENT)
Facility: HOSPITAL | Age: 65
Discharge: HOME | End: 2025-06-05
Attending: ORTHOPAEDIC SURGERY | Admitting: ORTHOPAEDIC SURGERY
Payer: MEDICARE

## 2025-06-05 VITALS
DIASTOLIC BLOOD PRESSURE: 64 MMHG | RESPIRATION RATE: 16 BRPM | HEART RATE: 104 BPM | TEMPERATURE: 97.9 F | SYSTOLIC BLOOD PRESSURE: 98 MMHG | OXYGEN SATURATION: 100 %

## 2025-06-05 DIAGNOSIS — Z96.611 S/P REVERSE TOTAL SHOULDER ARTHROPLASTY, RIGHT: Primary | ICD-10-CM

## 2025-06-05 DIAGNOSIS — M19.011 ARTHRITIS OF RIGHT SHOULDER: ICD-10-CM

## 2025-06-05 DIAGNOSIS — Z78.9 DECREASED ACTIVITIES OF DAILY LIVING (ADL): ICD-10-CM

## 2025-06-05 PROCEDURE — 25010000002 DEXAMETHASONE SODIUM PHOSPHATE 20 MG/5ML SOLUTION: Performed by: NURSE ANESTHETIST, CERTIFIED REGISTERED

## 2025-06-05 PROCEDURE — C1776 JOINT DEVICE (IMPLANTABLE): HCPCS | Performed by: ORTHOPAEDIC SURGERY

## 2025-06-05 PROCEDURE — C1713 ANCHOR/SCREW BN/BN,TIS/BN: HCPCS | Performed by: ORTHOPAEDIC SURGERY

## 2025-06-05 PROCEDURE — 25010000002 MIDAZOLAM PER 1 MG: Performed by: ANESTHESIOLOGY

## 2025-06-05 PROCEDURE — 23472 RECONSTRUCT SHOULDER JOINT: CPT | Performed by: ORTHOPAEDIC SURGERY

## 2025-06-05 PROCEDURE — 25010000002 HYDROMORPHONE PER 4 MG: Performed by: NURSE ANESTHETIST, CERTIFIED REGISTERED

## 2025-06-05 PROCEDURE — 25010000002 ONDANSETRON PER 1 MG: Performed by: NURSE ANESTHETIST, CERTIFIED REGISTERED

## 2025-06-05 PROCEDURE — 25810000003 SODIUM CHLORIDE 0.9 % SOLUTION 250 ML FLEX CONT: Performed by: ORTHOPAEDIC SURGERY

## 2025-06-05 PROCEDURE — 97166 OT EVAL MOD COMPLEX 45 MIN: CPT

## 2025-06-05 PROCEDURE — 25010000002 CEFAZOLIN PER 500 MG: Performed by: ORTHOPAEDIC SURGERY

## 2025-06-05 PROCEDURE — 25010000002 VANCOMYCIN HCL 1.25 G RECONSTITUTED SOLUTION 1 EACH VIAL: Performed by: ORTHOPAEDIC SURGERY

## 2025-06-05 PROCEDURE — 25010000002 LIDOCAINE PF 2% 2 % SOLUTION: Performed by: NURSE ANESTHETIST, CERTIFIED REGISTERED

## 2025-06-05 PROCEDURE — 97535 SELF CARE MNGMENT TRAINING: CPT

## 2025-06-05 PROCEDURE — 25810000003 LACTATED RINGERS PER 1000 ML: Performed by: ANESTHESIOLOGY

## 2025-06-05 PROCEDURE — 25010000002 PROPOFOL 200 MG/20ML EMULSION: Performed by: NURSE ANESTHETIST, CERTIFIED REGISTERED

## 2025-06-05 PROCEDURE — 25010000002 SUGAMMADEX 200 MG/2ML SOLUTION: Performed by: NURSE ANESTHETIST, CERTIFIED REGISTERED

## 2025-06-05 PROCEDURE — 23472 RECONSTRUCT SHOULDER JOINT: CPT | Performed by: TECHNICIAN, OTHER

## 2025-06-05 PROCEDURE — 25010000002 FAMOTIDINE 10 MG/ML SOLUTION: Performed by: ANESTHESIOLOGY

## 2025-06-05 PROCEDURE — 73020 X-RAY EXAM OF SHOULDER: CPT

## 2025-06-05 DEVICE — IMPLANTABLE DEVICE
Type: IMPLANTABLE DEVICE | Site: SHOULDER | Status: FUNCTIONAL
Brand: COMPREHENSIVE® REVERSE SHOULDER

## 2025-06-05 DEVICE — IMPLANTABLE DEVICE
Type: IMPLANTABLE DEVICE | Site: SHOULDER | Status: FUNCTIONAL
Brand: COMPREHENSIVE REVERSE SHOULDER

## 2025-06-05 DEVICE — IMPLANTABLE DEVICE
Type: IMPLANTABLE DEVICE | Site: SHOULDER | Status: FUNCTIONAL
Brand: COMPREHENSIVE®

## 2025-06-05 DEVICE — IMPLANTABLE DEVICE
Type: IMPLANTABLE DEVICE | Site: SHOULDER | Status: FUNCTIONAL
Brand: COMPREHENSIVE® VIVACIT-E®

## 2025-06-05 DEVICE — DEV CONTRL TISS STRATAFIX SPIRAL MNCRYL UD 3/0 PLS 30CM: Type: IMPLANTABLE DEVICE | Site: SHOULDER | Status: FUNCTIONAL

## 2025-06-05 DEVICE — BLUE CO-BRAID POLYETHYLENE SIZE 2 38" C-7 NEEDLE BIOMET
Type: IMPLANTABLE DEVICE | Site: SHOULDER | Status: FUNCTIONAL
Brand: TELEFLEX

## 2025-06-05 DEVICE — TOTL SHLDER REV: Type: IMPLANTABLE DEVICE | Status: FUNCTIONAL

## 2025-06-05 DEVICE — IMPLANTABLE DEVICE
Type: IMPLANTABLE DEVICE | Site: SHOULDER | Status: FUNCTIONAL
Brand: SURGIFOAM® ABSORBABLE GELATIN SPONGE, U.S.P.

## 2025-06-05 RX ORDER — LABETALOL HYDROCHLORIDE 5 MG/ML
5 INJECTION, SOLUTION INTRAVENOUS
Status: DISCONTINUED | OUTPATIENT
Start: 2025-06-05 | End: 2025-06-05 | Stop reason: HOSPADM

## 2025-06-05 RX ORDER — ONDANSETRON 2 MG/ML
4 INJECTION INTRAMUSCULAR; INTRAVENOUS ONCE AS NEEDED
Status: DISCONTINUED | OUTPATIENT
Start: 2025-06-05 | End: 2025-06-05 | Stop reason: HOSPADM

## 2025-06-05 RX ORDER — OXYCODONE AND ACETAMINOPHEN 7.5; 325 MG/1; MG/1
1 TABLET ORAL EVERY 4 HOURS PRN
Qty: 30 TABLET | Refills: 0 | Status: SHIPPED | OUTPATIENT
Start: 2025-06-05 | End: 2025-06-11 | Stop reason: SDUPTHER

## 2025-06-05 RX ORDER — HYDROCODONE BITARTRATE AND ACETAMINOPHEN 5; 325 MG/1; MG/1
1 TABLET ORAL ONCE AS NEEDED
Status: DISCONTINUED | OUTPATIENT
Start: 2025-06-05 | End: 2025-06-05 | Stop reason: HOSPADM

## 2025-06-05 RX ORDER — NALOXONE HCL 0.4 MG/ML
0.2 VIAL (ML) INJECTION AS NEEDED
Status: DISCONTINUED | OUTPATIENT
Start: 2025-06-05 | End: 2025-06-05 | Stop reason: HOSPADM

## 2025-06-05 RX ORDER — FAMOTIDINE 10 MG/ML
20 INJECTION, SOLUTION INTRAVENOUS ONCE
Status: COMPLETED | OUTPATIENT
Start: 2025-06-05 | End: 2025-06-05

## 2025-06-05 RX ORDER — FENTANYL CITRATE 50 UG/ML
50 INJECTION, SOLUTION INTRAMUSCULAR; INTRAVENOUS
Status: DISCONTINUED | OUTPATIENT
Start: 2025-06-05 | End: 2025-06-05 | Stop reason: HOSPADM

## 2025-06-05 RX ORDER — HYDRALAZINE HYDROCHLORIDE 20 MG/ML
5 INJECTION INTRAMUSCULAR; INTRAVENOUS
Status: DISCONTINUED | OUTPATIENT
Start: 2025-06-05 | End: 2025-06-05 | Stop reason: HOSPADM

## 2025-06-05 RX ORDER — FLUMAZENIL 0.1 MG/ML
0.2 INJECTION INTRAVENOUS AS NEEDED
Status: DISCONTINUED | OUTPATIENT
Start: 2025-06-05 | End: 2025-06-05 | Stop reason: HOSPADM

## 2025-06-05 RX ORDER — PHENYLEPHRINE HCL IN 0.9% NACL 1 MG/10 ML
SYRINGE (ML) INTRAVENOUS AS NEEDED
Status: DISCONTINUED | OUTPATIENT
Start: 2025-06-05 | End: 2025-06-05 | Stop reason: SURG

## 2025-06-05 RX ORDER — MAGNESIUM HYDROXIDE 1200 MG/15ML
LIQUID ORAL AS NEEDED
Status: DISCONTINUED | OUTPATIENT
Start: 2025-06-05 | End: 2025-06-05 | Stop reason: HOSPADM

## 2025-06-05 RX ORDER — PROMETHAZINE HYDROCHLORIDE 25 MG/1
25 TABLET ORAL ONCE AS NEEDED
Status: DISCONTINUED | OUTPATIENT
Start: 2025-06-05 | End: 2025-06-05 | Stop reason: HOSPADM

## 2025-06-05 RX ORDER — TRANEXAMIC ACID 100 MG/ML
INJECTION, SOLUTION INTRAVENOUS AS NEEDED
Status: DISCONTINUED | OUTPATIENT
Start: 2025-06-05 | End: 2025-06-05 | Stop reason: SURG

## 2025-06-05 RX ORDER — LIDOCAINE HYDROCHLORIDE 10 MG/ML
0.5 INJECTION, SOLUTION INFILTRATION; PERINEURAL ONCE AS NEEDED
Status: DISCONTINUED | OUTPATIENT
Start: 2025-06-05 | End: 2025-06-05 | Stop reason: HOSPADM

## 2025-06-05 RX ORDER — SODIUM CHLORIDE 0.9 % (FLUSH) 0.9 %
3 SYRINGE (ML) INJECTION EVERY 12 HOURS SCHEDULED
Status: DISCONTINUED | OUTPATIENT
Start: 2025-06-05 | End: 2025-06-05 | Stop reason: HOSPADM

## 2025-06-05 RX ORDER — ROCURONIUM BROMIDE 10 MG/ML
INJECTION, SOLUTION INTRAVENOUS AS NEEDED
Status: DISCONTINUED | OUTPATIENT
Start: 2025-06-05 | End: 2025-06-05 | Stop reason: SURG

## 2025-06-05 RX ORDER — SODIUM CHLORIDE 0.9 % (FLUSH) 0.9 %
3-10 SYRINGE (ML) INJECTION AS NEEDED
Status: DISCONTINUED | OUTPATIENT
Start: 2025-06-05 | End: 2025-06-05 | Stop reason: HOSPADM

## 2025-06-05 RX ORDER — IPRATROPIUM BROMIDE AND ALBUTEROL SULFATE 2.5; .5 MG/3ML; MG/3ML
3 SOLUTION RESPIRATORY (INHALATION) ONCE AS NEEDED
Status: DISCONTINUED | OUTPATIENT
Start: 2025-06-05 | End: 2025-06-05 | Stop reason: HOSPADM

## 2025-06-05 RX ORDER — PROMETHAZINE HYDROCHLORIDE 25 MG/1
25 SUPPOSITORY RECTAL ONCE AS NEEDED
Status: DISCONTINUED | OUTPATIENT
Start: 2025-06-05 | End: 2025-06-05 | Stop reason: HOSPADM

## 2025-06-05 RX ORDER — DIPHENHYDRAMINE HYDROCHLORIDE 50 MG/ML
12.5 INJECTION, SOLUTION INTRAMUSCULAR; INTRAVENOUS
Status: DISCONTINUED | OUTPATIENT
Start: 2025-06-05 | End: 2025-06-05 | Stop reason: HOSPADM

## 2025-06-05 RX ORDER — PROPOFOL 10 MG/ML
INJECTION, EMULSION INTRAVENOUS AS NEEDED
Status: DISCONTINUED | OUTPATIENT
Start: 2025-06-05 | End: 2025-06-05 | Stop reason: SURG

## 2025-06-05 RX ORDER — DEXAMETHASONE SODIUM PHOSPHATE 4 MG/ML
INJECTION, SOLUTION INTRA-ARTICULAR; INTRALESIONAL; INTRAMUSCULAR; INTRAVENOUS; SOFT TISSUE AS NEEDED
Status: DISCONTINUED | OUTPATIENT
Start: 2025-06-05 | End: 2025-06-05 | Stop reason: SURG

## 2025-06-05 RX ORDER — DROPERIDOL 2.5 MG/ML
0.62 INJECTION, SOLUTION INTRAMUSCULAR; INTRAVENOUS
Status: DISCONTINUED | OUTPATIENT
Start: 2025-06-05 | End: 2025-06-05 | Stop reason: HOSPADM

## 2025-06-05 RX ORDER — OXYCODONE AND ACETAMINOPHEN 7.5; 325 MG/1; MG/1
2 TABLET ORAL EVERY 4 HOURS PRN
Refills: 0 | Status: CANCELLED | OUTPATIENT
Start: 2025-06-05 | End: 2025-06-10

## 2025-06-05 RX ORDER — HYDROMORPHONE HYDROCHLORIDE 1 MG/ML
0.5 INJECTION, SOLUTION INTRAMUSCULAR; INTRAVENOUS; SUBCUTANEOUS
Status: DISCONTINUED | OUTPATIENT
Start: 2025-06-05 | End: 2025-06-05 | Stop reason: HOSPADM

## 2025-06-05 RX ORDER — LORAZEPAM 1 MG/1
1 TABLET ORAL DAILY
Status: ON HOLD | COMMUNITY
Start: 2025-05-29 | End: 2025-06-05

## 2025-06-05 RX ORDER — ATROPINE SULFATE 0.4 MG/ML
0.4 INJECTION, SOLUTION INTRAMUSCULAR; INTRAVENOUS; SUBCUTANEOUS ONCE AS NEEDED
Status: DISCONTINUED | OUTPATIENT
Start: 2025-06-05 | End: 2025-06-05 | Stop reason: HOSPADM

## 2025-06-05 RX ORDER — EPHEDRINE SULFATE 50 MG/ML
5 INJECTION, SOLUTION INTRAVENOUS ONCE AS NEEDED
Status: DISCONTINUED | OUTPATIENT
Start: 2025-06-05 | End: 2025-06-05 | Stop reason: HOSPADM

## 2025-06-05 RX ORDER — LIDOCAINE HYDROCHLORIDE 20 MG/ML
INJECTION, SOLUTION EPIDURAL; INFILTRATION; INTRACAUDAL; PERINEURAL AS NEEDED
Status: DISCONTINUED | OUTPATIENT
Start: 2025-06-05 | End: 2025-06-05 | Stop reason: SURG

## 2025-06-05 RX ORDER — OXYCODONE AND ACETAMINOPHEN 7.5; 325 MG/1; MG/1
1 TABLET ORAL EVERY 4 HOURS PRN
Status: DISCONTINUED | OUTPATIENT
Start: 2025-06-05 | End: 2025-06-05 | Stop reason: HOSPADM

## 2025-06-05 RX ORDER — MIDAZOLAM HYDROCHLORIDE 1 MG/ML
1 INJECTION, SOLUTION INTRAMUSCULAR; INTRAVENOUS
Status: COMPLETED | OUTPATIENT
Start: 2025-06-05 | End: 2025-06-05

## 2025-06-05 RX ORDER — SODIUM CHLORIDE, SODIUM LACTATE, POTASSIUM CHLORIDE, CALCIUM CHLORIDE 600; 310; 30; 20 MG/100ML; MG/100ML; MG/100ML; MG/100ML
9 INJECTION, SOLUTION INTRAVENOUS CONTINUOUS
Status: DISCONTINUED | OUTPATIENT
Start: 2025-06-05 | End: 2025-06-05 | Stop reason: HOSPADM

## 2025-06-05 RX ORDER — ONDANSETRON 4 MG/1
4 TABLET, FILM COATED ORAL EVERY 8 HOURS PRN
Qty: 12 TABLET | Refills: 0 | Status: SHIPPED | OUTPATIENT
Start: 2025-06-05

## 2025-06-05 RX ORDER — ACETAMINOPHEN 325 MG/1
1000 TABLET ORAL ONCE
Status: COMPLETED | OUTPATIENT
Start: 2025-06-05 | End: 2025-06-05

## 2025-06-05 RX ORDER — OXYCODONE AND ACETAMINOPHEN 7.5; 325 MG/1; MG/1
1 TABLET ORAL EVERY 4 HOURS PRN
Refills: 0 | Status: CANCELLED | OUTPATIENT
Start: 2025-06-05 | End: 2025-06-10

## 2025-06-05 RX ORDER — DOCUSATE SODIUM 100 MG/1
100 CAPSULE, LIQUID FILLED ORAL 2 TIMES DAILY
Qty: 60 CAPSULE | Refills: 0 | Status: SHIPPED | OUTPATIENT
Start: 2025-06-05

## 2025-06-05 RX ORDER — TRANEXAMIC ACID 10 MG/ML
1000 INJECTION, SOLUTION INTRAVENOUS ONCE
Status: DISCONTINUED | OUTPATIENT
Start: 2025-06-05 | End: 2025-06-05 | Stop reason: HOSPADM

## 2025-06-05 RX ORDER — HYDROCODONE BITARTRATE AND ACETAMINOPHEN 7.5; 325 MG/1; MG/1
2 TABLET ORAL ONCE AS NEEDED
Status: DISCONTINUED | OUTPATIENT
Start: 2025-06-05 | End: 2025-06-05 | Stop reason: HOSPADM

## 2025-06-05 RX ORDER — ONDANSETRON 2 MG/ML
INJECTION INTRAMUSCULAR; INTRAVENOUS AS NEEDED
Status: DISCONTINUED | OUTPATIENT
Start: 2025-06-05 | End: 2025-06-05 | Stop reason: SURG

## 2025-06-05 RX ORDER — ONDANSETRON 4 MG/1
4 TABLET, ORALLY DISINTEGRATING ORAL ONCE AS NEEDED
Status: DISCONTINUED | OUTPATIENT
Start: 2025-06-05 | End: 2025-06-05 | Stop reason: HOSPADM

## 2025-06-05 RX ORDER — PREGABALIN 75 MG/1
150 CAPSULE ORAL ONCE
Status: COMPLETED | OUTPATIENT
Start: 2025-06-05 | End: 2025-06-05

## 2025-06-05 RX ADMIN — Medication 200 MCG: at 13:23

## 2025-06-05 RX ADMIN — MIDAZOLAM HYDROCHLORIDE 1 MG: 1 INJECTION, SOLUTION INTRAMUSCULAR; INTRAVENOUS at 11:48

## 2025-06-05 RX ADMIN — DEXAMETHASONE SODIUM PHOSPHATE 10 MG: 4 INJECTION, SOLUTION INTRAMUSCULAR; INTRAVENOUS at 12:35

## 2025-06-05 RX ADMIN — ROCURONIUM BROMIDE 50 MG: 10 INJECTION, SOLUTION INTRAVENOUS at 12:14

## 2025-06-05 RX ADMIN — SODIUM CHLORIDE 2 G: 900 INJECTION INTRAVENOUS at 11:56

## 2025-06-05 RX ADMIN — VANCOMYCIN HYDROCHLORIDE 1250 MG: 1.25 INJECTION, POWDER, LYOPHILIZED, FOR SOLUTION INTRAVENOUS at 11:25

## 2025-06-05 RX ADMIN — OXYCODONE AND ACETAMINOPHEN 1 TABLET: 7.5; 325 TABLET ORAL at 14:28

## 2025-06-05 RX ADMIN — HYDROMORPHONE HYDROCHLORIDE 0.5 MG: 1 INJECTION, SOLUTION INTRAMUSCULAR; INTRAVENOUS; SUBCUTANEOUS at 14:20

## 2025-06-05 RX ADMIN — SODIUM CHLORIDE, POTASSIUM CHLORIDE, SODIUM LACTATE AND CALCIUM CHLORIDE 9 ML/HR: 600; 310; 30; 20 INJECTION, SOLUTION INTRAVENOUS at 11:07

## 2025-06-05 RX ADMIN — PROPOFOL 100 MG: 10 INJECTION, EMULSION INTRAVENOUS at 12:14

## 2025-06-05 RX ADMIN — FAMOTIDINE 20 MG: 10 INJECTION INTRAVENOUS at 11:32

## 2025-06-05 RX ADMIN — Medication 200 MCG: at 13:01

## 2025-06-05 RX ADMIN — Medication 100 MCG: at 12:44

## 2025-06-05 RX ADMIN — ONDANSETRON 4 MG: 2 INJECTION, SOLUTION INTRAMUSCULAR; INTRAVENOUS at 13:44

## 2025-06-05 RX ADMIN — Medication 100 MCG: at 12:49

## 2025-06-05 RX ADMIN — MIDAZOLAM HYDROCHLORIDE 1 MG: 1 INJECTION, SOLUTION INTRAMUSCULAR; INTRAVENOUS at 11:32

## 2025-06-05 RX ADMIN — ACETAMINOPHEN 975 MG: 325 TABLET ORAL at 11:06

## 2025-06-05 RX ADMIN — SUGAMMADEX 200 MG: 100 INJECTION, SOLUTION INTRAVENOUS at 13:44

## 2025-06-05 RX ADMIN — PREGABALIN 75 MG: 75 CAPSULE ORAL at 11:06

## 2025-06-05 RX ADMIN — Medication 200 MCG: at 13:12

## 2025-06-05 RX ADMIN — Medication 200 MCG: at 13:31

## 2025-06-05 RX ADMIN — TRANEXAMIC ACID 1000 MG: 100 INJECTION, SOLUTION INTRAVENOUS at 12:49

## 2025-06-05 RX ADMIN — LIDOCAINE HYDROCHLORIDE 100 MG: 20 INJECTION, SOLUTION EPIDURAL; INFILTRATION; INTRACAUDAL; PERINEURAL at 12:14

## 2025-06-05 NOTE — ANESTHESIA PROCEDURE NOTES
Airway  Reason: elective    Date/Time: 6/5/2025 12:21 PM  Airway not difficult    General Information and Staff    Patient location during procedure: OR  Anesthesiologist: Chelsea Meyers MD  CRNA/CAA: Tomasa Dumas CRNA    Indications and Patient Condition  Indications for airway management: airway protection    Preoxygenated: yes    Mask difficulty assessment: 1 - vent by mask    Final Airway Details    Final airway type: endotracheal airway      Successful airway: ETT  Cuffed: yes   Successful intubation technique: direct laryngoscopy  Adjuncts used in placement: intubating stylet  Endotracheal tube insertion site: oral  Blade: Martinez  Blade size: 2  ETT size (mm): 7.0  Cormack-Lehane Classification: grade I - full view of glottis  Placement verified by: chest auscultation and capnometry   Measured from: lips  ETT/EBT  to lips (cm): 20  Number of attempts at approach: 1  Assessment: lips, teeth, and gum same as pre-op and atraumatic intubation    Additional Comments  Atraumatic, MOP to cuff, BSBE, no change to dentition, secured with tape

## 2025-06-05 NOTE — BRIEF OP NOTE
TOTAL SHOULDER REVERSE ARTHROPLASTY  Progress Note    Filomena Liu  6/5/2025    Pre-op Diagnosis:   Arthritis of right shoulder [M19.011]       Post-Op Diagnosis Codes:     * Arthritis of right shoulder [M19.011]    Procedure(s):      Procedure(s):  Right Reverse Total Shoulder Arthroplasty        SURGICAL APPROACH: Deltopectoral    SURGICAL TECHNIQUE: Tenotomy      Surgeon(s):  Maxwell Lanier MD    Anesthesia: General with Block    Staff:   Circulator: Corina Emerson RN  Scrub Person: Alison Armas  Vendor Representative: Tommy Orellana  Assistant: Eden Javed CSA  Assistant: Eden Javed CSA    Estimated Blood Loss: 100ml    Urine Voided: * No values recorded between 6/5/2025 12:02 PM and 6/5/2025  1:31 PM *    Specimens:                None      Drains: * No LDAs found *    Findings: See dictation      Complications: None    Assistant: Eden Javed CSA  was responsible for performing the following activities: Retraction and their skilled assistance was necessary for the success of this case.    Maxwell Lanier MD     Date: 6/5/2025  Time: 13:32 EDT

## 2025-06-05 NOTE — ANESTHESIA POSTPROCEDURE EVALUATION
Patient: Filomena Liu    Procedure Summary       Date: 06/05/25 [facility] / [facility]  [facility]    Anesthesia Start: 1204 Anesthesia Stop: 1400    Procedure: Right Reverse Total Shoulder Arthroplasty (Right: Shoulder) Diagnosis:       Arthritis of right shoulder      (Arthritis of right shoulder [M19.011])    Surgeons: Maxwell Lanier MD Provider: Chelsea Meyers MD    Anesthesia Type: general ASA Status: 3            Anesthesia Type: general    Vitals  Vitals Value Taken Time   /73 06/05/25 14:45   Temp 36.6 °C (97.9 °F) 06/05/25 13:57   Pulse 84 06/05/25 14:47   Resp 16 06/05/25 14:30   SpO2 92 % 06/05/25 14:47   Vitals shown include unfiled device data.        Post Anesthesia Care and Evaluation    Patient location during evaluation: bedside  Patient participation: complete - patient participated  Level of consciousness: awake and alert  Pain management: adequate    Airway patency: patent  Anesthetic complications: No anesthetic complications  PONV Status: controlled  Cardiovascular status: acceptable  Respiratory status: acceptable  Hydration status: acceptable

## 2025-06-05 NOTE — PLAN OF CARE
Goal Outcome Evaluation:  Plan of Care Reviewed With: patient, significant other           Outcome Evaluation: Patient was provided with education and training on the implementation of adaptive ADL strategies to promote safety and maximize independence during postoperative recovery. Instruction included proper donning and doffing of the shoulder sling, adherence to prescribed wear schedule, reinforcement of shoulder precautions, and demonstration of pendulum exercises to maintain early joint mobility. Transfer training was conducted to enhance safety and reduce fall risk during all functional transfers. Recommendations for home modifications were discussed to support a safe discharge environment and facilitate long-term functional independence. Patient demonstrated receptiveness to all education and training, with appropriate verbal and physical participation noted.    Anticipated Discharge Disposition (OT): home with outpatient therapy services

## 2025-06-05 NOTE — THERAPY EVALUATION
Patient Name: Filomena Liu  : 1960    MRN: [id number]                              Today's Date: 2025       Admit Date: 2025    Visit Dx:     ICD-10-CM ICD-9-CM   1. S/P reverse total shoulder arthroplasty, right  Z96.611 V43.61   2. Arthritis of right shoulder  M19.011 716.91   3. Decreased activities of daily living (ADL)  Z78.9 V49.89     Patient Active Problem List   Diagnosis    Mediastinal mass    Cervical stenosis of spinal canal    Cervical radiculopathy    Cellulitis/abscess of left foot    HTN (hypertension)    History of MRSA infection    Anxiety    Peroneal tenosynovitis    Fracture of navicular bone of left foot    Tobacco use    Non compliance with medical treatment    Screening for colon cancer    Osteoporosis    Shoulder arthritis    Primary localized osteoarthrosis of left shoulder region    History of adenomatous polyp of colon    Diverticulosis    Acute GI bleeding    Hyperkalemia    SANDRO (acute kidney injury)    Alcoholism    COPD (chronic obstructive pulmonary disease)    Acute blood loss anemia    Melena    Gastroesophageal reflux disease    Dysphagia    Alcoholic cirrhosis of liver without ascites    Other iron deficiency anemias    Malabsorption of iron    Acute pancreatitis    Diverticulitis    Hypoglycemia    Lactic acidosis    Adverse effect of iron    Iron deficiency anemia    Cirrhosis of liver without ascites    Infrarenal abdominal aortic aneurysm (AAA) without rupture    Arthritis of shoulder     Past Medical History:   Diagnosis Date    Abdominal aneurysm     DR. JOYCE FOLLOWING 3.5CM    Alcoholism 1985    Anemia 24    HAS HAD IRON INFUSIONS ALMOST YEARLY    Anxiety     Arthritis of back     Asthma 2015    At high risk for falls     Basal cell carcinoma     Bruises easily     Chronic kidney disease 24    Cirrhosis 24    COPD (chronic obstructive pulmonary disease)     MANAGED BY PRIMARY CARE    CTS (carpal tunnel syndrome) Surgery     DDD  (degenerative disc disease), cervical     Depression     Diverticulitis of colon Unknown    Fatty liver 2022    GERD (gastroesophageal reflux disease)     GI (gastrointestinal bleed) 8/5/24    History of anemia     History of MRSA infection     LEFT ANKLE TREAT [facility]  5YEARS GO    Hyperlipidemia ?    Hypertension ?    Low back strain Unknown    Lower leg edema     Lumbosacral disc disease 2024    Neck strain 2000    Neuropathy     Pancreatitis 2024    Panic attacks     Scoliosis Unknown    Shoulder arthritis 06/05/2023    Shoulder pain, left 07/07/2023    Sleep apnea     NO MACHINE USE    Spinal stenosis     Spondylolisthesis of cervical region     Steatosis of liver     Tachycardia     Tendinitis of knee 2022    Thoracic disc disorder 2000    Vertigo      Past Surgical History:   Procedure Laterality Date    BACK SURGERY      cervical disc surgery with fusion-    CARPAL TUNNEL RELEASE Bilateral     CATARACT EXTRACTION      CHOLECYSTECTOMY  1/1/20    COLONOSCOPY      COLONOSCOPY N/A 11/12/2020    Procedure: COLONOSCOPY, polypectomy;  Surgeon: Filomena Mckeon MD;  Location: [facility];  Service: Gastroenterology;  Laterality: N/A;  Diverticulosis; Hepatic flexure polyp x 1; Polyp at 60cm x 1; Polyp at 50cm x 1- hot snare;    COLONOSCOPY N/A 04/15/2024    Procedure: COLONOSCOPY into sigmoid colon with hot snare polypectomy;  Surgeon: Leatha Johns MD;  Location: [facility] ENDOSCOPY;  Service: General;  Laterality: N/A;  pre- history of polyps  post- diverticulosis, polyp    COLONOSCOPY N/A 05/07/2025    Procedure: COLONOSCOPY to cecum and ti with hot snare polypectomies;  Surgeon: Ana Guerrero MD;  Location: [facility] ENDOSCOPY;  Service: Gastroenterology;  Laterality: N/A;  PRE: IRON DEFICIENCY ANEMIA  POST: diverticulosis, polyps, hemorrhoids    ENDOSCOPY N/A 08/15/2024    Procedure: ESOPHAGOGASTRODUODENOSCOPY;  Surgeon: Garth Constantino MD;  Location: [facility] ENDOSCOPY;  Service: Gastroenterology;   Laterality: N/A;  PRE- CIRRHOSIS, DARK STOOL  POST- NORMAL    ENDOSCOPY N/A 05/07/2025    Procedure: ESOPHAGOGASTRODUODENOSCOPY;  Surgeon: Ana Guerrero MD;  Location: [facility] ENDOSCOPY;  Service: Gastroenterology;  Laterality: N/A;  PRE: CIRRHOSIS  POST:portal hypertensive gastropathy; esophagitis; hiatal hernia; varices    HYSTERECTOMY  1998    INCISION AND DRAINAGE LEG Left 11/17/2018    Procedure: Incision and Drainage of Left ankle;  Surgeon: Maxwell Lanier MD;  Location: [facility] MAIN OR;  Service: Orthopedics    NECK SURGERY  2000, 2005,2017    SIGMOIDOSCOPY N/A 03/28/2024    Procedure: SIGMOIDOSCOPY FLEXIBLE;  Surgeon: Leatha Johns MD;  Location: [facility] ENDOSCOPY;  Service: General;  Laterality: N/A;  pre: h/o colon polyps  post: poor prep, diverticulosis    SKIN BIOPSY      SKIN GRAFT SPLIT THICKNESS N/A 02/12/2019    Procedure: debridement SKIN GRAFT SPLIT THICKNESS left ankle wound;  Surgeon: Barry Worthy MD;  Location: [facility] OR [facility];  Service: Plastics    TONGUE LESION EXCISION/BIOPSY N/A 11/26/2024    Procedure: WIDE LOCAL EXCISION OF TONGUE LESION;  Surgeon: El Mercedes MD;  Location: [facility] MAIN OR;  Service: ENT;  Laterality: N/A;    TOTAL SHOULDER ARTHROPLASTY Left 07/20/2023    Procedure: Total  shoulder arthroplasty;  Surgeon: Maxwell Lanier MD;  Location: [facility] OR [facility];  Service: Orthopedics;  Laterality: Left;    TOTAL THYMECTOMY      TRIGGER POINT INJECTION  [id number]    UPPER GASTROINTESTINAL ENDOSCOPY  8/10/24    WRIST FRACTURE SURGERY      WRIST FRACTURE SURGERY Left       General Information       Row Name 06/05/25 1558 06/05/25 1551       OT Time and Intention    Document Type therapy note (daily note)  -ES evaluation  -ES    Mode of Treatment individual therapy;occupational therapy  -ES individual therapy;occupational therapy  -ES    Patient Effort good  -ES good  -ES      Row Name 06/05/25 1558 06/05/25 1551       General Information    Patient Profile  Reviewed -- yes  -ES    Prior Level of Function -- independent:;ADL's;all household mobility  Patient independent with ADLs at baseline. RW for community mobility, none in home. Walk in shower, shower chair. Groom in stance. no home O2 use.  -ES    Existing Precautions/Restrictions fall;non-weight bearing;shoulder  -ES shoulder;non-weight bearing  Nonweightbearing R upper extremity, no external rotation, no shoulder flexion>90 °, no external rotation >10°.  Shoulder sling donned.  -ES      Row Name 06/05/25 1551          Living Environment    Current Living Arrangements home  -ES     People in Home significant other  -ES       Row Name 06/05/25 1551          Home Main Entrance    Number of Stairs, Main Entrance two  -ES       Row Name 06/05/25 1551          Stairs Within Home, Primary    Number of Stairs, Within Home, Primary none  -ES       Row Name 06/05/25 1558 06/05/25 1551       Cognition    Orientation Status (Cognition) --  patient and s/o receptive to all education and training provided. All questions addressed. Provided OT packet for reference at time of discharge to all education provided.  -ES oriented x 3  -ES      Row Name 06/05/25 1558 06/05/25 1551       Safety Issues/Impairments Affecting Functional Mobility    Impairments Affecting Function (Mobility) range of motion (ROM);strength;pain  -ES range of motion (ROM);pain;strength  -ES              User Key  (r) = Recorded By, (t) = Taken By, (c) = Cosigned By      Initials Name Provider Type    ES Argenis Temple, OTR/L, CSRS Occupational Therapist                     Mobility/ADL's       Row Name 06/05/25 1559 06/05/25 1553       Bed Mobility    Bed Mobility -- bed mobility (all) activities  -ES    All Activities, [place] (Bed Mobility) -- not tested  -ES    Comment, (Bed Mobility) patient was educated and trained in bed mobility techniques with emphasis on adherence to shoulder precautions to ensure safe and appropriate shoulder protection during  bed mobility activities at the time of discharge  -ES not assessed. Met seated in recliner.  -ES      Row Name 06/05/25 1559 06/05/25 1553       Transfers    Transfers -- sit-stand transfer  -ES    Comment, (Transfers) patient was educated and trained in proper hand placement during functional transfers, with emphasis on maintaining adherence to shoulder precautions and NWB status during all transfers to promote safety at the time of discharge  -ES --      Row Name 06/05/25 1553          Sit-Stand Transfer    Sit-Stand [place] (Transfers) standby assist  -ES     Assistive Device (Sit-Stand Transfers) other (see comments)  no AD  -ES       Row Name 06/05/25 1553          Functional Mobility    Functional Mobility- Ind. Level standby assist  -ES     Functional Mobility- Device other (see comments)  no AD  -ES       Row Name 06/05/25 1559 06/05/25 1553       Activities of Daily Living    BADL Assessment/Intervention upper body dressing;lower body dressing;bathing  -ES upper body dressing;lower body dressing  -ES      Row Name 06/05/25 1559          Mobility    Extremity Weight-bearing Status right upper extremity  -ES     Right Upper Extremity (Weight-bearing Status) non weight-bearing (NWB)  -ES       Row Name 06/05/25 1559 06/05/25 1553       Upper Body Dressing Assessment/Training    [place] Level (Upper Body Dressing) upper body dressing skills;don;pull-over garment;moderate assist (50% patient effort);verbal cues;nonverbal cues (demo/gesture)  -ES upper body dressing skills;don;pull-over garment;moderate assist (50% patient effort);verbal cues;nonverbal cues (demo/gesture)  -ES    Position (Upper Body Dressing) edge of bed sitting  -ES unsupported sitting  -ES    Comment, (Upper Body Dressing) Patient was educated and trained in adaptive upper body dressing techniques to facilitate independence while maintaining compliance with shoulder precautions during dressing tasks at the time of discharge. Return  demonstration provided for ensured understanding.  -ES --      Row Name 06/05/25 1559 06/05/25 1553       Lower Body Dressing Assessment/Training    [place] Level (Lower Body Dressing) lower body dressing skills;don;pants/bottoms;shoes/slippers;socks;moderate assist (50% patient effort);verbal cues;nonverbal cues (demo/gesture)  -ES lower body dressing skills;don;pants/bottoms;shoes/slippers;socks;moderate assist (50% patient effort);verbal cues;nonverbal cues (demo/gesture)  -ES    Position (Lower Body Dressing) unsupported sitting;unsupported standing  -ES unsupported sitting;unsupported standing  -ES    Comment, (Lower Body Dressing) Patient was educated and trained in adaptive lower body dressing techniques to facilitate independence while maintaining compliance with shoulder precautions during dressing tasks at the time of discharge. Return demonstration provided for ensured understanding.  -ES --      Row Name 06/05/25 1552          Bathing Assessment/Intervention    Comment, (Bathing) Patient was educated and trained in adaptive upper body bathing techniques to facilitate independence while maintaining compliance with shoulder precautions during bathing tasks at the time of discharge. Return demonstration provided for ensured understanding.  -ES               User Key  (r) = Recorded By, (t) = Taken By, (c) = Cosigned By      Initials Name Provider Type    Argenis Carolina, OTR/L, CSRS Occupational Therapist                   Obj/Interventions       Row Name 06/05/25 1550          Sensory Assessment (Somatosensory)    Sensory Assessment (Somatosensory) right UE  -ES     Right UE Sensory Assessment impaired  -ES     Sensory Assessment nerve block  -ES       Row Name 06/05/25 155          Range of Motion Comprehensive    General Range of Motion upper extremity range of motion deficits identified  -ES     Comment, General Range of Motion LUE AROM WFL. RUE deferred secondary to post surgical and shoulder  precautions  -ES       Row Name 06/05/25 1554          Strength Comprehensive (MMT)    Comment, General Manual Muscle Testing (MMT) Assessment deferred  -ES       Row Name 06/05/25 1559          Shoulder (Therapeutic Exercise)    Shoulder (Therapeutic Exercise) pendulum exercises  not completed secondary to nerve block  -ES     Shoulder Pendulum Exercises (Therapeutic Exercise) left  Pt was educated on the implementation of pendulum exercises. Demonstration was provided for both seated and standing execution. Patient was instructed to perform the exercises 5 to 6 times daily in a supported seated or standing position per MD orders.  -ES       Row Name 06/05/25 1559 06/05/25 1554       Motor Skills    Motor Skills -- functional endurance  -ES    Functional Endurance -- fair  -ES    Therapeutic Exercise shoulder  -ES --      Row Name 06/05/25 1554          Balance    Balance Assessment sitting dynamic balance;standing static balance;standing dynamic balance  -ES     Dynamic Sitting Balance standby assist  -ES     Position, Sitting Balance sitting in chair  -ES     Static Standing Balance standby assist  -ES     Dynamic Standing Balance standby assist  -ES     Position/Device Used, Standing Balance unsupported  -ES               User Key  (r) = Recorded By, (t) = Taken By, (c) = Cosigned By      Initials Name Provider Type    ES Argenis Temple, OTR/L, CSRS Occupational Therapist                   Goals/Plan       Row Name 06/05/25 1557          ROM Goal 1 (OT)    ROM Goal 1 (OT) Patient will demonstrate 90° of active shoulder flexion within surgical precautions to support functional reach  -ES     Time Frame (ROM Goal 1, OT) short term goal (STG);2 weeks  -ES     Progress/Outcome (ROM Goal 1, OT) new goal  -ES       Row Name 06/05/25 1557          Problem Specific Goal 1 (OT)    Problem Specific Goal 1 (OT) Patient will accurately perform HEP exercises with proper technique and within shoulder precautions during  supervised sessions, and report independent completion for ensured understanding at time of discharge  -ES     Time Frame (Problem Specific Goal 1, OT) short term goal (STG);2 weeks  -ES     Progress/Outcome (Problem Specific Goal 1, OT) new goal  -ES       Row Name 06/05/25 1557          Therapy Assessment/Plan (OT)    Planned Therapy Interventions (OT) activity tolerance training;BADL retraining;functional balance retraining;occupation/activity based interventions;patient/caregiver education/training;ROM/therapeutic exercise;strengthening exercise;transfer/mobility retraining  -ES               User Key  (r) = Recorded By, (t) = Taken By, (c) = Cosigned By      Initials Name Provider Type    ES Argenis Temple, OTR/L, CSRS Occupational Therapist                   Clinical Impression       Row Name 06/05/25 1557          Pain Assessment    Pretreatment Pain Rating 0/10 - no pain  -ES     Posttreatment Pain Rating 0/10 - no pain  -ES       Row Name 06/05/25 1600 06/05/25 1552       Plan of Care Review    Plan of Care Reviewed With -- patient;significant other  -ES    Outcome Evaluation Patient was provided with education and training on the implementation of adaptive ADL strategies to promote safety and maximize independence during postoperative recovery. Instruction included proper donning and doffing of the shoulder sling, adherence to prescribed wear schedule, reinforcement of shoulder precautions, and demonstration of pendulum exercises to maintain early joint mobility. Transfer training was conducted to enhance safety and reduce fall risk during all functional transfers. Recommendations for home modifications were discussed to support a safe discharge environment and facilitate long-term functional independence. Patient demonstrated receptiveness to all education and training, with appropriate verbal and physical participation noted.  -ES Patient is 64 year old female presenting [facility]  [facility]25 for elective right reverse total shoulder arthroplasty following failed conservative management of R shoulder osteoarthritis. At baseline, patient is independent with ADLs, does not use a device in the home however uses a RW in community, and lives with significant other. Following a comprehensive Occupational Therapy assessment conducted today, the patient demonstrates a decline from their baseline functional status, exhibiting deficits related to strength, AROM, and pain management that significantly impact independence in activities of daily living. The patient would benefit from skilled Occupational Therapy services to enhance safety and facilitate a return to prior level of independence. Home with OP therapy is recommended upon discharge from the hospital setting as continued rehabilitation is necessary to address functional deficits and support optimal recovery.  -ES      Row Name 06/05/25 0147          Therapy Assessment/Plan (OT)    Rehab Potential (OT) good  -ES     Criteria for Skilled Therapeutic Interventions Met (OT) yes;meets criteria;skilled treatment is necessary  -ES     Therapy Frequency (OT) 5 times/wk  -ES       Row Name 06/05/25 6549          Therapy Plan Review/Discharge Plan (OT)    Anticipated Discharge Disposition (OT) home with outpatient therapy services  -ES       Row Name 06/05/25 0349          Positioning and Restraints    Pre-Treatment Position sitting in chair/recliner  -ES     Post Treatment Position chair  -ES               User Key  (r) = Recorded By, (t) = Taken By, (c) = Cosigned By      Initials Name Provider Type    Argenis Carolina, PATRICAR/L, CSRS Occupational Therapist                   Outcome Measures       Row Name 06/05/25 7699          How much help from another is currently needed...    Putting on and taking off regular lower body clothing? 3  -ES     Bathing (including washing, rinsing, and drying) 3  -ES     Toileting (which includes using toilet bed pan or  urinal) 3  -ES     Putting on and taking off regular upper body clothing 3  -ES     Taking care of personal grooming (such as brushing teeth) 4  -ES     Eating meals 4  -ES     AM-PAC 6 Clicks Score (OT) 20  -ES       Row Name 06/05/25 1557          Functional Assessment    Outcome Measure Options AM-PAC 6 Clicks Daily Activity (OT)  -ES               User Key  (r) = Recorded By, (t) = Taken By, (c) = Cosigned By      Initials Name Provider Type    Argenis Carolina, OTR/L, CSRS Occupational Therapist                      OT Recommendation and Plan  Planned Therapy Interventions (OT): activity tolerance training, BADL retraining, functional balance retraining, occupation/activity based interventions, patient/caregiver education/training, ROM/therapeutic exercise, strengthening exercise, transfer/mobility retraining  Therapy Frequency (OT): 5 times/wk  Plan of Care Review  Plan of Care Reviewed With: patient, significant other  Outcome Evaluation: Patient was provided with education and training on the implementation of adaptive ADL strategies to promote safety and maximize independence during postoperative recovery. Instruction included proper donning and doffing of the shoulder sling, adherence to prescribed wear schedule, reinforcement of shoulder precautions, and demonstration of pendulum exercises to maintain early joint mobility. Transfer training was conducted to enhance safety and reduce fall risk during all functional transfers. Recommendations for home modifications were discussed to support a safe discharge environment and facilitate long-term functional independence. Patient demonstrated receptiveness to all education and training, with appropriate verbal and physical participation noted.     Time Calculation:   Evaluation Complexity (OT)  Review Occupational Profile/Medical/Therapy History Complexity: expanded/moderate complexity  Assessment, Occupational Performance/Identification of Deficit Complexity:  3-5 performance deficits  Clinical Decision Making Complexity (OT): detailed assessment/moderate complexity  Overall Complexity of Evaluation (OT): moderate complexity     Time Calculation- OT       Row Name 06/05/25 1558             Time Calculation- OT    OT Start Time 1526  -ES      OT Stop Time 1547  -ES      OT Time Calculation (min) 21 min  -ES      Total Timed Code Minutes- OT 10 minute(s)  -ES      OT Received On 06/05/25  -ES      OT - Next Appointment 06/06/25  -ES      OT Goal Re-Cert Due Date 06/19/25  -ES         Timed Charges    86413 - OT Self Care/Mgmt Minutes 10  -ES         Untimed Charges    OT Eval/Re-eval Minutes 11  -ES         Total Minutes    Timed Charges Total Minutes 10  -ES      Untimed Charges Total Minutes 11  -ES       Total Minutes 21  -ES                User Key  (r) = Recorded By, (t) = Taken By, (c) = Cosigned By      Initials Name Provider Type    ES Argenis Temple, OTR/L, CSRS Occupational Therapist                  Therapy Charges for Today       Code Description Service Date Service Provider Modifiers Qty    85906432778 HC OT SELF CARE/MGMT/TRAIN EA 15 MIN 6/5/2025 Argenis Temple, OTR/L, CSRS GO 1    [id number] HC OT EVAL MOD COMPLEXITY 2 6/5/2025 Argenis Temple, OTR/L, CSRS GO 1                 Argenis Temple, OTR/L, CSRS  6/5/2025

## 2025-06-05 NOTE — ANESTHESIA PREPROCEDURE EVALUATION
Anesthesia Evaluation     Patient summary reviewed and Nursing notes reviewed   no history of anesthetic complications:   NPO Solid Status: > 8 hours  NPO Liquid Status: > 2 hours           Airway   Mallampati: II  TM distance: >3 FB  Neck ROM: full  Possible difficult intubation  Dental    (+) implants        Pulmonary    (+) a smoker, COPD (uses daily inhalers, used today) moderate, asthma,sleep apnea, decreased breath sounds, wheezes  Cardiovascular     ECG reviewed  Patient on routine beta blocker and Beta blocker given within 24 hours of surgery    (+) hypertension, dysrhythmias Tachycardia, PVD, hyperlipidemia      Neuro/Psych  (+) dizziness/light headedness, numbness, psychiatric history Anxiety and Depression  GI/Hepatic/Renal/Endo    (+) obesity, GERD, GI bleeding , liver disease (alcoholic cirrhosis) fatty liver disease cirrhosis, renal disease- CRI    Musculoskeletal     (+) chronic pain (percocet 7.5 chronically, 4x per day), neck pain (cervical spondylosis)  Abdominal    Substance History   (+) alcohol use     OB/GYN          Other   arthritis,   history of cancer (BCC)        Phys Exam Other: Previous grade IIa view                Anesthesia Plan    ASA 3     general     (With ISB for POPC PSR    I have reviewed the patient's history and chart with the patient, including all pertinent laboratory results and imaging. I have explained the risks of anesthesia including but not limited to dental damage, corneal abrasion, nerve injury, MI, stroke, aspiration, and death. Patient has agreed to proceed.      Risks of peripheral nerve block were discussed with patient including but not limited to: inadequate block, bleeding, infection, persistent numbness or weakness, nerve damage, SOB, painful dysasthesia and need to protect limb while numb.      /64 (BP Location: Left arm, Patient Position: Sitting)   Pulse 90   Temp 36.8 °C (98.2 °F) (Oral)   Resp 18   SpO2 99%   )  intravenous induction      Anesthetic plan, risks, benefits, and alternatives have been provided, discussed and informed consent has been obtained with: patient.    CODE STATUS:

## 2025-06-05 NOTE — OP NOTE
Orthopaedic Operative Note    Facility: [facility]    Patient: Filomena Liu    Medical Record Number: [id number]    YOB: 1960    Dictating Surgeon: Maxwell Lanier M.D.*    Primary Care Physician: Fernando Dunn MD    Date of Operation: 6/5/2025    Pre-Operative Diagnosis:  Right shoulder osteoarthritis with associated rotator cuff tendinopathy    Post-Operative Diagnosis:  Right shoulder osteoarthritis with associated rotator cuff tendinopathy    Procedure Performed:    1.  Right reverse total shoulder arthroplasty    2.  Tenodesis of long head of biceps tendon    Surgeon: Maxwell Lanier MD     Assistant: Eden Javed whose assistance was critical for help with patient positioning, suctioning and irrigation, retraction, manipulation of the extremity for insertion of the implants, wound closure and application of the bandages.  Her assistance was critical to the success of this case.      Anesthesia: Regional followed by Gen.    Complications: None.     Estimated Blood Loss: Less than 50 mL.     Implants: 1.  Biomet size 12 micro comprehensive stem  2.  Mini glenoid baseplate with one 6.5 mm central compression screw and 4 peripheral 4.75 mm locking screws  3.  Size 36 glenosphere  4.  +3 offset, standard thickness humeral tray with 36 mm standard thickness E1 humeral bearing    Specimens: * No orders in the log *    Brief Operative Indication:  Ms. Liu had a history of worsening shoulder pain and dysfunction which had been nonresponsive to conservative treatment.  We had a thorough discussion regarding the risks, benefits and alternatives to an arthroplasty and nonsurgical management versus surgery.  I explained that surgical risks include infection, hematoma, hardware related complications including failure of fixation, loosening, fracture, persistent pain and/or loss of motion, iatrogenic nerve and/or blood vessel injury resulting in permanent weakness, numbness  or dysfunction, DVT, PE, positioning related neuropraxia, and anesthesia related complications resulting in death.  We discussed the indication for a reverse as opposed to a standard total shoulder and the risks inherent to the reverse including notching, glenoid loosening, instability, and traction related neuropraxia, any of which could result in persistent pain or problems requiring further surgery.  Last, we discussed the rehab and all that will be expected of the patient postoperatively to ensure an optimal outcome.  The patient voiced understanding of the risks, benefits, and alternative forms of treatment that were discussed and the patient consented to proceed.    Description of the procedure in detail:  The patient and operative site were identified in the preoperative holding area.  The surgical site was marked.  Adequate regional anesthesia was administered. She was then taken to the operating room.  The patient was placed on the operating table where adequate general anesthesia was administered. She was then repositioned into the modified beachchair position with the head and neck in neutral alignment.  All bony prominences were carefully padded and protected.    The right upper extremity was then prepped and draped in the standard, sterile fashion.  The extremity was cleaned with an alcohol solution.  A Hibiclens scrub was performed and then the extremity was prepped with 2 ChloraPrep preps.  I allowed this to dry for 3 minutes before the draping procedure was carried out.    A timeout was taken and preoperative antibiotics administered.  Tranexamic acid was administered at this time as well.  Following this, I began by fashioning an approximately 6 cm incision over the anterior aspect of the shoulder.  This was carried down through the skin and subcutaneous tissues.  Full-thickness medial and lateral skin flaps were developed.  The interval between the deltoid and pectoralis was carefully identified  and developed.  The underlying cephalic vein was dissected out and retracted laterally.  This structure was kept protected throughout the case.  Unfortunately, at the conclusion of the case, the vein was noted to be perforated and I ended up having to suture ligate that with 3-0 silk ties.    The clavipectoral fascia was divided.  The strap muscles were retracted medially.  The biceps groove was identified.  The biceps tendon was carefully dissected out.  The biceps was released from its attachment to the supraglenoid tubercle using curved Sands scissors.  The diseased, intra-articular portion of the biceps was removed.  The remaining intact tendon was tenodesed to the pectoralis tendon using multiple MaxBraid sutures which were tied using 6 throw surgeon's knots.    I then directed my attention to the shoulder.  Her subscapularis was carefully tagged and released.  I left a small cuff of tissue on the lesser tuberosity for a later anatomic repair of this structure.   The humeral head was then completely exposed.  The periarticular osteophytes were carefully removed with a rongeur.  The circumflex vessels were suture-ligated with 3-0 silk ties.    The cutting guide for the head cut was inserted.  This was set to 20° of retroversion which I judged off of the forearm.  With the guide in position, I demarcated the cut and then carried out the cut using an oscillating saw.  The cut portion of the bone was removed and taken to the back table for possible bone grafting later in the case, if needed.    Having completed the humeral sided preparations, I then directed my attention to the glenoid.  The axillary nerve was carefully dissected out and identified.  This structure was protected.  Retractors were carefully positioned along the anterior, posterior and inferior glenoid rim.  I carefully exposed the caudal rim of the glenoid using the electrocautery.  The anterior, inferior and posterior glenoid labrum were then  carefully debrided and removed along with the remaining biceps stump superiorly.  The centering guide for the baseplate was inserted.  The center pin for the baseplate was drilled in the center of the glenoid vault.  I then carefully reamed and prepared the glenoid in typical fashion, taking care to maintain appropriate inferior tilt for proper positioning of the baseplate.    Once I had completed the reaming process and made sure that the reaming was adequate, the baseplate was impacted into position.  This was secured with a single screw central compression screw which got great purchase.  The 4 peripheral locking screws were then placed without complication.  All 4 screws were confirmed to lock into the baseplate.  With the baseplate secured, I examined the remaining glenoid.  I did determine that a 36 standard glenosphere would fit best in this case.  The appropriate size implant was selected for use and then impacted.  I took care to make sure that the Shields taper was fully engaged and that the implant was well-seated.  I used a right angle clamp to pass this beneath the implant and pull up.  When doing so, the entire scapula moved.  Once I was satisfied that this was well seated, I then directed my attention back to the humerus.    The humeral sided preparations were carried out in the typical fashion.  I reamed and broached the humerus up to a 12 micro broach which seemed to fit well.  The appropriate size implant was impacted into position, taking care to maintain appropriate retroversion as judged off of the forearm.  The implants seated well.  I then trialed off of the stem.  The posterior offset, standard thickness trial tray with a 36 mm standard thickness trial bearing seemed to fit best.  This allowed for excellent motion and stability.  The trial component was removed and then the final component impacted.  Again, I took care to make sure that the Shields taper was fully engaged before reducing it and  carrying the shoulder through range of motion.    Again, the shoulder demonstrated excellent motion and stability.  I could fully elevate, abduct and externally rotate the shoulder without any impingement or limitation.  There was also good tension in the periarticular soft tissue structures.  At this point, I directed my attention to the subscapularis repair.  The arm was placed in approximately 30 degrees of external rotation.  The subscapularis was then anatomically repaired using multiple #2 MaxBraid sutures.  I then irrigated the wound with 500 mL of a Betadine-containing saline solution.  I then irrigated with 3 L of sterile saline via pulsatile lavage.  I made sure that we had good hemostasis.  She did have some oozing around the medial calcar.  I placed some Gelfoam in this area and this seemed to quell the bleeding very nicely.  The wound was sequentially closed in a layered fashion.  Vicryl was used to repair the subcutaneous tissues.  A running subcuticular Monocryl stitch was used to close the skin followed by Steri-strips.  Sterile dressings were applied.  The drapes were withdrawn. Her arm was placed in a sling. She was awakened and taken to the recovery room in good condition.    Maxwell Lanier MD  06/05/25

## 2025-06-06 ENCOUNTER — RESULTS FOLLOW-UP (OUTPATIENT)
Dept: GASTROENTEROLOGY | Facility: CLINIC | Age: 65
End: 2025-06-06
Payer: MEDICARE

## 2025-06-06 ENCOUNTER — TELEPHONE (OUTPATIENT)
Dept: ORTHOPEDIC SURGERY | Facility: HOSPITAL | Age: 65
End: 2025-06-06
Payer: MEDICARE

## 2025-06-06 DIAGNOSIS — R93.3 ABNORMAL FINDING ON GI TRACT IMAGING: Primary | ICD-10-CM

## 2025-06-06 DIAGNOSIS — Z98.890 S/P SHOULDER SURGERY: Primary | ICD-10-CM

## 2025-06-06 NOTE — TELEPHONE ENCOUNTER
Ms. Liu called me at this time. She is POD 1 RTSRA. She is requesting  PT to come out and see her as she won't have transportation to go to [facility] where she was referred. She would like to have [facility]  and the fax number is [phone]. Explained that it can be difficult to get HH approved, but I would let the MD know. She voiced understanding. Ms. Liu doesn't have any other questions for me at this time. She has my contact information should she need anything.

## 2025-06-07 ENCOUNTER — PATIENT MESSAGE (OUTPATIENT)
Dept: ORTHOPEDIC SURGERY | Facility: CLINIC | Age: 65
End: 2025-06-07
Payer: MEDICARE

## 2025-06-09 NOTE — TELEPHONE ENCOUNTER
I have called and spoke to Mrs. Liu. Gypsy asked that I instruct the patient on changing her bandage.     I have instructed Mrs. Liu that her hands should be clean before removing the dressing and before putting a new dressing on. Also that she is not to touch the incision or apply anything on the incision other than a dry dressing. I also instructed her that if the dressing is not water to not shower until she follows up in office.    Mrs Liu has verbalized understanding

## 2025-06-11 ENCOUNTER — PATIENT MESSAGE (OUTPATIENT)
Dept: ORTHOPEDIC SURGERY | Facility: CLINIC | Age: 65
End: 2025-06-11
Payer: MEDICARE

## 2025-06-11 DIAGNOSIS — Z96.611 S/P REVERSE TOTAL SHOULDER ARTHROPLASTY, RIGHT: ICD-10-CM

## 2025-06-11 RX ORDER — OXYCODONE AND ACETAMINOPHEN 7.5; 325 MG/1; MG/1
1 TABLET ORAL EVERY 4 HOURS PRN
Qty: 30 TABLET | Refills: 0 | Status: SHIPPED | OUTPATIENT
Start: 2025-06-11 | End: 2025-06-16 | Stop reason: SDUPTHER

## 2025-06-11 RX ORDER — OXYCODONE AND ACETAMINOPHEN 7.5; 325 MG/1; MG/1
1 TABLET ORAL EVERY 4 HOURS PRN
Qty: 30 TABLET | Refills: 0 | Status: SHIPPED | OUTPATIENT
Start: 2025-06-11 | End: 2025-06-11

## 2025-06-16 ENCOUNTER — TELEPHONE (OUTPATIENT)
Dept: ORTHOPEDIC SURGERY | Facility: CLINIC | Age: 65
End: 2025-06-16

## 2025-06-16 DIAGNOSIS — Z96.611 S/P REVERSE TOTAL SHOULDER ARTHROPLASTY, RIGHT: ICD-10-CM

## 2025-06-16 RX ORDER — OXYCODONE AND ACETAMINOPHEN 7.5; 325 MG/1; MG/1
1 TABLET ORAL EVERY 4 HOURS PRN
Qty: 30 TABLET | Refills: 0 | Status: SHIPPED | OUTPATIENT
Start: 2025-06-16 | End: 2025-06-20 | Stop reason: SDUPTHER

## 2025-06-16 NOTE — TELEPHONE ENCOUNTER
"Caller: Filomena Liu \"PAT\"    Relationship to patient: Self    Best call back number: [phone] (home)     Chief complaint: Rt Shoulder RTSA // SX 6/5/25     Type of visit: PO 2 week // Rt Shoulder RTSA // SX 6/5/25     Requested date: ASAP    If rescheduling, when is the original appointment: 6/20/25    Additional notes: PT ACCIDENTALLY CANCELLED HER 1ST POST-OP ON 6/20/25    HUB UNABLE TO TRANSFER    "

## 2025-06-16 NOTE — TELEPHONE ENCOUNTER
I have called the scheduling department and had them cancel the MRI apt and I have canceled the order   Humira Counseling:  I discussed with the patient the risks of adalimumab including but not limited to myelosuppression, immunosuppression, autoimmune hepatitis, demyelinating diseases, lymphoma, and serious infections.  The patient understands that monitoring is required including a PPD at baseline and must alert us or the primary physician if symptoms of infection or other concerning signs are noted.

## 2025-06-16 NOTE — TELEPHONE ENCOUNTER
Please advise   [phone]    Sx date 06/05/2025 - Right Reverse Total Shoulder     Last FD 06/11/2025 qty 30  Last OV 05/30/2025  Next OV 06/20/2025

## 2025-06-20 ENCOUNTER — HOSPITAL ENCOUNTER (INPATIENT)
Facility: HOSPITAL | Age: 65
LOS: 7 days | Discharge: HOME | End: 2025-06-27
Attending: EMERGENCY MEDICINE | Admitting: INTERNAL MEDICINE
Payer: MEDICARE

## 2025-06-20 ENCOUNTER — PATIENT MESSAGE (OUTPATIENT)
Dept: ORTHOPEDIC SURGERY | Facility: CLINIC | Age: 65
End: 2025-06-20

## 2025-06-20 ENCOUNTER — APPOINTMENT (OUTPATIENT)
Dept: GENERAL RADIOLOGY | Facility: HOSPITAL | Age: 65
End: 2025-06-20
Payer: MEDICARE

## 2025-06-20 DIAGNOSIS — Z96.611 S/P REVERSE TOTAL SHOULDER ARTHROPLASTY, RIGHT: ICD-10-CM

## 2025-06-20 DIAGNOSIS — D64.9 ACUTE ANEMIA: ICD-10-CM

## 2025-06-20 DIAGNOSIS — N28.9 ACUTE RENAL INSUFFICIENCY: ICD-10-CM

## 2025-06-20 DIAGNOSIS — Z78.9 DECREASED ACTIVITIES OF DAILY LIVING (ADL): ICD-10-CM

## 2025-06-20 DIAGNOSIS — S32.000A COMPRESSION FRACTURE OF LUMBAR VERTEBRA, UNSPECIFIED LUMBAR VERTEBRAL LEVEL, INITIAL ENCOUNTER: ICD-10-CM

## 2025-06-20 DIAGNOSIS — E87.6 ACUTE HYPOKALEMIA: ICD-10-CM

## 2025-06-20 DIAGNOSIS — R60.0 PEDAL EDEMA: Primary | ICD-10-CM

## 2025-06-20 LAB
ALBUMIN SERPL-MCNC: 3.2 G/DL (ref 3.5–5.2)
ALBUMIN/GLOB SERPL: 1.1 G/DL
ALP SERPL-CCNC: 196 U/L (ref 39–117)
ALT SERPL W P-5'-P-CCNC: 21 U/L (ref 1–33)
ANION GAP SERPL CALCULATED.3IONS-SCNC: 12.1 MMOL/L (ref 5–15)
AST SERPL-CCNC: 50 U/L (ref 1–32)
BASOPHILS # BLD AUTO: 0.03 10*3/MM3 (ref 0–0.2)
BASOPHILS NFR BLD AUTO: 0.3 % (ref 0–1.5)
BILIRUB SERPL-MCNC: 1.8 MG/DL (ref 0–1.2)
BILIRUB UR QL STRIP: NEGATIVE
BUN SERPL-MCNC: 31 MG/DL (ref 8–23)
BUN/CREAT SERPL: 16 (ref 7–25)
CALCIUM SPEC-SCNC: 9.3 MG/DL (ref 8.6–10.5)
CHLORIDE SERPL-SCNC: 92 MMOL/L (ref 98–107)
CLARITY UR: CLEAR
CO2 SERPL-SCNC: 32.9 MMOL/L (ref 22–29)
COLOR UR: YELLOW
CREAT SERPL-MCNC: 1.94 MG/DL (ref 0.57–1)
DEPRECATED RDW RBC AUTO: 49.4 FL (ref 37–54)
EGFRCR SERPLBLD CKD-EPI 2021: 28.5 ML/MIN/1.73
EOSINOPHIL # BLD AUTO: 0.22 10*3/MM3 (ref 0–0.4)
EOSINOPHIL NFR BLD AUTO: 2.2 % (ref 0.3–6.2)
ERYTHROCYTE [DISTWIDTH] IN BLOOD BY AUTOMATED COUNT: 14.3 % (ref 12.3–15.4)
GLOBULIN UR ELPH-MCNC: 3 GM/DL
GLUCOSE SERPL-MCNC: 119 MG/DL (ref 65–99)
GLUCOSE UR STRIP-MCNC: NEGATIVE MG/DL
HCT VFR BLD AUTO: 27.1 % (ref 34–46.6)
HGB BLD-MCNC: 8.2 G/DL (ref 12–15.9)
HGB UR QL STRIP.AUTO: NEGATIVE
HOLD SPECIMEN: NORMAL
HOLD SPECIMEN: NORMAL
IMM GRANULOCYTES # BLD AUTO: 0.05 10*3/MM3 (ref 0–0.05)
IMM GRANULOCYTES NFR BLD AUTO: 0.5 % (ref 0–0.5)
KETONES UR QL STRIP: NEGATIVE
LEUKOCYTE ESTERASE UR QL STRIP.AUTO: NEGATIVE
LYMPHOCYTES # BLD AUTO: 1.33 10*3/MM3 (ref 0.7–3.1)
LYMPHOCYTES NFR BLD AUTO: 13.5 % (ref 19.6–45.3)
MAGNESIUM SERPL-MCNC: 3.3 MG/DL (ref 1.6–2.4)
MCH RBC QN AUTO: 28.3 PG (ref 26.6–33)
MCHC RBC AUTO-ENTMCNC: 30.3 G/DL (ref 31.5–35.7)
MCV RBC AUTO: 93.4 FL (ref 79–97)
MONOCYTES # BLD AUTO: 0.96 10*3/MM3 (ref 0.1–0.9)
MONOCYTES NFR BLD AUTO: 9.8 % (ref 5–12)
NEUTROPHILS NFR BLD AUTO: 7.25 10*3/MM3 (ref 1.7–7)
NEUTROPHILS NFR BLD AUTO: 73.7 % (ref 42.7–76)
NITRITE UR QL STRIP: NEGATIVE
NRBC BLD AUTO-RTO: 0 /100 WBC (ref 0–0.2)
PH UR STRIP.AUTO: 7 [PH] (ref 5–8)
PLATELET # BLD AUTO: 127 10*3/MM3 (ref 140–450)
PMV BLD AUTO: 10.1 FL (ref 6–12)
POTASSIUM SERPL-SCNC: 2.7 MMOL/L (ref 3.5–5.2)
PROT SERPL-MCNC: 6.2 G/DL (ref 6–8.5)
PROT UR QL STRIP: NEGATIVE
RBC # BLD AUTO: 2.9 10*6/MM3 (ref 3.77–5.28)
SODIUM SERPL-SCNC: 137 MMOL/L (ref 136–145)
SP GR UR STRIP: 1.01 (ref 1–1.03)
UROBILINOGEN UR QL STRIP: NORMAL
WBC NRBC COR # BLD AUTO: 9.84 10*3/MM3 (ref 3.4–10.8)
WHOLE BLOOD HOLD COAG: NORMAL
WHOLE BLOOD HOLD SPECIMEN: NORMAL

## 2025-06-20 PROCEDURE — 83735 ASSAY OF MAGNESIUM: CPT | Performed by: EMERGENCY MEDICINE

## 2025-06-20 PROCEDURE — 85025 COMPLETE CBC W/AUTO DIFF WBC: CPT | Performed by: EMERGENCY MEDICINE

## 2025-06-20 PROCEDURE — 80053 COMPREHEN METABOLIC PANEL: CPT | Performed by: EMERGENCY MEDICINE

## 2025-06-20 PROCEDURE — 99291 CRITICAL CARE FIRST HOUR: CPT

## 2025-06-20 PROCEDURE — 81003 URINALYSIS AUTO W/O SCOPE: CPT | Performed by: EMERGENCY MEDICINE

## 2025-06-20 PROCEDURE — 94640 AIRWAY INHALATION TREATMENT: CPT

## 2025-06-20 PROCEDURE — 36415 COLL VENOUS BLD VENIPUNCTURE: CPT

## 2025-06-20 PROCEDURE — 94799 UNLISTED PULMONARY SVC/PX: CPT

## 2025-06-20 PROCEDURE — 25010000002 POTASSIUM CHLORIDE 10 MEQ/100ML SOLUTION: Performed by: EMERGENCY MEDICINE

## 2025-06-20 PROCEDURE — 94761 N-INVAS EAR/PLS OXIMETRY MLT: CPT

## 2025-06-20 PROCEDURE — 94760 N-INVAS EAR/PLS OXIMETRY 1: CPT

## 2025-06-20 PROCEDURE — 71045 X-RAY EXAM CHEST 1 VIEW: CPT

## 2025-06-20 RX ORDER — OXYCODONE AND ACETAMINOPHEN 7.5; 325 MG/1; MG/1
1 TABLET ORAL EVERY 4 HOURS PRN
Qty: 30 TABLET | Refills: 0 | Status: ON HOLD | OUTPATIENT
Start: 2025-06-20

## 2025-06-20 RX ORDER — AMOXICILLIN 250 MG
2 CAPSULE ORAL 2 TIMES DAILY PRN
Status: DISCONTINUED | OUTPATIENT
Start: 2025-06-20 | End: 2025-06-22

## 2025-06-20 RX ORDER — SPIRONOLACTONE 25 MG/1
25 TABLET ORAL 2 TIMES DAILY
Status: DISCONTINUED | OUTPATIENT
Start: 2025-06-20 | End: 2025-06-25

## 2025-06-20 RX ORDER — POLYETHYLENE GLYCOL 3350 17 G/17G
17 POWDER, FOR SOLUTION ORAL DAILY PRN
Status: DISCONTINUED | OUTPATIENT
Start: 2025-06-20 | End: 2025-06-22

## 2025-06-20 RX ORDER — DOCUSATE SODIUM 100 MG/1
100 CAPSULE, LIQUID FILLED ORAL 2 TIMES DAILY
Status: DISCONTINUED | OUTPATIENT
Start: 2025-06-20 | End: 2025-06-27 | Stop reason: HOSPADM

## 2025-06-20 RX ORDER — ONDANSETRON 4 MG/1
4 TABLET, ORALLY DISINTEGRATING ORAL EVERY 6 HOURS PRN
Status: DISCONTINUED | OUTPATIENT
Start: 2025-06-20 | End: 2025-06-27 | Stop reason: HOSPADM

## 2025-06-20 RX ORDER — ATENOLOL 50 MG/1
25 TABLET ORAL NIGHTLY
Status: DISCONTINUED | OUTPATIENT
Start: 2025-06-20 | End: 2025-06-26

## 2025-06-20 RX ORDER — BISACODYL 5 MG/1
5 TABLET, DELAYED RELEASE ORAL DAILY PRN
Status: DISCONTINUED | OUTPATIENT
Start: 2025-06-20 | End: 2025-06-22

## 2025-06-20 RX ORDER — BISACODYL 10 MG
10 SUPPOSITORY, RECTAL RECTAL DAILY PRN
Status: DISCONTINUED | OUTPATIENT
Start: 2025-06-20 | End: 2025-06-22

## 2025-06-20 RX ORDER — FLUTICASONE PROPIONATE 50 MCG
1 SPRAY, SUSPENSION (ML) NASAL DAILY
Status: DISCONTINUED | OUTPATIENT
Start: 2025-06-21 | End: 2025-06-27 | Stop reason: HOSPADM

## 2025-06-20 RX ORDER — CHOLECALCIFEROL (VITAMIN D3) 25 MCG
1000 TABLET ORAL WEEKLY
Status: DISCONTINUED | OUTPATIENT
Start: 2025-06-26 | End: 2025-06-27 | Stop reason: HOSPADM

## 2025-06-20 RX ORDER — OXYCODONE AND ACETAMINOPHEN 7.5; 325 MG/1; MG/1
1 TABLET ORAL EVERY 4 HOURS PRN
Refills: 0 | Status: DISCONTINUED | OUTPATIENT
Start: 2025-06-20 | End: 2025-06-21

## 2025-06-20 RX ORDER — ONDANSETRON 2 MG/ML
4 INJECTION INTRAMUSCULAR; INTRAVENOUS EVERY 6 HOURS PRN
Status: DISCONTINUED | OUTPATIENT
Start: 2025-06-20 | End: 2025-06-27 | Stop reason: HOSPADM

## 2025-06-20 RX ORDER — POTASSIUM CHLORIDE 1500 MG/1
40 TABLET, EXTENDED RELEASE ORAL ONCE
Status: COMPLETED | OUTPATIENT
Start: 2025-06-20 | End: 2025-06-20

## 2025-06-20 RX ORDER — IPRATROPIUM BROMIDE AND ALBUTEROL SULFATE 2.5; .5 MG/3ML; MG/3ML
3 SOLUTION RESPIRATORY (INHALATION)
Status: DISCONTINUED | OUTPATIENT
Start: 2025-06-20 | End: 2025-06-27 | Stop reason: HOSPADM

## 2025-06-20 RX ORDER — ALBUTEROL SULFATE 0.83 MG/ML
2.5 SOLUTION RESPIRATORY (INHALATION) EVERY 6 HOURS PRN
Status: DISCONTINUED | OUTPATIENT
Start: 2025-06-20 | End: 2025-06-27 | Stop reason: HOSPADM

## 2025-06-20 RX ORDER — POTASSIUM CHLORIDE 7.45 MG/ML
10 INJECTION INTRAVENOUS ONCE
Status: COMPLETED | OUTPATIENT
Start: 2025-06-20 | End: 2025-06-20

## 2025-06-20 RX ORDER — OXYCODONE AND ACETAMINOPHEN 7.5; 325 MG/1; MG/1
1 TABLET ORAL EVERY 4 HOURS PRN
Qty: 30 TABLET | Refills: 0 | Status: SHIPPED | OUTPATIENT
Start: 2025-06-20 | End: 2025-06-20 | Stop reason: SDUPTHER

## 2025-06-20 RX ORDER — ROPINIROLE 1 MG/1
1 TABLET, FILM COATED ORAL NIGHTLY
Status: DISCONTINUED | OUTPATIENT
Start: 2025-06-20 | End: 2025-06-27 | Stop reason: HOSPADM

## 2025-06-20 RX ORDER — OXYCODONE AND ACETAMINOPHEN 7.5; 325 MG/1; MG/1
1 TABLET ORAL ONCE
Refills: 0 | Status: COMPLETED | OUTPATIENT
Start: 2025-06-20 | End: 2025-06-20

## 2025-06-20 RX ORDER — PREGABALIN 25 MG/1
25 CAPSULE ORAL EVERY 12 HOURS SCHEDULED
Status: DISCONTINUED | OUTPATIENT
Start: 2025-06-20 | End: 2025-06-27 | Stop reason: HOSPADM

## 2025-06-20 RX ORDER — PANTOPRAZOLE SODIUM 40 MG/1
40 TABLET, DELAYED RELEASE ORAL
Status: DISCONTINUED | OUTPATIENT
Start: 2025-06-21 | End: 2025-06-27 | Stop reason: HOSPADM

## 2025-06-20 RX ADMIN — IPRATROPIUM BROMIDE AND ALBUTEROL SULFATE 3 ML: .5; 3 SOLUTION RESPIRATORY (INHALATION) at 20:33

## 2025-06-20 RX ADMIN — SPIRONOLACTONE 25 MG: 25 TABLET ORAL at 21:07

## 2025-06-20 RX ADMIN — PREGABALIN 25 MG: 25 CAPSULE ORAL at 21:07

## 2025-06-20 RX ADMIN — POTASSIUM CHLORIDE 10 MEQ: 7.46 INJECTION, SOLUTION INTRAVENOUS at 16:59

## 2025-06-20 RX ADMIN — OXYCODONE AND ACETAMINOPHEN 1 TABLET: 7.5; 325 TABLET ORAL at 21:07

## 2025-06-20 RX ADMIN — DOCUSATE SODIUM 100 MG: 100 CAPSULE, LIQUID FILLED ORAL at 21:06

## 2025-06-20 RX ADMIN — POTASSIUM CHLORIDE 40 MEQ: 1500 TABLET, EXTENDED RELEASE ORAL at 16:33

## 2025-06-20 RX ADMIN — ATENOLOL 25 MG: 25 TABLET ORAL at 21:07

## 2025-06-20 RX ADMIN — Medication 2.5 MG: at 21:06

## 2025-06-20 RX ADMIN — OXYCODONE AND ACETAMINOPHEN 1 TABLET: 7.5; 325 TABLET ORAL at 16:33

## 2025-06-20 RX ADMIN — ROPINIROLE 1 MG: 1 TABLET, FILM COATED ORAL at 21:07

## 2025-06-20 NOTE — TELEPHONE ENCOUNTER
I called and canceled her appt she will call us back to reschedule     Sx 06/05/2025  Last Fill 6/16/25

## 2025-06-20 NOTE — ED TRIAGE NOTES
Pt states that she was sent in by md for low blood pressure and elevated kidney function that has been ongoing

## 2025-06-20 NOTE — ED PROVIDER NOTES
EMERGENCY DEPARTMENT ENCOUNTER    Room Number:  10/10  PCP: Fernando Dunn MD  Historian: Patient/family      HPI:  Chief Complaint: Swelling  A complete HPI/ROS/PMH/PSH/SH/FH are unobtainable due to: None  Context: Filomena Liu is a 64 y.o. female who presents to the ED c/o sudden onset and progressively worsening swelling in both lower extremities that has been present now over the last couple of weeks.  She states that all this began following right shoulder surgery 2 weeks ago.  She is on diuretics for treatment of the swelling and also presents to the ED today for worsening renal function.  She states moderate to severe weakness along with the symptoms and checked her blood pressure this morning and it was in the 80s systolic prompting her ED visit.  She denies chest pain, shortness of breath, nausea/vomiting, back pain, or fever/chills.            PAST MEDICAL HISTORY  Active Ambulatory Problems     Diagnosis Date Noted    Mediastinal mass 12/14/2016    Cervical stenosis of spinal canal 12/14/2016    Cervical radiculopathy 12/14/2016    Cellulitis/abscess of left foot 11/12/2018    HTN (hypertension) 11/13/2018    History of MRSA infection 11/13/2018    Anxiety 11/13/2018    Peroneal tenosynovitis 11/14/2018    Fracture of navicular bone of left foot 11/14/2018    Tobacco use 11/16/2018    Non compliance with medical treatment 11/17/2018    Screening for colon cancer 10/27/2020    Osteoporosis 10/31/2020    Shoulder arthritis 06/05/2023    Primary localized osteoarthrosis of left shoulder region 06/07/2023    History of adenomatous polyp of colon 01/19/2024    Diverticulosis 03/28/2024    Acute GI bleeding 08/05/2024    Hyperkalemia 08/05/2024    SANDRO (acute kidney injury) 08/05/2024    Alcoholism 08/05/2024    COPD (chronic obstructive pulmonary disease) 08/05/2024    Acute blood loss anemia 08/05/2024    Melena 08/05/2024    Gastroesophageal reflux disease 08/05/2024    Dysphagia 08/05/2024     Alcoholic cirrhosis of liver without ascites 08/05/2024    Other iron deficiency anemias 09/09/2024    Malabsorption of iron 09/25/2024    Acute pancreatitis 03/06/2025    Diverticulitis 03/06/2025    Hypoglycemia 03/06/2025    Lactic acidosis 03/06/2025    Adverse effect of iron 03/28/2025    Iron deficiency anemia 04/08/2025    Cirrhosis of liver without ascites 04/08/2025    Infrarenal abdominal aortic aneurysm (AAA) without rupture 04/10/2025    Arthritis of shoulder 05/09/2025     Resolved Ambulatory Problems     Diagnosis Date Noted    No Resolved Ambulatory Problems     Past Medical History:   Diagnosis Date    Abdominal aneurysm     Anemia 08/01/24    Arthritis of back 2000    Asthma 2015    At high risk for falls     Basal cell carcinoma     Bruises easily     Chronic kidney disease 8/5/24    Cirrhosis 8/5/24    CTS (carpal tunnel syndrome) Surgery 1999    DDD (degenerative disc disease), cervical     Depression     Diverticulitis of colon Unknown    Fatty liver 2022    GERD (gastroesophageal reflux disease)     GI (gastrointestinal bleed) 8/5/24    History of anemia     Hyperlipidemia ?    Hypertension ?    Low back strain Unknown    Lower leg edema     Lumbosacral disc disease 2024    Neck strain 2000    Neuropathy     Pancreatitis 2024    Panic attacks     Scoliosis Unknown    Shoulder pain, left 07/07/2023    Sleep apnea     Spinal stenosis     Spondylolisthesis of cervical region     Steatosis of liver     Tachycardia     Tendinitis of knee 2022    Thoracic disc disorder 2000    Vertigo          PAST SURGICAL HISTORY  Past Surgical History:   Procedure Laterality Date    BACK SURGERY      cervical disc surgery with fusion-    CARPAL TUNNEL RELEASE Bilateral     CATARACT EXTRACTION      CHOLECYSTECTOMY  1/1/20    COLONOSCOPY      COLONOSCOPY N/A 11/12/2020    Procedure: COLONOSCOPY, polypectomy;  Surgeon: Filomena Mckeon MD;  Location: [facility];  Service: Gastroenterology;  Laterality: N/A;   Diverticulosis; Hepatic flexure polyp x 1; Polyp at 60cm x 1; Polyp at 50cm x 1- hot snare;    COLONOSCOPY N/A 04/15/2024    Procedure: COLONOSCOPY into sigmoid colon with hot snare polypectomy;  Surgeon: Leatha Johns MD;  Location: [facility] ENDOSCOPY;  Service: General;  Laterality: N/A;  pre- history of polyps  post- diverticulosis, polyp    COLONOSCOPY N/A 05/07/2025    Procedure: COLONOSCOPY to cecum and ti with hot snare polypectomies;  Surgeon: Ana Guerrero MD;  Location: [facility] ENDOSCOPY;  Service: Gastroenterology;  Laterality: N/A;  PRE: IRON DEFICIENCY ANEMIA  POST: diverticulosis, polyps, hemorrhoids    ENDOSCOPY N/A 08/15/2024    Procedure: ESOPHAGOGASTRODUODENOSCOPY;  Surgeon: Garth Constantino MD;  Location: [facility] ENDOSCOPY;  Service: Gastroenterology;  Laterality: N/A;  PRE- CIRRHOSIS, DARK STOOL  POST- NORMAL    ENDOSCOPY N/A 05/07/2025    Procedure: ESOPHAGOGASTRODUODENOSCOPY;  Surgeon: Ana Guerrero MD;  Location: [facility] ENDOSCOPY;  Service: Gastroenterology;  Laterality: N/A;  PRE: CIRRHOSIS  POST:portal hypertensive gastropathy; esophagitis; hiatal hernia; varices    HYSTERECTOMY  1998    INCISION AND DRAINAGE LEG Left 11/17/2018    Procedure: Incision and Drainage of Left ankle;  Surgeon: Maxwell Lanier MD;  Location: [facility];  Service: Orthopedics    NECK SURGERY  2000, 2005,2017    SIGMOIDOSCOPY N/A 03/28/2024    Procedure: SIGMOIDOSCOPY FLEXIBLE;  Surgeon: Leatha Johns MD;  Location: [facility] ENDOSCOPY;  Service: General;  Laterality: N/A;  pre: h/o colon polyps  post: poor prep, diverticulosis    SKIN BIOPSY      SKIN GRAFT SPLIT THICKNESS N/A 02/12/2019    Procedure: debridement SKIN GRAFT SPLIT THICKNESS left ankle wound;  Surgeon: Barry Worthy MD;  Location: [facility];  Service: Plastics    TONGUE LESION EXCISION/BIOPSY N/A 11/26/2024    Procedure: WIDE LOCAL EXCISION OF TONGUE LESION;  Surgeon: El Mercedes MD;  Location: [facility]  OR;  Service: ENT;  Laterality: N/A;    TOTAL SHOULDER ARTHROPLASTY Left 07/20/2023    Procedure: Total  shoulder arthroplasty;  Surgeon: Maxwell Lanier MD;  [facility] [place] [facility]  Service: Orthopedics;  Laterality: Left;    TOTAL SHOULDER ARTHROPLASTY W/ DISTAL CLAVICLE EXCISION Right 6/5/2025    Procedure: Right Reverse Total Shoulder Arthroplasty;  Surgeon: Maxwell Lanier MD;  [facility] [place] [facility];  Service: Orthopedics;  Laterality: Right;    TOTAL THYMECTOMY      TRIGGER POINT INJECTION  [id number]    UPPER GASTROINTESTINAL ENDOSCOPY  8/10/24    WRIST FRACTURE SURGERY      WRIST FRACTURE SURGERY Left          FAMILY HISTORY  Family History   Problem Relation Age of Onset    Emphysema Mother     Alzheimer's disease Father     Cancer Father     Osteoporosis Sister     Scoliosis Sister     Malig Hyperthermia Neg Hx          SOCIAL HISTORY  Social History     Socioeconomic History    Marital status:    Tobacco Use    Smoking status: Never     Passive exposure: Current    Smokeless tobacco: Never    Tobacco comments:     PATIENT STATES SHE IS CURRENTLY TRYING TO QUIT. STATES SHE IS SMOKING APPROX. 4-8 CIGARETTES PER DAY AS OF 5-23-25   Vaping Use    Vaping status: Never Used   Substance and Sexual Activity    Alcohol use: Not Currently     Alcohol/week: 10.0 standard drinks of alcohol     Comment: PATIENT STATES SHE IS 80 DAYS SOBER AS OF 5-23-25    Drug use: No    Sexual activity: Yes     Partners: Male     Birth control/protection: Post-menopausal, Tubal ligation, Hysterectomy, Surgical         ALLERGIES  Wound dressing adhesive        REVIEW OF SYSTEMS  Review of Systems   Constitutional:  Negative for fever.   HENT:  Negative for sore throat.    Eyes: Negative.    Respiratory:  Negative for cough and shortness of breath.    Cardiovascular:  Negative for chest pain.   Gastrointestinal:  Negative for abdominal pain, diarrhea and vomiting.   Genitourinary:  Negative for dysuria.    Musculoskeletal:  Negative for neck pain.        Pain and swelling to both lower extremities   Skin:  Negative for rash.   Allergic/Immunologic: Negative.    Neurological:  Positive for weakness. Negative for numbness and headaches.   Hematological: Negative.    Psychiatric/Behavioral: Negative.     All other systems reviewed and are negative.         PHYSICAL EXAM  ED Triage Vitals [06/20/25 1435]   Temp Heart Rate Resp BP SpO2   97.3 °F (36.3 °C) 79 18 114/62 97 %      Temp src Heart Rate Source Patient Position BP Location FiO2 (%)   Oral Monitor -- -- --       Physical Exam  Constitutional:       General: She is not in acute distress.     Appearance: Normal appearance. She is not ill-appearing or toxic-appearing.   HENT:      Head: Normocephalic and atraumatic.   Eyes:      Extraocular Movements: Extraocular movements intact.      Pupils: Pupils are equal, round, and reactive to light.   Cardiovascular:      Rate and Rhythm: Normal rate and regular rhythm.      Heart sounds: No murmur heard.     No friction rub. No gallop.   Pulmonary:      Effort: Pulmonary effort is normal.      Breath sounds: Normal breath sounds.   Abdominal:      General: Abdomen is flat. There is no distension.      Palpations: Abdomen is soft.      Tenderness: There is no abdominal tenderness.   Musculoskeletal:         General: Tenderness present. No swelling. Normal range of motion.      Cervical back: Normal range of motion and neck supple.      Right lower leg: Edema present.      Left lower leg: Edema present.   Skin:     General: Skin is warm and dry.   Neurological:      General: No focal deficit present.      Mental Status: She is alert and oriented to person, place, and time.      Sensory: No sensory deficit.      Motor: No weakness.   Psychiatric:         Mood and Affect: Mood normal.         Behavior: Behavior normal.         Vital signs and nursing notes reviewed.          LAB RESULTS  Recent Results (from the past 24 hours)    Comprehensive Metabolic Panel    Collection Time: 06/20/25  2:43 PM    Specimen: Blood   Result Value Ref Range    Glucose 119 (H) 65 - 99 mg/dL    BUN 31.0 (H) 8.0 - 23.0 mg/dL    Creatinine 1.94 (H) 0.57 - 1.00 mg/dL    Sodium 137 136 - 145 mmol/L    Potassium 2.7 (L) 3.5 - 5.2 mmol/L    Chloride 92 (L) 98 - 107 mmol/L    CO2 32.9 (H) 22.0 - 29.0 mmol/L    Calcium 9.3 8.6 - 10.5 mg/dL    Total Protein 6.2 6.0 - 8.5 g/dL    Albumin 3.2 (L) 3.5 - 5.2 g/dL    ALT (SGPT) 21 1 - 33 U/L    AST (SGOT) 50 (H) 1 - 32 U/L    Alkaline Phosphatase 196 (H) 39 - 117 U/L    Total Bilirubin 1.8 (H) 0.0 - 1.2 mg/dL    Globulin 3.0 gm/dL    A/G Ratio 1.1 g/dL    BUN/Creatinine Ratio 16.0 7.0 - 25.0    Anion Gap 12.1 5.0 - 15.0 mmol/L    eGFR 28.5 (L) >60.0 mL/min/1.73   Green Top (Gel)    Collection Time: 06/20/25  2:43 PM   Result Value Ref Range    Extra Tube Hold for add-ons.    Lavender Top    Collection Time: 06/20/25  2:43 PM   Result Value Ref Range    Extra Tube hold for add-on    Gold Top - SST    Collection Time: 06/20/25  2:43 PM   Result Value Ref Range    Extra Tube Hold for add-ons.    Light Blue Top    Collection Time: 06/20/25  2:43 PM   Result Value Ref Range    Extra Tube Hold for add-ons.    CBC Auto Differential    Collection Time: 06/20/25  2:43 PM    Specimen: Blood   Result Value Ref Range    WBC 9.84 3.40 - 10.80 10*3/mm3    RBC 2.90 (L) 3.77 - 5.28 10*6/mm3    Hemoglobin 8.2 (L) 12.0 - 15.9 g/dL    Hematocrit 27.1 (L) 34.0 - 46.6 %    MCV 93.4 79.0 - 97.0 fL    MCH 28.3 26.6 - 33.0 pg    MCHC 30.3 (L) 31.5 - 35.7 g/dL    RDW 14.3 12.3 - 15.4 %    RDW-SD 49.4 37.0 - 54.0 fl    MPV 10.1 6.0 - 12.0 fL    Platelets 127 (L) 140 - 450 10*3/mm3    Neutrophil % 73.7 42.7 - 76.0 %    Lymphocyte % 13.5 (L) 19.6 - 45.3 %    Monocyte % 9.8 5.0 - 12.0 %    Eosinophil % 2.2 0.3 - 6.2 %    Basophil % 0.3 0.0 - 1.5 %    Immature Grans % 0.5 0.0 - 0.5 %    Neutrophils, Absolute 7.25 (H) 1.70 - 7.00 10*3/mm3     Lymphocytes, Absolute 1.33 0.70 - 3.10 10*3/mm3    Monocytes, Absolute 0.96 (H) 0.10 - 0.90 10*3/mm3    Eosinophils, Absolute 0.22 0.00 - 0.40 10*3/mm3    Basophils, Absolute 0.03 0.00 - 0.20 10*3/mm3    Immature Grans, Absolute 0.05 0.00 - 0.05 10*3/mm3    nRBC 0.0 0.0 - 0.2 /100 WBC   Magnesium    Collection Time: 06/20/25  2:43 PM    Specimen: Blood   Result Value Ref Range    Magnesium 3.3 (H) 1.6 - 2.4 mg/dL   Urinalysis With Microscopic If Indicated (No Culture) - Urine, Clean Catch    Collection Time: 06/20/25  4:31 PM    Specimen: Urine, Clean Catch   Result Value Ref Range    Color, UA Yellow Yellow, Straw    Appearance, UA Clear Clear    pH, UA 7.0 5.0 - 8.0    Specific Gravity, UA 1.009 1.005 - 1.030    Glucose, UA Negative Negative    Ketones, UA Negative Negative    Bilirubin, UA Negative Negative    Blood, UA Negative Negative    Protein, UA Negative Negative    Leuk Esterase, UA Negative Negative    Nitrite, UA Negative Negative    Urobilinogen, UA 0.2 E.U./dL 0.2 - 1.0 E.U./dL       Ordered the above labs and reviewed the results.        RADIOLOGY  No Radiology Exams Resulted Within Past 24 Hours    Ordered the above noted radiological studies. Reviewed by me in PACS.            PROCEDURES  Critical Care    Performed by: Mitesh Martinez MD  Authorized by: Mitesh Martinez MD    Critical care provider statement:     Critical care time (minutes):  31    Critical care time was exclusive of:  Separately billable procedures and treating other patients    Critical care was necessary to treat or prevent imminent or life-threatening deterioration of the following conditions:  Renal failure, circulatory failure and metabolic crisis    Critical care was time spent personally by me on the following activities:  Blood draw for specimens, development of treatment plan with patient or surrogate, discussions with consultants, evaluation of patient's response to treatment, examination of patient, obtaining  history from patient or surrogate, ordering and performing treatments and interventions, ordering and review of laboratory studies, pulse oximetry, re-evaluation of patient's condition and review of old charts    Care discussed with: admitting provider              MEDICATIONS GIVEN IN ER  Medications   potassium chloride 10 mEq in 100 mL IVPB (10 mEq Intravenous New Bag 6/20/25 1659)   oxyCODONE-acetaminophen (PERCOCET) 7.5-325 MG per tablet 1 tablet (1 tablet Oral Given 6/20/25 1633)   potassium chloride (KLOR-CON M20) CR tablet 40 mEq (40 mEq Oral Given 6/20/25 1633)                   MEDICAL DECISION MAKING, PROGRESS, and CONSULTS    All labs have been independently reviewed by me.  All radiology studies have been reviewed by me and I have also reviewed the radiology report.   EKGs independently viewed and interpreted by me.  Discussion below represents my analysis of pertinent findings related to patient's condition, differential diagnosis, treatment plan and final disposition.      Additional sources:  - Discussed/ obtained information from independent historians: History obtained from the patient as well as the patient's family at bedside.    - External (non-ED) record review: Upon medical records review, the patient was last seen and evaluated in the outpatient office at nephrology on 6/13/2025 for evaluation of chronic kidney disease.    - Chronic or social conditions impacting care: History of alcoholism, fatty liver, CKD    - Shared decision making: Admission decision based on shared conversations have to myself, the patient and family at bedside, as well as Dr. Choudhury with [facility].      Orders placed during this visit:  Orders Placed This Encounter   Procedures    Critical Care    [place] Draw    Comprehensive Metabolic Panel    CBC Auto Differential    Magnesium    Urinalysis With Microscopic If Indicated (No Culture) - Urine, Clean Catch    [facility] (on-call MD unless specified) Details    Inpatient Admission     CBC & Differential    Green Top (Gel)    Lavender Top    Gold Top - SST    Light Blue Top         Differential diagnosis includes but is not limited to:    Acute renal insufficiency, acute renal failure, overdiuresis, dehydration, urinary tract infection, sepsis, or severe electrolyte disturbance      Independent interpretation of labs, radiology studies, and discussions with consultants:    Lab values independently interpreted by myself with my interpretation showing  an acute kidney injury with a BUN of 31 and creatinine 1.94.  Also a marked hypokalemia with a potassium of 2.7.        ED Course as of 06/20/25 1711 Fri Jun 20, 2025   1617 The patient's potassium level is markedly low at 2.7.  Her renal function is also markedly elevated today with a BUN of 31 and creatinine of 1.94.  As we await the remainder of the workup, we will treat her reported discomfort and also treat with oral as well as IV potassium for repletion. [BM]   1647 On reevaluation, the patient is just receiving her medication.  Vital signs are currently stable and her blood pressure is currently 114/62 with a heart rate of 79.  I did inform her that she does have a significant SANDRO with a BUN of 31 and creatinine of 1.94.  She also has a marked hypokalemia with a potassium of 2.7.  That is currently being repleted.  I informed her that due to these issues, we would be admitting her to the hospital for further management and treatment.  She agrees with the plan and all questions answered. [BM]   1710 The patient's presentation, workup, as well as diagnosis and treatment plan was discussed at length with Dr. Choudhury of [facility].  She agrees to admit the patient to the hospital today for further management and treatment. [BM]      ED Course User Index  [BM] Mitesh Martinez MD           DIAGNOSIS  Final diagnoses:   Pedal edema   Acute renal insufficiency   Acute hypokalemia   Acute anemia         DISPOSITION  ADMISSION    Discussed treatment  plan and reason for admission with pt/family and admitting physician.  Pt/family voiced understanding of the plan for admission for further testing/treatment as needed.               Latest Documented Vital Signs:  As of 17:11 EDT  BP- 121/67 HR- 81 Temp- 97.3 °F (36.3 °C) (Oral) O2 sat- 99%              --    Please note that portions of this were completed with a voice recognition program.       Note Disclaimer: At [facility], we believe that sharing information builds trust and better relationships. You are receiving this note because you are receiving care at [facility] or recently visited. It is possible you will see health information before a provider has talked with you about it. This kind of information can be easy to misunderstand. To help you fully understand what it means for your health, we urge you to discuss this note with your provider.             Mitesh Martinez MD  06/20/25 0924

## 2025-06-20 NOTE — ED NOTES
"Nursing report ED to floor  Filomena Liu  64 y.o.  female    HPI :  HPI  Stated Reason for Visit: hypotension, abnormal labs    Chief Complaint  Chief Complaint   Patient presents with    Hypotension    Abnormal Lab       Admitting doctor:   Belen Choudhury MD    Admitting diagnosis:   The primary encounter diagnosis was Pedal edema. Diagnoses of Acute renal insufficiency, Acute hypokalemia, and Acute anemia were also pertinent to this visit.    Code status:   Current Code Status       Date Active Code Status Order ID Comments User Context       Prior            Allergies:   Wound dressing adhesive    Isolation:   No active isolations    Intake and Output    Intake/Output Summary (Last 24 hours) at 6/20/2025 1713  Last data filed at 6/20/2025 1631  Gross per 24 hour   Intake --   Output 200 ml   Net -200 ml       Weight:       06/20/25  1435   Weight: 75.3 kg (166 lb)       Most recent vitals:   Vitals:    06/20/25 1435 06/20/25 1637 06/20/25 1658   BP: 114/62 121/67 125/85   Pulse: 79 81 90   Resp: 18     Temp: 97.3 °F (36.3 °C)     TempSrc: Oral     SpO2: 97% 99% 98%   Weight: 75.3 kg (166 lb)     Height: 149.9 cm (59\")         Active LDAs/IV Access:   Lines, Drains & Airways       Active LDAs       Name Placement date Placement time Site Days    Peripheral IV 06/20/25 1659 20 G Left Antecubital 06/20/25  1659  Antecubital  less than 1                    Labs (abnormal labs have a star):   Labs Reviewed   COMPREHENSIVE METABOLIC PANEL - Abnormal; Notable for the following components:       Result Value    Glucose 119 (*)     BUN 31.0 (*)     Creatinine 1.94 (*)     Potassium 2.7 (*)     Chloride 92 (*)     CO2 32.9 (*)     Albumin 3.2 (*)     AST (SGOT) 50 (*)     Alkaline Phosphatase 196 (*)     Total Bilirubin 1.8 (*)     eGFR 28.5 (*)     All other components within normal limits    Narrative:     GFR Categories in Chronic Kidney Disease (CKD)              GFR Category          GFR (mL/min/1.73)    " Interpretation  G1                    90 or greater        Normal or high (1)  G2                    60-89                Mild decrease (1)  G3a                   45-59                Mild to moderate decrease  G3b                   30-44                Moderate to severe decrease  G4                    15-29                Severe decrease  G5                    14 or less           Kidney failure    (1)In the absence of evidence of kidney disease, neither GFR category G1 or G2 fulfill the criteria for CKD.    eGFR calculation 2021 CKD-EPI creatinine equation, which does not include race as a factor   CBC WITH AUTO DIFFERENTIAL - Abnormal; Notable for the following components:    RBC 2.90 (*)     Hemoglobin 8.2 (*)     Hematocrit 27.1 (*)     MCHC 30.3 (*)     Platelets 127 (*)     Lymphocyte % 13.5 (*)     Neutrophils, Absolute 7.25 (*)     Monocytes, Absolute 0.96 (*)     All other components within normal limits   MAGNESIUM - Abnormal; Notable for the following components:    Magnesium 3.3 (*)     All other components within normal limits   URINALYSIS W/ MICROSCOPIC IF INDICATED (NO CULTURE) - Normal    Narrative:     Urine microscopic not indicated.   RAINBOW DRAW    Narrative:     The following orders were created for panel order [place] Draw.  Procedure                               Abnormality         Status                     ---------                               -----------         ------                     Green Top (Gel)[[id number]]                                  Final result               Lavender Top[[id number]]                                     Final result               Gold Top - SST[[id number]]                                   Final result               Light Blue Top[[id number]]                                   Final result                 Please view results for these tests on the individual orders.   CBC AND DIFFERENTIAL    Narrative:     The following orders were created for panel order  CBC & Differential.  Procedure                               Abnormality         Status                     ---------                               -----------         ------                     CBC Auto Differential[[id number]]        Abnormal            Final result                 Please view results for these tests on the individual orders.   GREEN TOP   LAVENDER TOP   GOLD TOP - SST   LIGHT BLUE TOP       EKG:   No orders to display       Meds given in ED:   Medications   potassium chloride 10 mEq in 100 mL IVPB (10 mEq Intravenous New Bag 6/20/25 9199)   oxyCODONE-acetaminophen (PERCOCET) 7.5-325 MG per tablet 1 tablet (1 tablet Oral Given 6/20/25 1633)   potassium chloride (KLOR-CON M20) CR tablet 40 mEq (40 mEq Oral Given 6/20/25 1633)       Imaging results:  No radiology results for the last day    Ambulatory status:   - with assist    Social issues:   Social History     Socioeconomic History    Marital status:    Tobacco Use    Smoking status: Never     Passive exposure: Current    Smokeless tobacco: Never    Tobacco comments:     PATIENT STATES SHE IS CURRENTLY TRYING TO QUIT. STATES SHE IS SMOKING APPROX. 4-8 CIGARETTES PER DAY AS OF 5-23-25   Vaping Use    Vaping status: Never Used   Substance and Sexual Activity    Alcohol use: Not Currently     Alcohol/week: 10.0 standard drinks of alcohol     Comment: PATIENT STATES SHE IS 80 DAYS SOBER AS OF 5-23-25    Drug use: No    Sexual activity: Yes     Partners: Male     Birth control/protection: Post-menopausal, Tubal ligation, Hysterectomy, Surgical       Peripheral Neurovascular  Peripheral Neurovascular (Adult)  Peripheral Neurovascular WDL: .WDL except (Pitting edema noted in BLE)    Neuro Cognitive  Neuro Cognitive (Adult)  Cognitive/Neuro/Behavioral WDL: WDL  Pupils  Pupil PERRLA: yes    Learning  Learning Assessment  Learning Readiness and Ability: no barriers identified    Respiratory  Respiratory WDL  Respiratory WDL: WDL  Breath Sounds  All  Lung Fields Breath Sounds: All Fields  All Lung Fields Breath Sounds: clear    Abdominal Pain       Pain Assessments  Pain (Adult)  (0-10) Pain Rating: Rest: 10  (0-10) Pain Rating: Activity: 10  Pain Side/Orientation: generalized    NIH Stroke Scale       Fernando Jane RN  06/20/25 17:13 EDT

## 2025-06-20 NOTE — TELEPHONE ENCOUNTER
"  Caller: Filomena Liu \"PAT\"    Relationship: Self    Best call back number:     What is the best time to reach you: ANY     Who are you requesting to speak with (clinical staff, provider,  specific staff member): CLINICAL    What was the call regarding: UNABLE TO WARM TRANSFER.  PATIENT NEEDS TO SPEAK WITH SOMEONE.  SHE IS HAVING KIDNEY CONCERNS AND IS GOING TO GET HOSPITALIZED MOST LIKELY.  HER BLOOD PRESSURE IS AT 87/53 AND SHE NEEDS SOMEONE TO CALL HER BACK TO RESCHEDULE HER POST OP     Is it okay if the provider responds through MyChart: PLEASE CALL   "

## 2025-06-21 ENCOUNTER — APPOINTMENT (OUTPATIENT)
Dept: GENERAL RADIOLOGY | Facility: HOSPITAL | Age: 65
End: 2025-06-21
Payer: MEDICARE

## 2025-06-21 PROBLEM — I95.9 HYPOTENSION: Status: ACTIVE | Noted: 2025-06-21

## 2025-06-21 PROBLEM — R74.01 TRANSAMINITIS: Status: ACTIVE | Noted: 2025-06-21

## 2025-06-21 PROBLEM — N18.30 CKD (CHRONIC KIDNEY DISEASE) STAGE 3, GFR 30-59 ML/MIN: Status: ACTIVE | Noted: 2025-06-21

## 2025-06-21 PROBLEM — D69.6 THROMBOCYTOPENIA: Status: ACTIVE | Noted: 2025-06-21

## 2025-06-21 LAB
ALBUMIN SERPL-MCNC: 2.6 G/DL (ref 3.5–5.2)
ANION GAP SERPL CALCULATED.3IONS-SCNC: 11 MMOL/L (ref 5–15)
BUN SERPL-MCNC: 28 MG/DL (ref 8–23)
BUN/CREAT SERPL: 18.7 (ref 7–25)
CALCIUM SPEC-SCNC: 8.7 MG/DL (ref 8.6–10.5)
CHLORIDE SERPL-SCNC: 98 MMOL/L (ref 98–107)
CO2 SERPL-SCNC: 32 MMOL/L (ref 22–29)
CREAT SERPL-MCNC: 1.5 MG/DL (ref 0.57–1)
CREAT UR-MCNC: 72.4 MG/DL
DEPRECATED RDW RBC AUTO: 46.2 FL (ref 37–54)
EGFRCR SERPLBLD CKD-EPI 2021: 38.8 ML/MIN/1.73
ERYTHROCYTE [DISTWIDTH] IN BLOOD BY AUTOMATED COUNT: 14.3 % (ref 12.3–15.4)
GLUCOSE SERPL-MCNC: 133 MG/DL (ref 65–99)
HCT VFR BLD AUTO: 23.5 % (ref 34–46.6)
HGB BLD-MCNC: 7.6 G/DL (ref 12–15.9)
MCH RBC QN AUTO: 28.9 PG (ref 26.6–33)
MCHC RBC AUTO-ENTMCNC: 32.3 G/DL (ref 31.5–35.7)
MCV RBC AUTO: 89.4 FL (ref 79–97)
PHOSPHATE SERPL-MCNC: 2.5 MG/DL (ref 2.5–4.5)
PLATELET # BLD AUTO: 109 10*3/MM3 (ref 140–450)
PMV BLD AUTO: 10.5 FL (ref 6–12)
POTASSIUM SERPL-SCNC: 3 MMOL/L (ref 3.5–5.2)
POTASSIUM SERPL-SCNC: 4.7 MMOL/L (ref 3.5–5.2)
RBC # BLD AUTO: 2.63 10*6/MM3 (ref 3.77–5.28)
SODIUM SERPL-SCNC: 141 MMOL/L (ref 136–145)
SODIUM UR-SCNC: 38 MMOL/L
WBC NRBC COR # BLD AUTO: 7.19 10*3/MM3 (ref 3.4–10.8)

## 2025-06-21 PROCEDURE — 82570 ASSAY OF URINE CREATININE: CPT | Performed by: INTERNAL MEDICINE

## 2025-06-21 PROCEDURE — 94760 N-INVAS EAR/PLS OXIMETRY 1: CPT

## 2025-06-21 PROCEDURE — 25010000002 CEFAZOLIN PER 500 MG: Performed by: STUDENT IN AN ORGANIZED HEALTH CARE EDUCATION/TRAINING PROGRAM

## 2025-06-21 PROCEDURE — P9041 ALBUMIN (HUMAN),5%, 50ML: HCPCS

## 2025-06-21 PROCEDURE — 87040 BLOOD CULTURE FOR BACTERIA: CPT

## 2025-06-21 PROCEDURE — 94799 UNLISTED PULMONARY SVC/PX: CPT

## 2025-06-21 PROCEDURE — 99024 POSTOP FOLLOW-UP VISIT: CPT | Performed by: NURSE PRACTITIONER

## 2025-06-21 PROCEDURE — 25010000002 ALBUMIN HUMAN 5% PER 50 ML

## 2025-06-21 PROCEDURE — 73030 X-RAY EXAM OF SHOULDER: CPT

## 2025-06-21 PROCEDURE — 97530 THERAPEUTIC ACTIVITIES: CPT

## 2025-06-21 PROCEDURE — 36415 COLL VENOUS BLD VENIPUNCTURE: CPT | Performed by: INTERNAL MEDICINE

## 2025-06-21 PROCEDURE — 85027 COMPLETE CBC AUTOMATED: CPT | Performed by: INTERNAL MEDICINE

## 2025-06-21 PROCEDURE — 84300 ASSAY OF URINE SODIUM: CPT | Performed by: INTERNAL MEDICINE

## 2025-06-21 PROCEDURE — 94761 N-INVAS EAR/PLS OXIMETRY MLT: CPT

## 2025-06-21 PROCEDURE — 97162 PT EVAL MOD COMPLEX 30 MIN: CPT

## 2025-06-21 PROCEDURE — 80069 RENAL FUNCTION PANEL: CPT | Performed by: INTERNAL MEDICINE

## 2025-06-21 PROCEDURE — 84132 ASSAY OF SERUM POTASSIUM: CPT | Performed by: STUDENT IN AN ORGANIZED HEALTH CARE EDUCATION/TRAINING PROGRAM

## 2025-06-21 RX ORDER — ALBUMIN HUMAN 50 G/1000ML
25 SOLUTION INTRAVENOUS ONCE
Status: COMPLETED | OUTPATIENT
Start: 2025-06-21 | End: 2025-06-21

## 2025-06-21 RX ORDER — POTASSIUM CHLORIDE 1500 MG/1
40 TABLET, EXTENDED RELEASE ORAL EVERY 4 HOURS
Status: COMPLETED | OUTPATIENT
Start: 2025-06-21 | End: 2025-06-21

## 2025-06-21 RX ORDER — OXYCODONE AND ACETAMINOPHEN 10; 325 MG/1; MG/1
1 TABLET ORAL EVERY 4 HOURS PRN
Refills: 0 | Status: DISPENSED | OUTPATIENT
Start: 2025-06-21 | End: 2025-06-26

## 2025-06-21 RX ORDER — ALUMINA, MAGNESIA, AND SIMETHICONE 2400; 2400; 240 MG/30ML; MG/30ML; MG/30ML
15 SUSPENSION ORAL EVERY 6 HOURS PRN
Status: DISCONTINUED | OUTPATIENT
Start: 2025-06-21 | End: 2025-06-27 | Stop reason: HOSPADM

## 2025-06-21 RX ADMIN — OXYCODONE HYDROCHLORIDE AND ACETAMINOPHEN 1 TABLET: 10; 325 TABLET ORAL at 21:34

## 2025-06-21 RX ADMIN — POTASSIUM CHLORIDE 40 MEQ: 1500 TABLET, EXTENDED RELEASE ORAL at 09:30

## 2025-06-21 RX ADMIN — CEFAZOLIN 2000 MG: 2 INJECTION, POWDER, FOR SOLUTION INTRAMUSCULAR; INTRAVENOUS at 17:43

## 2025-06-21 RX ADMIN — FLUTICASONE PROPIONATE 1 SPRAY: 50 SPRAY, METERED NASAL at 08:52

## 2025-06-21 RX ADMIN — IPRATROPIUM BROMIDE AND ALBUTEROL SULFATE 3 ML: .5; 3 SOLUTION RESPIRATORY (INHALATION) at 10:25

## 2025-06-21 RX ADMIN — POTASSIUM CHLORIDE 40 MEQ: 1500 TABLET, EXTENDED RELEASE ORAL at 06:37

## 2025-06-21 RX ADMIN — PREGABALIN 25 MG: 25 CAPSULE ORAL at 21:34

## 2025-06-21 RX ADMIN — OXYCODONE HYDROCHLORIDE AND ACETAMINOPHEN 1 TABLET: 10; 325 TABLET ORAL at 17:43

## 2025-06-21 RX ADMIN — IPRATROPIUM BROMIDE AND ALBUTEROL SULFATE 3 ML: .5; 3 SOLUTION RESPIRATORY (INHALATION) at 07:45

## 2025-06-21 RX ADMIN — OXYCODONE HYDROCHLORIDE AND ACETAMINOPHEN 1 TABLET: 10; 325 TABLET ORAL at 13:30

## 2025-06-21 RX ADMIN — IPRATROPIUM BROMIDE AND ALBUTEROL SULFATE 3 ML: .5; 3 SOLUTION RESPIRATORY (INHALATION) at 14:57

## 2025-06-21 RX ADMIN — OXYCODONE AND ACETAMINOPHEN 1 TABLET: 7.5; 325 TABLET ORAL at 00:47

## 2025-06-21 RX ADMIN — PREGABALIN 25 MG: 25 CAPSULE ORAL at 08:51

## 2025-06-21 RX ADMIN — PANTOPRAZOLE SODIUM 40 MG: 40 TABLET, DELAYED RELEASE ORAL at 06:36

## 2025-06-21 RX ADMIN — POTASSIUM CHLORIDE 40 MEQ: 1500 TABLET, EXTENDED RELEASE ORAL at 13:30

## 2025-06-21 RX ADMIN — OXYCODONE AND ACETAMINOPHEN 1 TABLET: 7.5; 325 TABLET ORAL at 05:10

## 2025-06-21 RX ADMIN — DOCUSATE SODIUM 100 MG: 100 CAPSULE, LIQUID FILLED ORAL at 08:52

## 2025-06-21 RX ADMIN — ROPINIROLE 1 MG: 1 TABLET, FILM COATED ORAL at 21:34

## 2025-06-21 RX ADMIN — Medication 2.5 MG: at 21:34

## 2025-06-21 RX ADMIN — ALUMINUM HYDROXIDE, MAGNESIUM HYDROXIDE, AND DIMETHICONE 15 ML: 400; 400; 40 SUSPENSION ORAL at 21:34

## 2025-06-21 RX ADMIN — ALBUMIN (HUMAN) 25 G: 12.5 INJECTION, SOLUTION INTRAVENOUS at 11:32

## 2025-06-21 RX ADMIN — OXYCODONE AND ACETAMINOPHEN 1 TABLET: 7.5; 325 TABLET ORAL at 09:25

## 2025-06-21 RX ADMIN — DOCUSATE SODIUM 100 MG: 100 CAPSULE, LIQUID FILLED ORAL at 21:34

## 2025-06-21 RX ADMIN — CEFAZOLIN 2000 MG: 2 INJECTION, POWDER, FOR SOLUTION INTRAMUSCULAR; INTRAVENOUS at 09:25

## 2025-06-21 RX ADMIN — IPRATROPIUM BROMIDE AND ALBUTEROL SULFATE 3 ML: .5; 3 SOLUTION RESPIRATORY (INHALATION) at 19:40

## 2025-06-21 NOTE — PROGRESS NOTES
Name: Filomena Liu ADMIT: 2025   : 1960  PCP: Fernando Dunn MD    MRN: [id number] LOS: 1 days   AGE/SEX: 64 y.o. female  ROOM: [facility]     Subjective   Subjective   Patient seen and examined this morning.  She is awake and resting in bed.  She has ongoing swelling in her lower extremities, right greater than left, with associated erythema and tenderness.       Objective   Objective   Vital Signs  Temp:  [97.3 °F (36.3 °C)-98.2 °F (36.8 °C)] 98.2 °F (36.8 °C)  Heart Rate:  [71-97] 79  Resp:  [16-24] 16  BP: ()/(46-85) 123/51  SpO2:  [92 %-100 %] 96 %  on   ;   Device (Oxygen Therapy): room air  Body mass index is 34.15 kg/m².  Physical Exam  Vitals and nursing note reviewed.   Constitutional:       General: She is not in acute distress.     Appearance: She is ill-appearing.   Cardiovascular:      Rate and Rhythm: Normal rate.   Pulmonary:      Effort: Pulmonary effort is normal. No respiratory distress.      Breath sounds: No wheezing.   Abdominal:      General: There is no distension.      Palpations: Abdomen is soft.      Tenderness: There is no abdominal tenderness.      Comments: Bruising noted on abdomen   Musculoskeletal:      Right lower leg: Edema present.      Left lower leg: Edema present.      Comments: Erythema and more pronounced swelling of right lower extremity   Skin:     General: Skin is warm and dry.      Findings: Bruising and erythema present.   Neurological:      Mental Status: She is alert.       Results Review     I reviewed the patient's new clinical results.  Results from last 7 days   Lab Units 25  0407 25  1443   WBC 10*3/mm3 7.19 9.84   HEMOGLOBIN g/dL 7.6* 8.2*   PLATELETS 10*3/mm3 109* 127*     Results from last 7 days   Lab Units 25  0407 25  1443   SODIUM mmol/L 141 137   POTASSIUM mmol/L 3.0* 2.7*   CHLORIDE mmol/L 98 92*   CO2 mmol/L 32.0* 32.9*   BUN mg/dL 28.0* 31.0*   CREATININE mg/dL 1.50* 1.94*   GLUCOSE mg/dL 133* 119*  "  EGFR mL/min/1.73 38.8* 28.5*     Results from last 7 days   Lab Units 06/21/25  0407 06/20/25  1443   ALBUMIN g/dL 2.6* 3.2*   BILIRUBIN mg/dL  --  1.8*   ALK PHOS U/L  --  196*   AST (SGOT) U/L  --  50*   ALT (SGPT) U/L  --  21     Results from last 7 days   Lab Units 06/21/25  0407 06/20/25  1443   CALCIUM mg/dL 8.7 9.3   ALBUMIN g/dL 2.6* 3.2*   MAGNESIUM mg/dL  --  3.3*   PHOSPHORUS mg/dL 2.5  --        No results found for: \"HGBA1C\", \"POCGLU\"    No radiology results for the last day    I have personally reviewed all medications:  Scheduled Medications  atenolol, 25 mg, Oral, Nightly  [START ON 6/26/2025] cholecalciferol, 1,000 Units, Oral, Weekly  docusate sodium, 100 mg, Oral, BID  fluticasone, 1 spray, Nasal, Daily  ipratropium-albuterol, 3 mL, Nebulization, 4x Daily - RT  melatonin, 2.5 mg, Oral, Nightly  pantoprazole, 40 mg, Oral, QAM AC  potassium chloride ER, 40 mEq, Oral, Q4H  pregabalin, 25 mg, Oral, Q12H  rOPINIRole, 1 mg, Oral, Nightly  spironolactone, 25 mg, Oral, BID    Infusions   Diet  Diet: Cardiac; Healthy Heart (2-3 Na+); Fluid Consistency: Thin (IDDSI 0)    I have personally reviewed:  [x]  Laboratory   []  Microbiology   [x]  Radiology   [x]  EKG/Telemetry  [x]  Cardiology/Vascular Echo from 08/2024 with EF 63.4%, indeterminate LV diastolic function.  []  Pathology    []  Records       Assessment/Plan     Active Hospital Problems    Diagnosis  POA    **Hypokalemia [E87.6]  Yes    Transaminitis [R74.01]  Yes    CKD (chronic kidney disease) stage 3, GFR 30-59 ml/min [N18.30]  Yes    Hypotension [I95.9]  Yes    Thrombocytopenia [D69.6]  Yes    Iron deficiency anemia [D50.9]  Yes    Alcoholic cirrhosis of liver without ascites [K70.30]  Yes    Gastroesophageal reflux disease [K21.9]  Yes    HTN (hypertension) [I10]  Yes      Resolved Hospital Problems   No resolved problems to display.       64 y.o. female admitted with Hypokalemia.    SANDRO on CKD stage 3  - Creatinine 1.94 on arrival, value " previously 1.02 on 05/23/25. Baseline values appear to fluctuate but average over past 6 lab draws prior to arrival ~1.3.   - Diuretic held on arrival, repeat levels improving this morning. Nephrology consulted, will follow up their assessment. Continue treatment as ordered.  - Repeat labs in AM.    Right lower extremity cellulitis  - RLE warm, erythematous and tender to palpation. Start Cefazolin now.  - Check RLE Duplex US to evaluate for DVT.    Hypokalemia  - K low on most recent labs; Will replete via electrolyte protocol. Mag level normal. May need to schedule potassium moving forward if not sustained with repletion.     History of HTN complicated by hypotension  - BP soft this morning noted on vitals. Diuretic being held, hold AM dose of Aldactone for now and trend BP.    Cirrhosis  Transaminitis  - LFT values elevated, will review chart for comparison. Abdominal exam benign, trend levels for now. Repeat CMP in AM.    Anemia  Thrombocytopenia  - Noted on labs, she does have bruising on her abdomen and lower back, this area has been marked to allow for close monitoring. No overt signs of GI bleeding, but need to keep close eye on this.  - Order repeat CBC in AM for reassessment. Transfuse for hemoglobin <7.    Right shoulder osteoarthritis  - With associated rotator cuff tendinopathy; S/P Right reverse total shoulder arthroplasty and Tenodesis of long head of biceps tendon with Dr. Lanier on 06/05/25. She is having some discomfort in her shoulders, orthopedic surgery consulted.    GERD  - Continue PPI as prescribed.     Hyperglycemia  - Check hemoglobin A1c.      SCDs for DVT prophylaxis.  Full code.  Discussed with patient and nursing staff.  Anticipate discharge TBD timing yet to be determined.  Expected discharge date/ time has not been documented.      Mike Bunch DO  [facility]  06/21/25

## 2025-06-21 NOTE — CONSULTS
Orthopedic Consult      Patient: Filomena Liu    Date of Admission: 6/20/2025  3:45 PM    YOB: 1960    Medical Record Number: [id number]    Consulting Physician: Mike Bunch DO    Chief Complaints: Right shoulder pain and swelling    History of Present Illness: 64 y.o. female admitted to [facility] to services of Mike Bunch DO with hypokalemia.  Patient is 2 weeks status post right reverse total shoulder replacement by Dr. Lanier.  Due to her current medical concerns, she had to cancel her scheduled post-op appointment yesterday.  Reports she has been doing well in regards to her motion.  Reports she has actually probably been doing more than she should have.  She reports significant pain in the shoulder and rates this as 9 out of 10.  She is requesting her oxycodone be increased today.  She is concerned about swelling in the front of the shoulder.  Denies fever, chills, or drainage from the wound.  She mentions she is having some mild discomfort in the left shoulder, but this is not of significant concern to her.  She feels this pain is related to overuse since her right shoulder replacement.      Allergies:   Allergies   Allergen Reactions    Wound Dressing Adhesive Other (See Comments)     SKIN TEARS AND RASH- PAPER TAPE OK       Home Medications:    Current Facility-Administered Medications:     albumin human 5 % solution 25 g, 25 g, Intravenous, Once, Mathew Olivas, TAYLER    albuterol (PROVENTIL) nebulizer solution 0.083% 2.5 mg/3mL, 2.5 mg, Nebulization, Q6H PRN, Belen Choudhury MD    atenolol (TENORMIN) tablet 25 mg, 25 mg, Oral, Nightly, Belen Choudhury MD, 25 mg at 06/20/25 2107    sennosides-docusate (PERICOLACE) 8.6-50 MG per tablet 2 tablet, 2 tablet, Oral, BID PRN **AND** polyethylene glycol (MIRALAX) packet 17 g, 17 g, Oral, Daily PRN **AND** bisacodyl (DULCOLAX) EC tablet 5 mg, 5 mg, Oral, Daily PRN **AND** bisacodyl (DULCOLAX) suppository 10 mg, 10  mg, Rectal, Daily PRN, Belen Choudhury MD    Calcium Replacement - Follow Nurse / BPA Driven Protocol, , Not Applicable, PRN, Belen Choudhury MD    ceFAZolin 2000 mg IVPB in 100 mL NS (MBP), 2,000 mg, Intravenous, Q8H, Mike Bunch DO, 2,000 mg at 06/21/25 0925    [START ON 6/26/2025] cholecalciferol (VITAMIN D3) tablet 1,000 Units, 1,000 Units, Oral, Weekly, Belen Choudhury MD    docusate sodium (COLACE) capsule 100 mg, 100 mg, Oral, BID, Belen Choudhury MD, 100 mg at 06/21/25 0852    fluticasone (FLONASE) 50 MCG/ACT nasal spray 1 spray, 1 spray, Nasal, Daily, Belen Choudhury MD, 1 spray at 06/21/25 0852    ipratropium-albuterol (DUO-NEB) nebulizer solution 3 mL, 3 mL, Nebulization, 4x Daily - RT, Belen Choudhury MD, 3 mL at 06/21/25 0745    Magnesium Standard Dose Replacement - Follow Nurse / BPA Driven Protocol, , Not Applicable, PRN, Belen Choudhury MD    melatonin tablet 2.5 mg, 2.5 mg, Oral, Nightly, Belen Choudhury MD, 2.5 mg at 06/20/25 2106    ondansetron ODT (ZOFRAN-ODT) disintegrating tablet 4 mg, 4 mg, Oral, Q6H PRN **OR** ondansetron (ZOFRAN) injection 4 mg, 4 mg, Intravenous, Q6H PRN, Belen Choudhury MD    oxyCODONE-acetaminophen (PERCOCET)  MG per tablet 1 tablet, 1 tablet, Oral, Q4H PRN, Gypsy Mart APRN    pantoprazole (PROTONIX) EC tablet 40 mg, 40 mg, Oral, QAM AC, Belen Choudhury MD, 40 mg at 06/21/25 0636    Pharmacy To Dose: Cefazolin, , Not Applicable, Continuous PRN, Mike Bunch DO    Phosphorus Replacement - Follow Nurse / BPA Driven Protocol, , Not Applicable, PRNMacey Renate Andrea, MD    potassium chloride (KLOR-CON M20) CR tablet 40 mEq, 40 mEq, Oral, Q4H, Mike Bunch DO, 40 mEq at 06/21/25 0930    Potassium Replacement - Follow Nurse / BPA Driven Protocol, , Not Applicable, PRN, Belen Choudhury MD    pregabalin (LYRICA) capsule 25 mg, 25 mg, Oral, Q12H, Belen Choudhury MD, 25 mg at  06/21/25 0851    rOPINIRole (REQUIP) tablet 1 mg, 1 mg, Oral, Nightly, Belen Choudhury MD, 1 mg at 06/20/25 2107    [Held by provider] spironolactone (ALDACTONE) tablet 25 mg, 25 mg, Oral, BID, StingBelen luna MD, 25 mg at 06/20/25 2107    Current Medications:  Scheduled Meds:albumin human, 25 g, Intravenous, Once  atenolol, 25 mg, Oral, Nightly  ceFAZolin, 2,000 mg, Intravenous, Q8H  [START ON 6/26/2025] cholecalciferol, 1,000 Units, Oral, Weekly  docusate sodium, 100 mg, Oral, BID  fluticasone, 1 spray, Nasal, Daily  ipratropium-albuterol, 3 mL, Nebulization, 4x Daily - RT  melatonin, 2.5 mg, Oral, Nightly  pantoprazole, 40 mg, Oral, QAM AC  potassium chloride ER, 40 mEq, Oral, Q4H  pregabalin, 25 mg, Oral, Q12H  rOPINIRole, 1 mg, Oral, Nightly  [Held by provider] spironolactone, 25 mg, Oral, BID      Continuous Infusions:Pharmacy To Dose:,       PRN Meds:.  albuterol    senna-docusate sodium **AND** polyethylene glycol **AND** bisacodyl **AND** bisacodyl    Calcium Replacement - Follow Nurse / BPA Driven Protocol    Magnesium Standard Dose Replacement - Follow Nurse / BPA Driven Protocol    ondansetron ODT **OR** ondansetron    oxyCODONE-acetaminophen    Pharmacy To Dose:    Phosphorus Replacement - Follow Nurse / BPA Driven Protocol    Potassium Replacement - Follow Nurse / BPA Driven Protocol    Past Medical History:   Diagnosis Date    Abdominal aneurysm     DR. JOYCE FOLLOWING 3.5CM    Alcoholism 1985    Anemia 08/01/24    HAS HAD IRON INFUSIONS ALMOST YEARLY    Anxiety     Arthritis of back 2000    Asthma 2015    At high risk for falls     Basal cell carcinoma     Bruises easily     Chronic kidney disease 8/5/24    Cirrhosis 8/5/24    COPD (chronic obstructive pulmonary disease)     MANAGED BY PRIMARY CARE    CTS (carpal tunnel syndrome) Surgery 1999    DDD (degenerative disc disease), cervical     Depression     Diverticulitis of colon Unknown    Fatty liver 2022    GERD (gastroesophageal  reflux disease)     GI (gastrointestinal bleed) 8/5/24    History of anemia     History of MRSA infection     LEFT ANKLE TREAT [facility]  5YEARS GO    Hyperlipidemia ?    Hypertension ?    Low back strain Unknown    Lower leg edema     Lumbosacral disc disease 2024    Neck strain 2000    Neuropathy     Pancreatitis 2024    Panic attacks     Scoliosis Unknown    Shoulder arthritis 06/05/2023    Shoulder pain, left 07/07/2023    Sleep apnea     NO MACHINE USE    Spinal stenosis     Spondylolisthesis of cervical region     Steatosis of liver     Tachycardia     Tendinitis of knee 2022    Thoracic disc disorder 2000    Vertigo        Past Surgical History:   Procedure Laterality Date    BACK SURGERY      cervical disc surgery with fusion-    CARPAL TUNNEL RELEASE Bilateral     CATARACT EXTRACTION      CHOLECYSTECTOMY  1/1/20    COLONOSCOPY      COLONOSCOPY N/A 11/12/2020    Procedure: COLONOSCOPY, polypectomy;  Surgeon: Filomena Mckeon MD;  Location: [facility];  Service: Gastroenterology;  Laterality: N/A;  Diverticulosis; Hepatic flexure polyp x 1; Polyp at 60cm x 1; Polyp at 50cm x 1- hot snare;    COLONOSCOPY N/A 04/15/2024    Procedure: COLONOSCOPY into sigmoid colon with hot snare polypectomy;  Surgeon: Leatha Johns MD;  Location: [facility] ENDOSCOPY;  Service: General;  Laterality: N/A;  pre- history of polyps  post- diverticulosis, polyp    COLONOSCOPY N/A 05/07/2025    Procedure: COLONOSCOPY to cecum and ti with hot snare polypectomies;  Surgeon: Ana Guerrero MD;  Location: [facility] ENDOSCOPY;  Service: Gastroenterology;  Laterality: N/A;  PRE: IRON DEFICIENCY ANEMIA  POST: diverticulosis, polyps, hemorrhoids    ENDOSCOPY N/A 08/15/2024    Procedure: ESOPHAGOGASTRODUODENOSCOPY;  Surgeon: Garth Constantino MD;  Location: [facility] ENDOSCOPY;  Service: Gastroenterology;  Laterality: N/A;  PRE- CIRRHOSIS, DARK STOOL  POST- NORMAL    ENDOSCOPY N/A 05/07/2025    Procedure: ESOPHAGOGASTRODUODENOSCOPY;  Surgeon:  Ana Guerrero MD;  Location: [facility] ENDOSCOPY;  Service: Gastroenterology;  Laterality: N/A;  PRE: CIRRHOSIS  POST:portal hypertensive gastropathy; esophagitis; hiatal hernia; varices    HYSTERECTOMY  1998    INCISION AND DRAINAGE LEG Left 11/17/2018    Procedure: Incision and Drainage of Left ankle;  Surgeon: Maxwell Lanier MD;  Location: [facility] MAIN OR;  Service: Orthopedics    NECK SURGERY  2000, 2005,2017    SIGMOIDOSCOPY N/A 03/28/2024    Procedure: SIGMOIDOSCOPY FLEXIBLE;  Surgeon: Leatha Johns MD;  Location: [facility] ENDOSCOPY;  Service: General;  Laterality: N/A;  pre: h/o colon polyps  post: poor prep, diverticulosis    SKIN BIOPSY      SKIN GRAFT SPLIT THICKNESS N/A 02/12/2019    Procedure: debridement SKIN GRAFT SPLIT THICKNESS left ankle wound;  Surgeon: Barry Worthy MD;  Location: [facility] OR [facility];  Service: Plastics    TONGUE LESION EXCISION/BIOPSY N/A 11/26/2024    Procedure: WIDE LOCAL EXCISION OF TONGUE LESION;  Surgeon: El Mercedes MD;  Location: [facility];  Service: ENT;  Laterality: N/A;    TOTAL SHOULDER ARTHROPLASTY Left 07/20/2023    Procedure: Total  shoulder arthroplasty;  Surgeon: Maxwell Lanier MD;  [place] [facility] [place] [facility]  Service: Orthopedics;  Laterality: Left;    TOTAL SHOULDER ARTHROPLASTY W/ DISTAL CLAVICLE EXCISION Right 6/5/2025    Procedure: Right Reverse Total Shoulder Arthroplasty;  Surgeon: Maxwell Lanier MD;  [place] [facility] [place] [facility];  Service: Orthopedics;  Laterality: Right;    TOTAL THYMECTOMY      TRIGGER POINT INJECTION  [id number]    UPPER GASTROINTESTINAL ENDOSCOPY  8/10/24    WRIST FRACTURE SURGERY      WRIST FRACTURE SURGERY Left        Social History     Occupational History     Employer: DISABLED   Tobacco Use    Smoking status: Never     Passive exposure: Current    Smokeless tobacco: Never    Tobacco comments:     PATIENT STATES SHE IS CURRENTLY TRYING TO QUIT. STATES SHE IS SMOKING APPROX. 4-8 CIGARETTES PER DAY AS OF 5-23-25    Vaping Use    Vaping status: Never Used   Substance and Sexual Activity    Alcohol use: Not Currently     Alcohol/week: 10.0 standard drinks of alcohol     Comment: PATIENT STATES SHE IS 80 DAYS SOBER AS OF 5-23-25    Drug use: No    Sexual activity: Yes     Partners: Male     Birth control/protection: Post-menopausal, Tubal ligation, Hysterectomy, Surgical      Social History     Social History Narrative    Not on file       Family History   Problem Relation Age of Onset    Emphysema Mother     Alzheimer's disease Father     Cancer Father     Osteoporosis Sister     Scoliosis Sister     Malig Hyperthermia Neg Hx        Review of Systems:     Constitutional:  Denies fever, shaking or chills   Musculoskeletal:  Denies numbness tingling or loss of motor function except as above    Physical Exam: 64 y.o. female    Vitals:    06/21/25 0651 06/21/25 0700 06/21/25 0703 06/21/25 0745   BP: (!) 87/46 93/51 123/51    BP Location: Left arm Left arm Right arm    Patient Position: Lying Lying Lying    Pulse: 77 76 79 79   Resp: 16   18   Temp: 98.2 °F (36.8 °C)      TempSrc: Oral      SpO2: 93% 97% 96% 94%   Weight:       Height:         General:  Awake, alert. No acute distress.      Psych:  Affect and demeanor appropriate.    Extremities: Right upper extremity: Dressing is dry and intact.  She has a moderate hematoma about the incision.  Skin over the hematoma appears healthy.  Compartments are soft without evidence for DVT or compartment syndrome.  No significant tenderness to palpation about the shoulder.   ROM is limited as expected, but is on track according to protocol.  No obvious instability although exam is limited due to discomfort. Sensation to light touch grossly intact distally.  Brisk cap refill and good pulses distally.    Diagnostic Tests:    Admission on 06/20/2025   Component Date Value Ref Range Status    Glucose 06/20/2025 119 (H)  65 - 99 mg/dL Final    BUN 06/20/2025 31.0 (H)  8.0 - 23.0 mg/dL Final     Creatinine 06/20/2025 1.94 (H)  0.57 - 1.00 mg/dL Final    Sodium 06/20/2025 137  136 - 145 mmol/L Final    Potassium 06/20/2025 2.7 (L)  3.5 - 5.2 mmol/L Final    Chloride 06/20/2025 92 (L)  98 - 107 mmol/L Final    CO2 06/20/2025 32.9 (H)  22.0 - 29.0 mmol/L Final    Calcium 06/20/2025 9.3  8.6 - 10.5 mg/dL Final    Total Protein 06/20/2025 6.2  6.0 - 8.5 g/dL Final    Albumin 06/20/2025 3.2 (L)  3.5 - 5.2 g/dL Final    ALT (SGPT) 06/20/2025 21  1 - 33 U/L Final    AST (SGOT) 06/20/2025 50 (H)  1 - 32 U/L Final    Alkaline Phosphatase 06/20/2025 196 (H)  39 - 117 U/L Final    Total Bilirubin 06/20/2025 1.8 (H)  0.0 - 1.2 mg/dL Final    Globulin 06/20/2025 3.0  gm/dL Final    A/G Ratio 06/20/2025 1.1  g/dL Final    BUN/Creatinine Ratio 06/20/2025 16.0  7.0 - 25.0 Final    Anion Gap 06/20/2025 12.1  5.0 - 15.0 mmol/L Final    eGFR 06/20/2025 28.5 (L)  >60.0 mL/min/1.73 Final    Extra Tube 06/20/2025 Hold for add-ons.   Final    Auto resulted.    Extra Tube 06/20/2025 hold for add-on   Final    Auto resulted    Extra Tube 06/20/2025 Hold for add-ons.   Final    Auto resulted.    Extra Tube 06/20/2025 Hold for add-ons.   Final    Auto resulted    WBC 06/20/2025 9.84  3.40 - 10.80 10*3/mm3 Final    RBC 06/20/2025 2.90 (L)  3.77 - 5.28 10*6/mm3 Final    Hemoglobin 06/20/2025 8.2 (L)  12.0 - 15.9 g/dL Final    Hematocrit 06/20/2025 27.1 (L)  34.0 - 46.6 % Final    MCV 06/20/2025 93.4  79.0 - 97.0 fL Final    MCH 06/20/2025 28.3  26.6 - 33.0 pg Final    MCHC 06/20/2025 30.3 (L)  31.5 - 35.7 g/dL Final    RDW 06/20/2025 14.3  12.3 - 15.4 % Final    RDW-SD 06/20/2025 49.4  37.0 - 54.0 fl Final    MPV 06/20/2025 10.1  6.0 - 12.0 fL Final    Platelets 06/20/2025 127 (L)  140 - 450 10*3/mm3 Final    Neutrophil % 06/20/2025 73.7  42.7 - 76.0 % Final    Lymphocyte % 06/20/2025 13.5 (L)  19.6 - 45.3 % Final    Monocyte % 06/20/2025 9.8  5.0 - 12.0 % Final    Eosinophil % 06/20/2025 2.2  0.3 - 6.2 % Final    Basophil % 06/20/2025  0.3  0.0 - 1.5 % Final    Immature Grans % 06/20/2025 0.5  0.0 - 0.5 % Final    Neutrophils, Absolute 06/20/2025 7.25 (H)  1.70 - 7.00 10*3/mm3 Final    Lymphocytes, Absolute 06/20/2025 1.33  0.70 - 3.10 10*3/mm3 Final    Monocytes, Absolute 06/20/2025 0.96 (H)  0.10 - 0.90 10*3/mm3 Final    Eosinophils, Absolute 06/20/2025 0.22  0.00 - 0.40 10*3/mm3 Final    Basophils, Absolute 06/20/2025 0.03  0.00 - 0.20 10*3/mm3 Final    Immature Grans, Absolute 06/20/2025 0.05  0.00 - 0.05 10*3/mm3 Final    nRBC 06/20/2025 0.0  0.0 - 0.2 /100 WBC Final    Magnesium 06/20/2025 3.3 (H)  1.6 - 2.4 mg/dL Final    Color, UA 06/20/2025 Yellow  Yellow, Straw Final    Appearance, UA 06/20/2025 Clear  Clear Final    pH, UA 06/20/2025 7.0  5.0 - 8.0 Final    Specific Gravity, UA 06/20/2025 1.009  1.005 - 1.030 Final    Glucose, UA 06/20/2025 Negative  Negative Final    Ketones, UA 06/20/2025 Negative  Negative Final    Bilirubin, UA 06/20/2025 Negative  Negative Final    Blood, UA 06/20/2025 Negative  Negative Final    Protein, UA 06/20/2025 Negative  Negative Final    Leuk Esterase, UA 06/20/2025 Negative  Negative Final    Nitrite, UA 06/20/2025 Negative  Negative Final    Urobilinogen, UA 06/20/2025 0.2 E.U./dL  0.2 - 1.0 E.U./dL Final    WBC 06/21/2025 7.19  3.40 - 10.80 10*3/mm3 Final    RBC 06/21/2025 2.63 (L)  3.77 - 5.28 10*6/mm3 Final    Hemoglobin 06/21/2025 7.6 (L)  12.0 - 15.9 g/dL Final    Hematocrit 06/21/2025 23.5 (L)  34.0 - 46.6 % Final    MCV 06/21/2025 89.4  79.0 - 97.0 fL Final    MCH 06/21/2025 28.9  26.6 - 33.0 pg Final    MCHC 06/21/2025 32.3  31.5 - 35.7 g/dL Final    RDW 06/21/2025 14.3  12.3 - 15.4 % Final    RDW-SD 06/21/2025 46.2  37.0 - 54.0 fl Final    MPV 06/21/2025 10.5  6.0 - 12.0 fL Final    Platelets 06/21/2025 109 (L)  140 - 450 10*3/mm3 Final    Glucose 06/21/2025 133 (H)  65 - 99 mg/dL Final    BUN 06/21/2025 28.0 (H)  8.0 - 23.0 mg/dL Final    Creatinine 06/21/2025 1.50 (H)  0.57 - 1.00 mg/dL Final     Sodium 06/21/2025 141  136 - 145 mmol/L Final    Potassium 06/21/2025 3.0 (L)  3.5 - 5.2 mmol/L Final    Chloride 06/21/2025 98  98 - 107 mmol/L Final    CO2 06/21/2025 32.0 (H)  22.0 - 29.0 mmol/L Final    Calcium 06/21/2025 8.7  8.6 - 10.5 mg/dL Final    Albumin 06/21/2025 2.6 (L)  3.5 - 5.2 g/dL Final    Phosphorus 06/21/2025 2.5  2.5 - 4.5 mg/dL Final    Anion Gap 06/21/2025 11.0  5.0 - 15.0 mmol/L Final    BUN/Creatinine Ratio 06/21/2025 18.7  7.0 - 25.0 Final    eGFR 06/21/2025 38.8 (L)  >60.0 mL/min/1.73 Final    Sodium, Urine 06/21/2025 38  mmol/L Final    Creatinine, Urine 06/21/2025 72.4  mg/dL Final     Lab Results (last 24 hours)       Procedure Component Value Units Date/Time    Renal Function Panel [[id number]]  (Abnormal) Collected: 06/21/25 0407    Specimen: Blood Updated: 06/21/25 0505     Glucose 133 mg/dL      BUN 28.0 mg/dL      Creatinine 1.50 mg/dL      Sodium 141 mmol/L      Potassium 3.0 mmol/L      Chloride 98 mmol/L      CO2 32.0 mmol/L      Calcium 8.7 mg/dL      Albumin 2.6 g/dL      Phosphorus 2.5 mg/dL      Anion Gap 11.0 mmol/L      BUN/Creatinine Ratio 18.7     eGFR 38.8 mL/min/1.73     Narrative:      GFR Categories in Chronic Kidney Disease (CKD)              GFR Category          GFR (mL/min/1.73)    Interpretation  G1                    90 or greater        Normal or high (1)  G2                    60-89                Mild decrease (1)  G3a                   45-59                Mild to moderate decrease  G3b                   30-44                Moderate to severe decrease  G4                    15-29                Severe decrease  G5                    14 or less           Kidney failure    (1)In the absence of evidence of kidney disease, neither GFR category G1 or G2 fulfill the criteria for CKD.    eGFR calculation 2021 CKD-EPI creatinine equation, which does not include race as a factor    CBC (No Diff) [[id number]]  (Abnormal) Collected: 06/21/25 0407    Specimen: Blood  Updated: 06/21/25 0442     WBC 7.19 10*3/mm3      RBC 2.63 10*6/mm3      Hemoglobin 7.6 g/dL      Hematocrit 23.5 %      MCV 89.4 fL      MCH 28.9 pg      MCHC 32.3 g/dL      RDW 14.3 %      RDW-SD 46.2 fl      MPV 10.5 fL      Platelets 109 10*3/mm3     Sodium, Urine, Random - Urine, Clean Catch [[id number]] Collected: 06/21/25 0044    Specimen: Urine, Clean Catch Updated: 06/21/25 0242     Sodium, Urine 38 mmol/L     Narrative:      Reference intervals for random urine have not been established.  Clinical usage is dependent upon physician's interpretation in combination with other laboratory tests.       Creatinine Urine Random (kidney function) GFR component - Urine, Clean Catch [[id number]] Collected: 06/21/25 0044    Specimen: Urine, Clean Catch Updated: 06/21/25 0129     Creatinine, Urine 72.4 mg/dL     Narrative:      Reference intervals for random urine have not been established.  Clinical usage is dependent upon physician's interpretation in combination with other laboratory tests.       Urinalysis With Microscopic If Indicated (No Culture) - Urine, Clean Catch [[id number]]  (Normal) Collected: 06/20/25 1631    Specimen: Urine, Clean Catch Updated: 06/20/25 1639     Color, UA Yellow     Appearance, UA Clear     pH, UA 7.0     Specific Gravity, UA 1.009     Glucose, UA Negative     Ketones, UA Negative     Bilirubin, UA Negative     Blood, UA Negative     Protein, UA Negative     Leuk Esterase, UA Negative     Nitrite, UA Negative     Urobilinogen, UA 0.2 E.U./dL    Narrative:      Urine microscopic not indicated.    Magnesium [[id number]]  (Abnormal) Collected: 06/20/25 1443    Specimen: Blood Updated: 06/20/25 1637     Magnesium 3.3 mg/dL     Comprehensive Metabolic Panel [[id number]]  (Abnormal) Collected: 06/20/25 1443    Specimen: Blood Updated: 06/20/25 1518     Glucose 119 mg/dL      BUN 31.0 mg/dL      Creatinine 1.94 mg/dL      Sodium 137 mmol/L      Potassium 2.7 mmol/L      Chloride 92 mmol/L      CO2  32.9 mmol/L      Calcium 9.3 mg/dL      Total Protein 6.2 g/dL      Albumin 3.2 g/dL      ALT (SGPT) 21 U/L      AST (SGOT) 50 U/L      Alkaline Phosphatase 196 U/L      Total Bilirubin 1.8 mg/dL      Globulin 3.0 gm/dL      A/G Ratio 1.1 g/dL      BUN/Creatinine Ratio 16.0     Anion Gap 12.1 mmol/L      eGFR 28.5 mL/min/1.73     Narrative:      GFR Categories in Chronic Kidney Disease (CKD)              GFR Category          GFR (mL/min/1.73)    Interpretation  G1                    90 or greater        Normal or high (1)  G2                    60-89                Mild decrease (1)  G3a                   45-59                Mild to moderate decrease  G3b                   30-44                Moderate to severe decrease  G4                    15-29                Severe decrease  G5                    14 or less           Kidney failure    (1)In the absence of evidence of kidney disease, neither GFR category G1 or G2 fulfill the criteria for CKD.    eGFR calculation 2021 CKD-EPI creatinine equation, which does not include race as a factor    [place] Draw [[id number]] Collected: 06/20/25 1443    Specimen: Blood Updated: 06/20/25 1501    Narrative:      The following orders were created for panel order [place] Draw.  Procedure                               Abnormality         Status                     ---------                               -----------         ------                     Green Top (Gel)[[id number]]                                  Final result               Lavender Top[[id number]]                                     Final result               Gold Top - SST[[id number]]                                   Final result               Light Blue Top[[id number]]                                   Final result                 Please view results for these tests on the individual orders.    Green Top (Gel) [[id number]] Collected: 06/20/25 1443    Specimen: Blood Updated: 06/20/25 1501     Extra Tube Hold for  add-ons.     Comment: Auto resulted.       Lavender Top [[id number]] Collected: 06/20/25 1443    Specimen: Blood Updated: 06/20/25 1501     Extra Tube hold for add-on     Comment: Auto resulted       Gold Top - SST [[id number]] Collected: 06/20/25 1443    Specimen: Blood Updated: 06/20/25 1501     Extra Tube Hold for add-ons.     Comment: Auto resulted.       Light Blue Top [[id number]] Collected: 06/20/25 1443    Specimen: Blood Updated: 06/20/25 1501     Extra Tube Hold for add-ons.     Comment: Auto resulted       CBC & Differential [[id number]]  (Abnormal) Collected: 06/20/25 1443    Specimen: Blood Updated: 06/20/25 1457    Narrative:      The following orders were created for panel order CBC & Differential.  Procedure                               Abnormality         Status                     ---------                               -----------         ------                     CBC Auto Differential[[id number]]        Abnormal            Final result                 Please view results for these tests on the individual orders.    CBC Auto Differential [[id number]]  (Abnormal) Collected: 06/20/25 1443    Specimen: Blood Updated: 06/20/25 1457     WBC 9.84 10*3/mm3      RBC 2.90 10*6/mm3      Hemoglobin 8.2 g/dL      Hematocrit 27.1 %      MCV 93.4 fL      MCH 28.3 pg      MCHC 30.3 g/dL      RDW 14.3 %      RDW-SD 49.4 fl      MPV 10.1 fL      Platelets 127 10*3/mm3      Neutrophil % 73.7 %      Lymphocyte % 13.5 %      Monocyte % 9.8 %      Eosinophil % 2.2 %      Basophil % 0.3 %      Immature Grans % 0.5 %      Neutrophils, Absolute 7.25 10*3/mm3      Lymphocytes, Absolute 1.33 10*3/mm3      Monocytes, Absolute 0.96 10*3/mm3      Eosinophils, Absolute 0.22 10*3/mm3      Basophils, Absolute 0.03 10*3/mm3      Immature Grans, Absolute 0.05 10*3/mm3      nRBC 0.0 /100 WBC             Assessment: 2 weeks s/p right reverse total shoulder replacement with hematoma.    Plan:    Increased Percocet to 10 mg Q4 hours.  OT  to work with patient for passive range of motion.  No pushing, pulling, lifting, or weight bearing on right upper extremity.   Right shoulder x-ray ordered per post-op protocol.  Continue ice to shoulder.     I will see her tomorrow for reevaluation and x-ray review.       Date: 6/21/2025    TAYLER Santizo    CC: Mike Bunch, DO

## 2025-06-21 NOTE — PLAN OF CARE
Problem: Fall Injury Risk  Goal: Absence of Fall and Fall-Related Injury  Outcome: Progressing     Problem: Pain Acute  Goal: Optimal Pain Control and Function  Outcome: Progressing   Goal Outcome Evaluation:  Plan of Care Reviewed With: patient        Progress: no change  Outcome Evaluation: Pt appeared to sleep well, alert and oriented x 4, coop with care, up to br with assist, prn pain meds given, potassium replaced per protocol,  nephrology and ortho to see today
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
## 2025-06-21 NOTE — H&P
HISTORY AND PHYSICAL   [facility]        Date of Admission: 2025  Patient Identification:  Name: Filomena Liu  Age: 64 y.o.  Sex: female  :  1960  MRN: [id number]                     Primary Care Physician: Fernando Dunn MD    Chief Complaint:  64 year old female presented to the emergency room with abnormal labs and low blood pressure at home; she has had swelling of her legs and has had her diuretic increased recently; she did not improve; she denies fever or chills; she had a reverse shoulder replacement and recently and has had pain of her left shoulder; she denies shortness of breath or chest pain    History of Present Illness:   As above    Past Medical History:  Past Medical History:   Diagnosis Date    Abdominal aneurysm     DR. JOYCE FOLLOWING 3.5CM    Alcoholism 1985    Anemia 24    HAS HAD IRON INFUSIONS ALMOST YEARLY    Anxiety     Arthritis of back     Asthma 2015    At high risk for falls     Basal cell carcinoma     Bruises easily     Chronic kidney disease 24    Cirrhosis 24    COPD (chronic obstructive pulmonary disease)     MANAGED BY PRIMARY CARE    CTS (carpal tunnel syndrome) Surgery     DDD (degenerative disc disease), cervical     Depression     Diverticulitis of colon Unknown    Fatty liver     GERD (gastroesophageal reflux disease)     GI (gastrointestinal bleed) 24    History of anemia     History of MRSA infection     LEFT ANKLE TREAT [facility]  5YEARS GO    Hyperlipidemia ?    Hypertension ?    Low back strain Unknown    Lower leg edema     Lumbosacral disc disease     Neck strain     Neuropathy     Pancreatitis     Panic attacks     Scoliosis Unknown    Shoulder arthritis 2023    Shoulder pain, left 2023    Sleep apnea     NO MACHINE USE    Spinal stenosis     Spondylolisthesis of cervical region     Steatosis of liver     Tachycardia     Tendinitis of knee     Thoracic disc disorder     Vertigo       Past Surgical History:  Past Surgical History:   Procedure Laterality Date    BACK SURGERY      cervical disc surgery with fusion-    CARPAL TUNNEL RELEASE Bilateral     CATARACT EXTRACTION      CHOLECYSTECTOMY  1/1/20    COLONOSCOPY      COLONOSCOPY N/A 11/12/2020    Procedure: COLONOSCOPY, polypectomy;  Surgeon: Filomena Mckeon MD;  [facility]: [facility] OR;  Service: Gastroenterology;  Laterality: N/A;  Diverticulosis; Hepatic flexure polyp x 1; Polyp at 60cm x 1; Polyp at 50cm x 1- hot snare;    COLONOSCOPY N/A 04/15/2024    Procedure: COLONOSCOPY into sigmoid colon with hot snare polypectomy;  Surgeon: Leatha Johns MD;  Location: [facility] ENDOSCOPY;  Service: General;  Laterality: N/A;  pre- history of polyps  post- diverticulosis, polyp    COLONOSCOPY N/A 05/07/2025    Procedure: COLONOSCOPY to cecum and ti with hot snare polypectomies;  Surgeon: Ana Guerrero MD;  Location: [facility] ENDOSCOPY;  Service: Gastroenterology;  Laterality: N/A;  PRE: IRON DEFICIENCY ANEMIA  POST: diverticulosis, polyps, hemorrhoids    ENDOSCOPY N/A 08/15/2024    Procedure: ESOPHAGOGASTRODUODENOSCOPY;  Surgeon: Garth Constantino MD;  Location: [facility] ENDOSCOPY;  Service: Gastroenterology;  Laterality: N/A;  PRE- CIRRHOSIS, DARK STOOL  POST- NORMAL    ENDOSCOPY N/A 05/07/2025    Procedure: ESOPHAGOGASTRODUODENOSCOPY;  Surgeon: Ana Guerrero MD;  Location: [facility] ENDOSCOPY;  Service: Gastroenterology;  Laterality: N/A;  PRE: CIRRHOSIS  POST:portal hypertensive gastropathy; esophagitis; hiatal hernia; varices    HYSTERECTOMY  1998    INCISION AND DRAINAGE LEG Left 11/17/2018    Procedure: Incision and Drainage of Left ankle;  Surgeon: Maxwell Lanier MD;  Location: [facility] OR;  Service: Orthopedics    NECK SURGERY  2000, 2005,2017    SIGMOIDOSCOPY N/A 03/28/2024    Procedure: SIGMOIDOSCOPY FLEXIBLE;  Surgeon: Leatha Johns MD;  Location: [facility] ENDOSCOPY;  Service: General;  Laterality: N/A;  pre: h/o colon  polyps  post: poor prep, diverticulosis    SKIN BIOPSY      SKIN GRAFT SPLIT THICKNESS N/A 02/12/2019    Procedure: debridement SKIN GRAFT SPLIT THICKNESS left ankle wound;  Surgeon: Barry Worthy MD;  [place] [facility] [place] [facility]  Service: Plastics    TONGUE LESION EXCISION/BIOPSY N/A 11/26/2024    Procedure: WIDE LOCAL EXCISION OF TONGUE LESION;  Surgeon: El Mercedes MD;  [place] [facility] [place]  Service: ENT;  Laterality: N/A;    TOTAL SHOULDER ARTHROPLASTY Left 07/20/2023    Procedure: Total  shoulder arthroplasty;  Surgeon: Maxwell Lanier MD;  [place] [facility] [place] [facility] [place]  Service: Orthopedics;  Laterality: Left;    TOTAL SHOULDER ARTHROPLASTY W/ DISTAL CLAVICLE EXCISION Right 6/5/2025    Procedure: Right Reverse Total Shoulder Arthroplasty;  Surgeon: Maxwell Lanier MD;  [place] [facility] [place] [facility] [place]  Service: Orthopedics;  Laterality: Right;    TOTAL THYMECTOMY      TRIGGER POINT INJECTION  [id number]    UPPER GASTROINTESTINAL ENDOSCOPY  8/10/24    WRIST FRACTURE SURGERY      WRIST FRACTURE SURGERY Left       Home Meds:  Medications Prior to Admission   Medication Sig Dispense Refill Last Dose/Taking    albuterol (PROVENTIL HFA;VENTOLIN HFA) 108 (90 Base) MCG/ACT inhaler Inhale 2 puffs 3 times a day. Indications: Spasm of Lung Air Passages   6/20/2025    atenolol (TENORMIN) 25 MG tablet Take 1 tablet by mouth Every Night.   6/19/2025    Cholecalciferol 25 MCG (1000 UT) tablet Take 1 tablet by mouth 1 (One) Time Per Week. HOLDING AS OF 5-23-25 FOR HIGH VIT D LEVELS  Indications: Vitamin D Deficiency   6/19/2025    docusate sodium (COLACE) 100 MG capsule Take 1 capsule by mouth 2 (Two) Times a Day. 60 capsule 0 6/19/2025    fluticasone (FLONASE) 50 MCG/ACT nasal spray Administer 1 spray into the nostril(s) as directed by provider Daily. Indications: Stuffy Nose   6/19/2025    ipratropium-albuterol (DUO-NEB) 0.5-2.5 mg/3 ml nebulizer Take 3 mL by nebulization 4 (Four) Times a Day. Indications: Chronic  Obstructive Lung Disease   6/20/2025    oxyCODONE-acetaminophen (PERCOCET) 7.5-325 MG per tablet Take 1 tablet by mouth Every 4 (Four) Hours As Needed for Moderate Pain. 30 tablet 0 6/20/2025    pantoprazole (PROTONIX) 40 MG EC tablet Take 1 tablet by mouth 2 (Two) Times a Day for 60 days. (Patient taking differently: Take 1 tablet by mouth Daily.) 60 tablet 1 6/20/2025    pregabalin (LYRICA) 25 MG capsule Take 1 capsule by mouth Every 12 (Twelve) Hours.   6/19/2025    rOPINIRole (REQUIP) 1 MG tablet Take 1 tablet by mouth every night at bedtime. Indications: Restless Leg Syndrome   6/19/2025    spironolactone (ALDACTONE) 25 MG tablet Take 1 tablet by mouth Daily. (Patient taking differently: Take 1 tablet by mouth 2 (Two) Times a Day. Indications: High Blood Pressure) 30 tablet 0 6/20/2025    torsemide (DEMADEX) 20 MG tablet Take 1 tablet by mouth Daily. Indications: Cardiac Failure, Edema, High Blood Pressure (Patient taking differently: Take 5 tablets by mouth 2 (Two) Times a Day. Indications: Cardiac Failure, Edema, High Blood Pressure) 30 tablet 0 Patient Taking Differently    ondansetron (Zofran) 4 MG tablet Take 1 tablet by mouth Every 8 (Eight) Hours As Needed for Nausea or Vomiting. 12 tablet 0        Allergies:  Allergies   Allergen Reactions    Wound Dressing Adhesive Other (See Comments)     SKIN TEARS AND RASH- PAPER TAPE OK     Immunizations:  Immunization History   Administered Date(s) Administered    COVID-19 (PFIZER) Purple Cap Monovalent 04/07/2021, 04/28/2021     Social History:   Social History     Social History Narrative    Not on file     Social History     Socioeconomic History    Marital status:    Tobacco Use    Smoking status: Never     Passive exposure: Current    Smokeless tobacco: Never    Tobacco comments:     PATIENT STATES SHE IS CURRENTLY TRYING TO QUIT. STATES SHE IS SMOKING APPROX. 4-8 CIGARETTES PER DAY AS OF 5-23-25   Vaping Use    Vaping status: Never Used   Substance  "and Sexual Activity    Alcohol use: Not Currently     Alcohol/week: 10.0 standard drinks of alcohol     Comment: PATIENT STATES SHE IS 80 DAYS SOBER AS OF 25    Drug use: No    Sexual activity: Yes     Partners: Male     Birth control/protection: Post-menopausal, Tubal ligation, Hysterectomy, Surgical       Family History:  Family History   Problem Relation Age of Onset    Emphysema Mother     Alzheimer's disease Father     Cancer Father     Osteoporosis Sister     Scoliosis Sister     Malig Hyperthermia Neg Hx         Review of Systems  See history of present illness and past medical history.  Patient denies headache, dizziness, syncope, falls, trauma, change in vision, change in hearing, change in taste, changes in weight, changes in appetite, focal weakness, numbness, or paresthesia.  Patient denies chest pain, palpitations, dyspnea, orthopnea, PND, cough, sinus pressure, rhinorrhea, epistaxis, hemoptysis, nausea, vomiting,hematemesis, diarrhea, constipation or hematochezia.  Denies cold or heat intolerance, polydipsia, polyuria, polyphagia. Denies hematuria, pyuria, dysuria, hesitancy, frequency or urgency. Denies consumption of raw and under cooked meats foods or change in water source.      Objective:  T Max 24 hrs: Temp (24hrs), Av.4 °F (36.3 °C), Min:97.3 °F (36.3 °C), Max:97.5 °F (36.4 °C)    Vitals Ranges:   Temp:  [97.3 °F (36.3 °C)-97.5 °F (36.4 °C)] 97.5 °F (36.4 °C)  Heart Rate:  [79-90] 86  Resp:  [18] 18  BP: (111-125)/(62-85) 111/65      Exam:  /65 (BP Location: Left arm, Patient Position: Lying)   Pulse 86   Temp 97.5 °F (36.4 °C) (Oral)   Resp 18   Ht 149.9 cm (59\")   Wt 78.2 kg (172 lb 6.4 oz)   SpO2 98%   BMI 34.82 kg/m²     General Appearance:    Alert, cooperative, no distress, appears stated age   Head:    Normocephalic, without obvious abnormality, atraumatic   Eyes:    PERRL, conjunctivae/corneas clear, EOM's intact, both eyes   Ears:    Normal external ear canals, " both ears   Nose:   Nares normal, septum midline, mucosa normal, no drainage    or sinus tenderness   Throat:   Lips, mucosa, and tongue normal   Neck:   Supple, symmetrical, trachea midline, no adenopathy;     thyroid:  no enlargement/tenderness/nodules; no carotid    bruit or JVD   Back:     Symmetric, no curvature, ROM normal, no CVA tenderness   Lungs:     Clear to auscultation bilaterally, respirations unlabored   Chest Wall:    No tenderness or deformity    Heart:    Regular rate and rhythm, S1 and S2 normal, no murmur, rub   or gallop   Abdomen:     Soft, nontender, bowel sounds active all four quadrants,     no masses, no hepatomegaly, no splenomegaly   Extremities:   Extremities normal, atraumatic, healing incision right shoulder; no drainage; mild bruising                       .    Data Review:  Labs in chart were reviewed.  WBC   Date Value Ref Range Status   06/20/2025 9.84 3.40 - 10.80 10*3/mm3 Final     Hemoglobin   Date Value Ref Range Status   06/20/2025 8.2 (L) 12.0 - 15.9 g/dL Final     Hematocrit   Date Value Ref Range Status   06/20/2025 27.1 (L) 34.0 - 46.6 % Final     Platelets   Date Value Ref Range Status   06/20/2025 127 (L) 140 - 450 10*3/mm3 Final     Sodium   Date Value Ref Range Status   06/20/2025 137 136 - 145 mmol/L Final     Potassium   Date Value Ref Range Status   06/20/2025 2.7 (L) 3.5 - 5.2 mmol/L Final     Chloride   Date Value Ref Range Status   06/20/2025 92 (L) 98 - 107 mmol/L Final     CO2   Date Value Ref Range Status   06/20/2025 32.9 (H) 22.0 - 29.0 mmol/L Final     BUN   Date Value Ref Range Status   06/20/2025 31.0 (H) 8.0 - 23.0 mg/dL Final     Creatinine   Date Value Ref Range Status   06/20/2025 1.94 (H) 0.57 - 1.00 mg/dL Final     Glucose   Date Value Ref Range Status   06/20/2025 119 (H) 65 - 99 mg/dL Final     Calcium   Date Value Ref Range Status   06/20/2025 9.3 8.6 - 10.5 mg/dL Final     Magnesium   Date Value Ref Range Status   06/20/2025 3.3 (H) 1.6 - 2.4  mg/dL Final                Imaging Results (All)       None              Assessment:  Active Hospital Problems    Diagnosis  POA    **Hypokalemia [E87.6]  Yes      Resolved Hospital Problems   No resolved problems to display.   Acute kidney injury  Ckd3  Copd  Cirrhosis  Right shoulder pain  Hypertension  Hyperlipidemia  Obesity  Sleep apnea    Plan:  Ask nephrology to see her  Replace potassium  Ask ortho to see her  Check cxr  Monitor on telemetry  Dw patient, ed provider    Belen Choudhury MD  6/20/2025  20:11 EDT

## 2025-06-21 NOTE — THERAPY EVALUATION
Patient Name: Filomena Liu  : 1960    MRN: [id number]                              Today's Date: 2025       Admit Date: 2025    Visit Dx:     ICD-10-CM ICD-9-CM   1. Pedal edema  R60.0 782.3   2. Acute renal insufficiency  N28.9 593.9   3. Acute hypokalemia  E87.6 276.8   4. Acute anemia  D64.9 285.9     Patient Active Problem List   Diagnosis    Mediastinal mass    Cervical stenosis of spinal canal    Cervical radiculopathy    Cellulitis/abscess of left foot    HTN (hypertension)    History of MRSA infection    Anxiety    Peroneal tenosynovitis    Fracture of navicular bone of left foot    Tobacco use    Non compliance with medical treatment    Screening for colon cancer    Osteoporosis    Shoulder arthritis    Primary localized osteoarthrosis of left shoulder region    History of adenomatous polyp of colon    Diverticulosis    Acute GI bleeding    Hyperkalemia    SANDRO (acute kidney injury)    Alcoholism    COPD (chronic obstructive pulmonary disease)    Acute blood loss anemia    Melena    Gastroesophageal reflux disease    Dysphagia    Alcoholic cirrhosis of liver without ascites    Other iron deficiency anemias    Malabsorption of iron    Acute pancreatitis    Diverticulitis    Hypoglycemia    Lactic acidosis    Adverse effect of iron    Iron deficiency anemia    Cirrhosis of liver without ascites    Infrarenal abdominal aortic aneurysm (AAA) without rupture    Arthritis of shoulder    Hypokalemia    Transaminitis    CKD (chronic kidney disease) stage 3, GFR 30-59 ml/min    Hypotension    Thrombocytopenia     Past Medical History:   Diagnosis Date    Abdominal aneurysm     DR. JOYCE FOLLOWING 3.5CM    Alcoholism     Anemia 24    HAS HAD IRON INFUSIONS ALMOST YEARLY    Anxiety     Arthritis of back     Asthma 2015    At high risk for falls     Basal cell carcinoma     Bruises easily     Chronic kidney disease 24    Cirrhosis 24    COPD (chronic obstructive pulmonary  disease)     MANAGED BY PRIMARY CARE    CTS (carpal tunnel syndrome) Surgery 1999    DDD (degenerative disc disease), cervical     Depression     Diverticulitis of colon Unknown    Fatty liver 2022    GERD (gastroesophageal reflux disease)     GI (gastrointestinal bleed) 8/5/24    History of anemia     History of MRSA infection     LEFT ANKLE TREAT [facility]  5YEARS GO    Hyperlipidemia ?    Hypertension ?    Low back strain Unknown    Lower leg edema     Lumbosacral disc disease 2024    Neck strain 2000    Neuropathy     Pancreatitis 2024    Panic attacks     Scoliosis Unknown    Shoulder arthritis 06/05/2023    Shoulder pain, left 07/07/2023    Sleep apnea     NO MACHINE USE    Spinal stenosis     Spondylolisthesis of cervical region     Steatosis of liver     Tachycardia     Tendinitis of knee 2022    Thoracic disc disorder 2000    Vertigo      Past Surgical History:   Procedure Laterality Date    BACK SURGERY      cervical disc surgery with fusion-    CARPAL TUNNEL RELEASE Bilateral     CATARACT EXTRACTION      CHOLECYSTECTOMY  1/1/20    COLONOSCOPY      COLONOSCOPY N/A 11/12/2020    Procedure: COLONOSCOPY, polypectomy;  Surgeon: Filomena Mckeon MD;  Location: [facility];  Service: Gastroenterology;  Laterality: N/A;  Diverticulosis; Hepatic flexure polyp x 1; Polyp at 60cm x 1; Polyp at 50cm x 1- hot snare;    COLONOSCOPY N/A 04/15/2024    Procedure: COLONOSCOPY into sigmoid colon with hot snare polypectomy;  Surgeon: Leatha Johns MD;  Location: [facility] ENDOSCOPY;  Service: General;  Laterality: N/A;  pre- history of polyps  post- diverticulosis, polyp    COLONOSCOPY N/A 05/07/2025    Procedure: COLONOSCOPY to cecum and ti with hot snare polypectomies;  Surgeon: Ana Guerrero MD;  Location: [facility] ENDOSCOPY;  Service: Gastroenterology;  Laterality: N/A;  PRE: IRON DEFICIENCY ANEMIA  POST: diverticulosis, polyps, hemorrhoids    ENDOSCOPY N/A 08/15/2024    Procedure: ESOPHAGOGASTRODUODENOSCOPY;  Surgeon:  Garth Constantino MD;  Location: [facility] ENDOSCOPY;  Service: Gastroenterology;  Laterality: N/A;  PRE- CIRRHOSIS, DARK STOOL  POST- NORMAL    ENDOSCOPY N/A 05/07/2025    Procedure: ESOPHAGOGASTRODUODENOSCOPY;  Surgeon: Ana Guerrero MD;  Location: [facility] ENDOSCOPY;  Service: Gastroenterology;  Laterality: N/A;  PRE: CIRRHOSIS  POST:portal hypertensive gastropathy; esophagitis; hiatal hernia; varices    HYSTERECTOMY  1998    INCISION AND DRAINAGE LEG Left 11/17/2018    Procedure: Incision and Drainage of Left ankle;  Surgeon: Maxwell Lanier MD;  Location: [facility] MAIN OR;  Service: Orthopedics    NECK SURGERY  2000, 2005,2017    SIGMOIDOSCOPY N/A 03/28/2024    Procedure: SIGMOIDOSCOPY FLEXIBLE;  Surgeon: Leatha Johns MD;  Location: [facility] ENDOSCOPY;  Service: General;  Laterality: N/A;  pre: h/o colon polyps  post: poor prep, diverticulosis    SKIN BIOPSY      SKIN GRAFT SPLIT THICKNESS N/A 02/12/2019    Procedure: debridement SKIN GRAFT SPLIT THICKNESS left ankle wound;  Surgeon: Barry Worthy MD;  Location: [facility] OR [facility] [place];  Service: Plastics    TONGUE LESION EXCISION/BIOPSY N/A 11/26/2024    Procedure: WIDE LOCAL EXCISION OF TONGUE LESION;  Surgeon: El Mercedes MD;  Location: [facility] MAIN OR;  Service: ENT;  Laterality: N/A;    TOTAL SHOULDER ARTHROPLASTY Left 07/20/2023    Procedure: Total  shoulder arthroplasty;  Surgeon: Maxwell Lanier MD;  Location: [facility] OR [facility] [place];  Service: Orthopedics;  Laterality: Left;    TOTAL SHOULDER ARTHROPLASTY W/ DISTAL CLAVICLE EXCISION Right 6/5/2025    Procedure: Right Reverse Total Shoulder Arthroplasty;  Surgeon: Maxwell Lanier MD;  Location: [facility] OR [facility] [place];  Service: Orthopedics;  Laterality: Right;    TOTAL THYMECTOMY      TRIGGER POINT INJECTION  1709-2988    UPPER GASTROINTESTINAL ENDOSCOPY  8/10/24    WRIST FRACTURE SURGERY      WRIST FRACTURE SURGERY Left       General Information       Row Name 06/21/25 8101          Physical Therapy Time  and Intention    Document Type evaluation  -CS     Mode of Treatment individual therapy;physical therapy  -CS       Row Name 06/21/25 1322          General Information    Patient Profile Reviewed yes  -CS     Prior Level of Function independent:;ADL's;all household mobility  -CS     Existing Precautions/Restrictions right;shoulder  -CS     Barriers to Rehab previous functional deficit  -CS       Row Name 06/21/25 1322          Living Environment    Current Living Arrangements home  -CS     People in Home significant other  -CS       Row Name 06/21/25 1322          Home Main Entrance    Number of Stairs, Main Entrance three  -CS       Row Name 06/21/25 1322          Cognition    Orientation Status (Cognition) oriented x 3  -CS       Row Name 06/21/25 1322          Safety Issues/Impairments Affecting Functional Mobility    Safety Issues Affecting Function (Mobility) ability to follow commands;safety precautions follow-through/compliance;safety precaution awareness  -CS     Impairments Affecting Function (Mobility) pain;endurance/activity tolerance;strength  -CS     Comment, Safety Issues/Impairments (Mobility) Gait belt and non-skid socks donned  -CS               User Key  (r) = Recorded By, (t) = Taken By, (c) = Cosigned By      Initials Name Provider Type    CS David Mcintyre PT Physical Therapist                   Mobility       Row Name 06/21/25 1324          Bed Mobility    Bed Mobility supine-sit;scooting/bridging;sit-supine  -CS     Scooting/Bridging [place] (Bed Mobility) modified independence  -CS     Supine-Sit [place] (Bed Mobility) moderate assist (50% patient effort);1 person assist;verbal cues  -CS     Sit-Supine [place] (Bed Mobility) moderate assist (50% patient effort);1 person assist  -CS       Row Name 06/21/25 1320          Sit-Stand Transfer    Sit-Stand [place] (Transfers) minimum assist (75% patient effort)  -CS     Assistive Device (Sit-Stand Transfers) walker, front-wheeled   -CS       Row Name 06/21/25 1324          Gait/Stairs (Locomotion)    [place] Level (Gait) contact guard  -CS     Assistive Device (Gait) walker, front-wheeled  -CS     Patient was able to Ambulate yes  -CS     Distance in Feet (Gait) 100  -CS     Deviations/Abnormal Patterns (Gait) dayanara decreased  -CS     Bilateral Gait Deviations forward flexed posture;heel strike decreased  -CS     [place] Level (Stairs) not tested  -CS     Comment, (Gait/Stairs) Pt requires cueing to limit use of R shoulder, attempted stairs but pt refused this date. Pt limited by fatigue to progress ambulation distance  -CS               User Key  (r) = Recorded By, (t) = Taken By, (c) = Cosigned By      Initials Name Provider Type    CS David Mcintyre, PT Physical Therapist                   Obj/Interventions       Row Name 06/21/25 1326          Range of Motion Comprehensive    Comment, General Range of Motion RUE w/ limited ROM due to rTSA 2 weeks ago; generalized decreased BLE mobility due to B swelling  -       Row Name 06/21/25 1326          Strength Comprehensive (MMT)    Comment, General Manual Muscle Testing (MMT) Assessment Generalized weakness but WFL for mobility  -       Row Name 06/21/25 1326          Motor Skills    Therapeutic Exercise other (see comments)  LAQs, seated HRs, APs, GS, hip add isos, QS, APs x 10 reps  -       Row Name 06/21/25 1326          Balance    Balance Assessment sitting static balance;sitting dynamic balance;standing static balance;standing dynamic balance  -     Static Sitting Balance standby assist  -CS     Dynamic Sitting Balance standby assist  -CS     Position, Sitting Balance sitting edge of bed  -CS     Static Standing Balance contact guard  -CS     Dynamic Standing Balance contact guard  -CS     Position/Device Used, Standing Balance supported;walker, front-wheeled  -CS     Balance Interventions sitting;standing;sit to stand;supported;static;dynamic  -     Comment, Balance  Initially unsteady but no overt LOB during ambulation, needs cues for RWx use  -CS               User Key  (r) = Recorded By, (t) = Taken By, (c) = Cosigned By      Initials Name Provider Type    CS David Mcintyre PT Physical Therapist                   Goals/Plan       Row Name 06/21/25 1329          Bed Mobility Goal 1 (PT)    Activity/Assistive Device (Bed Mobility Goal 1, PT) bed mobility activities, all  -CS     [place] Level/Cues Needed (Bed Mobility Goal 1, PT) contact guard required  -CS     Time Frame (Bed Mobility Goal 1, PT) short term goal (STG);10 days  -CS       Row Name 06/21/25 1329          Transfer Goal 1 (PT)    Activity/Assistive Device (Transfer Goal 1, PT) transfers, all  -CS     [place] Level/Cues Needed (Transfer Goal 1, PT) contact guard required  -CS     Time Frame (Transfer Goal 1, PT) short term goal (STG);10 days  -CS       Row Name 06/21/25 1329          Gait Training Goal 1 (PT)    Activity/Assistive Device (Gait Training Goal 1, PT) gait (walking locomotion)  -CS     [place] Level (Gait Training Goal 1, PT) contact guard required  -CS     Distance (Gait Training Goal 1, PT) 150ft  -CS               User Key  (r) = Recorded By, (t) = Taken By, (c) = Cosigned By      Initials Name Provider Type    CS David Mcintyre PT Physical Therapist                   Clinical Impression       Row Name 06/21/25 1327          Pain    Pretreatment Pain Rating 6/10  -CS     Posttreatment Pain Rating 5/10  -CS     Pain Location back  -CS     Pain Side/Orientation generalized;lower  -CS     Pain Management Interventions activity modification encouraged  -CS     Response to Pain Interventions activity participation with tolerable pain  -CS     Additional Documentation Pain Scale: FACES Pre/Post-Treatment (Group)  -CS       Row Name 06/21/25 9517          Pain Scale: FACES Pre/Post-Treatment    Pain: FACES Scale, Pretreatment 2-->hurts little bit  -CS     Posttreatment Pain Rating 6-->hurts  even more  -CS       Row Name 06/21/25 1327          Plan of Care Review    Plan of Care Reviewed With patient  -CS     Progress no change  -CS     Outcome Evaluation Pt is a 64 y.o. female admitted to [facility] at request from her MD due to low BP and elevated kidney function on 6/20/2025. Pt has developed BLE swelling after her shoulder surgery about 2 weeks ago. Prior to hospital admission, pt reports residing w/ her S/O with and 3 RAGHAVENDRA. Prior to hospital stay, pt was IADLs and utilized RWx AD at baseline. Pt required ModA for bed mobility, Mireya for STS transfers, and CGA for ambulation with RWx for 100 ft. Pt limited by pain and fatigue this date. Pt has a difficult time w/ maintaining her R shoulder precautions following rTSA despite cueing to limit use specifically w/ using the bed rails. Pt presents today with below baseline strength, dec endurance, and decreased functional mobility. Pt will benefit from skilled PT to address the previous deficits and improve overall safety with functional mobility. Anticipate pt will D/C and return to home w/ home health pending progress.  -CS       Row Name 06/21/25 1327          Therapy Assessment/Plan (PT)    Rehab Potential (PT) good  -CS     Criteria for Skilled Interventions Met (PT) yes  -CS     Therapy Frequency (PT) 5 times/wk  -CS       Row Name 06/21/25 1327          Vital Signs    O2 Delivery Pre Treatment room air  -CS     O2 Delivery Intra Treatment room air  -CS     O2 Delivery Post Treatment room air  -CS     Pre Patient Position Supine  -CS     Intra Patient Position Standing  -CS     Post Patient Position Supine  -CS       Row Name 06/21/25 1327          Positioning and Restraints    Pre-Treatment Position in bed  -CS     Post Treatment Position bed  -CS     In Bed notified nsg;fowlers;exit alarm on;encouraged to call for assist;call light within reach;with other staff;side rails up x3  -CS               User Key  (r) = Recorded By, (t) = Taken By, (c) = Cosigned  By      Initials Name Provider Type     David Mcintyre, PT Physical Therapist                   Outcome Measures       Row Name 06/21/25 1329 06/21/25 0800       How much help from another person do you currently need...    Turning from your back to your side while in flat bed without using bedrails? 3  -CS 3  -MR    Moving from lying on back to sitting on the side of a flat bed without bedrails? 3  -CS 3  -MR    Moving to and from a bed to a chair (including a wheelchair)? 3  -CS 3  -MR    Standing up from a chair using your arms (e.g., wheelchair, bedside chair)? 3  -CS 3  -MR    Climbing 3-5 steps with a railing? 2  -CS 3  -MR    To walk in hospital room? 3  -CS 3  -MR    AM-PAC 6 Clicks Score (PT) 17  -CS 18  -MR              User Key  (r) = Recorded By, (t) = Taken By, (c) = Cosigned By      Initials Name Provider Type    David Luna, YADIEL Physical Therapist    Nita Vásquez, RN Registered Nurse                                 Physical Therapy Education       Title: PT OT SLP Therapies (Done)       Topic: Physical Therapy (Done)       Point: Mobility training (Done)       Learning Progress Summary            Patient Acceptance, E, VU,DU by  at 6/21/2025 1329                      Point: Home exercise program (Done)       Learning Progress Summary            Patient Acceptance, E, VU,DU by  at 6/21/2025 1329                      Point: Body mechanics (Done)       Learning Progress Summary            Patient Acceptance, E, VU,DU by  at 6/21/2025 1329                      Point: Precautions (Done)       Learning Progress Summary            Patient Acceptance, E, VU,DU by  at 6/21/2025 1329                                      User Key       Initials Effective Dates Name Provider Type Discipline     09/06/24 -  David Mcintyre, PT Physical Therapist PT                  PT Recommendation and Plan     Progress: no change  Outcome Evaluation: Pt is a 64 y.o. female admitted to [facility] at request from her MD  due to low BP and elevated kidney function on 6/20/2025. Pt has developed BLE swelling after her shoulder surgery about 2 weeks ago. Prior to hospital admission, pt reports residing w/ her S/O with and 3 RAGHAVENDRA. Prior to hospital stay, pt was IADLs and utilized RWx AD at baseline. Pt required ModA for bed mobility, Mireya for STS transfers, and CGA for ambulation with RWx for 100 ft. Pt limited by pain and fatigue this date. Pt has a difficult time w/ maintaining her R shoulder precautions following rTSA despite cueing to limit use specifically w/ using the bed rails. Pt presents today with below baseline strength, dec endurance, and decreased functional mobility. Pt will benefit from skilled PT to address the previous deficits and improve overall safety with functional mobility. Anticipate pt will D/C and return to home w/ home health pending progress.     Time Calculation:         PT Charges       Row Name 06/21/25 1330             Time Calculation    Start Time 1146  -CS      Stop Time 1209  -CS      Time Calculation (min) 23 min  -CS      PT Received On 06/21/25  -CS      PT - Next Appointment 06/23/25  -CS      PT Goal Re-Cert Due Date 07/05/25  -CS         Time Calculation- PT    Total Timed Code Minutes- PT 23 minute(s)  -CS         Timed Charges    21699 - PT Therapeutic Exercise Minutes 6  -CS      61771 - PT Therapeutic Activity Minutes 8  -CS         Untimed Charges    PT Eval/Re-eval Minutes 9  -CS         Total Minutes    Timed Charges Total Minutes 14  -CS      Untimed Charges Total Minutes 9  -CS       Total Minutes 23  -CS                User Key  (r) = Recorded By, (t) = Taken By, (c) = Cosigned By      Initials Name Provider Type    CS David Mcintyre, PT Physical Therapist                  Therapy Charges for Today       Code Description Service Date Service Provider Modifiers Qty    38793098907 HC PT THERAPEUTIC ACT EA 15 MIN 6/21/2025 David Mcintyre, PT GP 1    73853983818 HC PT EVAL MOD COMPLEXITY 2  6/21/2025 David Mcintyre, PT GP 1            PT G-Codes  AM-PAC 6 Clicks Score (PT): 17  PT Discharge Summary  Anticipated Discharge Disposition (PT): home with home health    David Mcintyre, PT  6/21/2025

## 2025-06-21 NOTE — PLAN OF CARE
Goal Outcome Evaluation:  Plan of Care Reviewed With: patient        Progress: no change  Outcome Evaluation: Pt is a 64 y.o. female admitted to [facility] at request from her MD due to low BP and elevated kidney function on 6/20/2025. Pt has developed BLE swelling after her shoulder surgery about 2 weeks ago. Prior to hospital admission, pt reports residing w/ her S/O with and 3 RAGHAVENDRA. Prior to hospital stay, pt was IADLs and utilized RWx AD at baseline. Pt required ModA for bed mobility, Mireya for STS transfers, and CGA for ambulation with RWx for 100 ft. Pt limited by pain and fatigue this date. Pt has a difficult time w/ maintaining her R shoulder precautions following rTSA despite cueing to limit use specifically w/ using the bed rails. Pt presents today with below baseline strength, dec endurance, and decreased functional mobility. Pt will benefit from skilled PT to address the previous deficits and improve overall safety with functional mobility. Anticipate pt will D/C and return to home w/ home health pending progress.    Anticipated Discharge Disposition (PT): home with home health

## 2025-06-21 NOTE — CONSULTS
[facility] Consult Note      Patient Name: Filomena Liu  : 1960  MRN: [id number]  Primary Care Physician:  Fernando Dunn MD  Referring Physician: No ref. provider found  Date of admission: 2025    Subjective     Reason for Consult: Hypokalemia, fluid overload, CKD 3B    HPI:   Filomena Liu is a 64 y.o. female with CKD 3B (baseline SCr 1.2-1.5), anxiety, alcoholism in remission, COPD, hypertension, and hyperlipidemia, admitted 2025 with worsening lower extremity swelling over the past couple weeks.  SCr of 1.9, potassium 2.7, hemoglobin 8.2. Patient states that she had shoulder surgery 2 weeks ago, and swelling has been worse since.  Patiently was recently seen in our office on 2025 and was placed on increased dose of torsemide 60 mg in the morning and 40 mg in the afternoon.      Describes weeping edema, but better today   No shortness of breath at rest  Not following any fluid restriction; trying to limit salt intake  Primary complaint now is back pain and difficulty walking, coinciding with right shoulder surgery  No urinary complaints  Feels constipated; describes melenic stool several days ago      Review of Systems:   14 point review of systems is otherwise negative except for mentioned above on HPI    Personal History     Past Medical History:   Diagnosis Date    Abdominal aneurysm     DR. JOYCE FOLLOWING 3.5CM    Alcoholism     Anemia 24    HAS HAD IRON INFUSIONS ALMOST YEARLY    Anxiety     Arthritis of back     Asthma 2015    At high risk for falls     Basal cell carcinoma     Bruises easily     Chronic kidney disease 24    Cirrhosis 24    COPD (chronic obstructive pulmonary disease)     MANAGED BY PRIMARY CARE    CTS (carpal tunnel syndrome) Surgery     DDD (degenerative disc disease), cervical     Depression     Diverticulitis of colon Unknown    Fatty liver     GERD (gastroesophageal reflux disease)     GI  (gastrointestinal bleed) 8/5/24    History of anemia     History of MRSA infection     LEFT ANKLE TREAT [facility]  5YEARS GO    Hyperlipidemia ?    Hypertension ?    Low back strain Unknown    Lower leg edema     Lumbosacral disc disease 2024    Neck strain 2000    Neuropathy     Pancreatitis 2024    Panic attacks     Scoliosis Unknown    Shoulder arthritis 06/05/2023    Shoulder pain, left 07/07/2023    Sleep apnea     NO MACHINE USE    Spinal stenosis     Spondylolisthesis of cervical region     Steatosis of liver     Tachycardia     Tendinitis of knee 2022    Thoracic disc disorder 2000    Vertigo        Past Surgical History:   Procedure Laterality Date    BACK SURGERY      cervical disc surgery with fusion-    CARPAL TUNNEL RELEASE Bilateral     CATARACT EXTRACTION      CHOLECYSTECTOMY  1/1/20    COLONOSCOPY      COLONOSCOPY N/A 11/12/2020    Procedure: COLONOSCOPY, polypectomy;  Surgeon: Filomena Mckeon MD;  Location: [facility];  Service: Gastroenterology;  Laterality: N/A;  Diverticulosis; Hepatic flexure polyp x 1; Polyp at 60cm x 1; Polyp at 50cm x 1- hot snare;    COLONOSCOPY N/A 04/15/2024    Procedure: COLONOSCOPY into sigmoid colon with hot snare polypectomy;  Surgeon: Leatha Johns MD;  Location: [facility] ENDOSCOPY;  Service: General;  Laterality: N/A;  pre- history of polyps  post- diverticulosis, polyp    COLONOSCOPY N/A 05/07/2025    Procedure: COLONOSCOPY to cecum and ti with hot snare polypectomies;  Surgeon: Ana Guerrero MD;  Location: [facility] ENDOSCOPY;  Service: Gastroenterology;  Laterality: N/A;  PRE: IRON DEFICIENCY ANEMIA  POST: diverticulosis, polyps, hemorrhoids    ENDOSCOPY N/A 08/15/2024    Procedure: ESOPHAGOGASTRODUODENOSCOPY;  Surgeon: Garth Constantino MD;  Location: [facility] ENDOSCOPY;  Service: Gastroenterology;  Laterality: N/A;  PRE- CIRRHOSIS, DARK STOOL  POST- NORMAL    ENDOSCOPY N/A 05/07/2025    Procedure: ESOPHAGOGASTRODUODENOSCOPY;  Surgeon: Ana Guerrero MD;   Location: [facility] ENDOSCOPY;  Service: Gastroenterology;  Laterality: N/A;  PRE: CIRRHOSIS  POST:portal hypertensive gastropathy; esophagitis; hiatal hernia; varices    HYSTERECTOMY  1998    INCISION AND DRAINAGE LEG Left 11/17/2018    Procedure: Incision and Drainage of Left ankle;  Surgeon: Maxwell Lanier MD;  Location: [facility] MAIN OR;  Service: Orthopedics    NECK SURGERY  2000, 2005,2017    SIGMOIDOSCOPY N/A 03/28/2024    Procedure: SIGMOIDOSCOPY FLEXIBLE;  Surgeon: Leatha Johns MD;  Location: [facility] ENDOSCOPY;  Service: General;  Laterality: N/A;  pre: h/o colon polyps  post: poor prep, diverticulosis    SKIN BIOPSY      SKIN GRAFT SPLIT THICKNESS N/A 02/12/2019    Procedure: debridement SKIN GRAFT SPLIT THICKNESS left ankle wound;  Surgeon: Barry Worthy MD;  Location: [facility] OR [facility];  Service: Plastics    TONGUE LESION EXCISION/BIOPSY N/A 11/26/2024    Procedure: WIDE LOCAL EXCISION OF TONGUE LESION;  Surgeon: El Mercedes MD;  Location: [facility] MAIN OR;  Service: ENT;  Laterality: N/A;    TOTAL SHOULDER ARTHROPLASTY Left 07/20/2023    Procedure: Total  shoulder arthroplasty;  Surgeon: Maxwell Lanier MD;  Location: [facility] OR [facility];  Service: Orthopedics;  Laterality: Left;    TOTAL SHOULDER ARTHROPLASTY W/ DISTAL CLAVICLE EXCISION Right 6/5/2025    Procedure: Right Reverse Total Shoulder Arthroplasty;  Surgeon: Maxwell Lanier MD;  Location: [facility] OR [facility];  Service: Orthopedics;  Laterality: Right;    TOTAL THYMECTOMY      TRIGGER POINT INJECTION  [id number]    UPPER GASTROINTESTINAL ENDOSCOPY  8/10/24    WRIST FRACTURE SURGERY      WRIST FRACTURE SURGERY Left        Family History: family history includes Alzheimer's disease in her father; Cancer in her father; Emphysema in her mother; Osteoporosis in her sister; Scoliosis in her sister.    Social History:  reports that she has never smoked. She has been exposed to tobacco smoke. She has never used smokeless tobacco. She reports that  she does not currently use alcohol after a past usage of about 10.0 standard drinks of alcohol per week. She reports that she does not use drugs.    Home Medications:  Prior to Admission medications    Medication Sig Start Date End Date Taking? Authorizing Provider   albuterol (PROVENTIL HFA;VENTOLIN HFA) 108 (90 Base) MCG/ACT inhaler Inhale 2 puffs 3 times a day. Indications: Spasm of Lung Air Passages 1/1/24  Yes Trevin Liu MD   atenolol (TENORMIN) 25 MG tablet Take 1 tablet by mouth Every Night.   Yes Trevin Liu MD   Cholecalciferol 25 MCG (1000 UT) tablet Take 1 tablet by mouth 1 (One) Time Per Week. HOLDING AS OF 5-23-25 FOR HIGH VIT D LEVELS  Indications: Vitamin D Deficiency 1/1/24  Yes Trevin Liu MD   docusate sodium (COLACE) 100 MG capsule Take 1 capsule by mouth 2 (Two) Times a Day. 6/5/25  Yes Maxwell Lanier MD   fluticasone (FLONASE) 50 MCG/ACT nasal spray Administer 1 spray into the nostril(s) as directed by provider Daily. Indications: Stuffy Nose 1/1/24  Yes Trevin Liu MD   ipratropium-albuterol (DUO-NEB) 0.5-2.5 mg/3 ml nebulizer Take 3 mL by nebulization 4 (Four) Times a Day. Indications: Chronic Obstructive Lung Disease 8/1/24  Yes Trevin Liu MD   oxyCODONE-acetaminophen (PERCOCET) 7.5-325 MG per tablet Take 1 tablet by mouth Every 4 (Four) Hours As Needed for Moderate Pain. 6/20/25  Yes Maxwell Lanier MD   pantoprazole (PROTONIX) 40 MG EC tablet Take 1 tablet by mouth 2 (Two) Times a Day for 60 days.  Patient taking differently: Take 1 tablet by mouth Daily. 5/7/25 7/6/25 Yes Ana Guerrero MD   pregabalin (LYRICA) 25 MG capsule Take 1 capsule by mouth Every 12 (Twelve) Hours. 5/20/25  Yes Trevin Liu MD   rOPINIRole (REQUIP) 1 MG tablet Take 1 tablet by mouth every night at bedtime. Indications: Restless Leg Syndrome 1/2/24  Yes Trevin Liu MD   spironolactone (ALDACTONE) 25 MG tablet Take 1 tablet by mouth  Daily.  Patient taking differently: Take 1 tablet by mouth 2 (Two) Times a Day. Indications: High Blood Pressure 8/17/24  Yes Miah Bonilla MD   torsemide (DEMADEX) 20 MG tablet Take 1 tablet by mouth Daily. Indications: Cardiac Failure, Edema, High Blood Pressure  Patient taking differently: Take 5 tablets by mouth 2 (Two) Times a Day. Indications: Cardiac Failure, Edema, High Blood Pressure 3/8/25  Yes Chan Garzon MD   ondansetron (Zofran) 4 MG tablet Take 1 tablet by mouth Every 8 (Eight) Hours As Needed for Nausea or Vomiting. 6/5/25   Maxwell Lanier MD       Allergies:  Allergies   Allergen Reactions    Wound Dressing Adhesive Other (See Comments)     SKIN TEARS AND RASH- PAPER TAPE OK       Objective     Vitals:   Temp:  [97.5 °F (36.4 °C)-98.2 °F (36.8 °C)] 97.7 °F (36.5 °C)  Heart Rate:  [71-97] 95  Resp:  [16-24] 17  BP: ()/(46-71) 107/66    Intake/Output Summary (Last 24 hours) at 6/21/2025 1702  Last data filed at 6/21/2025 1400  Gross per 24 hour   Intake 1380 ml   Output 850 ml   Net 530 ml       Physical Exam:   Constitutional: Awake, alert, NAD, chronically ill, tearful  HEENT: Sclera anicteric, no conjunctival injection  Neck: Supple, no thyromegaly, no lymphadenopathy, trachea at midline, no JVD  Respiratory: Clear to auscultation bilaterally, nonlabored on RA  Cardiovascular: RR, tachycardic  Gastrointestinal: BS +, soft, nontender, distended  : No palpable bladder  Musculoskeletal: +1-2 bilateral lower extremity edema to knee, +clubbing  Right shoulder with surgical dressing in place with rosalie-incisional ecchymosis and swelling.  Psychiatric: Appropriate affect, cooperative but tearful at times; oriented  Neurologic: moving all extremities, normal speech and mental status  Skin: Warm and dry.  Redness to bilateral lower extremities      Scheduled Meds:     atenolol, 25 mg, Oral, Nightly  ceFAZolin, 2,000 mg, Intravenous, Q8H  [START ON 6/26/2025] cholecalciferol, 1,000 Units,  Oral, Weekly  docusate sodium, 100 mg, Oral, BID  fluticasone, 1 spray, Nasal, Daily  ipratropium-albuterol, 3 mL, Nebulization, 4x Daily - RT  melatonin, 2.5 mg, Oral, Nightly  pantoprazole, 40 mg, Oral, QAM AC  pregabalin, 25 mg, Oral, Q12H  rOPINIRole, 1 mg, Oral, Nightly  [Held by provider] spironolactone, 25 mg, Oral, BID      IV Meds:   Pharmacy To Dose:,         Results Reviewed:   I have personally reviewed the results from the time of this admission to 6/21/2025 17:02 EDT     Lab Results   Component Value Date    GLUCOSE 133 (H) 06/21/2025    CALCIUM 8.7 06/21/2025     06/21/2025    K 3.0 (L) 06/21/2025    CO2 32.0 (H) 06/21/2025    CL 98 06/21/2025    BUN 28.0 (H) 06/21/2025    CREATININE 1.50 (H) 06/21/2025    EGFRIFAFRI >60 03/14/2023    EGFRIFNONA 83 02/07/2019    BCR 18.7 06/21/2025    ANIONGAP 11.0 06/21/2025      Lab Results   Component Value Date    MG 3.3 (H) 06/20/2025    PHOS 2.5 06/21/2025    ALBUMIN 2.6 (L) 06/21/2025           Assessment / Plan     ASSESSMENT:  SANDRO on CKD 3B (baseline SCr 1.2-1.5): Peak 2.72 on 6/13/2025, 1.94 on admission, improved to 1.5 with holding diuretics.  FENa 0.6% suggestive of prerenal state with intravascular volume depletion from diuretics and infection.  Patient was also noted to have been on Bactrim for cellulitis and was switched to Keflex prior to admission.  Chronic disease secondary to chronic prerenal state with diuretic use with history of cirrhosis.  Hypervolemic on exam with peripheral edema although suspect patient is intravascularly depleted.  Hypokalemia: 2.7 on admission.  Improved to 3.0 with replacement  Anemia: History of VIOLETA on iron supplementation.  Hgb 8.2 on admission and trending down with reported black stools.  Concern for blood loss.  Right shoulder pain s/p reverse total shoulder arthroplasty: Orthopedics consulted.  Erythematous legs: suspect venous stasis.  Was started on Bactrim and then Keflex outpatient.  On cefazolin per  primary.  Hypertension with CKD: Hypotensive.  Hyperlipidemia  Obesity: Complicates all aspects of care.  Severe back pain      PLAN:  Give 25 g 5% albumin  Continue to hold diuretics, though add fluid restriction 1200 mL/day  Orthostatic blood pressure to guide volume status and treatment  Check stool Hemoccult; consider GI evaluation given her description of melena several days ago.  Has known cirrhosis  Replete potassium per protocol  Surveillance labs      Thank you for involving us in the care of Filomena ELGIN Liu.  Please feel free to call with any questions.    Chan Brewster MD  06/21/25  17:02 EDT    [facility]  [phone]    Parts of this note may be an electronic transcription/translation of spoken language to printed text using the Dragon dictation system.

## 2025-06-22 ENCOUNTER — APPOINTMENT (OUTPATIENT)
Dept: CARDIOLOGY | Facility: HOSPITAL | Age: 65
End: 2025-06-22
Payer: MEDICARE

## 2025-06-22 LAB
ALBUMIN SERPL-MCNC: 2.9 G/DL (ref 3.5–5.2)
ALBUMIN SERPL-MCNC: 3.2 G/DL (ref 3.5–5.2)
ALBUMIN/GLOB SERPL: 1.2 G/DL
ALP SERPL-CCNC: 185 U/L (ref 39–117)
ALT SERPL W P-5'-P-CCNC: 13 U/L (ref 1–33)
AMMONIA BLD-SCNC: 61 UMOL/L (ref 11–51)
ANION GAP SERPL CALCULATED.3IONS-SCNC: 9 MMOL/L (ref 5–15)
ANION GAP SERPL CALCULATED.3IONS-SCNC: 9.4 MMOL/L (ref 5–15)
AST SERPL-CCNC: 44 U/L (ref 1–32)
BASOPHILS # BLD AUTO: 0.03 10*3/MM3 (ref 0–0.2)
BASOPHILS NFR BLD AUTO: 0.4 % (ref 0–1.5)
BH CV LOWER VASCULAR LEFT COMMON FEMORAL AUGMENT: NORMAL
BH CV LOWER VASCULAR LEFT COMMON FEMORAL COMPETENT: NORMAL
BH CV LOWER VASCULAR LEFT COMMON FEMORAL COMPRESS: NORMAL
BH CV LOWER VASCULAR LEFT COMMON FEMORAL PHASIC: NORMAL
BH CV LOWER VASCULAR LEFT COMMON FEMORAL SPONT: NORMAL
BH CV LOWER VASCULAR LEFT DISTAL FEMORAL COMPRESS: NORMAL
BH CV LOWER VASCULAR LEFT GASTRONEMIUS COMPRESS: NORMAL
BH CV LOWER VASCULAR LEFT GREATER SAPH AK COMPRESS: NORMAL
BH CV LOWER VASCULAR LEFT GREATER SAPH BK COMPRESS: NORMAL
BH CV LOWER VASCULAR LEFT LESSER SAPH COMPRESS: NORMAL
BH CV LOWER VASCULAR LEFT MID FEMORAL AUGMENT: NORMAL
BH CV LOWER VASCULAR LEFT MID FEMORAL COMPETENT: NORMAL
BH CV LOWER VASCULAR LEFT MID FEMORAL COMPRESS: NORMAL
BH CV LOWER VASCULAR LEFT MID FEMORAL PHASIC: NORMAL
BH CV LOWER VASCULAR LEFT MID FEMORAL SPONT: NORMAL
BH CV LOWER VASCULAR LEFT PERONEAL COMPRESS: NORMAL
BH CV LOWER VASCULAR LEFT POPLITEAL AUGMENT: NORMAL
BH CV LOWER VASCULAR LEFT POPLITEAL COMPETENT: NORMAL
BH CV LOWER VASCULAR LEFT POPLITEAL COMPRESS: NORMAL
BH CV LOWER VASCULAR LEFT POPLITEAL PHASIC: NORMAL
BH CV LOWER VASCULAR LEFT POPLITEAL SPONT: NORMAL
BH CV LOWER VASCULAR LEFT POSTERIOR TIBIAL COMPRESS: NORMAL
BH CV LOWER VASCULAR LEFT PROFUNDA FEMORAL COMPRESS: NORMAL
BH CV LOWER VASCULAR LEFT PROXIMAL FEMORAL COMPRESS: NORMAL
BH CV LOWER VASCULAR LEFT SAPHENOFEMORAL JUNCTION COMPRESS: NORMAL
BH CV LOWER VASCULAR RIGHT COMMON FEMORAL AUGMENT: NORMAL
BH CV LOWER VASCULAR RIGHT COMMON FEMORAL COMPETENT: NORMAL
BH CV LOWER VASCULAR RIGHT COMMON FEMORAL COMPRESS: NORMAL
BH CV LOWER VASCULAR RIGHT COMMON FEMORAL PHASIC: NORMAL
BH CV LOWER VASCULAR RIGHT COMMON FEMORAL SPONT: NORMAL
BH CV LOWER VASCULAR RIGHT DISTAL FEMORAL COMPRESS: NORMAL
BH CV LOWER VASCULAR RIGHT GASTRONEMIUS COMPRESS: NORMAL
BH CV LOWER VASCULAR RIGHT GREATER SAPH AK COMPRESS: NORMAL
BH CV LOWER VASCULAR RIGHT GREATER SAPH BK COMPRESS: NORMAL
BH CV LOWER VASCULAR RIGHT LESSER SAPH COMPRESS: NORMAL
BH CV LOWER VASCULAR RIGHT MID FEMORAL AUGMENT: NORMAL
BH CV LOWER VASCULAR RIGHT MID FEMORAL COMPETENT: NORMAL
BH CV LOWER VASCULAR RIGHT MID FEMORAL COMPRESS: NORMAL
BH CV LOWER VASCULAR RIGHT MID FEMORAL PHASIC: NORMAL
BH CV LOWER VASCULAR RIGHT MID FEMORAL SPONT: NORMAL
BH CV LOWER VASCULAR RIGHT PERONEAL COMPRESS: NORMAL
BH CV LOWER VASCULAR RIGHT POPLITEAL AUGMENT: NORMAL
BH CV LOWER VASCULAR RIGHT POPLITEAL COMPETENT: NORMAL
BH CV LOWER VASCULAR RIGHT POPLITEAL COMPRESS: NORMAL
BH CV LOWER VASCULAR RIGHT POPLITEAL PHASIC: NORMAL
BH CV LOWER VASCULAR RIGHT POPLITEAL SPONT: NORMAL
BH CV LOWER VASCULAR RIGHT POSTERIOR TIBIAL COMPRESS: NORMAL
BH CV LOWER VASCULAR RIGHT PROFUNDA FEMORAL COMPRESS: NORMAL
BH CV LOWER VASCULAR RIGHT PROXIMAL FEMORAL COMPRESS: NORMAL
BH CV LOWER VASCULAR RIGHT SAPHENOFEMORAL JUNCTION COMPRESS: NORMAL
BH CV POP FLUID COLLECT LEFT: 1
BILIRUB SERPL-MCNC: 1.2 MG/DL (ref 0–1.2)
BUN SERPL-MCNC: 30 MG/DL (ref 8–23)
BUN SERPL-MCNC: 30 MG/DL (ref 8–23)
BUN/CREAT SERPL: 18.2 (ref 7–25)
BUN/CREAT SERPL: 22.9 (ref 7–25)
CALCIUM SPEC-SCNC: 8.9 MG/DL (ref 8.6–10.5)
CALCIUM SPEC-SCNC: 9 MG/DL (ref 8.6–10.5)
CHLORIDE SERPL-SCNC: 100 MMOL/L (ref 98–107)
CHLORIDE SERPL-SCNC: 101 MMOL/L (ref 98–107)
CO2 SERPL-SCNC: 29.6 MMOL/L (ref 22–29)
CO2 SERPL-SCNC: 31 MMOL/L (ref 22–29)
CREAT SERPL-MCNC: 1.31 MG/DL (ref 0.57–1)
CREAT SERPL-MCNC: 1.65 MG/DL (ref 0.57–1)
DEPRECATED RDW RBC AUTO: 48.2 FL (ref 37–54)
EGFRCR SERPLBLD CKD-EPI 2021: 34.6 ML/MIN/1.73
EGFRCR SERPLBLD CKD-EPI 2021: 45.6 ML/MIN/1.73
EOSINOPHIL # BLD AUTO: 0.14 10*3/MM3 (ref 0–0.4)
EOSINOPHIL NFR BLD AUTO: 1.8 % (ref 0.3–6.2)
ERYTHROCYTE [DISTWIDTH] IN BLOOD BY AUTOMATED COUNT: 14.3 % (ref 12.3–15.4)
GLOBULIN UR ELPH-MCNC: 2.6 GM/DL
GLUCOSE SERPL-MCNC: 103 MG/DL (ref 65–99)
GLUCOSE SERPL-MCNC: 133 MG/DL (ref 65–99)
HBA1C MFR BLD: 4.7 % (ref 4.8–5.6)
HCT VFR BLD AUTO: 24 % (ref 34–46.6)
HGB BLD-MCNC: 7.5 G/DL (ref 12–15.9)
IMM GRANULOCYTES # BLD AUTO: 0.03 10*3/MM3 (ref 0–0.05)
IMM GRANULOCYTES NFR BLD AUTO: 0.4 % (ref 0–0.5)
LYMPHOCYTES # BLD AUTO: 1.24 10*3/MM3 (ref 0.7–3.1)
LYMPHOCYTES NFR BLD AUTO: 15.9 % (ref 19.6–45.3)
MAGNESIUM SERPL-MCNC: 2.5 MG/DL (ref 1.6–2.4)
MCH RBC QN AUTO: 28.8 PG (ref 26.6–33)
MCHC RBC AUTO-ENTMCNC: 31.3 G/DL (ref 31.5–35.7)
MCV RBC AUTO: 92.3 FL (ref 79–97)
MONOCYTES # BLD AUTO: 0.86 10*3/MM3 (ref 0.1–0.9)
MONOCYTES NFR BLD AUTO: 11.1 % (ref 5–12)
NEUTROPHILS NFR BLD AUTO: 5.48 10*3/MM3 (ref 1.7–7)
NEUTROPHILS NFR BLD AUTO: 70.4 % (ref 42.7–76)
NRBC BLD AUTO-RTO: 0 /100 WBC (ref 0–0.2)
PHOSPHATE SERPL-MCNC: 1.9 MG/DL (ref 2.5–4.5)
PHOSPHATE SERPL-MCNC: 2.8 MG/DL (ref 2.5–4.5)
PHOSPHATE SERPL-MCNC: 2.8 MG/DL (ref 2.5–4.5)
PLATELET # BLD AUTO: 110 10*3/MM3 (ref 140–450)
PMV BLD AUTO: 10.3 FL (ref 6–12)
POTASSIUM SERPL-SCNC: 4.3 MMOL/L (ref 3.5–5.2)
POTASSIUM SERPL-SCNC: 4.5 MMOL/L (ref 3.5–5.2)
PROT SERPL-MCNC: 5.8 G/DL (ref 6–8.5)
RBC # BLD AUTO: 2.6 10*6/MM3 (ref 3.77–5.28)
SODIUM SERPL-SCNC: 140 MMOL/L (ref 136–145)
SODIUM SERPL-SCNC: 140 MMOL/L (ref 136–145)
WBC NRBC COR # BLD AUTO: 7.78 10*3/MM3 (ref 3.4–10.8)

## 2025-06-22 PROCEDURE — 84100 ASSAY OF PHOSPHORUS: CPT | Performed by: INTERNAL MEDICINE

## 2025-06-22 PROCEDURE — 83735 ASSAY OF MAGNESIUM: CPT

## 2025-06-22 PROCEDURE — 94799 UNLISTED PULMONARY SVC/PX: CPT

## 2025-06-22 PROCEDURE — 25810000003 SODIUM CHLORIDE 0.9 % SOLUTION: Performed by: STUDENT IN AN ORGANIZED HEALTH CARE EDUCATION/TRAINING PROGRAM

## 2025-06-22 PROCEDURE — 83036 HEMOGLOBIN GLYCOSYLATED A1C: CPT | Performed by: STUDENT IN AN ORGANIZED HEALTH CARE EDUCATION/TRAINING PROGRAM

## 2025-06-22 PROCEDURE — 93971 EXTREMITY STUDY: CPT

## 2025-06-22 PROCEDURE — 97166 OT EVAL MOD COMPLEX 45 MIN: CPT

## 2025-06-22 PROCEDURE — 99024 POSTOP FOLLOW-UP VISIT: CPT | Performed by: NURSE PRACTITIONER

## 2025-06-22 PROCEDURE — 93970 EXTREMITY STUDY: CPT

## 2025-06-22 PROCEDURE — 94761 N-INVAS EAR/PLS OXIMETRY MLT: CPT

## 2025-06-22 PROCEDURE — 97535 SELF CARE MNGMENT TRAINING: CPT

## 2025-06-22 PROCEDURE — 25010000002 CEFAZOLIN PER 500 MG: Performed by: STUDENT IN AN ORGANIZED HEALTH CARE EDUCATION/TRAINING PROGRAM

## 2025-06-22 PROCEDURE — 93970 EXTREMITY STUDY: CPT | Performed by: SURGERY

## 2025-06-22 PROCEDURE — 84100 ASSAY OF PHOSPHORUS: CPT | Performed by: STUDENT IN AN ORGANIZED HEALTH CARE EDUCATION/TRAINING PROGRAM

## 2025-06-22 PROCEDURE — 85025 COMPLETE CBC W/AUTO DIFF WBC: CPT | Performed by: STUDENT IN AN ORGANIZED HEALTH CARE EDUCATION/TRAINING PROGRAM

## 2025-06-22 PROCEDURE — 97110 THERAPEUTIC EXERCISES: CPT

## 2025-06-22 PROCEDURE — 80053 COMPREHEN METABOLIC PANEL: CPT | Performed by: STUDENT IN AN ORGANIZED HEALTH CARE EDUCATION/TRAINING PROGRAM

## 2025-06-22 PROCEDURE — 82140 ASSAY OF AMMONIA: CPT

## 2025-06-22 RX ORDER — POLYETHYLENE GLYCOL 3350 17 G/17G
17 POWDER, FOR SOLUTION ORAL DAILY
Status: DISCONTINUED | OUTPATIENT
Start: 2025-06-22 | End: 2025-06-27 | Stop reason: HOSPADM

## 2025-06-22 RX ORDER — MIDODRINE HYDROCHLORIDE 5 MG/1
5 TABLET ORAL
Status: DISCONTINUED | OUTPATIENT
Start: 2025-06-22 | End: 2025-06-25

## 2025-06-22 RX ORDER — BISACODYL 10 MG
10 SUPPOSITORY, RECTAL RECTAL DAILY PRN
Status: DISCONTINUED | OUTPATIENT
Start: 2025-06-22 | End: 2025-06-27 | Stop reason: HOSPADM

## 2025-06-22 RX ORDER — AMOXICILLIN 250 MG
2 CAPSULE ORAL 2 TIMES DAILY
Status: DISCONTINUED | OUTPATIENT
Start: 2025-06-22 | End: 2025-06-27 | Stop reason: HOSPADM

## 2025-06-22 RX ORDER — BISACODYL 5 MG/1
5 TABLET, DELAYED RELEASE ORAL DAILY PRN
Status: DISCONTINUED | OUTPATIENT
Start: 2025-06-22 | End: 2025-06-27 | Stop reason: HOSPADM

## 2025-06-22 RX ADMIN — FLUTICASONE PROPIONATE 1 SPRAY: 50 SPRAY, METERED NASAL at 09:49

## 2025-06-22 RX ADMIN — CEFAZOLIN 2000 MG: 2 INJECTION, POWDER, FOR SOLUTION INTRAMUSCULAR; INTRAVENOUS at 01:19

## 2025-06-22 RX ADMIN — ROPINIROLE 1 MG: 1 TABLET, FILM COATED ORAL at 20:34

## 2025-06-22 RX ADMIN — OXYCODONE HYDROCHLORIDE AND ACETAMINOPHEN 1 TABLET: 10; 325 TABLET ORAL at 14:34

## 2025-06-22 RX ADMIN — DOCUSATE SODIUM 50 MG AND SENNOSIDES 8.6 MG 2 TABLET: 8.6; 5 TABLET, FILM COATED ORAL at 14:35

## 2025-06-22 RX ADMIN — OXYCODONE HYDROCHLORIDE AND ACETAMINOPHEN 1 TABLET: 10; 325 TABLET ORAL at 05:32

## 2025-06-22 RX ADMIN — IPRATROPIUM BROMIDE AND ALBUTEROL SULFATE 3 ML: .5; 3 SOLUTION RESPIRATORY (INHALATION) at 10:45

## 2025-06-22 RX ADMIN — IPRATROPIUM BROMIDE AND ALBUTEROL SULFATE 3 ML: .5; 3 SOLUTION RESPIRATORY (INHALATION) at 07:29

## 2025-06-22 RX ADMIN — CEFAZOLIN 2000 MG: 2 INJECTION, POWDER, FOR SOLUTION INTRAMUSCULAR; INTRAVENOUS at 17:44

## 2025-06-22 RX ADMIN — MIDODRINE HYDROCHLORIDE 5 MG: 5 TABLET ORAL at 16:54

## 2025-06-22 RX ADMIN — PREGABALIN 25 MG: 25 CAPSULE ORAL at 20:34

## 2025-06-22 RX ADMIN — OXYCODONE HYDROCHLORIDE AND ACETAMINOPHEN 1 TABLET: 10; 325 TABLET ORAL at 09:49

## 2025-06-22 RX ADMIN — OXYCODONE HYDROCHLORIDE AND ACETAMINOPHEN 1 TABLET: 10; 325 TABLET ORAL at 01:18

## 2025-06-22 RX ADMIN — OXYCODONE HYDROCHLORIDE AND ACETAMINOPHEN 1 TABLET: 10; 325 TABLET ORAL at 18:57

## 2025-06-22 RX ADMIN — DOCUSATE SODIUM 100 MG: 100 CAPSULE, LIQUID FILLED ORAL at 09:49

## 2025-06-22 RX ADMIN — PANTOPRAZOLE SODIUM 40 MG: 40 TABLET, DELAYED RELEASE ORAL at 06:22

## 2025-06-22 RX ADMIN — IPRATROPIUM BROMIDE AND ALBUTEROL SULFATE 3 ML: .5; 3 SOLUTION RESPIRATORY (INHALATION) at 14:11

## 2025-06-22 RX ADMIN — DOCUSATE SODIUM 100 MG: 100 CAPSULE, LIQUID FILLED ORAL at 20:34

## 2025-06-22 RX ADMIN — Medication 2.5 MG: at 20:34

## 2025-06-22 RX ADMIN — OXYCODONE HYDROCHLORIDE AND ACETAMINOPHEN 1 TABLET: 10; 325 TABLET ORAL at 23:13

## 2025-06-22 RX ADMIN — PREGABALIN 25 MG: 25 CAPSULE ORAL at 09:50

## 2025-06-22 RX ADMIN — CEFAZOLIN 2000 MG: 2 INJECTION, POWDER, FOR SOLUTION INTRAMUSCULAR; INTRAVENOUS at 09:49

## 2025-06-22 RX ADMIN — POLYETHYLENE GLYCOL 3350 17 G: 17 POWDER, FOR SOLUTION ORAL at 20:34

## 2025-06-22 RX ADMIN — SODIUM PHOSPHATE, MONOBASIC, MONOHYDRATE AND SODIUM PHOSPHATE, DIBASIC, ANHYDROUS 15 MMOL: 142; 276 INJECTION, SOLUTION INTRAVENOUS at 08:44

## 2025-06-22 RX ADMIN — IPRATROPIUM BROMIDE AND ALBUTEROL SULFATE 3 ML: .5; 3 SOLUTION RESPIRATORY (INHALATION) at 19:54

## 2025-06-22 RX ADMIN — SENNOSIDES AND DOCUSATE SODIUM 2 TABLET: 50; 8.6 TABLET ORAL at 20:34

## 2025-06-22 NOTE — PROGRESS NOTES
Orthopedic Progress Note      Patient: Filomena Liu    YOB: 1960    Medical Record Number: [id number]    Attending Physician: Mike Bunch DO    Date of Admission: 6/20/2025  3:45 PM    Admitting Dx:  Hypokalemia [E87.6]  Acute hypokalemia [E87.6]  Acute renal insufficiency [N28.9]  Pedal edema [R60.0]  Acute anemia [D64.9]    Current Problem List:   Hypokalemia    HTN (hypertension)    Gastroesophageal reflux disease    Alcoholic cirrhosis of liver without ascites    Iron deficiency anemia    Transaminitis    CKD (chronic kidney disease) stage 3, GFR 30-59 ml/min    Hypotension    Thrombocytopenia      Past Medical History:   Diagnosis Date    Abdominal aneurysm     DR. JOYCE FOLLOWING 3.5CM    Alcoholism 1985    Anemia 08/01/24    HAS HAD IRON INFUSIONS ALMOST YEARLY    Anxiety     Arthritis of back 2000    Asthma 2015    At high risk for falls     Basal cell carcinoma     Bruises easily     Chronic kidney disease 8/5/24    Cirrhosis 8/5/24    COPD (chronic obstructive pulmonary disease)     MANAGED BY PRIMARY CARE    CTS (carpal tunnel syndrome) Surgery 1999    DDD (degenerative disc disease), cervical     Depression     Diverticulitis of colon Unknown    Fatty liver 2022    GERD (gastroesophageal reflux disease)     GI (gastrointestinal bleed) 8/5/24    History of anemia     History of MRSA infection     LEFT ANKLE TREAT [facility]  5YEARS GO    Hyperlipidemia ?    Hypertension ?    Low back strain Unknown    Lower leg edema     Lumbosacral disc disease 2024    Neck strain 2000    Neuropathy     Pancreatitis 2024    Panic attacks     Scoliosis Unknown    Shoulder arthritis 06/05/2023    Shoulder pain, left 07/07/2023    Sleep apnea     NO MACHINE USE    Spinal stenosis     Spondylolisthesis of cervical region     Steatosis of liver     Tachycardia     Tendinitis of knee 2022    Thoracic disc disorder 2000    Vertigo        Current Medications:  Scheduled Meds:atenolol, 25 mg, Oral,  Nightly  ceFAZolin, 2,000 mg, Intravenous, Q8H  [START ON 6/26/2025] cholecalciferol, 1,000 Units, Oral, Weekly  docusate sodium, 100 mg, Oral, BID  fluticasone, 1 spray, Nasal, Daily  ipratropium-albuterol, 3 mL, Nebulization, 4x Daily - RT  melatonin, 2.5 mg, Oral, Nightly  pantoprazole, 40 mg, Oral, QAM AC  pregabalin, 25 mg, Oral, Q12H  rOPINIRole, 1 mg, Oral, Nightly  sodium phosphate, 15 mmol, Intravenous, Once  [Held by provider] spironolactone, 25 mg, Oral, BID      PRN Meds:.  albuterol    aluminum-magnesium hydroxide-simethicone    senna-docusate sodium **AND** polyethylene glycol **AND** bisacodyl **AND** bisacodyl    Calcium Replacement - Follow Nurse / BPA Driven Protocol    Magnesium Standard Dose Replacement - Follow Nurse / BPA Driven Protocol    ondansetron ODT **OR** ondansetron    oxyCODONE-acetaminophen    Pharmacy To Dose:    Phosphorus Replacement - Follow Nurse / BPA Driven Protocol    Potassium Replacement - Follow Nurse / BPA Driven Protocol    SUBJECTIVE: 64 y.o.  female resting comfortably in bed.  Reports the increase in Percocet strength helped with her shoulder pain.  She worked with occupational therapy this morning without any issues.  Her therapist was still at bedside and reported it seems she may be using the right arm more than she should be at this point.  Patient agreed. Denies any new issues or concerns this morning.       OBJECTIVE:   Vitals:    06/22/25 0435 06/22/25 0550 06/22/25 0702 06/22/25 0729   BP: 113/61  106/69    BP Location: Left arm      Patient Position: Lying  Lying    Pulse:   92    Resp:   16 18   Temp:   98.1 °F (36.7 °C)    TempSrc:   Oral    SpO2:   92%    Weight:  78.3 kg (172 lb 9.9 oz)     Height:         I/O last 3 completed shifts:  In: 2027 [P.O.:1527; IV Piggyback:500]  Out: 1650 [Urine:1650]    Diagnostic Tests:  Lab Results (last 72 hours)       Procedure Component Value Units Date/Time    Hemoglobin A1c [[id number]]  (Abnormal) Collected: 06/22/25  0454    Specimen: Blood Updated: 06/22/25 0646     Hemoglobin A1C <4.20 %     Narrative:      Hemoglobin A1C Ranges:    Increased Risk for Diabetes  5.7% to 6.4%  Diabetes                     >= 6.5%  Diabetic Goal                < 7.0%    Phosphorus [[id number]]  (Abnormal) Collected: 06/22/25 0454    Specimen: Blood Updated: 06/22/25 0638     Phosphorus 1.9 mg/dL     Comprehensive Metabolic Panel [[id number]]  (Abnormal) Collected: 06/22/25 0454    Specimen: Blood Updated: 06/22/25 0603     Glucose 103 mg/dL      BUN 30.0 mg/dL      Creatinine 1.65 mg/dL      Sodium 140 mmol/L      Potassium 4.5 mmol/L      Chloride 100 mmol/L      CO2 31.0 mmol/L      Calcium 9.0 mg/dL      Total Protein 5.8 g/dL      Albumin 3.2 g/dL      ALT (SGPT) 13 U/L      AST (SGOT) 44 U/L      Alkaline Phosphatase 185 U/L      Total Bilirubin 1.2 mg/dL      Globulin 2.6 gm/dL      A/G Ratio 1.2 g/dL      BUN/Creatinine Ratio 18.2     Anion Gap 9.0 mmol/L      eGFR 34.6 mL/min/1.73     Narrative:      GFR Categories in Chronic Kidney Disease (CKD)              GFR Category          GFR (mL/min/1.73)    Interpretation  G1                    90 or greater        Normal or high (1)  G2                    60-89                Mild decrease (1)  G3a                   45-59                Mild to moderate decrease  G3b                   30-44                Moderate to severe decrease  G4                    15-29                Severe decrease  G5                    14 or less           Kidney failure    (1)In the absence of evidence of kidney disease, neither GFR category G1 or G2 fulfill the criteria for CKD.    eGFR calculation 2021 CKD-EPI creatinine equation, which does not include race as a factor    Magnesium [[id number]]  (Abnormal) Collected: 06/22/25 0454    Specimen: Blood Updated: 06/22/25 0603     Magnesium 2.5 mg/dL     CBC & Differential [[id number]]  (Abnormal) Collected: 06/22/25 0454    Specimen: Blood Updated: 06/22/25 0540     Narrative:      The following orders were created for panel order CBC & Differential.  Procedure                               Abnormality         Status                     ---------                               -----------         ------                     CBC Auto Differential[[id number]]        Abnormal            Final result                 Please view results for these tests on the individual orders.    CBC Auto Differential [[id number]]  (Abnormal) Collected: 06/22/25 0454    Specimen: Blood Updated: 06/22/25 0540     WBC 7.78 10*3/mm3      RBC 2.60 10*6/mm3      Hemoglobin 7.5 g/dL      Hematocrit 24.0 %      MCV 92.3 fL      MCH 28.8 pg      MCHC 31.3 g/dL      RDW 14.3 %      RDW-SD 48.2 fl      MPV 10.3 fL      Platelets 110 10*3/mm3      Neutrophil % 70.4 %      Lymphocyte % 15.9 %      Monocyte % 11.1 %      Eosinophil % 1.8 %      Basophil % 0.4 %      Immature Grans % 0.4 %      Neutrophils, Absolute 5.48 10*3/mm3      Lymphocytes, Absolute 1.24 10*3/mm3      Monocytes, Absolute 0.86 10*3/mm3      Eosinophils, Absolute 0.14 10*3/mm3      Basophils, Absolute 0.03 10*3/mm3      Immature Grans, Absolute 0.03 10*3/mm3      nRBC 0.0 /100 WBC     Potassium [[id number]]  (Normal) Collected: 06/21/25 1934    Specimen: Blood Updated: 06/21/25 2011     Potassium 4.7 mmol/L     Blood Culture - Blood, Hand, Left [[id number]] Collected: 06/21/25 1136    Specimen: Blood from Hand, Left Updated: 06/21/25 1144    Blood Culture - Blood, Hand, Right [[id number]] Collected: 06/21/25 1136    Specimen: Blood from Hand, Right Updated: 06/21/25 1143    Renal Function Panel [[id number]]  (Abnormal) Collected: 06/21/25 0407    Specimen: Blood Updated: 06/21/25 0505     Glucose 133 mg/dL      BUN 28.0 mg/dL      Creatinine 1.50 mg/dL      Sodium 141 mmol/L      Potassium 3.0 mmol/L      Chloride 98 mmol/L      CO2 32.0 mmol/L      Calcium 8.7 mg/dL      Albumin 2.6 g/dL      Phosphorus 2.5 mg/dL      Anion Gap 11.0 mmol/L       BUN/Creatinine Ratio 18.7     eGFR 38.8 mL/min/1.73     Narrative:      GFR Categories in Chronic Kidney Disease (CKD)              GFR Category          GFR (mL/min/1.73)    Interpretation  G1                    90 or greater        Normal or high (1)  G2                    60-89                Mild decrease (1)  G3a                   45-59                Mild to moderate decrease  G3b                   30-44                Moderate to severe decrease  G4                    15-29                Severe decrease  G5                    14 or less           Kidney failure    (1)In the absence of evidence of kidney disease, neither GFR category G1 or G2 fulfill the criteria for CKD.    eGFR calculation 2021 CKD-EPI creatinine equation, which does not include race as a factor    CBC (No Diff) [[id number]]  (Abnormal) Collected: 06/21/25 0407    Specimen: Blood Updated: 06/21/25 0442     WBC 7.19 10*3/mm3      RBC 2.63 10*6/mm3      Hemoglobin 7.6 g/dL      Hematocrit 23.5 %      MCV 89.4 fL      MCH 28.9 pg      MCHC 32.3 g/dL      RDW 14.3 %      RDW-SD 46.2 fl      MPV 10.5 fL      Platelets 109 10*3/mm3     Sodium, Urine, Random - Urine, Clean Catch [[id number]] Collected: 06/21/25 0044    Specimen: Urine, Clean Catch Updated: 06/21/25 0242     Sodium, Urine 38 mmol/L     Narrative:      Reference intervals for random urine have not been established.  Clinical usage is dependent upon physician's interpretation in combination with other laboratory tests.       Creatinine Urine Random (kidney function) GFR component - Urine, Clean Catch [[id number]] Collected: 06/21/25 0044    Specimen: Urine, Clean Catch Updated: 06/21/25 0129     Creatinine, Urine 72.4 mg/dL     Narrative:      Reference intervals for random urine have not been established.  Clinical usage is dependent upon physician's interpretation in combination with other laboratory tests.       Urinalysis With Microscopic If Indicated (No Culture) - Urine, Clean  Catch [[id number]]  (Normal) Collected: 06/20/25 1631    Specimen: Urine, Clean Catch Updated: 06/20/25 1639     Color, UA Yellow     Appearance, UA Clear     pH, UA 7.0     Specific Gravity, UA 1.009     Glucose, UA Negative     Ketones, UA Negative     Bilirubin, UA Negative     Blood, UA Negative     Protein, UA Negative     Leuk Esterase, UA Negative     Nitrite, UA Negative     Urobilinogen, UA 0.2 E.U./dL    Narrative:      Urine microscopic not indicated.    Magnesium [[id number]]  (Abnormal) Collected: 06/20/25 1443    Specimen: Blood Updated: 06/20/25 1637     Magnesium 3.3 mg/dL     Comprehensive Metabolic Panel [[id number]]  (Abnormal) Collected: 06/20/25 1443    Specimen: Blood Updated: 06/20/25 1518     Glucose 119 mg/dL      BUN 31.0 mg/dL      Creatinine 1.94 mg/dL      Sodium 137 mmol/L      Potassium 2.7 mmol/L      Chloride 92 mmol/L      CO2 32.9 mmol/L      Calcium 9.3 mg/dL      Total Protein 6.2 g/dL      Albumin 3.2 g/dL      ALT (SGPT) 21 U/L      AST (SGOT) 50 U/L      Alkaline Phosphatase 196 U/L      Total Bilirubin 1.8 mg/dL      Globulin 3.0 gm/dL      A/G Ratio 1.1 g/dL      BUN/Creatinine Ratio 16.0     Anion Gap 12.1 mmol/L      eGFR 28.5 mL/min/1.73     Narrative:      GFR Categories in Chronic Kidney Disease (CKD)              GFR Category          GFR (mL/min/1.73)    Interpretation  G1                    90 or greater        Normal or high (1)  G2                    60-89                Mild decrease (1)  G3a                   45-59                Mild to moderate decrease  G3b                   30-44                Moderate to severe decrease  G4                    15-29                Severe decrease  G5                    14 or less           Kidney failure    (1)In the absence of evidence of kidney disease, neither GFR category G1 or G2 fulfill the criteria for CKD.    eGFR calculation 2021 CKD-EPI creatinine equation, which does not include race as a factor    [place] Draw  [[id number]] Collected: 06/20/25 1443    Specimen: Blood Updated: 06/20/25 1501    Narrative:      The following orders were created for panel order [place] Draw.  Procedure                               Abnormality         Status                     ---------                               -----------         ------                     Green Top (Gel)[[id number]]                                  Final result               Lavender Top[[id number]]                                     Final result               Gold Top - SST[[id number]]                                   Final result               Light Blue Top[[id number]]                                   Final result                 Please view results for these tests on the individual orders.    Green Top (Gel) [[id number]] Collected: 06/20/25 1443    Specimen: Blood Updated: 06/20/25 1501     Extra Tube Hold for add-ons.     Comment: Auto resulted.       Lavender Top [[id number]] Collected: 06/20/25 1443    Specimen: Blood Updated: 06/20/25 1501     Extra Tube hold for add-on     Comment: Auto resulted       Gold Top - SST [[id number]] Collected: 06/20/25 1443    Specimen: Blood Updated: 06/20/25 1501     Extra Tube Hold for add-ons.     Comment: Auto resulted.       Light Blue Top [[id number]] Collected: 06/20/25 1443    Specimen: Blood Updated: 06/20/25 1501     Extra Tube Hold for add-ons.     Comment: Auto resulted       CBC & Differential [[id number]]  (Abnormal) Collected: 06/20/25 1443    Specimen: Blood Updated: 06/20/25 1457    Narrative:      The following orders were created for panel order CBC & Differential.  Procedure                               Abnormality         Status                     ---------                               -----------         ------                     CBC Auto Differential[[id number]]        Abnormal            Final result                 Please view results for these tests on the individual orders.    CBC Auto Differential  [[id number]]  (Abnormal) Collected: 06/20/25 1443    Specimen: Blood Updated: 06/20/25 1457     WBC 9.84 10*3/mm3      RBC 2.90 10*6/mm3      Hemoglobin 8.2 g/dL      Hematocrit 27.1 %      MCV 93.4 fL      MCH 28.3 pg      MCHC 30.3 g/dL      RDW 14.3 %      RDW-SD 49.4 fl      MPV 10.1 fL      Platelets 127 10*3/mm3      Neutrophil % 73.7 %      Lymphocyte % 13.5 %      Monocyte % 9.8 %      Eosinophil % 2.2 %      Basophil % 0.3 %      Immature Grans % 0.5 %      Neutrophils, Absolute 7.25 10*3/mm3      Lymphocytes, Absolute 1.33 10*3/mm3      Monocytes, Absolute 0.96 10*3/mm3      Eosinophils, Absolute 0.22 10*3/mm3      Basophils, Absolute 0.03 10*3/mm3      Immature Grans, Absolute 0.05 10*3/mm3      nRBC 0.0 /100 WBC              PHYSICAL EXAM:  Right shoulder is examined:  Incision is well approximated.  No active drainage noted.  Hematoma appears unchanged.     IMAGING:  PROCEDURE:  XR SHOULDER 2+ VW RIGHT-     HISTORY: Right shoulder pain and contusion, 2 weeks postop shoulder  replacement.     COMPARISON: 6/5/2025     FINDINGS:       3 views of the right shoulder were obtained.     There is a right shoulder reverse total arthroplasty with appropriate alignment. No acute fracture identified. Postoperative soft tissue air has resolved. Increased density throughout the soft tissues at the right shoulder is incompletely assessed but may reflect residual postoperative edema and/or effusion. Mild acromioclavicular osteoarthritis. Sternal wires and cervical hardware are partially imaged. Old rib fractures incidentally noted.     This report was finalized on 6/21/2025 12:28 PM by Dr. Kady Prince M.D  on Workstation: [id number]        ASSESSMENT & PLAN:  2 weeks s/p right reverse total shoulder replacement with hematoma    We reviewed the x-rays together.  Her implants look fine.  There are no acute findings.  We had a lengthy discussed regarding activity limitations and restrictions.  I recommended she resume  sling use to serve as a reminder.  She agreed.  She will have her friend bring this in later today.  She may continue pendulums exercises 5-6 times per day as well as continue therapy with OT.  We will sign off today, but are happy to see her if there are any new concerns.  Patient will follow up with me in clinic in 2 weeks.          Date: 6/22/2025    TAYLER Santizo

## 2025-06-22 NOTE — PLAN OF CARE
Problem: Fall Injury Risk  Goal: Absence of Fall and Fall-Related Injury  Outcome: Progressing     Problem: Pain Acute  Goal: Optimal Pain Control and Function  Outcome: Progressing     Problem: Skin Injury Risk Increased  Goal: Skin Health and Integrity  Outcome: Progressing   Goal Outcome Evaluation:  Plan of Care Reviewed With: patient        Progress: no change  Outcome Evaluation: Pt appeared to sleep fair, alert and oriented x 4, coop with care, up to br with assist x 1 and walker, prn pain meds given, potassium improved, tolerated iv antibiotic, compliant with fluid restriction, to have rle doppler.

## 2025-06-22 NOTE — PROGRESS NOTES
[facility] Progress Note      Patient Name: Filomena Liu  : 1960  MRN: [id number]  Primary Care Physician:  Fernando Dunn MD  Date of admission: 2025    Subjective     Interval History:   Follow-up hypokalemia, fluid overload, SANDRO, CKD 3B     Continues to feel shaky  Back and right shoulder pain unchanged; legs still feel weak continues to feel   Remains constipated with description of melenic stools prior to admission    Review of Systems:   As noted above    Objective     Vitals:   Temp:  [97.9 °F (36.6 °C)-98.8 °F (37.1 °C)] 98.4 °F (36.9 °C)  Heart Rate:  [92-99] 96  Resp:  [16-20] 18  BP: ()/(40-69) 93/55    Intake/Output Summary (Last 24 hours) at 2025 1812  Last data filed at 2025 1744  Gross per 24 hour   Intake 1587 ml   Output 900 ml   Net 687 ml       Physical Exam:    Constitutional: Awake, alert, NAD, chronically ill, anxious  HEENT: Sclera anicteric, no conjunctival injection  Neck: Supple, no carotid bruit, trachea at midline, no JVD  Respiratory: Crackles bilaterally, nonlabored on RA  Cardiovascular: RR, tachycardic  Gastrointestinal: BS +, soft, nontender, distended  : No palpable bladder  Musculoskeletal: +1-2 bilateral lower extremity edema to knee, +clubbing  Right shoulder with surgical dressing in place with rosalie-incisional ecchymosis and swelling.  Psychiatric: Appropriate affect, cooperative but tearful at times; oriented  Neurologic: moving all extremities, normal speech and mental status  Skin: Warm and dry.  Venous stasis changes both legs    Scheduled Meds:     [Held by provider] atenolol, 25 mg, Oral, Nightly  ceFAZolin, 2,000 mg, Intravenous, Q8H  [START ON 2025] cholecalciferol, 1,000 Units, Oral, Weekly  docusate sodium, 100 mg, Oral, BID  fluticasone, 1 spray, Nasal, Daily  ipratropium-albuterol, 3 mL, Nebulization, 4x Daily - RT  melatonin, 2.5 mg, Oral, Nightly  midodrine, 5 mg, Oral, TID AC  pantoprazole,  40 mg, Oral, QAM AC  senna-docusate sodium, 2 tablet, Oral, BID   And  polyethylene glycol, 17 g, Oral, Daily  pregabalin, 25 mg, Oral, Q12H  rOPINIRole, 1 mg, Oral, Nightly  [Held by provider] spironolactone, 25 mg, Oral, BID      IV Meds:   Pharmacy To Dose:,         Results Reviewed:   I have personally reviewed the results from the time of this admission to 6/22/2025 18:12 EDT     Results from last 7 days   Lab Units 06/22/25  1538 06/22/25 0454 06/21/25  1934 06/21/25 0407 06/20/25  1443   SODIUM mmol/L 140 140  --  141 137   POTASSIUM mmol/L 4.3 4.5 4.7 3.0* 2.7*   CHLORIDE mmol/L 101 100  --  98 92*   CO2 mmol/L 29.6* 31.0*  --  32.0* 32.9*   BUN mg/dL 30.0* 30.0*  --  28.0* 31.0*   CREATININE mg/dL 1.31* 1.65*  --  1.50* 1.94*   CALCIUM mg/dL 8.9 9.0  --  8.7 9.3   BILIRUBIN mg/dL  --  1.2  --   --  1.8*   ALK PHOS U/L  --  185*  --   --  196*   ALT (SGPT) U/L  --  13  --   --  21   AST (SGOT) U/L  --  44*  --   --  50*   GLUCOSE mg/dL 133* 103*  --  133* 119*       Estimated Creatinine Clearance: 41.5 mL/min (A) (by C-G formula based on SCr of 1.31 mg/dL (H)).    Results from last 7 days   Lab Units 06/22/25 1538 06/22/25 0454 06/21/25 0407 06/20/25  1443   MAGNESIUM mg/dL  --  2.5*  --  3.3*   PHOSPHORUS mg/dL 2.8  2.8 1.9* 2.5  --              Results from last 7 days   Lab Units 06/22/25 0454 06/21/25 0407 06/20/25  1443   WBC 10*3/mm3 7.78 7.19 9.84   HEMOGLOBIN g/dL 7.5* 7.6* 8.2*   PLATELETS 10*3/mm3 110* 109* 127*             Assessment / Plan     ASSESSMENT:  SANDRO on CKD 3B (baseline SCr 1.2-1.5), nonoliguric, overall stable.  FENa 0.6% suggestive of prerenal state with intravascular volume depletion from diuretics and infection.  On Bactrim prior to admission, and so element of SCR elevation probably was due to trimethoprim.  Volume excess with peripheral edema in setting of low albumin and cirrhosis.  Low phosphorus; resolved hypokalemia  Anemia: History of VIOLETA on iron supplementation.  Hgb  8.2 on admission and trending down with reported black stools.  Concern for blood loss.  Right shoulder pain s/p reverse total shoulder arthroplasty: Orthopedics consulted.  Erythematous legs: suspect venous stasis.  Was started on Bactrim and then Keflex outpatient.  On cefazolin per primary.  Hypertension with CKD: Hypotensive now  Hyperlipidemia  Obesity: Complicates all aspects of care.  Severe back pain    PLAN:  Add midodrine 5 mg TID  Hold diuretics for now, but anticipate restarting soon  Continue fluid restriction 1200 mL/day  Replace phosphorus per protocol  Check stool Hemoccult; recommend GI evaluation given her description of melena several days ago in setting of known cirrhosis  Replete phosphorus per protocol  Duplex bilateral lower extremities pending  Surveillance labs      Thank you for involving us in the care of Filomena Liu.  Please feel free to call with any questions.    Chan Brewster MD  06/22/25  18:12 EDT    [facility]  [phone]    Parts of this note may be an electronic transcription/translation of spoken language to printed text using the Dragon dictation system.

## 2025-06-22 NOTE — PROGRESS NOTES
Name: Filomena Liu ADMIT: 2025   : 1960  PCP: Fernando Dunn MD    MRN: [id number] LOS: 2 days   AGE/SEX: 64 y.o. female  ROOM: [facility]     Subjective   Subjective   Patient seen and examined this morning. She is resting in bed, right leg erythema and swelling is improving.       Objective   Objective   Vital Signs  Temp:  [97.5 °F (36.4 °C)-98.8 °F (37.1 °C)] 98.1 °F (36.7 °C)  Heart Rate:  [79-99] 92  Resp:  [16-20] 16  BP: ()/(40-69) 106/69  SpO2:  [92 %-100 %] 92 %  on   ;   Device (Oxygen Therapy): room air  Body mass index is 34.87 kg/m².  Physical Exam  Vitals and nursing note reviewed.   Constitutional:       General: She is not in acute distress.     Appearance: She is ill-appearing.   Cardiovascular:      Rate and Rhythm: Normal rate.   Pulmonary:      Effort: Pulmonary effort is normal. No respiratory distress.      Breath sounds: No wheezing or rhonchi.   Abdominal:      General: There is no distension.      Palpations: Abdomen is soft.      Tenderness: There is no abdominal tenderness.      Comments: Bruising noted on abdomen   Musculoskeletal:      Right lower leg: Edema present.      Left lower leg: Edema present.      Comments: Erythema and more pronounced swelling of right lower extremity   Skin:     General: Skin is warm and dry.      Findings: Bruising and erythema present.   Neurological:      Mental Status: She is alert.       Results Review     I reviewed the patient's new clinical results.  Results from last 7 days   Lab Units 25  0454 25  0407 25  1443   WBC 10*3/mm3 7.78 7.19 9.84   HEMOGLOBIN g/dL 7.5* 7.6* 8.2*   PLATELETS 10*3/mm3 110* 109* 127*     Results from last 7 days   Lab Units 25  0454 25  1934 25  0407 25  1443   SODIUM mmol/L 140  --  141 137   POTASSIUM mmol/L 4.5 4.7 3.0* 2.7*   CHLORIDE mmol/L 100  --  98 92*   CO2 mmol/L 31.0*  --  32.0* 32.9*   BUN mg/dL 30.0*  --  28.0* 31.0*   CREATININE mg/dL 1.65*   --  1.50* 1.94*   GLUCOSE mg/dL 103*  --  133* 119*   EGFR mL/min/1.73 34.6*  --  38.8* 28.5*     Results from last 7 days   Lab Units 06/22/25  0454 06/21/25  0407 06/20/25  1443   ALBUMIN g/dL 3.2* 2.6* 3.2*   BILIRUBIN mg/dL 1.2  --  1.8*   ALK PHOS U/L 185*  --  196*   AST (SGOT) U/L 44*  --  50*   ALT (SGPT) U/L 13  --  21     Results from last 7 days   Lab Units 06/22/25  0454 06/21/25  0407 06/20/25  1443   CALCIUM mg/dL 9.0 8.7 9.3   ALBUMIN g/dL 3.2* 2.6* 3.2*   MAGNESIUM mg/dL 2.5*  --  3.3*   PHOSPHORUS mg/dL 1.9* 2.5  --        Hemoglobin A1C   Date/Time Value Ref Range Status   06/22/2025 0454 <4.20 (L) 4.80 - 5.60 % Final       No radiology results for the last day    I have personally reviewed all medications:  Scheduled Medications  atenolol, 25 mg, Oral, Nightly  ceFAZolin, 2,000 mg, Intravenous, Q8H  [START ON 6/26/2025] cholecalciferol, 1,000 Units, Oral, Weekly  docusate sodium, 100 mg, Oral, BID  fluticasone, 1 spray, Nasal, Daily  ipratropium-albuterol, 3 mL, Nebulization, 4x Daily - RT  melatonin, 2.5 mg, Oral, Nightly  pantoprazole, 40 mg, Oral, QAM AC  pregabalin, 25 mg, Oral, Q12H  rOPINIRole, 1 mg, Oral, Nightly  sodium phosphate, 15 mmol, Intravenous, Once  [Held by provider] spironolactone, 25 mg, Oral, BID    Infusions  Pharmacy To Dose:,     Diet  Diet: Cardiac, Fluid Restriction (240 mL/tray); Healthy Heart (2-3 Na+); Other (Specify mL/day) (1200); Fluid Consistency: Thin (IDDSI 0)    I have personally reviewed:  [x]  Laboratory   []  Microbiology   [x]  Radiology   [x]  EKG/Telemetry  [x]  Cardiology/Vascular Echo from 08/2024 with EF 63.4%, indeterminate LV diastolic function.  []  Pathology    []  Records       Assessment/Plan     Active Hospital Problems    Diagnosis  POA    **Hypokalemia [E87.6]  Yes    Transaminitis [R74.01]  Yes    CKD (chronic kidney disease) stage 3, GFR 30-59 ml/min [N18.30]  Yes    Hypotension [I95.9]  Yes    Thrombocytopenia [D69.6]  Yes    Iron deficiency  anemia [D50.9]  Yes    Alcoholic cirrhosis of liver without ascites [K70.30]  Yes    Gastroesophageal reflux disease [K21.9]  Yes    HTN (hypertension) [I10]  Yes      Resolved Hospital Problems   No resolved problems to display.       64 y.o. female admitted with Hypokalemia.    SANDRO on CKD stage 3  - Creatinine 1.94 on arrival, value previously 1.02 on 05/23/25. Baseline values appear to fluctuate but average over past 6 lab draws prior to arrival ~1.3.   - Diuretic held on arrival, repeat levels had been improving, but now back up to 1.65 on labs this AM. Follow up with Nephrology regarding management, may need small fluid bolus.   - Repeat labs in AM.    Right lower extremity cellulitis  - RLE warm, erythematous and tender to palpation. She is on Cefazolin, tolerating well.  - RLE Duplex US pending.     Hypokalemia  Hypophosphatemia  - K low on prior labs, repleted and improved.  - Phos level now low, replete per protocol.     History of HTN complicated by hypotension  - BP soft, but no further hypotension noted on vitals overnight. Diuretic/Aldactone being held. Trend BP.    Cirrhosis  Transaminitis  - LFT values elevated, slightly improved today from prior. Abdominal exam benign, trend levels for now. Repeat CMP in AM.    Anemia  Thrombocytopenia  - Noted on labs, she does have bruising on her abdomen and lower back, this area has been marked to allow for close monitoring. No overt signs of GI bleeding, but need to keep close eye on this.  - Order repeat CBC in AM for reassessment. Transfuse for hemoglobin <7.    Right shoulder osteoarthritis  - With associated rotator cuff tendinopathy; S/P Right reverse total shoulder arthroplasty and Tenodesis of long head of biceps tendon with Dr. Lanier on 06/05/25. She is having some discomfort in her shoulders, XR obtained and reviewed, orthopedic surgery consulted.    GERD  - Continue PPI as prescribed.     Hyperglycemia  - A1c <4.20%.      SCDs for DVT  prophylaxis.  Full code.  Discussed with patient and nursing staff.  Anticipate discharge TBD timing yet to be determined.  Expected discharge date/ time has not been documented.      Mike Bunch DO  [facility]  06/22/25

## 2025-06-22 NOTE — THERAPY EVALUATION
Patient Name: Filomena Liu  : 1960    MRN: [id number]                              Today's Date: 2025       Admit Date: 2025    Visit Dx:     ICD-10-CM ICD-9-CM   1. Pedal edema  R60.0 782.3   2. Acute renal insufficiency  N28.9 593.9   3. Acute hypokalemia  E87.6 276.8   4. Acute anemia  D64.9 285.9   5. Decreased activities of daily living (ADL)  Z78.9 V49.89     Patient Active Problem List   Diagnosis    Mediastinal mass    Cervical stenosis of spinal canal    Cervical radiculopathy    Cellulitis/abscess of left foot    HTN (hypertension)    History of MRSA infection    Anxiety    Peroneal tenosynovitis    Fracture of navicular bone of left foot    Tobacco use    Non compliance with medical treatment    Screening for colon cancer    Osteoporosis    Shoulder arthritis    Primary localized osteoarthrosis of left shoulder region    History of adenomatous polyp of colon    Diverticulosis    Acute GI bleeding    Hyperkalemia    SANDRO (acute kidney injury)    Alcoholism    COPD (chronic obstructive pulmonary disease)    Acute blood loss anemia    Melena    Gastroesophageal reflux disease    Dysphagia    Alcoholic cirrhosis of liver without ascites    Other iron deficiency anemias    Malabsorption of iron    Acute pancreatitis    Diverticulitis    Hypoglycemia    Lactic acidosis    Adverse effect of iron    Iron deficiency anemia    Cirrhosis of liver without ascites    Infrarenal abdominal aortic aneurysm (AAA) without rupture    Arthritis of shoulder    Hypokalemia    Transaminitis    CKD (chronic kidney disease) stage 3, GFR 30-59 ml/min    Hypotension    Thrombocytopenia     Past Medical History:   Diagnosis Date    Abdominal aneurysm     DR. JOYCE FOLLOWING 3.5CM    Alcoholism 1985    Anemia 24    HAS HAD IRON INFUSIONS ALMOST YEARLY    Anxiety     Arthritis of back     Asthma 2015    At high risk for falls     Basal cell carcinoma     Bruises easily     Chronic kidney disease 24     Cirrhosis 8/5/24    COPD (chronic obstructive pulmonary disease)     MANAGED BY PRIMARY CARE    CTS (carpal tunnel syndrome) Surgery 1999    DDD (degenerative disc disease), cervical     Depression     Diverticulitis of colon Unknown    Fatty liver 2022    GERD (gastroesophageal reflux disease)     GI (gastrointestinal bleed) 8/5/24    History of anemia     History of MRSA infection     LEFT ANKLE TREAT [facility]  5YEARS GO    Hyperlipidemia ?    Hypertension ?    Low back strain Unknown    Lower leg edema     Lumbosacral disc disease 2024    Neck strain 2000    Neuropathy     Pancreatitis 2024    Panic attacks     Scoliosis Unknown    Shoulder arthritis 06/05/2023    Shoulder pain, left 07/07/2023    Sleep apnea     NO MACHINE USE    Spinal stenosis     Spondylolisthesis of cervical region     Steatosis of liver     Tachycardia     Tendinitis of knee 2022    Thoracic disc disorder 2000    Vertigo      Past Surgical History:   Procedure Laterality Date    BACK SURGERY      cervical disc surgery with fusion-    CARPAL TUNNEL RELEASE Bilateral     CATARACT EXTRACTION      CHOLECYSTECTOMY  1/1/20    COLONOSCOPY      COLONOSCOPY N/A 11/12/2020    Procedure: COLONOSCOPY, polypectomy;  Surgeon: Filomena Mckeon MD;  Location: [facility];  Service: Gastroenterology;  Laterality: N/A;  Diverticulosis; Hepatic flexure polyp x 1; Polyp at 60cm x 1; Polyp at 50cm x 1- hot snare;    COLONOSCOPY N/A 04/15/2024    Procedure: COLONOSCOPY into sigmoid colon with hot snare polypectomy;  Surgeon: Leatha Johns MD;  Location: [facility] ENDOSCOPY;  Service: General;  Laterality: N/A;  pre- history of polyps  post- diverticulosis, polyp    COLONOSCOPY N/A 05/07/2025    Procedure: COLONOSCOPY to cecum and ti with hot snare polypectomies;  Surgeon: Ana Guerrero MD;  Location: [facility] ENDOSCOPY;  Service: Gastroenterology;  Laterality: N/A;  PRE: IRON DEFICIENCY ANEMIA  POST: diverticulosis, polyps, hemorrhoids    ENDOSCOPY N/A  08/15/2024    Procedure: ESOPHAGOGASTRODUODENOSCOPY;  Surgeon: Garth Constantino MD;  Location: [facility] ENDOSCOPY;  Service: Gastroenterology;  Laterality: N/A;  PRE- CIRRHOSIS, DARK STOOL  POST- NORMAL    ENDOSCOPY N/A 05/07/2025    Procedure: ESOPHAGOGASTRODUODENOSCOPY;  Surgeon: Ana Guerrero MD;  Location: [facility] ENDOSCOPY;  Service: Gastroenterology;  Laterality: N/A;  PRE: CIRRHOSIS  POST:portal hypertensive gastropathy; esophagitis; hiatal hernia; varices    HYSTERECTOMY  1998    INCISION AND DRAINAGE LEG Left 11/17/2018    Procedure: Incision and Drainage of Left ankle;  Surgeon: Maxwell Lanier MD;  Location: [facility] MAIN OR;  Service: Orthopedics    NECK SURGERY  2000, 2005,2017    SIGMOIDOSCOPY N/A 03/28/2024    Procedure: SIGMOIDOSCOPY FLEXIBLE;  Surgeon: Leatha Johns MD;  Location: [facility] ENDOSCOPY;  Service: General;  Laterality: N/A;  pre: h/o colon polyps  post: poor prep, diverticulosis    SKIN BIOPSY      SKIN GRAFT SPLIT THICKNESS N/A 02/12/2019    Procedure: debridement SKIN GRAFT SPLIT THICKNESS left ankle wound;  Surgeon: Barry Worthy MD;  Location: [facility] OR [facility];  Service: Plastics    TONGUE LESION EXCISION/BIOPSY N/A 11/26/2024    Procedure: WIDE LOCAL EXCISION OF TONGUE LESION;  Surgeon: El Mercedes MD;  [place] [facility] [place];  Service: ENT;  Laterality: N/A;    TOTAL SHOULDER ARTHROPLASTY Left 07/20/2023    Procedure: Total  shoulder arthroplasty;  Surgeon: Maxwell Lanier MD;  Location: [facility] OR [facility];  Service: Orthopedics;  Laterality: Left;    TOTAL SHOULDER ARTHROPLASTY W/ DISTAL CLAVICLE EXCISION Right 6/5/2025    Procedure: Right Reverse Total Shoulder Arthroplasty;  Surgeon: Maxwell Lanier MD;  Location: [facility] OR [facility];  Service: Orthopedics;  Laterality: Right;    TOTAL THYMECTOMY      TRIGGER POINT INJECTION  1163-1301    UPPER GASTROINTESTINAL ENDOSCOPY  8/10/24    WRIST FRACTURE SURGERY      WRIST FRACTURE SURGERY Left       General Information        Row Name 06/22/25 0910          OT Time and Intention    Document Type evaluation  -ES     Mode of Treatment individual therapy;occupational therapy  -ES     Patient Effort adequate  -ES       Row Name 06/22/25 0910          General Information    Patient Profile Reviewed yes  -ES     Prior Level of Function independent:;ADL's;all household mobility  patient independent with ADLs at baseline. RW for mobility. Standing ADL engagement. No home O2 use. Denies recent falls.  -ES     Existing Precautions/Restrictions right;shoulder;non-weight bearing  No pushing, no pulling, no lifting, no weight bearing with right upper extremity. OK for PROM forward flexion and external rotation per orders.  -ES       Row Name 06/22/25 0910          Living Environment    Current Living Arrangements home  -ES     People in Home significant other  -ES       Row Name 06/22/25 0910          Home Main Entrance    Number of Stairs, Main Entrance three  -ES       Row Name 06/22/25 0910          Cognition    Orientation Status (Cognition) oriented x 3  -ES       Row Name 06/22/25 0910          Safety Issues/Impairments Affecting Functional Mobility    Impairments Affecting Function (Mobility) pain;endurance/activity tolerance;strength;balance  -ES               User Key  (r) = Recorded By, (t) = Taken By, (c) = Cosigned By      Initials Name Provider Type    ES Argenis Temple, OTR/L, CSRS Occupational Therapist                     Mobility/ADL's       Row Name 06/22/25 0912          Bed Mobility    Bed Mobility supine-sit;sit-supine  -ES     Supine-Sit [place] (Bed Mobility) minimum assist (75% patient effort);1 person assist;verbal cues  -ES     Sit-Supine [place] (Bed Mobility) minimum assist (75% patient effort);1 person assist;verbal cues  -ES     Comment, (Bed Mobility) verbal cueing provided for body mechanics with bed mobility for protection of RUE with mobility. Patient attempts to push and pull with RUE despite OT  education prior to transfers.  -ES       Row Name 06/22/25 0912          Transfers    Transfers sit-stand transfer;toilet transfer  -ES     Comment, (Transfers) education provided for RUE placement and NWB RUE prior to transfers. Despite education and cueing, patient attempts to push up with RUE with all transfers  -ES       Row Name 06/22/25 0912          Sit-Stand Transfer    Sit-Stand [place] (Transfers) minimum assist (75% patient effort);1 person assist  -ES     Assistive Device (Sit-Stand Transfers) walker, front-wheeled  -ES       Row Name 06/22/25 0912          Toilet Transfer    Type (Toilet Transfer) sit-stand;stand-sit  -ES     Becker Level (Toilet Transfer) minimum assist (75% patient effort);1 person assist  -ES     Assistive Device (Toilet Transfer) walker, front-wheeled  -ES     Comment, (Toilet Transfer) from standard height commode  -ES       Row Name 06/22/25 0912          Functional Mobility    Functional Mobility- Ind. Level minimum assist (75% patient effort);1 person  -ES     Functional Mobility- Device walker, front-wheeled  -ES     Functional Mobility- Comment patient performs functional mobility to/from bathroom, min A with use of RW.  -ES       Row Name 06/22/25 0912          Activities of Daily Living    BADL Assessment/Intervention toileting  -ES       Row Name 06/22/25 0912          Mobility    Extremity Weight-bearing Status right upper extremity  -ES     Right Upper Extremity (Weight-bearing Status) non weight-bearing (NWB)  -ES       Row Name 06/22/25 0912          Toileting Assessment/Training    Becker Level (Toileting) toileting skills;adjust/manage clothing;perform perineal hygiene;minimum assist (75% patient effort)  -ES     Position (Toileting) unsupported sitting;supported standing  -ES     Comment, (Toileting) min A provided with LB donning and doffing of underwear  -ES               User Key  (r) = Recorded By, (t) = Taken By, (c) = Cosigned By      Initials  Name Provider Type    Argenis Carolina, OTR/L, CSRS Occupational Therapist                   Obj/Interventions       Row Name 06/22/25 0916          Range of Motion Comprehensive    Comment, General Range of Motion RUE with limited AROM secondary to RTSA x2 weeks prior. generalized weakness noted.  -ES       Row Name 06/22/25 0916          Shoulder (Therapeutic Exercise)    Shoulder (Therapeutic Exercise) pendulum exercises  -ES     Shoulder Pendulum Exercises (Therapeutic Exercise) right  provided continued education and training on pendulum exercises and requirement for completion 3-4x/day along with R PROM shoulder flexion and external rotation as tolerated. Patient voiced understanding.  -ES       Row Name 06/22/25 0916          Motor Skills    Motor Skills functional endurance  -ES     Therapeutic Exercise shoulder  -ES       Row Name 06/22/25 0916          Balance    Balance Assessment sitting dynamic balance;standing dynamic balance  -ES     Dynamic Sitting Balance standby assist  -ES     Position, Sitting Balance unsupported;sitting edge of bed  -ES     Dynamic Standing Balance minimal assist  -ES     Position/Device Used, Standing Balance supported;walker, front-wheeled  -ES               User Key  (r) = Recorded By, (t) = Taken By, (c) = Cosigned By      Initials Name Provider Type    ES Argenis Temple, OTR/L, CSRS Occupational Therapist                   Goals/Plan       Row Name 06/22/25 0920          Bed Mobility Goal 1 (OT)    Activity/Assistive Device (Bed Mobility Goal 1, OT) bed mobility activities, all  -ES     [place] Level/Cues Needed (Bed Mobility Goal 1, OT) modified independence  -ES     Time Frame (Bed Mobility Goal 1, OT) short term goal (STG);2 weeks  -ES     Progress/Outcomes (Bed Mobility Goal 1, OT) new goal  -ES       Row Name 06/22/25 0920          Transfer Goal 1 (OT)    Activity/Assistive Device (Transfer Goal 1, OT) transfers, all  -ES     [place] Level/Cues Needed  (Transfer Goal 1, OT) modified independence  -ES     Time Frame (Transfer Goal 1, OT) short term goal (STG);2 weeks  -ES     Progress/Outcome (Transfer Goal 1, OT) new goal  -ES       Row Name 06/22/25 0920          Dressing Goal 1 (OT)    Activity/Device (Dressing Goal 1, OT) dressing skills, all  -ES     [place]/Cues Needed (Dressing Goal 1, OT) modified independence  -ES     Time Frame (Dressing Goal 1, OT) short term goal (STG);2 weeks  -ES     Progress/Outcome (Dressing Goal 1, OT) new goal  -ES       Row Name 06/22/25 0920          Toileting Goal 1 (OT)    Activity/Device (Toileting Goal 1, OT) toileting skills, all  -ES     [place] Level/Cues Needed (Toileting Goal 1, OT) modified independence  -ES     Time Frame (Toileting Goal 1, OT) short term goal (STG);2 weeks  -ES     Progress/Outcome (Toileting Goal 1, OT) new goal  -ES       Row Name 06/22/25 0920          Grooming Goal 1 (OT)    Activity/Device (Grooming Goal 1, OT) grooming skills, all  -ES     [place] (Grooming Goal 1, OT) independent  -ES     Time Frame (Grooming Goal 1, OT) short term goal (STG);2 weeks  -ES     Progress/Outcome (Grooming Goal 1, OT) new goal  -ES       Row Name 06/22/25 0920          Problem Specific Goal 1 (OT)    Problem Specific Goal 1 (OT) Patient will accurately perform HEP exercises with proper technique and within shoulder precautions during supervised sessions, and report independent completion for ensured understanding at time of discharge  -ES     Time Frame (Problem Specific Goal 1, OT) short term goal (STG);2 weeks  -ES     Progress/Outcome (Problem Specific Goal 1, OT) new goal  -ES       Row Name 06/22/25 0920          Therapy Assessment/Plan (OT)    Planned Therapy Interventions (OT) activity tolerance training;BADL retraining;functional balance retraining;occupation/activity based interventions;patient/caregiver education/training;ROM/therapeutic exercise;strengthening exercise;transfer/mobility  "retraining  -ES               User Key  (r) = Recorded By, (t) = Taken By, (c) = Cosigned By      Initials Name Provider Type    Argenis Carolina, OTR/L, CSRS Occupational Therapist                   Clinical Impression       Row Name 06/22/25 0917          Pain Assessment    Pretreatment Pain Rating 0/10 - no pain  -ES     Posttreatment Pain Rating 0/10 - no pain  -ES       Row Name 06/22/25 0917          Plan of Care Review    Plan of Care Reviewed With patient  -ES     Outcome Evaluation Patient is 64 year old female presenting to [facility] 6/20/2025 with reports of low blood pressure, L shoulder pain following recent RTSA 6/5/2025. At baseline, patient reports she is independent with ADLs, uses a rolling walker for mobility, and lives with her significant other who works 3 days a week. Patient endorses noncompliance with shoulder precautions and states \"she has overdone it\" in regards to her R shoulder with recent replacement. OT provided max education and training on shoulder precautions, pendulums and PROM exercising, and requirement for R shoulder protection to facilitate optiomal healing. Patient would benefit from skilled Occupational Therapy services to enhance safety and facilitate a return to prior level of independence. Home with HH is recommended upon discharge from the hospital setting as continued rehabilitation is necessary to address functional deficits and support optimal recovery.  -ES       Row Name 06/22/25 0917          Therapy Assessment/Plan (OT)    Rehab Potential (OT) good  -ES     Criteria for Skilled Therapeutic Interventions Met (OT) yes;meets criteria;skilled treatment is necessary  -ES     Therapy Frequency (OT) 5 times/wk  -ES       Row Name 06/22/25 0917          Therapy Plan Review/Discharge Plan (OT)    Anticipated Discharge Disposition (OT) home with home health  -ES       Row Name 06/22/25 0917          Positioning and Restraints    Pre-Treatment Position in bed  " -ES     Post Treatment Position bed  -ES     In Bed fowlers;call light within reach;encouraged to call for assist  attempted to encourage patient to sit OOB in chair, however patient declined.  -ES               User Key  (r) = Recorded By, (t) = Taken By, (c) = Cosigned By      Initials Name Provider Type    Argenis Carolina, PATRICAR/L, MJ Occupational Therapist                   Outcome Measures       Row Name 06/22/25 0921          How much help from another is currently needed...    Putting on and taking off regular lower body clothing? 2  -ES     Bathing (including washing, rinsing, and drying) 2  -ES     Toileting (which includes using toilet bed pan or urinal) 2  -ES     Putting on and taking off regular upper body clothing 3  -ES     Taking care of personal grooming (such as brushing teeth) 3  -ES     Eating meals 4  -ES     AM-PAC 6 Clicks Score (OT) 16  -ES       Row Name 06/22/25 0921          Modified Carver Scale    Modified Ev Scale 0 - No Symptoms at all.  -ES       Row Name 06/22/25 0921          Functional Assessment    Outcome Measure Options AM-PAC 6 Clicks Daily Activity (OT);Modified Carver  -ES               User Key  (r) = Recorded By, (t) = Taken By, (c) = Cosigned By      Initials Name Provider Type    Argenis Carolina OTR/L, MJ Occupational Therapist                      OT Recommendation and Plan  Planned Therapy Interventions (OT): activity tolerance training, BADL retraining, functional balance retraining, occupation/activity based interventions, patient/caregiver education/training, ROM/therapeutic exercise, strengthening exercise, transfer/mobility retraining  Therapy Frequency (OT): 5 times/wk  Plan of Care Review  Plan of Care Reviewed With: patient  Outcome Evaluation: Patient is 64 year old female presenting to [facility] 6/20/2025 with reports of low blood pressure, L shoulder pain following recent RTSA 6/5/2025. At baseline, patient reports she is independent  "with ADLs, uses a rolling walker for mobility, and lives with her significant other who works 3 days a week. Patient endorses noncompliance with shoulder precautions and states \"she has overdone it\" in regards to her R shoulder with recent replacement. OT provided max education and training on shoulder precautions, pendulums and PROM exercising, and requirement for R shoulder protection to facilitate optiomal healing. Patient would benefit from skilled Occupational Therapy services to enhance safety and facilitate a return to prior level of independence. Home with HH is recommended upon discharge from the hospital setting as continued rehabilitation is necessary to address functional deficits and support optimal recovery.     Time Calculation:   Evaluation Complexity (OT)  Review Occupational Profile/Medical/Therapy History Complexity: expanded/moderate complexity  Assessment, Occupational Performance/Identification of Deficit Complexity: 3-5 performance deficits  Clinical Decision Making Complexity (OT): detailed assessment/moderate complexity  Overall Complexity of Evaluation (OT): moderate complexity     Time Calculation- OT       Row Name 06/22/25 0921             Time Calculation- OT    OT Start Time 0833  -ES      OT Stop Time 0906  -ES      OT Time Calculation (min) 33 min  -ES      Total Timed Code Minutes- OT 25 minute(s)  -ES      OT Received On 06/22/25  -ES      OT - Next Appointment 06/23/25  -ES      OT Goal Re-Cert Due Date 07/06/25  -ES         Timed Charges    82681 - OT Therapeutic Exercise Minutes 10  -ES      31635 - OT Self Care/Mgmt Minutes 15  -ES         Untimed Charges    OT Eval/Re-eval Minutes 8  -ES         Total Minutes    Timed Charges Total Minutes 25  -ES      Untimed Charges Total Minutes 8  -ES       Total Minutes 33  -ES                User Key  (r) = Recorded By, (t) = Taken By, (c) = Cosigned By      Initials Name Provider Type    Argenis Carolina, OTR/L, CSRS Occupational " Therapist                  Therapy Charges for Today       Code Description Service Date Service Provider Modifiers Qty    [id number]  OT SELF CARE/MGMT/TRAIN EA 15 MIN 6/22/2025 Argenis Temple, OTR/L, CSRS GO 1    [id number]  OT THER PROC EA 15 MIN 6/22/2025 Argenis Temple, OTR/L, CSRS GO 1    [id number]  OT EVAL MOD COMPLEXITY 2 6/22/2025 Argenis Temple, OTR/L, CSRS GO 1                 Argenis Temple OTR/L, CSRS  6/22/2025

## 2025-06-22 NOTE — PLAN OF CARE
"Goal Outcome Evaluation:  Plan of Care Reviewed With: patient           Outcome Evaluation: Patient is 64 year old female presenting to [facility] 6/20/2025 with reports of low blood pressure, L shoulder pain following recent RTSA 6/5/2025. At baseline, patient reports she is independent with ADLs, uses a rolling walker for mobility, and lives with her significant other who works 3 days a week. Patient endorses noncompliance with shoulder precautions and states \"she has overdone it\" in regards to her R shoulder with recent replacement. OT provided max education and training on shoulder precautions, pendulums and PROM exercising, and requirement for R shoulder protection to facilitate optiomal healing. Patient would benefit from skilled Occupational Therapy services to enhance safety and facilitate a return to prior level of independence. Home with HH is recommended upon discharge from the hospital setting as continued rehabilitation is necessary to address functional deficits and support optimal recovery.    Anticipated Discharge Disposition (OT): home with home health                        "

## 2025-06-23 LAB
ABO GROUP BLD: NORMAL
ALBUMIN SERPL-MCNC: 2.8 G/DL (ref 3.5–5.2)
ALBUMIN/GLOB SERPL: 1.1 G/DL
ALP SERPL-CCNC: 163 U/L (ref 39–117)
ALT SERPL W P-5'-P-CCNC: 10 U/L (ref 1–33)
ANION GAP SERPL CALCULATED.3IONS-SCNC: 9.5 MMOL/L (ref 5–15)
AST SERPL-CCNC: 46 U/L (ref 1–32)
BASOPHILS # BLD AUTO: 0.03 10*3/MM3 (ref 0–0.2)
BASOPHILS NFR BLD AUTO: 0.4 % (ref 0–1.5)
BILIRUB SERPL-MCNC: 1.1 MG/DL (ref 0–1.2)
BLD GP AB SCN SERPL QL: NEGATIVE
BUN SERPL-MCNC: 28 MG/DL (ref 8–23)
BUN/CREAT SERPL: 20.3 (ref 7–25)
CALCIUM SPEC-SCNC: 9.1 MG/DL (ref 8.6–10.5)
CHLORIDE SERPL-SCNC: 103 MMOL/L (ref 98–107)
CO2 SERPL-SCNC: 28.5 MMOL/L (ref 22–29)
CREAT SERPL-MCNC: 1.38 MG/DL (ref 0.57–1)
DEPRECATED RDW RBC AUTO: 47.8 FL (ref 37–54)
EGFRCR SERPLBLD CKD-EPI 2021: 42.8 ML/MIN/1.73
EOSINOPHIL # BLD AUTO: 0.18 10*3/MM3 (ref 0–0.4)
EOSINOPHIL NFR BLD AUTO: 2.4 % (ref 0.3–6.2)
ERYTHROCYTE [DISTWIDTH] IN BLOOD BY AUTOMATED COUNT: 14.4 % (ref 12.3–15.4)
GLOBULIN UR ELPH-MCNC: 2.6 GM/DL
GLUCOSE SERPL-MCNC: 99 MG/DL (ref 65–99)
HCT VFR BLD AUTO: 21.7 % (ref 34–46.6)
HCT VFR BLD AUTO: 22 % (ref 34–46.6)
HEMOCCULT STL QL: POSITIVE
HGB BLD-MCNC: 6.9 G/DL (ref 12–15.9)
HGB BLD-MCNC: 7 G/DL (ref 12–15.9)
IMM GRANULOCYTES # BLD AUTO: 0.03 10*3/MM3 (ref 0–0.05)
IMM GRANULOCYTES NFR BLD AUTO: 0.4 % (ref 0–0.5)
LYMPHOCYTES # BLD AUTO: 1.32 10*3/MM3 (ref 0.7–3.1)
LYMPHOCYTES NFR BLD AUTO: 17.5 % (ref 19.6–45.3)
MCH RBC QN AUTO: 29 PG (ref 26.6–33)
MCHC RBC AUTO-ENTMCNC: 31.8 G/DL (ref 31.5–35.7)
MCV RBC AUTO: 91.3 FL (ref 79–97)
MONOCYTES # BLD AUTO: 0.84 10*3/MM3 (ref 0.1–0.9)
MONOCYTES NFR BLD AUTO: 11.2 % (ref 5–12)
NEUTROPHILS NFR BLD AUTO: 5.13 10*3/MM3 (ref 1.7–7)
NEUTROPHILS NFR BLD AUTO: 68.1 % (ref 42.7–76)
NRBC BLD AUTO-RTO: 0 /100 WBC (ref 0–0.2)
PHOSPHATE SERPL-MCNC: 3.2 MG/DL (ref 2.5–4.5)
PLATELET # BLD AUTO: 97 10*3/MM3 (ref 140–450)
PMV BLD AUTO: 10.5 FL (ref 6–12)
POTASSIUM SERPL-SCNC: 4.2 MMOL/L (ref 3.5–5.2)
PROT SERPL-MCNC: 5.4 G/DL (ref 6–8.5)
RBC # BLD AUTO: 2.41 10*6/MM3 (ref 3.77–5.28)
RH BLD: POSITIVE
SODIUM SERPL-SCNC: 141 MMOL/L (ref 136–145)
T&S EXPIRATION DATE: NORMAL
WBC NRBC COR # BLD AUTO: 7.53 10*3/MM3 (ref 3.4–10.8)

## 2025-06-23 PROCEDURE — 85025 COMPLETE CBC W/AUTO DIFF WBC: CPT | Performed by: STUDENT IN AN ORGANIZED HEALTH CARE EDUCATION/TRAINING PROGRAM

## 2025-06-23 PROCEDURE — 94799 UNLISTED PULMONARY SVC/PX: CPT

## 2025-06-23 PROCEDURE — 36430 TRANSFUSION BLD/BLD COMPNT: CPT

## 2025-06-23 PROCEDURE — 86900 BLOOD TYPING SEROLOGIC ABO: CPT

## 2025-06-23 PROCEDURE — 85014 HEMATOCRIT: CPT | Performed by: STUDENT IN AN ORGANIZED HEALTH CARE EDUCATION/TRAINING PROGRAM

## 2025-06-23 PROCEDURE — 94761 N-INVAS EAR/PLS OXIMETRY MLT: CPT

## 2025-06-23 PROCEDURE — 97530 THERAPEUTIC ACTIVITIES: CPT

## 2025-06-23 PROCEDURE — 85018 HEMOGLOBIN: CPT | Performed by: STUDENT IN AN ORGANIZED HEALTH CARE EDUCATION/TRAINING PROGRAM

## 2025-06-23 PROCEDURE — 25010000002 CEFAZOLIN PER 500 MG: Performed by: STUDENT IN AN ORGANIZED HEALTH CARE EDUCATION/TRAINING PROGRAM

## 2025-06-23 PROCEDURE — 80053 COMPREHEN METABOLIC PANEL: CPT | Performed by: STUDENT IN AN ORGANIZED HEALTH CARE EDUCATION/TRAINING PROGRAM

## 2025-06-23 PROCEDURE — 86901 BLOOD TYPING SEROLOGIC RH(D): CPT | Performed by: STUDENT IN AN ORGANIZED HEALTH CARE EDUCATION/TRAINING PROGRAM

## 2025-06-23 PROCEDURE — P9016 RBC LEUKOCYTES REDUCED: HCPCS

## 2025-06-23 PROCEDURE — 82272 OCCULT BLD FECES 1-3 TESTS: CPT

## 2025-06-23 PROCEDURE — 84100 ASSAY OF PHOSPHORUS: CPT | Performed by: INTERNAL MEDICINE

## 2025-06-23 PROCEDURE — 86900 BLOOD TYPING SEROLOGIC ABO: CPT | Performed by: STUDENT IN AN ORGANIZED HEALTH CARE EDUCATION/TRAINING PROGRAM

## 2025-06-23 PROCEDURE — 86850 RBC ANTIBODY SCREEN: CPT | Performed by: STUDENT IN AN ORGANIZED HEALTH CARE EDUCATION/TRAINING PROGRAM

## 2025-06-23 PROCEDURE — 99221 1ST HOSP IP/OBS SF/LOW 40: CPT | Performed by: INTERNAL MEDICINE

## 2025-06-23 PROCEDURE — 86923 COMPATIBILITY TEST ELECTRIC: CPT

## 2025-06-23 PROCEDURE — 94664 DEMO&/EVAL PT USE INHALER: CPT

## 2025-06-23 PROCEDURE — 97110 THERAPEUTIC EXERCISES: CPT

## 2025-06-23 RX ORDER — TORSEMIDE 20 MG/1
40 TABLET ORAL DAILY
Status: DISCONTINUED | OUTPATIENT
Start: 2025-06-24 | End: 2025-06-27

## 2025-06-23 RX ADMIN — CEFAZOLIN 2000 MG: 2 INJECTION, POWDER, FOR SOLUTION INTRAMUSCULAR; INTRAVENOUS at 11:18

## 2025-06-23 RX ADMIN — IPRATROPIUM BROMIDE AND ALBUTEROL SULFATE 3 ML: .5; 3 SOLUTION RESPIRATORY (INHALATION) at 08:01

## 2025-06-23 RX ADMIN — ROPINIROLE 1 MG: 1 TABLET, FILM COATED ORAL at 22:25

## 2025-06-23 RX ADMIN — IPRATROPIUM BROMIDE AND ALBUTEROL SULFATE 3 ML: .5; 3 SOLUTION RESPIRATORY (INHALATION) at 15:06

## 2025-06-23 RX ADMIN — OXYCODONE HYDROCHLORIDE AND ACETAMINOPHEN 1 TABLET: 10; 325 TABLET ORAL at 02:49

## 2025-06-23 RX ADMIN — ALUMINUM HYDROXIDE, MAGNESIUM HYDROXIDE, AND DIMETHICONE 15 ML: 400; 400; 40 SUSPENSION ORAL at 01:12

## 2025-06-23 RX ADMIN — DOCUSATE SODIUM 100 MG: 100 CAPSULE, LIQUID FILLED ORAL at 22:25

## 2025-06-23 RX ADMIN — CEFAZOLIN 2000 MG: 2 INJECTION, POWDER, FOR SOLUTION INTRAMUSCULAR; INTRAVENOUS at 17:22

## 2025-06-23 RX ADMIN — OXYCODONE HYDROCHLORIDE AND ACETAMINOPHEN 1 TABLET: 10; 325 TABLET ORAL at 15:02

## 2025-06-23 RX ADMIN — MIDODRINE HYDROCHLORIDE 5 MG: 5 TABLET ORAL at 11:18

## 2025-06-23 RX ADMIN — IPRATROPIUM BROMIDE AND ALBUTEROL SULFATE 3 ML: .5; 3 SOLUTION RESPIRATORY (INHALATION) at 18:46

## 2025-06-23 RX ADMIN — SENNOSIDES AND DOCUSATE SODIUM 2 TABLET: 50; 8.6 TABLET ORAL at 08:45

## 2025-06-23 RX ADMIN — DOCUSATE SODIUM 100 MG: 100 CAPSULE, LIQUID FILLED ORAL at 08:45

## 2025-06-23 RX ADMIN — MIDODRINE HYDROCHLORIDE 5 MG: 5 TABLET ORAL at 05:00

## 2025-06-23 RX ADMIN — OXYCODONE HYDROCHLORIDE AND ACETAMINOPHEN 1 TABLET: 10; 325 TABLET ORAL at 23:10

## 2025-06-23 RX ADMIN — FLUTICASONE PROPIONATE 1 SPRAY: 50 SPRAY, METERED NASAL at 08:46

## 2025-06-23 RX ADMIN — OXYCODONE HYDROCHLORIDE AND ACETAMINOPHEN 1 TABLET: 10; 325 TABLET ORAL at 18:59

## 2025-06-23 RX ADMIN — PREGABALIN 25 MG: 25 CAPSULE ORAL at 08:45

## 2025-06-23 RX ADMIN — MIDODRINE HYDROCHLORIDE 5 MG: 5 TABLET ORAL at 17:22

## 2025-06-23 RX ADMIN — PANTOPRAZOLE SODIUM 40 MG: 40 TABLET, DELAYED RELEASE ORAL at 05:00

## 2025-06-23 RX ADMIN — Medication 2.5 MG: at 22:25

## 2025-06-23 RX ADMIN — CEFAZOLIN 2000 MG: 2 INJECTION, POWDER, FOR SOLUTION INTRAMUSCULAR; INTRAVENOUS at 01:13

## 2025-06-23 RX ADMIN — IPRATROPIUM BROMIDE AND ALBUTEROL SULFATE 3 ML: .5; 3 SOLUTION RESPIRATORY (INHALATION) at 11:09

## 2025-06-23 RX ADMIN — OXYCODONE HYDROCHLORIDE AND ACETAMINOPHEN 1 TABLET: 10; 325 TABLET ORAL at 11:18

## 2025-06-23 RX ADMIN — OXYCODONE HYDROCHLORIDE AND ACETAMINOPHEN 1 TABLET: 10; 325 TABLET ORAL at 06:41

## 2025-06-23 RX ADMIN — POLYETHYLENE GLYCOL 3350 17 G: 17 POWDER, FOR SOLUTION ORAL at 08:45

## 2025-06-23 RX ADMIN — PREGABALIN 25 MG: 25 CAPSULE ORAL at 22:25

## 2025-06-23 NOTE — CASE MANAGEMENT/SOCIAL WORK
Continued Stay Note  [facility]     Patient Name: Filomena Liu  MRN: [id number]  Today's Date: 6/23/2025    Admit Date: 6/20/2025    Plan: Plan home with XIANG Witt RN   Discharge Plan       Row Name 06/23/25 1423       Plan    Plan Plan home with XIANG Witt RN    Patient/Family in Agreement with Plan yes    Plan Comments FACE SHEET VERIFIED/ IM LETTER SIGNED.  Spoke with pt at bedside.  Pt's PCP is Dr. Fernando Dunn.  Pt lives with her Significant Other ( Praveen Flores [phone]) in a single story house. Pt is independent with ADLs.  Pt has a nebulizer, rolator and shower chair for home use if needed. Pt gets her prescriptions at [facility] and [facility].  Pt denies any issues affording medications. [facility] is [facility] with [facility]  ( [phone]) called to verify.  Pt has not been in SNF.  Pt's significant other will assist pt at home if needed and will transport her home.  Plan home with XIANG Witt RN                   Discharge Codes    No documentation.                       Jessie Witt RN

## 2025-06-23 NOTE — PLAN OF CARE
Goal Outcome Evaluation:  Plan of Care Reviewed With: patient        Progress: no change  Outcome Evaluation: Pt was participatory in OT services but appears noncompliant with recommendations, attempting to push through R hand to move tray table out of the way upon entering room requiring OT's cues and education for safety. Pt was able to demo some teachback methods to sit up without pushing through R hand but still appears to be WB'ing with some aspects of bed mobiltiy requiring cues for safety. pt also verb'd not having sling and not wanting to use it. Both OT and RN education on sling use and fall safety that Pt should not be up without sling other than performing sh Pendulums with assist from OT. Pt was unable to tolerate pendulums today, crying and laying back down after mult cues and trials. She did tolerate supine PROM sh FF today. Continue OT POC.

## 2025-06-23 NOTE — PROGRESS NOTES
Name: Filomena Liu ADMIT: 2025   : 1960  PCP: Fernando Dunn MD    MRN: [id number] LOS: 3 days   AGE/SEX: 64 y.o. female  ROOM: [facility]     Subjective   Subjective   Patient seen and examined this morning. Resting in bed, she feels like right lower extremity erythema are slightly improved from prior.       Objective   Objective   Vital Signs  Temp:  [97.3 °F (36.3 °C)-98.4 °F (36.9 °C)] 97.3 °F (36.3 °C)  Heart Rate:  [] 89  Resp:  [18] 18  BP: ()/(53-71) 108/64  SpO2:  [92 %-100 %] 92 %  on   ;   Device (Oxygen Therapy): room air  Body mass index is 38.38 kg/m².  Physical Exam  Vitals and nursing note reviewed.   Constitutional:       General: She is not in acute distress.     Appearance: She is ill-appearing.   Cardiovascular:      Rate and Rhythm: Normal rate.   Pulmonary:      Effort: Pulmonary effort is normal. No respiratory distress.      Breath sounds: No wheezing or rhonchi.   Abdominal:      General: There is no distension.      Palpations: Abdomen is soft.      Tenderness: There is no abdominal tenderness.      Comments: Bruising noted on abdomen   Musculoskeletal:      Right lower leg: Edema present.      Left lower leg: Edema present.   Skin:     General: Skin is warm and dry.      Findings: Bruising and erythema present.   Neurological:      Mental Status: She is alert.   Psychiatric:         Mood and Affect: Mood is anxious.       Results Review     I reviewed the patient's new clinical results.  Results from last 7 days   Lab Units 25  0457 25  0454 25  0407 25  1443   WBC 10*3/mm3 7.53 7.78 7.19 9.84   HEMOGLOBIN g/dL 7.0* 7.5* 7.6* 8.2*   PLATELETS 10*3/mm3 97* 110* 109* 127*     Results from last 7 days   Lab Units 25  0457 25  1538 25  0454 25  1934 25  0407   SODIUM mmol/L 141 140 140  --  141   POTASSIUM mmol/L 4.2 4.3 4.5 4.7 3.0*   CHLORIDE mmol/L 103 101 100  --  98   CO2 mmol/L 28.5 29.6* 31.0*  --   32.0*   BUN mg/dL 28.0* 30.0* 30.0*  --  28.0*   CREATININE mg/dL 1.38* 1.31* 1.65*  --  1.50*   GLUCOSE mg/dL 99 133* 103*  --  133*   EGFR mL/min/1.73 42.8* 45.6* 34.6*  --  38.8*     Results from last 7 days   Lab Units 06/23/25  0457 06/22/25  1538 06/22/25  0454 06/21/25 0407 06/20/25  1443   ALBUMIN g/dL 2.8* 2.9* 3.2* 2.6* 3.2*   BILIRUBIN mg/dL 1.1  --  1.2  --  1.8*   ALK PHOS U/L 163*  --  185*  --  196*   AST (SGOT) U/L 46*  --  44*  --  50*   ALT (SGPT) U/L 10  --  13  --  21     Results from last 7 days   Lab Units 06/23/25 0457 06/22/25  1538 06/22/25  0454 06/21/25 0407 06/20/25  1443   CALCIUM mg/dL 9.1 8.9 9.0 8.7 9.3   ALBUMIN g/dL 2.8* 2.9* 3.2* 2.6* 3.2*   MAGNESIUM mg/dL  --   --  2.5*  --  3.3*   PHOSPHORUS mg/dL 3.2 2.8  2.8 1.9* 2.5  --        Hemoglobin A1C   Date/Time Value Ref Range Status   06/22/2025 0454 4.70 (L) 4.80 - 5.60 % Corrected     Comment:     Corrected result. Previous result was <4.20 % on 6/22/2025 at 0646 EDT.       No radiology results for the last day    I have personally reviewed all medications:  Scheduled Medications  [Held by provider] atenolol, 25 mg, Oral, Nightly  ceFAZolin, 2,000 mg, Intravenous, Q8H  [START ON 6/26/2025] cholecalciferol, 1,000 Units, Oral, Weekly  docusate sodium, 100 mg, Oral, BID  fluticasone, 1 spray, Nasal, Daily  ipratropium-albuterol, 3 mL, Nebulization, 4x Daily - RT  melatonin, 2.5 mg, Oral, Nightly  midodrine, 5 mg, Oral, TID AC  pantoprazole, 40 mg, Oral, QAM AC  senna-docusate sodium, 2 tablet, Oral, BID   And  polyethylene glycol, 17 g, Oral, Daily  pregabalin, 25 mg, Oral, Q12H  rOPINIRole, 1 mg, Oral, Nightly  [Held by provider] spironolactone, 25 mg, Oral, BID    Infusions  Pharmacy To Dose:,     Diet  Diet: Cardiac, Fluid Restriction (240 mL/tray); Healthy Heart (2-3 Na+); Other (Specify mL/day) (1200); Fluid Consistency: Thin (IDDSI 0)    I have personally reviewed:  [x]  Laboratory   []  Microbiology   [x]  Radiology   []   EKG/Telemetry  [x]  Cardiology/Vascular Echo from 08/2024 with EF 63.4%, indeterminate LV diastolic function.  []  Pathology    []  Records       Assessment/Plan     Active Hospital Problems    Diagnosis  POA    **Hypokalemia [E87.6]  Yes    Transaminitis [R74.01]  Yes    CKD (chronic kidney disease) stage 3, GFR 30-59 ml/min [N18.30]  Yes    Hypotension [I95.9]  Yes    Thrombocytopenia [D69.6]  Yes    Iron deficiency anemia [D50.9]  Yes    Alcoholic cirrhosis of liver without ascites [K70.30]  Yes    Gastroesophageal reflux disease [K21.9]  Yes    HTN (hypertension) [I10]  Yes      Resolved Hospital Problems   No resolved problems to display.       64 y.o. female admitted with Hypokalemia.    SANDRO on CKD stage 3  - Creatinine 1.94 on arrival, value previously 1.02 on 05/23/25. Baseline values appear to fluctuate but average over past 6 lab draws prior to arrival ~1.3.   - Diuretic held on arrival, Nephrology consulted and following. Will continue to follow their plans/recommendations. Greatly appreciate their help.  - Repeat labs in AM.    Right lower extremity cellulitis  - RLE warm, erythematous and tender to palpation. She is on Cefazolin, tolerating well. Will continue this as prescribed for now. RLE negative for DVT.    Hypokalemia  Hypophosphatemia  - Noted previously, repleted and improved. Continue monitoring.    History of HTN complicated by hypotension  - BP soft. Diuretic/Aldactone being held. She is on Midodrine, continue as prescribed. Trend BP.    Cirrhosis  Transaminitis  - LFT values elevated, slightly improved today from prior. Abdominal exam benign, trend levels for now. Repeat CMP in AM.    Anemia  Thrombocytopenia  - Noted on labs, she does have bruising on her abdomen and lower back, this area has been marked to allow for close monitoring, appears to be decreasing in size. She has had recent GI workup.  - Hemoglobin down to 7, repeat ordered <7, transfuse PRBC now. GI consult.  - Order repeat  CBC in AM for reassessment. Transfuse for hemoglobin <7.    Right shoulder osteoarthritis  - With associated rotator cuff tendinopathy; S/P Right reverse total shoulder arthroplasty and Tenodesis of long head of biceps tendon with Dr. Lanier on 06/05/25. She is having some discomfort in her shoulders, XR obtained and reviewed, orthopedic surgery consulted.    GERD  - Continue PPI as prescribed.     Hyperglycemia  - A1c <4.20%.      SCDs for DVT prophylaxis.  Full code.  Discussed with patient and nursing staff.  Anticipate discharge TBD timing yet to be determined.  Expected discharge date/ time has not been documented.      Mike Bunch DO  [facility]  06/23/25

## 2025-06-23 NOTE — PROGRESS NOTES
[facility] Progress Note      Patient Name: Filomena Liu  : 1960  MRN: [id number]  Primary Care Physician:  Fernando Dunn MD  Date of admission: 2025    Subjective     Interval History:   Follow-up hypokalemia, fluid overload, SANDRO, CKD 3B     Feels better today; urinating well; 1.5 L UOP yesterday  Back and right shoulder pain continue  No shortness of breath on room air    Review of Systems:   As noted above    Objective     Vitals:   Temp:  [97.3 °F (36.3 °C)-99.3 °F (37.4 °C)] 98.1 °F (36.7 °C)  Heart Rate:  [] 95  Resp:  [16-18] 16  BP: ()/(59-84) 134/84    Intake/Output Summary (Last 24 hours) at 2025 1851  Last data filed at 2025 0857  Gross per 24 hour   Intake 640 ml   Output 1400 ml   Net -760 ml       Physical Exam:    Constitutional: Awake, alert, NAD, chronically ill; ice pack under right elbow  HEENT: Sclera anicteric, no conjunctival injection  Neck: Supple, no carotid bruit, trachea at midline, no JVD  Respiratory: Crackles bilaterally, nonlabored on RA  Cardiovascular: RR, tachycardic  Gastrointestinal: BS +, soft, nontender, distended  : No palpable bladder  Musculoskeletal: +1-2 bilateral lower extremity edema to knee, +clubbing  Right shoulder with surgical dressing in place with rosalie-incisional ecchymosis and swelling.  Psychiatric: Appropriate affect, cooperative; oriented  Neurologic: moving all extremities, normal speech and mental status  Skin: Warm and dry.  Venous stasis changes both legs    Scheduled Meds:     [Held by provider] atenolol, 25 mg, Oral, Nightly  ceFAZolin, 2,000 mg, Intravenous, Q8H  [START ON 2025] cholecalciferol, 1,000 Units, Oral, Weekly  docusate sodium, 100 mg, Oral, BID  fluticasone, 1 spray, Nasal, Daily  ipratropium-albuterol, 3 mL, Nebulization, 4x Daily - RT  melatonin, 2.5 mg, Oral, Nightly  midodrine, 5 mg, Oral, TID AC  pantoprazole, 40 mg, Oral, QAM AC  senna-docusate sodium, 2  tablet, Oral, BID   And  polyethylene glycol, 17 g, Oral, Daily  pregabalin, 25 mg, Oral, Q12H  rOPINIRole, 1 mg, Oral, Nightly  [Held by provider] spironolactone, 25 mg, Oral, BID      IV Meds:   Pharmacy To Dose:,         Results Reviewed:   I have personally reviewed the results from the time of this admission to 6/23/2025 18:51 EDT     Results from last 7 days   Lab Units 06/23/25  0457 06/22/25  1538 06/22/25  0454 06/21/25 0407 06/20/25  1443   SODIUM mmol/L 141 140 140   < > 137   POTASSIUM mmol/L 4.2 4.3 4.5   < > 2.7*   CHLORIDE mmol/L 103 101 100   < > 92*   CO2 mmol/L 28.5 29.6* 31.0*   < > 32.9*   BUN mg/dL 28.0* 30.0* 30.0*   < > 31.0*   CREATININE mg/dL 1.38* 1.31* 1.65*   < > 1.94*   CALCIUM mg/dL 9.1 8.9 9.0   < > 9.3   BILIRUBIN mg/dL 1.1  --  1.2  --  1.8*   ALK PHOS U/L 163*  --  185*  --  196*   ALT (SGPT) U/L 10  --  13  --  21   AST (SGOT) U/L 46*  --  44*  --  50*   GLUCOSE mg/dL 99 133* 103*   < > 119*    < > = values in this interval not displayed.       Estimated Creatinine Clearance: 41.5 mL/min (A) (by C-G formula based on SCr of 1.38 mg/dL (H)).    Results from last 7 days   Lab Units 06/23/25  0457 06/22/25  1538 06/22/25  0454 06/21/25 0407 06/20/25  1443   MAGNESIUM mg/dL  --   --  2.5*  --  3.3*   PHOSPHORUS mg/dL 3.2 2.8  2.8 1.9*   < >  --     < > = values in this interval not displayed.             Results from last 7 days   Lab Units 06/23/25  1515 06/23/25  0457 06/22/25  0454 06/21/25 0407 06/20/25  1443   WBC 10*3/mm3  --  7.53 7.78 7.19 9.84   HEMOGLOBIN g/dL 6.9* 7.0* 7.5* 7.6* 8.2*   PLATELETS 10*3/mm3  --  97* 110* 109* 127*             Assessment / Plan     ASSESSMENT:  SANDRO on CKD 3B (baseline SCr 1.2-1.5), nonoliguric, stable.  FENa 0.6% suggestive of prerenal state with intravascular volume depletion from diuretics and infection.  On Bactrim prior to admission, and so element of SCR elevation probably was due to trimethoprim.  Volume excess with peripheral edema in  setting of low albumin and cirrhosis.  Electrolytes stable  Anemia: History of VIOLETA on iron supplementation.  Hemoglobin drifting downward; PRBCs to be given today.    Right shoulder pain s/p reverse total shoulder arthroplasty: Orthopedics following  Erythematous legs: suspect venous stasis.  Was started on Bactrim and then Keflex outpatient.  On cefazolin per primary.  Hypertension with CKD: Stable   Obesity: Complicates all aspects of care.  Severe back pain  Cirrhosis    PLAN:  Begin tomorrow torsemide 40 mg once daily  Continue fluid restriction 1200 mL/day  PRBCs today  GI evaluation noted; discussed with Dr. Chery      Thank you for involving us in the care of Filomena Liu.  Please feel free to call with any questions.    Chan Brewster MD  06/23/25  18:51 EDT    [facility]  [phone]    Parts of this note may be an electronic transcription/translation of spoken language to printed text using the Dragon dictation system.

## 2025-06-23 NOTE — TELEPHONE ENCOUNTER
Call returned to patient. ZAYDA with my direct line for her to call and reschedule her appointment upon discharge from the hospital.   Patient would like Gabapentin sent to [facility] order pharmacy

## 2025-06-23 NOTE — DISCHARGE PLACEMENT REQUEST
"Filomena Liu P \"PAT\" (64 y.o. Female)       Date of Birth   1960    Social Security Number       Address   [address] [place] [facility] [address]    Home Phone   [phone]    MRN   [id number]       Baptism   Anglican    Marital Status                               Admission Date   6/20/2025    Admission Type   Emergency    Admitting Provider   Belen Choudhury MD    Attending Provider   Mike Bunch DO    Department, Room/Bed   [address], [address]       Discharge Date       Discharge Disposition       Discharge Destination                                 Attending Provider: Mike Bunch DO    Allergies: Wound Dressing Adhesive    Isolation: None   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)   Code Status: CPR    Ht: 149.9 cm (59\")   Wt: 86.2 kg (190 lb 0.6 oz)    Admission Cmt: None   Principal Problem: Hypokalemia [E87.6]                   Active Insurance as of 6/20/2025       Primary Coverage       Payor Plan Insurance Group Employer/Plan Group    [facility]ly [facility] MEDICARE REPLACEMENT ANTHEM MEDICARE ADVANTAGE O [id number]       Payor Plan Address Payor Plan Phone Number Payor Plan Fax Number Effective Dates    [address] [phone]  1/1/2024 - None Entered    [facility] [address]         Subscriber Name Subscriber Birth Date Member ID       FILOMENA LIU ELGIN 1960 [id number]                     Emergency Contacts        (Rel.) Home Phone Work Phone Mobile Phone    MARTINEZ STEIN (Significant Other) [phone] -- [phone]                "

## 2025-06-23 NOTE — THERAPY TREATMENT NOTE
Patient Name: Filomena Liu  : 1960    MRN: [id number]                              Today's Date: 2025       Admit Date: 2025    Visit Dx:     ICD-10-CM ICD-9-CM   1. Pedal edema  R60.0 782.3   2. Acute renal insufficiency  N28.9 593.9   3. Acute hypokalemia  E87.6 276.8   4. Acute anemia  D64.9 285.9   5. Decreased activities of daily living (ADL)  Z78.9 V49.89     Patient Active Problem List   Diagnosis    Mediastinal mass    Cervical stenosis of spinal canal    Cervical radiculopathy    Cellulitis/abscess of left foot    HTN (hypertension)    History of MRSA infection    Anxiety    Peroneal tenosynovitis    Fracture of navicular bone of left foot    Tobacco use    Non compliance with medical treatment    Screening for colon cancer    Osteoporosis    Shoulder arthritis    Primary localized osteoarthrosis of left shoulder region    History of adenomatous polyp of colon    Diverticulosis    Acute GI bleeding    Hyperkalemia    SANDRO (acute kidney injury)    Alcoholism    COPD (chronic obstructive pulmonary disease)    Acute blood loss anemia    Melena    Gastroesophageal reflux disease    Dysphagia    Alcoholic cirrhosis of liver without ascites    Other iron deficiency anemias    Malabsorption of iron    Acute pancreatitis    Diverticulitis    Hypoglycemia    Lactic acidosis    Adverse effect of iron    Iron deficiency anemia    Cirrhosis of liver without ascites    Infrarenal abdominal aortic aneurysm (AAA) without rupture    Arthritis of shoulder    Hypokalemia    Transaminitis    CKD (chronic kidney disease) stage 3, GFR 30-59 ml/min    Hypotension    Thrombocytopenia     Past Medical History:   Diagnosis Date    Abdominal aneurysm     DR. JOYCE FOLLOWING 3.5CM    Alcoholism 1985    Anemia 24    HAS HAD IRON INFUSIONS ALMOST YEARLY    Anxiety     Arthritis of back     Asthma 2015    At high risk for falls     Basal cell carcinoma     Bruises easily     Chronic kidney disease 24     Cirrhosis 8/5/24    COPD (chronic obstructive pulmonary disease)     MANAGED BY PRIMARY CARE    CTS (carpal tunnel syndrome) Surgery 1999    DDD (degenerative disc disease), cervical     Depression     Diverticulitis of colon Unknown    Fatty liver 2022    GERD (gastroesophageal reflux disease)     GI (gastrointestinal bleed) 8/5/24    History of anemia     History of MRSA infection     LEFT ANKLE TREAT [facility]  5YEARS GO    Hyperlipidemia ?    Hypertension ?    Low back strain Unknown    Lower leg edema     Lumbosacral disc disease 2024    Neck strain 2000    Neuropathy     Pancreatitis 2024    Panic attacks     Scoliosis Unknown    Shoulder arthritis 06/05/2023    Shoulder pain, left 07/07/2023    Sleep apnea     NO MACHINE USE    Spinal stenosis     Spondylolisthesis of cervical region     Steatosis of liver     Tachycardia     Tendinitis of knee 2022    Thoracic disc disorder 2000    Vertigo      Past Surgical History:   Procedure Laterality Date    BACK SURGERY      cervical disc surgery with fusion-    CARPAL TUNNEL RELEASE Bilateral     CATARACT EXTRACTION      CHOLECYSTECTOMY  1/1/20    COLONOSCOPY      COLONOSCOPY N/A 11/12/2020    Procedure: COLONOSCOPY, polypectomy;  Surgeon: Filomena Mckeon MD;  Location: [facility];  Service: Gastroenterology;  Laterality: N/A;  Diverticulosis; Hepatic flexure polyp x 1; Polyp at 60cm x 1; Polyp at 50cm x 1- hot snare;    COLONOSCOPY N/A 04/15/2024    Procedure: COLONOSCOPY into sigmoid colon with hot snare polypectomy;  Surgeon: Leatha Johns MD;  Location: [facility] ENDOSCOPY;  Service: General;  Laterality: N/A;  pre- history of polyps  post- diverticulosis, polyp    COLONOSCOPY N/A 05/07/2025    Procedure: COLONOSCOPY to cecum and ti with hot snare polypectomies;  Surgeon: Ana Guerrero MD;  Location: [facility] ENDOSCOPY;  Service: Gastroenterology;  Laterality: N/A;  PRE: IRON DEFICIENCY ANEMIA  POST: diverticulosis, polyps, hemorrhoids    ENDOSCOPY N/A  08/15/2024    Procedure: ESOPHAGOGASTRODUODENOSCOPY;  Surgeon: Garth Constantino MD;  Location: [facility] ENDOSCOPY;  Service: Gastroenterology;  Laterality: N/A;  PRE- CIRRHOSIS, DARK STOOL  POST- NORMAL    ENDOSCOPY N/A 05/07/2025    Procedure: ESOPHAGOGASTRODUODENOSCOPY;  Surgeon: Ana Guerrero MD;  Location: [facility] ENDOSCOPY;  Service: Gastroenterology;  Laterality: N/A;  PRE: CIRRHOSIS  POST:portal hypertensive gastropathy; esophagitis; hiatal hernia; varices    HYSTERECTOMY  1998    INCISION AND DRAINAGE LEG Left 11/17/2018    Procedure: Incision and Drainage of Left ankle;  Surgeon: Maxwell Lanier MD;  Location: [facility] MAIN OR;  Service: Orthopedics    NECK SURGERY  2000, 2005,2017    SIGMOIDOSCOPY N/A 03/28/2024    Procedure: SIGMOIDOSCOPY FLEXIBLE;  Surgeon: Leatha Johns MD;  Location: [facility] ENDOSCOPY;  Service: General;  Laterality: N/A;  pre: h/o colon polyps  post: poor prep, diverticulosis    SKIN BIOPSY      SKIN GRAFT SPLIT THICKNESS N/A 02/12/2019    Procedure: debridement SKIN GRAFT SPLIT THICKNESS left ankle wound;  Surgeon: Barry Worthy MD;  [place] [facility] [place] [facility]  Service: Plastics    TONGUE LESION EXCISION/BIOPSY N/A 11/26/2024    Procedure: WIDE LOCAL EXCISION OF TONGUE LESION;  Surgeon: El Mercedes MD;  [place] [facility] [place];  Service: ENT;  Laterality: N/A;    TOTAL SHOULDER ARTHROPLASTY Left 07/20/2023    Procedure: Total  shoulder arthroplasty;  Surgeon: Maxwell Lanier MD;  [place] [facility] [place] [facility]  Service: Orthopedics;  Laterality: Left;    TOTAL SHOULDER ARTHROPLASTY W/ DISTAL CLAVICLE EXCISION Right 6/5/2025    Procedure: Right Reverse Total Shoulder Arthroplasty;  Surgeon: Maxwell Lanier MD;  [place] [facility] [place] [facility]  Service: Orthopedics;  Laterality: Right;    TOTAL THYMECTOMY      TRIGGER POINT INJECTION  0751-7951    UPPER GASTROINTESTINAL ENDOSCOPY  8/10/24    WRIST FRACTURE SURGERY      WRIST FRACTURE SURGERY Left       General Information        Row Name 06/23/25 1515          OT Time and Intention    Document Type therapy note (daily note)  -BC     Mode of Treatment occupational therapy  -BC     Patient Effort adequate  -BC     Comment Pt emotionally labile throughout session  -BC       Row Name 06/23/25 1515          General Information    Patient Profile Reviewed yes  -BC     Existing Precautions/Restrictions right;shoulder;non-weight bearing  No pushing, no pulling, no lifting, no weight bearing with right upper extremity. OK for PROM forward flexion and external rotation per orders.  -BC       Row Name 06/23/25 1515          Cognition    Orientation Status (Cognition) oriented x 3  -BC       Row Name 06/23/25 1515          Safety Issues/Impairments Affecting Functional Mobility    Safety Issues Affecting Function (Mobility) awareness of need for assistance;insight into deficits/self-awareness;judgment;safety precaution awareness;safety precautions follow-through/compliance  -BC     Impairments Affecting Function (Mobility) pain;endurance/activity tolerance;strength;balance;range of motion (ROM)  -BC               User Key  (r) = Recorded By, (t) = Taken By, (c) = Cosigned By      Initials Name Provider Type    BC Lillian pEstein OT Occupational Therapist                     Mobility/ADL's       Row Name 06/23/25 1516          Bed Mobility    Bed Mobility supine-sit;sit-supine  -BC     Supine-Sit [place] (Bed Mobility) supervision;verbal cues  -BC     Sit-Supine [place] (Bed Mobility) supervision;verbal cues  -BC     Bed Mobility, Safety Issues decreased use of arms for pushing/pulling  -BC     Assistive Device (Bed Mobility) bed rails  -BC     Comment, (Bed Mobility) cues for techniques to protect RUE and not push up, adjusting foot height of bed to assist w/ Pt sitting up. Pt was able to demo teachback  -BC       Row Name 06/23/25 1516          Transfers    Transfers sit-stand transfer;stand-sit transfer  -BC       Row Name 06/23/25  1516          Sit-Stand Transfer    Sit-Stand [place] (Transfers) contact guard  -BC     Comment, (Sit-Stand Transfer) cues for techniques w/ sit<>stand with pt using bed to stand and cues for maintain NWB RUE and not grab onto walker w/ R hand  -BC       Row Name 06/23/25 1516          Stand-Sit Transfer    Stand-Sit [place] (Transfers) contact guard  -BC       Row Name 06/23/25 1516          Mobility    Extremity Weight-bearing Status right upper extremity  -BC     Right Upper Extremity (Weight-bearing Status) non weight-bearing (NWB)  -BC               User Key  (r) = Recorded By, (t) = Taken By, (c) = Cosigned By      Initials Name Provider Type    BC Lillian Epstein OT Occupational Therapist                   Obj/Interventions       Row Name 06/23/25 1519          Shoulder (Therapeutic Exercise)    Shoulder (Therapeutic Exercise) PROM (passive range of motion);pendulum exercises  -BC     Shoulder PROM (Therapeutic Exercise) right;flexion;5 repetitions  >90* sh FF; attempting ER per protocol w minimal tolerance, pt crying and requesting OT to stop before completing mvmts, toelerated PROM sh FF fair with no increased pain until >90*  -BC     Shoulder Pendulum Exercises (Therapeutic Exercise) right;standing;needs assist to initiate movement  attempting education/practice on pendulum exercises to which Pt was not able to tolerate, began crying and reports not able to tolerate, sitting back down and back to supine.  -BC       Row Name 06/23/25 1519          Elbow/Forearm (Therapeutic Exercise)    Elbow/Forearm (Therapeutic Exercise) AROM (active range of motion)  -BC     Elbow/Forearm AROM (Therapeutic Exercise) right;flexion;extension;supination;pronation;supine;5 repetitions  -BC       Row Name 06/23/25 1519          Motor Skills    Therapeutic Exercise shoulder;elbow/forearm  -BC               User Key  (r) = Recorded By, (t) = Taken By, (c) = Cosigned By      Initials Name Provider Type    MICAH Epstein  PATRICA Snyder Occupational Therapist                   Goals/Plan    No documentation.                  Clinical Impression       Row Name 06/23/25 1522          Pain Assessment    Pretreatment Pain Rating 2/10  -BC     Posttreatment Pain Rating 4/10  -BC     Pre/Posttreatment Pain Comment r sh pain fluctuating, no to minimal pain at rest then increased with certain sh protocol up to 6 /10  -BC       Row Name 06/23/25 1522          Plan of Care Review    Plan of Care Reviewed With patient  -BC     Progress no change  -BC     Outcome Evaluation Pt was participatory in OT services but appears noncompliant with recommendations, attempting to push through R hand to move tray table out of the way upon entering room requiring OT's cues and education for safety. Pt was able to demo some teachback methods to sit up without pushing through R hand but still appears to be WB'ing with some aspects of bed mobiltiy requiring cues for safety. pt also verb'd not having sling and not wanting to use it. Both OT and RN education on sling use and fall safety that Pt should not be up without sling other than performing sh Pendulums with assist from OT. Pt was unable to tolerate pendulums today, crying and laying back down after mult cues and trials. She did tolerate supine PROM sh FF today. Continue OT POC.  -BC       Row Name 06/23/25 1522          Vital Signs    O2 Delivery Pre Treatment room air  -BC     O2 Delivery Intra Treatment room air  -BC     O2 Delivery Post Treatment room air  -BC     Pre Patient Position Supine  -BC     Intra Patient Position Standing  -BC     Post Patient Position Supine  -BC       Row Name 06/23/25 1522          Positioning and Restraints    Pre-Treatment Position in bed  -BC     Post Treatment Position bed  -BC     In Bed notified nsg;supine;fowlers;call light within reach;encouraged to call for assist;exit alarm on  transport present to take Pt off floor  -BC               User Key  (r) = Recorded By,  (t) = Taken By, (c) = Cosigned By      Initials Name Provider Type    Lillian Ann OT Occupational Therapist                   Outcome Measures       Row Name 06/23/25 1522          How much help from another is currently needed...    Putting on and taking off regular lower body clothing? 2  -BC     Bathing (including washing, rinsing, and drying) 2  -BC     Toileting (which includes using toilet bed pan or urinal) 2  -BC     Putting on and taking off regular upper body clothing 3  -BC     Taking care of personal grooming (such as brushing teeth) 3  -BC     Eating meals 4  -BC     AM-PAC 6 Clicks Score (OT) 16  -BC       Row Name 06/23/25 1521          Functional Assessment    Outcome Measure Options AM-PAC 6 Clicks Daily Activity (OT)  -BC               User Key  (r) = Recorded By, (t) = Taken By, (c) = Cosigned By      Initials Name Provider Type    Lillian Ann OT Occupational Therapist                      OT Recommendation and Plan     Plan of Care Review  Plan of Care Reviewed With: patient  Progress: no change  Outcome Evaluation: Pt was participatory in OT services but appears noncompliant with recommendations, attempting to push through R hand to move tray table out of the way upon entering room requiring OT's cues and education for safety. Pt was able to demo some teachback methods to sit up without pushing through R hand but still appears to be WB'ing with some aspects of bed mobiltiy requiring cues for safety. pt also verb'd not having sling and not wanting to use it. Both OT and RN education on sling use and fall safety that Pt should not be up without sling other than performing sh Pendulums with assist from OT. Pt was unable to tolerate pendulums today, crying and laying back down after mult cues and trials. She did tolerate supine PROM sh FF today. Continue OT POC.     Time Calculation:         Time Calculation- OT       Row Name 06/23/25 1982             Time Calculation- OT    OT  Start Time 1252  -BC      OT Stop Time 1316  -BC      OT Time Calculation (min) 24 min  -BC      Total Timed Code Minutes- OT 24 minute(s)  -BC      OT Received On 06/23/25  -BC      OT - Next Appointment 06/24/25  -BC         Timed Charges    24542 - OT Therapeutic Exercise Minutes 14  -BC      64937 - OT Therapeutic Activity Minutes 10  -BC         Total Minutes    Timed Charges Total Minutes 24  -BC       Total Minutes 24  -BC                User Key  (r) = Recorded By, (t) = Taken By, (c) = Cosigned By      Initials Name Provider Type    BC Lillian Epstein OT Occupational Therapist                  Therapy Charges for Today       Code Description Service Date Service Provider Modifiers Qty    66143332063 HC OT THER PROC EA 15 MIN 6/23/2025 Lillian Epstein OT GO 1    85634334561 HC OT THERAPEUTIC ACT EA 15 MIN 6/23/2025 Lillian Epstein OT GO 1                 Lillian Epstein OT  6/23/2025

## 2025-06-23 NOTE — CASE MANAGEMENT/SOCIAL WORK
Continued Stay Note  [facility]     Patient Name: Filomena Liu  MRN: [id number]  Today's Date: 6/23/2025    Admit Date: 6/20/2025    Plan: Plan home with significant other and [facility].  JENNY Witt RN   Discharge Plan       Row Name 06/23/25 2       Plan    Plan Plan home with significant other and [facility].  JENNY Witt RN    Patient/Family in Agreement with Plan yes    Plan Comments Per Alison  ( [phone]) pt is current with [facility]  and Alison will follow.   Plan home with significant other and [facility]. JENNY Witt RN      Row Name 06/23/25 5392       Plan    Plan Plan home with HH.   JENNY Witt RN    Patient/Family in Agreement with Plan yes    Plan Comments FACE SHEET VERIFIED/ IM LETTER SIGNED.  Spoke with pt at bedside.  Pt's PCP is Dr. Fernando Dunn.  Pt lives with her Significant Other ( Parveen Flores [phone]) in a single story house. Pt is independent with ADLs.  Pt has a nebulizer, rolator and shower chair for home use if needed. Pt gets her prescriptions at [facility] and [facility].  Pt denies any issues affording medications. Pt is current with [facility].  Alison  ( [phone]) called to verify.  Pt has not been in SNF.  Pt's significant other will assist pt at home if needed and will transport her home.  Plan home with HH.  JENNY Witt RN                   Discharge Codes    No documentation.                 Expected Discharge Date and Time       Expected Discharge Date Expected Discharge Time    Jun 25, 2025               Jessie Witt RN

## 2025-06-23 NOTE — PLAN OF CARE
Problem: Adult Inpatient Plan of Care  Goal: Absence of Hospital-Acquired Illness or Injury  Intervention: Identify and Manage Fall Risk  Recent Flowsheet Documentation  Taken 6/23/2025 0200 by Zakia Drew RN  Safety Promotion/Fall Prevention:   nonskid shoes/slippers when out of bed   safety round/check completed  Taken 6/23/2025 0000 by Zakia Drew RN  Safety Promotion/Fall Prevention:   nonskid shoes/slippers when out of bed   safety round/check completed  Taken 6/22/2025 2200 by Zakia Drew, ERICA  Safety Promotion/Fall Prevention:   nonskid shoes/slippers when out of bed   safety round/check completed  Taken 6/22/2025 2015 by Zakia Drew RN  Safety Promotion/Fall Prevention:   activity supervised   clutter free environment maintained   fall prevention program maintained   nonskid shoes/slippers when out of bed   safety round/check completed   Goal Outcome Evaluation:  Plan of Care Reviewed With: patient        Progress: no change  Outcome Evaluation: VSS, AOx4, pt experiencing shoulder pain from previous surgery, controlled with prn pain meds.

## 2025-06-23 NOTE — CONSULTS
[facility]  Initial Inpatient Consult Note    Referring Provider: Dr Bunch    Reason for Consultation: Anemia    Subjective     History of present illness:    64 y.o. female with multiple medical problems, asked to see for anemia.  Pt of Dr Ana Guerrero.  Long standing VIOLETA with baseline Hb around 8-9.   Recent GI workup with our service 6 weeks ago with non diagnostic EGd/colonscopy. She does have hx of EtOH cirrhosis with thrombocytopaenia.  Platelets around 100.  EGD showed small EVs mild pHTN.  Prior EGD done last year also for anemia and melena NAD.  Current Hb is 7 down from 7.6 on presentation.  Heme positive stool.  Pt had shoulder surgery 2 weeks ago, reports significant bruising noted on R flank, anterior abdomen, and upper extremities.      Past Medical History:  Past Medical History:   Diagnosis Date    Abdominal aneurysm     DR. JOYCE FOLLOWING 3.5CM    Alcoholism 1985    Anemia 08/01/24    HAS HAD IRON INFUSIONS ALMOST YEARLY    Anxiety     Arthritis of back 2000    Asthma 2015    At high risk for falls     Basal cell carcinoma     Bruises easily     Chronic kidney disease 8/5/24    Cirrhosis 8/5/24    COPD (chronic obstructive pulmonary disease)     MANAGED BY PRIMARY CARE    CTS (carpal tunnel syndrome) Surgery 1999    DDD (degenerative disc disease), cervical     Depression     Diverticulitis of colon Unknown    Fatty liver 2022    GERD (gastroesophageal reflux disease)     GI (gastrointestinal bleed) 8/5/24    History of anemia     History of MRSA infection     LEFT ANKLE TREAT [facility]  5YEARS GO    Hyperlipidemia ?    Hypertension ?    Low back strain Unknown    Lower leg edema     Lumbosacral disc disease 2024    Neck strain 2000    Neuropathy     Pancreatitis 2024    Panic attacks     Scoliosis Unknown    Shoulder arthritis 06/05/2023    Shoulder pain, left 07/07/2023    Sleep apnea     NO MACHINE USE    Spinal stenosis     Spondylolisthesis of cervical region     Steatosis  of liver     Tachycardia     Tendinitis of knee 2022    Thoracic disc disorder 2000    Vertigo      Past Surgical History:  Past Surgical History:   Procedure Laterality Date    BACK SURGERY      cervical disc surgery with fusion-    CARPAL TUNNEL RELEASE Bilateral     CATARACT EXTRACTION      CHOLECYSTECTOMY  1/1/20    COLONOSCOPY      COLONOSCOPY N/A 11/12/2020    Procedure: COLONOSCOPY, polypectomy;  Surgeon: Filomena Mckeon MD;  Location: [facility] OR;  Service: Gastroenterology;  Laterality: N/A;  Diverticulosis; Hepatic flexure polyp x 1; Polyp at 60cm x 1; Polyp at 50cm x 1- hot snare;    COLONOSCOPY N/A 04/15/2024    Procedure: COLONOSCOPY into sigmoid colon with hot snare polypectomy;  Surgeon: Leatha Johns MD;  Location: [facility] ENDOSCOPY;  Service: General;  Laterality: N/A;  pre- history of polyps  post- diverticulosis, polyp    COLONOSCOPY N/A 05/07/2025    Procedure: COLONOSCOPY to cecum and ti with hot snare polypectomies;  Surgeon: Ana Guerrero MD;  [facility];  Service: Gastroenterology;  Laterality: N/A;  PRE: IRON DEFICIENCY ANEMIA  POST: diverticulosis, polyps, hemorrhoids    ENDOSCOPY N/A 08/15/2024    Procedure: ESOPHAGOGASTRODUODENOSCOPY;  Surgeon: Garth Constantino MD;  Location: [facility] ENDOSCOPY;  Service: Gastroenterology;  Laterality: N/A;  PRE- CIRRHOSIS, DARK STOOL  POST- NORMAL    ENDOSCOPY N/A 05/07/2025    Procedure: ESOPHAGOGASTRODUODENOSCOPY;  Surgeon: Ana Guerrero MD;  Location: [facility] ENDOSCOPY;  Service: Gastroenterology;  Laterality: N/A;  PRE: CIRRHOSIS  POST:portal hypertensive gastropathy; esophagitis; hiatal hernia; varices    HYSTERECTOMY  1998    INCISION AND DRAINAGE LEG Left 11/17/2018    Procedure: Incision and Drainage of Left ankle;  Surgeon: Maxwell Lanier MD;  Location: [facility] OR;  Service: Orthopedics    NECK SURGERY  2000, 2005,2017    SIGMOIDOSCOPY N/A 03/28/2024    Procedure: SIGMOIDOSCOPY FLEXIBLE;  Surgeon: Andreas  MD Leatha;  Location: [facility] ENDOSCOPY;  Service: General;  Laterality: N/A;  pre: h/o colon polyps  post: poor prep, diverticulosis    SKIN BIOPSY      SKIN GRAFT SPLIT THICKNESS N/A 02/12/2019    Procedure: debridement SKIN GRAFT SPLIT THICKNESS left ankle wound;  Surgeon: Barry Worthy MD;  [facility];  Service: Plastics    TONGUE LESION EXCISION/BIOPSY N/A 11/26/2024    Procedure: WIDE LOCAL EXCISION OF TONGUE LESION;  Surgeon: El Mercedes MD;  [place] [facility] [place];  Service: ENT;  Laterality: N/A;    TOTAL SHOULDER ARTHROPLASTY Left 07/20/2023    Procedure: Total  shoulder arthroplasty;  Surgeon: Maxwell Lanier MD;  Location: [facility] OR [facility];  Service: Orthopedics;  Laterality: Left;    TOTAL SHOULDER ARTHROPLASTY W/ DISTAL CLAVICLE EXCISION Right 6/5/2025    Procedure: Right Reverse Total Shoulder Arthroplasty;  Surgeon: Maxwell Lanier MD;  Location: [facility] OR [facility] [place];  Service: Orthopedics;  Laterality: Right;    TOTAL THYMECTOMY      TRIGGER POINT INJECTION  [id number]    UPPER GASTROINTESTINAL ENDOSCOPY  8/10/24    WRIST FRACTURE SURGERY      WRIST FRACTURE SURGERY Left       Social History:   Social History     Tobacco Use    Smoking status: Never     Passive exposure: Current    Smokeless tobacco: Never    Tobacco comments:     PATIENT STATES SHE IS CURRENTLY TRYING TO QUIT. STATES SHE IS SMOKING APPROX. 4-8 CIGARETTES PER DAY AS OF 5-23-25   Substance Use Topics    Alcohol use: Not Currently     Alcohol/week: 10.0 standard drinks of alcohol     Comment: PATIENT STATES SHE IS 80 DAYS SOBER AS OF 5-23-25      Family History:  Family History   Problem Relation Age of Onset    Emphysema Mother     Alzheimer's disease Father     Cancer Father     Osteoporosis Sister     Scoliosis Sister     Malig Hyperthermia Neg Hx        Home Meds:  Medications Prior to Admission   Medication Sig Dispense Refill Last Dose/Taking    albuterol (PROVENTIL HFA;VENTOLIN HFA) 108 (90 Base)  MCG/ACT inhaler Inhale 2 puffs 3 times a day. Indications: Spasm of Lung Air Passages   6/20/2025    atenolol (TENORMIN) 25 MG tablet Take 1 tablet by mouth Every Night.   6/19/2025    Cholecalciferol 25 MCG (1000 UT) tablet Take 1 tablet by mouth 1 (One) Time Per Week. HOLDING AS OF 5-23-25 FOR HIGH VIT D LEVELS  Indications: Vitamin D Deficiency   6/19/2025    docusate sodium (COLACE) 100 MG capsule Take 1 capsule by mouth 2 (Two) Times a Day. 60 capsule 0 6/19/2025    fluticasone (FLONASE) 50 MCG/ACT nasal spray Administer 1 spray into the nostril(s) as directed by provider Daily. Indications: Stuffy Nose   6/19/2025    ipratropium-albuterol (DUO-NEB) 0.5-2.5 mg/3 ml nebulizer Take 3 mL by nebulization 4 (Four) Times a Day. Indications: Chronic Obstructive Lung Disease   6/20/2025    oxyCODONE-acetaminophen (PERCOCET) 7.5-325 MG per tablet Take 1 tablet by mouth Every 4 (Four) Hours As Needed for Moderate Pain. 30 tablet 0 6/20/2025    pantoprazole (PROTONIX) 40 MG EC tablet Take 1 tablet by mouth 2 (Two) Times a Day for 60 days. (Patient taking differently: Take 1 tablet by mouth Daily.) 60 tablet 1 6/20/2025    pregabalin (LYRICA) 25 MG capsule Take 1 capsule by mouth Every 12 (Twelve) Hours.   6/19/2025    rOPINIRole (REQUIP) 1 MG tablet Take 1 tablet by mouth every night at bedtime. Indications: Restless Leg Syndrome   6/19/2025    spironolactone (ALDACTONE) 25 MG tablet Take 1 tablet by mouth Daily. (Patient taking differently: Take 1 tablet by mouth 2 (Two) Times a Day. Indications: High Blood Pressure) 30 tablet 0 6/20/2025    torsemide (DEMADEX) 20 MG tablet Take 1 tablet by mouth Daily. Indications: Cardiac Failure, Edema, High Blood Pressure (Patient taking differently: Take 5 tablets by mouth 2 (Two) Times a Day. Indications: Cardiac Failure, Edema, High Blood Pressure) 30 tablet 0 Patient Taking Differently    ondansetron (Zofran) 4 MG tablet Take 1 tablet by mouth Every 8 (Eight) Hours As Needed for  Nausea or Vomiting. 12 tablet 0      Current Meds:   [Held by provider] atenolol, 25 mg, Oral, Nightly  ceFAZolin, 2,000 mg, Intravenous, Q8H  [START ON 6/26/2025] cholecalciferol, 1,000 Units, Oral, Weekly  docusate sodium, 100 mg, Oral, BID  fluticasone, 1 spray, Nasal, Daily  ipratropium-albuterol, 3 mL, Nebulization, 4x Daily - RT  melatonin, 2.5 mg, Oral, Nightly  midodrine, 5 mg, Oral, TID AC  pantoprazole, 40 mg, Oral, QAM AC  senna-docusate sodium, 2 tablet, Oral, BID   And  polyethylene glycol, 17 g, Oral, Daily  pregabalin, 25 mg, Oral, Q12H  rOPINIRole, 1 mg, Oral, Nightly  [Held by provider] spironolactone, 25 mg, Oral, BID      Allergies:  Allergies   Allergen Reactions    Wound Dressing Adhesive Other (See Comments)     SKIN TEARS AND RASH- PAPER TAPE OK       Objective     Vital Signs  Temp:  [97.3 °F (36.3 °C)-99.3 °F (37.4 °C)] 97.7 °F (36.5 °C)  Heart Rate:  [] 95  Resp:  [18] 18  BP: ()/(55-84) 134/84  Physical Exam:  General Appearance:     Alert, cooperative, in no acute distress   Abdomen:     Normal bowel sounds, no masses, no organomegaly, soft     nontender, nondistended, no guarding, no rebound                 tenderness   Rectal:     Deferred       Results Review:   I reviewed the patient's new clinical results.    Results from last 7 days   Lab Units 06/23/25  0457 06/22/25  0454 06/21/25  0407   WBC 10*3/mm3 7.53 7.78 7.19   HEMOGLOBIN g/dL 7.0* 7.5* 7.6*   HEMATOCRIT % 22.0* 24.0* 23.5*   PLATELETS 10*3/mm3 97* 110* 109*     Results from last 7 days   Lab Units 06/23/25  0457 06/22/25  1538 06/22/25  0454 06/21/25  0407 06/20/25  1443   SODIUM mmol/L 141 140 140   < > 137   POTASSIUM mmol/L 4.2 4.3 4.5   < > 2.7*   CHLORIDE mmol/L 103 101 100   < > 92*   CO2 mmol/L 28.5 29.6* 31.0*   < > 32.9*   BUN mg/dL 28.0* 30.0* 30.0*   < > 31.0*   CREATININE mg/dL 1.38* 1.31* 1.65*   < > 1.94*   CALCIUM mg/dL 9.1 8.9 9.0   < > 9.3   BILIRUBIN mg/dL 1.1  --  1.2  --  1.8*   ALK PHOS U/L  163*  --  185*  --  196*   ALT (SGPT) U/L 10  --  13  --  21   AST (SGOT) U/L 46*  --  44*  --  50*   GLUCOSE mg/dL 99 133* 103*   < > 119*    < > = values in this interval not displayed.         Lab Results   Lab Value Date/Time    LIPASE 92 (H) 03/08/2025 0548    LIPASE 126 (H) 03/07/2025 0552    LIPASE 193 (H) 03/06/2025 0857    LIPASE 31 03/14/2023 1237    LIPASE 14 05/11/2022 0059    LIPASE 21 (L) 06/01/2020 0149    LIPASE 22 (L) 05/12/2020 1235    LIPASE 29 01/09/2020 1229    LIPASE 23 02/27/2018 1221       Radiology:  XR Shoulder 2+ View Right   Final Result      XR Chest 1 View   Final Result          Assessment & Plan   Assessment:   Anemia - long standing  Bicytopenia  Hx of EtHO cirrhosis, low MELD  SANDRO on CKD    Plan:   Pt with long standing anemia and s/p recent GI evaluation including EGD/colonoscopy last month  Her iron indices are suggestive of AoCD with normal iron and ferritin.    Repeat endoscopy low yield at this time. Can consider outpatient VCE to evaluated for possible small bowel AVMs  Transfuse as needed fro Hb <7  Note sent to office to schedule f/u to discuss capsule endoscopy    GI will see again as needed    I discussed the patients findings and my recommendations with patient and Dr Ney Chery M.D.  [facility]  [address] [place] [address]  Office: [phone]

## 2025-06-23 NOTE — CASE MANAGEMENT/SOCIAL WORK
Discharge Planning Assessment  [facility]     Patient Name: Filomena Liu  MRN: [id number]  Today's Date: 6/23/2025    Admit Date: 6/20/2025    Plan: Plan home with XIANG Witt RN   Discharge Needs Assessment       Row Name 06/23/25 1421       Living Environment    People in Home significant other    Name(s) of People in Home Significant Other  ( Parveen Flores [phone])    Current Living Arrangements home    Potentially Unsafe Housing Conditions none    In the past 12 months has the electric, gas, oil, or water company threatened to shut off services in your home? No    Primary Care Provided by self    Provides Primary Care For no one    Family Caregiver if Needed significant other    Family Caregiver Names Significant Other ( Parveen Flores [phone])    Quality of Family Relationships helpful;involved;supportive    Able to Return to Prior Arrangements yes    Living Arrangement Comments Pt lives with her Significant Other ( Parveen Flores [phone]) in a single story house.       Resource/Environmental Concerns    Resource/Environmental Concerns none    Transportation Concerns none       Transportation Needs    In the past 12 months, has lack of transportation kept you from medical appointments or from getting medications? no    In the past 12 months, has lack of transportation kept you from meetings, work, or from getting things needed for daily living? No       Food Insecurity    Within the past 12 months, you worried that your food would run out before you got the money to buy more. Never true    Within the past 12 months, the food you bought just didn't last and you didn't have money to get more. Never true       Transition Planning    Patient/Family Anticipates Transition to home with family    Patient/Family Anticipated Services at Transition none    Transportation Anticipated family or friend will provide       Discharge Needs Assessment    Readmission Within the Last 30 Days no previous  admission in last 30 days    Equipment Currently Used at Home nebulizer;shower chair;rollator    Concerns to be Addressed no discharge needs identified;denies needs/concerns at this time    Anticipated Changes Related to Illness none    Equipment Needed After Discharge nebulizer;rollator;shower chair    Discharge Facility/Level of Care Needs home with home health                   Discharge Plan       Row Name 06/23/25 1425       Plan    Plan Plan home with XIANG Witt RN    Patient/Family in Agreement with Plan yes    Plan Comments FACE SHEET VERIFIED/ IM LETTER SIGNED.  Spoke with pt at bedside.  Pt's PCP is Dr. Fernando Dunn.  Pt lives with her Significant Other ( Parveen Flores [phone]) in a single story house. Pt is independent with ADLs.  Pt has a nebulizer, rolator and shower chair for home use if needed. Pt gets her prescriptions at [facility] and [facility].  Pt denies any issues affording medications. Pt is current with [facility] XIANG Rosas  ( [phone]) called to verify.  Pt has not been in SNF.  Pt's significant other will assist pt at home if needed and will transport her home.  Plan home with XIANG Witt RN                  Continued Care and Services - Admitted Since 6/20/2025       Home Medical Care       Service Provider Request Status Services Address Phone Fax Patient Preferred    [facility] P[facility] - Request Sent -- [id number] [facility] [address] [phone] --                     Demographic Summary       Row Name 06/23/25 1417       General Information    Admission Type inpatient    Arrived From emergency department    Required Notices Provided Important Message from Medicare    Referral Source admission list    Reason for Consult discharge planning    Preferred Language English                   Functional Status       Row Name 06/23/25 1417       Functional Status    Usual Activity  Tolerance moderate    Current Activity Tolerance moderate       Functional Status, IADL    Medications independent    Meal Preparation assistive person    Housekeeping assistive person    Laundry assistive person    Shopping assistive person       Mental Status    General Appearance WDL WDL                   Psychosocial    No documentation.                  Abuse/Neglect    No documentation.                  Legal    No documentation.                  Substance Abuse    No documentation.                  Patient Forms    No documentation.                     Jessie Witt RN

## 2025-06-24 ENCOUNTER — TELEPHONE (OUTPATIENT)
Dept: GASTROENTEROLOGY | Facility: CLINIC | Age: 65
End: 2025-06-24
Payer: MEDICARE

## 2025-06-24 ENCOUNTER — DOCUMENTATION (OUTPATIENT)
Dept: PHYSICAL THERAPY | Facility: CLINIC | Age: 65
End: 2025-06-24
Payer: MEDICARE

## 2025-06-24 LAB
ALBUMIN SERPL-MCNC: 2.8 G/DL (ref 3.5–5.2)
ALBUMIN/GLOB SERPL: 1.1 G/DL
ALP SERPL-CCNC: 175 U/L (ref 39–117)
ALT SERPL W P-5'-P-CCNC: 6 U/L (ref 1–33)
ANION GAP SERPL CALCULATED.3IONS-SCNC: 9.3 MMOL/L (ref 5–15)
AST SERPL-CCNC: 37 U/L (ref 1–32)
BASOPHILS # BLD AUTO: 0.04 10*3/MM3 (ref 0–0.2)
BASOPHILS NFR BLD AUTO: 0.6 % (ref 0–1.5)
BH BB BLOOD EXPIRATION DATE: NORMAL
BH BB BLOOD TYPE BARCODE: 5100
BH BB DISPENSE STATUS: NORMAL
BH BB PRODUCT CODE: NORMAL
BH BB UNIT NUMBER: NORMAL
BILIRUB SERPL-MCNC: 1.2 MG/DL (ref 0–1.2)
BUN SERPL-MCNC: 22 MG/DL (ref 8–23)
BUN/CREAT SERPL: 22.9 (ref 7–25)
CALCIUM SPEC-SCNC: 9.1 MG/DL (ref 8.6–10.5)
CHLORIDE SERPL-SCNC: 104 MMOL/L (ref 98–107)
CO2 SERPL-SCNC: 26.7 MMOL/L (ref 22–29)
CREAT SERPL-MCNC: 0.96 MG/DL (ref 0.57–1)
CROSSMATCH INTERPRETATION: NORMAL
DEPRECATED RDW RBC AUTO: 46 FL (ref 37–54)
EGFRCR SERPLBLD CKD-EPI 2021: 66.2 ML/MIN/1.73
EOSINOPHIL # BLD AUTO: 0.28 10*3/MM3 (ref 0–0.4)
EOSINOPHIL NFR BLD AUTO: 4 % (ref 0.3–6.2)
ERYTHROCYTE [DISTWIDTH] IN BLOOD BY AUTOMATED COUNT: 14.4 % (ref 12.3–15.4)
GLOBULIN UR ELPH-MCNC: 2.5 GM/DL
GLUCOSE SERPL-MCNC: 129 MG/DL (ref 65–99)
HCT VFR BLD AUTO: 24.3 % (ref 34–46.6)
HGB BLD-MCNC: 7.8 G/DL (ref 12–15.9)
IMM GRANULOCYTES # BLD AUTO: 0.05 10*3/MM3 (ref 0–0.05)
IMM GRANULOCYTES NFR BLD AUTO: 0.7 % (ref 0–0.5)
LYMPHOCYTES # BLD AUTO: 1.48 10*3/MM3 (ref 0.7–3.1)
LYMPHOCYTES NFR BLD AUTO: 20.9 % (ref 19.6–45.3)
MCH RBC QN AUTO: 28.7 PG (ref 26.6–33)
MCHC RBC AUTO-ENTMCNC: 32.1 G/DL (ref 31.5–35.7)
MCV RBC AUTO: 89.3 FL (ref 79–97)
MONOCYTES # BLD AUTO: 0.79 10*3/MM3 (ref 0.1–0.9)
MONOCYTES NFR BLD AUTO: 11.2 % (ref 5–12)
NEUTROPHILS NFR BLD AUTO: 4.43 10*3/MM3 (ref 1.7–7)
NEUTROPHILS NFR BLD AUTO: 62.6 % (ref 42.7–76)
NRBC BLD AUTO-RTO: 0 /100 WBC (ref 0–0.2)
PLATELET # BLD AUTO: 98 10*3/MM3 (ref 140–450)
PMV BLD AUTO: 10.5 FL (ref 6–12)
POTASSIUM SERPL-SCNC: 3.7 MMOL/L (ref 3.5–5.2)
PROT SERPL-MCNC: 5.3 G/DL (ref 6–8.5)
RBC # BLD AUTO: 2.72 10*6/MM3 (ref 3.77–5.28)
SODIUM SERPL-SCNC: 140 MMOL/L (ref 136–145)
UNIT  ABO: NORMAL
UNIT  RH: NORMAL
WBC NRBC COR # BLD AUTO: 7.07 10*3/MM3 (ref 3.4–10.8)

## 2025-06-24 PROCEDURE — 80053 COMPREHEN METABOLIC PANEL: CPT | Performed by: STUDENT IN AN ORGANIZED HEALTH CARE EDUCATION/TRAINING PROGRAM

## 2025-06-24 PROCEDURE — 94799 UNLISTED PULMONARY SVC/PX: CPT

## 2025-06-24 PROCEDURE — 90791 PSYCH DIAGNOSTIC EVALUATION: CPT

## 2025-06-24 PROCEDURE — 94761 N-INVAS EAR/PLS OXIMETRY MLT: CPT

## 2025-06-24 PROCEDURE — 94664 DEMO&/EVAL PT USE INHALER: CPT

## 2025-06-24 PROCEDURE — 25010000002 CEFAZOLIN PER 500 MG: Performed by: STUDENT IN AN ORGANIZED HEALTH CARE EDUCATION/TRAINING PROGRAM

## 2025-06-24 PROCEDURE — 85025 COMPLETE CBC W/AUTO DIFF WBC: CPT | Performed by: STUDENT IN AN ORGANIZED HEALTH CARE EDUCATION/TRAINING PROGRAM

## 2025-06-24 RX ADMIN — FLUTICASONE PROPIONATE 1 SPRAY: 50 SPRAY, METERED NASAL at 08:52

## 2025-06-24 RX ADMIN — IPRATROPIUM BROMIDE AND ALBUTEROL SULFATE 3 ML: .5; 3 SOLUTION RESPIRATORY (INHALATION) at 14:42

## 2025-06-24 RX ADMIN — SENNOSIDES AND DOCUSATE SODIUM 2 TABLET: 50; 8.6 TABLET ORAL at 22:19

## 2025-06-24 RX ADMIN — DOCUSATE SODIUM 100 MG: 100 CAPSULE, LIQUID FILLED ORAL at 22:18

## 2025-06-24 RX ADMIN — POLYETHYLENE GLYCOL 3350 17 G: 17 POWDER, FOR SOLUTION ORAL at 08:49

## 2025-06-24 RX ADMIN — OXYCODONE HYDROCHLORIDE AND ACETAMINOPHEN 1 TABLET: 10; 325 TABLET ORAL at 08:50

## 2025-06-24 RX ADMIN — CEFAZOLIN 2000 MG: 2 INJECTION, POWDER, FOR SOLUTION INTRAMUSCULAR; INTRAVENOUS at 09:11

## 2025-06-24 RX ADMIN — OXYCODONE HYDROCHLORIDE AND ACETAMINOPHEN 1 TABLET: 10; 325 TABLET ORAL at 22:19

## 2025-06-24 RX ADMIN — OXYCODONE HYDROCHLORIDE AND ACETAMINOPHEN 1 TABLET: 10; 325 TABLET ORAL at 13:03

## 2025-06-24 RX ADMIN — SENNOSIDES AND DOCUSATE SODIUM 2 TABLET: 50; 8.6 TABLET ORAL at 08:50

## 2025-06-24 RX ADMIN — IPRATROPIUM BROMIDE AND ALBUTEROL SULFATE 3 ML: .5; 3 SOLUTION RESPIRATORY (INHALATION) at 22:01

## 2025-06-24 RX ADMIN — PREGABALIN 25 MG: 25 CAPSULE ORAL at 22:19

## 2025-06-24 RX ADMIN — OXYCODONE HYDROCHLORIDE AND ACETAMINOPHEN 1 TABLET: 10; 325 TABLET ORAL at 05:12

## 2025-06-24 RX ADMIN — MIDODRINE HYDROCHLORIDE 5 MG: 5 TABLET ORAL at 12:17

## 2025-06-24 RX ADMIN — CEFAZOLIN 2000 MG: 2 INJECTION, POWDER, FOR SOLUTION INTRAMUSCULAR; INTRAVENOUS at 18:12

## 2025-06-24 RX ADMIN — DOCUSATE SODIUM 100 MG: 100 CAPSULE, LIQUID FILLED ORAL at 08:50

## 2025-06-24 RX ADMIN — MIDODRINE HYDROCHLORIDE 5 MG: 5 TABLET ORAL at 08:50

## 2025-06-24 RX ADMIN — ALBUTEROL SULFATE 2.5 MG: 2.5 SOLUTION RESPIRATORY (INHALATION) at 01:40

## 2025-06-24 RX ADMIN — CEFAZOLIN 2000 MG: 2 INJECTION, POWDER, FOR SOLUTION INTRAMUSCULAR; INTRAVENOUS at 02:30

## 2025-06-24 RX ADMIN — MIDODRINE HYDROCHLORIDE 5 MG: 5 TABLET ORAL at 17:37

## 2025-06-24 RX ADMIN — IPRATROPIUM BROMIDE AND ALBUTEROL SULFATE 3 ML: .5; 3 SOLUTION RESPIRATORY (INHALATION) at 10:56

## 2025-06-24 RX ADMIN — TORSEMIDE 40 MG: 20 TABLET ORAL at 08:50

## 2025-06-24 RX ADMIN — Medication 2.5 MG: at 22:18

## 2025-06-24 RX ADMIN — IPRATROPIUM BROMIDE AND ALBUTEROL SULFATE 3 ML: .5; 3 SOLUTION RESPIRATORY (INHALATION) at 07:48

## 2025-06-24 RX ADMIN — OXYCODONE HYDROCHLORIDE AND ACETAMINOPHEN 1 TABLET: 10; 325 TABLET ORAL at 17:37

## 2025-06-24 RX ADMIN — PANTOPRAZOLE SODIUM 40 MG: 40 TABLET, DELAYED RELEASE ORAL at 08:50

## 2025-06-24 RX ADMIN — ROPINIROLE 1 MG: 1 TABLET, FILM COATED ORAL at 22:19

## 2025-06-24 RX ADMIN — PREGABALIN 25 MG: 25 CAPSULE ORAL at 08:50

## 2025-06-24 RX ADMIN — ALBUTEROL SULFATE 2.5 MG: 2.5 SOLUTION RESPIRATORY (INHALATION) at 16:28

## 2025-06-24 NOTE — SIGNIFICANT NOTE
06/24/25 1532   OTHER   Discipline physical therapist   Rehab Time/Intention   Session Not Performed patient/family declined treatment  (pt declined PT this date due to pain and fatigue. offered ice, pt declined. will follow up next date.)   Recommendation   PT - Next Appointment 06/25/25

## 2025-06-24 NOTE — PROGRESS NOTES
[facility] Progress Note      Patient Name: Filomena Liu  : 1960  MRN: [id number]  Primary Care Physician:  Fernando Dunn MD  Date of admission: 2025    Subjective     Interval History:   Follow-up acute kidney injury on CKD 3B.  Urine output not recorded but nonoliguric.  Weight consistent with 2 days ago.  Starting oral torsemide today.  Eating.  Bowels moving.  Feels like she is urinating a lot.  Strength pretty good once she is up.    Review of Systems:   As noted above    Objective     Vitals:   Temp:  [97.7 °F (36.5 °C)-98.3 °F (36.8 °C)] 98.3 °F (36.8 °C)  Heart Rate:  [] 96  Resp:  [16-18] 16  BP: (116-148)/(70-84) 135/80    Intake/Output Summary (Last 24 hours) at 2025 0946  Last data filed at 2025 0115  Gross per 24 hour   Intake 646.25 ml   Output --   Net 646.25 ml       Physical Exam:    General Appearance: Sleepy after pain pill but awakens easily.  oriented x 3, no acute distress   Skin: warm and dry  HEENT: oral mucosa normal, nonicteric sclera  Neck: supple, no JVD  Lungs: Clear to auscultation bilaterally.  Heart: RRR, normal S1 and S2  Abdomen: soft, nontender, nondistended.  Abdominal wall bruising.  Positive bowel sounds.  No extension.  : no palpable bladder  Extremities: Venous stasis changes.  Neuro: normal speech and mental status     Scheduled Meds:     [Held by provider] atenolol, 25 mg, Oral, Nightly  ceFAZolin, 2,000 mg, Intravenous, Q8H  [START ON 2025] cholecalciferol, 1,000 Units, Oral, Weekly  docusate sodium, 100 mg, Oral, BID  fluticasone, 1 spray, Nasal, Daily  ipratropium-albuterol, 3 mL, Nebulization, 4x Daily - RT  melatonin, 2.5 mg, Oral, Nightly  midodrine, 5 mg, Oral, TID AC  pantoprazole, 40 mg, Oral, QAM AC  senna-docusate sodium, 2 tablet, Oral, BID   And  polyethylene glycol, 17 g, Oral, Daily  pregabalin, 25 mg, Oral, Q12H  rOPINIRole, 1 mg, Oral, Nightly  [Held by provider] spironolactone, 25  mg, Oral, BID  torsemide, 40 mg, Oral, Daily      IV Meds:   Pharmacy To Dose:,         Results Reviewed:   I have personally reviewed the results from the time of this admission to 6/24/2025 09:46 EDT     Results from last 7 days   Lab Units 06/24/25  0616 06/23/25  0457 06/22/25  1538 06/22/25  0454   SODIUM mmol/L 140 141 140 140   POTASSIUM mmol/L 3.7 4.2 4.3 4.5   CHLORIDE mmol/L 104 103 101 100   CO2 mmol/L 26.7 28.5 29.6* 31.0*   BUN mg/dL 22.0 28.0* 30.0* 30.0*   CREATININE mg/dL 0.96 1.38* 1.31* 1.65*   CALCIUM mg/dL 9.1 9.1 8.9 9.0   BILIRUBIN mg/dL 1.2 1.1  --  1.2   ALK PHOS U/L 175* 163*  --  185*   ALT (SGPT) U/L 6 10  --  13   AST (SGOT) U/L 37* 46*  --  44*   GLUCOSE mg/dL 129* 99 133* 103*       Estimated Creatinine Clearance: 56.3 mL/min (by C-G formula based on SCr of 0.96 mg/dL).    Results from last 7 days   Lab Units 06/23/25  0457 06/22/25  1538 06/22/25  0454 06/21/25 0407 06/20/25  1443   MAGNESIUM mg/dL  --   --  2.5*  --  3.3*   PHOSPHORUS mg/dL 3.2 2.8  2.8 1.9*   < >  --     < > = values in this interval not displayed.             Results from last 7 days   Lab Units 06/24/25  0616 06/23/25  1515 06/23/25  0457 06/22/25  0454 06/21/25  0407 06/20/25  1443   WBC 10*3/mm3 7.07  --  7.53 7.78 7.19 9.84   HEMOGLOBIN g/dL 7.8* 6.9* 7.0* 7.5* 7.6* 8.2*   PLATELETS 10*3/mm3 98*  --  97* 110* 109* 127*             Assessment / Plan     ASSESSMENT:  Acute kidney injury on CKD 3B baseline creatinine 1.3-1.5.  Creatinine below usual baseline.  Initial acute kidney injury due to intravascular volume depletion.  May have been some contribution from Bactrim.  Now with fluid overload.  Hypoalbuminemia and cirrhosis with extravascular volume also contributing.  2.  Anemia.  Recent GI evaluation EGD and colonoscopy.  Iron consistent with anemia of chronic disease.  GI plans for possible outpatient capsule endoscopy.  Hemoglobin low but stable to slightly improved at 7.8.  3.  Right shoulder pain status  post reverse right total shoulder arthroplasty.  Ortho evaluated.  Recommend continued use of sling and exercises.  4.  Lower extremity venous stasis    PLAN:  P.o. torsemide today.  2.  Discussed with patient  3.  If renal function stable tomorrow, okay with renal for discharge if okay with others.  4, has follow-up appointment with [facility] 6/27/2025.  Thank you for involving us in the care of Filomena Liu.  Please feel free to call with any questions.    Cyndi Dimas MD  06/24/25  09:46 EDT    [facility]  [phone]    Please note that portions of this note were completed with a voice recognition program.

## 2025-06-24 NOTE — PLAN OF CARE
Goal Outcome Evaluation:      Pt resting in bed. 1 unit prbc's given w/o incident. Pt medicated for pain per MAR. No toher c/o voiced. VSS, Plan of care ongoing

## 2025-06-24 NOTE — PROGRESS NOTES
[facility]     LOS: 4 days     Subjective:    CC:Hypotension and Abnormal Lab    DIET:  Diet Order   Procedures    Diet: Cardiac, Fluid Restriction (240 mL/tray); Healthy Heart (2-3 Na+); Other (Specify mL/day) (1200); Fluid Consistency: Thin (IDDSI 0)       Objective:    Vital Signs:  Temp:  [97.9 °F (36.6 °C)-98.3 °F (36.8 °C)] 98.3 °F (36.8 °C)  Heart Rate:  [88-98] 88  Resp:  [16-18] 18  BP: (134-148)/(70-84) 135/80    SpO2:  [95 %-98 %] 97 %  on   ;   Device (Oxygen Therapy): room air  Body mass index is 34.42 kg/m².    Physical Exam  Constitutional:       General: She is not in acute distress.  Pulmonary:      Effort: Pulmonary effort is normal.      Breath sounds: Normal breath sounds.   Abdominal:      General: There is no distension.      Palpations: Abdomen is soft.      Tenderness: There is no abdominal tenderness.   Skin:     General: Skin is warm and dry.   Neurological:      General: No focal deficit present.      Mental Status: She is alert.   Psychiatric:         Mood and Affect: Mood normal.         Behavior: Behavior normal.         Results Review:    Glucose   Date Value Ref Range Status   06/24/2025 129 (H) 65 - 99 mg/dL Final   06/23/2025 99 65 - 99 mg/dL Final   06/22/2025 133 (H) 65 - 99 mg/dL Final   06/22/2025 103 (H) 65 - 99 mg/dL Final     Results from last 7 days   Lab Units 06/24/25  0616   WBC 10*3/mm3 7.07   HEMOGLOBIN g/dL 7.8*   HEMATOCRIT % 24.3*   PLATELETS 10*3/mm3 98*     Results from last 7 days   Lab Units 06/24/25  0616   SODIUM mmol/L 140   POTASSIUM mmol/L 3.7   CHLORIDE mmol/L 104   CO2 mmol/L 26.7   BUN mg/dL 22.0   CREATININE mg/dL 0.96   CALCIUM mg/dL 9.1   BILIRUBIN mg/dL 1.2   ALK PHOS U/L 175*   ALT (SGPT) U/L 6   AST (SGOT) U/L 37*   GLUCOSE mg/dL 129*         Results from last 7 days   Lab Units 06/22/25  0454   MAGNESIUM mg/dL 2.5*         Cultures:  Blood Culture   Date Value Ref Range Status   06/21/2025 No growth at 3 days   Preliminary   06/21/2025 No growth at 3 days  Preliminary       I have reviewed daily medications and changes in CPOE    Scheduled meds  [Held by provider] atenolol, 25 mg, Oral, Nightly  ceFAZolin, 2,000 mg, Intravenous, Q8H  [START ON 6/26/2025] cholecalciferol, 1,000 Units, Oral, Weekly  docusate sodium, 100 mg, Oral, BID  fluticasone, 1 spray, Nasal, Daily  ipratropium-albuterol, 3 mL, Nebulization, 4x Daily - RT  melatonin, 2.5 mg, Oral, Nightly  midodrine, 5 mg, Oral, TID AC  pantoprazole, 40 mg, Oral, QAM AC  senna-docusate sodium, 2 tablet, Oral, BID   And  polyethylene glycol, 17 g, Oral, Daily  pregabalin, 25 mg, Oral, Q12H  rOPINIRole, 1 mg, Oral, Nightly  [Held by provider] spironolactone, 25 mg, Oral, BID  torsemide, 40 mg, Oral, Daily        Pharmacy To Dose:,       PRN meds    albuterol    aluminum-magnesium hydroxide-simethicone    senna-docusate sodium **AND** polyethylene glycol **AND** bisacodyl **AND** bisacodyl    Calcium Replacement - Follow Nurse / BPA Driven Protocol    Magnesium Standard Dose Replacement - Follow Nurse / BPA Driven Protocol    ondansetron ODT **OR** ondansetron    oxyCODONE-acetaminophen    Pharmacy To Dose:    Phosphorus Replacement - Follow Nurse / BPA Driven Protocol    Potassium Replacement - Follow Nurse / BPA Driven Protocol        Hypokalemia    HTN (hypertension)    Gastroesophageal reflux disease    Alcoholic cirrhosis of liver without ascites    Iron deficiency anemia    Transaminitis    CKD (chronic kidney disease) stage 3, GFR 30-59 ml/min    Hypotension    Thrombocytopenia        Assessment/Plan:    64 y.o. female admitted with Hypokalemia.     SANDRO on CKD stage 3  - Creatinine 1.94 on arrival, value previously 1.02 on 05/23/25. Baseline values appear to fluctuate but average over past 6 lab draws prior to arrival ~1.3.   - Diuretic held on arrival, Nephrology consulted and following.      Right lower extremity cellulitis  - RLE warm, erythematous and tender to  palpation. She is on Cefazolin, tolerating well. Will continue this as prescribed for now. RLE negative for DVT.     Hypokalemia  Hypophosphatemia  - Noted previously, repleted and improved. Continue monitoring.     History of HTN complicated by hypotension  - BP soft. Diuretic/Aldactone being held. She is on Midodrine, continue as prescribed. Trend BP.     Cirrhosis  Transaminitis     Anemia  Thrombocytopenia  - Noted on labs, she does have bruising on her abdomen and lower back, this area has been marked to allow for close monitoring, appears to be decreasing in size. She has had recent GI workup.  - Hemoglobin down to 7, repeat ordered <7, transfuse PRBC now. GI consult.     Right shoulder osteoarthritis  - With associated rotator cuff tendinopathy; S/P Right reverse total shoulder arthroplasty and Tenodesis of long head of biceps tendon with Dr. Lanier on 06/05/25. She is having some discomfort in her shoulders, XR obtained and reviewed, orthopedic surgery consulted.     GERD  - Continue PPI as prescribed.      Hyperglycemia  - A1c <4.20%.     If renal function stable, hemoglobin is stable then possible discharge tomorrow    Edward Galan MD  06/24/25  12:29 EDT

## 2025-06-24 NOTE — PLAN OF CARE
Goal Outcome Evaluation:  Plan of Care Reviewed With: patient, spouse           Outcome Evaluation: Pt has been up and down urinating all day to the BR.  Hat in toilet.  Pain meds given as ordered, every 4 hours.  Pt refused OT/PT today.  Pt has been very va eyed and states that she is tired of being in the hospital and tired of being sick.  Access to be consulted for depression.  VSS, RA, NSR.  Walker Assist x1 up to the bathroom, A&Ox4.

## 2025-06-24 NOTE — TELEPHONE ENCOUNTER
See note. Sharon can you schedule this for the pt? I just called her and she is in the hospital but she wants to keep this as a follow up, can we do that?

## 2025-06-24 NOTE — PROGRESS NOTES
Patient did not return     Closure of Physical Therapy Encounter            Myriam Guzman, PT  Physical Therapist

## 2025-06-24 NOTE — SIGNIFICANT NOTE
06/24/25 1444   OTHER   Discipline occupational therapist   Rehab Time/Intention   Session Not Performed patient/family declined treatment  (attempted to see pt this date in afternoon, she declined due to fatigue, just returned to bed from bathroom. pt agreeable for therapy to follow up next service date.)   Recommendation   OT - Next Appointment 06/25/25

## 2025-06-24 NOTE — TELEPHONE ENCOUNTER
Trudy Lamas, Spenser Weinstein, PCT  I can see her at 3:00 7/2.  Lvm on pts phone about the above   Ok for the hub to relay

## 2025-06-25 ENCOUNTER — APPOINTMENT (OUTPATIENT)
Dept: CT IMAGING | Facility: HOSPITAL | Age: 65
End: 2025-06-25
Payer: MEDICARE

## 2025-06-25 LAB
ALBUMIN SERPL-MCNC: 2.8 G/DL (ref 3.5–5.2)
ALBUMIN/GLOB SERPL: 1.1 G/DL
ALP SERPL-CCNC: 168 U/L (ref 39–117)
ALT SERPL W P-5'-P-CCNC: 6 U/L (ref 1–33)
ANION GAP SERPL CALCULATED.3IONS-SCNC: 11.9 MMOL/L (ref 5–15)
AST SERPL-CCNC: 39 U/L (ref 1–32)
BASOPHILS # BLD AUTO: 0.01 10*3/MM3 (ref 0–0.2)
BASOPHILS NFR BLD AUTO: 0.2 % (ref 0–1.5)
BILIRUB SERPL-MCNC: 1.2 MG/DL (ref 0–1.2)
BUN SERPL-MCNC: 21 MG/DL (ref 8–23)
BUN/CREAT SERPL: 21 (ref 7–25)
CALCIUM SPEC-SCNC: 8.7 MG/DL (ref 8.6–10.5)
CHLORIDE SERPL-SCNC: 104 MMOL/L (ref 98–107)
CO2 SERPL-SCNC: 26.1 MMOL/L (ref 22–29)
CREAT SERPL-MCNC: 1 MG/DL (ref 0.57–1)
CRP SERPL-MCNC: 0.32 MG/DL (ref 0–0.5)
DEPRECATED RDW RBC AUTO: 49.5 FL (ref 37–54)
EGFRCR SERPLBLD CKD-EPI 2021: 63 ML/MIN/1.73
EOSINOPHIL # BLD AUTO: 0.26 10*3/MM3 (ref 0–0.4)
EOSINOPHIL NFR BLD AUTO: 3.9 % (ref 0.3–6.2)
ERYTHROCYTE [DISTWIDTH] IN BLOOD BY AUTOMATED COUNT: 14.6 % (ref 12.3–15.4)
GLOBULIN UR ELPH-MCNC: 2.6 GM/DL
GLUCOSE SERPL-MCNC: 137 MG/DL (ref 65–99)
HCT VFR BLD AUTO: 26.4 % (ref 34–46.6)
HGB BLD-MCNC: 8.3 G/DL (ref 12–15.9)
IMM GRANULOCYTES # BLD AUTO: 0.03 10*3/MM3 (ref 0–0.05)
IMM GRANULOCYTES NFR BLD AUTO: 0.5 % (ref 0–0.5)
LYMPHOCYTES # BLD AUTO: 1.27 10*3/MM3 (ref 0.7–3.1)
LYMPHOCYTES NFR BLD AUTO: 19.1 % (ref 19.6–45.3)
MAGNESIUM SERPL-MCNC: 1.7 MG/DL (ref 1.6–2.4)
MCH RBC QN AUTO: 28.8 PG (ref 26.6–33)
MCHC RBC AUTO-ENTMCNC: 31.4 G/DL (ref 31.5–35.7)
MCV RBC AUTO: 91.7 FL (ref 79–97)
MONOCYTES # BLD AUTO: 0.78 10*3/MM3 (ref 0.1–0.9)
MONOCYTES NFR BLD AUTO: 11.7 % (ref 5–12)
NEUTROPHILS NFR BLD AUTO: 4.31 10*3/MM3 (ref 1.7–7)
NEUTROPHILS NFR BLD AUTO: 64.6 % (ref 42.7–76)
NRBC BLD AUTO-RTO: 0 /100 WBC (ref 0–0.2)
PHOSPHATE SERPL-MCNC: 2.6 MG/DL (ref 2.5–4.5)
PLATELET # BLD AUTO: 108 10*3/MM3 (ref 140–450)
PMV BLD AUTO: 10.4 FL (ref 6–12)
POTASSIUM SERPL-SCNC: 3.4 MMOL/L (ref 3.5–5.2)
PROT SERPL-MCNC: 5.4 G/DL (ref 6–8.5)
RBC # BLD AUTO: 2.88 10*6/MM3 (ref 3.77–5.28)
SODIUM SERPL-SCNC: 142 MMOL/L (ref 136–145)
WBC NRBC COR # BLD AUTO: 6.66 10*3/MM3 (ref 3.4–10.8)

## 2025-06-25 PROCEDURE — 97110 THERAPEUTIC EXERCISES: CPT

## 2025-06-25 PROCEDURE — 94799 UNLISTED PULMONARY SVC/PX: CPT

## 2025-06-25 PROCEDURE — 94761 N-INVAS EAR/PLS OXIMETRY MLT: CPT

## 2025-06-25 PROCEDURE — 83735 ASSAY OF MAGNESIUM: CPT | Performed by: INTERNAL MEDICINE

## 2025-06-25 PROCEDURE — 99221 1ST HOSP IP/OBS SF/LOW 40: CPT

## 2025-06-25 PROCEDURE — 25010000002 HYDROMORPHONE PER 4 MG: Performed by: HOSPITALIST

## 2025-06-25 PROCEDURE — 86140 C-REACTIVE PROTEIN: CPT | Performed by: HOSPITALIST

## 2025-06-25 PROCEDURE — 97535 SELF CARE MNGMENT TRAINING: CPT

## 2025-06-25 PROCEDURE — 94664 DEMO&/EVAL PT USE INHALER: CPT

## 2025-06-25 PROCEDURE — 84100 ASSAY OF PHOSPHORUS: CPT | Performed by: INTERNAL MEDICINE

## 2025-06-25 PROCEDURE — 72131 CT LUMBAR SPINE W/O DYE: CPT

## 2025-06-25 PROCEDURE — 25010000002 CEFAZOLIN PER 500 MG: Performed by: STUDENT IN AN ORGANIZED HEALTH CARE EDUCATION/TRAINING PROGRAM

## 2025-06-25 PROCEDURE — 80053 COMPREHEN METABOLIC PANEL: CPT | Performed by: STUDENT IN AN ORGANIZED HEALTH CARE EDUCATION/TRAINING PROGRAM

## 2025-06-25 PROCEDURE — 85025 COMPLETE CBC W/AUTO DIFF WBC: CPT | Performed by: STUDENT IN AN ORGANIZED HEALTH CARE EDUCATION/TRAINING PROGRAM

## 2025-06-25 RX ORDER — HYDROMORPHONE HYDROCHLORIDE 1 MG/ML
0.5 INJECTION, SOLUTION INTRAMUSCULAR; INTRAVENOUS; SUBCUTANEOUS
Refills: 0 | Status: DISCONTINUED | OUTPATIENT
Start: 2025-06-25 | End: 2025-06-27 | Stop reason: HOSPADM

## 2025-06-25 RX ORDER — DIAZEPAM 2 MG/1
2 TABLET ORAL EVERY 6 HOURS PRN
Status: DISCONTINUED | OUTPATIENT
Start: 2025-06-25 | End: 2025-06-27 | Stop reason: HOSPADM

## 2025-06-25 RX ORDER — POTASSIUM CHLORIDE 1500 MG/1
40 TABLET, EXTENDED RELEASE ORAL ONCE
Status: COMPLETED | OUTPATIENT
Start: 2025-06-25 | End: 2025-06-25

## 2025-06-25 RX ORDER — SPIRONOLACTONE 25 MG/1
25 TABLET ORAL DAILY
Status: DISCONTINUED | OUTPATIENT
Start: 2025-06-25 | End: 2025-06-27 | Stop reason: HOSPADM

## 2025-06-25 RX ORDER — MIDODRINE HYDROCHLORIDE 5 MG/1
2.5 TABLET ORAL
Status: DISCONTINUED | OUTPATIENT
Start: 2025-06-25 | End: 2025-06-26

## 2025-06-25 RX ORDER — CYCLOBENZAPRINE HCL 10 MG
5 TABLET ORAL 3 TIMES DAILY PRN
Status: DISCONTINUED | OUTPATIENT
Start: 2025-06-25 | End: 2025-06-27 | Stop reason: HOSPADM

## 2025-06-25 RX ADMIN — CEFAZOLIN 2000 MG: 2 INJECTION, POWDER, FOR SOLUTION INTRAMUSCULAR; INTRAVENOUS at 10:40

## 2025-06-25 RX ADMIN — CEFAZOLIN 2000 MG: 2 INJECTION, POWDER, FOR SOLUTION INTRAMUSCULAR; INTRAVENOUS at 02:22

## 2025-06-25 RX ADMIN — PANTOPRAZOLE SODIUM 40 MG: 40 TABLET, DELAYED RELEASE ORAL at 08:31

## 2025-06-25 RX ADMIN — SENNOSIDES AND DOCUSATE SODIUM 2 TABLET: 50; 8.6 TABLET ORAL at 08:31

## 2025-06-25 RX ADMIN — POTASSIUM CHLORIDE 40 MEQ: 1500 TABLET, EXTENDED RELEASE ORAL at 08:31

## 2025-06-25 RX ADMIN — PREGABALIN 25 MG: 25 CAPSULE ORAL at 21:22

## 2025-06-25 RX ADMIN — MIDODRINE HYDROCHLORIDE 2.5 MG: 5 TABLET ORAL at 08:32

## 2025-06-25 RX ADMIN — OXYCODONE HYDROCHLORIDE AND ACETAMINOPHEN 1 TABLET: 10; 325 TABLET ORAL at 06:21

## 2025-06-25 RX ADMIN — OXYCODONE HYDROCHLORIDE AND ACETAMINOPHEN 1 TABLET: 10; 325 TABLET ORAL at 10:39

## 2025-06-25 RX ADMIN — TORSEMIDE 40 MG: 20 TABLET ORAL at 08:41

## 2025-06-25 RX ADMIN — IPRATROPIUM BROMIDE AND ALBUTEROL SULFATE 3 ML: .5; 3 SOLUTION RESPIRATORY (INHALATION) at 10:46

## 2025-06-25 RX ADMIN — ROPINIROLE 1 MG: 1 TABLET, FILM COATED ORAL at 21:23

## 2025-06-25 RX ADMIN — IPRATROPIUM BROMIDE AND ALBUTEROL SULFATE 3 ML: .5; 3 SOLUTION RESPIRATORY (INHALATION) at 07:18

## 2025-06-25 RX ADMIN — IPRATROPIUM BROMIDE AND ALBUTEROL SULFATE 3 ML: .5; 3 SOLUTION RESPIRATORY (INHALATION) at 20:53

## 2025-06-25 RX ADMIN — PREGABALIN 25 MG: 25 CAPSULE ORAL at 08:32

## 2025-06-25 RX ADMIN — FLUTICASONE PROPIONATE 1 SPRAY: 50 SPRAY, METERED NASAL at 10:52

## 2025-06-25 RX ADMIN — Medication 2.5 MG: at 21:23

## 2025-06-25 RX ADMIN — OXYCODONE HYDROCHLORIDE AND ACETAMINOPHEN 1 TABLET: 10; 325 TABLET ORAL at 18:22

## 2025-06-25 RX ADMIN — ALBUTEROL SULFATE 2.5 MG: 2.5 SOLUTION RESPIRATORY (INHALATION) at 02:50

## 2025-06-25 RX ADMIN — CEFAZOLIN 2000 MG: 2 INJECTION, POWDER, FOR SOLUTION INTRAMUSCULAR; INTRAVENOUS at 18:14

## 2025-06-25 RX ADMIN — POLYETHYLENE GLYCOL 3350 17 G: 17 POWDER, FOR SOLUTION ORAL at 08:32

## 2025-06-25 RX ADMIN — CYCLOBENZAPRINE HYDROCHLORIDE 5 MG: 10 TABLET, FILM COATED ORAL at 21:22

## 2025-06-25 RX ADMIN — CYCLOBENZAPRINE HYDROCHLORIDE 5 MG: 10 TABLET, FILM COATED ORAL at 15:52

## 2025-06-25 RX ADMIN — OXYCODONE HYDROCHLORIDE AND ACETAMINOPHEN 1 TABLET: 10; 325 TABLET ORAL at 14:47

## 2025-06-25 RX ADMIN — HYDROMORPHONE HYDROCHLORIDE 0.5 MG: 1 INJECTION, SOLUTION INTRAMUSCULAR; INTRAVENOUS; SUBCUTANEOUS at 15:52

## 2025-06-25 RX ADMIN — HYDROMORPHONE HYDROCHLORIDE 0.5 MG: 1 INJECTION, SOLUTION INTRAMUSCULAR; INTRAVENOUS; SUBCUTANEOUS at 21:23

## 2025-06-25 RX ADMIN — DOCUSATE SODIUM 100 MG: 100 CAPSULE, LIQUID FILLED ORAL at 21:22

## 2025-06-25 RX ADMIN — IPRATROPIUM BROMIDE AND ALBUTEROL SULFATE 3 ML: .5; 3 SOLUTION RESPIRATORY (INHALATION) at 14:42

## 2025-06-25 RX ADMIN — SENNOSIDES AND DOCUSATE SODIUM 2 TABLET: 50; 8.6 TABLET ORAL at 21:23

## 2025-06-25 RX ADMIN — MIDODRINE HYDROCHLORIDE 2.5 MG: 5 TABLET ORAL at 18:14

## 2025-06-25 RX ADMIN — OXYCODONE HYDROCHLORIDE AND ACETAMINOPHEN 1 TABLET: 10; 325 TABLET ORAL at 02:26

## 2025-06-25 RX ADMIN — DOCUSATE SODIUM 100 MG: 100 CAPSULE, LIQUID FILLED ORAL at 08:32

## 2025-06-25 NOTE — PLAN OF CARE
Goal Outcome Evaluation:     Pt resting in bed. Medicated for R shoulder pain per MAR. No other c/o voiced. VSS No s/s of distress Plan of care ongoing

## 2025-06-25 NOTE — PROGRESS NOTES
[facility]     LOS: 5 days     Subjective:    CC:Hypotension and Abnormal Lab    DIET:  Diet Order   Procedures    Diet: Cardiac, Fluid Restriction (240 mL/tray); Healthy Heart (2-3 Na+); Other (Specify mL/day) (1200); Fluid Consistency: Thin (IDDSI 0)   Lower extremity erythema, severe back pain    Objective:    Vital Signs:  Temp:  [97.9 °F (36.6 °C)-98.8 °F (37.1 °C)] 98.8 °F (37.1 °C)  Heart Rate:  [] 99  Resp:  [16-24] 18  BP: (136-142)/(67-77) 136/67    SpO2:  [96 %-100 %] 100 %  on   ;   Device (Oxygen Therapy): room air  Body mass index is 34.46 kg/m².    Physical Exam  Constitutional:       General: She is not in acute distress.  Pulmonary:      Effort: Pulmonary effort is normal.      Breath sounds: Normal breath sounds.   Abdominal:      General: There is no distension.      Palpations: Abdomen is soft.      Tenderness: There is no abdominal tenderness.   Musculoskeletal:      Right lower leg: Edema present.   Skin:     General: Skin is warm and dry.   Neurological:      General: No focal deficit present.      Mental Status: She is alert.   Psychiatric:         Mood and Affect: Mood normal.         Behavior: Behavior normal.         Results Review:    Glucose   Date Value Ref Range Status   06/25/2025 137 (H) 65 - 99 mg/dL Final   06/24/2025 129 (H) 65 - 99 mg/dL Final   06/23/2025 99 65 - 99 mg/dL Final     Results from last 7 days   Lab Units 06/25/25  0357   WBC 10*3/mm3 6.66   HEMOGLOBIN g/dL 8.3*   HEMATOCRIT % 26.4*   PLATELETS 10*3/mm3 108*     Results from last 7 days   Lab Units 06/25/25  0357   SODIUM mmol/L 142   POTASSIUM mmol/L 3.4*   CHLORIDE mmol/L 104   CO2 mmol/L 26.1   BUN mg/dL 21.0   CREATININE mg/dL 1.00   CALCIUM mg/dL 8.7   BILIRUBIN mg/dL 1.2   ALK PHOS U/L 168*   ALT (SGPT) U/L 6   AST (SGOT) U/L 39*   GLUCOSE mg/dL 137*         Results from last 7 days   Lab Units 06/25/25  0357   MAGNESIUM mg/dL 1.7         Cultures:  Blood Culture   Date  Value Ref Range Status   06/21/2025 No growth at 3 days  Preliminary   06/21/2025 No growth at 3 days  Preliminary       I have reviewed daily medications and changes in CPOE    Scheduled meds  [Held by provider] atenolol, 25 mg, Oral, Nightly  ceFAZolin, 2,000 mg, Intravenous, Q8H  [START ON 6/26/2025] cholecalciferol, 1,000 Units, Oral, Weekly  docusate sodium, 100 mg, Oral, BID  fluticasone, 1 spray, Nasal, Daily  ipratropium-albuterol, 3 mL, Nebulization, 4x Daily - RT  melatonin, 2.5 mg, Oral, Nightly  midodrine, 2.5 mg, Oral, BID AC  pantoprazole, 40 mg, Oral, QAM AC  senna-docusate sodium, 2 tablet, Oral, BID   And  polyethylene glycol, 17 g, Oral, Daily  pregabalin, 25 mg, Oral, Q12H  rOPINIRole, 1 mg, Oral, Nightly  [Held by provider] spironolactone, 25 mg, Oral, Daily  torsemide, 40 mg, Oral, Daily             PRN meds    albuterol    aluminum-magnesium hydroxide-simethicone    senna-docusate sodium **AND** polyethylene glycol **AND** bisacodyl **AND** bisacodyl    Calcium Replacement - Follow Nurse / BPA Driven Protocol    cyclobenzaprine    diazePAM    HYDROmorphone    Magnesium Standard Dose Replacement - Follow Nurse / BPA Driven Protocol    ondansetron ODT **OR** ondansetron    oxyCODONE-acetaminophen    Phosphorus Replacement - Follow Nurse / BPA Driven Protocol    Potassium Replacement - Follow Nurse / BPA Driven Protocol        Hypokalemia    HTN (hypertension)    Gastroesophageal reflux disease    Alcoholic cirrhosis of liver without ascites    Iron deficiency anemia    Transaminitis    CKD (chronic kidney disease) stage 3, GFR 30-59 ml/min    Hypotension    Thrombocytopenia        Assessment/Plan:    64 y.o. female admitted with Hypokalemia.     Patient now has excruciating low back pain.  Tender to palpation along the midline.  Still suspicious that this could be sacroiliac arthritis  - Getting a stat CT scan without contrast may end up needing CT with contrast or MRI.  Patient states she has a  very difficult time with MRIs   - Asking neurosurgery to see    SANDRO on CKD stage 3  - Creatinine 1.94 on arrival, value previously 1.02 on 05/23/25. Baseline values appear to fluctuate but average over past 6 lab draws prior to arrival ~1.3.   - Diuretic held on arrival, Nephrology consulted and following.      Right lower extremity cellulitis  - RLE warm, erythematous and tender to palpation. She is on Cefazolin, tolerating well. Will continue this as prescribed for now. RLE negative for DVT.     Hypokalemia  Hypophosphatemia  - Noted previously, repleted and improved. Continue monitoring.     History of HTN complicated by hypotension  - She is on Midodrine, continue as prescribed. Trend BP.   -torsemide 40 qday    Cirrhosis  Transaminitis     Anemia  Thrombocytopenia  - hb is stable     Right shoulder osteoarthritis  - With associated rotator cuff tendinopathy; S/P Right reverse total shoulder arthroplasty and Tenodesis of long head of biceps tendon with Dr. Lanier on 06/05/25. She is having some discomfort in her shoulders, XR obtained and reviewed, orthopedic surgery has seen     GERD  - Continue PPI as prescribed.      Hyperglycemia  - A1c <4.20%.    Hyopkalemia  -replace       Discussed with nephrology  Discussed with nurse  CT scan of the back    Edward Galan MD  06/25/25  15:50 EDT

## 2025-06-25 NOTE — THERAPY TREATMENT NOTE
Patient Name: Filomena Liu  : 1960    MRN: [id number]                              Today's Date: 2025       Admit Date: 2025    Visit Dx:     ICD-10-CM ICD-9-CM   1. Pedal edema  R60.0 782.3   2. Acute renal insufficiency  N28.9 593.9   3. Acute hypokalemia  E87.6 276.8   4. Acute anemia  D64.9 285.9   5. Decreased activities of daily living (ADL)  Z78.9 V49.89     Patient Active Problem List   Diagnosis    Mediastinal mass    Cervical stenosis of spinal canal    Cervical radiculopathy    Cellulitis/abscess of left foot    HTN (hypertension)    History of MRSA infection    Anxiety    Peroneal tenosynovitis    Fracture of navicular bone of left foot    Tobacco use    Non compliance with medical treatment    Screening for colon cancer    Osteoporosis    Shoulder arthritis    Primary localized osteoarthrosis of left shoulder region    History of adenomatous polyp of colon    Diverticulosis    Acute GI bleeding    Hyperkalemia    SANDRO (acute kidney injury)    Alcoholism    COPD (chronic obstructive pulmonary disease)    Acute blood loss anemia    Melena    Gastroesophageal reflux disease    Dysphagia    Alcoholic cirrhosis of liver without ascites    Other iron deficiency anemias    Malabsorption of iron    Acute pancreatitis    Diverticulitis    Hypoglycemia    Lactic acidosis    Adverse effect of iron    Iron deficiency anemia    Cirrhosis of liver without ascites    Infrarenal abdominal aortic aneurysm (AAA) without rupture    Arthritis of shoulder    Hypokalemia    Transaminitis    CKD (chronic kidney disease) stage 3, GFR 30-59 ml/min    Hypotension    Thrombocytopenia     Past Medical History:   Diagnosis Date    Abdominal aneurysm     DR. JOYCE FOLLOWING 3.5CM    Alcoholism 1985    Anemia 24    HAS HAD IRON INFUSIONS ALMOST YEARLY    Anxiety     Arthritis of back     Asthma 2015    At high risk for falls     Basal cell carcinoma     Bruises easily     Chronic kidney disease 24     Cirrhosis 8/5/24    COPD (chronic obstructive pulmonary disease)     MANAGED BY PRIMARY CARE    CTS (carpal tunnel syndrome) Surgery 1999    DDD (degenerative disc disease), cervical     Depression     Diverticulitis of colon Unknown    Fatty liver 2022    GERD (gastroesophageal reflux disease)     GI (gastrointestinal bleed) 8/5/24    History of anemia     History of MRSA infection     LEFT ANKLE TREAT [facility]  5YEARS GO    Hyperlipidemia ?    Hypertension ?    Low back strain Unknown    Lower leg edema     Lumbosacral disc disease 2024    Neck strain 2000    Neuropathy     Pancreatitis 2024    Panic attacks     Scoliosis Unknown    Shoulder arthritis 06/05/2023    Shoulder pain, left 07/07/2023    Sleep apnea     NO MACHINE USE    Spinal stenosis     Spondylolisthesis of cervical region     Steatosis of liver     Tachycardia     Tendinitis of knee 2022    Thoracic disc disorder 2000    Vertigo      Past Surgical History:   Procedure Laterality Date    BACK SURGERY      cervical disc surgery with fusion-    CARPAL TUNNEL RELEASE Bilateral     CATARACT EXTRACTION      CHOLECYSTECTOMY  1/1/20    COLONOSCOPY      COLONOSCOPY N/A 11/12/2020    Procedure: COLONOSCOPY, polypectomy;  Surgeon: Filomena Mckeon MD;  Location: [facility];  Service: Gastroenterology;  Laterality: N/A;  Diverticulosis; Hepatic flexure polyp x 1; Polyp at 60cm x 1; Polyp at 50cm x 1- hot snare;    COLONOSCOPY N/A 04/15/2024    Procedure: COLONOSCOPY into sigmoid colon with hot snare polypectomy;  Surgeon: Leatha Johns MD;  Location: [facility] ENDOSCOPY;  Service: General;  Laterality: N/A;  pre- history of polyps  post- diverticulosis, polyp    COLONOSCOPY N/A 05/07/2025    Procedure: COLONOSCOPY to cecum and ti with hot snare polypectomies;  Surgeon: Ana Guerrero MD;  Location: [facility] ENDOSCOPY;  Service: Gastroenterology;  Laterality: N/A;  PRE: IRON DEFICIENCY ANEMIA  POST: diverticulosis, polyps, hemorrhoids    ENDOSCOPY N/A  08/15/2024    Procedure: ESOPHAGOGASTRODUODENOSCOPY;  Surgeon: Garth Constantino MD;  Location: [facility] ENDOSCOPY;  Service: Gastroenterology;  Laterality: N/A;  PRE- CIRRHOSIS, DARK STOOL  POST- NORMAL    ENDOSCOPY N/A 05/07/2025    Procedure: ESOPHAGOGASTRODUODENOSCOPY;  Surgeon: Ana Guerrero MD;  Location: [facility] ENDOSCOPY;  Service: Gastroenterology;  Laterality: N/A;  PRE: CIRRHOSIS  POST:portal hypertensive gastropathy; esophagitis; hiatal hernia; varices    HYSTERECTOMY  1998    INCISION AND DRAINAGE LEG Left 11/17/2018    Procedure: Incision and Drainage of Left ankle;  Surgeon: Maxwell Lanier MD;  Location: [facility] MAIN OR;  Service: Orthopedics    NECK SURGERY  2000, 2005,2017    SIGMOIDOSCOPY N/A 03/28/2024    Procedure: SIGMOIDOSCOPY FLEXIBLE;  Surgeon: Leatha Johns MD;  Location: [facility] ENDOSCOPY;  Service: General;  Laterality: N/A;  pre: h/o colon polyps  post: poor prep, diverticulosis    SKIN BIOPSY      SKIN GRAFT SPLIT THICKNESS N/A 02/12/2019    Procedure: debridement SKIN GRAFT SPLIT THICKNESS left ankle wound;  Surgeon: Barry Worthy MD;  Location: [facility] OR [facility];  Service: Plastics    TONGUE LESION EXCISION/BIOPSY N/A 11/26/2024    Procedure: WIDE LOCAL EXCISION OF TONGUE LESION;  Surgeon: El Mercedes MD;  Location: [facility] MAIN OR;  Service: ENT;  Laterality: N/A;    TOTAL SHOULDER ARTHROPLASTY Left 07/20/2023    Procedure: Total  shoulder arthroplasty;  Surgeon: Maxwell Lanier MD;  Location: [facility] OR [facility];  Service: Orthopedics;  Laterality: Left;    TOTAL SHOULDER ARTHROPLASTY W/ DISTAL CLAVICLE EXCISION Right 6/5/2025    Procedure: Right Reverse Total Shoulder Arthroplasty;  Surgeon: Maxwell Lanier MD;  Location: [facility] OR [facility];  Service: Orthopedics;  Laterality: Right;    TOTAL THYMECTOMY      TRIGGER POINT INJECTION  4207-2020    UPPER GASTROINTESTINAL ENDOSCOPY  8/10/24    WRIST FRACTURE SURGERY      WRIST FRACTURE SURGERY Left       General Information        [facility] Name 06/25/25 1033          OT Time and Intention    Document Type therapy note (daily note)  -     Mode of Treatment occupational therapy;individual therapy  -     Patient Effort adequate  -[facility] Name 06/25/25 1033          General Information    Patient Profile Reviewed yes  -     Existing Precautions/Restrictions right;shoulder;non-weight bearing  No pushing, no pulling, no lifting, no weight bearing with right upper extremity. OK for PROM forward flexion and external rotation per orders.  -[facility] Name 06/25/25 1033          Cognition    Orientation Status (Cognition) oriented x 4  -[facility] Name 06/25/25 1033          Safety Issues/Impairments Affecting Functional Mobility    Safety Issues Affecting Function (Mobility) safety precautions follow-through/compliance;insight into deficits/self-awareness  -     Impairments Affecting Function (Mobility) endurance/activity tolerance;strength;range of motion (ROM);pain  -               User Key  (r) = Recorded By, (t) = Taken By, (c) = Cosigned By      Initials Name Provider Type     Ester Resendez, OTR/L, CSRS Occupational Therapist                     Mobility/ADL's       [facility] Name 06/25/25 1033          Bed Mobility    Supine-Sit [place] (Bed Mobility) modified independence  -     Sit-Supine [place] (Bed Mobility) modified independence  -[facility] Name 06/25/25 1033          Sit-Stand Transfer    Sit-Stand [place] (Transfers) contact guard  -     Assistive Device (Sit-Stand Transfers) walker, front-wheeled  -     Comment, (Sit-Stand Transfer) from bed X2 reps. slow to come to a stand without arm rests, also requesting therapist to hold down the walker to pull up on.  -[facility] Name 06/25/25 1033          Toilet Transfer    [place] Level (Toilet Transfer) modified independence  -     Assistive Device (Toilet Transfer) walker, front-wheeled;grab bars/safety frame  -[facility] Name 06/25/25  1033          Functional Mobility    Functional Mobility- Ind. Level supervision required;conditional independence  -     Functional Mobility- Device walker, front-wheeled  -     Functional Mobility-Distance (Feet) --  20  -SM       Row Name 06/25/25 1033          Activities of Daily Living    BADL Assessment/Intervention lower body dressing  -       Row Name 06/25/25 1033          Toileting Assessment/Training    [place] Level (Toileting) minimum assist (75% patient effort);adjust/manage clothing  -     Comment, (Toileting) difficulty pulling up on R hip  -       Row Name 06/25/25 1033          Lower Body Dressing Assessment/Training    [place] Level (Lower Body Dressing) modified independence;shoes/slippers  -     Position (Lower Body Dressing) edge of bed sitting  -               User Key  (r) = Recorded By, (t) = Taken By, (c) = Cosigned By      Initials Name Provider Type     Ester Resendez OTR/L, MJ Occupational Therapist                   Obj/Interventions       Row Name 06/25/25 1035          Shoulder (Therapeutic Exercise)    Shoulder PROM (Therapeutic Exercise) right;flexion;external rotation;10 repetitions;supine  -     Shoulder Pendulum Exercises (Therapeutic Exercise) standing;right  X10 clockwise/counterclockwise  -       Row Name 06/25/25 1035          Elbow/Forearm (Therapeutic Exercise)    Elbow/Forearm AROM (Therapeutic Exercise) right;flexion;extension;supination;pronation;10 repetitions  -[facility] Name 06/25/25 1035          Balance    Static Sitting Balance independent  -     Static Standing Balance independent  -     Dynamic Standing Balance independent;supervision  -     Position/Device Used, Standing Balance supported;walker, rolling  -               User Key  (r) = Recorded By, (t) = Taken By, (c) = Cosigned By      Initials Name Provider Type    Ester Powell OTR/L, MJ Occupational Therapist                   Goals/Plan    No  "documentation.                  Clinical Impression       Row Name 06/25/25 1036          Pain Assessment    Pre/Posttreatment Pain Comment \"not bad today\" when asked about pain  -SM       Row Name 06/25/25 1036          Plan of Care Review    Plan of Care Reviewed With patient  -     Outcome Evaluation Pt tolerates OT today. Pt is able to get to bathroom, some difficulty standing from a lower surface as well as pulling up pants fully afterwards. Overall reports going to the bathroom \"a work out\" Pt with recent R reverse total shoulder. She has been documented to be noncompliant with precautions. She participated in HEP and PROM today. Discussed importance of following precautions and therapy participation for optimal healing and recovery from shoulder sugery. Plans  PT to address following dc.  -       Row Name 06/25/25 1036          Therapy Plan Review/Discharge Plan (OT)    Anticipated Discharge Disposition (OT) home with home health  -       Row Name 06/25/25 1036          Vital Signs    Intratreatment Heart Rate (beats/min) 126  -       Row Name 06/25/25 1036          Positioning and Restraints    Pre-Treatment Position in bed  -     Post Treatment Position bed  -SM     In Bed fowlers;encouraged to call for assist;notified nsg;call light within reach  -               User Key  (r) = Recorded By, (t) = Taken By, (c) = Cosigned By      Initials Name Provider Type    Ester Powell, EULOGIO/L, CSRS Occupational Therapist                   Outcome Measures       Row Name 06/25/25 1311          How much help from another is currently needed...    Putting on and taking off regular lower body clothing? 3  -SM     Bathing (including washing, rinsing, and drying) 3  -SM     Toileting (which includes using toilet bed pan or urinal) 3  -SM     Putting on and taking off regular upper body clothing 3  -SM     Taking care of personal grooming (such as brushing teeth) 4  -SM     Eating meals 4  -SM     AM-PAC " "6 Clicks Score (OT) 20  -SM       Row Name 06/25/25 1311          Functional Assessment    Outcome Measure Options AM-PAC 6 Clicks Daily Activity (OT)  -SM               User Key  (r) = Recorded By, (t) = Taken By, (c) = Cosigned By      Initials Name Provider Type    MATILDE Ester Resendez, OTR/L, CSRS Occupational Therapist                      OT Recommendation and Plan     Plan of Care Review  Plan of Care Reviewed With: patient  Outcome Evaluation: Pt tolerates OT today. Pt is able to get to bathroom, some difficulty standing from a lower surface as well as pulling up pants fully afterwards. Overall reports going to the bathroom \"a work out\" Pt with recent R reverse total shoulder. She has been documented to be noncompliant with precautions. She participated in HEP and PROM today. Discussed importance of following precautions and therapy participation for optimal healing and recovery from shoulder sugery. Plans  PT to address following dc.     Time Calculation:         Time Calculation- OT       Row Name 06/25/25 1312             Time Calculation- OT    OT Start Time 1006  -SM      OT Stop Time 1029  -SM      OT Time Calculation (min) 23 min  -SM      OT Received On 06/25/25  -SM      OT - Next Appointment 06/26/25  -SM         Timed Charges    06875 - OT Therapeutic Exercise Minutes 10  -SM      27753 - OT Self Care/Mgmt Minutes 13  -SM         Total Minutes    Timed Charges Total Minutes 23  -SM       Total Minutes 23  -SM                User Key  (r) = Recorded By, (t) = Taken By, (c) = Cosigned By      Initials Name Provider Type     Ester Resendez OTR/L, CSRS Occupational Therapist                  Therapy Charges for Today       Code Description Service Date Service Provider Modifiers Qty    50898023385 HC OT THER PROC EA 15 MIN 6/25/2025 Ester Resendez OTR/L, CSRS GO 1    17139166558 HC OT SELF CARE/MGMT/TRAIN EA 15 MIN 6/25/2025 Ester Resendez OTR/L, CSRS GO 1                 Ester " EULOGIO Resendez/L, CSRS  6/25/2025

## 2025-06-25 NOTE — PROGRESS NOTES
[facility] Progress Note      Patient Name: Filomena Liu  : 1960  MRN: [id number]  Primary Care Physician:  Fernando Dunn MD  Date of admission: 2025    Subjective     Interval History:   Follow-up acute kidney injury on CKD 3B.  Urine output 1750.  Bowels moving.  Blood pressure stable.  On room air.  Asks if she can take 20 of torsemide instead of 40mg.  We discussed limiting Tylenol intake.  She was taking Tylenol in addition to Percocet at home.  Review of Systems:   As noted above    Objective     Vitals:   Temp:  [97.9 °F (36.6 °C)-98.7 °F (37.1 °C)] 97.9 °F (36.6 °C)  Heart Rate:  [] 89  Resp:  [16-18] 16  BP: (137-142)/(73-77) 142/73    Intake/Output Summary (Last 24 hours) at 2025 0834  Last data filed at 2025 204  Gross per 24 hour   Intake 240 ml   Output 1750 ml   Net -1510 ml       Physical Exam:    General Appearance:oriented x 3, no acute distress .  Obese  Skin: warm and dry  HEENT: oral mucosa normal, nonicteric sclera  Neck: supple, no JVD  Lungs: Clear to auscultation bilaterally.  Heart: RRR, normal S1 and S2  Abdomen: soft, nontender, nondistended.  Abdominal wall bruising.  Positive bowel sounds.  No extension.  Body wall edema  : no palpable bladder  Extremities: Venous stasis changes.  2+ lower extremity edema to hips  Neuro: normal speech and mental status     Scheduled Meds:     [Held by provider] atenolol, 25 mg, Oral, Nightly  ceFAZolin, 2,000 mg, Intravenous, Q8H  [START ON 2025] cholecalciferol, 1,000 Units, Oral, Weekly  docusate sodium, 100 mg, Oral, BID  fluticasone, 1 spray, Nasal, Daily  ipratropium-albuterol, 3 mL, Nebulization, 4x Daily - RT  melatonin, 2.5 mg, Oral, Nightly  midodrine, 2.5 mg, Oral, BID AC  pantoprazole, 40 mg, Oral, QAM AC  senna-docusate sodium, 2 tablet, Oral, BID   And  polyethylene glycol, 17 g, Oral, Daily  pregabalin, 25 mg, Oral, Q12H  rOPINIRole, 1 mg, Oral, Nightly  [Held by  provider] spironolactone, 25 mg, Oral, Daily  torsemide, 40 mg, Oral, Daily      IV Meds:          Results Reviewed:   I have personally reviewed the results from the time of this admission to 6/25/2025 08:34 EDT     Results from last 7 days   Lab Units 06/25/25  0357 06/24/25  0616 06/23/25  0457   SODIUM mmol/L 142 140 141   POTASSIUM mmol/L 3.4* 3.7 4.2   CHLORIDE mmol/L 104 104 103   CO2 mmol/L 26.1 26.7 28.5   BUN mg/dL 21.0 22.0 28.0*   CREATININE mg/dL 1.00 0.96 1.38*   CALCIUM mg/dL 8.7 9.1 9.1   BILIRUBIN mg/dL 1.2 1.2 1.1   ALK PHOS U/L 168* 175* 163*   ALT (SGPT) U/L 6 6 10   AST (SGOT) U/L 39* 37* 46*   GLUCOSE mg/dL 137* 129* 99       Estimated Creatinine Clearance: 54 mL/min (by C-G formula based on SCr of 1 mg/dL).    Results from last 7 days   Lab Units 06/25/25  0357 06/23/25  0457 06/22/25  1538 06/22/25  0454 06/21/25  0407 06/20/25  1443   MAGNESIUM mg/dL  --   --   --  2.5*  --  3.3*   PHOSPHORUS mg/dL 2.6 3.2 2.8  2.8 1.9*   < >  --     < > = values in this interval not displayed.             Results from last 7 days   Lab Units 06/25/25  0357 06/24/25  0616 06/23/25  1515 06/23/25  0457 06/22/25  0454 06/21/25  0407   WBC 10*3/mm3 6.66 7.07  --  7.53 7.78 7.19   HEMOGLOBIN g/dL 8.3* 7.8* 6.9* 7.0* 7.5* 7.6*   PLATELETS 10*3/mm3 108* 98*  --  97* 110* 109*             Assessment / Plan     ASSESSMENT:  Acute kidney injury on CKD 3B baseline creatinine 1.3-1.5.  Creatinine below usual baseline.  Initial acute kidney injury due to intravascular volume depletion.  May have been some contribution from Bactrim.  Still has significant edema.  Hypoalbuminemia and cirrhosis with extravascular volume also contributing.  2.  Anemia.  Recent GI evaluation EGD and colonoscopy.  Iron consistent with anemia of chronic disease.  GI plans possible outpatient capsule endoscopy.  Hemoglobin improved.  3.  Right shoulder pain status post reverse right total shoulder arthroplasty.  Ortho evaluated.  Recommended  continued use of sling and exercises.  4.  Lower extremity venous stasis  5.  Hypotension.   blood pressure now much more stable.  Reduce midodrine to 2.5 mg twice daily with goal of stopping it.  PLAN:  Continue p.o. torsemide.  Add back 25 mg Aldactone daily.  One-time dose potassium K-Dur 40 mill equivalents.  Check magnesium on the blood in the lab.  Reduce midodrine to 2.5 mg twice daily.  6.  When discharged has follow-up appointment with [facility]  [facility] July 9 at 11 AM.  Thank you for involving us in the care of Filomena ELGIN TinsleyNoe.  Please feel free to call with any questions.    Cyndi Dimas MD  06/25/25  08:34 EDT    [facility]  [phone]    Please note that portions of this note were completed with a voice recognition program.

## 2025-06-25 NOTE — CASE MANAGEMENT/SOCIAL WORK
Continued Stay Note  [facility]     Patient Name: Filomena Liu  MRN: [id number]  Today's Date: 6/25/2025    Admit Date: 6/20/2025    Plan: Plan home with significant other and [facility].  JENNY Witt RN   Discharge Plan       Row Name 06/25/25 1401       Plan    Plan Plan home with significant other and [facility].  JENNY Witt RN    Patient/Family in Agreement with Plan yes    Plan Comments Pt is current with [facility]  Pt's significant other will assist pt at home if needed and will transport her home. Per Alison ( [phone]) pt is current with [facility] and Alison will follow. Plan home with significant other and [facility]. JENNY Witt RN                   Discharge Codes    No documentation.                 Expected Discharge Date and Time       Expected Discharge Date Expected Discharge Time    Jun 25, 2025               Jessie Witt RN

## 2025-06-25 NOTE — CONSULTS
[facility] NEUROSURGERY CONSULT NOTE    Patient name: Filomena Liu  Referring Provider: Dr. Galan  Reason for Consultation: severe low back pain    Patient Care Team:  Fernando Dunn MD as PCP - General (Family Medicine)  Nam Calderon APRN as Referring Physician (Nurse Practitioner)  Alina Asencio MD as Consulting Physician (Hematology and Oncology)    Chief complaint: hypokalemia, LBP    Subjective .     History of present illness:    Patient is a 64 y.o.  female with HTN, GERD, liver cirrhosis, CKD, COPD, chronic low back pain, history of C4-7 ACDF with Dr. Scott, and shoulder surgery earlier this month.  She presented with bilateral lower extremity swelling and her diuretics were recently increased.  Potassium was 2.7 on arrival to the ER.  Neurosurgery consulted for acute low back pain that began last week but progressed and became severe today. Her pain is severe with big position changes. She has chronic low back pain and was seeing Dr. Warren at [facility] who recommended facet injections but states she has not seen him in a couple years. States her back pain is different than her prior pain. Denies leg pain, numbness, and tingling, or weakness. Currently on cefazolin for RLE cellulitus    Review of Systems  Review of Systems   Musculoskeletal:  Positive for back pain and gait problem.   Neurological:  Negative for weakness and numbness.       History  PAST MEDICAL HISTORY  Past Medical History:   Diagnosis Date    Abdominal aneurysm     DR. JOYCE FOLLOWING 3.5CM    Alcoholism 1985    Anemia 08/01/24    HAS HAD IRON INFUSIONS ALMOST YEARLY    Anxiety     Arthritis of back 2000    Asthma 2015    At high risk for falls     Basal cell carcinoma     Bruises easily     Chronic kidney disease 8/5/24    Cirrhosis 8/5/24    COPD (chronic obstructive pulmonary disease)     MANAGED BY PRIMARY CARE    CTS (carpal tunnel syndrome) Surgery 1999    DDD (degenerative disc disease), cervical     Depression      Diverticulitis of colon Unknown    Fatty liver 2022    GERD (gastroesophageal reflux disease)     GI (gastrointestinal bleed) 8/5/24    History of anemia     History of MRSA infection     LEFT ANKLE TREAT [facility]  5YEARS GO    Hyperlipidemia ?    Hypertension ?    Low back strain Unknown    Lower leg edema     Lumbosacral disc disease 2024    Neck strain 2000    Neuropathy     Pancreatitis 2024    Panic attacks     Scoliosis Unknown    Shoulder arthritis 06/05/2023    Shoulder pain, left 07/07/2023    Sleep apnea     NO MACHINE USE    Spinal stenosis     Spondylolisthesis of cervical region     Steatosis of liver     Tachycardia     Tendinitis of knee 2022    Thoracic disc disorder 2000    Vertigo        PAST SURGICAL HISTORY  Past Surgical History:   Procedure Laterality Date    BACK SURGERY      cervical disc surgery with fusion-    CARPAL TUNNEL RELEASE Bilateral     CATARACT EXTRACTION      CHOLECYSTECTOMY  1/1/20    COLONOSCOPY      COLONOSCOPY N/A 11/12/2020    Procedure: COLONOSCOPY, polypectomy;  Surgeon: Filomena Mckeon MD;  Location: [facility];  Service: Gastroenterology;  Laterality: N/A;  Diverticulosis; Hepatic flexure polyp x 1; Polyp at 60cm x 1; Polyp at 50cm x 1- hot snare;    COLONOSCOPY N/A 04/15/2024    Procedure: COLONOSCOPY into sigmoid colon with hot snare polypectomy;  Surgeon: Leatha Johns MD;  Location: [facility] ENDOSCOPY;  Service: General;  Laterality: N/A;  pre- history of polyps  post- diverticulosis, polyp    COLONOSCOPY N/A 05/07/2025    Procedure: COLONOSCOPY to cecum and ti with hot snare polypectomies;  Surgeon: Ana Guerrero MD;  Location: [facility] ENDOSCOPY;  Service: Gastroenterology;  Laterality: N/A;  PRE: IRON DEFICIENCY ANEMIA  POST: diverticulosis, polyps, hemorrhoids    ENDOSCOPY N/A 08/15/2024    Procedure: ESOPHAGOGASTRODUODENOSCOPY;  Surgeon: Garth Constantino MD;  Location: [facility] ENDOSCOPY;  Service: Gastroenterology;  Laterality: N/A;  PRE- CIRRHOSIS, DARK  STOOL  POST- NORMAL    ENDOSCOPY N/A 05/07/2025    Procedure: ESOPHAGOGASTRODUODENOSCOPY;  Surgeon: Ana Guerrero MD;  Location: [facility] ENDOSCOPY;  Service: Gastroenterology;  Laterality: N/A;  PRE: CIRRHOSIS  POST:portal hypertensive gastropathy; esophagitis; hiatal hernia; varices    HYSTERECTOMY  1998    INCISION AND DRAINAGE LEG Left 11/17/2018    Procedure: Incision and Drainage of Left ankle;  Surgeon: Maxwell Lanier MD;  Location: [facility] MAIN OR;  Service: Orthopedics    NECK SURGERY  2000, 2005,2017    SIGMOIDOSCOPY N/A 03/28/2024    Procedure: SIGMOIDOSCOPY FLEXIBLE;  Surgeon: Leatha Johns MD;  Location: [facility] ENDOSCOPY;  Service: General;  Laterality: N/A;  pre: h/o colon polyps  post: poor prep, diverticulosis    SKIN BIOPSY      SKIN GRAFT SPLIT THICKNESS N/A 02/12/2019    Procedure: debridement SKIN GRAFT SPLIT THICKNESS left ankle wound;  Surgeon: Barry Worthy MD;  Location: [facility] OR [facility];  Service: Plastics    TONGUE LESION EXCISION/BIOPSY N/A 11/26/2024    Procedure: WIDE LOCAL EXCISION OF TONGUE LESION;  Surgeon: El Mercedes MD;  Location: [facility] MAIN OR;  Service: ENT;  Laterality: N/A;    TOTAL SHOULDER ARTHROPLASTY Left 07/20/2023    Procedure: Total  shoulder arthroplasty;  Surgeon: Maxwell Lanier MD;  Location: [facility] OR [facility];  Service: Orthopedics;  Laterality: Left;    TOTAL SHOULDER ARTHROPLASTY W/ DISTAL CLAVICLE EXCISION Right 6/5/2025    Procedure: Right Reverse Total Shoulder Arthroplasty;  Surgeon: Maxwell Lanier MD;  Location: [facility] OR [facility];  Service: Orthopedics;  Laterality: Right;    TOTAL THYMECTOMY      TRIGGER POINT INJECTION  [id number]    UPPER GASTROINTESTINAL ENDOSCOPY  8/10/24    WRIST FRACTURE SURGERY      WRIST FRACTURE SURGERY Left        FAMILY HISTORY  Family History   Problem Relation Age of Onset    Emphysema Mother     Alzheimer's disease Father     Cancer Father     Osteoporosis Sister     Scoliosis Sister     Malig Hyperthermia  Neg Hx        SOCIAL HISTORY  Social History     Tobacco Use    Smoking status: Never     Passive exposure: Current    Smokeless tobacco: Never    Tobacco comments:     PATIENT STATES SHE IS CURRENTLY TRYING TO QUIT. STATES SHE IS SMOKING APPROX. 4-8 CIGARETTES PER DAY AS OF 5-23-25   Vaping Use    Vaping status: Never Used   Substance Use Topics    Alcohol use: Not Currently     Alcohol/week: 10.0 standard drinks of alcohol     Comment: PATIENT STATES SHE IS 80 DAYS SOBER AS OF 5-23-25    Drug use: No       Allergies:  Wound dressing adhesive    MEDICATIONS:  Medications Prior to Admission   Medication Sig Dispense Refill Last Dose/Taking    albuterol (PROVENTIL HFA;VENTOLIN HFA) 108 (90 Base) MCG/ACT inhaler Inhale 2 puffs 3 times a day. Indications: Spasm of Lung Air Passages   6/20/2025    atenolol (TENORMIN) 25 MG tablet Take 1 tablet by mouth Every Night.   6/19/2025    Cholecalciferol 25 MCG (1000 UT) tablet Take 1 tablet by mouth 1 (One) Time Per Week. HOLDING AS OF 5-23-25 FOR HIGH VIT D LEVELS  Indications: Vitamin D Deficiency   6/19/2025    docusate sodium (COLACE) 100 MG capsule Take 1 capsule by mouth 2 (Two) Times a Day. 60 capsule 0 6/19/2025    fluticasone (FLONASE) 50 MCG/ACT nasal spray Administer 1 spray into the nostril(s) as directed by provider Daily. Indications: Stuffy Nose   6/19/2025    ipratropium-albuterol (DUO-NEB) 0.5-2.5 mg/3 ml nebulizer Take 3 mL by nebulization 4 (Four) Times a Day. Indications: Chronic Obstructive Lung Disease   6/20/2025    oxyCODONE-acetaminophen (PERCOCET) 7.5-325 MG per tablet Take 1 tablet by mouth Every 4 (Four) Hours As Needed for Moderate Pain. 30 tablet 0 6/20/2025    pantoprazole (PROTONIX) 40 MG EC tablet Take 1 tablet by mouth 2 (Two) Times a Day for 60 days. (Patient taking differently: Take 1 tablet by mouth Daily.) 60 tablet 1 6/20/2025    pregabalin (LYRICA) 25 MG capsule Take 1 capsule by mouth Every 12 (Twelve) Hours.   6/19/2025    rOPINIRole  (REQUIP) 1 MG tablet Take 1 tablet by mouth every night at bedtime. Indications: Restless Leg Syndrome   6/19/2025    spironolactone (ALDACTONE) 25 MG tablet Take 1 tablet by mouth Daily. (Patient taking differently: Take 1 tablet by mouth 2 (Two) Times a Day. Indications: High Blood Pressure) 30 tablet 0 6/20/2025    torsemide (DEMADEX) 20 MG tablet Take 1 tablet by mouth Daily. Indications: Cardiac Failure, Edema, High Blood Pressure (Patient taking differently: Take 5 tablets by mouth 2 (Two) Times a Day. Indications: Cardiac Failure, Edema, High Blood Pressure) 30 tablet 0 Patient Taking Differently    ondansetron (Zofran) 4 MG tablet Take 1 tablet by mouth Every 8 (Eight) Hours As Needed for Nausea or Vomiting. 12 tablet 0          Current Facility-Administered Medications:     albuterol (PROVENTIL) nebulizer solution 0.083% 2.5 mg/3mL, 2.5 mg, Nebulization, Q6H PRN, Belen Choudhury MD, 2.5 mg at 06/25/25 0250    aluminum-magnesium hydroxide-simethicone (MAALOX MAX) 400-400-40 MG/5ML suspension 15 mL, 15 mL, Oral, Q6H PRN, Eloisa Carrillo, APRN, 15 mL at 06/23/25 0112    [Held by provider] atenolol (TENORMIN) tablet 25 mg, 25 mg, Oral, Nightly, Belen Choudhury MD, 25 mg at 06/20/25 2107    sennosides-docusate (PERICOLACE) 8.6-50 MG per tablet 2 tablet, 2 tablet, Oral, BID, 2 tablet at 06/25/25 0831 **AND** polyethylene glycol (MIRALAX) packet 17 g, 17 g, Oral, Daily, 17 g at 06/25/25 0832 **AND** bisacodyl (DULCOLAX) EC tablet 5 mg, 5 mg, Oral, Daily PRN **AND** bisacodyl (DULCOLAX) suppository 10 mg, 10 mg, Rectal, Daily PRN, Mike Bunch, DO    Calcium Replacement - Follow Nurse / BPA Driven Protocol, , Not Applicable, PRN, Belen Choudhury MD    ceFAZolin 2000 mg IVPB in 100 mL NS (MBP), 2,000 mg, Intravenous, Q8H, Mike Bunch DO, 2,000 mg at 06/25/25 1040    [START ON 6/26/2025] cholecalciferol (VITAMIN D3) tablet 1,000 Units, 1,000 Units, Oral, Weekly, Belen Choudhury MD     cyclobenzaprine (FLEXERIL) tablet 5 mg, 5 mg, Oral, TID PRN, Edward Galan MD, 5 mg at 06/25/25 1552    diazePAM (VALIUM) tablet 2 mg, 2 mg, Oral, Q6H PRN, Edward Galan MD    docusate sodium (COLACE) capsule 100 mg, 100 mg, Oral, BID, Belen Choudhury MD, 100 mg at 06/25/25 0832    fluticasone (FLONASE) 50 MCG/ACT nasal spray 1 spray, 1 spray, Nasal, Daily, Belen Choudhury MD, 1 spray at 06/25/25 1052    HYDROmorphone (DILAUDID) injection 0.5 mg, 0.5 mg, Intravenous, Q2H PRN, Edward Galan MD, 0.5 mg at 06/25/25 1552    ipratropium-albuterol (DUO-NEB) nebulizer solution 3 mL, 3 mL, Nebulization, 4x Daily - RT, Belen Choudhury MD, 3 mL at 06/25/25 1442    Magnesium Standard Dose Replacement - Follow Nurse / BPA Driven Protocol, , Not Applicable, PRN, Belen Choudhury MD    melatonin tablet 2.5 mg, 2.5 mg, Oral, Nightly, Belen Choudhury MD, 2.5 mg at 06/24/25 2218    midodrine (PROAMATINE) tablet 2.5 mg, 2.5 mg, Oral, BID AC, Cyndi Dimas MD, 2.5 mg at 06/25/25 0832    ondansetron ODT (ZOFRAN-ODT) disintegrating tablet 4 mg, 4 mg, Oral, Q6H PRN **OR** ondansetron (ZOFRAN) injection 4 mg, 4 mg, Intravenous, Q6H PRN, Belen Choudhury MD    oxyCODONE-acetaminophen (PERCOCET)  MG per tablet 1 tablet, 1 tablet, Oral, Q4H PRN, Gypsy Mart, APRN, 1 tablet at 06/25/25 1447    pantoprazole (PROTONIX) EC tablet 40 mg, 40 mg, Oral, QAM AC, Belen Choudhury MD, 40 mg at 06/25/25 0831    Phosphorus Replacement - Follow Nurse / BPA Driven Protocol, , Not Applicable, Macey GARZON Renate Andrea, MD    Potassium Replacement - Follow Nurse / BPA Driven Protocol, , Not Applicable, Macey GARZON Renate Andrea, MD    pregabalin (LYRICA) capsule 25 mg, 25 mg, Oral, Q12H, Belen Choudhury MD, 25 mg at 06/25/25 0832    rOPINIRole (REQUIP) tablet 1 mg, 1 mg, Oral, Nightly, Belen Choudhury MD, 1 mg at 06/24/25 0816    [Held by provider] spironolactone  (ALDACTONE) tablet 25 mg, 25 mg, Oral, Daily, Cyndi Dimas MD    torsemide (DEMADEX) tablet 40 mg, 40 mg, Oral, Daily, Chan Brewster MD, 40 mg at 06/25/25 0841    COMORBID CONDITIONS:  chronic kidney disease including stage, Chronic respiratory failure, COPD, Hypertension, and GERD, liver cirrhosis, history of cervical fusion, and chronic low back pain    Objective     Results Review:  LABS:  Results from last 7 days   Lab Units 06/25/25  0357 06/24/25  0616 06/23/25  1515 06/23/25  0457   WBC 10*3/mm3 6.66 7.07  --  7.53   HEMOGLOBIN g/dL 8.3* 7.8* 6.9* 7.0*   HEMATOCRIT % 26.4* 24.3* 21.7* 22.0*   PLATELETS 10*3/mm3 108* 98*  --  97*     Results from last 7 days   Lab Units 06/25/25  0357 06/24/25  0616 06/23/25  0457   SODIUM mmol/L 142 140 141   POTASSIUM mmol/L 3.4* 3.7 4.2   CHLORIDE mmol/L 104 104 103   CO2 mmol/L 26.1 26.7 28.5   BUN mg/dL 21.0 22.0 28.0*   CREATININE mg/dL 1.00 0.96 1.38*   CALCIUM mg/dL 8.7 9.1 9.1   BILIRUBIN mg/dL 1.2 1.2 1.1   ALK PHOS U/L 168* 175* 163*   ALT (SGPT) U/L 6 6 10   AST (SGOT) U/L 39* 37* 46*   GLUCOSE mg/dL 137* 129* 99         DIAGNOSTICS:  BLE venous doppler: left popliteal fossa fluid collection. No thrombus    Results Review:   I reviewed the patient's new clinical results.  I personally viewed and interpreted the patient's Chart, labs, medications, and imaging has been reviewed by and discussed with Dr. Jefferson     Vital Signs   Temp:  [97.9 °F (36.6 °C)-98.8 °F (37.1 °C)] 98.8 °F (37.1 °C)  Heart Rate:  [] 99  Resp:  [16-24] 18  BP: (136-142)/(67-77) 136/67    Physical Exam:  Physical Exam  Vitals reviewed.   Constitutional:       Appearance: Normal appearance.   Pulmonary:      Effort: Pulmonary effort is normal.   Musculoskeletal:      Lumbar back: Tenderness present. No bony tenderness. Negative right straight leg raise test and negative left straight leg raise test.      Comments: Right paraspinal tenderness   Skin:     General: Skin is warm  and dry.   Neurological:      Mental Status: She is alert.      Motor: Motor strength is normal.     Deep Tendon Reflexes:      Reflex Scores:       Patellar reflexes are 2+ on the right side and 2+ on the left side.       Achilles reflexes are 1+ on the right side and 1+ on the left side.  Psychiatric:         Speech: Speech normal.       Neurological Exam  Mental Status  Alert. Oriented to person, place, time and situation. Recent and remote memory are intact. Speech is normal. Language is fluent with no aphasia. Attention and concentration are normal.    Motor   Strength is 5/5 throughout all four extremities.  Some generalized weakness in legs but no focal weakness.    Sensory  Sensation is intact to light touch, pinprick, vibration and proprioception in all four extremities.    Reflexes                                            Right                      Left  Patellar                                2+                         2+  Achilles                                1+                         1+    Gait    Slow cautious gait using walker.      Assessment & Plan       Hypokalemia    HTN (hypertension)    Gastroesophageal reflux disease    Alcoholic cirrhosis of liver without ascites    Iron deficiency anemia    Transaminitis    CKD (chronic kidney disease) stage 3, GFR 30-59 ml/min    Hypotension    Thrombocytopenia      Problem List Items Addressed This Visit    None  Visit Diagnoses         Pedal edema    -  Primary      Acute renal insufficiency        Relevant Medications    torsemide (DEMADEX) tablet 40 mg    spironolactone (ALDACTONE) tablet 25 mg      Acute hypokalemia          Acute anemia          Decreased activities of daily living (ADL)               Pleasant 64 old female with recent right shoulder surgery,  right lower extremity cellulitis, and hypokalemia now with worsening low back pain that became severe today.  Most of her pain is in her right low back where she is tender to touch and  "becomes severe with position changes.  She denies any radiating leg symptoms or sensory changes. No focal deficits on exam. Patient does not think she will tolerate MRI, will start with CT lumbar and go from there. Further recommendations once imaging completed.     PLAN:     -Continue muscle relaxers and multimodal pain control  -CT Lumbar  -Mobilize as tolerable    I discussed the patient's findings and my recommendations with patient, nursing staff, and Dr. Jefferson    I spent 32 minutes caring for Filomena Liu on this date of service. This time includes time spent by me in the following activities: preparing for the visit, reviewing tests, obtaining and/or reviewing a separately obtained history, performing a medically appropriate examination and/or evaluation, counseling and educating the patient/family/caregiver, ordering medications, tests, or procedures, referring and communicating with other health care professionals, documenting information in the medical record, independently interpreting results and communicating that information with the patient/family/caregiver, and care coordination        During patient visit, I utilized appropriate personal protective equipment including gloves and mask.  Mask used was standard procedure mask. Appropriate PPE was worn during the entire visit.  Hand hygiene was completed before and after.     Lily Fine, TAYLER  06/25/25  16:04 EDT    \"Dictated utilizing Dragon dictation\".     "
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
## 2025-06-25 NOTE — PLAN OF CARE
"Goal Outcome Evaluation:  Plan of Care Reviewed With: patient           Outcome Evaluation: Pt tolerates OT today. Pt is able to get to bathroom, some difficulty standing from a lower surface as well as pulling up pants fully afterwards. Overall reports going to the bathroom \"a work out\" Pt with recent R reverse total shoulder. She has been documented to be noncompliant with precautions. She participated in HEP and PROM today. Discussed importance of following precautions and therapy participation for optimal healing and recovery from shoulder sugery. Plans  PT to address following dc.    Anticipated Discharge Disposition (OT): home with home health                        "

## 2025-06-25 NOTE — PLAN OF CARE
Goal Outcome Evaluation:                         Patient alert and oriented x4 with c/o pain- to back. Patient with report of severe pain to lower back late this afternoon. Patient medicated for pain per MAR, Dr Galan aware with orders for dilaudid, neuro surgery consult which TAYLER Bautista examined patient and CT of lumbar which has been completed. Lily said she would like for patient to have the flexeril stay in her system; it is ordered prn. Will pass on in report.  Patient up to bathroom frequently with standby assist and walker. No acute distress noted, will continue to monitor.

## 2025-06-26 ENCOUNTER — APPOINTMENT (OUTPATIENT)
Dept: GENERAL RADIOLOGY | Facility: HOSPITAL | Age: 65
End: 2025-06-26
Payer: MEDICARE

## 2025-06-26 LAB
ALBUMIN SERPL-MCNC: 2.8 G/DL (ref 3.5–5.2)
ALBUMIN/GLOB SERPL: 1 G/DL
ALP SERPL-CCNC: 182 U/L (ref 39–117)
ALT SERPL W P-5'-P-CCNC: 5 U/L (ref 1–33)
ANION GAP SERPL CALCULATED.3IONS-SCNC: 7 MMOL/L (ref 5–15)
AST SERPL-CCNC: 45 U/L (ref 1–32)
BACTERIA SPEC AEROBE CULT: NORMAL
BACTERIA SPEC AEROBE CULT: NORMAL
BASOPHILS # BLD AUTO: 0.03 10*3/MM3 (ref 0–0.2)
BASOPHILS NFR BLD AUTO: 0.5 % (ref 0–1.5)
BILIRUB SERPL-MCNC: 1.2 MG/DL (ref 0–1.2)
BUN SERPL-MCNC: 21 MG/DL (ref 8–23)
BUN/CREAT SERPL: 19.8 (ref 7–25)
CALCIUM SPEC-SCNC: 8.7 MG/DL (ref 8.6–10.5)
CHLORIDE SERPL-SCNC: 106 MMOL/L (ref 98–107)
CO2 SERPL-SCNC: 30 MMOL/L (ref 22–29)
CREAT SERPL-MCNC: 1.06 MG/DL (ref 0.57–1)
DEPRECATED RDW RBC AUTO: 46.9 FL (ref 37–54)
EGFRCR SERPLBLD CKD-EPI 2021: 58.8 ML/MIN/1.73
EOSINOPHIL # BLD AUTO: 0.27 10*3/MM3 (ref 0–0.4)
EOSINOPHIL NFR BLD AUTO: 4.8 % (ref 0.3–6.2)
ERYTHROCYTE [DISTWIDTH] IN BLOOD BY AUTOMATED COUNT: 14.4 % (ref 12.3–15.4)
GLOBULIN UR ELPH-MCNC: 2.8 GM/DL
GLUCOSE SERPL-MCNC: 83 MG/DL (ref 65–99)
HCT VFR BLD AUTO: 25.9 % (ref 34–46.6)
HGB BLD-MCNC: 8 G/DL (ref 12–15.9)
IMM GRANULOCYTES # BLD AUTO: 0.02 10*3/MM3 (ref 0–0.05)
IMM GRANULOCYTES NFR BLD AUTO: 0.4 % (ref 0–0.5)
LYMPHOCYTES # BLD AUTO: 1.18 10*3/MM3 (ref 0.7–3.1)
LYMPHOCYTES NFR BLD AUTO: 21 % (ref 19.6–45.3)
MCH RBC QN AUTO: 28 PG (ref 26.6–33)
MCHC RBC AUTO-ENTMCNC: 30.9 G/DL (ref 31.5–35.7)
MCV RBC AUTO: 90.6 FL (ref 79–97)
MONOCYTES # BLD AUTO: 0.8 10*3/MM3 (ref 0.1–0.9)
MONOCYTES NFR BLD AUTO: 14.2 % (ref 5–12)
NEUTROPHILS NFR BLD AUTO: 3.33 10*3/MM3 (ref 1.7–7)
NEUTROPHILS NFR BLD AUTO: 59.1 % (ref 42.7–76)
NRBC BLD AUTO-RTO: 0 /100 WBC (ref 0–0.2)
PHOSPHATE SERPL-MCNC: 2.8 MG/DL (ref 2.5–4.5)
PLATELET # BLD AUTO: 104 10*3/MM3 (ref 140–450)
PMV BLD AUTO: 10.2 FL (ref 6–12)
POTASSIUM SERPL-SCNC: 3.7 MMOL/L (ref 3.5–5.2)
PROT SERPL-MCNC: 5.6 G/DL (ref 6–8.5)
RBC # BLD AUTO: 2.86 10*6/MM3 (ref 3.77–5.28)
SODIUM SERPL-SCNC: 143 MMOL/L (ref 136–145)
WBC NRBC COR # BLD AUTO: 5.63 10*3/MM3 (ref 3.4–10.8)

## 2025-06-26 PROCEDURE — 97110 THERAPEUTIC EXERCISES: CPT

## 2025-06-26 PROCEDURE — 85025 COMPLETE CBC W/AUTO DIFF WBC: CPT | Performed by: STUDENT IN AN ORGANIZED HEALTH CARE EDUCATION/TRAINING PROGRAM

## 2025-06-26 PROCEDURE — 99231 SBSQ HOSP IP/OBS SF/LOW 25: CPT | Performed by: NURSE PRACTITIONER

## 2025-06-26 PROCEDURE — 94799 UNLISTED PULMONARY SVC/PX: CPT

## 2025-06-26 PROCEDURE — 25010000002 HYDROMORPHONE PER 4 MG: Performed by: HOSPITALIST

## 2025-06-26 PROCEDURE — 94761 N-INVAS EAR/PLS OXIMETRY MLT: CPT

## 2025-06-26 PROCEDURE — 25010000002 CEFAZOLIN PER 500 MG: Performed by: STUDENT IN AN ORGANIZED HEALTH CARE EDUCATION/TRAINING PROGRAM

## 2025-06-26 PROCEDURE — 84100 ASSAY OF PHOSPHORUS: CPT | Performed by: INTERNAL MEDICINE

## 2025-06-26 PROCEDURE — 80053 COMPREHEN METABOLIC PANEL: CPT | Performed by: STUDENT IN AN ORGANIZED HEALTH CARE EDUCATION/TRAINING PROGRAM

## 2025-06-26 PROCEDURE — 72100 X-RAY EXAM L-S SPINE 2/3 VWS: CPT

## 2025-06-26 PROCEDURE — 25010000002 HYDROMORPHONE 1 MG/ML SOLUTION: Performed by: HOSPITALIST

## 2025-06-26 PROCEDURE — 94664 DEMO&/EVAL PT USE INHALER: CPT

## 2025-06-26 RX ORDER — ATENOLOL 50 MG/1
25 TABLET ORAL
Status: DISCONTINUED | OUTPATIENT
Start: 2025-06-26 | End: 2025-06-27 | Stop reason: HOSPADM

## 2025-06-26 RX ORDER — OXYCODONE AND ACETAMINOPHEN 10; 325 MG/1; MG/1
1 TABLET ORAL EVERY 4 HOURS PRN
Refills: 0 | Status: DISCONTINUED | OUTPATIENT
Start: 2025-06-26 | End: 2025-06-27 | Stop reason: HOSPADM

## 2025-06-26 RX ADMIN — DIAZEPAM 2 MG: 2 TABLET ORAL at 21:07

## 2025-06-26 RX ADMIN — OXYCODONE HYDROCHLORIDE AND ACETAMINOPHEN 1 TABLET: 10; 325 TABLET ORAL at 07:54

## 2025-06-26 RX ADMIN — HYDROMORPHONE HYDROCHLORIDE 0.5 MG: 1 INJECTION, SOLUTION INTRAMUSCULAR; INTRAVENOUS; SUBCUTANEOUS at 11:14

## 2025-06-26 RX ADMIN — CYCLOBENZAPRINE HYDROCHLORIDE 5 MG: 10 TABLET, FILM COATED ORAL at 16:15

## 2025-06-26 RX ADMIN — PANTOPRAZOLE SODIUM 40 MG: 40 TABLET, DELAYED RELEASE ORAL at 07:54

## 2025-06-26 RX ADMIN — SENNOSIDES AND DOCUSATE SODIUM 2 TABLET: 50; 8.6 TABLET ORAL at 08:00

## 2025-06-26 RX ADMIN — PREGABALIN 25 MG: 25 CAPSULE ORAL at 08:00

## 2025-06-26 RX ADMIN — FLUTICASONE PROPIONATE 1 SPRAY: 50 SPRAY, METERED NASAL at 08:00

## 2025-06-26 RX ADMIN — DOCUSATE SODIUM 100 MG: 100 CAPSULE, LIQUID FILLED ORAL at 08:00

## 2025-06-26 RX ADMIN — BISACODYL 5 MG: 5 TABLET, COATED ORAL at 14:16

## 2025-06-26 RX ADMIN — HYDROMORPHONE HYDROCHLORIDE 0.5 MG: 1 INJECTION, SOLUTION INTRAMUSCULAR; INTRAVENOUS; SUBCUTANEOUS at 04:06

## 2025-06-26 RX ADMIN — HYDROMORPHONE HYDROCHLORIDE 0.5 MG: 1 INJECTION, SOLUTION INTRAMUSCULAR; INTRAVENOUS; SUBCUTANEOUS at 19:15

## 2025-06-26 RX ADMIN — IPRATROPIUM BROMIDE AND ALBUTEROL SULFATE 3 ML: .5; 3 SOLUTION RESPIRATORY (INHALATION) at 08:33

## 2025-06-26 RX ADMIN — HYDROMORPHONE HYDROCHLORIDE 0.5 MG: 1 INJECTION, SOLUTION INTRAMUSCULAR; INTRAVENOUS; SUBCUTANEOUS at 13:20

## 2025-06-26 RX ADMIN — ROPINIROLE 1 MG: 1 TABLET, FILM COATED ORAL at 21:07

## 2025-06-26 RX ADMIN — ALBUTEROL SULFATE 2.5 MG: 2.5 SOLUTION RESPIRATORY (INHALATION) at 04:21

## 2025-06-26 RX ADMIN — SENNOSIDES AND DOCUSATE SODIUM 2 TABLET: 50; 8.6 TABLET ORAL at 21:07

## 2025-06-26 RX ADMIN — OXYCODONE AND ACETAMINOPHEN 1 TABLET: 325; 10 TABLET ORAL at 21:06

## 2025-06-26 RX ADMIN — PREGABALIN 25 MG: 25 CAPSULE ORAL at 21:07

## 2025-06-26 RX ADMIN — IPRATROPIUM BROMIDE AND ALBUTEROL SULFATE 3 ML: .5; 3 SOLUTION RESPIRATORY (INHALATION) at 11:29

## 2025-06-26 RX ADMIN — OXYCODONE AND ACETAMINOPHEN 1 TABLET: 325; 10 TABLET ORAL at 14:10

## 2025-06-26 RX ADMIN — DOCUSATE SODIUM 100 MG: 100 CAPSULE, LIQUID FILLED ORAL at 21:07

## 2025-06-26 RX ADMIN — OXYCODONE HYDROCHLORIDE AND ACETAMINOPHEN 1 TABLET: 10; 325 TABLET ORAL at 03:10

## 2025-06-26 RX ADMIN — HYDROMORPHONE HYDROCHLORIDE 0.5 MG: 1 INJECTION, SOLUTION INTRAMUSCULAR; INTRAVENOUS; SUBCUTANEOUS at 16:15

## 2025-06-26 RX ADMIN — Medication 1000 UNITS: at 08:00

## 2025-06-26 RX ADMIN — POLYETHYLENE GLYCOL 3350 17 G: 17 POWDER, FOR SOLUTION ORAL at 08:00

## 2025-06-26 RX ADMIN — CEFAZOLIN 2000 MG: 2 INJECTION, POWDER, FOR SOLUTION INTRAMUSCULAR; INTRAVENOUS at 18:07

## 2025-06-26 RX ADMIN — CEFAZOLIN 2000 MG: 2 INJECTION, POWDER, FOR SOLUTION INTRAMUSCULAR; INTRAVENOUS at 10:11

## 2025-06-26 RX ADMIN — ATENOLOL 25 MG: 50 TABLET ORAL at 10:10

## 2025-06-26 RX ADMIN — IPRATROPIUM BROMIDE AND ALBUTEROL SULFATE 3 ML: .5; 3 SOLUTION RESPIRATORY (INHALATION) at 19:53

## 2025-06-26 RX ADMIN — CYCLOBENZAPRINE HYDROCHLORIDE 5 MG: 10 TABLET, FILM COATED ORAL at 06:40

## 2025-06-26 RX ADMIN — HYDROMORPHONE HYDROCHLORIDE 0.5 MG: 1 INJECTION, SOLUTION INTRAMUSCULAR; INTRAVENOUS; SUBCUTANEOUS at 06:39

## 2025-06-26 RX ADMIN — TORSEMIDE 40 MG: 20 TABLET ORAL at 08:00

## 2025-06-26 RX ADMIN — CEFAZOLIN 2000 MG: 2 INJECTION, POWDER, FOR SOLUTION INTRAMUSCULAR; INTRAVENOUS at 02:27

## 2025-06-26 NOTE — PLAN OF CARE
Goal Outcome Evaluation:                               Patient alert and oriented with constant c/o pain to back and right shoulder. Patient medicated per MAR. Patient up to bathroom with stand by assistance and walker very frequently. Patient on room air and sinus on monitor. Patient with new brace to back- patient unable to put on and take off per self. Patient will need further education and encouragement on using the LCO- she verbalized she would not be using it at home. No acute distress noted, will continue to monitor.

## 2025-06-26 NOTE — PLAN OF CARE
Goal Outcome Evaluation:      Pt resting in bed. Medicated for pain per MAR. No other c/o voiced. VSS No s/s of distress. Plan of care ongoing

## 2025-06-26 NOTE — PROGRESS NOTES
[facility]     LOS: 6 days     Subjective:    CC:Hypotension and Abnormal Lab    DIET:  Diet Order   Procedures    Diet: Cardiac, Fluid Restriction (240 mL/tray); Healthy Heart (2-3 Na+); Other (Specify mL/day) (1200); Fluid Consistency: Thin (IDDSI 0)   Back pain is much better    Objective:    Vital Signs:  Temp:  [98 °F (36.7 °C)-98.8 °F (37.1 °C)] 98 °F (36.7 °C)  Heart Rate:  [] 95  Resp:  [18-20] 18  BP: (128-145)/(79-97) 137/79    SpO2:  [95 %-98 %] 95 %  on   ;   Device (Oxygen Therapy): room air  Body mass index is 34.94 kg/m².    Physical Exam  Constitutional:       General: She is not in acute distress.  Pulmonary:      Effort: Pulmonary effort is normal.      Breath sounds: Normal breath sounds.   Abdominal:      General: There is no distension.      Palpations: Abdomen is soft.      Tenderness: There is no abdominal tenderness.   Musculoskeletal:      Right lower leg: Edema present.   Skin:     General: Skin is warm and dry.   Neurological:      General: No focal deficit present.      Mental Status: She is alert.   Psychiatric:         Mood and Affect: Mood normal.         Behavior: Behavior normal.         Results Review:    Glucose   Date Value Ref Range Status   06/26/2025 83 65 - 99 mg/dL Final   06/25/2025 137 (H) 65 - 99 mg/dL Final   06/24/2025 129 (H) 65 - 99 mg/dL Final     Results from last 7 days   Lab Units 06/26/25  0614   WBC 10*3/mm3 5.63   HEMOGLOBIN g/dL 8.0*   HEMATOCRIT % 25.9*   PLATELETS 10*3/mm3 104*     Results from last 7 days   Lab Units 06/26/25  0614   SODIUM mmol/L 143   POTASSIUM mmol/L 3.7   CHLORIDE mmol/L 106   CO2 mmol/L 30.0*   BUN mg/dL 21.0   CREATININE mg/dL 1.06*   CALCIUM mg/dL 8.7   BILIRUBIN mg/dL 1.2   ALK PHOS U/L 182*   ALT (SGPT) U/L 5   AST (SGOT) U/L 45*   GLUCOSE mg/dL 83         Results from last 7 days   Lab Units 06/25/25  0357   MAGNESIUM mg/dL 1.7         Cultures:  Blood Culture   Date Value Ref Range Status    06/21/2025 No growth at 3 days  Preliminary   06/21/2025 No growth at 3 days  Preliminary       I have reviewed daily medications and changes in CPOE    Scheduled meds  atenolol, 25 mg, Oral, Q24H  ceFAZolin, 2,000 mg, Intravenous, Q8H  cholecalciferol, 1,000 Units, Oral, Weekly  docusate sodium, 100 mg, Oral, BID  fluticasone, 1 spray, Nasal, Daily  ipratropium-albuterol, 3 mL, Nebulization, 4x Daily - RT  melatonin, 2.5 mg, Oral, Nightly  pantoprazole, 40 mg, Oral, QAM AC  senna-docusate sodium, 2 tablet, Oral, BID   And  polyethylene glycol, 17 g, Oral, Daily  pregabalin, 25 mg, Oral, Q12H  rOPINIRole, 1 mg, Oral, Nightly  [Held by provider] spironolactone, 25 mg, Oral, Daily  torsemide, 40 mg, Oral, Daily             PRN meds    albuterol    aluminum-magnesium hydroxide-simethicone    senna-docusate sodium **AND** polyethylene glycol **AND** bisacodyl **AND** bisacodyl    Calcium Replacement - Follow Nurse / BPA Driven Protocol    cyclobenzaprine    diazePAM    HYDROmorphone    Magnesium Standard Dose Replacement - Follow Nurse / BPA Driven Protocol    ondansetron ODT **OR** ondansetron    oxyCODONE-acetaminophen    Phosphorus Replacement - Follow Nurse / BPA Driven Protocol    Potassium Replacement - Follow Nurse / BPA Driven Protocol        Hypokalemia    HTN (hypertension)    Gastroesophageal reflux disease    Alcoholic cirrhosis of liver without ascites    Iron deficiency anemia    Transaminitis    CKD (chronic kidney disease) stage 3, GFR 30-59 ml/min    Hypotension    Thrombocytopenia        Assessment/Plan:    64 y.o. female admitted with Hypokalemia.     Patient now has excruciating low back pain.  Tender to palpation along the midline.  Still suspicious that this could be sacroiliac arthritis  - Getting a stat CT scan without contrast may end up needing CT with contrast or MRI.  Patient states she has a very difficult time with MRIs   - L2 compression fx    SANDRO on CKD stage 3  - Creatinine 1.94 on  arrival, value previously 1.02 on 05/23/25. Baseline values appear to fluctuate but average over past 6 lab draws prior to arrival ~1.3.   - restarting demedex      Right lower extremity cellulitis  - RLE warm, erythematous and tender to palpation. She is on Cefazolin, tolerating well. Will continue this as prescribed for now. RLE negative for DVT.  -change to po on discharge     Hypokalemia  Hypophosphatemia  - Noted previously, repleted and improved. Continue monitoring.     History of HTN complicated by hypotension  - She is on Midodrine, continue as prescribed. Trend BP.   -torsemide 40 qday    Cirrhosis  Transaminitis     Anemia  Thrombocytopenia  - hb is stable     Right shoulder osteoarthritis  - With associated rotator cuff tendinopathy; S/P Right reverse total shoulder arthroplasty and Tenodesis of long head of biceps tendon with Dr. Lanier on 06/05/25. She is having some discomfort in her shoulders, XR obtained and reviewed, orthopedic surgery has seen     GERD  - Continue PPI as prescribed.      Hyperglycemia  - A1c <4.20%.    Hyopkalemia  -replace     Possible d/c 1-2 days, she was able to get to the bathroom with a walker while I was in the room    Edward Galan MD  06/26/25  16:57 EDT

## 2025-06-26 NOTE — PROGRESS NOTES
[facility] Progress Note      Patient Name: Filomena Liu  : 1960  MRN: [id number]  Primary Care Physician:  Fernando Dunn MD  Date of admission: 2025    Subjective     Interval History:   Follow-up acute kidney injury on CKD 3B.  Urine output 2.9 L.  Severe back pain yesterday late afternoon.  CT scan with L2 compression fracture.  Neurosurgery evaluating.  Review of Systems:   As noted above    Objective     Vitals:   Temp:  [98.7 °F (37.1 °C)-98.8 °F (37.1 °C)] 98.7 °F (37.1 °C)  Heart Rate:  [] 108  Resp:  [18-24] 18  BP: (128-145)/(67-97) 128/97    Intake/Output Summary (Last 24 hours) at 2025 0829  Last data filed at 2025 0300  Gross per 24 hour   Intake 720 ml   Output 2925 ml   Net -2205 ml       Physical Exam:    General Appearance:oriented x 3, no acute distress .  Obese  Skin: warm and dry  HEENT: oral mucosa normal, nonicteric sclera  Neck: supple, no JVD  Lungs: Clear to auscultation bilaterally.  Heart: RRR, normal S1 and S2  Abdomen: soft, nontender, nondistended.  Abdominal wall bruising.  Positive bowel sounds.  No extension.  Body wall edema  : no palpable bladder  Extremities: Venous stasis changes.  2+ lower extremity edema to hips  Neuro: normal speech and mental status     Scheduled Meds:     [Held by provider] atenolol, 25 mg, Oral, Nightly  ceFAZolin, 2,000 mg, Intravenous, Q8H  cholecalciferol, 1,000 Units, Oral, Weekly  docusate sodium, 100 mg, Oral, BID  fluticasone, 1 spray, Nasal, Daily  ipratropium-albuterol, 3 mL, Nebulization, 4x Daily - RT  melatonin, 2.5 mg, Oral, Nightly  midodrine, 2.5 mg, Oral, BID AC  pantoprazole, 40 mg, Oral, QAM AC  senna-docusate sodium, 2 tablet, Oral, BID   And  polyethylene glycol, 17 g, Oral, Daily  pregabalin, 25 mg, Oral, Q12H  rOPINIRole, 1 mg, Oral, Nightly  [Held by provider] spironolactone, 25 mg, Oral, Daily  torsemide, 40 mg, Oral, Daily      IV Meds:          Results Reviewed:    I have personally reviewed the results from the time of this admission to 6/26/2025 08:29 EDT     Results from last 7 days   Lab Units 06/26/25  0614 06/25/25  0357 06/24/25  0616   SODIUM mmol/L 143 142 140   POTASSIUM mmol/L 3.7 3.4* 3.7   CHLORIDE mmol/L 106 104 104   CO2 mmol/L 30.0* 26.1 26.7   BUN mg/dL 21.0 21.0 22.0   CREATININE mg/dL 1.06* 1.00 0.96   CALCIUM mg/dL 8.7 8.7 9.1   BILIRUBIN mg/dL 1.2 1.2 1.2   ALK PHOS U/L 182* 168* 175*   ALT (SGPT) U/L 5 6 6   AST (SGOT) U/L 45* 39* 37*   GLUCOSE mg/dL 83 137* 129*       Estimated Creatinine Clearance: 51.4 mL/min (A) (by C-G formula based on SCr of 1.06 mg/dL (H)).    Results from last 7 days   Lab Units 06/26/25  0614 06/25/25  0357 06/23/25  0457 06/22/25  1538 06/22/25  0454 06/21/25  0407 06/20/25  1443   MAGNESIUM mg/dL  --  1.7  --   --  2.5*  --  3.3*   PHOSPHORUS mg/dL 2.8 2.6 3.2   < > 1.9*   < >  --     < > = values in this interval not displayed.             Results from last 7 days   Lab Units 06/26/25  0614 06/25/25  0357 06/24/25  0616 06/23/25  1515 06/23/25  0457 06/22/25  0454   WBC 10*3/mm3 5.63 6.66 7.07  --  7.53 7.78   HEMOGLOBIN g/dL 8.0* 8.3* 7.8* 6.9* 7.0* 7.5*   PLATELETS 10*3/mm3 104* 108* 98*  --  97* 110*             Assessment / Plan     ASSESSMENT:  Acute kidney injury on CKD 3B baseline creatinine 1.3-1.5.  Creatinine below usual baseline.  Initial acute kidney injury due to intravascular volume depletion.  May have been some contribution from Bactrim with decreased secretion of creatinine.  Still has significant edema.  Hypoalbuminemia and cirrhosis with extravascular volume also contributing.  Potassium replete.  Continue oral diuretic.  2.  Anemia.  Recent GI evaluation EGD and colonoscopy.  Iron consistent with anemia of chronic disease.  GI plans possible outpatient capsule endoscopy.  Hemoglobin stable.  3.  Right shoulder pain status post reverse right total shoulder arthroplasty.  Ortho evaluated.  Recommended  continued use of sling and exercises.  4.  Lower extremity venous stasis  5.  Hypotension.  Resolved.  Now hypertensive.  Stop midodrine.  Resume atenolol.  6.  L2 compression fracture.  Neurosurgery evaluating.  PLAN:  Continue oral torsemide.  Discontinue midodrine  Atenolol 25 mg p.o. daily  4.  When discharged has follow-up appointment with APRN  [facility] July 9 at 11 AM.  Thank you for involving us in the care of Filomena Liu.  Please feel free to call with any questions.    Cyndi Dimas MD  06/26/25  08:29 EDT    [facility]  [phone]    Please note that portions of this note were completed with a voice recognition program.

## 2025-06-26 NOTE — THERAPY TREATMENT NOTE
Patient Name: Filomena Liu  : 1960    MRN: [id number]                              Today's Date: 2025       Admit Date: 2025    Visit Dx:     ICD-10-CM ICD-9-CM   1. Pedal edema  R60.0 782.3   2. Acute renal insufficiency  N28.9 593.9   3. Acute hypokalemia  E87.6 276.8   4. Acute anemia  D64.9 285.9   5. Decreased activities of daily living (ADL)  Z78.9 V49.89     Patient Active Problem List   Diagnosis    Mediastinal mass    Cervical stenosis of spinal canal    Cervical radiculopathy    Cellulitis/abscess of left foot    HTN (hypertension)    History of MRSA infection    Anxiety    Peroneal tenosynovitis    Fracture of navicular bone of left foot    Tobacco use    Non compliance with medical treatment    Screening for colon cancer    Osteoporosis    Shoulder arthritis    Primary localized osteoarthrosis of left shoulder region    History of adenomatous polyp of colon    Diverticulosis    Acute GI bleeding    Hyperkalemia    SANDRO (acute kidney injury)    Alcoholism    COPD (chronic obstructive pulmonary disease)    Acute blood loss anemia    Melena    Gastroesophageal reflux disease    Dysphagia    Alcoholic cirrhosis of liver without ascites    Other iron deficiency anemias    Malabsorption of iron    Acute pancreatitis    Diverticulitis    Hypoglycemia    Lactic acidosis    Adverse effect of iron    Iron deficiency anemia    Cirrhosis of liver without ascites    Infrarenal abdominal aortic aneurysm (AAA) without rupture    Arthritis of shoulder    Hypokalemia    Transaminitis    CKD (chronic kidney disease) stage 3, GFR 30-59 ml/min    Hypotension    Thrombocytopenia     Past Medical History:   Diagnosis Date    Abdominal aneurysm     DR. JOYCE FOLLOWING 3.5CM    Alcoholism 1985    Anemia 24    HAS HAD IRON INFUSIONS ALMOST YEARLY    Anxiety     Arthritis of back     Asthma 2015    At high risk for falls     Basal cell carcinoma     Bruises easily     Chronic kidney disease 24     Cirrhosis 8/5/24    COPD (chronic obstructive pulmonary disease)     MANAGED BY PRIMARY CARE    CTS (carpal tunnel syndrome) Surgery 1999    DDD (degenerative disc disease), cervical     Depression     Diverticulitis of colon Unknown    Fatty liver 2022    GERD (gastroesophageal reflux disease)     GI (gastrointestinal bleed) 8/5/24    History of anemia     History of MRSA infection     LEFT ANKLE TREAT [facility]  5YEARS GO    Hyperlipidemia ?    Hypertension ?    Low back strain Unknown    Lower leg edema     Lumbosacral disc disease 2024    Neck strain 2000    Neuropathy     Pancreatitis 2024    Panic attacks     Scoliosis Unknown    Shoulder arthritis 06/05/2023    Shoulder pain, left 07/07/2023    Sleep apnea     NO MACHINE USE    Spinal stenosis     Spondylolisthesis of cervical region     Steatosis of liver     Tachycardia     Tendinitis of knee 2022    Thoracic disc disorder 2000    Vertigo      Past Surgical History:   Procedure Laterality Date    BACK SURGERY      cervical disc surgery with fusion-    CARPAL TUNNEL RELEASE Bilateral     CATARACT EXTRACTION      CHOLECYSTECTOMY  1/1/20    COLONOSCOPY      COLONOSCOPY N/A 11/12/2020    Procedure: COLONOSCOPY, polypectomy;  Surgeon: Filomena Mckeon MD;  Location: [facility];  Service: Gastroenterology;  Laterality: N/A;  Diverticulosis; Hepatic flexure polyp x 1; Polyp at 60cm x 1; Polyp at 50cm x 1- hot snare;    COLONOSCOPY N/A 04/15/2024    Procedure: COLONOSCOPY into sigmoid colon with hot snare polypectomy;  Surgeon: Leatha Johns MD;  Location: [facility] ENDOSCOPY;  Service: General;  Laterality: N/A;  pre- history of polyps  post- diverticulosis, polyp    COLONOSCOPY N/A 05/07/2025    Procedure: COLONOSCOPY to cecum and ti with hot snare polypectomies;  Surgeon: Ana Guerrero MD;  Location: [facility] ENDOSCOPY;  Service: Gastroenterology;  Laterality: N/A;  PRE: IRON DEFICIENCY ANEMIA  POST: diverticulosis, polyps, hemorrhoids    ENDOSCOPY N/A  08/15/2024    Procedure: ESOPHAGOGASTRODUODENOSCOPY;  Surgeon: Garth Constantino MD;  Location: [facility] ENDOSCOPY;  Service: Gastroenterology;  Laterality: N/A;  PRE- CIRRHOSIS, DARK STOOL  POST- NORMAL    ENDOSCOPY N/A 05/07/2025    Procedure: ESOPHAGOGASTRODUODENOSCOPY;  Surgeon: Ana Guerrero MD;  Location: [facility] ENDOSCOPY;  Service: Gastroenterology;  Laterality: N/A;  PRE: CIRRHOSIS  POST:portal hypertensive gastropathy; esophagitis; hiatal hernia; varices    HYSTERECTOMY  1998    INCISION AND DRAINAGE LEG Left 11/17/2018    Procedure: Incision and Drainage of Left ankle;  Surgeon: Maxwell Lanier MD;  Location: [facility] MAIN OR;  Service: Orthopedics    NECK SURGERY  2000, 2005,2017    SIGMOIDOSCOPY N/A 03/28/2024    Procedure: SIGMOIDOSCOPY FLEXIBLE;  Surgeon: Leatha Johns MD;  Location: [facility] ENDOSCOPY;  Service: General;  Laterality: N/A;  pre: h/o colon polyps  post: poor prep, diverticulosis    SKIN BIOPSY      SKIN GRAFT SPLIT THICKNESS N/A 02/12/2019    Procedure: debridement SKIN GRAFT SPLIT THICKNESS left ankle wound;  Surgeon: Barry Worthy MD;  Location: [facility] OR [facility];  Service: Plastics    TONGUE LESION EXCISION/BIOPSY N/A 11/26/2024    Procedure: WIDE LOCAL EXCISION OF TONGUE LESION;  Surgeon: El Mercedes MD;  Location: [facility] MAIN OR;  Service: ENT;  Laterality: N/A;    TOTAL SHOULDER ARTHROPLASTY Left 07/20/2023    Procedure: Total  shoulder arthroplasty;  Surgeon: Maxwell Lanier MD;  Location: [facility] OR [facility];  Service: Orthopedics;  Laterality: Left;    TOTAL SHOULDER ARTHROPLASTY W/ DISTAL CLAVICLE EXCISION Right 6/5/2025    Procedure: Right Reverse Total Shoulder Arthroplasty;  Surgeon: Maxwell Lanier MD;  Location: [facility] OR [facility];  Service: Orthopedics;  Laterality: Right;    TOTAL THYMECTOMY      TRIGGER POINT INJECTION  [id number]    UPPER GASTROINTESTINAL ENDOSCOPY  8/10/24    WRIST FRACTURE SURGERY      WRIST FRACTURE SURGERY Left       General Information        Row Name 06/26/25 1506          OT Time and Intention    Document Type therapy note (daily note)  -MM     Mode of Treatment occupational therapy;individual therapy  -MM     Patient Effort good  -MM       Row Name 06/26/25 1506          General Information    Patient Profile Reviewed yes  -MM     Existing Precautions/Restrictions right;shoulder;non-weight bearing;LSO;spinal  -MM       Row Name 06/26/25 1506          Cognition    Orientation Status (Cognition) oriented x 4  -MM       Row Name 06/26/25 1506          Safety Issues/Impairments Affecting Functional Mobility    Impairments Affecting Function (Mobility) endurance/activity tolerance;strength;range of motion (ROM);pain  -MM               User Key  (r) = Recorded By, (t) = Taken By, (c) = Cosigned By      Initials Name Provider Type    MM Roopa Marina OT Occupational Therapist                     Mobility/ADL's       Row Name 06/26/25 1506          Bed Mobility    Bed Mobility sit-supine  -MM     Sit-Supine [place] (Bed Mobility) modified independence  -MM     Comment, (Bed Mobility) sitting EOB with RN on arrival  -MM       Row Name 06/26/25 1506          Transfers    Transfers sit-stand transfer  -MM       Row Name 06/26/25 1506          Sit-Stand Transfer    Sit-Stand [place] (Transfers) standby assist  -MM     Assistive Device (Sit-Stand Transfers) walker, front-wheeled  -MM       Row Name 06/26/25 1506          Stand-Sit Transfer    Stand-Sit [place] (Transfers) standby assist  -MM       Row Name 06/26/25 1506          Functional Mobility    Functional Mobility- Ind. Level standby assist  -MM     Functional Mobility- Device walker, front-wheeled  -MM     Functional Mobility- Comment x3 to and from doorway after completing bathroom transfer  -MM       Row Name 06/26/25 1506          Activities of Daily Living    BADL Assessment/Intervention lower body dressing  -MM       Row Name 06/26/25 1506          Mobility    Extremity  Weight-bearing Status right upper extremity  -MM     Right Upper Extremity (Weight-bearing Status) non weight-bearing (NWB)  -MM       Row Name 06/26/25 1506          Lower Body Dressing Assessment/Training    [place] Level (Lower Body Dressing) don;pants/bottoms;contact guard assist  -MM     Position (Lower Body Dressing) unsupported standing  -MM     Comment, (Lower Body Dressing) mesh underwear donned/doffed  -MM               User Key  (r) = Recorded By, (t) = Taken By, (c) = Cosigned By      Initials Name Provider Type    Roopa Bojorquez OT Occupational Therapist                   Obj/Interventions       Row Name 06/26/25 1508          Shoulder (Therapeutic Exercise)    Shoulder (Therapeutic Exercise) PROM (passive range of motion)  -MM     Shoulder PROM (Therapeutic Exercise) extension;flexion;right;aBduction  -MM     Shoulder Pendulum Exercises (Therapeutic Exercise) sitting;right  -MM       Row Name 06/26/25 1508          Balance    Comment, Balance no overt LOBs  -MM               User Key  (r) = Recorded By, (t) = Taken By, (c) = Cosigned By      Initials Name Provider Type    Roopa Bojorquez OT Occupational Therapist                   Goals/Plan    No documentation.                  Clinical Impression       Row Name 06/26/25 1509          Pain Assessment    Pretreatment Pain Rating 4/10  -MM     Posttreatment Pain Rating 4/10  -MM     Pain Location shoulder  -MM     Pain Side/Orientation right  -MM       Row Name 06/26/25 1509          Plan of Care Review    Plan of Care Reviewed With patient  -MM     Progress improving  -MM     Outcome Evaluation pt seen this date for OT, she is agreeable, feeling better this date. She is able to engage in pendulums to R shoulder while seated, PROM to RUE with no pain per pt report. She is able to manage LBD with CGA in standing unsupported and demo func mob within room x3 trials with mostly SBA and use of rwx. she plans home with HH, continue to follow to  maximize (I)  -MM       Row Name 06/26/25 1509          Therapy Plan Review/Discharge Plan (OT)    Anticipated Discharge Disposition (OT) home with home health  -MM       Row Name 06/26/25 1509          Vital Signs    O2 Delivery Pre Treatment room air  -MM       Row Name 06/26/25 1509          Positioning and Restraints    Pre-Treatment Position in bed  -MM     Post Treatment Position bed  -MM     In Bed notified nsg;fowlers;encouraged to call for assist;exit alarm on;call light within reach  -MM               User Key  (r) = Recorded By, (t) = Taken By, (c) = Cosigned By      Initials Name Provider Type    MM Roopa Marina OT Occupational Therapist                   Outcome Measures       Row Name 06/26/25 1512          How much help from another is currently needed...    Putting on and taking off regular lower body clothing? 3  -MM     Bathing (including washing, rinsing, and drying) 3  -MM     Toileting (which includes using toilet bed pan or urinal) 3  -MM     Putting on and taking off regular upper body clothing 3  -MM     Taking care of personal grooming (such as brushing teeth) 4  -MM     Eating meals 4  -MM     AM-PAC 6 Clicks Score (OT) 20  -MM       Row Name 06/26/25 0801          How much help from another person do you currently need...    Turning from your back to your side while in flat bed without using bedrails? 3  -VJ     Moving from lying on back to sitting on the side of a flat bed without bedrails? 3  -VJ     Moving to and from a bed to a chair (including a wheelchair)? 3  -VJ     Standing up from a chair using your arms (e.g., wheelchair, bedside chair)? 2  -VJ     Climbing 3-5 steps with a railing? 2  -VJ     To walk in hospital room? 3  -VJ     AM-PAC 6 Clicks Score (PT) 16  -VJ       Row Name 06/26/25 1512          Functional Assessment    Outcome Measure Options AM-PAC 6 Clicks Daily Activity (OT)  -MM               User Key  (r) = Recorded By, (t) = Taken By, (c) = Cosigned By       Initials Name Provider Type    Charlene Aguilar, RN Registered Nurse    Roopa Bojorquez OT Occupational Therapist                      OT Recommendation and Plan     Plan of Care Review  Plan of Care Reviewed With: patient  Progress: improving  Outcome Evaluation: pt seen this date for OT, she is agreeable, feeling better this date. She is able to engage in pendulums to R shoulder while seated, PROM to RUE with no pain per pt report. She is able to manage LBD with CGA in standing unsupported and demo func mob within room x3 trials with mostly SBA and use of rwx. she plans home with HH, continue to follow to maximize (I)     Time Calculation:         Time Calculation- OT       Row Name 06/26/25 1512             Time Calculation- OT    OT Start Time 1416  -MM      OT Stop Time 1436  -MM      OT Time Calculation (min) 20 min  -MM      Total Timed Code Minutes- OT 20 minute(s)  -MM      OT Received On 06/26/25  -MM      OT - Next Appointment 06/30/25  -MM         Timed Charges    43311 - OT Therapeutic Exercise Minutes 10  -MM      95512 - OT Self Care/Mgmt Minutes 10  -MM         Total Minutes    Timed Charges Total Minutes 20  -MM       Total Minutes 20  -MM                User Key  (r) = Recorded By, (t) = Taken By, (c) = Cosigned By      Initials Name Provider Type    Roopa Bojorquez OT Occupational Therapist                  Therapy Charges for Today       Code Description Service Date Service Provider Modifiers Qty    54823016401 HC OT THER PROC EA 15 MIN 6/26/2025 Roopa Marina OT GO 1                 Roopa Marina OT  6/26/2025

## 2025-06-26 NOTE — PROGRESS NOTES
[facility] THORACIC/LUMBAR NEUROSURGERY PROGRESS NOTE      CC:back pain      Subjective     Interval History: had Ct lumbar.  Reports ongoing back pain with movement.  States she does not have much pain when she is up and walking.    ROS:  MS: back pain    Objective     Vital signs in last 24 hours:  Temp:  [98.7 °F (37.1 °C)-98.8 °F (37.1 °C)] 98.7 °F (37.1 °C)  Heart Rate:  [] 101  Resp:  [18-24] 18  BP: (128-145)/(67-97) 128/97    Intake/Output this shift:  No intake/output data recorded.    LABS:  Results from last 7 days   Lab Units 06/26/25  0614 06/25/25  0357 06/24/25  0616   WBC 10*3/mm3 5.63 6.66 7.07   HEMOGLOBIN g/dL 8.0* 8.3* 7.8*   HEMATOCRIT % 25.9* 26.4* 24.3*   PLATELETS 10*3/mm3 104* 108* 98*     Results from last 7 days   Lab Units 06/26/25  0614 06/25/25  0357 06/24/25  0616   SODIUM mmol/L 143 142 140   POTASSIUM mmol/L 3.7 3.4* 3.7   CHLORIDE mmol/L 106 104 104   CO2 mmol/L 30.0* 26.1 26.7   BUN mg/dL 21.0 21.0 22.0   CREATININE mg/dL 1.06* 1.00 0.96   CALCIUM mg/dL 8.7 8.7 9.1   BILIRUBIN mg/dL 1.2 1.2 1.2   ALK PHOS U/L 182* 168* 175*   ALT (SGPT) U/L 5 6 6   AST (SGOT) U/L 45* 39* 37*   GLUCOSE mg/dL 83 137* 129*         IMAGING STUDIES:  CT lumbar spine compared with CT abdomen pelvis from May 16, 2025 reveals new superior endplate anterior wedge compression deformity of L2.  There may be some very mild retropulsion of the posterior superior endplate.  There is vacuum disc phenomenon at T11/12, T12/L1, L1/2 with DDD at these levels.  No evidence of posterior element involvement.  There is mild canal stenosis at several levels due to ligamentum flavum and facet hypertrophy.  Bilateral neuroforaminal narrowing at L4/5, left greater than right.    I personally viewed and interpreted the patient's CT lumbar spine.  Also reviewed by and discussed with Dr. Jefferson.    Meds reviewed/changed: Yes  Lyrica 25 mg p.o. every 12 hours  Flexeril 5 mg p.o. 3 times daily as needed-3 doses  Dilaudid 0.5 mg  "IV every 2 hours as needed-4 doses  Percocet 10 mg p.o. every 4 as needed-5 doses    Physical Exam:    General:   Awake, alert.  Resting in bed.  Shows expressions of discomfort with movement  Back:    - SLR  Motor:    Normal muscle strength, bulk and tone in lower extremities.    Station and Gait:             Per OT note 6/25-able to get to bathroom, some difficulty standing from a lower surface as well as pulling up pants fully afterwards. Overall reports going to the bathroom \"a work out\"    Extremities:   Wearing SCD      Assessment & Plan     ASSESSMENT:      Hypokalemia    HTN (hypertension)    Gastroesophageal reflux disease    Alcoholic cirrhosis of liver without ascites    Iron deficiency anemia    Transaminitis    CKD (chronic kidney disease) stage 3, GFR 30-59 ml/min    Hypotension    Thrombocytopenia    Patient with acute back pain denies falls or trauma.  Recent right shoulder surgery.  Chronically on narcotics but has had higher dosing due to recent shoulder surgery.  CT scan reveals L2 superior endplate compression forme new with mild vertebral body height loss and minimal if any retropulsion.  No canal stenosis of significance.  She surprised by the findings of the fracture she states she has not had any falls.  She does not recall ever being evaluated for osteoporosis.  She is currently being treated for cellulitis.  Recommend bracing and follow-up x-rays.  She is in agreement with plan.  Will get upright x-rays for baseline.  Pain management per PCP or pain management provider.  Did recommend she speak to her PCP regarding further workup and treatment of osteoporosis following healing of fracture.    PLAN:   LSO brace  Upright xrays in brace for baseline  F/u 6 weeks with xrays  Discuss osteoporosis workup and treatment with PCP    No lift, push, pull more than 5 pounds, no swimming, no bending or twisting. No exertional or impact activity- walking is OK. Wear brace when sitting higher than 45 " "degrees, standing, walking. OK to remove brace for showers.    A LSO brace has been ordered due to diagnosis of L2 VCF.  The purpose of the brace is to support weak muscles, stabilize and restrict movement of the trunk to aid in healing and pain relief of back pain        I discussed the patient's findings and my recommendations with patient and Dr. Jefferson    During patient visit, I utilized appropriate personal protective equipment including gloves. Appropriate PPE was worn during the entire visit.  Hand hygiene was completed before and after.      LOS: 6 days       Vida Nayak, APRN  6/26/2025  09:18 EDT    \"Dictated utilizing Dragon dictation\".      "

## 2025-06-26 NOTE — PLAN OF CARE
Goal Outcome Evaluation:  Plan of Care Reviewed With: patient        Progress: improving  Outcome Evaluation: pt seen this date for OT, she is agreeable, feeling better this date. She is able to engage in pendulums to R shoulder while seated, PROM to RUE with no pain per pt report. She is able to manage LBD with CGA in standing unsupported and demo func mob within room x3 trials with mostly SBA and use of rwx. she plans home with HH, continue to follow to maximize (I)    Anticipated Discharge Disposition (OT): home with home health

## 2025-06-27 ENCOUNTER — READMISSION MANAGEMENT (OUTPATIENT)
Dept: CALL CENTER | Facility: HOSPITAL | Age: 65
End: 2025-06-27
Payer: MEDICARE

## 2025-06-27 VITALS
DIASTOLIC BLOOD PRESSURE: 75 MMHG | SYSTOLIC BLOOD PRESSURE: 134 MMHG | HEART RATE: 87 BPM | WEIGHT: 161.6 LBS | OXYGEN SATURATION: 97 % | BODY MASS INDEX: 32.58 KG/M2 | HEIGHT: 59 IN | TEMPERATURE: 98.4 F | RESPIRATION RATE: 18 BRPM

## 2025-06-27 PROBLEM — S32.000A COMPRESSION FX, LUMBAR SPINE: Status: ACTIVE | Noted: 2025-06-27

## 2025-06-27 LAB
ALBUMIN SERPL-MCNC: 2.8 G/DL (ref 3.5–5.2)
ALBUMIN/GLOB SERPL: 1 G/DL
ALP SERPL-CCNC: 173 U/L (ref 39–117)
ALT SERPL W P-5'-P-CCNC: 5 U/L (ref 1–33)
ANION GAP SERPL CALCULATED.3IONS-SCNC: 9.5 MMOL/L (ref 5–15)
AST SERPL-CCNC: 36 U/L (ref 1–32)
BASOPHILS # BLD AUTO: 0.02 10*3/MM3 (ref 0–0.2)
BASOPHILS NFR BLD AUTO: 0.4 % (ref 0–1.5)
BILIRUB SERPL-MCNC: 1 MG/DL (ref 0–1.2)
BUN SERPL-MCNC: 20 MG/DL (ref 8–23)
BUN/CREAT SERPL: 19.8 (ref 7–25)
CALCIUM SPEC-SCNC: 8.5 MG/DL (ref 8.6–10.5)
CHLORIDE SERPL-SCNC: 105 MMOL/L (ref 98–107)
CO2 SERPL-SCNC: 29.5 MMOL/L (ref 22–29)
CREAT SERPL-MCNC: 1.01 MG/DL (ref 0.57–1)
DEPRECATED RDW RBC AUTO: 46.2 FL (ref 37–54)
EGFRCR SERPLBLD CKD-EPI 2021: 62.3 ML/MIN/1.73
EOSINOPHIL # BLD AUTO: 0.28 10*3/MM3 (ref 0–0.4)
EOSINOPHIL NFR BLD AUTO: 5.2 % (ref 0.3–6.2)
ERYTHROCYTE [DISTWIDTH] IN BLOOD BY AUTOMATED COUNT: 14.2 % (ref 12.3–15.4)
GLOBULIN UR ELPH-MCNC: 2.7 GM/DL
GLUCOSE SERPL-MCNC: 104 MG/DL (ref 65–99)
HCT VFR BLD AUTO: 25.7 % (ref 34–46.6)
HGB BLD-MCNC: 8.1 G/DL (ref 12–15.9)
IMM GRANULOCYTES # BLD AUTO: 0.01 10*3/MM3 (ref 0–0.05)
IMM GRANULOCYTES NFR BLD AUTO: 0.2 % (ref 0–0.5)
LYMPHOCYTES # BLD AUTO: 1.21 10*3/MM3 (ref 0.7–3.1)
LYMPHOCYTES NFR BLD AUTO: 22.3 % (ref 19.6–45.3)
MCH RBC QN AUTO: 28.3 PG (ref 26.6–33)
MCHC RBC AUTO-ENTMCNC: 31.5 G/DL (ref 31.5–35.7)
MCV RBC AUTO: 89.9 FL (ref 79–97)
MONOCYTES # BLD AUTO: 0.66 10*3/MM3 (ref 0.1–0.9)
MONOCYTES NFR BLD AUTO: 12.2 % (ref 5–12)
NEUTROPHILS NFR BLD AUTO: 3.24 10*3/MM3 (ref 1.7–7)
NEUTROPHILS NFR BLD AUTO: 59.7 % (ref 42.7–76)
NRBC BLD AUTO-RTO: 0 /100 WBC (ref 0–0.2)
PHOSPHATE SERPL-MCNC: 2.8 MG/DL (ref 2.5–4.5)
PLATELET # BLD AUTO: 100 10*3/MM3 (ref 140–450)
PMV BLD AUTO: 9.9 FL (ref 6–12)
POTASSIUM SERPL-SCNC: 3 MMOL/L (ref 3.5–5.2)
POTASSIUM SERPL-SCNC: 3.5 MMOL/L (ref 3.5–5.2)
PROT SERPL-MCNC: 5.5 G/DL (ref 6–8.5)
RBC # BLD AUTO: 2.86 10*6/MM3 (ref 3.77–5.28)
SODIUM SERPL-SCNC: 144 MMOL/L (ref 136–145)
WBC NRBC COR # BLD AUTO: 5.42 10*3/MM3 (ref 3.4–10.8)

## 2025-06-27 PROCEDURE — 25010000002 CEFAZOLIN PER 500 MG: Performed by: STUDENT IN AN ORGANIZED HEALTH CARE EDUCATION/TRAINING PROGRAM

## 2025-06-27 PROCEDURE — 85025 COMPLETE CBC W/AUTO DIFF WBC: CPT | Performed by: STUDENT IN AN ORGANIZED HEALTH CARE EDUCATION/TRAINING PROGRAM

## 2025-06-27 PROCEDURE — 80053 COMPREHEN METABOLIC PANEL: CPT | Performed by: STUDENT IN AN ORGANIZED HEALTH CARE EDUCATION/TRAINING PROGRAM

## 2025-06-27 PROCEDURE — 94799 UNLISTED PULMONARY SVC/PX: CPT

## 2025-06-27 PROCEDURE — 94664 DEMO&/EVAL PT USE INHALER: CPT

## 2025-06-27 PROCEDURE — 84100 ASSAY OF PHOSPHORUS: CPT | Performed by: INTERNAL MEDICINE

## 2025-06-27 PROCEDURE — 25010000002 HYDROMORPHONE PER 4 MG: Performed by: HOSPITALIST

## 2025-06-27 PROCEDURE — 94761 N-INVAS EAR/PLS OXIMETRY MLT: CPT

## 2025-06-27 PROCEDURE — 84132 ASSAY OF SERUM POTASSIUM: CPT | Performed by: HOSPITALIST

## 2025-06-27 PROCEDURE — 25010000002 HYDROMORPHONE 1 MG/ML SOLUTION: Performed by: HOSPITALIST

## 2025-06-27 RX ORDER — CYCLOBENZAPRINE HCL 5 MG
5 TABLET ORAL 3 TIMES DAILY PRN
Qty: 30 TABLET | Refills: 0 | Status: SHIPPED | OUTPATIENT
Start: 2025-06-27

## 2025-06-27 RX ORDER — TORSEMIDE 20 MG/1
20 TABLET ORAL DAILY
Status: DISCONTINUED | OUTPATIENT
Start: 2025-06-28 | End: 2025-06-27 | Stop reason: HOSPADM

## 2025-06-27 RX ORDER — POTASSIUM CHLORIDE 1500 MG/1
40 TABLET, EXTENDED RELEASE ORAL EVERY 4 HOURS
Status: COMPLETED | OUTPATIENT
Start: 2025-06-27 | End: 2025-06-27

## 2025-06-27 RX ORDER — CEPHALEXIN 500 MG/1
500 CAPSULE ORAL 4 TIMES DAILY
Qty: 3 CAPSULE | Refills: 0 | Status: SHIPPED | OUTPATIENT
Start: 2025-06-27 | End: 2025-07-04

## 2025-06-27 RX ORDER — OXYCODONE AND ACETAMINOPHEN 10; 325 MG/1; MG/1
1 TABLET ORAL EVERY 4 HOURS PRN
Qty: 8 TABLET | Refills: 0 | Status: SHIPPED | OUTPATIENT
Start: 2025-06-27 | End: 2025-06-30 | Stop reason: SDUPTHER

## 2025-06-27 RX ORDER — PANTOPRAZOLE SODIUM 40 MG/1
40 TABLET, DELAYED RELEASE ORAL DAILY
Qty: 30 TABLET | Refills: 1 | Status: SHIPPED | OUTPATIENT
Start: 2025-06-27 | End: 2025-08-26

## 2025-06-27 RX ADMIN — DOCUSATE SODIUM 100 MG: 100 CAPSULE, LIQUID FILLED ORAL at 08:06

## 2025-06-27 RX ADMIN — POTASSIUM CHLORIDE 40 MEQ: 1500 TABLET, EXTENDED RELEASE ORAL at 09:47

## 2025-06-27 RX ADMIN — HYDROMORPHONE HYDROCHLORIDE 0.5 MG: 1 INJECTION, SOLUTION INTRAMUSCULAR; INTRAVENOUS; SUBCUTANEOUS at 15:22

## 2025-06-27 RX ADMIN — HYDROMORPHONE HYDROCHLORIDE 0.5 MG: 1 INJECTION, SOLUTION INTRAMUSCULAR; INTRAVENOUS; SUBCUTANEOUS at 08:02

## 2025-06-27 RX ADMIN — IPRATROPIUM BROMIDE AND ALBUTEROL SULFATE 3 ML: .5; 3 SOLUTION RESPIRATORY (INHALATION) at 06:22

## 2025-06-27 RX ADMIN — HYDROMORPHONE HYDROCHLORIDE 0.5 MG: 1 INJECTION, SOLUTION INTRAMUSCULAR; INTRAVENOUS; SUBCUTANEOUS at 13:04

## 2025-06-27 RX ADMIN — PANTOPRAZOLE SODIUM 40 MG: 40 TABLET, DELAYED RELEASE ORAL at 06:39

## 2025-06-27 RX ADMIN — IPRATROPIUM BROMIDE AND ALBUTEROL SULFATE 3 ML: .5; 3 SOLUTION RESPIRATORY (INHALATION) at 10:00

## 2025-06-27 RX ADMIN — FLUTICASONE PROPIONATE 1 SPRAY: 50 SPRAY, METERED NASAL at 08:10

## 2025-06-27 RX ADMIN — CEFAZOLIN 2000 MG: 2 INJECTION, POWDER, FOR SOLUTION INTRAMUSCULAR; INTRAVENOUS at 09:47

## 2025-06-27 RX ADMIN — POTASSIUM CHLORIDE 40 MEQ: 1500 TABLET, EXTENDED RELEASE ORAL at 15:52

## 2025-06-27 RX ADMIN — Medication 2.5 MG: at 00:28

## 2025-06-27 RX ADMIN — CYCLOBENZAPRINE HYDROCHLORIDE 5 MG: 10 TABLET, FILM COATED ORAL at 00:28

## 2025-06-27 RX ADMIN — SENNOSIDES AND DOCUSATE SODIUM 2 TABLET: 50; 8.6 TABLET ORAL at 08:06

## 2025-06-27 RX ADMIN — IPRATROPIUM BROMIDE AND ALBUTEROL SULFATE 3 ML: .5; 3 SOLUTION RESPIRATORY (INHALATION) at 14:03

## 2025-06-27 RX ADMIN — POLYETHYLENE GLYCOL 3350 17 G: 17 POWDER, FOR SOLUTION ORAL at 08:06

## 2025-06-27 RX ADMIN — POTASSIUM CHLORIDE 40 MEQ: 1500 TABLET, EXTENDED RELEASE ORAL at 13:04

## 2025-06-27 RX ADMIN — IPRATROPIUM BROMIDE AND ALBUTEROL SULFATE 3 ML: .5; 3 SOLUTION RESPIRATORY (INHALATION) at 19:27

## 2025-06-27 RX ADMIN — PREGABALIN 25 MG: 25 CAPSULE ORAL at 08:06

## 2025-06-27 RX ADMIN — CEFAZOLIN 2000 MG: 2 INJECTION, POWDER, FOR SOLUTION INTRAMUSCULAR; INTRAVENOUS at 02:20

## 2025-06-27 RX ADMIN — OXYCODONE AND ACETAMINOPHEN 1 TABLET: 325; 10 TABLET ORAL at 16:58

## 2025-06-27 RX ADMIN — OXYCODONE AND ACETAMINOPHEN 1 TABLET: 325; 10 TABLET ORAL at 06:39

## 2025-06-27 RX ADMIN — HYDROMORPHONE HYDROCHLORIDE 0.5 MG: 1 INJECTION, SOLUTION INTRAMUSCULAR; INTRAVENOUS; SUBCUTANEOUS at 02:20

## 2025-06-27 RX ADMIN — TORSEMIDE 40 MG: 20 TABLET ORAL at 08:06

## 2025-06-27 RX ADMIN — OXYCODONE AND ACETAMINOPHEN 1 TABLET: 325; 10 TABLET ORAL at 00:56

## 2025-06-27 RX ADMIN — ATENOLOL 25 MG: 50 TABLET ORAL at 08:06

## 2025-06-27 RX ADMIN — HYDROMORPHONE HYDROCHLORIDE 0.5 MG: 1 INJECTION, SOLUTION INTRAMUSCULAR; INTRAVENOUS; SUBCUTANEOUS at 10:35

## 2025-06-27 NOTE — PLAN OF CARE
Goal Outcome Evaluation:                                             Primary Defect Width In Cm (Final Defect Size - Required For Flaps/Grafts): 1.7

## 2025-06-27 NOTE — CASE MANAGEMENT/SOCIAL WORK
Continued Stay Note  [facility]     Patient Name: Filomena Liu  MRN: [id number]  Today's Date: 6/27/2025    Admit Date: 6/20/2025    Plan: Plan home with significant other and [facility].  JENNY Witt RN   Discharge Plan       Row Name 06/27/25 1055       Plan    Plan Plan home with significant other and [facility].  JENNY Witt RN    Plan Comments Spoke with pt at bedside  .Pt is current with [facility].  Mouna ( [phone]) notified pt is discharging today.   Pt's significant other will assist pt at home if needed and will transport her home.  Plan home with significant other and [facility]. JENNY Witt RN                   Discharge Codes    No documentation.                 Expected Discharge Date and Time       Expected Discharge Date Expected Discharge Time    Jun 27, 2025               Jessie Witt RN

## 2025-06-27 NOTE — DISCHARGE SUMMARY
[facility]  [phone]    DISCHARGE SUMMARY  [facility]    Patient Identification:  Name: Filomena Liu  Age: 64 y.o.  Sex: female  :  1960  MRN: [id number]  Primary Care Physician: Fernando Dunn MD    Admit date: 2025  Discharge date and time: 2025     Discharge Diagnoses:  Hypokalemia    HTN (hypertension)    Gastroesophageal reflux disease    Alcoholic cirrhosis of liver without ascites    Iron deficiency anemia    Transaminitis    CKD (chronic kidney disease) stage 3, GFR 30-59 ml/min    Hypotension    Thrombocytopenia    Compression fx, lumbar spine       History of present illness from H&P:    64 year old female presented to the emergency room with abnormal labs and low blood pressure at home; she has had swelling of her legs and has had her diuretic increased recently; she did not improve; she denies fever or chills; she had a reverse shoulder replacement and recently and has had pain of her left shoulder; she denies shortness of breath or chest pain     Hospital Course:     64 y.o. female admitted with Hypokalemia and acute kidney injury.  Patient was also found to have right lower extremity cellulitis for which she has been on cefazolin during hospitalization tolerating it well.  Will finish this up outpatient.  Creatinine on arrival is 1.9 has improved to 1.0.  We have restarted her on Demadex.  Her potassium dropped this morning to 3.0 assuming the repeat is okay plan is for discharge this afternoon on Demadex 20 mg daily and spironolactone 25 twice daily which was her previous dose.     On the day prior to discharge the patient developed excruciating low back pain.  CT scan showed L2 compression fracture.  She was seen by neurosurgery recommended bracing.  By the next day the pain was much improved.  Pain medication is currently under the troll of her orthopedist.  Jimbo reports been reviewed and benefits of pain  medication discussed with the patient.  Short supply of Percocet tens has been given to the patient but will need further prescriptions through her orthopedist.  And then eventually will go back to her pain specialist.  Neurosurgery has given patient instructions concerning her compression fracture.       Cirrhosis  Transaminitis, very minimal elevation     Anemia  Thrombocytopenia-labile during hospitalization  - hb is stable     Right shoulder osteoarthritis  - With associated rotator cuff tendinopathy; S/P Right reverse total shoulder arthroplasty and Tenodesis of long head of biceps tendon with Dr. Lanier on 06/05/25. She is having some discomfort in her shoulders, XR obtained and reviewed, orthopedic surgery has seen, although up with Dr. Lanier outpatient         The patient was seen and examined on the day of discharge.    Consults:   Consults       Date and Time Order Name Status Description    6/25/2025  3:45 PM Inpatient Neurosurgery Consult Completed     6/23/2025  1:28 PM Inpatient Gastroenterology Consult Completed     6/20/2025  8:10 PM Inpatient Orthopedic Surgery Consult Completed     6/20/2025  5:28 PM Inpatient Nephrology Consult Completed             Results from last 7 days   Lab Units 06/27/25  0606   WBC 10*3/mm3 5.42   HEMOGLOBIN g/dL 8.1*   HEMATOCRIT % 25.7*   PLATELETS 10*3/mm3 100*       Results from last 7 days   Lab Units 06/27/25  1804 06/27/25  0606   SODIUM mmol/L  --  144   POTASSIUM mmol/L 3.5 3.0*   CHLORIDE mmol/L  --  105   CO2 mmol/L  --  29.5*   BUN mg/dL  --  20.0   CREATININE mg/dL  --  1.01*   GLUCOSE mg/dL  --  104*   CALCIUM mg/dL  --  8.5*       Significant Diagnostic Studies:   WBC   Date Value Ref Range Status   06/27/2025 5.42 3.40 - 10.80 10*3/mm3 Final     Hemoglobin   Date Value Ref Range Status   06/27/2025 8.1 (L) 12.0 - 15.9 g/dL Final     Hematocrit   Date Value Ref Range Status   06/27/2025 25.7 (L) 34.0 - 46.6 % Final     Platelets   Date Value Ref Range  "Status   06/27/2025 100 (L) 140 - 450 10*3/mm3 Final     Sodium   Date Value Ref Range Status   06/27/2025 144 136 - 145 mmol/L Final     Potassium   Date Value Ref Range Status   06/27/2025 3.5 3.5 - 5.2 mmol/L Final     Chloride   Date Value Ref Range Status   06/27/2025 105 98 - 107 mmol/L Final     CO2   Date Value Ref Range Status   06/27/2025 29.5 (H) 22.0 - 29.0 mmol/L Final     BUN   Date Value Ref Range Status   06/27/2025 20.0 8.0 - 23.0 mg/dL Final     Creatinine   Date Value Ref Range Status   06/27/2025 1.01 (H) 0.57 - 1.00 mg/dL Final     Glucose   Date Value Ref Range Status   06/27/2025 104 (H) 65 - 99 mg/dL Final     Calcium   Date Value Ref Range Status   06/27/2025 8.5 (L) 8.6 - 10.5 mg/dL Final     Phosphorus   Date Value Ref Range Status   06/27/2025 2.8 2.5 - 4.5 mg/dL Final     AST (SGOT)   Date Value Ref Range Status   06/27/2025 36 (H) 1 - 32 U/L Final     ALT (SGPT)   Date Value Ref Range Status   06/27/2025 5 1 - 33 U/L Final     Alkaline Phosphatase   Date Value Ref Range Status   06/27/2025 173 (H) 39 - 117 U/L Final     No results found for: \"APTT\", \"INR\"  No results found for: \"COLORU\", \"CLARITYU\", \"SPECGRAV\", \"PHUR\", \"PROTEINUR\", \"GLUCOSEU\", \"KETONESU\", \"BLOODU\", \"NITRITE\", \"LEUKOCYTESUR\", \"BILIRUBINUR\", \"UROBILINOGEN\", \"RBCUA\", \"WBCUA\", \"BACTERIA\", \"UACOMMENT\"  No results found for: \"TROPONINT\", \"TROPONINI\", \"BNP\"  No components found for: \"HGBA1C;2\"  No components found for: \"TSH;2\"    Imaging Results (All)       Procedure Component Value Units Date/Time    XR Spine Lumbar 2 or 3 View [[id number]] Collected: 06/26/25 1543     Updated: 06/26/25 1548    Narrative:      XR SPINE LUMBAR 2 OR 3 VW-     INDICATIONS: L2 fracture     TECHNIQUE: 3 views of the lumbar spine, standing     COMPARISON: Correlated with CT from 6/25/2025     FINDINGS:     L2 compression fracture appears stable. Vertebral body heights are  otherwise preserved. No other fractures are identified. Mild  retrolisthesis " of L2 on L3. Alignment is otherwise in range of normal.  Multilevel endplate spurring, disc space narrowing, and facet  arthropathy are apparent. Arterial calcifications are seen. As  positioned, thoracolumbar levoscoliosis is apparent.       Impression:         As described.           This report was finalized on 6/26/2025 3:45 PM by Dr. Harpreet Mistry M.D on Workstation: [id number]       CT Lumbar Spine Without Contrast [[id number]] Collected: 06/25/25 1727     Updated: 06/25/25 1748    Narrative:      CT LUMBAR SPINE WO CONTRAST-     INDICATIONS: Low back pain. Radiation dose reduction techniques were  utilized, including automated exposure control and exposure modulation  based on body size.     TECHNIQUE: NONCONTRAST LUMBAR SPINE CT     COMPARISON: 5/16/2025     FINDINGS:     A compression fracture of L2 is new from the prior exam, with about 20%  loss of height anteriorly. No retropulsed fragments. No other fractures  are noted.     There appears to be some neuroforaminal narrowing on the right at L1/2  related to ligamentum flavum hypertrophy and disc bulge. On the left,  facet hypertrophy appears to result in moderate neuroforaminal narrowing  at L4/5. 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     Abdominal aortic aneurysm measures 4.2 cm on axial image 40, not  significantly changed. Scattered arterial calcifications are present.  Colonic diverticula are seen that do not appear inflamed. Surgical clips  at the gallbladder fossa.             Impression:         L2 compression fracture. Degenerative changes in the spine.     Incidental findings, as noted above.           This report was finalized on 6/25/2025 5:33 PM by Dr. Harpreet Mistry M.D on Workstation: [id number]       XR Shoulder 2+ View Right [[id number]] Collected: 06/21/25 1226     Updated: 06/21/25 1231    Narrative:      PROCEDURE:  XR SHOULDER 2+ VW RIGHT-     HISTORY: Right shoulder pain and contusion, 2 weeks postop  "shoulder  replacement.     COMPARISON: 6/5/2025     FINDINGS:       3 views of the right shoulder were obtained.     There is a right shoulder reverse total arthroplasty with appropriate  alignment. No acute fracture identified. Postoperative soft tissue air  has resolved. Increased density throughout the soft tissues at the right  shoulder is incompletely assessed but may reflect residual postoperative  edema and/or effusion. Mild acromioclavicular osteoarthritis. Sternal  wires and cervical hardware are partially imaged. Old rib fractures  incidentally noted.        This report was finalized on 6/21/2025 12:28 PM by Dr. Kady Prince M.D  on Workstation: [id number]       XR Chest 1 View [[id number]] Collected: 06/20/25 2114     Updated: 06/20/25 2118    Narrative:      XR CHEST 1 VW-     HISTORY: Hypertension, abnormal labs.     COMPARISON: 3/6/2025     FINDINGS: A single view of the chest was obtained.     Support Devices:  None.  Cardiac Silhouette/Mediastinum/Batool: The cardiac silhouette is upper and  ultimately enlarged. There is calcific atherosclerosis. The aorta is  tortuous.  Lungs/Pleural Spaces:  The lungs and pleural spaces are clear.  Chest Wall/Diaphragm/Upper Abdomen: There are bilateral shoulder  arthroplasties. There is interval assess fusion hardware in the  visualized lower cervical spine. Sternal wires are present. Degenerative  disc disease and scoliosis.     This report was finalized on 6/20/2025 9:15 PM by Dr. Kady Prince M.D  on Workstation: [id number]           No results found for: \"SITE\", \"ALLENTEST\", \"PHART\", \"RXT8SRV\", \"PO2ART\", \"PEN7PFJ\", \"BASEEXCESS\", \"O5OIHHOK\", \"HGBBG\", \"HCTABG\", \"OXYHEMOGLOBI\", \"METHHGBN\", \"CARBOXYHGB\", \"CO2CT\", \"BAROMETRIC\", \"MODALITY\", \"FIO2\"       Discharge Medications        New Medications        Instructions Start Date   cephalexin 500 MG capsule  Commonly known as: KEFLEX   500 mg, Oral, 4 Times Daily      cyclobenzaprine 5 MG tablet  Commonly known " as: FLEXERIL   5 mg, Oral, 3 Times Daily PRN      oxyCODONE-acetaminophen  MG per tablet  Commonly known as: PERCOCET  Replaces: oxyCODONE-acetaminophen 7.5-325 MG per tablet   1 tablet, Oral, Every 4 Hours PRN             Changes to Medications        Instructions Start Date   torsemide 20 MG tablet  Commonly known as: DEMADEX  What changed:   how much to take  when to take this   20 mg, Oral, Daily             Continue These Medications        Instructions Start Date   albuterol sulfate  (90 Base) MCG/ACT inhaler  Commonly known as: PROVENTIL HFA;VENTOLIN HFA;PROAIR HFA   2 puffs, 3 times daily      atenolol 25 MG tablet  Commonly known as: TENORMIN   25 mg, Nightly      cholecalciferol 25 MCG (1000 UT) tablet  Commonly known as: VITAMIN D3   1 tablet, Weekly      docusate sodium 100 MG capsule  Commonly known as: COLACE   100 mg, Oral, 2 Times Daily      fluticasone 50 MCG/ACT nasal spray  Commonly known as: FLONASE   1 spray, Daily      ipratropium-albuterol 0.5-2.5 mg/3 ml nebulizer  Commonly known as: DUO-NEB   Take 3 mL by nebulization 4 (Four) Times a Day. Indications: Chronic Obstructive Lung Disease      pantoprazole 40 MG EC tablet  Commonly known as: PROTONIX   40 mg, Oral, Daily      pregabalin 25 MG capsule  Commonly known as: LYRICA   1 capsule, Every 12 Hours Scheduled      rOPINIRole 1 MG tablet  Commonly known as: REQUIP   1 tablet, Every Night at Bedtime      spironolactone 25 MG tablet  Commonly known as: ALDACTONE   25 mg, Oral, 2 Times Daily             Stop These Medications      ondansetron 4 MG tablet  Commonly known as: Zofran     oxyCODONE-acetaminophen 7.5-325 MG per tablet  Commonly known as: PERCOCET  Replaced by: oxyCODONE-acetaminophen  MG per tablet                Patient Instructions:       Future Appointments   Date Time Provider Department Center   7/2/2025  3:00 PM Trudy Lamas PA-C [facility]   7/16/2025 12:00 PM YANNA BRKG CT 1 BH [facility] CT BR None    7/17/2025  9:30 AM Gypsy Mart, [facility]   9/8/2025 10:00 AM [facility]KG US BH [facility] US BR None   4/27/2026 10:15 AM Y[facility]NA US N[facility] ART/VASC CART 1 BH [facility] NI [facility]        Additional Instructions for the Follow-ups that You Need to Schedule       Ambulatory Referral to Home Health   As directed      Face to Face Visit Date: 6/27/2025   Follow-up provider for Plan of Care?: I treated the patient in an acute care facility and will not continue treatment after discharge.   Follow-up provider: FERNADNO DUNN [[id number]]   Reason/Clinical Findings: Back pain, cellulitis RLE, HTN, anemia. Needs post hospitalization follow up.   Describe mobility limitations that make leaving home difficult: Back pain, has new brace.   Nursing/Therapeutic Services Requested: Skilled Nursing Occupational Therapy Physical Therapy   Skilled nursing orders: Medication education Pain management Cardiopulmonary assessments Neurovascular assessments   PT orders: Home safety assessment Strengthening   Occupational orders: Activities of daily living Strengthening   Frequency: 1 Week 1        Basic Metabolic Panel    Jul 02, 2025 (Approximate)      Release to patient: Routine Release               Contact information for follow-up providers       Carlos Simpson APRN. Go on 7/9/2025.    Specialties: Nephrology, Nurse Practitioner  Why: Has follow-up scheduled with [facility] Carlos Simpson July 9 at 11 AM.  Contact information:  [address] [address] [phone]               Fernando Dunn MD .    Specialty: Family Medicine  Why: Follow up in 1-2 weeks. Call office for appointment.  Contact information:  [address] [facility] [address] [phone]                       Contact information for after-discharge care       Home Medical Care       [facility] - [facility] .    Services: Home Nursing, Home Rehabilitation, Home Health Services  Contact  information:  [address] [address]  [facility] [address]  [phone]                                   Discharge Order (From admission, onward)       Start     Ordered    06/27/25 0924  Discharge patient  Once        Comments: Call me this afternoon with recheck potassium   Expected Discharge Date: 06/27/25   Discharge Disposition: Home or Self Care   Physician of Record for Attribution - Please select from Treatment Team: EDWARD GALAN [[id number]]   Review needed by CMO to determine Physician of Record: No      Question Answer Comment   Physician of Record for Attribution - Please select from Treatment Team EDWARD GALAN    Review needed by CMO to determine Physician of Record No        06/27/25 0931                    No active diet order        Discharge instructions:  Follow up with your primary care provider in 1-2 weeks with a cbc and cmp         Total time spent discharging patient including evaluation, post hospitalization follow up,  medication and post hospitalization instructions and education, total time exceeds 30 minutes.    Signed:  Edward Galan MD  6/28/2025  09:11 EDT

## 2025-06-27 NOTE — PLAN OF CARE
Goal Outcome Evaluation:            Pt resting in bed quietly. C/o back pain. Medicated prn. Ambulates with walker and standby assist. Room air.

## 2025-06-27 NOTE — PROGRESS NOTES
[facility] Progress Note      Patient Name: Filomena Liu  : 1960  MRN: [id number]  Primary Care Physician:  Fernando Dunn MD  Date of admission: 2025    Subjective     Interval History:   Follow-up acute kidney injury on CKD 3B  Urine output 2.6 L; breathing is comfortable on room air  Was able to walk without difficulty; Neurosurg does not plan intervention yet for her [facility]  Eating well; no N/V.  Leg swelling much better    Review of Systems:   As noted above    Objective     Vitals:   Temp:  [98.4 °F (36.9 °C)-98.6 °F (37 °C)] 98.4 °F (36.9 °C)  Heart Rate:  [] 86  Resp:  [16-18] 18  BP: (105-135)/(56-80) 135/79    Intake/Output Summary (Last 24 hours) at 2025 1449  Last data filed at 2025 0640  Gross per 24 hour   Intake 340 ml   Output 1300 ml   Net -960 ml       Physical Exam:    General Appearance: oriented x 3, NAD, smiling, overweight  Skin: warm and dry  HEENT: oral mucosa normal, nonicteric sclera  Neck: supple, no JVD  Lungs: Clear to auscultation bilaterally; not labored on RA.  Heart: RRR, normal S1 and S2  Abdomen: soft, nontender, nondistended; bruising of wall; BS +, body wall edema  : no palpable bladder  Extremities: Venous stasis changes.  Trace-+1 lower extremity edema to hips  Neuro: normal speech and mental status     Scheduled Meds:     atenolol, 25 mg, Oral, Q24H  ceFAZolin, 2,000 mg, Intravenous, Q8H  cholecalciferol, 1,000 Units, Oral, Weekly  docusate sodium, 100 mg, Oral, BID  fluticasone, 1 spray, Nasal, Daily  ipratropium-albuterol, 3 mL, Nebulization, 4x Daily - RT  melatonin, 2.5 mg, Oral, Nightly  pantoprazole, 40 mg, Oral, QAM AC  senna-docusate sodium, 2 tablet, Oral, BID   And  polyethylene glycol, 17 g, Oral, Daily  potassium chloride ER, 40 mEq, Oral, Q4H  pregabalin, 25 mg, Oral, Q12H  rOPINIRole, 1 mg, Oral, Nightly  [Held by provider] spironolactone, 25 mg, Oral, Daily  torsemide, 40 mg, Oral, Daily      IV  Meds:          Results Reviewed:   I have personally reviewed the results from the time of this admission to 6/27/2025 14:49 EDT     Results from last 7 days   Lab Units 06/27/25  0606 06/26/25  0614 06/25/25  0357   SODIUM mmol/L 144 143 142   POTASSIUM mmol/L 3.0* 3.7 3.4*   CHLORIDE mmol/L 105 106 104   CO2 mmol/L 29.5* 30.0* 26.1   BUN mg/dL 20.0 21.0 21.0   CREATININE mg/dL 1.01* 1.06* 1.00   CALCIUM mg/dL 8.5* 8.7 8.7   BILIRUBIN mg/dL 1.0 1.2 1.2   ALK PHOS U/L 173* 182* 168*   ALT (SGPT) U/L 5 5 6   AST (SGOT) U/L 36* 45* 39*   GLUCOSE mg/dL 104* 83 137*       Estimated Creatinine Clearance: 52.1 mL/min (A) (by C-G formula based on SCr of 1.01 mg/dL (H)).    Results from last 7 days   Lab Units 06/27/25  0606 06/26/25  0614 06/25/25  0357 06/22/25  1538 06/22/25  0454   MAGNESIUM mg/dL  --   --  1.7  --  2.5*   PHOSPHORUS mg/dL 2.8 2.8 2.6   < > 1.9*    < > = values in this interval not displayed.             Results from last 7 days   Lab Units 06/27/25  0606 06/26/25  0614 06/25/25  0357 06/24/25  0616 06/23/25  1515 06/23/25  0457   WBC 10*3/mm3 5.42 5.63 6.66 7.07  --  7.53   HEMOGLOBIN g/dL 8.1* 8.0* 8.3* 7.8* 6.9* 7.0*   PLATELETS 10*3/mm3 100* 104* 108* 98*  --  97*             Assessment / Plan     ASSESSMENT:  SANDRO on CKD 3B baseline creatinine 1.3-1.5, with initial injury from intravascular volume depletion and possibly contribution from Bactrim with decreased secretion of creatinine.  Volume excess much better; on room air and leg edema resolving.  Hypoalbuminemia and cirrhosis playing a role in fluid retention.  Hypokalemia.  Anemia.  Recent GI evaluation EGD and colonoscopy.  Iron consistent with anemia of chronic disease.  GI plans possible outpatient capsule endoscopy  Cirrhosis  L2 compression fracture: Neurosurgery does not plan intervention yet.  Brace recommended  Right shoulder pain after reverse right total shoulder arthroplasty.  Hypertension, controlled    PLAN:  Reduce torsemide to 20  mg daily  Resume spironolactone 25 mg twice daily  Await repeat potassium.  If above 3.3, no objection to discharge from renal view.  Discussed with Dr. Galan.  Will arrange BMP next week  Follow-up already arranged in our office on 7/9 at 11 AM with TAYLER Torrez    Thank you for involving us in the care of Filomena Liu.  Please feel free to call with any questions.    Chan Brewster MD  06/27/25  14:50 EDT    [facility]  [phone]    Please note that portions of this note were completed with a voice recognition program.

## 2025-06-28 RX ORDER — SPIRONOLACTONE 25 MG/1
25 TABLET ORAL 2 TIMES DAILY
Qty: 30 TABLET | Refills: 0 | Status: SHIPPED | OUTPATIENT
Start: 2025-06-28

## 2025-06-28 NOTE — NURSING NOTE
Refuses to wear LSO brace d/t it covers her chest to high up.  Abd binder bought to patient per Dr. Galan's suggestion. Patient states it is too big.

## 2025-06-28 NOTE — CASE MANAGEMENT/SOCIAL WORK
Case Management Discharge Note      Final Note: home with [facility]         Selected Continued Care - Discharged on 6/27/2025 Admission date: 6/20/2025 - Discharge disposition: Home or Self Care      Destination    No services have been selected for the patient.                Durable Medical Equipment    No services have been selected for the patient.                Dialysis/Infusion    No services have been selected for the patient.                Home Medical Care Coordination complete.      Service Provider Services Address Phone Fax Patient Preferred    [facility] [address] [facility] [place] [address] [phone] --              Therapy    No services have been selected for the patient.                Community Resources    No services have been selected for the patient.                Community & DME    No services have been selected for the patient.                    Transportation Services  Transportation: Private Transportation  Private: Car    Final Discharge Disposition Code: 06 - home with home health care

## 2025-06-28 NOTE — OUTREACH NOTE
Prep Survey      Flowsheet Row Responses   [facility] patient discharged from? [facility]   Is LACE score < 7 ? No   Eligibility Readm Mgmt   Discharge diagnosis Hypokalemia   Does the patient have one of the following disease processes/diagnoses(primary or secondary)? Other   Does the patient have Home health ordered? Yes   What is the Home health agency?  [facility]   Prep survey completed? Yes            Chelsea MOSS - Registered Nurse

## 2025-06-30 DIAGNOSIS — S32.000A COMPRESSION FRACTURE OF LUMBAR VERTEBRA, UNSPECIFIED LUMBAR VERTEBRAL LEVEL, INITIAL ENCOUNTER: ICD-10-CM

## 2025-06-30 RX ORDER — OXYCODONE AND ACETAMINOPHEN 10; 325 MG/1; MG/1
1 TABLET ORAL EVERY 4 HOURS PRN
Qty: 8 TABLET | Refills: 0 | Status: ON HOLD | OUTPATIENT
Start: 2025-06-30 | End: 2025-07-04

## 2025-06-30 NOTE — TELEPHONE ENCOUNTER
Ms. Liu called me at this time. She had a RTSRA 6/6. She said her shoulder has been doing well, but she just got out of the hospital where she spent 7 days because her labs were all messed up and her BP was low. She was also found to have a compression fracture that wasn't there a month ago.  She asked about her next appt with Dr. Lanier which was confirmed for 7/17 with [facility]. She said she wanted to be seen sooner if possible because of this lumbar fracture. Explained that Dr. Lanier doesn't do back surgery, but could possibly refer her to someone if needed.   She was also requesting a refill on her pain medication. She said she was D/C'd with just a couple days worth and is about out. Told her I would let the MD know.   She just doesn't feel well and doesn't know what to do. Told her I would send the MD an overview of what has been going on and we would go from there. She voiced understanding.    She would like a refill on her pain medication  Medication: Oxycodone 7.5  Surgery: 6/6 ([facility])  Last fill 6/20  Next appt: 7/17  Pharmacy: [facility] [place]  [phone]

## 2025-07-01 NOTE — PAYOR COMM NOTE
"Filomena Liu \"PAT\" (64 y.o. Female)     ATTN: NURSE REVIEWER  RE: DISCHARGE SUMMARY  REF: [id number]  PLS REPLY TO WILBERTO GREER [phone] OR FAX# [phone]      Date of Birth   1960    Social Security Number       Address   [address] [place] [facility] [address]    Home Phone   [phone]    MRN   [id number]       Mosque   Amish    Marital Status                               Admission Date   2025    Admission Type   Emergency    Admitting Provider   Belen Choudhury MD    Attending Provider       Department, Room/Bed   [facility], E667/       Discharge Date   2025    Discharge Disposition   Home or Self Care    Discharge Destination                                 Attending Provider: (none)   Allergies: Wound Dressing Adhesive    Isolation: None   Infection: MRSA/History Only (23)   Code Status: Prior    Ht: 149.9 cm (59\")   Wt: 73.3 kg (161 lb 9.6 oz)    Admission Cmt: None   Principal Problem: Hypokalemia [E87.6]                   Active Insurance as of 2025       Primary Coverage       Payor Plan Insurance Group Employer/Plan Group    ANTHEM MEDICARE REPLACEMENT ANTHEM MEDICARE ADVANTAGE HMO [id number]       Payor Plan Address Payor Plan Phone Number Payor Plan Fax Number Effective Dates    [address] [phone]  2024 - None Entered    [facility] [id number]         Subscriber Name Subscriber Birth Date Member ID       FILOMENA LIU 1960 [id number]                     Emergency Contacts        (Rel.) Home Phone Work Phone Mobile Phone    MARTINEZ STEIN (Significant Other) [phone] -- [phone]                 Discharge Summary        Edward Galan MD at 25 1731                       [facility]  [phone]    DISCHARGE SUMMARY  [facility]    Patient Identification:  Name: Filomena Liu  Age: 64 y.o.  Sex: female  :  " 1960  MRN: [id number]  Primary Care Physician: Fernando Dunn MD    Admit date: 6/20/2025  Discharge date and time: 6/27/2025     Discharge Diagnoses:  Hypokalemia    HTN (hypertension)    Gastroesophageal reflux disease    Alcoholic cirrhosis of liver without ascites    Iron deficiency anemia    Transaminitis    CKD (chronic kidney disease) stage 3, GFR 30-59 ml/min    Hypotension    Thrombocytopenia    Compression fx, lumbar spine       History of present illness from H&P:    64 year old female presented to the emergency room with abnormal labs and low blood pressure at home; she has had swelling of her legs and has had her diuretic increased recently; she did not improve; she denies fever or chills; she had a reverse shoulder replacement and recently and has had pain of her left shoulder; she denies shortness of breath or chest pain     Hospital Course:     64 y.o. female admitted with Hypokalemia and acute kidney injury.  Patient was also found to have right lower extremity cellulitis for which she has been on cefazolin during hospitalization tolerating it well.  Will finish this up outpatient.  Creatinine on arrival is 1.9 has improved to 1.0.  We have restarted her on Demadex.  Her potassium dropped this morning to 3.0 assuming the repeat is okay plan is for discharge this afternoon on Demadex 20 mg daily and spironolactone 25 twice daily which was her previous dose.     On the day prior to discharge the patient developed excruciating low back pain.  CT scan showed L2 compression fracture.  She was seen by neurosurgery recommended bracing.  By the next day the pain was much improved.  Pain medication is currently under the troll of her orthopedist.  Jimbo reports been reviewed and benefits of pain medication discussed with the patient.  Short supply of Percocet tens has been given to the patient but will need further prescriptions through her orthopedist.  And then eventually will go back to her pain  specialist.  Neurosurgery has given patient instructions concerning her compression fracture.       Cirrhosis  Transaminitis, very minimal elevation     Anemia  Thrombocytopenia-labile during hospitalization  - hb is stable     Right shoulder osteoarthritis  - With associated rotator cuff tendinopathy; S/P Right reverse total shoulder arthroplasty and Tenodesis of long head of biceps tendon with Dr. Lanier on 06/05/25. She is having some discomfort in her shoulders, XR obtained and reviewed, orthopedic surgery has seen, although up with Dr. Lanier outpatient         The patient was seen and examined on the day of discharge.    Consults:   Consults       Date and Time Order Name Status Description    6/25/2025  3:45 PM Inpatient Neurosurgery Consult Completed     6/23/2025  1:28 PM Inpatient Gastroenterology Consult Completed     6/20/2025  8:10 PM Inpatient Orthopedic Surgery Consult Completed     6/20/2025  5:28 PM Inpatient Nephrology Consult Completed             Results from last 7 days   Lab Units 06/27/25  0606   WBC 10*3/mm3 5.42   HEMOGLOBIN g/dL 8.1*   HEMATOCRIT % 25.7*   PLATELETS 10*3/mm3 100*       Results from last 7 days   Lab Units 06/27/25  1804 06/27/25  0606   SODIUM mmol/L  --  144   POTASSIUM mmol/L 3.5 3.0*   CHLORIDE mmol/L  --  105   CO2 mmol/L  --  29.5*   BUN mg/dL  --  20.0   CREATININE mg/dL  --  1.01*   GLUCOSE mg/dL  --  104*   CALCIUM mg/dL  --  8.5*       Significant Diagnostic Studies:   WBC   Date Value Ref Range Status   06/27/2025 5.42 3.40 - 10.80 10*3/mm3 Final     Hemoglobin   Date Value Ref Range Status   06/27/2025 8.1 (L) 12.0 - 15.9 g/dL Final     Hematocrit   Date Value Ref Range Status   06/27/2025 25.7 (L) 34.0 - 46.6 % Final     Platelets   Date Value Ref Range Status   06/27/2025 100 (L) 140 - 450 10*3/mm3 Final     Sodium   Date Value Ref Range Status   06/27/2025 144 136 - 145 mmol/L Final     Potassium   Date Value Ref Range Status   06/27/2025 3.5 3.5 - 5.2  "mmol/L Final     Chloride   Date Value Ref Range Status   06/27/2025 105 98 - 107 mmol/L Final     CO2   Date Value Ref Range Status   06/27/2025 29.5 (H) 22.0 - 29.0 mmol/L Final     BUN   Date Value Ref Range Status   06/27/2025 20.0 8.0 - 23.0 mg/dL Final     Creatinine   Date Value Ref Range Status   06/27/2025 1.01 (H) 0.57 - 1.00 mg/dL Final     Glucose   Date Value Ref Range Status   06/27/2025 104 (H) 65 - 99 mg/dL Final     Calcium   Date Value Ref Range Status   06/27/2025 8.5 (L) 8.6 - 10.5 mg/dL Final     Phosphorus   Date Value Ref Range Status   06/27/2025 2.8 2.5 - 4.5 mg/dL Final     AST (SGOT)   Date Value Ref Range Status   06/27/2025 36 (H) 1 - 32 U/L Final     ALT (SGPT)   Date Value Ref Range Status   06/27/2025 5 1 - 33 U/L Final     Alkaline Phosphatase   Date Value Ref Range Status   06/27/2025 173 (H) 39 - 117 U/L Final     No results found for: \"APTT\", \"INR\"  No results found for: \"COLORU\", \"CLARITYU\", \"SPECGRAV\", \"PHUR\", \"PROTEINUR\", \"GLUCOSEU\", \"KETONESU\", \"BLOODU\", \"NITRITE\", \"LEUKOCYTESUR\", \"BILIRUBINUR\", \"UROBILINOGEN\", \"RBCUA\", \"WBCUA\", \"BACTERIA\", \"UACOMMENT\"  No results found for: \"TROPONINT\", \"TROPONINI\", \"BNP\"  No components found for: \"HGBA1C;2\"  No components found for: \"TSH;2\"    Imaging Results (All)       Procedure Component Value Units Date/Time    XR Spine Lumbar 2 or 3 View [[id number]] Collected: 06/26/25 1543     Updated: 06/26/25 1548    Narrative:      XR SPINE LUMBAR 2 OR 3 VW-     INDICATIONS: L2 fracture     TECHNIQUE: 3 views of the lumbar spine, standing     COMPARISON: Correlated with CT from 6/25/2025     FINDINGS:     L2 compression fracture appears stable. Vertebral body heights are  otherwise preserved. No other fractures are identified. Mild  retrolisthesis of L2 on L3. Alignment is otherwise in range of normal.  Multilevel endplate spurring, disc space narrowing, and facet  arthropathy are apparent. Arterial calcifications are seen. As  positioned, " thoracolumbar levoscoliosis is apparent.       Impression:         As described.           This report was finalized on 6/26/2025 3:45 PM by Dr. Harpreet Mistry M.D on Workstation: [id number]       CT Lumbar Spine Without Contrast [[id number]] Collected: 06/25/25 1727     Updated: 06/25/25 1748    Narrative:      CT LUMBAR SPINE WO CONTRAST-     INDICATIONS: Low back pain. Radiation dose reduction techniques were  utilized, including automated exposure control and exposure modulation  based on body size.     TECHNIQUE: NONCONTRAST LUMBAR SPINE CT     COMPARISON: 5/16/2025     FINDINGS:     A compression fracture of L2 is new from the prior exam, with about 20%  loss of height anteriorly. No retropulsed fragments. No other fractures  are noted.     There appears to be some neuroforaminal narrowing on the right at L1/2  related to ligamentum flavum hypertrophy and disc bulge. On the left,  facet hypertrophy appears to result in moderate neuroforaminal narrowing  at L4/5. 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     Abdominal aortic aneurysm measures 4.2 cm on axial image 40, not  significantly changed. Scattered arterial calcifications are present.  Colonic diverticula are seen that do not appear inflamed. Surgical clips  at the gallbladder fossa.             Impression:         L2 compression fracture. Degenerative changes in the spine.     Incidental findings, as noted above.           This report was finalized on 6/25/2025 5:33 PM by Dr. Harpreet Mistry M.D on Workstation: [id number]       XR Shoulder 2+ View Right [[id number]] Collected: 06/21/25 1226     Updated: 06/21/25 1231    Narrative:      PROCEDURE:  XR SHOULDER 2+ VW RIGHT-     HISTORY: Right shoulder pain and contusion, 2 weeks postop shoulder  replacement.     COMPARISON: 6/5/2025     FINDINGS:       3 views of the right shoulder were obtained.     There is a right shoulder reverse total arthroplasty with appropriate  alignment. No acute  "fracture identified. Postoperative soft tissue air  has resolved. Increased density throughout the soft tissues at the right  shoulder is incompletely assessed but may reflect residual postoperative  edema and/or effusion. Mild acromioclavicular osteoarthritis. Sternal  wires and cervical hardware are partially imaged. Old rib fractures  incidentally noted.        This report was finalized on 6/21/2025 12:28 PM by Dr. Kady Prince M.D  on Workstation: [id number]       XR Chest 1 View [[id number]] Collected: 06/20/25 2114     Updated: 06/20/25 2118    Narrative:      XR CHEST 1 VW-     HISTORY: Hypertension, abnormal labs.     COMPARISON: 3/6/2025     FINDINGS: A single view of the chest was obtained.     Support Devices:  None.  Cardiac Silhouette/Mediastinum/Batool: The cardiac silhouette is upper and  ultimately enlarged. There is calcific atherosclerosis. The aorta is  tortuous.  Lungs/Pleural Spaces:  The lungs and pleural spaces are clear.  Chest Wall/Diaphragm/Upper Abdomen: There are bilateral shoulder  arthroplasties. There is interval assess fusion hardware in the  visualized lower cervical spine. Sternal wires are present. Degenerative  disc disease and scoliosis.     This report was finalized on 6/20/2025 9:15 PM by Dr. Kady Prince M.D  on Workstation: [id number]           No results found for: \"SITE\", \"ALLENTEST\", \"PHART\", \"TKO5XOI\", \"PO2ART\", \"ZCY3SKP\", \"BASEEXCESS\", \"X1SWSYUU\", \"HGBBG\", \"HCTABG\", \"OXYHEMOGLOBI\", \"METHHGBN\", \"CARBOXYHGB\", \"CO2CT\", \"BAROMETRIC\", \"MODALITY\", \"FIO2\"       Discharge Medications        New Medications        Instructions Start Date   cephalexin 500 MG capsule  Commonly known as: KEFLEX   500 mg, Oral, 4 Times Daily      cyclobenzaprine 5 MG tablet  Commonly known as: FLEXERIL   5 mg, Oral, 3 Times Daily PRN      oxyCODONE-acetaminophen  MG per tablet  Commonly known as: PERCOCET  Replaces: oxyCODONE-acetaminophen 7.5-325 MG per tablet   1 tablet, Oral, Every 4 " Hours PRN             Changes to Medications        Instructions Start Date   torsemide 20 MG tablet  Commonly known as: DEMADEX  What changed:   how much to take  when to take this   20 mg, Oral, Daily             Continue These Medications        Instructions Start Date   albuterol sulfate  (90 Base) MCG/ACT inhaler  Commonly known as: PROVENTIL HFA;VENTOLIN HFA;PROAIR HFA   2 puffs, 3 times daily      atenolol 25 MG tablet  Commonly known as: TENORMIN   25 mg, Nightly      cholecalciferol 25 MCG (1000 UT) tablet  Commonly known as: VITAMIN D3   1 tablet, Weekly      docusate sodium 100 MG capsule  Commonly known as: COLACE   100 mg, Oral, 2 Times Daily      fluticasone 50 MCG/ACT nasal spray  Commonly known as: FLONASE   1 spray, Daily      ipratropium-albuterol 0.5-2.5 mg/3 ml nebulizer  Commonly known as: DUO-NEB   Take 3 mL by nebulization 4 (Four) Times a Day. Indications: Chronic Obstructive Lung Disease      pantoprazole 40 MG EC tablet  Commonly known as: PROTONIX   40 mg, Oral, Daily      pregabalin 25 MG capsule  Commonly known as: LYRICA   1 capsule, Every 12 Hours Scheduled      rOPINIRole 1 MG tablet  Commonly known as: REQUIP   1 tablet, Every Night at Bedtime      spironolactone 25 MG tablet  Commonly known as: ALDACTONE   25 mg, Oral, 2 Times Daily             Stop These Medications      ondansetron 4 MG tablet  Commonly known as: Zofran     oxyCODONE-acetaminophen 7.5-325 MG per tablet  Commonly known as: PERCOCET  Replaced by: oxyCODONE-acetaminophen  MG per tablet                Patient Instructions:       Future Appointments   Date Time Provider Department Center   7/2/2025  3:00 PM Trudy Lamas PA-C [facility]   7/16/2025 12:00 PM [facility] BRKG CT 1 BH [facility] CT BR None   7/17/2025  9:30 AM Gypsy Mart APRN [facility]   9/8/2025 10:00 AM [facility]KG US BH [facility] US BR None   4/27/2026 10:15 AM [facility]NA US NIVAS ART/VASC CART 1 BH [facility] NI [facility]        Additional Instructions  for the Follow-ups that You Need to Schedule       Ambulatory Referral to Home Health   As directed      Face to Face Visit Date: 6/27/2025   Follow-up provider for Plan of Care?: I treated the patient in an acute care facility and will not continue treatment after discharge.   Follow-up provider: FERNANDO DUNN [[id number]]   Reason/Clinical Findings: Back pain, cellulitis RLE, HTN, anemia. Needs post hospitalization follow up.   Describe mobility limitations that make leaving home difficult: Back pain, has new brace.   Nursing/Therapeutic Services Requested: Skilled Nursing Occupational Therapy Physical Therapy   Skilled nursing orders: Medication education Pain management Cardiopulmonary assessments Neurovascular assessments   PT orders: Home safety assessment Strengthening   Occupational orders: Activities of daily living Strengthening   Frequency: 1 Week 1        Basic Metabolic Panel    Jul 02, 2025 (Approximate)      Release to patient: Routine Release               Contact information for follow-up providers       Carlos Simpson APRN. Go on 7/9/2025.    Specialties: Nephrology, Nurse Practitioner  Why: Has follow-up scheduled with [facility] Carlos Simpson July 9 at 11 AM.  Contact information:  [address] [facility] [address]  [place] [id number] [phone]               Fernando Dunn MD .    Specialty: Family Medicine  Why: Follow up in 1-2 weeks. Call office for appointment.  Contact information:  [address] [facility] [address] [phone]                       Contact information for after-discharge care       Home Medical Care       [facility] .    Services: Home Nursing, Home Rehabilitation, Home Health Services  Contact information:  [address] [address] [address] [phone]                                   Discharge Order (From admission, onward)       Start     Ordered    06/27/25 0924  Discharge  patient  Once        Comments: Call me this afternoon with recheck potassium   Expected Discharge Date: 06/27/25   Discharge Disposition: Home or Self Care   Physician of Record for Attribution - Please select from Treatment Team: EDWARD GALAN [[id number]]   Review needed by CMO to determine Physician of Record: No      Question Answer Comment   Physician of Record for Attribution - Please select from Treatment Team EDWARD GALAN    Review needed by CMO to determine Physician of Record No        06/27/25 0931                    No active diet order        Discharge instructions:  Follow up with your primary care provider in 1-2 weeks with a cbc and cmp         Total time spent discharging patient including evaluation, post hospitalization follow up,  medication and post hospitalization instructions and education, total time exceeds 30 minutes.    Signed:  Edward Galan MD  6/28/2025  09:11 EDT     Electronically signed by Edward Galan MD at 06/28/25 0911

## 2025-07-02 ENCOUNTER — OFFICE VISIT (OUTPATIENT)
Dept: ORTHOPEDIC SURGERY | Facility: CLINIC | Age: 65
End: 2025-07-02
Payer: MEDICARE

## 2025-07-02 ENCOUNTER — READMISSION MANAGEMENT (OUTPATIENT)
Dept: CALL CENTER | Facility: HOSPITAL | Age: 65
End: 2025-07-02
Payer: MEDICARE

## 2025-07-02 VITALS — BODY MASS INDEX: 32.39 KG/M2 | WEIGHT: 165 LBS | TEMPERATURE: 98.6 F | HEIGHT: 60 IN

## 2025-07-02 DIAGNOSIS — Z09 SURGERY FOLLOW-UP: Primary | ICD-10-CM

## 2025-07-02 NOTE — PROGRESS NOTES
"Filomena Liu : 1960 MRN: [id number] DATE: 2025    DIAGNOSIS: 4 week follow up right shoulder arthroplasty      SUBJECTIVE:  Patient returns today for 4 week follow up of right shoulder replacement. Patient reports she was under the impression Michelle was going to see her for home health therapy, however, this has not happened yet.  She suspects it may be due to her recent hospitalization.    OBJECTIVE:    Temp 98.6 °F (37 °C) (Temporal)   Ht 152.4 cm (60\")   Wt 74.8 kg (165 lb)   BMI 32.22 kg/m²     Exam: The incision is well healed. No erythema or drainage. Hematoma remains, but has improved.  Skin over hematoma appears healthy.  Shoulder moves fluidly with pendulums.  Motion is on track per protocol.  The arm is soft and nontender.  Good motor and sensory function.  Palpable distal pulses.     DIAGNOSTIC STUDIES    Xrays: AP and scapular Y views of the right shoulder are ordered and reviewed for evaluation of shoulder replacement.  They demonstrate a well positioned, well aligned replacement without complicating factors noted.  In comparison with previous films there has been no change.    ASSESSMENT: 4 week follow up right shoulder replacement    PLAN:   1.  Continue PT per protocol.  2.  Discontinue sling and begin working on progressing ROM as tolerated.  3.  Counseled patient about appropriate activity modifications and restrictions.  Released to drive at this point.  4.  We discussed the need for prophylactic antibiotics prior to dental appointments for 2 years following replacement surgery.  5.  We will research the issue with [facility] to get her some therapy.  Patient will notify me if she does not hear from Michelle by the end of this week.  6.  I recommend she follow up with her PCP since she was recently hospitalized.   7.  For her lumbar fracture, I recommended she contact Dr. Warren for evaluation since she has a relationship with him.   8.  Follow-up in 6 weeks with Dr. Lanier. "     Gypsy Mart, APRN    07/02/2025

## 2025-07-02 NOTE — OUTREACH NOTE
Medical Week 1 Survey      Flowsheet Row Responses   [facility] patient discharged from? [place]   Does the patient have one of the following disease processes/diagnoses(primary or secondary)? Other   Week 1 attempt successful? No   Unsuccessful attempts Attempt 1            Vanesa CARRASCO - Registered Nurse

## 2025-07-03 ENCOUNTER — APPOINTMENT (OUTPATIENT)
Dept: CT IMAGING | Facility: HOSPITAL | Age: 65
End: 2025-07-03
Payer: MEDICARE

## 2025-07-03 ENCOUNTER — TELEPHONE (OUTPATIENT)
Dept: ORTHOPEDIC SURGERY | Facility: CLINIC | Age: 65
End: 2025-07-03
Payer: MEDICARE

## 2025-07-03 ENCOUNTER — TELEPHONE (OUTPATIENT)
Dept: ORTHOPEDIC SURGERY | Facility: CLINIC | Age: 65
End: 2025-07-03

## 2025-07-03 ENCOUNTER — READMISSION MANAGEMENT (OUTPATIENT)
Dept: CALL CENTER | Facility: HOSPITAL | Age: 65
End: 2025-07-03
Payer: MEDICARE

## 2025-07-03 ENCOUNTER — APPOINTMENT (OUTPATIENT)
Dept: GENERAL RADIOLOGY | Facility: HOSPITAL | Age: 65
End: 2025-07-03
Payer: MEDICARE

## 2025-07-03 ENCOUNTER — HOSPITAL ENCOUNTER (INPATIENT)
Facility: HOSPITAL | Age: 65
LOS: 2 days | Discharge: HOME | End: 2025-07-08
Attending: EMERGENCY MEDICINE | Admitting: INTERNAL MEDICINE
Payer: MEDICARE

## 2025-07-03 DIAGNOSIS — E87.6 HYPOKALEMIA: ICD-10-CM

## 2025-07-03 DIAGNOSIS — R60.0 BILATERAL LEG EDEMA: Primary | ICD-10-CM

## 2025-07-03 DIAGNOSIS — N17.9 AKI (ACUTE KIDNEY INJURY): ICD-10-CM

## 2025-07-03 DIAGNOSIS — R10.84 GENERALIZED ABDOMINAL PAIN: ICD-10-CM

## 2025-07-03 DIAGNOSIS — S32.029D CLOSED FRACTURE OF SECOND LUMBAR VERTEBRA WITH ROUTINE HEALING, UNSPECIFIED FRACTURE MORPHOLOGY, SUBSEQUENT ENCOUNTER: ICD-10-CM

## 2025-07-03 LAB
ALBUMIN SERPL-MCNC: 3.4 G/DL (ref 3.5–5.2)
ALBUMIN/GLOB SERPL: 1.1 G/DL
ALP SERPL-CCNC: 233 U/L (ref 39–117)
ALT SERPL W P-5'-P-CCNC: 25 U/L (ref 1–33)
ANION GAP SERPL CALCULATED.3IONS-SCNC: 11.2 MMOL/L (ref 5–15)
AST SERPL-CCNC: 175 U/L (ref 1–32)
BASOPHILS # BLD AUTO: 0.05 10*3/MM3 (ref 0–0.2)
BASOPHILS NFR BLD AUTO: 0.7 % (ref 0–1.5)
BILIRUB SERPL-MCNC: 2.6 MG/DL (ref 0–1.2)
BUN SERPL-MCNC: 17 MG/DL (ref 8–23)
BUN/CREAT SERPL: 12.8 (ref 7–25)
CALCIUM SPEC-SCNC: 9.5 MG/DL (ref 8.6–10.5)
CHLORIDE SERPL-SCNC: 101 MMOL/L (ref 98–107)
CO2 SERPL-SCNC: 30.8 MMOL/L (ref 22–29)
CREAT SERPL-MCNC: 1.33 MG/DL (ref 0.57–1)
DEPRECATED RDW RBC AUTO: 44.4 FL (ref 37–54)
EGFRCR SERPLBLD CKD-EPI 2021: 44.8 ML/MIN/1.73
EOSINOPHIL # BLD AUTO: 0.21 10*3/MM3 (ref 0–0.4)
EOSINOPHIL NFR BLD AUTO: 3.1 % (ref 0.3–6.2)
ERYTHROCYTE [DISTWIDTH] IN BLOOD BY AUTOMATED COUNT: 13.8 % (ref 12.3–15.4)
GLOBULIN UR ELPH-MCNC: 3.2 GM/DL
GLUCOSE SERPL-MCNC: 118 MG/DL (ref 65–99)
HCT VFR BLD AUTO: 31.5 % (ref 34–46.6)
HGB BLD-MCNC: 10 G/DL (ref 12–15.9)
HOLD SPECIMEN: NORMAL
HOLD SPECIMEN: NORMAL
IMM GRANULOCYTES # BLD AUTO: 0.05 10*3/MM3 (ref 0–0.05)
IMM GRANULOCYTES NFR BLD AUTO: 0.7 % (ref 0–0.5)
LYMPHOCYTES # BLD AUTO: 1.45 10*3/MM3 (ref 0.7–3.1)
LYMPHOCYTES NFR BLD AUTO: 21.3 % (ref 19.6–45.3)
MCH RBC QN AUTO: 28.2 PG (ref 26.6–33)
MCHC RBC AUTO-ENTMCNC: 31.7 G/DL (ref 31.5–35.7)
MCV RBC AUTO: 89 FL (ref 79–97)
MONOCYTES # BLD AUTO: 0.89 10*3/MM3 (ref 0.1–0.9)
MONOCYTES NFR BLD AUTO: 13 % (ref 5–12)
NEUTROPHILS NFR BLD AUTO: 4.17 10*3/MM3 (ref 1.7–7)
NEUTROPHILS NFR BLD AUTO: 61.2 % (ref 42.7–76)
NRBC BLD AUTO-RTO: 0 /100 WBC (ref 0–0.2)
NT-PROBNP SERPL-MCNC: 160 PG/ML (ref 0–900)
PLATELET # BLD AUTO: 168 10*3/MM3 (ref 140–450)
PMV BLD AUTO: 9.6 FL (ref 6–12)
POTASSIUM SERPL-SCNC: 3.3 MMOL/L (ref 3.5–5.2)
PROT SERPL-MCNC: 6.6 G/DL (ref 6–8.5)
RBC # BLD AUTO: 3.54 10*6/MM3 (ref 3.77–5.28)
SODIUM SERPL-SCNC: 143 MMOL/L (ref 136–145)
WBC NRBC COR # BLD AUTO: 6.82 10*3/MM3 (ref 3.4–10.8)
WHOLE BLOOD HOLD COAG: NORMAL
WHOLE BLOOD HOLD SPECIMEN: NORMAL

## 2025-07-03 PROCEDURE — 36415 COLL VENOUS BLD VENIPUNCTURE: CPT

## 2025-07-03 PROCEDURE — G0378 HOSPITAL OBSERVATION PER HR: HCPCS

## 2025-07-03 PROCEDURE — 74176 CT ABD & PELVIS W/O CONTRAST: CPT

## 2025-07-03 PROCEDURE — 71046 X-RAY EXAM CHEST 2 VIEWS: CPT

## 2025-07-03 PROCEDURE — 83880 ASSAY OF NATRIURETIC PEPTIDE: CPT | Performed by: EMERGENCY MEDICINE

## 2025-07-03 PROCEDURE — 25010000002 HYDROMORPHONE PER 4 MG: Performed by: EMERGENCY MEDICINE

## 2025-07-03 PROCEDURE — 80053 COMPREHEN METABOLIC PANEL: CPT

## 2025-07-03 PROCEDURE — 85025 COMPLETE CBC W/AUTO DIFF WBC: CPT

## 2025-07-03 PROCEDURE — 99285 EMERGENCY DEPT VISIT HI MDM: CPT

## 2025-07-03 RX ORDER — CEPHALEXIN 500 MG/1
500 CAPSULE ORAL 4 TIMES DAILY
Status: COMPLETED | OUTPATIENT
Start: 2025-07-03 | End: 2025-07-04

## 2025-07-03 RX ORDER — ONDANSETRON 4 MG/1
4 TABLET, ORALLY DISINTEGRATING ORAL EVERY 6 HOURS PRN
Status: DISCONTINUED | OUTPATIENT
Start: 2025-07-03 | End: 2025-07-08 | Stop reason: HOSPADM

## 2025-07-03 RX ORDER — ALBUTEROL SULFATE 0.83 MG/ML
2.5 SOLUTION RESPIRATORY (INHALATION) EVERY 6 HOURS PRN
Status: DISCONTINUED | OUTPATIENT
Start: 2025-07-03 | End: 2025-07-08 | Stop reason: HOSPADM

## 2025-07-03 RX ORDER — IPRATROPIUM BROMIDE AND ALBUTEROL SULFATE 2.5; .5 MG/3ML; MG/3ML
3 SOLUTION RESPIRATORY (INHALATION)
Status: DISCONTINUED | OUTPATIENT
Start: 2025-07-03 | End: 2025-07-08 | Stop reason: HOSPADM

## 2025-07-03 RX ORDER — ACETAMINOPHEN 160 MG/5ML
650 SOLUTION ORAL EVERY 4 HOURS PRN
Status: DISCONTINUED | OUTPATIENT
Start: 2025-07-03 | End: 2025-07-08 | Stop reason: HOSPADM

## 2025-07-03 RX ORDER — ACETAMINOPHEN 325 MG/1
650 TABLET ORAL EVERY 4 HOURS PRN
Status: DISCONTINUED | OUTPATIENT
Start: 2025-07-03 | End: 2025-07-08 | Stop reason: HOSPADM

## 2025-07-03 RX ORDER — FLUTICASONE PROPIONATE 50 MCG
1 SPRAY, SUSPENSION (ML) NASAL DAILY
Status: DISCONTINUED | OUTPATIENT
Start: 2025-07-04 | End: 2025-07-08 | Stop reason: HOSPADM

## 2025-07-03 RX ORDER — AMOXICILLIN 250 MG
2 CAPSULE ORAL 2 TIMES DAILY PRN
Status: DISCONTINUED | OUTPATIENT
Start: 2025-07-03 | End: 2025-07-08 | Stop reason: HOSPADM

## 2025-07-03 RX ORDER — SPIRONOLACTONE 25 MG/1
25 TABLET ORAL 2 TIMES DAILY
Status: DISCONTINUED | OUTPATIENT
Start: 2025-07-03 | End: 2025-07-04

## 2025-07-03 RX ORDER — POLYETHYLENE GLYCOL 3350 17 G/17G
17 POWDER, FOR SOLUTION ORAL DAILY PRN
Status: DISCONTINUED | OUTPATIENT
Start: 2025-07-03 | End: 2025-07-08 | Stop reason: HOSPADM

## 2025-07-03 RX ORDER — NITROGLYCERIN 0.4 MG/1
0.4 TABLET SUBLINGUAL
Status: DISCONTINUED | OUTPATIENT
Start: 2025-07-03 | End: 2025-07-08 | Stop reason: HOSPADM

## 2025-07-03 RX ORDER — DOCUSATE SODIUM 100 MG/1
100 CAPSULE, LIQUID FILLED ORAL 2 TIMES DAILY
Status: DISCONTINUED | OUTPATIENT
Start: 2025-07-03 | End: 2025-07-08 | Stop reason: HOSPADM

## 2025-07-03 RX ORDER — BISACODYL 5 MG/1
5 TABLET, DELAYED RELEASE ORAL DAILY PRN
Status: DISCONTINUED | OUTPATIENT
Start: 2025-07-03 | End: 2025-07-08 | Stop reason: HOSPADM

## 2025-07-03 RX ORDER — BISACODYL 10 MG
10 SUPPOSITORY, RECTAL RECTAL DAILY PRN
Status: DISCONTINUED | OUTPATIENT
Start: 2025-07-03 | End: 2025-07-08 | Stop reason: HOSPADM

## 2025-07-03 RX ORDER — ACETAMINOPHEN 650 MG/1
325 SUPPOSITORY RECTAL EVERY 4 HOURS PRN
Status: DISCONTINUED | OUTPATIENT
Start: 2025-07-03 | End: 2025-07-08 | Stop reason: HOSPADM

## 2025-07-03 RX ORDER — ONDANSETRON 2 MG/ML
4 INJECTION INTRAMUSCULAR; INTRAVENOUS EVERY 6 HOURS PRN
Status: DISCONTINUED | OUTPATIENT
Start: 2025-07-03 | End: 2025-07-08 | Stop reason: HOSPADM

## 2025-07-03 RX ORDER — PANTOPRAZOLE SODIUM 40 MG/1
40 TABLET, DELAYED RELEASE ORAL DAILY
Status: DISCONTINUED | OUTPATIENT
Start: 2025-07-04 | End: 2025-07-08 | Stop reason: HOSPADM

## 2025-07-03 RX ORDER — POTASSIUM CHLORIDE 1500 MG/1
40 TABLET, EXTENDED RELEASE ORAL ONCE
Status: DISCONTINUED | OUTPATIENT
Start: 2025-07-03 | End: 2025-07-04

## 2025-07-03 RX ORDER — PREGABALIN 25 MG/1
25 CAPSULE ORAL EVERY 12 HOURS SCHEDULED
Status: DISCONTINUED | OUTPATIENT
Start: 2025-07-03 | End: 2025-07-08 | Stop reason: HOSPADM

## 2025-07-03 RX ORDER — CHOLECALCIFEROL (VITAMIN D3) 25 MCG
1000 TABLET ORAL WEEKLY
Status: DISCONTINUED | OUTPATIENT
Start: 2025-07-03 | End: 2025-07-08 | Stop reason: HOSPADM

## 2025-07-03 RX ORDER — FUROSEMIDE 10 MG/ML
40 INJECTION INTRAMUSCULAR; INTRAVENOUS ONCE
Status: DISCONTINUED | OUTPATIENT
Start: 2025-07-03 | End: 2025-07-03 | Stop reason: SDUPTHER

## 2025-07-03 RX ORDER — ROPINIROLE 1 MG/1
1 TABLET, FILM COATED ORAL NIGHTLY
Status: DISCONTINUED | OUTPATIENT
Start: 2025-07-03 | End: 2025-07-08 | Stop reason: HOSPADM

## 2025-07-03 RX ORDER — FUROSEMIDE 10 MG/ML
40 INJECTION INTRAMUSCULAR; INTRAVENOUS 2 TIMES DAILY
Status: DISCONTINUED | OUTPATIENT
Start: 2025-07-03 | End: 2025-07-07

## 2025-07-03 RX ORDER — HYDROMORPHONE HYDROCHLORIDE 1 MG/ML
0.5 INJECTION, SOLUTION INTRAMUSCULAR; INTRAVENOUS; SUBCUTANEOUS ONCE
Refills: 0 | Status: COMPLETED | OUTPATIENT
Start: 2025-07-03 | End: 2025-07-03

## 2025-07-03 RX ORDER — CYCLOBENZAPRINE HCL 10 MG
5 TABLET ORAL 3 TIMES DAILY PRN
Status: DISCONTINUED | OUTPATIENT
Start: 2025-07-03 | End: 2025-07-08 | Stop reason: HOSPADM

## 2025-07-03 RX ORDER — OXYCODONE AND ACETAMINOPHEN 10; 325 MG/1; MG/1
1 TABLET ORAL EVERY 6 HOURS PRN
Qty: 50 TABLET | Refills: 0 | Status: ON HOLD | OUTPATIENT
Start: 2025-07-03

## 2025-07-03 RX ORDER — OXYCODONE AND ACETAMINOPHEN 10; 325 MG/1; MG/1
1 TABLET ORAL EVERY 4 HOURS PRN
Refills: 0 | Status: DISPENSED | OUTPATIENT
Start: 2025-07-03 | End: 2025-07-04

## 2025-07-03 RX ORDER — ATENOLOL 50 MG/1
25 TABLET ORAL NIGHTLY
Status: DISCONTINUED | OUTPATIENT
Start: 2025-07-03 | End: 2025-07-08 | Stop reason: HOSPADM

## 2025-07-03 RX ADMIN — HYDROMORPHONE HYDROCHLORIDE 0.5 MG: 1 INJECTION, SOLUTION INTRAMUSCULAR; INTRAVENOUS; SUBCUTANEOUS at 19:55

## 2025-07-03 NOTE — ED PROVIDER NOTES
EMERGENCY DEPARTMENT ENCOUNTER  Room Number:  E657/1  PCP: Fernando Dunn MD  Independent Historians: Patient      HPI:  Chief Complaint: Leg swelling, leg pain, back pain, abdominal distention, difficulties walking  A complete HPI/ROS/PMH/PSH/SH/FH are unobtainable due to: None    Context: Filomena Liu is a 64 y.o. female with a medical history of COPD, hypertension, GERD, arthritis and abdominal aortic aneurysm who presents to the ED c/o acute worsening, severe swelling of the lower legs bilaterally as well as pain in her lower back and abdominal distention and discomfort.  All of the symptoms are making it more difficult for the patient to ambulate or do her normal activities.  Patient has been taking her usual medications including torsemide, as directed.  Denies fevers.  She says her legs are weeping fluid.  She has been trying to apply bandages to the areas that are weeping fluid but it does not seem to be helping at all.      Review of prior external notes (non-ED) -and- Review of prior external test results outside of this encounter: I independently reviewed the hospitalist discharge summary from June 27, 2025.  Patient was admitted for management of hypokalemia and SANDRO after having some recent changes with her diuretic medications prior to that.    Prescription drug monitoring program review: KELLY reviewed by Belen Choudhury MD, Woodrow Cardenas MD KASPER: N/A and KELLY query complete and reviewed. Patient receives regular prescriptions for controlled substances.    PAST MEDICAL HISTORY  Active Ambulatory Problems     Diagnosis Date Noted    Mediastinal mass 12/14/2016    Cervical stenosis of spinal canal 12/14/2016    Cervical radiculopathy 12/14/2016    Cellulitis/abscess of left foot 11/12/2018    HTN (hypertension) 11/13/2018    History of MRSA infection 11/13/2018    Anxiety 11/13/2018    Peroneal tenosynovitis 11/14/2018    Fracture of navicular bone of left foot 11/14/2018     Tobacco use 11/16/2018    Non compliance with medical treatment 11/17/2018    Screening for colon cancer 10/27/2020    Osteoporosis 10/31/2020    Shoulder arthritis 06/05/2023    Primary localized osteoarthrosis of left shoulder region 06/07/2023    History of adenomatous polyp of colon 01/19/2024    Diverticulosis 03/28/2024    Acute GI bleeding 08/05/2024    Hyperkalemia 08/05/2024    SANDRO (acute kidney injury) 08/05/2024    Alcoholism 08/05/2024    COPD (chronic obstructive pulmonary disease) 08/05/2024    Acute blood loss anemia 08/05/2024    Melena 08/05/2024    Gastroesophageal reflux disease 08/05/2024    Dysphagia 08/05/2024    Alcoholic cirrhosis of liver without ascites 08/05/2024    Other iron deficiency anemias 09/09/2024    Malabsorption of iron 09/25/2024    Acute pancreatitis 03/06/2025    Diverticulitis 03/06/2025    Hypoglycemia 03/06/2025    Lactic acidosis 03/06/2025    Adverse effect of iron 03/28/2025    Iron deficiency anemia 04/08/2025    Cirrhosis of liver without ascites 04/08/2025    Infrarenal abdominal aortic aneurysm (AAA) without rupture 04/10/2025    Arthritis of shoulder 05/09/2025    Hypokalemia 06/20/2025    Transaminitis 06/21/2025    CKD (chronic kidney disease) stage 3, GFR 30-59 ml/min 06/21/2025    Hypotension 06/21/2025    Thrombocytopenia 06/21/2025    Compression fx, lumbar spine 06/27/2025     Resolved Ambulatory Problems     Diagnosis Date Noted    No Resolved Ambulatory Problems     Past Medical History:   Diagnosis Date    Abdominal aneurysm     Anemia 08/01/24    Arthritis of back 2000    Asthma 2015    At high risk for falls     Basal cell carcinoma     Bruises easily     Chronic kidney disease 8/5/24    Cirrhosis 8/5/24    CTS (carpal tunnel syndrome) Surgery 1999    DDD (degenerative disc disease), cervical     Depression     Diverticulitis of colon Unknown    Fatty liver 2022    GERD (gastroesophageal reflux disease)     GI (gastrointestinal bleed) 8/5/24    History  of anemia     Hyperlipidemia ?    Hypertension ?    Low back strain Unknown    Lower leg edema     Lumbosacral disc disease 2024    Neck strain 2000    Neuropathy     Pancreatitis 2024    Panic attacks     Scoliosis Unknown    Shoulder pain, left 07/07/2023    Sleep apnea     Spinal stenosis     Spondylolisthesis of cervical region     Steatosis of liver     Tachycardia     Tendinitis of knee 2022    Thoracic disc disorder 2000    Vertigo          PAST SURGICAL HISTORY  Past Surgical History:   Procedure Laterality Date    BACK SURGERY      cervical disc surgery with fusion-    CARPAL TUNNEL RELEASE Bilateral     CATARACT EXTRACTION      CHOLECYSTECTOMY  1/1/20    COLONOSCOPY      COLONOSCOPY N/A 11/12/2020    Procedure: COLONOSCOPY, polypectomy;  Surgeon: Filomena Mckeon MD;  Location: [facility] OR;  Service: Gastroenterology;  Laterality: N/A;  Diverticulosis; Hepatic flexure polyp x 1; Polyp at 60cm x 1; Polyp at 50cm x 1- hot snare;    COLONOSCOPY N/A 04/15/2024    Procedure: COLONOSCOPY into sigmoid colon with hot snare polypectomy;  Surgeon: Leatha Johns MD;  Location: [facility] ENDOSCOPY;  Service: General;  Laterality: N/A;  pre- history of polyps  post- diverticulosis, polyp    COLONOSCOPY N/A 05/07/2025    Procedure: COLONOSCOPY to cecum and ti with hot snare polypectomies;  Surgeon: Ana Guerrero MD;  Location: [facility] ENDOSCOPY;  Service: Gastroenterology;  Laterality: N/A;  PRE: IRON DEFICIENCY ANEMIA  POST: diverticulosis, polyps, hemorrhoids    ENDOSCOPY N/A 08/15/2024    Procedure: ESOPHAGOGASTRODUODENOSCOPY;  Surgeon: Garth Constantino MD;  Location: [facility] ENDOSCOPY;  Service: Gastroenterology;  Laterality: N/A;  PRE- CIRRHOSIS, DARK STOOL  POST- NORMAL    ENDOSCOPY N/A 05/07/2025    Procedure: ESOPHAGOGASTRODUODENOSCOPY;  Surgeon: Ana Guerrero MD;  Location: [facility] ENDOSCOPY;  Service: Gastroenterology;  Laterality: N/A;  PRE: CIRRHOSIS  POST:portal hypertensive gastropathy;  esophagitis; hiatal hernia; varices    HYSTERECTOMY  1998    INCISION AND DRAINAGE LEG Left 11/17/2018    Procedure: Incision and Drainage of Left ankle;  Surgeon: Maxwell Lanier MD;  Location: [facility] MAIN OR;  Service: Orthopedics    NECK SURGERY  2000, 2005,2017    SIGMOIDOSCOPY N/A 03/28/2024    Procedure: SIGMOIDOSCOPY FLEXIBLE;  Surgeon: Leatha Johns MD;  Location: [facility] ENDOSCOPY;  Service: General;  Laterality: N/A;  pre: h/o colon polyps  post: poor prep, diverticulosis    SKIN BIOPSY      SKIN GRAFT SPLIT THICKNESS N/A 02/12/2019    Procedure: debridement SKIN GRAFT SPLIT THICKNESS left ankle wound;  Surgeon: Barry Worthy MD;  Location: [facility] OR [facility];  Service: Plastics    TONGUE LESION EXCISION/BIOPSY N/A 11/26/2024    Procedure: WIDE LOCAL EXCISION OF TONGUE LESION;  Surgeon: El Mercedes MD;  Location: [facility] MAIN OR;  Service: ENT;  Laterality: N/A;    TOTAL SHOULDER ARTHROPLASTY Left 07/20/2023    Procedure: Total  shoulder arthroplasty;  Surgeon: Maxwell Lanier MD;  Location: [facility] OR [facility];  Service: Orthopedics;  Laterality: Left;    TOTAL SHOULDER ARTHROPLASTY W/ DISTAL CLAVICLE EXCISION Right 6/5/2025    Procedure: Right Reverse Total Shoulder Arthroplasty;  Surgeon: Maxwell Lanier MD;  Location: [facility] OR [facility];  Service: Orthopedics;  Laterality: Right;    TOTAL THYMECTOMY      TRIGGER POINT INJECTION  [id number]    UPPER GASTROINTESTINAL ENDOSCOPY  8/10/24    WRIST FRACTURE SURGERY      WRIST FRACTURE SURGERY Left          FAMILY HISTORY  Family History   Problem Relation Age of Onset    Emphysema Mother     Alzheimer's disease Father     Cancer Father     Osteoporosis Sister     Scoliosis Sister     Malig Hyperthermia Neg Hx          SOCIAL HISTORY  Social History     Socioeconomic History    Marital status:    Tobacco Use    Smoking status: Never     Passive exposure: Current    Smokeless tobacco: Never    Tobacco comments:     PATIENT STATES SHE IS  CURRENTLY TRYING TO QUIT. STATES SHE IS SMOKING APPROX. 4-8 CIGARETTES PER DAY AS OF 5-23-25   Vaping Use    Vaping status: Never Used   Substance and Sexual Activity    Alcohol use: Not Currently     Alcohol/week: 10.0 standard drinks of alcohol     Comment: PATIENT STATES SHE IS 80 DAYS SOBER AS OF 5-23-25    Drug use: No    Sexual activity: Yes     Partners: Male     Birth control/protection: Post-menopausal, Tubal ligation, Hysterectomy, Surgical       Chronic or social conditions impacting patient care (Social Determinants of Health):  Social Drivers of Health     Tobacco Use: Medium Risk (7/3/2025)    Patient History     Smoking Tobacco Use: Never     Smokeless Tobacco Use: Never     Passive Exposure: Current   Alcohol Use: Not At Risk (7/3/2025)    AUDIT-C     Frequency of Alcohol Consumption: Never     Average Number of Drinks: Patient does not drink     Frequency of Binge Drinking: Never   Financial Resource Strain: Low Risk  (2/19/2025)    Received from [facility]    Overall Financial Resource Strain (CARDIA)     Difficulty of Paying Living Expenses: Not hard at all   Food Insecurity: No Food Insecurity (6/23/2025)    Hunger Vital Sign     Worried About Running Out of Food in the Last Year: Never true     Ran Out of Food in the Last Year: Never true   Transportation Needs: No Transportation Needs (6/23/2025)    PRAPARE - Transportation     Lack of Transportation (Medical): No     Lack of Transportation (Non-Medical): No   Physical Activity: Insufficiently Active (8/6/2024)    Exercise Vital Sign     Days of Exercise per Week: 3 days     Minutes of Exercise per Session: 30 min   Stress: No Stress Concern Present (8/6/2024)    [employer] - Occupational Stress Questionnaire     Feeling of Stress : Not at all   Social Connections: Feeling Socially Integrated (9/9/2024)    OASIS : Social Isolation     Frequency of experiencing loneliness or isolation: Never    Interpersonal Safety: Not At Risk (7/3/2025)    Abuse Screen     Unsafe at Home or Work/School: no     Feels Threatened by Someone?: no     Does Anyone Keep You from Contacting Others or Doint Things Outside the Home?: no     Physical Sign of Abuse Present: no   Depression: Not at risk (2/19/2025)    Received from [facility]    Depression     PHQ-2 Total Score: 2   Housing Stability: Not At Risk (7/3/2025)    Housing Stability     Current Living Arrangements: home     Potentially Unsafe Housing Conditions: none   Utilities: Not At Risk (6/23/2025)    [facility] Utilities     Threatened with loss of utilities: No   Health Literacy: Not At Risk (6/23/2025)    Education     Help with school or training?: No     Preferred Language: English   Employment: Not At Risk (8/6/2024)    Employment     Do you want help finding or keeping work or a job?: I do not need or want help   Disabilities: Not At Risk (7/3/2025)    Disabilities     Concentrating, Remembering, or Making Decisions Difficulty: no     Doing Errands Independently Difficulty: no       ALLERGIES  Wound dressing adhesive      REVIEW OF SYSTEMS  Review of Systems  Included in HPI  All systems reviewed and negative except for those discussed in HPI.      PHYSICAL EXAM    I have reviewed the triage vital signs and nursing notes.    ED Triage Vitals   Temp Heart Rate Resp BP SpO2   07/03/25 1829 07/03/25 1829 07/03/25 1829 07/03/25 1831 07/03/25 1829   98.6 °F (37 °C) (!) 121 20 150/78 92 %      Temp src Heart Rate Source Patient Position BP Location FiO2 (%)   07/03/25 1829 -- 07/03/25 1831 07/03/25 1831 --   Oral  Sitting Right arm        Physical Exam  GENERAL: alert, no acute distress  SKIN: Warm, dry  HENT: Normocephalic, atraumatic  EYES: no scleral icterus, normal conjunctiva  CV: regular rhythm, regular rate  RESPIRATORY: normal effort, lungs clear  ABDOMEN: soft, mild distention.  Moderate diffuse tenderness in all quadrants.  No masses are  palpable.  MUSCULOSKELETAL: no deformity.  Moderate edema noted to the bilateral legs symmetrically from the proximal tibia all the way through the feet and ankles.  There are a few scattered areas of thin weeping fluid notable bilaterally.  NEURO: alert, moves all extremities, follows commands        LAB RESULTS  Recent Results (from the past 24 hours)   Comprehensive Metabolic Panel    Collection Time: 07/03/25  6:37 PM    Specimen: Arm, Left; Blood   Result Value Ref Range    Glucose 118 (H) 65 - 99 mg/dL    BUN 17.0 8.0 - 23.0 mg/dL    Creatinine 1.33 (H) 0.57 - 1.00 mg/dL    Sodium 143 136 - 145 mmol/L    Potassium 3.3 (L) 3.5 - 5.2 mmol/L    Chloride 101 98 - 107 mmol/L    CO2 30.8 (H) 22.0 - 29.0 mmol/L    Calcium 9.5 8.6 - 10.5 mg/dL    Total Protein 6.6 6.0 - 8.5 g/dL    Albumin 3.4 (L) 3.5 - 5.2 g/dL    ALT (SGPT) 25 1 - 33 U/L    AST (SGOT) 175 (H) 1 - 32 U/L    Alkaline Phosphatase 233 (H) 39 - 117 U/L    Total Bilirubin 2.6 (H) 0.0 - 1.2 mg/dL    Globulin 3.2 gm/dL    A/G Ratio 1.1 g/dL    BUN/Creatinine Ratio 12.8 7.0 - 25.0    Anion Gap 11.2 5.0 - 15.0 mmol/L    eGFR 44.8 (L) >60.0 mL/min/1.73   BNP    Collection Time: 07/03/25  6:37 PM    Specimen: Arm, Left; Blood   Result Value Ref Range    proBNP 160.0 0.0 - 900.0 pg/mL   Green Top (Gel)    Collection Time: 07/03/25  6:37 PM   Result Value Ref Range    Extra Tube Hold for add-ons.    Lavender Top    Collection Time: 07/03/25  6:37 PM   Result Value Ref Range    Extra Tube hold for add-on    Gold Top - SST    Collection Time: 07/03/25  6:37 PM   Result Value Ref Range    Extra Tube Hold for add-ons.    Light Blue Top    Collection Time: 07/03/25  6:37 PM   Result Value Ref Range    Extra Tube Hold for add-ons.    CBC Auto Differential    Collection Time: 07/03/25  6:37 PM    Specimen: Arm, Left; Blood   Result Value Ref Range    WBC 6.82 3.40 - 10.80 10*3/mm3    RBC 3.54 (L) 3.77 - 5.28 10*6/mm3    Hemoglobin 10.0 (L) 12.0 - 15.9 g/dL    Hematocrit  31.5 (L) 34.0 - 46.6 %    MCV 89.0 79.0 - 97.0 fL    MCH 28.2 26.6 - 33.0 pg    MCHC 31.7 31.5 - 35.7 g/dL    RDW 13.8 12.3 - 15.4 %    RDW-SD 44.4 37.0 - 54.0 fl    MPV 9.6 6.0 - 12.0 fL    Platelets 168 140 - 450 10*3/mm3    Neutrophil % 61.2 42.7 - 76.0 %    Lymphocyte % 21.3 19.6 - 45.3 %    Monocyte % 13.0 (H) 5.0 - 12.0 %    Eosinophil % 3.1 0.3 - 6.2 %    Basophil % 0.7 0.0 - 1.5 %    Immature Grans % 0.7 (H) 0.0 - 0.5 %    Neutrophils, Absolute 4.17 1.70 - 7.00 10*3/mm3    Lymphocytes, Absolute 1.45 0.70 - 3.10 10*3/mm3    Monocytes, Absolute 0.89 0.10 - 0.90 10*3/mm3    Eosinophils, Absolute 0.21 0.00 - 0.40 10*3/mm3    Basophils, Absolute 0.05 0.00 - 0.20 10*3/mm3    Immature Grans, Absolute 0.05 0.00 - 0.05 10*3/mm3    nRBC 0.0 0.0 - 0.2 /100 WBC         RADIOLOGY  CT Abdomen Pelvis Without Contrast  Result Date: 7/3/2025  CT ABDOMEN AND PELVIS WITHOUT IV CONTRAST  HISTORY: Abdominal distention  TECHNIQUE: Radiation dose reduction techniques were utilized, including automated exposure control and exposure modulation based on body size. 3 mm images were obtained through the abdomen and pelvis without IV contrast.  COMPARISON: CT abdomen pelvis 5/16/2025 and 3/6/2025      FINDINGS AND IMPRESSION: Please note evaluation is suboptimal due to the marked streak artifact from patient's arms being along her side. No free intraperitoneal air is seen. Soft tissue thickening and subcutaneous stranding is present throughout the visualized body wall, nonspecific but can be seen in the setting of anasarca, edema and/or cellulitis and correlation with physical exam and patient history is recommended. The appendix is unremarkable.  The liver is cirrhotic. Spleen is enlarged, measuring up to approximate 15.1 cm in length areas of portal hypertension. Gallbladder surgically absent. Pancreas, adrenal glands and kidneys have an unremarkable noncontrast CT appearance. No hydronephrosis.  THERE IS ASYMMETRIC THICKENING AND  SURROUNDING STRANDING SURROUNDING THE ADJACENT COLON AND CECUM, SUGGESTIVE OF PORTAL HYPERTENSIVE COLOPATHY VERSUS COLITIS IN THE APPROPRIATE CONTEXT. CORRELATION WITH PATIENT HISTORY IS RECOMMENDED.  No abdominal pelvic adenopathy by size criteria. Infrarenal aneurysmal dilation of the abdominal aorta measures up to approximately 3.6 cm disease previously 2.9 cm on 3/9/2021) continued attention on follow-up is recommended.  No suspicious lytic or blastic osseous lesion. For the purpose of this dictation, last well-formed disc base referred to as L5-S1. Burst compression fracture of the L2 vertebral body appears to demonstrate increased loss of height since 6/25/2025 resulting approximate 30% loss of height (previously 25%).  This report was finalized on 7/3/2025 10:29 PM by Dr. David Briceño M.D on Workstation: [id number]      XR Chest 2 View  Result Date: 7/3/2025  TWO VIEWS OF THE CHEST   HISTORY: swelling  COMPARISON: 6/20/2025  TECHNIQUE: PA and lateral views were performed of the chest.  FINDINGS:  Heart size is within normal limits, unchanged.  There is mild pulmonary vascular congestion, but there is no consolidation, effusion or pneumothorax.  There is no acute bony abnormality.       Negative chest, no acute abnormality.  This report was finalized on 7/3/2025 7:42 PM by Dr. Richard Chambers M.D on Workstation: [id number]          MEDICATIONS GIVEN IN ER  Medications   potassium chloride (KLOR-CON M20) CR tablet 40 mEq (has no administration in time range)   nitroglycerin (NITROSTAT) SL tablet 0.4 mg (has no administration in time range)   acetaminophen (TYLENOL) tablet 650 mg (has no administration in time range)     Or   acetaminophen (TYLENOL) 160 MG/5ML oral solution 650 mg (has no administration in time range)     Or   acetaminophen (TYLENOL) suppository 325 mg (has no administration in time range)   ondansetron ODT (ZOFRAN-ODT) disintegrating tablet 4 mg (has no administration in time range)     Or    ondansetron (ZOFRAN) injection 4 mg (has no administration in time range)   melatonin tablet 2.5 mg (has no administration in time range)   sennosides-docusate (PERICOLACE) 8.6-50 MG per tablet 2 tablet (has no administration in time range)     And   polyethylene glycol (MIRALAX) packet 17 g (has no administration in time range)     And   bisacodyl (DULCOLAX) EC tablet 5 mg (has no administration in time range)     And   bisacodyl (DULCOLAX) suppository 10 mg (has no administration in time range)   furosemide (LASIX) injection 40 mg (has no administration in time range)   Potassium Replacement - Follow Nurse / BPA Driven Protocol (has no administration in time range)   Magnesium Standard Dose Replacement - Follow Nurse / BPA Driven Protocol (has no administration in time range)   Phosphorus Replacement - Follow Nurse / BPA Driven Protocol (has no administration in time range)   Calcium Replacement - Follow Nurse / BPA Driven Protocol (has no administration in time range)   albuterol (PROVENTIL) nebulizer solution 0.083% 2.5 mg/3mL (has no administration in time range)   atenolol (TENORMIN) tablet 25 mg (has no administration in time range)   cephalexin (KEFLEX) capsule 500 mg (has no administration in time range)   cholecalciferol (VITAMIN D3) tablet 1,000 Units (has no administration in time range)   cyclobenzaprine (FLEXERIL) tablet 5 mg (has no administration in time range)   docusate sodium (COLACE) capsule 100 mg (has no administration in time range)   fluticasone (FLONASE) 50 MCG/ACT nasal spray 1 spray (has no administration in time range)   ipratropium-albuterol (DUO-NEB) nebulizer solution 3 mL (has no administration in time range)   oxyCODONE-acetaminophen (PERCOCET)  MG per tablet 1 tablet (has no administration in time range)   pantoprazole (PROTONIX) EC tablet 40 mg (has no administration in time range)   pregabalin (LYRICA) capsule 25 mg (has no administration in time range)   rOPINIRole  (REQUIP) tablet 1 mg (has no administration in time range)   spironolactone (ALDACTONE) tablet 25 mg (has no administration in time range)   HYDROmorphone (DILAUDID) injection 0.5 mg (0.5 mg Intravenous Given 7/3/25 1955)         ORDERS PLACED DURING THIS VISIT:  Orders Placed This Encounter   Procedures    XR Chest 2 View    CT Abdomen Pelvis Without Contrast    [place] Draw    Comprehensive Metabolic Panel    BNP    CBC Auto Differential    Basic Metabolic Panel    CBC (No Diff)    Diet: Cardiac; Healthy Heart (2-3 Na+); Fluid Consistency: Thin (IDDSI 0)    Maintain IV Access    Telemetry - Place Orders & Notify Provider of Results When Patient Experiences Acute Chest Pain, Dysrhythmia or Respiratory Distress    May Be Off Telemetry for Tests    Vital Signs    Up with assistance    Daily Weights    Strict Intake & Output    Oral Care    Place Sequential Compression Device    Maintain Sequential Compression Device    Code Status and Medical Interventions: CPR (Attempt to Resuscitate); Full    Inpatient Nephrology Consult    Initiate Observation Status    CBC & Differential    Green Top (Gel)    Lavender Top    Gold Top - SST    Light Blue Top         OUTPATIENT MEDICATION MANAGEMENT:  Current Facility-Administered Medications Ordered in Epic   Medication Dose Route Frequency Provider Last Rate Last Admin    acetaminophen (TYLENOL) tablet 650 mg  650 mg Oral Q4H PRN Belen Choudhury MD        Or    acetaminophen (TYLENOL) 160 MG/5ML oral solution 650 mg  650 mg Oral Q4H PRN Belen Choudhury MD        Or    acetaminophen (TYLENOL) suppository 325 mg  325 mg Rectal Q4H PRN StingBelen luna MD        albuterol (PROVENTIL) nebulizer solution 0.083% 2.5 mg/3mL  2.5 mg Nebulization Q6H PRN Belen Choudhury MD        atenolol (TENORMIN) tablet 25 mg  25 mg Oral Nightly StinglBelen MD        sennosides-docusate (PERICOLACE) 8.6-50 MG per tablet 2 tablet  2 tablet Oral BID PRN Stingl  Belen Cuba MD        And    polyethylene glycol (MIRALAX) packet 17 g  17 g Oral Daily PRN Stingcheryl, Belen Cuba MD        And    bisacodyl (DULCOLAX) EC tablet 5 mg  5 mg Oral Daily PRN Macey, Belen Cuba MD        And    bisacodyl (DULCOLAX) suppository 10 mg  10 mg Rectal Daily PRN Lucyl, Belen Cuba MD        Calcium Replacement - Follow Nurse / BPA Driven Protocol   Not Applicable PRN Stingl, Belen Cuba MD        cephalexin (KEFLEX) capsule 500 mg  500 mg Oral 4x Daily StingBelen luna MD        cholecalciferol (VITAMIN D3) tablet 1,000 Units  1,000 Units Oral Weekly Stingcheryl, Belen Cuba MD        cyclobenzaprine (FLEXERIL) tablet 5 mg  5 mg Oral TID PRN Stingl, Belen Cuba MD        docusate sodium (COLACE) capsule 100 mg  100 mg Oral BID Stingcheryl, Belen Cuba MD        [START ON 7/4/2025] fluticasone (FLONASE) 50 MCG/ACT nasal spray 1 spray  1 spray Nasal Daily StingBelen luna MD        furosemide (LASIX) injection 40 mg  40 mg Intravenous BID Stingcheryl, Belen Cuba MD        ipratropium-albuterol (DUO-NEB) nebulizer solution 3 mL  3 mL Nebulization 4x Daily - RT Stingcheryl, Belen Cuba MD        Magnesium Standard Dose Replacement - Follow Nurse / BPA Driven Protocol   Not Applicable PRN Stingl, Belen Cuba MD        melatonin tablet 2.5 mg  2.5 mg Oral Nightly StingBelen luna MD        nitroglycerin (NITROSTAT) SL tablet 0.4 mg  0.4 mg Sublingual Q5 Min PRN StingBelen luna MD        ondansetron ODT (ZOFRAN-ODT) disintegrating tablet 4 mg  4 mg Oral Q6H PRN Belen Choudhury MD        Or    ondansetron (ZOFRAN) injection 4 mg  4 mg Intravenous Q6H PRN Stingcheryl, Belen Cuba MD        oxyCODONE-acetaminophen (PERCOCET)  MG per tablet 1 tablet  1 tablet Oral Q4H PRN Macey, Belen Cuba MD        [START ON 7/4/2025] pantoprazole (PROTONIX) EC tablet 40 mg  40 mg Oral Daily Belen Choudhury MD        Phosphorus Replacement - Follow Nurse /  BPA Driven Protocol   Not Applicable PRN Belen Choudhury MD        potassium chloride (KLOR-CON M20) CR tablet 40 mEq  40 mEq Oral Once Woodrow Cardenas MD        Potassium Replacement - Follow Nurse / BPA Driven Protocol   Not Applicable PRN Belen Choudhury MD        pregabalin (LYRICA) capsule 25 mg  25 mg Oral Q12H Belen Choudhury MD        rOPINIRole (REQUIP) tablet 1 mg  1 mg Oral Nightly Belen Choudhury MD        spironolactone (ALDACTONE) tablet 25 mg  25 mg Oral BID Belen Choudhury MD         No current [facility]-ordered outpatient medications on file.         PROCEDURES  Procedures            PROGRESS, DATA ANALYSIS, CONSULTS, AND MEDICAL DECISION MAKING  All labs have been independently interpreted by me.  All radiology studies have been reviewed by me. All EKG's have been independently viewed and interpreted by me.  Discussion below represents my analysis of pertinent findings related to patient's condition, differential diagnosis, treatment plan and final disposition.    Differential diagnosis includes but is not limited to leg cellulitis, DVT, congestive heart failure, liver failure, volume overload, SBP, lumbar spine fracture, cauda equina syndrome.    Clinical Scores:                                   ED Course as of 07/03/25 2327   Thu Jul 03, 2025   1922 Physical exam shows significant edema to the bilateral lower extremities as well as some abdominal distention and tenderness.  Lab work shows an increase in her liver function tests.  Therefore I am ordering a CT scan of the abdomen pelvis to be done without contrast.  Will encourage for further diuresis with 1 dose of IV Lasix here.  Ordering a dose of potassium chloride as well given her hypokalemia at 3.3.  Patient is struggling to manage the symptoms at home and it has significantly impacted her mobility.  Therefore we are making arrangements to admit her to the hospitalist service for further medical management  needs tonight. [SHERRILL]   1923 I discussed with Dr. Choudhury from [facility] about the patient.  She agrees to admit her to the hospitalist service for further care needs tonight. [SHERRILL]   2321 I independently interpreted the Chest X-ray and my findings are: No Pneumothorax, No Effusion, No Infiltrate   [SHERRILL]   2321 Potassium(!): 3.3 [SHERRILL]   2326 I independently interpreted the abdomen CT study and my findings are: No free air, no small bowel obstruction pattern   [SHERRILL]   2326 Creatinine(!): 1.33 [SHERRILL]      ED Course User Index  [SHERRILL] Woodrow Cardenas MD       AS OF 23:27 EDT VITALS:    BP - 142/82  HR - 96  TEMP - 98.1 °F (36.7 °C) (Oral)  O2 SATS - 97%    COMPLEXITY OF CARE  The patient requires admission.      DIAGNOSIS  Final diagnoses:   Bilateral leg edema   Closed fracture of second lumbar vertebra with routine healing, unspecified fracture morphology, subsequent encounter   Generalized abdominal pain   Hypokalemia   SANDRO (acute kidney injury)         DISPOSITION  ED Disposition       ED Disposition   Decision to Admit    Condition   --    Comment   Level of Care: Telemetry [5]   Diagnosis: Bilateral leg edema [324864]   Admitting Physician: LIBAN CHOUDHURY [[id number]]   Attending Physician: LIBAN CHOUDHURY [[id number]]   Is patient appropriate for Inpatient Observation Unit?: No [0]                  Please note that portions of this document were completed with a voice recognition program.    Note Disclaimer: At [facility], we believe that sharing information builds trust and better relationships. You are receiving this note because you recently visited [facility]. It is possible you will see health information before a provider has talked with you about it. This kind of information can be easy to misunderstand. To help you fully understand what it means for your health, we urge you to discuss this note with your provider.         Woodrow Carednas MD  07/03/25 8009

## 2025-07-03 NOTE — ED TRIAGE NOTES
Patient to ed from  with complaints of bilateral leg edema, shoulder pain reports recent shoulder repair.

## 2025-07-03 NOTE — TELEPHONE ENCOUNTER
"Caller: Filomena Liu \"PAT\"    Relationship: Self    Best call back number:     What is the best time to reach you: ANYTIME    Who are you requesting to speak with (clinical staff, provider,  specific staff member): HEIDI LEE    What was the call regarding: PATIENT STATES SHE HAD AN APPOINTMENT WITH HEIDI LEE AND WAS PRESCRIBED OXYCODONE AND THE PRESCRIPTION ISN'T AT THE [facility]. PATIENT IS CALLING TO TRY TO GET AN UPDATE ON WHEN SHE WILL RECEIVE MEDICATION.     Is it okay if the provider responds through MyChart: CALL    "

## 2025-07-03 NOTE — TELEPHONE ENCOUNTER
"Hub staff attempted to follow warm transfer process and was unsuccessful     Caller: Filomena Liu \"PAT\"    Relationship to patient: Self    Best call back number: [phone]    Patient is needing: PT CALLING TO GET AN UPDATE ON THIS REQUEST FOR MEDICATION. ADDITIONALLY PT HAS A FEW OTHER POSTOP ISSUES SHE WOULD LIKE TO ADDRESS WITH HEIDI LEE. PT IS HAVING SWELLING/LEAKING IN HER LEGS, SWELLING IN HER STOMACH AREA DUE TO EDEMA, MEMORY ISSUES, AND INTERMITTENT DOUBLE VISION. PLEASE CONTACT PT ASAP TO DISCUSS FURTHER.    "

## 2025-07-03 NOTE — OUTREACH NOTE
Medical Week 1 Survey      Flowsheet Row Responses   [facility] patient discharged from? [place]   Does the patient have one of the following disease processes/diagnoses(primary or secondary)? Other   Week 1 attempt successful? No   Unsuccessful attempts Attempt 2            HECTOR DUMONT - Registered Nurse

## 2025-07-03 NOTE — TELEPHONE ENCOUNTER
Caller: KALEY    Relationship: HOME PHYSICAL THERAPIST     Best call back number:  KALEY    PATIENTS NUMBER  [phone]    What is the best time to reach you: ANY     Who are you requesting to speak with (clinical staff, provider,  specific staff member): CLINICAL      What was the call regarding: PATIENT CANCELLED HER HOME THERAPY AGAIN AND SAID SHE IS HAVING SOME MEDICAL ISSUES AND MAY GO TO HOSPITAL WOULDN'T TELL PHYSICAL THERAPIST WHY     Is it okay if the provider responds through MyChart: PLEASE CALL

## 2025-07-03 NOTE — OUTREACH NOTE
Medical Week 1 Survey      Flowsheet Row Responses   [facility] patient discharged from? [place]   Does the patient have one of the following disease processes/diagnoses(primary or secondary)? Other   Week 1 attempt successful? No   Unsuccessful attempts Attempt 3   Revoke Readmitted            EFRAIN MOHR - Registered Nurse

## 2025-07-03 NOTE — TELEPHONE ENCOUNTER
I spoke to Ms. Rm.  I explained the symptoms she is having are concerning.  I recommend she report to the emergency department for evaluation.  She says she has contacted Dr. Dunn's office and is awaiting a return call for his recommendations.

## 2025-07-04 LAB
ANION GAP SERPL CALCULATED.3IONS-SCNC: 9 MMOL/L (ref 5–15)
BUN SERPL-MCNC: 15 MG/DL (ref 8–23)
BUN/CREAT SERPL: 13 (ref 7–25)
CALCIUM SPEC-SCNC: 8.8 MG/DL (ref 8.6–10.5)
CHLORIDE SERPL-SCNC: 101 MMOL/L (ref 98–107)
CO2 SERPL-SCNC: 32 MMOL/L (ref 22–29)
CREAT SERPL-MCNC: 1.15 MG/DL (ref 0.57–1)
DEPRECATED RDW RBC AUTO: 43.5 FL (ref 37–54)
EGFRCR SERPLBLD CKD-EPI 2021: 53.3 ML/MIN/1.73
ERYTHROCYTE [DISTWIDTH] IN BLOOD BY AUTOMATED COUNT: 13.7 % (ref 12.3–15.4)
GLUCOSE SERPL-MCNC: 95 MG/DL (ref 65–99)
HCT VFR BLD AUTO: 25.9 % (ref 34–46.6)
HGB BLD-MCNC: 8.3 G/DL (ref 12–15.9)
MCH RBC QN AUTO: 28 PG (ref 26.6–33)
MCHC RBC AUTO-ENTMCNC: 32 G/DL (ref 31.5–35.7)
MCV RBC AUTO: 87.5 FL (ref 79–97)
PLATELET # BLD AUTO: 137 10*3/MM3 (ref 140–450)
PMV BLD AUTO: 10.1 FL (ref 6–12)
POTASSIUM SERPL-SCNC: 3.5 MMOL/L (ref 3.5–5.2)
POTASSIUM SERPL-SCNC: 4.1 MMOL/L (ref 3.5–5.2)
RBC # BLD AUTO: 2.96 10*6/MM3 (ref 3.77–5.28)
SODIUM SERPL-SCNC: 142 MMOL/L (ref 136–145)
WBC NRBC COR # BLD AUTO: 6.52 10*3/MM3 (ref 3.4–10.8)

## 2025-07-04 PROCEDURE — 94761 N-INVAS EAR/PLS OXIMETRY MLT: CPT

## 2025-07-04 PROCEDURE — 84132 ASSAY OF SERUM POTASSIUM: CPT | Performed by: INTERNAL MEDICINE

## 2025-07-04 PROCEDURE — 94799 UNLISTED PULMONARY SVC/PX: CPT

## 2025-07-04 PROCEDURE — G0378 HOSPITAL OBSERVATION PER HR: HCPCS

## 2025-07-04 PROCEDURE — 80048 BASIC METABOLIC PNL TOTAL CA: CPT | Performed by: INTERNAL MEDICINE

## 2025-07-04 PROCEDURE — 94640 AIRWAY INHALATION TREATMENT: CPT

## 2025-07-04 PROCEDURE — 94664 DEMO&/EVAL PT USE INHALER: CPT

## 2025-07-04 PROCEDURE — 85027 COMPLETE CBC AUTOMATED: CPT | Performed by: INTERNAL MEDICINE

## 2025-07-04 PROCEDURE — 25010000002 FUROSEMIDE PER 20 MG: Performed by: INTERNAL MEDICINE

## 2025-07-04 RX ORDER — POTASSIUM CHLORIDE 1500 MG/1
20 TABLET, EXTENDED RELEASE ORAL 2 TIMES DAILY WITH MEALS
Status: DISCONTINUED | OUTPATIENT
Start: 2025-07-05 | End: 2025-07-08 | Stop reason: HOSPADM

## 2025-07-04 RX ORDER — OXYCODONE AND ACETAMINOPHEN 10; 325 MG/1; MG/1
1 TABLET ORAL EVERY 4 HOURS PRN
Refills: 0 | Status: COMPLETED | OUTPATIENT
Start: 2025-07-04 | End: 2025-07-05

## 2025-07-04 RX ORDER — POTASSIUM CHLORIDE 1500 MG/1
40 TABLET, EXTENDED RELEASE ORAL EVERY 4 HOURS
Status: COMPLETED | OUTPATIENT
Start: 2025-07-04 | End: 2025-07-04

## 2025-07-04 RX ORDER — SPIRONOLACTONE 50 MG/1
50 TABLET, FILM COATED ORAL 2 TIMES DAILY
Status: DISCONTINUED | OUTPATIENT
Start: 2025-07-04 | End: 2025-07-08 | Stop reason: HOSPADM

## 2025-07-04 RX ORDER — POTASSIUM CHLORIDE 1500 MG/1
40 TABLET, EXTENDED RELEASE ORAL
Status: DISPENSED | OUTPATIENT
Start: 2025-07-04 | End: 2025-07-05

## 2025-07-04 RX ADMIN — OXYCODONE AND ACETAMINOPHEN 1 TABLET: 325; 10 TABLET ORAL at 14:55

## 2025-07-04 RX ADMIN — PREGABALIN 25 MG: 25 CAPSULE ORAL at 20:39

## 2025-07-04 RX ADMIN — SENNOSIDES AND DOCUSATE SODIUM 2 TABLET: 50; 8.6 TABLET ORAL at 09:04

## 2025-07-04 RX ADMIN — SPIRONOLACTONE 25 MG: 25 TABLET ORAL at 09:04

## 2025-07-04 RX ADMIN — PREGABALIN 25 MG: 25 CAPSULE ORAL at 00:26

## 2025-07-04 RX ADMIN — OXYCODONE AND ACETAMINOPHEN 1 TABLET: 325; 10 TABLET ORAL at 18:50

## 2025-07-04 RX ADMIN — Medication 2.5 MG: at 20:39

## 2025-07-04 RX ADMIN — PANTOPRAZOLE SODIUM 40 MG: 40 TABLET, DELAYED RELEASE ORAL at 09:04

## 2025-07-04 RX ADMIN — IPRATROPIUM BROMIDE AND ALBUTEROL SULFATE 3 ML: .5; 3 SOLUTION RESPIRATORY (INHALATION) at 11:45

## 2025-07-04 RX ADMIN — IPRATROPIUM BROMIDE AND ALBUTEROL SULFATE 3 ML: .5; 3 SOLUTION RESPIRATORY (INHALATION) at 15:25

## 2025-07-04 RX ADMIN — OXYCODONE AND ACETAMINOPHEN 1 TABLET: 325; 10 TABLET ORAL at 23:33

## 2025-07-04 RX ADMIN — IPRATROPIUM BROMIDE AND ALBUTEROL SULFATE 3 ML: .5; 3 SOLUTION RESPIRATORY (INHALATION) at 20:00

## 2025-07-04 RX ADMIN — Medication 2.5 MG: at 00:20

## 2025-07-04 RX ADMIN — ATENOLOL 25 MG: 50 TABLET ORAL at 20:39

## 2025-07-04 RX ADMIN — CYCLOBENZAPRINE HYDROCHLORIDE 5 MG: 10 TABLET, FILM COATED ORAL at 02:37

## 2025-07-04 RX ADMIN — ROPINIROLE 1 MG: 1 TABLET, FILM COATED ORAL at 00:26

## 2025-07-04 RX ADMIN — OXYCODONE AND ACETAMINOPHEN 1 TABLET: 325; 10 TABLET ORAL at 09:04

## 2025-07-04 RX ADMIN — ROPINIROLE 1 MG: 1 TABLET, FILM COATED ORAL at 20:39

## 2025-07-04 RX ADMIN — DOCUSATE SODIUM 100 MG: 100 CAPSULE, LIQUID FILLED ORAL at 20:39

## 2025-07-04 RX ADMIN — POTASSIUM CHLORIDE 40 MEQ: 1500 TABLET, EXTENDED RELEASE ORAL at 14:34

## 2025-07-04 RX ADMIN — CEPHALEXIN 500 MG: 500 CAPSULE ORAL at 14:34

## 2025-07-04 RX ADMIN — CEPHALEXIN 500 MG: 500 CAPSULE ORAL at 09:04

## 2025-07-04 RX ADMIN — IPRATROPIUM BROMIDE AND ALBUTEROL SULFATE 3 ML: .5; 3 SOLUTION RESPIRATORY (INHALATION) at 02:19

## 2025-07-04 RX ADMIN — FUROSEMIDE 40 MG: 10 INJECTION, SOLUTION INTRAMUSCULAR; INTRAVENOUS at 00:20

## 2025-07-04 RX ADMIN — DOCUSATE SODIUM 100 MG: 100 CAPSULE, LIQUID FILLED ORAL at 00:21

## 2025-07-04 RX ADMIN — POTASSIUM CHLORIDE 40 MEQ: 1500 TABLET, EXTENDED RELEASE ORAL at 11:52

## 2025-07-04 RX ADMIN — CEPHALEXIN 500 MG: 500 CAPSULE ORAL at 00:20

## 2025-07-04 RX ADMIN — FLUTICASONE PROPIONATE 1 SPRAY: 50 SPRAY, METERED NASAL at 09:05

## 2025-07-04 RX ADMIN — PREGABALIN 25 MG: 25 CAPSULE ORAL at 09:04

## 2025-07-04 RX ADMIN — ATENOLOL 25 MG: 50 TABLET ORAL at 00:20

## 2025-07-04 RX ADMIN — OXYCODONE AND ACETAMINOPHEN 1 TABLET: 325; 10 TABLET ORAL at 00:20

## 2025-07-04 RX ADMIN — DOCUSATE SODIUM 100 MG: 100 CAPSULE, LIQUID FILLED ORAL at 09:04

## 2025-07-04 RX ADMIN — Medication 1000 UNITS: at 00:20

## 2025-07-04 RX ADMIN — FUROSEMIDE 40 MG: 10 INJECTION, SOLUTION INTRAMUSCULAR; INTRAVENOUS at 20:39

## 2025-07-04 RX ADMIN — SPIRONOLACTONE 25 MG: 25 TABLET ORAL at 00:26

## 2025-07-04 RX ADMIN — CEPHALEXIN 500 MG: 500 CAPSULE ORAL at 18:50

## 2025-07-04 RX ADMIN — OXYCODONE AND ACETAMINOPHEN 1 TABLET: 325; 10 TABLET ORAL at 04:58

## 2025-07-04 RX ADMIN — FUROSEMIDE 40 MG: 10 INJECTION, SOLUTION INTRAMUSCULAR; INTRAVENOUS at 09:05

## 2025-07-04 RX ADMIN — SPIRONOLACTONE 50 MG: 50 TABLET ORAL at 20:39

## 2025-07-04 RX ADMIN — IPRATROPIUM BROMIDE AND ALBUTEROL SULFATE 3 ML: .5; 3 SOLUTION RESPIRATORY (INHALATION) at 07:35

## 2025-07-04 RX ADMIN — POTASSIUM CHLORIDE 40 MEQ: 1500 TABLET, EXTENDED RELEASE ORAL at 18:50

## 2025-07-04 NOTE — PROGRESS NOTES
"    Name: Filomena Liu ADMIT: 7/3/2025   : 1960  PCP: Fernando Dunn MD    MRN: [id number] LOS: 0 days   AGE/SEX: 64 y.o. female  ROOM: [facility]     Subjective   Subjective   Patient is lying on the bed and does not appear to be any major distress.  Denies nausea, vomiting abdominal pain, chest pain.         Objective   Objective   Vital Signs  Temp:  [97.7 °F (36.5 °C)-98.6 °F (37 °C)] 97.7 °F (36.5 °C)  Heart Rate:  [] 75  Resp:  [19-20] 20  BP: (133-150)/(78-82) 138/81  SpO2:  [92 %-100 %] 100 %  on   ;   Device (Oxygen Therapy): room air  Body mass index is 31.82 kg/m².  Physical Exam  HEENT: PERRLA, extraocular movements intact, Scleras no icterus  Neck: Supple, no JVD  Cardiovascular: Regular rate and rhythm with normal S1 and S2  GI: Soft, nontender, bowel sounds are present  Extremities: Positive for edema 2+, pedal pulse are palpable  Neurologic: Grossly nonfocal, no facial asymmetry    Results Review     I reviewed the patient's new clinical results.  Results from last 7 days   Lab Units 25  0647 25  1837   WBC 10*3/mm3 6.52 6.82   HEMOGLOBIN g/dL 8.3* 10.0*   PLATELETS 10*3/mm3 137* 168     Results from last 7 days   Lab Units 25  0647 25  1837 25  1804   SODIUM mmol/L 142 143  --    POTASSIUM mmol/L 3.5 3.3* 3.5   CHLORIDE mmol/L 101 101  --    CO2 mmol/L 32.0* 30.8*  --    BUN mg/dL 15.0 17.0  --    CREATININE mg/dL 1.15* 1.33*  --    GLUCOSE mg/dL 95 118*  --    EGFR mL/min/1.73 53.3* 44.8*  --      Results from last 7 days   Lab Units 25  1837   ALBUMIN g/dL 3.4*   BILIRUBIN mg/dL 2.6*   ALK PHOS U/L 233*   AST (SGOT) U/L 175*   ALT (SGPT) U/L 25     Results from last 7 days   Lab Units 25  0647 25  1837   CALCIUM mg/dL 8.8 9.5   ALBUMIN g/dL  --  3.4*       No results found for: \"HGBA1C\", \"POCGLU\"    CT Abdomen Pelvis Without Contrast  Result Date: 7/3/2025  FINDINGS AND IMPRESSION: Please note evaluation is suboptimal due to the " marked streak artifact from patient's arms being along her side. No free intraperitoneal air is seen. Soft tissue thickening and subcutaneous stranding is present throughout the visualized body wall, nonspecific but can be seen in the setting of anasarca, edema and/or cellulitis and correlation with physical exam and patient history is recommended. The appendix is unremarkable.  The liver is cirrhotic. Spleen is enlarged, measuring up to approximate 15.1 cm in length areas of portal hypertension. Gallbladder surgically absent. Pancreas, adrenal glands and kidneys have an unremarkable noncontrast CT appearance. No hydronephrosis.  THERE IS ASYMMETRIC THICKENING AND SURROUNDING STRANDING SURROUNDING THE ADJACENT COLON AND CECUM, SUGGESTIVE OF PORTAL HYPERTENSIVE COLOPATHY VERSUS COLITIS IN THE APPROPRIATE CONTEXT. CORRELATION WITH PATIENT HISTORY IS RECOMMENDED.  No abdominal pelvic adenopathy by size criteria. Infrarenal aneurysmal dilation of the abdominal aorta measures up to approximately 3.6 cm disease previously 2.9 cm on 3/9/2021) continued attention on follow-up is recommended.  No suspicious lytic or blastic osseous lesion. For the purpose of this dictation, last well-formed disc base referred to as L5-S1. Burst compression fracture of the L2 vertebral body appears to demonstrate increased loss of height since 6/25/2025 resulting approximate 30% loss of height (previously 25%).  This report was finalized on 7/3/2025 10:29 PM by Dr. David Briceño M.D on Workstation: [id number]      XR Chest 2 View  Result Date: 7/3/2025   Negative chest, no acute abnormality.  This report was finalized on 7/3/2025 7:42 PM by Dr. Richard Chambers M.D on Workstation: [id number]        I have personally reviewed all medications:  Scheduled Medications  atenolol, 25 mg, Oral, Nightly  cephalexin, 500 mg, Oral, 4x Daily  cholecalciferol, 1,000 Units, Oral, Weekly  docusate sodium, 100 mg, Oral, BID  fluticasone, 1 spray, Nasal,  Daily  furosemide, 40 mg, Intravenous, BID  ipratropium-albuterol, 3 mL, Nebulization, 4x Daily - RT  melatonin, 2.5 mg, Oral, Nightly  pantoprazole, 40 mg, Oral, Daily  [START ON 7/5/2025] potassium chloride, 20 mEq, Oral, BID With Meals  potassium chloride, 40 mEq, Oral, TID With Meals  potassium chloride ER, 40 mEq, Oral, Q4H  pregabalin, 25 mg, Oral, Q12H  rOPINIRole, 1 mg, Oral, Nightly  spironolactone, 50 mg, Oral, BID    Infusions   Diet  Diet: Cardiac; Healthy Heart (2-3 Na+); Fluid Consistency: Thin (IDDSI 0)    I have personally reviewed:  [x]  Laboratory   [x]  Microbiology   [x]  Radiology   [x]  EKG/Telemetry  [x]  Cardiology/Vascular   []  Pathology    []  Records       Assessment/Plan     Active Hospital Problems    Diagnosis  POA    **Bilateral leg edema [R60.0]  Yes      Resolved Hospital Problems   No resolved problems to display.       64 y.o. female admitted with Bilateral leg edema.    1. Bilateral lower extremity edema/volume overload/liver cirrhosis, currently being diuresed with IV Lasix as well as p.o. Aldactone.  Nephrology is following along and assisting with diuresis.  2.  CKD 3 A, creatinine appears to be close to baseline and some fluctuation is expected while being diuresed.  3.  Ethanol abuse, currently does not drink.  4.  GERD, on acid suppressive therapy.  5.  History of COPD, currently not in any exacerbation and not requiring any oxygen.  Continue with DuoNebs.  6.  L2 fracture, patient  is supposed to be on a TLSO brace but has been noncompliant and states it does not fit her well.  7. Anemia, likely anemia of chronic disease however iron panel will be checked.  8.  Thrombocytopenia, likely secondary to underlying liver disease and monitor count closely.  9.  Hypertension, on atenolol/Aldactone.      Rikki Jason MD  [facility]  07/04/25  15:09 EDT

## 2025-07-04 NOTE — ED NOTES
"Nursing report ED to floor  Filomena Liu  64 y.o.  female    HPI :  HPI  Stated Reason for Visit: leg swelling  History Obtained From: patient    Chief Complaint  Chief Complaint   Patient presents with    Leg Swelling       Admitting doctor:   Belen Choudhury MD    Admitting diagnosis:   There were no encounter diagnoses.    Code status:   Current Code Status       Date Active Code Status Order ID Comments User Context       7/3/2025 1926 CPR (Attempt to Resuscitate) [id number]  Belen Choudhury MD ED        Question Answer    Code Status (Patient has no pulse and is not breathing) CPR (Attempt to Resuscitate)    Medical Interventions (Patient has pulse or is breathing) Full                    Allergies:   Wound dressing adhesive    Isolation:   No active isolations    Intake and Output  No intake or output data in the 24 hours ending 07/03/25 2008    Weight:       07/03/25 1911   Weight: 75.4 kg (166 lb 3.2 oz)       Most recent vitals:   Vitals:    07/03/25 1829 07/03/25 1831 07/03/25 1911   BP:  150/78    BP Location:  Right arm    Patient Position:  Sitting    Pulse: (!) 121     Resp: 20     Temp: 98.6 °F (37 °C)     TempSrc: Oral     SpO2: 92%     Weight:   75.4 kg (166 lb 3.2 oz)   Height: 152.4 cm (60\")         Active LDAs/IV Access:   Lines, Drains & Airways       Active LDAs       Name Placement date Placement time Site Days    Peripheral IV 07/03/25 1950 20 G Left Antecubital 07/03/25  1950  Antecubital  less than 1                    Labs (abnormal labs have a star):   Labs Reviewed   COMPREHENSIVE METABOLIC PANEL - Abnormal; Notable for the following components:       Result Value    Glucose 118 (*)     Creatinine 1.33 (*)     Potassium 3.3 (*)     CO2 30.8 (*)     Albumin 3.4 (*)     AST (SGOT) 175 (*)     Alkaline Phosphatase 233 (*)     Total Bilirubin 2.6 (*)     eGFR 44.8 (*)     All other components within normal limits    Narrative:     GFR Categories in Chronic Kidney Disease " (CKD)              GFR Category          GFR (mL/min/1.73)    Interpretation  G1                    90 or greater        Normal or high (1)  G2                    60-89                Mild decrease (1)  G3a                   45-59                Mild to moderate decrease  G3b                   30-44                Moderate to severe decrease  G4                    15-29                Severe decrease  G5                    14 or less           Kidney failure    (1)In the absence of evidence of kidney disease, neither GFR category G1 or G2 fulfill the criteria for CKD.    eGFR calculation 2021 CKD-EPI creatinine equation, which does not include race as a factor   CBC WITH AUTO DIFFERENTIAL - Abnormal; Notable for the following components:    RBC 3.54 (*)     Hemoglobin 10.0 (*)     Hematocrit 31.5 (*)     Monocyte % 13.0 (*)     Immature Grans % 0.7 (*)     All other components within normal limits   BNP (IN-HOUSE) - Normal    Narrative:     This assay is used as an aid in the diagnosis of individuals suspected of having heart failure. It can be used as an aid in the diagnosis of acute decompensated heart failure (ADHF) in patients presenting with signs and symptoms of ADHF to the emergency department (ED). In addition, NT-proBNP of <300 pg/mL indicates ADHF is not likely.    Age Range Result Interpretation  NT-proBNP Concentration (pg/mL:      <50             Positive            >450                   Gray                 300-450                    Negative             <300    50-75           Positive            >900                  Gray                300-900                  Negative            <300      >75             Positive            >1800                  Gray                300-1800                  Negative            <300   RAINBOW DRAW    Narrative:     The following orders were created for panel order [place] Draw.  Procedure                               Abnormality         Status                      ---------                               -----------         ------                     Green Top (Gel)[[id number]]                                  Final result               Lavender Top[[id number]]                                     Final result               Gold Top - SST[[id number]]                                   Final result               Light Blue Top[[id number]]                                   Final result                 Please view results for these tests on the individual orders.   CBC AND DIFFERENTIAL    Narrative:     The following orders were created for panel order CBC & Differential.  Procedure                               Abnormality         Status                     ---------                               -----------         ------                     CBC Auto Differential[[id number]]        Abnormal            Final result                 Please view results for these tests on the individual orders.   GREEN TOP   LAVENDER TOP   GOLD TOP - SST   LIGHT BLUE TOP       EKG:   No orders to display       Meds given in ED:   Medications   furosemide (LASIX) injection 40 mg (has no administration in time range)   potassium chloride (KLOR-CON M20) CR tablet 40 mEq (has no administration in time range)   nitroglycerin (NITROSTAT) SL tablet 0.4 mg (has no administration in time range)   acetaminophen (TYLENOL) tablet 650 mg (has no administration in time range)     Or   acetaminophen (TYLENOL) 160 MG/5ML oral solution 650 mg (has no administration in time range)     Or   acetaminophen (TYLENOL) suppository 325 mg (has no administration in time range)   ondansetron ODT (ZOFRAN-ODT) disintegrating tablet 4 mg (has no administration in time range)     Or   ondansetron (ZOFRAN) injection 4 mg (has no administration in time range)   melatonin tablet 2.5 mg (has no administration in time range)   sennosides-docusate (PERICOLACE) 8.6-50 MG per tablet 2 tablet (has no administration in time range)     And    polyethylene glycol (MIRALAX) packet 17 g (has no administration in time range)     And   bisacodyl (DULCOLAX) EC tablet 5 mg (has no administration in time range)     And   bisacodyl (DULCOLAX) suppository 10 mg (has no administration in time range)   HYDROmorphone (DILAUDID) injection 0.5 mg (0.5 mg Intravenous Given 7/3/25 1955)       Imaging results:  XR Chest 2 View  Result Date: 7/3/2025   Negative chest, no acute abnormality.  This report was finalized on 7/3/2025 7:42 PM by Dr. Richard Chambers M.D on Workstation: [id number]      XR Shoulder 2+ View Right  Result Date: 7/2/2025  Ordering physician's impression is located in the Encounter Note dated 07/02/25. X-ray performed in the DR room.       Ambulatory status:   - Uses walker at baseline    Social issues:   Social History     Socioeconomic History    Marital status:    Tobacco Use    Smoking status: Never     Passive exposure: Current    Smokeless tobacco: Never    Tobacco comments:     PATIENT STATES SHE IS CURRENTLY TRYING TO QUIT. STATES SHE IS SMOKING APPROX. 4-8 CIGARETTES PER DAY AS OF 5-23-25   Vaping Use    Vaping status: Never Used   Substance and Sexual Activity    Alcohol use: Not Currently     Alcohol/week: 10.0 standard drinks of alcohol     Comment: PATIENT STATES SHE IS 80 DAYS SOBER AS OF 5-23-25    Drug use: No    Sexual activity: Yes     Partners: Male     Birth control/protection: Post-menopausal, Tubal ligation, Hysterectomy, Surgical       Peripheral Neurovascular  Peripheral Neurovascular (Adult)  Peripheral Neurovascular WDL: WDL    Neuro Cognitive  Neuro Cognitive (Adult)  Cognitive/Neuro/Behavioral WDL: WDL    Learning  Learning Assessment  Learning Readiness and Ability: no barriers identified    Respiratory  Respiratory  Airway WDL: WDL  Respiratory WDL  Respiratory WDL: WDL    Abdominal Pain       Pain Assessments  Pain (Adult)  (0-10) Pain Rating: Rest: 9  (0-10) Pain Rating: Activity: 10  Patient requested  Medication Prescribed for Lower Pain Scale: Yes  Pain Location: back, abdomen  Pain Side/Orientation: generalized    NIH Stroke Scale       Ira Laurent RN  07/03/25 20:08 EDT

## 2025-07-04 NOTE — H&P
HISTORY AND PHYSICAL   [facility]        Date of Admission: 7/3/2025  Patient Identification:  Name: Filomena Liu  Age: 64 y.o.  Sex: female  :  1960  MRN: [id number]                     Primary Care Physician: Fernando Dunn MD    Chief Complaint:  64 year old female presented to the emergency room with leg and abdominal swelling; she has also had back pain and some weeping from her legs; no fever or chills;     History of Present Illness:   As above    Past Medical History:  Past Medical History:   Diagnosis Date    Abdominal aneurysm     DR. JOYCE FOLLOWING 3.5CM    Alcoholism 1985    Anemia 24    HAS HAD IRON INFUSIONS ALMOST YEARLY    Anxiety     Arthritis of back     Asthma 2015    At high risk for falls     Basal cell carcinoma     Bruises easily     Chronic kidney disease 24    Cirrhosis 24    COPD (chronic obstructive pulmonary disease)     MANAGED BY PRIMARY CARE    CTS (carpal tunnel syndrome) Surgery     DDD (degenerative disc disease), cervical     Depression     Diverticulitis of colon Unknown    Fatty liver     GERD (gastroesophageal reflux disease)     GI (gastrointestinal bleed) 24    History of anemia     History of MRSA infection     LEFT ANKLE TREAT [facility]  5YEARS GO    Hyperlipidemia ?    Hypertension ?    Low back strain Unknown    Lower leg edema     Lumbosacral disc disease     Neck strain     Neuropathy     Pancreatitis     Panic attacks     Scoliosis Unknown    Shoulder arthritis 2023    Shoulder pain, left 2023    Sleep apnea     NO MACHINE USE    Spinal stenosis     Spondylolisthesis of cervical region     Steatosis of liver     Tachycardia     Tendinitis of knee     Thoracic disc disorder 2000    Vertigo      Past Surgical History:  Past Surgical History:   Procedure Laterality Date    BACK SURGERY      cervical disc surgery with fusion-    CARPAL TUNNEL RELEASE Bilateral     CATARACT EXTRACTION       CHOLECYSTECTOMY  1/1/20    COLONOSCOPY      COLONOSCOPY N/A 11/12/2020    Procedure: COLONOSCOPY, polypectomy;  Surgeon: Filomena Mckeon MD;  Location: [facility] OR;  Service: Gastroenterology;  Laterality: N/A;  Diverticulosis; Hepatic flexure polyp x 1; Polyp at 60cm x 1; Polyp at 50cm x 1- hot snare;    COLONOSCOPY N/A 04/15/2024    Procedure: COLONOSCOPY into sigmoid colon with hot snare polypectomy;  Surgeon: Leatha Johns MD;  Location: [facility] ENDOSCOPY;  Service: General;  Laterality: N/A;  pre- history of polyps  post- diverticulosis, polyp    COLONOSCOPY N/A 05/07/2025    Procedure: COLONOSCOPY to cecum and ti with hot snare polypectomies;  Surgeon: Ana Guerrero MD;  Location: [facility] ENDOSCOPY;  Service: Gastroenterology;  Laterality: N/A;  PRE: IRON DEFICIENCY ANEMIA  POST: diverticulosis, polyps, hemorrhoids    ENDOSCOPY N/A 08/15/2024    Procedure: ESOPHAGOGASTRODUODENOSCOPY;  Surgeon: Garth Constantino MD;  Location: [facility] ENDOSCOPY;  Service: Gastroenterology;  Laterality: N/A;  PRE- CIRRHOSIS, DARK STOOL  POST- NORMAL    ENDOSCOPY N/A 05/07/2025    Procedure: ESOPHAGOGASTRODUODENOSCOPY;  Surgeon: Ana Guerrero MD;  Location: [facility] ENDOSCOPY;  Service: Gastroenterology;  Laterality: N/A;  PRE: CIRRHOSIS  POST:portal hypertensive gastropathy; esophagitis; hiatal hernia; varices    HYSTERECTOMY  1998    INCISION AND DRAINAGE LEG Left 11/17/2018    Procedure: Incision and Drainage of Left ankle;  Surgeon: Maxwell Lanier MD;  Location: [facility] OR;  Service: Orthopedics    NECK SURGERY  2000, 2005,2017    SIGMOIDOSCOPY N/A 03/28/2024    Procedure: SIGMOIDOSCOPY FLEXIBLE;  Surgeon: Leatha Johns MD;  Location: [facility] ENDOSCOPY;  Service: General;  Laterality: N/A;  pre: h/o colon polyps  post: poor prep, diverticulosis    SKIN BIOPSY      SKIN GRAFT SPLIT THICKNESS N/A 02/12/2019    Procedure: debridement SKIN GRAFT SPLIT THICKNESS left ankle wound;  Surgeon: Deacon  MD Barry;  Location: [facility] OR [facility];  Service: Plastics    TONGUE LESION EXCISION/BIOPSY N/A 11/26/2024    Procedure: WIDE LOCAL EXCISION OF TONGUE LESION;  Surgeon: El Mercedes MD;  Location: [facility] MAIN OR;  Service: ENT;  Laterality: N/A;    TOTAL SHOULDER ARTHROPLASTY Left 07/20/2023    Procedure: Total  shoulder arthroplasty;  Surgeon: Maxwell Lanier MD;  Location: [facility] OR [facility];  Service: Orthopedics;  Laterality: Left;    TOTAL SHOULDER ARTHROPLASTY W/ DISTAL CLAVICLE EXCISION Right 6/5/2025    Procedure: Right Reverse Total Shoulder Arthroplasty;  Surgeon: Maxwell Lanier MD;  Location: [facility] OR [facility];  Service: Orthopedics;  Laterality: Right;    TOTAL THYMECTOMY      TRIGGER POINT INJECTION  [id number]    UPPER GASTROINTESTINAL ENDOSCOPY  8/10/24    WRIST FRACTURE SURGERY      WRIST FRACTURE SURGERY Left       Home Meds:  Medications Prior to Admission   Medication Sig Dispense Refill Last Dose/Taking    albuterol (PROVENTIL HFA;VENTOLIN HFA) 108 (90 Base) MCG/ACT inhaler Inhale 2 puffs 3 times a day. Indications: Spasm of Lung Air Passages       atenolol (TENORMIN) 25 MG tablet Take 1 tablet by mouth Every Night.       cephalexin (KEFLEX) 500 MG capsule Take 1 capsule by mouth 4 (Four) Times a Day for 7 days. 3 capsule 0     Cholecalciferol 25 MCG (1000 UT) tablet Take 1 tablet by mouth 1 (One) Time Per Week. HOLDING AS OF 5-23-25 FOR HIGH VIT D LEVELS  Indications: Vitamin D Deficiency       cyclobenzaprine (FLEXERIL) 5 MG tablet Take 1 tablet by mouth 3 (Three) Times a Day As Needed for Muscle Spasms. 30 tablet 0     docusate sodium (COLACE) 100 MG capsule Take 1 capsule by mouth 2 (Two) Times a Day. 60 capsule 0     fluticasone (FLONASE) 50 MCG/ACT nasal spray Administer 1 spray into the nostril(s) as directed by provider Daily. Indications: Stuffy Nose       ipratropium-albuterol (DUO-NEB) 0.5-2.5 mg/3 ml nebulizer Take 3 mL by nebulization 4 (Four) Times a Day. Indications: Chronic  Obstructive Lung Disease       oxyCODONE-acetaminophen (PERCOCET)  MG per tablet Take 1 tablet by mouth Every 4 (Four) Hours As Needed for Moderate Pain for up to 4 days. 8 tablet 0     oxyCODONE-acetaminophen (Percocet)  MG per tablet Take 1 tablet by mouth Every 6 (Six) Hours As Needed for Moderate Pain. 50 tablet 0     oxyCODONE-acetaminophen (Percocet)  MG per tablet Take 1 tablet by mouth Every 6 (Six) Hours As Needed for Moderate Pain. 50 tablet 0     pantoprazole (PROTONIX) 40 MG EC tablet Take 1 tablet by mouth Daily for 60 days. 30 tablet 1     pregabalin (LYRICA) 25 MG capsule Take 1 capsule by mouth Every 12 (Twelve) Hours.       rOPINIRole (REQUIP) 1 MG tablet Take 1 tablet by mouth every night at bedtime. Indications: Restless Leg Syndrome       spironolactone (ALDACTONE) 25 MG tablet Take 1 tablet by mouth 2 (Two) Times a Day. Indications: High Blood Pressure 30 tablet 0     torsemide (DEMADEX) 20 MG tablet Take 1 tablet by mouth Daily. Indications: Cardiac Failure, Edema, High Blood Pressure 30 tablet 0        Allergies:  Allergies   Allergen Reactions    Wound Dressing Adhesive Other (See Comments)     SKIN TEARS AND RASH- PAPER TAPE OK     Immunizations:  Immunization History   Administered Date(s) Administered    COVID-19 (PFIZER) Purple Cap Monovalent 04/07/2021, 04/28/2021     Social History:   Social History     Social History Narrative    Not on file     Social History     Socioeconomic History    Marital status:    Tobacco Use    Smoking status: Never     Passive exposure: Current    Smokeless tobacco: Never    Tobacco comments:     PATIENT STATES SHE IS CURRENTLY TRYING TO QUIT. STATES SHE IS SMOKING APPROX. 4-8 CIGARETTES PER DAY AS OF 5-23-25   Vaping Use    Vaping status: Never Used   Substance and Sexual Activity    Alcohol use: Not Currently     Alcohol/week: 10.0 standard drinks of alcohol     Comment: PATIENT STATES SHE IS 80 DAYS SOBER AS OF 5-23-25    Drug  "use: No    Sexual activity: Yes     Partners: Male     Birth control/protection: Post-menopausal, Tubal ligation, Hysterectomy, Surgical       Family History:  Family History   Problem Relation Age of Onset    Emphysema Mother     Alzheimer's disease Father     Cancer Father     Osteoporosis Sister     Scoliosis Sister     Malig Hyperthermia Neg Hx         Review of Systems  See history of present illness and past medical history.  Patient denies headache, dizziness, syncope, falls, trauma, change in vision, change in hearing, change in taste, changes in weight, changes in appetite, focal weakness, numbness, or paresthesia.  Patient denies chest pain, palpitations, dyspnea, orthopnea, PND, cough, sinus pressure, rhinorrhea, epistaxis, hemoptysis, nausea, vomiting,hematemesis, diarrhea, constipation or hematochezia.  Denies cold or heat intolerance, polydipsia, polyuria, polyphagia. Denies hematuria, pyuria, dysuria, hesitancy, frequency or urgency. Denies consumption of raw and under cooked meats foods or change in water source.  Denies fever, chills, sweats, night sweats.  Denies missing any routine medications. Remainder of ROS is negative.    Objective:  T Max 24 hrs: Temp (24hrs), Av.4 °F (36.9 °C), Min:98.1 °F (36.7 °C), Max:98.6 °F (37 °C)    Vitals Ranges:   Temp:  [98.1 °F (36.7 °C)-98.6 °F (37 °C)] 98.1 °F (36.7 °C)  Heart Rate:  [] 96  Resp:  [20] 20  BP: (142-150)/(78-82) 142/82      Exam:  /82 (BP Location: Right arm, Patient Position: Lying)   Pulse 96   Temp 98.1 °F (36.7 °C) (Oral)   Resp 20   Ht 152.4 cm (60\")   Wt 73.9 kg (162 lb 14.7 oz)   SpO2 97%   BMI 31.82 kg/m²     General Appearance:    Alert, cooperative, no distress, appears stated age   Head:    Normocephalic, without obvious abnormality, atraumatic   Eyes:    PERRL, conjunctivae/corneas clear, EOM's intact, both eyes   Ears:    Normal external ear canals, both ears   Nose:   Nares normal, septum midline, mucosa " normal, no drainage    or sinus tenderness   Throat:   Lips, mucosa, and tongue normal   Neck:   Supple, symmetrical, trachea midline, no adenopathy;     thyroid:  no enlargement/tenderness/nodules; no carotid    bruit or JVD   Back:     Symmetric, no curvature, ROM normal, no CVA tenderness   Lungs:     Clear to auscultation bilaterally, respirations unlabored   Chest Wall:    No tenderness or deformity    Heart:    Regular rate and rhythm, S1 and S2 normal, no murmur, rub   or gallop   Abdomen:     Soft, distended, bowel sounds active all four quadrants,         Extremities:   Extremities normal, atraumatic,  2plus edema                       .    Data Review:  Labs in chart were reviewed.  WBC   Date Value Ref Range Status   07/03/2025 6.82 3.40 - 10.80 10*3/mm3 Final     Hemoglobin   Date Value Ref Range Status   07/03/2025 10.0 (L) 12.0 - 15.9 g/dL Final     Hematocrit   Date Value Ref Range Status   07/03/2025 31.5 (L) 34.0 - 46.6 % Final     Platelets   Date Value Ref Range Status   07/03/2025 168 140 - 450 10*3/mm3 Final     Sodium   Date Value Ref Range Status   07/03/2025 143 136 - 145 mmol/L Final     Potassium   Date Value Ref Range Status   07/03/2025 3.3 (L) 3.5 - 5.2 mmol/L Final     Chloride   Date Value Ref Range Status   07/03/2025 101 98 - 107 mmol/L Final     CO2   Date Value Ref Range Status   07/03/2025 30.8 (H) 22.0 - 29.0 mmol/L Final     BUN   Date Value Ref Range Status   07/03/2025 17.0 8.0 - 23.0 mg/dL Final     Creatinine   Date Value Ref Range Status   07/03/2025 1.33 (H) 0.57 - 1.00 mg/dL Final     Glucose   Date Value Ref Range Status   07/03/2025 118 (H) 65 - 99 mg/dL Final     Calcium   Date Value Ref Range Status   07/03/2025 9.5 8.6 - 10.5 mg/dL Final     AST (SGOT)   Date Value Ref Range Status   07/03/2025 175 (H) 1 - 32 U/L Final     ALT (SGPT)   Date Value Ref Range Status   07/03/2025 25 1 - 33 U/L Final     Alkaline Phosphatase   Date Value Ref Range Status   07/03/2025 233  (H) 39 - 117 U/L Final                Imaging Results (All)       Procedure Component Value Units Date/Time    CT Abdomen Pelvis Without Contrast - In process [[id number]] Resulted: 07/03/25 2211     Updated: 07/03/25 2031    This result has not been signed. Information might be incomplete.      XR Chest 2 View [[id number]] Collected: 07/03/25 1941     Updated: 07/03/25 1945    Narrative:      TWO VIEWS OF THE CHEST        HISTORY: swelling     COMPARISON: 6/20/2025     TECHNIQUE: PA and lateral views were performed of the chest.     FINDINGS:     Heart size is within normal limits, unchanged.     There is mild pulmonary vascular congestion, but there is no  consolidation, effusion or pneumothorax.     There is no acute bony abnormality.       Impression:         Negative chest, no acute abnormality.     This report was finalized on 7/3/2025 7:42 PM by Dr. Richard Chambers M.D  on Workstation: [id number]                 Assessment:  Active Hospital Problems    Diagnosis  POA    **Bilateral leg edema [R60.0]  Yes      Resolved Hospital Problems   No resolved problems to display.   Copd  Cirrhosis  Back pain  Alcohol dependence  Anemia  ckd3    Plan:  Ask nephrology to see her again  Diurese  Ask neurosurgery to see her  She says the brace she has doesn't fit  Trend labs  Monitor on telemetry  Ct abd noted  Dw patient, family  Patient is full code      Belen Choudhury MD  7/3/2025  22:23 EDT

## 2025-07-04 NOTE — CONSULTS
[facility] Consult Note      Patient Name: Filomena Liu  : 1960  MRN: [id number]  Primary Care Physician:  Fernando Dunn MD  Referring Physician: Belen Choudhury MD  Date of admission: 7/3/2025    Subjective     Reason for Consult:  CKD3    HPI:   Filomena iLu is a 64 y.o. female with CKD stage 3A, BL Cr 1.1 to 1.3 range, HTN, alcoholic cirrhosis, AAA, who presented yesterday with worsening BLE swelling and abd distention.  She was hosp  to 25 for RLE cellulitis, SANDRO, hypokalemia.  Found to have L2 compression fracture, managed with bracing.  We followed along.  Peak Cr 1.9, SANDRO attributed to vol depletion and bactrim effect.  Mention of outpatient capsule endoscopy for iron def anemia.  She was sent home on lower dose of torsemide 20 mg daily along with aldactone 25 BID.  Cr stable 1.3 yesterday and 1.15 today.  CXR shows only mild vascular congestion.  CT abd/pelvis shows cirrhotic liver, known infrarenal AAA, but no ascites per se.  Initial K 3.3, 3.5 today.  Serum alb low 3.3.  BP been 130 to 150 systolic.  She was started on lasix 40mg IV BID.  UOP NR thus far.    Review of Systems:   14 point review of systems is otherwise negative except for mentioned above on HPI    Personal History     Past Medical History:   Diagnosis Date    Abdominal aneurysm     DR. JOYCE FOLLOWING 3.5CM    Alcoholism     Anemia 24    HAS HAD IRON INFUSIONS ALMOST YEARLY    Anxiety     Arthritis of back     Asthma 2015    At high risk for falls     Basal cell carcinoma     Bruises easily     Chronic kidney disease 24    Cirrhosis 24    COPD (chronic obstructive pulmonary disease)     MANAGED BY PRIMARY CARE    CTS (carpal tunnel syndrome) Surgery     DDD (degenerative disc disease), cervical     Depression     Diverticulitis of colon Unknown    Fatty liver     GERD (gastroesophageal reflux disease)     GI (gastrointestinal bleed) 24     History of anemia     History of MRSA infection     LEFT ANKLE TREAT [facility]  5YEARS GO    Hyperlipidemia ?    Hypertension ?    Low back strain Unknown    Lower leg edema     Lumbosacral disc disease 2024    Neck strain 2000    Neuropathy     Pancreatitis 2024    Panic attacks     Scoliosis Unknown    Shoulder arthritis 06/05/2023    Shoulder pain, left 07/07/2023    Sleep apnea     NO MACHINE USE    Spinal stenosis     Spondylolisthesis of cervical region     Steatosis of liver     Tachycardia     Tendinitis of knee 2022    Thoracic disc disorder 2000    Vertigo        Past Surgical History:   Procedure Laterality Date    BACK SURGERY      cervical disc surgery with fusion-    CARPAL TUNNEL RELEASE Bilateral     CATARACT EXTRACTION      CHOLECYSTECTOMY  1/1/20    COLONOSCOPY      COLONOSCOPY N/A 11/12/2020    Procedure: COLONOSCOPY, polypectomy;  Surgeon: Filomena Mckeon MD;  Location: [facility];  Service: Gastroenterology;  Laterality: N/A;  Diverticulosis; Hepatic flexure polyp x 1; Polyp at 60cm x 1; Polyp at 50cm x 1- hot snare;    COLONOSCOPY N/A 04/15/2024    Procedure: COLONOSCOPY into sigmoid colon with hot snare polypectomy;  Surgeon: Leatha Johns MD;  Location: [facility] ENDOSCOPY;  Service: General;  Laterality: N/A;  pre- history of polyps  post- diverticulosis, polyp    COLONOSCOPY N/A 05/07/2025    Procedure: COLONOSCOPY to cecum and ti with hot snare polypectomies;  Surgeon: Ana Guerrero MD;  Location: [facility] ENDOSCOPY;  Service: Gastroenterology;  Laterality: N/A;  PRE: IRON DEFICIENCY ANEMIA  POST: diverticulosis, polyps, hemorrhoids    ENDOSCOPY N/A 08/15/2024    Procedure: ESOPHAGOGASTRODUODENOSCOPY;  Surgeon: Garth Constantino MD;  Location: [facility] ENDOSCOPY;  Service: Gastroenterology;  Laterality: N/A;  PRE- CIRRHOSIS, DARK STOOL  POST- NORMAL    ENDOSCOPY N/A 05/07/2025    Procedure: ESOPHAGOGASTRODUODENOSCOPY;  Surgeon: Ana Guerrero MD;  Location: [facility] ENDOSCOPY;  Service:  Gastroenterology;  Laterality: N/A;  PRE: CIRRHOSIS  POST:portal hypertensive gastropathy; esophagitis; hiatal hernia; varices    HYSTERECTOMY  1998    INCISION AND DRAINAGE LEG Left 11/17/2018    Procedure: Incision and Drainage of Left ankle;  Surgeon: Maxwell Lanier MD;  Location: [facility] OR;  Service: Orthopedics    NECK SURGERY  2000, 2005,2017    SIGMOIDOSCOPY N/A 03/28/2024    Procedure: SIGMOIDOSCOPY FLEXIBLE;  Surgeon: Leatha Johns MD;  Location: [facility] ENDOSCOPY;  Service: General;  Laterality: N/A;  pre: h/o colon polyps  post: poor prep, diverticulosis    SKIN BIOPSY      SKIN GRAFT SPLIT THICKNESS N/A 02/12/2019    Procedure: debridement SKIN GRAFT SPLIT THICKNESS left ankle wound;  Surgeon: Barry Worthy MD;  Location: [facility] OR [facility];  Service: Plastics    TONGUE LESION EXCISION/BIOPSY N/A 11/26/2024    Procedure: WIDE LOCAL EXCISION OF TONGUE LESION;  Surgeon: El Mercedes MD;  Location: [facility] OR;  Service: ENT;  Laterality: N/A;    TOTAL SHOULDER ARTHROPLASTY Left 07/20/2023    Procedure: Total  shoulder arthroplasty;  Surgeon: Maxwell Lanier MD;  Location: [facility] OR [facility];  Service: Orthopedics;  Laterality: Left;    TOTAL SHOULDER ARTHROPLASTY W/ DISTAL CLAVICLE EXCISION Right 6/5/2025    Procedure: Right Reverse Total Shoulder Arthroplasty;  Surgeon: Maxwell Lanier MD;  Location: [facility] OR [facility];  Service: Orthopedics;  Laterality: Right;    TOTAL THYMECTOMY      TRIGGER POINT INJECTION  [id number]    UPPER GASTROINTESTINAL ENDOSCOPY  8/10/24    WRIST FRACTURE SURGERY      WRIST FRACTURE SURGERY Left        Family History: family history includes Alzheimer's disease in her father; Cancer in her father; Emphysema in her mother; Osteoporosis in her sister; Scoliosis in her sister.    Social History:  reports that she has never smoked. She has been exposed to tobacco smoke. She has never used smokeless tobacco. She reports that she does not currently use alcohol  after a past usage of about 10.0 standard drinks of alcohol per week. She reports that she does not use drugs.    Home Medications:  Prior to Admission medications    Medication Sig Start Date End Date Taking? Authorizing Provider   albuterol (PROVENTIL HFA;VENTOLIN HFA) 108 (90 Base) MCG/ACT inhaler Inhale 2 puffs 3 times a day. Indications: Spasm of Lung Air Passages 1/1/24   Trevin Liu MD   atenolol (TENORMIN) 25 MG tablet Take 1 tablet by mouth Every Night.    Trevin Liu MD   cephalexin (KEFLEX) 500 MG capsule Take 1 capsule by mouth 4 (Four) Times a Day for 7 days. 6/27/25 7/4/25  Edward Galan MD   Cholecalciferol 25 MCG (1000 UT) tablet Take 1 tablet by mouth 1 (One) Time Per Week. HOLDING AS OF 5-23-25 FOR HIGH VIT D LEVELS  Indications: Vitamin D Deficiency 1/1/24   Trevin Liu MD   cyclobenzaprine (FLEXERIL) 5 MG tablet Take 1 tablet by mouth 3 (Three) Times a Day As Needed for Muscle Spasms. 6/27/25   Edward Galan MD   docusate sodium (COLACE) 100 MG capsule Take 1 capsule by mouth 2 (Two) Times a Day. 6/5/25   Maxwell Lanier MD   fluticasone (FLONASE) 50 MCG/ACT nasal spray Administer 1 spray into the nostril(s) as directed by provider Daily. Indications: Stuffy Nose 1/1/24   Trevin Liu MD   ipratropium-albuterol (DUO-NEB) 0.5-2.5 mg/3 ml nebulizer Take 3 mL by nebulization 4 (Four) Times a Day. Indications: Chronic Obstructive Lung Disease 8/1/24   Trevin Liu MD   oxyCODONE-acetaminophen (PERCOCET)  MG per tablet Take 1 tablet by mouth Every 4 (Four) Hours As Needed for Moderate Pain for up to 4 days. 6/30/25 7/4/25  Maxwell Lanier MD   oxyCODONE-acetaminophen (Percocet)  MG per tablet Take 1 tablet by mouth Every 6 (Six) Hours As Needed for Moderate Pain. 7/3/25   Maxwell Lanier MD   oxyCODONE-acetaminophen (Percocet)  MG per tablet Take 1 tablet by mouth Every 6 (Six) Hours As Needed for Moderate Pain. 7/3/25    Maxwell Lanier MD   pantoprazole (PROTONIX) 40 MG EC tablet Take 1 tablet by mouth Daily for 60 days. 6/27/25 8/26/25  Edward Galan MD   pregabalin (LYRICA) 25 MG capsule Take 1 capsule by mouth Every 12 (Twelve) Hours. 5/20/25   Trevin Liu MD   rOPINIRole (REQUIP) 1 MG tablet Take 1 tablet by mouth every night at bedtime. Indications: Restless Leg Syndrome 1/2/24   Trevin Liu MD   spironolactone (ALDACTONE) 25 MG tablet Take 1 tablet by mouth 2 (Two) Times a Day. Indications: High Blood Pressure 6/28/25   Edward Galan MD   torsemide (DEMADEX) 20 MG tablet Take 1 tablet by mouth Daily. Indications: Cardiac Failure, Edema, High Blood Pressure 3/8/25   Chan Garzon MD       Allergies:  Allergies   Allergen Reactions    Wound Dressing Adhesive Other (See Comments)     SKIN TEARS AND RASH- PAPER TAPE OK       Objective     Vitals:   Temp:  [98.1 °F (36.7 °C)-98.6 °F (37 °C)] 98.1 °F (36.7 °C)  Heart Rate:  [] 70  Resp:  [19-20] 20  BP: (133-150)/(78-82) 133/81  No intake or output data in the 24 hours ending 07/04/25 1103    Physical Exam:    General Appearance: chronically ill appearing tearful WF in no acute distress on rooma ir  Skin: warm and dry  HEENT: oral mucosa normal, nonicteric sclera  Neck: supple, no JVD  Lungs: CTA bilat no rales  Heart: RRR, normal S1 and S2  Abdomen: mildly distended, NT  : no palpable bladder  Extremities: 2+ BLE edema, venous stasis skin changes   Neuro: normal speech and mental status     Scheduled Meds:     atenolol, 25 mg, Oral, Nightly  cephalexin, 500 mg, Oral, 4x Daily  cholecalciferol, 1,000 Units, Oral, Weekly  docusate sodium, 100 mg, Oral, BID  fluticasone, 1 spray, Nasal, Daily  furosemide, 40 mg, Intravenous, BID  ipratropium-albuterol, 3 mL, Nebulization, 4x Daily - RT  melatonin, 2.5 mg, Oral, Nightly  pantoprazole, 40 mg, Oral, Daily  potassium chloride, 40 mEq, Oral, Once  pregabalin, 25 mg, Oral, Q12H  rOPINIRole, 1  mg, Oral, Nightly  spironolactone, 25 mg, Oral, BID      IV Meds:        Results Reviewed:   I have personally reviewed the results from the time of this admission to 7/4/2025 11:03 EDT     Lab Results   Component Value Date    GLUCOSE 95 07/04/2025    CALCIUM 8.8 07/04/2025     07/04/2025    K 3.5 07/04/2025    CO2 32.0 (H) 07/04/2025     07/04/2025    BUN 15.0 07/04/2025    CREATININE 1.15 (H) 07/04/2025    EGFRIFAFRI >60 03/14/2023    EGFRIFNONA 83 02/07/2019    BCR 13.0 07/04/2025    ANIONGAP 9.0 07/04/2025      Lab Results   Component Value Date    MG 1.7 06/25/2025    PHOS 2.8 06/27/2025    ALBUMIN 3.4 (L) 07/03/2025           Assessment / Plan     ASSESSMENT:  CKD stage 3A - Cr fluctuates low to mid 1's in relation to cirrhosis and diuretic use, on lower end currently 1.3 yesterday and 1.15 today.  May need to permit degree of prerenal azotemia with more aggressive diuresis  Vol overload - periph > central in relation to cirrhosis.  Will inc IV lasix & aldactone.  She will need a higher dose of torsemide when able to transition to PO loop diuretic in couple days  Hypokalemia, due to diuresis, K up 3.3 to 3.5 with replacement  ETOH Cirrhosis  Iron def anemia - hgb is 8.3.  S/P recent endoscopy with plan for capsule  Recent L2 compression fracture managed via brace she stopped wearing  HTN, BP adequate 130 to 150, gives cushion for diuresis; on metop    PLAN:  Inc lasix 80mg IV BID   Monitor/record UOP, d/w RN; she does not wish to use [employer]  Inc aldactone 50 mg BID  Replace K: 40 meq PO x2 today then sched 20 meq BID start tomorrow    Thank you for involving us in the care of Filomena Liu.  Please feel free to call with any questions.    Fernando Baker MD  07/04/25  11:03 EDT    [facility]  [phone]

## 2025-07-04 NOTE — PLAN OF CARE
Goal Outcome Evaluation:   Pt up to bsc w standby assist tolerated well Medicated for shoulder/back pain per MAR. No other c/o voiced. VSS No s/s of distress. Plan of care ongoing

## 2025-07-05 LAB
ALBUMIN SERPL-MCNC: 2.9 G/DL (ref 3.5–5.2)
ALBUMIN/GLOB SERPL: 0.9 G/DL
ALP SERPL-CCNC: 231 U/L (ref 39–117)
ALT SERPL W P-5'-P-CCNC: 18 U/L (ref 1–33)
ANION GAP SERPL CALCULATED.3IONS-SCNC: 8 MMOL/L (ref 5–15)
AST SERPL-CCNC: 87 U/L (ref 1–32)
BASOPHILS # BLD AUTO: 0.04 10*3/MM3 (ref 0–0.2)
BASOPHILS NFR BLD AUTO: 0.6 % (ref 0–1.5)
BILIRUB SERPL-MCNC: 1.8 MG/DL (ref 0–1.2)
BUN SERPL-MCNC: 14 MG/DL (ref 8–23)
BUN/CREAT SERPL: 12.8 (ref 7–25)
CALCIUM SPEC-SCNC: 9 MG/DL (ref 8.6–10.5)
CHLORIDE SERPL-SCNC: 104 MMOL/L (ref 98–107)
CO2 SERPL-SCNC: 29 MMOL/L (ref 22–29)
CREAT SERPL-MCNC: 1.09 MG/DL (ref 0.57–1)
DEPRECATED RDW RBC AUTO: 44.7 FL (ref 37–54)
EGFRCR SERPLBLD CKD-EPI 2021: 56.8 ML/MIN/1.73
EOSINOPHIL # BLD AUTO: 0.3 10*3/MM3 (ref 0–0.4)
EOSINOPHIL NFR BLD AUTO: 4.3 % (ref 0.3–6.2)
ERYTHROCYTE [DISTWIDTH] IN BLOOD BY AUTOMATED COUNT: 13.5 % (ref 12.3–15.4)
GLOBULIN UR ELPH-MCNC: 3.1 GM/DL
GLUCOSE SERPL-MCNC: 127 MG/DL (ref 65–99)
HCT VFR BLD AUTO: 28.1 % (ref 34–46.6)
HGB BLD-MCNC: 8.7 G/DL (ref 12–15.9)
IMM GRANULOCYTES # BLD AUTO: 0.05 10*3/MM3 (ref 0–0.05)
IMM GRANULOCYTES NFR BLD AUTO: 0.7 % (ref 0–0.5)
LYMPHOCYTES # BLD AUTO: 1.67 10*3/MM3 (ref 0.7–3.1)
LYMPHOCYTES NFR BLD AUTO: 24 % (ref 19.6–45.3)
MAGNESIUM SERPL-MCNC: 2.3 MG/DL (ref 1.6–2.4)
MCH RBC QN AUTO: 28.2 PG (ref 26.6–33)
MCHC RBC AUTO-ENTMCNC: 31 G/DL (ref 31.5–35.7)
MCV RBC AUTO: 90.9 FL (ref 79–97)
MONOCYTES # BLD AUTO: 0.96 10*3/MM3 (ref 0.1–0.9)
MONOCYTES NFR BLD AUTO: 13.8 % (ref 5–12)
NEUTROPHILS NFR BLD AUTO: 3.94 10*3/MM3 (ref 1.7–7)
NEUTROPHILS NFR BLD AUTO: 56.6 % (ref 42.7–76)
NRBC BLD AUTO-RTO: 0 /100 WBC (ref 0–0.2)
PHOSPHATE SERPL-MCNC: 2.8 MG/DL (ref 2.5–4.5)
PLATELET # BLD AUTO: 148 10*3/MM3 (ref 140–450)
PMV BLD AUTO: 10.1 FL (ref 6–12)
POTASSIUM SERPL-SCNC: 4.4 MMOL/L (ref 3.5–5.2)
PROT SERPL-MCNC: 6 G/DL (ref 6–8.5)
RBC # BLD AUTO: 3.09 10*6/MM3 (ref 3.77–5.28)
SODIUM SERPL-SCNC: 141 MMOL/L (ref 136–145)
WBC NRBC COR # BLD AUTO: 6.96 10*3/MM3 (ref 3.4–10.8)

## 2025-07-05 PROCEDURE — 94664 DEMO&/EVAL PT USE INHALER: CPT

## 2025-07-05 PROCEDURE — 97161 PT EVAL LOW COMPLEX 20 MIN: CPT

## 2025-07-05 PROCEDURE — 94761 N-INVAS EAR/PLS OXIMETRY MLT: CPT

## 2025-07-05 PROCEDURE — 80053 COMPREHEN METABOLIC PANEL: CPT | Performed by: INTERNAL MEDICINE

## 2025-07-05 PROCEDURE — 94799 UNLISTED PULMONARY SVC/PX: CPT

## 2025-07-05 PROCEDURE — 83735 ASSAY OF MAGNESIUM: CPT | Performed by: INTERNAL MEDICINE

## 2025-07-05 PROCEDURE — 84100 ASSAY OF PHOSPHORUS: CPT | Performed by: INTERNAL MEDICINE

## 2025-07-05 PROCEDURE — 85025 COMPLETE CBC W/AUTO DIFF WBC: CPT | Performed by: INTERNAL MEDICINE

## 2025-07-05 PROCEDURE — 25010000002 FUROSEMIDE PER 20 MG: Performed by: INTERNAL MEDICINE

## 2025-07-05 PROCEDURE — 97530 THERAPEUTIC ACTIVITIES: CPT

## 2025-07-05 PROCEDURE — G0378 HOSPITAL OBSERVATION PER HR: HCPCS

## 2025-07-05 PROCEDURE — 94760 N-INVAS EAR/PLS OXIMETRY 1: CPT

## 2025-07-05 RX ORDER — OXYCODONE AND ACETAMINOPHEN 10; 325 MG/1; MG/1
1 TABLET ORAL EVERY 4 HOURS PRN
Refills: 0 | Status: DISCONTINUED | OUTPATIENT
Start: 2025-07-05 | End: 2025-07-08 | Stop reason: HOSPADM

## 2025-07-05 RX ORDER — NICOTINE 21 MG/24HR
1 PATCH, TRANSDERMAL 24 HOURS TRANSDERMAL
Status: DISCONTINUED | OUTPATIENT
Start: 2025-07-05 | End: 2025-07-08 | Stop reason: HOSPADM

## 2025-07-05 RX ADMIN — ROPINIROLE 1 MG: 1 TABLET, FILM COATED ORAL at 20:18

## 2025-07-05 RX ADMIN — SPIRONOLACTONE 50 MG: 50 TABLET ORAL at 20:18

## 2025-07-05 RX ADMIN — IPRATROPIUM BROMIDE AND ALBUTEROL SULFATE 3 ML: .5; 3 SOLUTION RESPIRATORY (INHALATION) at 11:28

## 2025-07-05 RX ADMIN — OXYCODONE AND ACETAMINOPHEN 1 TABLET: 325; 10 TABLET ORAL at 10:11

## 2025-07-05 RX ADMIN — DOCUSATE SODIUM 100 MG: 100 CAPSULE, LIQUID FILLED ORAL at 20:18

## 2025-07-05 RX ADMIN — FUROSEMIDE 40 MG: 10 INJECTION, SOLUTION INTRAMUSCULAR; INTRAVENOUS at 10:11

## 2025-07-05 RX ADMIN — PREGABALIN 25 MG: 25 CAPSULE ORAL at 20:19

## 2025-07-05 RX ADMIN — FUROSEMIDE 40 MG: 10 INJECTION, SOLUTION INTRAMUSCULAR; INTRAVENOUS at 20:19

## 2025-07-05 RX ADMIN — PANTOPRAZOLE SODIUM 40 MG: 40 TABLET, DELAYED RELEASE ORAL at 10:11

## 2025-07-05 RX ADMIN — NICOTINE 1 PATCH: 21 PATCH, EXTENDED RELEASE TRANSDERMAL at 15:47

## 2025-07-05 RX ADMIN — PREGABALIN 25 MG: 25 CAPSULE ORAL at 10:11

## 2025-07-05 RX ADMIN — OXYCODONE AND ACETAMINOPHEN 1 TABLET: 325; 10 TABLET ORAL at 04:14

## 2025-07-05 RX ADMIN — FLUTICASONE PROPIONATE 1 SPRAY: 50 SPRAY, METERED NASAL at 10:11

## 2025-07-05 RX ADMIN — IPRATROPIUM BROMIDE AND ALBUTEROL SULFATE 3 ML: .5; 3 SOLUTION RESPIRATORY (INHALATION) at 20:40

## 2025-07-05 RX ADMIN — DOCUSATE SODIUM 100 MG: 100 CAPSULE, LIQUID FILLED ORAL at 10:11

## 2025-07-05 RX ADMIN — POTASSIUM CHLORIDE 20 MEQ: 1500 TABLET, EXTENDED RELEASE ORAL at 17:35

## 2025-07-05 RX ADMIN — OXYCODONE AND ACETAMINOPHEN 1 TABLET: 325; 10 TABLET ORAL at 20:18

## 2025-07-05 RX ADMIN — Medication 2.5 MG: at 20:18

## 2025-07-05 RX ADMIN — IPRATROPIUM BROMIDE AND ALBUTEROL SULFATE 3 ML: .5; 3 SOLUTION RESPIRATORY (INHALATION) at 06:53

## 2025-07-05 RX ADMIN — POTASSIUM CHLORIDE 20 MEQ: 1500 TABLET, EXTENDED RELEASE ORAL at 10:11

## 2025-07-05 RX ADMIN — ATENOLOL 25 MG: 50 TABLET ORAL at 20:18

## 2025-07-05 RX ADMIN — IPRATROPIUM BROMIDE AND ALBUTEROL SULFATE 3 ML: .5; 3 SOLUTION RESPIRATORY (INHALATION) at 15:15

## 2025-07-05 RX ADMIN — SPIRONOLACTONE 50 MG: 50 TABLET ORAL at 10:11

## 2025-07-05 RX ADMIN — OXYCODONE AND ACETAMINOPHEN 1 TABLET: 325; 10 TABLET ORAL at 15:46

## 2025-07-05 NOTE — PROGRESS NOTES
[facility] Progress Note      Patient Name: Filomena Liu  : 1960  MRN: [id number]  Primary Care Physician:  Fernando Dunn MD  Date of admission: 7/3/2025    Subjective     Interval History:   Blood pressure is soft  Renal function is stable and in fact better  Creatinine 1.09 today electrolytes are stable tolerating diuretics  1600 mL documented uop    Review of Systems:   As noted above    Objective     Vitals:   Temp:  [97.9 °F (36.6 °C)-99.3 °F (37.4 °C)] 98 °F (36.7 °C)  Heart Rate:  [71-84] 81  Resp:  [18-20] 18  BP: (103-130)/(59-90) 103/90  Flow (L/min) (Oxygen Therapy):  [2] 2    Intake/Output Summary (Last 24 hours) at 2025 1853  Last data filed at 2025 1300  Gross per 24 hour   Intake --   Output 1750 ml   Net -1750 ml       Physical Exam:    General Appearance: alert, oriented x 3, no acute distress   Skin: warm and dry  HEENT: oral mucosa normal, nonicteric sclera  Neck: supple, no JVD  Lungs: CTA  Heart: RRR, normal S1 and S2  Abdomen: soft, nontender, nondistended  : no palpable bladder  Extremities: no edema, cyanosis or clubbing  Neuro: normal speech and mental status     Scheduled Meds:     atenolol, 25 mg, Oral, Nightly  cholecalciferol, 1,000 Units, Oral, Weekly  docusate sodium, 100 mg, Oral, BID  fluticasone, 1 spray, Nasal, Daily  furosemide, 40 mg, Intravenous, BID  ipratropium-albuterol, 3 mL, Nebulization, 4x Daily - RT  melatonin, 2.5 mg, Oral, Nightly  nicotine, 1 patch, Transdermal, Q24H  pantoprazole, 40 mg, Oral, Daily  potassium chloride, 20 mEq, Oral, BID With Meals  pregabalin, 25 mg, Oral, Q12H  rOPINIRole, 1 mg, Oral, Nightly  spironolactone, 50 mg, Oral, BID      IV Meds:        Results Reviewed:   I have personally reviewed the results from the time of this admission to 2025 18:53 EDT     Results from last 7 days   Lab Units 25  0500 25  2213 25  0647 25  1837   SODIUM mmol/L 141  --  142 143    POTASSIUM mmol/L 4.4 4.1 3.5 3.3*   CHLORIDE mmol/L 104  --  101 101   CO2 mmol/L 29.0  --  32.0* 30.8*   BUN mg/dL 14.0  --  15.0 17.0   CREATININE mg/dL 1.09*  --  1.15* 1.33*   CALCIUM mg/dL 9.0  --  8.8 9.5   BILIRUBIN mg/dL 1.8*  --   --  2.6*   ALK PHOS U/L 231*  --   --  233*   ALT (SGPT) U/L 18  --   --  25   AST (SGOT) U/L 87*  --   --  175*   GLUCOSE mg/dL 127*  --  95 118*     Estimated Creatinine Clearance: 47.9 mL/min (A) (by C-G formula based on SCr of 1.09 mg/dL (H)).  Results from last 7 days   Lab Units 07/05/25  0500   MAGNESIUM mg/dL 2.3   PHOSPHORUS mg/dL 2.8         Results from last 7 days   Lab Units 07/05/25  0500 07/04/25  0647 07/03/25  1837   WBC 10*3/mm3 6.96 6.52 6.82   HEMOGLOBIN g/dL 8.7* 8.3* 10.0*   PLATELETS 10*3/mm3 148 137* 168           Assessment / Plan     ASSESSMENT:  CKD stage 3A - Cr fluctuates low to mid 1's in relation to cirrhosis and diuretic use, on lower end currently 1.3 yesterday and 1.09 today.  May need to permit degree of prerenal azotemia with more aggressive diuresis  Vol overload - periph > central in relation to cirrhosis.  on IV lasix & aldactone.  She will need a higher dose of torsemide when able to transition to PO loop diuretic in couple days  Hypokalemia, due to diuresis, improved with replacement   ETOH Cirrhosis  Iron def anemia - hgb is 8.3.  S/P recent endoscopy with plan for capsule  Recent L2 compression fracture managed via brace she stopped wearing  HTN, BP adequate 130 to 150, gives cushion for diuresis; on metop     PLAN:  Continue lasix 80mg IV BID   Monitor/record UOP, d/w RN; she does not wish to use [employer]  aldactone 50 mg BID  Please note Lasix is albumin-bound-if the response is less can change to torsemide or to Bumex twice a day  Dose medicate per GFR  Intake output charting  Monitor electrolytes  Monitor for signs for overdiuresis  Avoid nephrotoxins thank for the consult      Brodie aSntos MD  07/05/25  18:53 EDT    Nephrology  [facility]  [phone]

## 2025-07-05 NOTE — THERAPY EVALUATION
Patient Name: Filomena Liu  : 1960    MRN: [id number]                              Today's Date: 2025       Admit Date: 7/3/2025    Visit Dx:     ICD-10-CM ICD-9-CM   1. Bilateral leg edema  R60.0 782.3   2. Closed fracture of second lumbar vertebra with routine healing, unspecified fracture morphology, subsequent encounter  S32.029D V54.17   3. Generalized abdominal pain  R10.84 789.07   4. Hypokalemia  E87.6 276.8   5. SANDRO (acute kidney injury)  N17.9 584.9     Patient Active Problem List   Diagnosis    Mediastinal mass    Cervical stenosis of spinal canal    Cervical radiculopathy    Cellulitis/abscess of left foot    HTN (hypertension)    History of MRSA infection    Anxiety    Peroneal tenosynovitis    Fracture of navicular bone of left foot    Tobacco use    Non compliance with medical treatment    Screening for colon cancer    Osteoporosis    Shoulder arthritis    Primary localized osteoarthrosis of left shoulder region    History of adenomatous polyp of colon    Diverticulosis    Acute GI bleeding    Hyperkalemia    SANDRO (acute kidney injury)    Alcoholism    COPD (chronic obstructive pulmonary disease)    Acute blood loss anemia    Melena    Gastroesophageal reflux disease    Dysphagia    Alcoholic cirrhosis of liver without ascites    Other iron deficiency anemias    Malabsorption of iron    Acute pancreatitis    Diverticulitis    Hypoglycemia    Lactic acidosis    Adverse effect of iron    Iron deficiency anemia    Cirrhosis of liver without ascites    Infrarenal abdominal aortic aneurysm (AAA) without rupture    Arthritis of shoulder    Hypokalemia    Transaminitis    CKD (chronic kidney disease) stage 3, GFR 30-59 ml/min    Hypotension    Thrombocytopenia    Compression fx, lumbar spine    Bilateral leg edema     Past Medical History:   Diagnosis Date    Abdominal aneurysm     DR. JOYCE FOLLOWING 3.5CM    Alcoholism 1985    Anemia 24    HAS HAD IRON INFUSIONS ALMOST YEARLY     Anxiety     Arthritis of back 2000    Asthma 2015    At high risk for falls     Basal cell carcinoma     Bruises easily     Chronic kidney disease 8/5/24    Cirrhosis 8/5/24    COPD (chronic obstructive pulmonary disease)     MANAGED BY PRIMARY CARE    CTS (carpal tunnel syndrome) Surgery 1999    DDD (degenerative disc disease), cervical     Depression     Diverticulitis of colon Unknown    Fatty liver 2022    GERD (gastroesophageal reflux disease)     GI (gastrointestinal bleed) 8/5/24    History of anemia     History of MRSA infection     LEFT ANKLE TREAT [facility]  5YEARS GO    Hyperlipidemia ?    Hypertension ?    Low back strain Unknown    Lower leg edema     Lumbosacral disc disease 2024    Neck strain 2000    Neuropathy     Pancreatitis 2024    Panic attacks     Scoliosis Unknown    Shoulder arthritis 06/05/2023    Shoulder pain, left 07/07/2023    Sleep apnea     NO MACHINE USE    Spinal stenosis     Spondylolisthesis of cervical region     Steatosis of liver     Tachycardia     Tendinitis of knee 2022    Thoracic disc disorder 2000    Vertigo      Past Surgical History:   Procedure Laterality Date    BACK SURGERY      cervical disc surgery with fusion-    CARPAL TUNNEL RELEASE Bilateral     CATARACT EXTRACTION      CHOLECYSTECTOMY  1/1/20    COLONOSCOPY      COLONOSCOPY N/A 11/12/2020    Procedure: COLONOSCOPY, polypectomy;  Surgeon: Filomena Mckeon MD;  Location: [facility] OR;  Service: Gastroenterology;  Laterality: N/A;  Diverticulosis; Hepatic flexure polyp x 1; Polyp at 60cm x 1; Polyp at 50cm x 1- hot snare;    COLONOSCOPY N/A 04/15/2024    Procedure: COLONOSCOPY into sigmoid colon with hot snare polypectomy;  Surgeon: Leatha Johns MD;  Location:  [facility];  Service: General;  Laterality: N/A;  pre- history of polyps  post- diverticulosis, polyp    COLONOSCOPY N/A 05/07/2025    Procedure: COLONOSCOPY to cecum and ti with hot snare polypectomies;  Surgeon: Ana Guerrero MD;  Location:   [facility] ENDOSCOPY;  Service: Gastroenterology;  Laterality: N/A;  PRE: IRON DEFICIENCY ANEMIA  POST: diverticulosis, polyps, hemorrhoids    ENDOSCOPY N/A 08/15/2024    Procedure: ESOPHAGOGASTRODUODENOSCOPY;  Surgeon: Garth Constantino MD;  Location: [facility] ENDOSCOPY;  Service: Gastroenterology;  Laterality: N/A;  PRE- CIRRHOSIS, DARK STOOL  POST- NORMAL    ENDOSCOPY N/A 05/07/2025    Procedure: ESOPHAGOGASTRODUODENOSCOPY;  Surgeon: Ana Guerrero MD;  Location: [facility] ENDOSCOPY;  Service: Gastroenterology;  Laterality: N/A;  PRE: CIRRHOSIS  POST:portal hypertensive gastropathy; esophagitis; hiatal hernia; varices    HYSTERECTOMY  1998    INCISION AND DRAINAGE LEG Left 11/17/2018    Procedure: Incision and Drainage of Left ankle;  Surgeon: Maxwell Lanier MD;  Location: [facility] OR;  Service: Orthopedics    NECK SURGERY  2000, 2005,2017    SIGMOIDOSCOPY N/A 03/28/2024    Procedure: SIGMOIDOSCOPY FLEXIBLE;  Surgeon: Leatha Johns MD;  Location: [facility] ENDOSCOPY;  Service: General;  Laterality: N/A;  pre: h/o colon polyps  post: poor prep, diverticulosis    SKIN BIOPSY      SKIN GRAFT SPLIT THICKNESS N/A 02/12/2019    Procedure: debridement SKIN GRAFT SPLIT THICKNESS left ankle wound;  Surgeon: Barry Worthy MD;  Location: [facility];  Service: Plastics    TONGUE LESION EXCISION/BIOPSY N/A 11/26/2024    Procedure: WIDE LOCAL EXCISION OF TONGUE LESION;  Surgeon: El Mercedes MD;  Location: [facility] OR;  Service: ENT;  Laterality: N/A;    TOTAL SHOULDER ARTHROPLASTY Left 07/20/2023    Procedure: Total  shoulder arthroplasty;  Surgeon: Maxwell Lanier MD;  Location: [facility] OR [facility];  Service: Orthopedics;  Laterality: Left;    TOTAL SHOULDER ARTHROPLASTY W/ DISTAL CLAVICLE EXCISION Right 6/5/2025    Procedure: Right Reverse Total Shoulder Arthroplasty;  Surgeon: Maxwell Lanier MD;  Location: [facility];  Service: Orthopedics;  Laterality: Right;    TOTAL THYMECTOMY      TRIGGER POINT  INJECTION  [id number]    UPPER GASTROINTESTINAL ENDOSCOPY  8/10/24    WRIST FRACTURE SURGERY      WRIST FRACTURE SURGERY Left       General Information       Row Name 07/05/25 1435          Physical Therapy Time and Intention    Document Type evaluation;discharge evaluation/summary  -     Mode of Treatment individual therapy;physical therapy  -       Row Name 07/05/25 1435          General Information    Patient Profile Reviewed yes  -SM     Prior Level of Function independent:  -     Existing Precautions/Restrictions fall  -       Row Name 07/05/25 1435          Living Environment    Current Living Arrangements home  -     People in Home significant other  -       Row Name 07/05/25 1435          Cognition    Orientation Status (Cognition) oriented x 4  -       Row Name 07/05/25 1435          Safety Issues/Impairments Affecting Functional Mobility    Impairments Affecting Function (Mobility) pain  -               User Key  (r) = Recorded By, (t) = Taken By, (c) = Cosigned By      Initials Name Provider Type     Ro Brothers, YADIEL Physical Therapist                   Mobility       Row Name 07/05/25 1437          Bed Mobility    Bed Mobility supine-sit  -     Supine-Sit [place] (Bed Mobility) stand assist  -     Comment, (Bed Mobility) sitting EOB at end of session  -       Row Name 07/05/25 1437          Sit-Stand Transfer    Sit-Stand [place] (Transfers) stand assist  -     Assistive Device (Sit-Stand Transfers) walker, front-wheeled  -       Row Name 07/05/25 1437          Gait/Stairs (Locomotion)    [place] Level (Gait) stand assist  -     Assistive Device (Gait) walker, front-wheeled  -     Patient was able to Ambulate yes  -     Distance in Feet (Gait) 150  -     Deviations/Abnormal Patterns (Gait) gait speed decreased  -     Comment, (Gait/Stairs) Gait slow but steady with no overt LOB noted.  -               User Key  (r) = Recorded By, (t) =  Taken By, (c) = Cosigned By      Initials Name Provider Type     Ro Brothers PT Physical Therapist                   Obj/Interventions       Row Name 07/05/25 1437          Range of Motion Comprehensive    General Range of Motion bilateral lower extremity ROM WFL  -       Row Name 07/05/25 1437          Strength Comprehensive (MMT)    General Manual Muscle Testing (MMT) Assessment lower extremity strength deficits identified  -     Comment, General Manual Muscle Testing (MMT) Assessment B LEs WFL  -       Row Name 07/05/25 1437          Balance    Balance Assessment sitting static balance;sitting dynamic balance;standing static balance;standing dynamic balance  -     Static Sitting Balance independent  -SM     Dynamic Sitting Balance modified independence  -     Position, Sitting Balance sitting edge of bed  -     Static Standing Balance standby assist  -     Dynamic Standing Balance standby assist  -     Position/Device Used, Standing Balance supported;walker, front-wheeled  -     Balance Interventions sitting;standing;sit to stand;supported;static;dynamic  -               User Key  (r) = Recorded By, (t) = Taken By, (c) = Cosigned By      Initials Name Provider Type     Ro Brothers PT Physical Therapist                   Goals/Plan    No documentation.                  Clinical Impression       Row Name 07/05/25 1438          Pain    Pain Location back;shoulder  -     Pain Side/Orientation right  -     Pain Management Interventions exercise or physical activity utilized  -     Response to Pain Interventions activity participation with tolerable pain  -       Row Name 07/05/25 1438          Plan of Care Review    Plan of Care Reviewed With patient  -     Outcome Evaluation Patient is a 64 y.o female who presented to [facility] with B LE edema. Patient AOx4, family at bedside. Patient lives at home with her significant other with 3 RAGHAVENDRA. Patient is independent at baseline but  reports she has recently been using a rwx for mobility. Patient reports recent shoulder surgery at the begining of June. Patient also reports a lumbar fracture discovered on last admission. Patient reports she was given an LSO but she does not wear it because it is uncomfortable.  Patient reports she is current with HHPT. Today patient sat up to EOB with SBA and increased time. Patient stood and ambulated to BR in room and addressed all needs with SV. Patient ambulated 150ft around unit with rwx and SBA. Gait slow but steady with no overt LOB noted. Patient planning to return home with assist at d/c. Encouraged patient to continue ambulating in hallway several times per day. No further skilled acute PT needs identified. PT will sign off.  -       Row Name 07/05/25 1438          Therapy Assessment/Plan (PT)    Criteria for Skilled Interventions Met (PT) no;no problems identified which require skilled intervention  -     Therapy Frequency (PT) evaluation only  -       Row Name 07/05/25 1438          Vital Signs    Pre Patient Position Supine  -     Intra Patient Position Standing  -     Post Patient Position Sitting  -       Row Name 07/05/25 1438          Positioning and Restraints    Pre-Treatment Position in bed  -SM     Post Treatment Position bed  -SM     In Bed notified nsg;call light within reach;encouraged to call for assist;sitting EOB  no bed alarm on arrival. Getting up with family  -               User Key  (r) = Recorded By, (t) = Taken By, (c) = Cosigned By      Initials Name Provider Type     Ro Brothers, PT Physical Therapist                   Outcome Measures       Row Name 07/05/25 1443          How much help from another person do you currently need...    Turning from your back to your side while in flat bed without using bedrails? 4  -SM     Moving from lying on back to sitting on the side of a flat bed without bedrails? 4  -SM     Moving to and from a bed to a chair  (including a wheelchair)? 4  -SM     Standing up from a chair using your arms (e.g., wheelchair, bedside chair)? 4  -SM     Climbing 3-5 steps with a railing? 3  -SM     To walk in hospital room? 4  -SM     AM-PAC 6 Clicks Score (PT) 23  -     Highest Level of Mobility Goal Walk 25 Feet or More-7  -       Row Name 07/05/25 1443          Functional Assessment    Outcome Measure Options AM-PAC 6 Clicks Basic Mobility (PT)  -               User Key  (r) = Recorded By, (t) = Taken By, (c) = Cosigned By      Initials Name Provider Type     Ro Brothers PT Physical Therapist                                 Physical Therapy Education       Title: PT OT SLP Therapies (Done)       Topic: Physical Therapy (Done)       Point: Mobility training (Done)       Learning Progress Summary            Patient Acceptance, E, VU by  at 7/5/2025 1443                      Point: Home exercise program (Done)       Learning Progress Summary            Patient Acceptance, E, VU by  at 7/5/2025 1443                      Point: Body mechanics (Done)       Learning Progress Summary            Patient Acceptance, E, VU by  at 7/5/2025 1443                      Point: Precautions (Done)       Learning Progress Summary            Patient Acceptance, E, VU by  at 7/5/2025 1443                                      User Key       Initials Effective Dates Name Provider Type Discipline     05/02/22 -  Ro Brothers PT Physical Therapist PT                  PT Recommendation and Plan     Outcome Evaluation: Patient is a 64 y.o female who presented to [facility] with B LE edema. Patient AOx4, family at bedside. Patient lives at home with her significant other with 3 RAGHAVENDRA. Patient is independent at baseline but reports she has recently been using a rwx for mobility. Patient reports recent shoulder surgery at the begining of June. Patient also reports a lumbar fracture discovered on last admission. Patient reports she was given an LSO  but she does not wear it because it is uncomfortable.  Patient reports she is current with HHPT. Today patient sat up to EOB with SBA and increased time. Patient stood and ambulated to BR in room and addressed all needs with SV. Patient ambulated 150ft around unit with rwx and SBA. Gait slow but steady with no overt LOB noted. Patient planning to return home with assist at d/c. Encouraged patient to continue ambulating in hallway several times per day. No further skilled acute PT needs identified. PT will sign off.     Time Calculation:         PT Charges       Row Name 07/05/25 1444             Time Calculation    Start Time 1331  -SM      Stop Time 1347  -SM      Time Calculation (min) 16 min  -SM      PT Received On 07/05/25  -         Time Calculation- PT    Total Timed Code Minutes- PT 8 minute(s)  -SM         Timed Charges    16641 - PT Therapeutic Activity Minutes 8  -SM         Total Minutes    Timed Charges Total Minutes 8  -SM       Total Minutes 8  -SM                User Key  (r) = Recorded By, (t) = Taken By, (c) = Cosigned By      Initials Name Provider Type     Ro Brothers, YADIEL Physical Therapist                  Therapy Charges for Today       Code Description Service Date Service Provider Modifiers Qty    94276878904  PT THERAPEUTIC ACT EA 15 MIN 7/5/2025 Ro Brothers, PT GP 1    [id number]  PT EVAL LOW COMPLEXITY 3 7/5/2025 Ro Brothers, PT GP 1            PT G-Codes  Outcome Measure Options: AM-PAC 6 Clicks Basic Mobility (PT)  AM-PAC 6 Clicks Score (PT): 23  PT Discharge Summary  Anticipated Discharge Disposition (PT): home with assist, home with home health    Ro Brothers PT  7/5/2025

## 2025-07-05 NOTE — PLAN OF CARE
Goal Outcome Evaluation:  Plan of Care Reviewed With: patient           Outcome Evaluation: Patient is a 64 y.o female who presented to [facility] with B LE edema. Patient AOx4, family at bedside. Patient lives at home with her significant other with 3 RAGHAVENDRA. Patient is independent at baseline but reports she has recently been using a rwx for mobility. Patient reports recent shoulder surgery at the begining of June. Patient also reports a lumbar fracture discovered on last admission. Patient reports she was given an LSO but she does not wear it because it is uncomfortable.  Patient reports she is current with HHPT. Today patient sat up to EOB with SBA and increased time. Patient stood and ambulated to BR in room and addressed all needs with SV. Patient ambulated 150ft around unit with rwx and SBA. Gait slow but steady with no overt LOB noted. Patient planning to return home with assist at d/c. Encouraged patient to continue ambulating in hallway several times per day. No further skilled acute PT needs identified. PT will sign off.    Anticipated Discharge Disposition (PT): home with assist, home with home health

## 2025-07-05 NOTE — PROGRESS NOTES
Name: Filomena Liu ADMIT: 7/3/2025   : 1960  PCP: Fernando Dunn MD    MRN: [id number] LOS: 0 days   AGE/SEX: 64 y.o. female  ROOM: [facility]     Subjective   Subjective   Patient is lying on the bed and does not appear to be any major distress.  Denies nausea, vomiting abdominal pain, chest pain.         Objective   Objective   Vital Signs  Temp:  [97.9 °F (36.6 °C)-99.3 °F (37.4 °C)] 98 °F (36.7 °C)  Heart Rate:  [71-84] 81  Resp:  [18-20] 18  BP: (103-130)/(59-90) 103/90  SpO2:  [97 %-100 %] 100 %  on  Flow (L/min) (Oxygen Therapy):  [2] 2;   Device (Oxygen Therapy): room air  Body mass index is 33.24 kg/m².  Physical Exam  HEENT: PERRLA, extraocular movements intact, Scleras no icterus  Neck: Supple, no JVD  Cardiovascular: Regular rate and rhythm with normal S1 and S2  GI: Soft, nontender, bowel sounds are present  Extremities: Positive for edema 2+, pedal pulse are palpable  Neurologic: Grossly nonfocal, no facial asymmetry    Results Review     I reviewed the patient's new clinical results.  Results from last 7 days   Lab Units 25  0500 25  0647 25  1837   WBC 10*3/mm3 6.96 6.52 6.82   HEMOGLOBIN g/dL 8.7* 8.3* 10.0*   PLATELETS 10*3/mm3 148 137* 168     Results from last 7 days   Lab Units 25  0500 25  2213 25  0647 25  1837   SODIUM mmol/L 141  --  142 143   POTASSIUM mmol/L 4.4 4.1 3.5 3.3*   CHLORIDE mmol/L 104  --  101 101   CO2 mmol/L 29.0  --  32.0* 30.8*   BUN mg/dL 14.0  --  15.0 17.0   CREATININE mg/dL 1.09*  --  1.15* 1.33*   GLUCOSE mg/dL 127*  --  95 118*   EGFR mL/min/1.73 56.8*  --  53.3* 44.8*     Results from last 7 days   Lab Units 25  0500 25  1837   ALBUMIN g/dL 2.9* 3.4*   BILIRUBIN mg/dL 1.8* 2.6*   ALK PHOS U/L 231* 233*   AST (SGOT) U/L 87* 175*   ALT (SGPT) U/L 18 25     Results from last 7 days   Lab Units 25  0500 25  0647 25  1837   CALCIUM mg/dL 9.0 8.8 9.5   ALBUMIN g/dL 2.9*  --  3.4*  "  MAGNESIUM mg/dL 2.3  --   --    PHOSPHORUS mg/dL 2.8  --   --        No results found for: \"HGBA1C\", \"POCGLU\"    CT Abdomen Pelvis Without Contrast  Result Date: 7/3/2025  FINDINGS AND IMPRESSION: Please note evaluation is suboptimal due to the marked streak artifact from patient's arms being along her side. No free intraperitoneal air is seen. Soft tissue thickening and subcutaneous stranding is present throughout the visualized body wall, nonspecific but can be seen in the setting of anasarca, edema and/or cellulitis and correlation with physical exam and patient history is recommended. The appendix is unremarkable.  The liver is cirrhotic. Spleen is enlarged, measuring up to approximate 15.1 cm in length areas of portal hypertension. Gallbladder surgically absent. Pancreas, adrenal glands and kidneys have an unremarkable noncontrast CT appearance. No hydronephrosis.  THERE IS ASYMMETRIC THICKENING AND SURROUNDING STRANDING SURROUNDING THE ADJACENT COLON AND CECUM, SUGGESTIVE OF PORTAL HYPERTENSIVE COLOPATHY VERSUS COLITIS IN THE APPROPRIATE CONTEXT. CORRELATION WITH PATIENT HISTORY IS RECOMMENDED.  No abdominal pelvic adenopathy by size criteria. Infrarenal aneurysmal dilation of the abdominal aorta measures up to approximately 3.6 cm disease previously 2.9 cm on 3/9/2021) continued attention on follow-up is recommended.  No suspicious lytic or blastic osseous lesion. For the purpose of this dictation, last well-formed disc base referred to as L5-S1. Burst compression fracture of the L2 vertebral body appears to demonstrate increased loss of height since 6/25/2025 resulting approximate 30% loss of height (previously 25%).  This report was finalized on 7/3/2025 10:29 PM by Dr. David Briceño M.D on Workstation: [id number]      XR Chest 2 View  Result Date: 7/3/2025   Negative chest, no acute abnormality.  This report was finalized on 7/3/2025 7:42 PM by Dr. Richard Chambers M.D on Workstation: [id number]  "       I have personally reviewed all medications:  Scheduled Medications  atenolol, 25 mg, Oral, Nightly  cholecalciferol, 1,000 Units, Oral, Weekly  docusate sodium, 100 mg, Oral, BID  fluticasone, 1 spray, Nasal, Daily  furosemide, 40 mg, Intravenous, BID  ipratropium-albuterol, 3 mL, Nebulization, 4x Daily - RT  melatonin, 2.5 mg, Oral, Nightly  pantoprazole, 40 mg, Oral, Daily  potassium chloride, 20 mEq, Oral, BID With Meals  pregabalin, 25 mg, Oral, Q12H  rOPINIRole, 1 mg, Oral, Nightly  spironolactone, 50 mg, Oral, BID    Infusions   Diet  Diet: Cardiac; Healthy Heart (2-3 Na+); Fluid Consistency: Thin (IDDSI 0)    I have personally reviewed:  [x]  Laboratory   [x]  Microbiology   [x]  Radiology   [x]  EKG/Telemetry  [x]  Cardiology/Vascular   []  Pathology    []  Records       Assessment/Plan     Active Hospital Problems    Diagnosis  POA    **Bilateral leg edema [R60.0]  Yes      Resolved Hospital Problems   No resolved problems to display.       64 y.o. female admitted with Bilateral leg edema.    1. Bilateral lower extremity edema/volume overload/liver cirrhosis, currently being diuresed with IV Lasix as well as p.o. Aldactone.  Nephrology is following along and assisting with diuresis.    2.  CKD 3 A, creatinine appears to be close to baseline and some fluctuation is expected while being diuresed.    3.  Ethanol abuse, currently does not drink.    4.  GERD, on acid suppressive therapy.    5.  History of COPD, currently not in any exacerbation and not requiring any oxygen.  Continue with DuoNebs.    6.  L2 fracture, patient  is supposed to be on a TLSO brace but has been noncompliant and states it does not fit her well.    7. Anemia, likely anemia of chronic disease however iron panel will be checked.  Hemoglobin noted to be 8.7.    8.  History of thrombocytopenia, likely secondary to underlying liver disease and currently platelet count is 1 48,000.    9.  Hypertension, on atenolol/Aldactone.    Copied  text on this note has been reviewed by me on 7/5/2028      Rikki Jason MD  [facility]  07/05/25  15:35 EDT

## 2025-07-06 PROBLEM — R60.0 BILATERAL LOWER EXTREMITY EDEMA: Status: ACTIVE | Noted: 2025-07-06

## 2025-07-06 LAB
ALBUMIN SERPL-MCNC: 2.7 G/DL (ref 3.5–5.2)
ALBUMIN/GLOB SERPL: 0.9 G/DL
ALP SERPL-CCNC: 224 U/L (ref 39–117)
ALT SERPL W P-5'-P-CCNC: 18 U/L (ref 1–33)
ANION GAP SERPL CALCULATED.3IONS-SCNC: 9 MMOL/L (ref 5–15)
AST SERPL-CCNC: 59 U/L (ref 1–32)
BASOPHILS # BLD AUTO: 0.03 10*3/MM3 (ref 0–0.2)
BASOPHILS NFR BLD AUTO: 0.4 % (ref 0–1.5)
BILIRUB SERPL-MCNC: 1.6 MG/DL (ref 0–1.2)
BUN SERPL-MCNC: 13 MG/DL (ref 8–23)
BUN/CREAT SERPL: 13.1 (ref 7–25)
CALCIUM SPEC-SCNC: 8.7 MG/DL (ref 8.6–10.5)
CHLORIDE SERPL-SCNC: 104 MMOL/L (ref 98–107)
CO2 SERPL-SCNC: 28 MMOL/L (ref 22–29)
CREAT SERPL-MCNC: 0.99 MG/DL (ref 0.57–1)
DEPRECATED RDW RBC AUTO: 46 FL (ref 37–54)
EGFRCR SERPLBLD CKD-EPI 2021: 63.8 ML/MIN/1.73
EOSINOPHIL # BLD AUTO: 0.29 10*3/MM3 (ref 0–0.4)
EOSINOPHIL NFR BLD AUTO: 3.9 % (ref 0.3–6.2)
ERYTHROCYTE [DISTWIDTH] IN BLOOD BY AUTOMATED COUNT: 13.9 % (ref 12.3–15.4)
GLOBULIN UR ELPH-MCNC: 3 GM/DL
GLUCOSE SERPL-MCNC: 127 MG/DL (ref 65–99)
HCT VFR BLD AUTO: 27.8 % (ref 34–46.6)
HGB BLD-MCNC: 8.5 G/DL (ref 12–15.9)
IMM GRANULOCYTES # BLD AUTO: 0.06 10*3/MM3 (ref 0–0.05)
IMM GRANULOCYTES NFR BLD AUTO: 0.8 % (ref 0–0.5)
LYMPHOCYTES # BLD AUTO: 2 10*3/MM3 (ref 0.7–3.1)
LYMPHOCYTES NFR BLD AUTO: 27.1 % (ref 19.6–45.3)
MCH RBC QN AUTO: 27.7 PG (ref 26.6–33)
MCHC RBC AUTO-ENTMCNC: 30.6 G/DL (ref 31.5–35.7)
MCV RBC AUTO: 90.6 FL (ref 79–97)
MONOCYTES # BLD AUTO: 0.98 10*3/MM3 (ref 0.1–0.9)
MONOCYTES NFR BLD AUTO: 13.3 % (ref 5–12)
NEUTROPHILS NFR BLD AUTO: 4.01 10*3/MM3 (ref 1.7–7)
NEUTROPHILS NFR BLD AUTO: 54.5 % (ref 42.7–76)
PHOSPHATE SERPL-MCNC: 3.3 MG/DL (ref 2.5–4.5)
PLATELET # BLD AUTO: 142 10*3/MM3 (ref 140–450)
PMV BLD AUTO: 11 FL (ref 6–12)
POTASSIUM SERPL-SCNC: 4.4 MMOL/L (ref 3.5–5.2)
PROT SERPL-MCNC: 5.7 G/DL (ref 6–8.5)
RBC # BLD AUTO: 3.07 10*6/MM3 (ref 3.77–5.28)
SODIUM SERPL-SCNC: 141 MMOL/L (ref 136–145)
WBC NRBC COR # BLD AUTO: 7.37 10*3/MM3 (ref 3.4–10.8)

## 2025-07-06 PROCEDURE — 94799 UNLISTED PULMONARY SVC/PX: CPT

## 2025-07-06 PROCEDURE — 25010000002 FUROSEMIDE PER 20 MG: Performed by: INTERNAL MEDICINE

## 2025-07-06 PROCEDURE — 84100 ASSAY OF PHOSPHORUS: CPT | Performed by: INTERNAL MEDICINE

## 2025-07-06 PROCEDURE — 85025 COMPLETE CBC W/AUTO DIFF WBC: CPT | Performed by: INTERNAL MEDICINE

## 2025-07-06 PROCEDURE — 94761 N-INVAS EAR/PLS OXIMETRY MLT: CPT

## 2025-07-06 PROCEDURE — 80053 COMPREHEN METABOLIC PANEL: CPT | Performed by: INTERNAL MEDICINE

## 2025-07-06 PROCEDURE — 94664 DEMO&/EVAL PT USE INHALER: CPT

## 2025-07-06 RX ADMIN — PREGABALIN 25 MG: 25 CAPSULE ORAL at 09:34

## 2025-07-06 RX ADMIN — ATENOLOL 25 MG: 50 TABLET ORAL at 20:35

## 2025-07-06 RX ADMIN — IPRATROPIUM BROMIDE AND ALBUTEROL SULFATE 3 ML: .5; 3 SOLUTION RESPIRATORY (INHALATION) at 20:06

## 2025-07-06 RX ADMIN — OXYCODONE AND ACETAMINOPHEN 1 TABLET: 325; 10 TABLET ORAL at 09:34

## 2025-07-06 RX ADMIN — DOCUSATE SODIUM 100 MG: 100 CAPSULE, LIQUID FILLED ORAL at 20:35

## 2025-07-06 RX ADMIN — FUROSEMIDE 40 MG: 10 INJECTION, SOLUTION INTRAMUSCULAR; INTRAVENOUS at 09:35

## 2025-07-06 RX ADMIN — Medication 2.5 MG: at 20:35

## 2025-07-06 RX ADMIN — ALBUTEROL SULFATE 2.5 MG: 2.5 SOLUTION RESPIRATORY (INHALATION) at 05:37

## 2025-07-06 RX ADMIN — SPIRONOLACTONE 50 MG: 50 TABLET ORAL at 20:35

## 2025-07-06 RX ADMIN — IPRATROPIUM BROMIDE AND ALBUTEROL SULFATE 3 ML: .5; 3 SOLUTION RESPIRATORY (INHALATION) at 11:47

## 2025-07-06 RX ADMIN — PANTOPRAZOLE SODIUM 40 MG: 40 TABLET, DELAYED RELEASE ORAL at 09:34

## 2025-07-06 RX ADMIN — CYCLOBENZAPRINE HYDROCHLORIDE 5 MG: 10 TABLET, FILM COATED ORAL at 23:51

## 2025-07-06 RX ADMIN — FUROSEMIDE 40 MG: 10 INJECTION, SOLUTION INTRAMUSCULAR; INTRAVENOUS at 20:35

## 2025-07-06 RX ADMIN — ROPINIROLE 1 MG: 1 TABLET, FILM COATED ORAL at 20:36

## 2025-07-06 RX ADMIN — NICOTINE 1 PATCH: 21 PATCH, EXTENDED RELEASE TRANSDERMAL at 09:43

## 2025-07-06 RX ADMIN — OXYCODONE AND ACETAMINOPHEN 1 TABLET: 325; 10 TABLET ORAL at 05:28

## 2025-07-06 RX ADMIN — OXYCODONE AND ACETAMINOPHEN 1 TABLET: 325; 10 TABLET ORAL at 22:26

## 2025-07-06 RX ADMIN — IPRATROPIUM BROMIDE AND ALBUTEROL SULFATE 3 ML: .5; 3 SOLUTION RESPIRATORY (INHALATION) at 07:36

## 2025-07-06 RX ADMIN — PREGABALIN 25 MG: 25 CAPSULE ORAL at 20:35

## 2025-07-06 RX ADMIN — FLUTICASONE PROPIONATE 1 SPRAY: 50 SPRAY, METERED NASAL at 09:35

## 2025-07-06 RX ADMIN — SPIRONOLACTONE 50 MG: 50 TABLET ORAL at 09:34

## 2025-07-06 RX ADMIN — DOCUSATE SODIUM 100 MG: 100 CAPSULE, LIQUID FILLED ORAL at 09:34

## 2025-07-06 RX ADMIN — OXYCODONE AND ACETAMINOPHEN 1 TABLET: 325; 10 TABLET ORAL at 00:32

## 2025-07-06 RX ADMIN — OXYCODONE AND ACETAMINOPHEN 1 TABLET: 325; 10 TABLET ORAL at 18:11

## 2025-07-06 RX ADMIN — OXYCODONE AND ACETAMINOPHEN 1 TABLET: 325; 10 TABLET ORAL at 13:37

## 2025-07-06 RX ADMIN — POTASSIUM CHLORIDE 20 MEQ: 1500 TABLET, EXTENDED RELEASE ORAL at 18:11

## 2025-07-06 RX ADMIN — IPRATROPIUM BROMIDE AND ALBUTEROL SULFATE 3 ML: .5; 3 SOLUTION RESPIRATORY (INHALATION) at 15:25

## 2025-07-06 RX ADMIN — POTASSIUM CHLORIDE 20 MEQ: 1500 TABLET, EXTENDED RELEASE ORAL at 09:34

## 2025-07-06 NOTE — PROGRESS NOTES
Name: Filomena Liu ADMIT: 7/3/2025   : 1960  PCP: Fernando Dunn MD    MRN: [id number] LOS: 0 days   AGE/SEX: 64 y.o. female  ROOM: [facility]     Subjective   Subjective   Patient is lying on the bed and does not appear to be any major distress.  Denies nausea, vomiting abdominal pain, chest pain, shortness of breath.       Objective   Objective   Vital Signs  Temp:  [97.9 °F (36.6 °C)-98.8 °F (37.1 °C)] 98.8 °F (37.1 °C)  Heart Rate:  [76-93] 81  Resp:  [16-20] 20  BP: (101-126)/(55-68) 126/68  SpO2:  [93 %-100 %] 100 %  on  Flow (L/min) (Oxygen Therapy):  [2] 2;   Device (Oxygen Therapy): room air  Body mass index is 32.81 kg/m².  Physical Exam  HEENT: PERRLA, extraocular movements intact, Scleras no icterus  Neck: Supple, no JVD  Cardiovascular: Regular rate and rhythm with normal S1 and S2  GI: Soft, nontender, bowel sounds are present  Extremities: Positive for edema 2+, pedal pulse are palpable  Neurologic: Grossly nonfocal, no facial asymmetry    Results Review     I reviewed the patient's new clinical results.  Results from last 7 days   Lab Units 25  0557 25  0500 25  0647 25  1837   WBC 10*3/mm3 7.37 6.96 6.52 6.82   HEMOGLOBIN g/dL 8.5* 8.7* 8.3* 10.0*   PLATELETS 10*3/mm3 142 148 137* 168     Results from last 7 days   Lab Units 25  0557 25  0500 25  2213 25  0647 25  1837   SODIUM mmol/L 141 141  --  142 143   POTASSIUM mmol/L 4.4 4.4 4.1 3.5 3.3*   CHLORIDE mmol/L 104 104  --  101 101   CO2 mmol/L 28.0 29.0  --  32.0* 30.8*   BUN mg/dL 13.0 14.0  --  15.0 17.0   CREATININE mg/dL 0.99 1.09*  --  1.15* 1.33*   GLUCOSE mg/dL 127* 127*  --  95 118*   EGFR mL/min/1.73 63.8 56.8*  --  53.3* 44.8*     Results from last 7 days   Lab Units 25  0557 25  0500 25  1837   ALBUMIN g/dL 2.7* 2.9* 3.4*   BILIRUBIN mg/dL 1.6* 1.8* 2.6*   ALK PHOS U/L 224* 231* 233*   AST (SGOT) U/L 59* 87* 175*   ALT (SGPT) U/L 18 18 25     Results  "from last 7 days   Lab Units 07/06/25  0557 07/05/25  0500 07/04/25  0647 07/03/25  1837   CALCIUM mg/dL 8.7 9.0 8.8 9.5   ALBUMIN g/dL 2.7* 2.9*  --  3.4*   MAGNESIUM mg/dL  --  2.3  --   --    PHOSPHORUS mg/dL 3.3 2.8  --   --        No results found for: \"HGBA1C\", \"POCGLU\"    No radiology results for the last day      I have personally reviewed all medications:  Scheduled Medications  atenolol, 25 mg, Oral, Nightly  cholecalciferol, 1,000 Units, Oral, Weekly  docusate sodium, 100 mg, Oral, BID  fluticasone, 1 spray, Nasal, Daily  furosemide, 40 mg, Intravenous, BID  ipratropium-albuterol, 3 mL, Nebulization, 4x Daily - RT  melatonin, 2.5 mg, Oral, Nightly  nicotine, 1 patch, Transdermal, Q24H  pantoprazole, 40 mg, Oral, Daily  potassium chloride, 20 mEq, Oral, BID With Meals  pregabalin, 25 mg, Oral, Q12H  rOPINIRole, 1 mg, Oral, Nightly  spironolactone, 50 mg, Oral, BID    Infusions   Diet  Diet: Cardiac; Healthy Heart (2-3 Na+); Fluid Consistency: Thin (IDDSI 0)    I have personally reviewed:  [x]  Laboratory   [x]  Microbiology   [x]  Radiology   [x]  EKG/Telemetry  [x]  Cardiology/Vascular   []  Pathology    []  Records       Assessment/Plan     Active Hospital Problems    Diagnosis  POA    **Bilateral leg edema [R60.0]  Yes      Resolved Hospital Problems   No resolved problems to display.       64 y.o. female admitted with Bilateral leg edema.    1. Bilateral lower extremity edema/volume overload/liver cirrhosis, was diuresed with IV Lasix and currently off and will continue with p.o. Aldactone.  Nephrology is following along as well.    2.  CKD 3 A, creatinine appears to be close to baseline and some fluctuation is expected while being diuresed.    3.  Ethanol abuse, currently does not drink.    4.  GERD, on acid suppressive therapy.    5.  History of COPD, currently not in any exacerbation and not requiring any oxygen.  Continue with DuoNebs.    6.  L2 fracture, patient  is supposed to be on a TLSO brace " but has been noncompliant and states it does not fit her well.    7. Anemia, likely anemia of chronic disease however iron panel will be checked.  Hemoglobin noted to be 8.5.    8.  History of thrombocytopenia, likely secondary to underlying liver disease and currently platelet count is  142,000.    9.  Hypertension, on atenolol/Aldactone.    10.  Disposition, hopefully home tomorrow.    Copied text on this note has been reviewed by me on 7/6/2028      Rikki Jason MD  [facility]  07/06/25  14:21 EDT

## 2025-07-06 NOTE — PLAN OF CARE
Goal Outcome Evaluation:     Patient states she has felt better this shift, but still has pain in her right post-op shoulder with chronic intermittent pain in her lower back. She has been up to the bathroom or BSC with minimal assistance and with the use of her walker, she is voiding without difficulty. Bilateral lower extremities with less edema and pulses palpable with brisk capillary refill In her toes.

## 2025-07-06 NOTE — PROGRESS NOTES
[facility] Progress Note      Patient Name: Filomena Liu  : 1960  MRN: [id number]  Primary Care Physician:  Fernando Dunn MD  Date of admission: 7/3/2025    Subjective     Interval History:   Blood pressure is still soft but better than yesterday  she is clinically stable  Good urine output 1.3 L from yesterday  Breathing better  Creatinine is down to 0.99 electrolytes stable  Review of Systems:   As noted above    Objective     Vitals:   Temp:  [97.9 °F (36.6 °C)-98.8 °F (37.1 °C)] 98.8 °F (37.1 °C)  Heart Rate:  [71-93] 88  Resp:  [16-20] 18  BP: (101-111)/(55-90) 110/57  Flow (L/min) (Oxygen Therapy):  [2] 2    Intake/Output Summary (Last 24 hours) at 2025 1105  Last data filed at 2025 0715  Gross per 24 hour   Intake 240 ml   Output 1300 ml   Net -1060 ml       Physical Exam:    General Appearance: alert, no acute distress   Skin: warm and dry  HEENT: oral mucosa normal   Neck: supple, no JVD  Lungs: CTA  Heart: RRR, normal S1 and S2  Abdomen: soft, nontender, nondistended  : no palpable bladder  Extremities: Minimal edema, cyanosis or clubbing  Neuro: normal speech no asterixis  Scheduled Meds:     atenolol, 25 mg, Oral, Nightly  cholecalciferol, 1,000 Units, Oral, Weekly  docusate sodium, 100 mg, Oral, BID  fluticasone, 1 spray, Nasal, Daily  furosemide, 40 mg, Intravenous, BID  ipratropium-albuterol, 3 mL, Nebulization, 4x Daily - RT  melatonin, 2.5 mg, Oral, Nightly  nicotine, 1 patch, Transdermal, Q24H  pantoprazole, 40 mg, Oral, Daily  potassium chloride, 20 mEq, Oral, BID With Meals  pregabalin, 25 mg, Oral, Q12H  rOPINIRole, 1 mg, Oral, Nightly  spironolactone, 50 mg, Oral, BID      IV Meds:        Results Reviewed:   I have personally reviewed the results from the time of this admission to 2025 11:05 EDT     Results from last 7 days   Lab Units 25  0557 25  0500 25  2213 25  0647 25  1837   SODIUM mmol/L 141 141   --  142 143   POTASSIUM mmol/L 4.4 4.4 4.1 3.5 3.3*   CHLORIDE mmol/L 104 104  --  101 101   CO2 mmol/L 28.0 29.0  --  32.0* 30.8*   BUN mg/dL 13.0 14.0  --  15.0 17.0   CREATININE mg/dL 0.99 1.09*  --  1.15* 1.33*   CALCIUM mg/dL 8.7 9.0  --  8.8 9.5   BILIRUBIN mg/dL 1.6* 1.8*  --   --  2.6*   ALK PHOS U/L 224* 231*  --   --  233*   ALT (SGPT) U/L 18 18  --   --  25   AST (SGOT) U/L 59* 87*  --   --  175*   GLUCOSE mg/dL 127* 127*  --  95 118*     Estimated Creatinine Clearance: 52.4 mL/min (by C-G formula based on SCr of 0.99 mg/dL).  Results from last 7 days   Lab Units 07/06/25  0557 07/05/25  0500   MAGNESIUM mg/dL  --  2.3   PHOSPHORUS mg/dL 3.3 2.8         Results from last 7 days   Lab Units 07/06/25  0557 07/05/25  0500 07/04/25  0647 07/03/25  1837   WBC 10*3/mm3 7.37 6.96 6.52 6.82   HEMOGLOBIN g/dL 8.5* 8.7* 8.3* 10.0*   PLATELETS 10*3/mm3 142 148 137* 168           Assessment / Plan     ASSESSMENT:  CKD stage 3A - Cr fluctuates low to mid 1's in relation to cirrhosis and diuretic use, creatinine peaked 1.3 yesterday down to 0.99   Vol overload - periph > central in relation to cirrhosis.  on IV lasix & aldactone.  She will need a higher dose of torsemide when able to transition to PO loop diuretic in couple days  Hypokalemia, due to diuresis, improved with replacement   ETOH Cirrhosis  Iron def anemia - hgb is 8.5.  S/P recent endoscopy with plan for capsule  Recent L2 compression fracture managed via brace she stopped wearing  HTN, BP adequate 130 to 150, gives cushion for diuresis; on metop     PLAN:  Continue lasix 80mg IV BID   Monitor/record UOP, d/w RN; she does not wish to use purewick  aldactone 50 mg BID  Please note Lasix is albumin-bound-if the response is less can change to torsemide or to Bumex twice a day  Since she is clinically stable aim for net 500 mL to 1 L negative balance from tomorrow  Can taper down the diuretic dosing from tomorrow  Dose medicate per GFR  Intake output  charting  Monitor electrolytes  Monitor for signs for overdiuresis  Avoid nephrotoxins thank for the consult      Brodie Santos MD  07/06/25  11:05 EDT    [facility]  [phone]

## 2025-07-07 ENCOUNTER — APPOINTMENT (OUTPATIENT)
Dept: GENERAL RADIOLOGY | Facility: HOSPITAL | Age: 65
End: 2025-07-07
Payer: MEDICARE

## 2025-07-07 LAB
ALBUMIN SERPL-MCNC: 2.8 G/DL (ref 3.5–5.2)
ALBUMIN/GLOB SERPL: 0.9 G/DL
ALP SERPL-CCNC: 231 U/L (ref 39–117)
ALT SERPL W P-5'-P-CCNC: 18 U/L (ref 1–33)
ANION GAP SERPL CALCULATED.3IONS-SCNC: 7.5 MMOL/L (ref 5–15)
AST SERPL-CCNC: 44 U/L (ref 1–32)
BASOPHILS # BLD AUTO: 0.05 10*3/MM3 (ref 0–0.2)
BASOPHILS NFR BLD AUTO: 0.5 % (ref 0–1.5)
BILIRUB SERPL-MCNC: 1.7 MG/DL (ref 0–1.2)
BUN SERPL-MCNC: 15 MG/DL (ref 8–23)
BUN/CREAT SERPL: 13.6 (ref 7–25)
CALCIUM SPEC-SCNC: 9 MG/DL (ref 8.6–10.5)
CHLORIDE SERPL-SCNC: 103 MMOL/L (ref 98–107)
CO2 SERPL-SCNC: 27.5 MMOL/L (ref 22–29)
CREAT SERPL-MCNC: 1.1 MG/DL (ref 0.57–1)
DEPRECATED RDW RBC AUTO: 46.9 FL (ref 37–54)
EGFRCR SERPLBLD CKD-EPI 2021: 56.2 ML/MIN/1.73
EOSINOPHIL # BLD AUTO: 0.35 10*3/MM3 (ref 0–0.4)
EOSINOPHIL NFR BLD AUTO: 3.7 % (ref 0.3–6.2)
ERYTHROCYTE [DISTWIDTH] IN BLOOD BY AUTOMATED COUNT: 13.9 % (ref 12.3–15.4)
GLOBULIN UR ELPH-MCNC: 3.2 GM/DL
GLUCOSE SERPL-MCNC: 80 MG/DL (ref 65–99)
HCT VFR BLD AUTO: 28.6 % (ref 34–46.6)
HGB BLD-MCNC: 8.9 G/DL (ref 12–15.9)
IMM GRANULOCYTES # BLD AUTO: 0.13 10*3/MM3 (ref 0–0.05)
IMM GRANULOCYTES NFR BLD AUTO: 1.4 % (ref 0–0.5)
LYMPHOCYTES # BLD AUTO: 2.25 10*3/MM3 (ref 0.7–3.1)
LYMPHOCYTES NFR BLD AUTO: 23.7 % (ref 19.6–45.3)
MCH RBC QN AUTO: 28.3 PG (ref 26.6–33)
MCHC RBC AUTO-ENTMCNC: 31.1 G/DL (ref 31.5–35.7)
MCV RBC AUTO: 91.1 FL (ref 79–97)
MONOCYTES # BLD AUTO: 1.05 10*3/MM3 (ref 0.1–0.9)
MONOCYTES NFR BLD AUTO: 11.1 % (ref 5–12)
NEUTROPHILS NFR BLD AUTO: 5.66 10*3/MM3 (ref 1.7–7)
NEUTROPHILS NFR BLD AUTO: 59.6 % (ref 42.7–76)
NRBC BLD AUTO-RTO: 0 /100 WBC (ref 0–0.2)
PHOSPHATE SERPL-MCNC: 3.5 MG/DL (ref 2.5–4.5)
PLATELET # BLD AUTO: 162 10*3/MM3 (ref 140–450)
PMV BLD AUTO: 10.6 FL (ref 6–12)
POTASSIUM SERPL-SCNC: 4.4 MMOL/L (ref 3.5–5.2)
PROT SERPL-MCNC: 6 G/DL (ref 6–8.5)
RBC # BLD AUTO: 3.14 10*6/MM3 (ref 3.77–5.28)
SODIUM SERPL-SCNC: 138 MMOL/L (ref 136–145)
WBC NRBC COR # BLD AUTO: 9.49 10*3/MM3 (ref 3.4–10.8)

## 2025-07-07 PROCEDURE — 94664 DEMO&/EVAL PT USE INHALER: CPT

## 2025-07-07 PROCEDURE — 80053 COMPREHEN METABOLIC PANEL: CPT | Performed by: INTERNAL MEDICINE

## 2025-07-07 PROCEDURE — 85025 COMPLETE CBC W/AUTO DIFF WBC: CPT | Performed by: INTERNAL MEDICINE

## 2025-07-07 PROCEDURE — 73020 X-RAY EXAM OF SHOULDER: CPT

## 2025-07-07 PROCEDURE — 94799 UNLISTED PULMONARY SVC/PX: CPT

## 2025-07-07 PROCEDURE — 25010000002 MORPHINE PER 10 MG: Performed by: NURSE PRACTITIONER

## 2025-07-07 PROCEDURE — 94761 N-INVAS EAR/PLS OXIMETRY MLT: CPT

## 2025-07-07 PROCEDURE — 25010000002 FUROSEMIDE PER 20 MG: Performed by: INTERNAL MEDICINE

## 2025-07-07 PROCEDURE — 84100 ASSAY OF PHOSPHORUS: CPT | Performed by: INTERNAL MEDICINE

## 2025-07-07 RX ORDER — TORSEMIDE 20 MG/1
40 TABLET ORAL DAILY
Status: DISCONTINUED | OUTPATIENT
Start: 2025-07-07 | End: 2025-07-08 | Stop reason: HOSPADM

## 2025-07-07 RX ORDER — MORPHINE SULFATE 2 MG/ML
2 INJECTION, SOLUTION INTRAMUSCULAR; INTRAVENOUS ONCE
Status: COMPLETED | OUTPATIENT
Start: 2025-07-07 | End: 2025-07-07

## 2025-07-07 RX ADMIN — IPRATROPIUM BROMIDE AND ALBUTEROL SULFATE 3 ML: .5; 3 SOLUTION RESPIRATORY (INHALATION) at 14:51

## 2025-07-07 RX ADMIN — OXYCODONE AND ACETAMINOPHEN 1 TABLET: 325; 10 TABLET ORAL at 16:34

## 2025-07-07 RX ADMIN — Medication 2.5 MG: at 20:06

## 2025-07-07 RX ADMIN — FUROSEMIDE 40 MG: 10 INJECTION, SOLUTION INTRAMUSCULAR; INTRAVENOUS at 08:34

## 2025-07-07 RX ADMIN — POTASSIUM CHLORIDE 20 MEQ: 1500 TABLET, EXTENDED RELEASE ORAL at 16:34

## 2025-07-07 RX ADMIN — OXYCODONE AND ACETAMINOPHEN 1 TABLET: 325; 10 TABLET ORAL at 08:34

## 2025-07-07 RX ADMIN — IPRATROPIUM BROMIDE AND ALBUTEROL SULFATE 3 ML: .5; 3 SOLUTION RESPIRATORY (INHALATION) at 11:34

## 2025-07-07 RX ADMIN — MORPHINE SULFATE 2 MG: 2 INJECTION, SOLUTION INTRAMUSCULAR; INTRAVENOUS at 02:24

## 2025-07-07 RX ADMIN — OXYCODONE AND ACETAMINOPHEN 1 TABLET: 325; 10 TABLET ORAL at 12:42

## 2025-07-07 RX ADMIN — ATENOLOL 25 MG: 50 TABLET ORAL at 20:06

## 2025-07-07 RX ADMIN — DOCUSATE SODIUM 100 MG: 100 CAPSULE, LIQUID FILLED ORAL at 08:34

## 2025-07-07 RX ADMIN — PANTOPRAZOLE SODIUM 40 MG: 40 TABLET, DELAYED RELEASE ORAL at 08:34

## 2025-07-07 RX ADMIN — OXYCODONE AND ACETAMINOPHEN 1 TABLET: 325; 10 TABLET ORAL at 04:21

## 2025-07-07 RX ADMIN — TORSEMIDE 40 MG: 20 TABLET ORAL at 16:34

## 2025-07-07 RX ADMIN — SPIRONOLACTONE 50 MG: 50 TABLET ORAL at 08:34

## 2025-07-07 RX ADMIN — POTASSIUM CHLORIDE 20 MEQ: 1500 TABLET, EXTENDED RELEASE ORAL at 08:34

## 2025-07-07 RX ADMIN — IPRATROPIUM BROMIDE AND ALBUTEROL SULFATE 3 ML: .5; 3 SOLUTION RESPIRATORY (INHALATION) at 07:23

## 2025-07-07 RX ADMIN — DOCUSATE SODIUM 100 MG: 100 CAPSULE, LIQUID FILLED ORAL at 20:06

## 2025-07-07 RX ADMIN — IPRATROPIUM BROMIDE AND ALBUTEROL SULFATE 3 ML: .5; 3 SOLUTION RESPIRATORY (INHALATION) at 20:48

## 2025-07-07 RX ADMIN — ROPINIROLE 1 MG: 1 TABLET, FILM COATED ORAL at 20:06

## 2025-07-07 RX ADMIN — OXYCODONE AND ACETAMINOPHEN 1 TABLET: 325; 10 TABLET ORAL at 20:10

## 2025-07-07 RX ADMIN — CYCLOBENZAPRINE HYDROCHLORIDE 5 MG: 10 TABLET, FILM COATED ORAL at 11:43

## 2025-07-07 RX ADMIN — PREGABALIN 25 MG: 25 CAPSULE ORAL at 20:06

## 2025-07-07 RX ADMIN — NICOTINE 1 PATCH: 21 PATCH, EXTENDED RELEASE TRANSDERMAL at 08:36

## 2025-07-07 RX ADMIN — SPIRONOLACTONE 50 MG: 50 TABLET ORAL at 20:06

## 2025-07-07 NOTE — PLAN OF CARE
"Goal Outcome Evaluation:      Patient c/o possible re-injuring her right shoulder repair from June 5th, as she heard a \"pop\" in her shoulder yesterday while up to bathroom...APRN on call made aware of patient's issues and did order a right shoulder x-ray and one time pain shot injection for her. Pain decreased at this time per patient, we are still awiting results of her shoulder x-ray to be interpreted by the radiologist. Bilateral lower extremities are without edema and do show signs of healing compared to what she looked like on admission. Patient is taking all her medication without difficulty and her pain medicine along with scheduled medications without any adverse side effects noted.                                       "

## 2025-07-07 NOTE — PROGRESS NOTES
[facility] Progress Note      Patient Name: Filomena Liu  : 1960  MRN: [id number]  Primary Care Physician:  Fernando Dunn MD  Date of admission: 7/3/2025    Subjective     Interval History:   Follow-up on CKD 3A    Patient with right sided shoulder discomfort  Denies any chest pain, SOB, or N/V/D  Yesterday: Intake 1360ml; output 1100ml    Review of Systems:   As noted above    Objective     Vitals:   Temp:  [97.9 °F (36.6 °C)-99 °F (37.2 °C)] 98.3 °F (36.8 °C)  Heart Rate:  [69-87] 77  Resp:  [18] 18  BP: (101-111)/(52-74) 110/70  Flow (L/min) (Oxygen Therapy):  [2] 2    Intake/Output Summary (Last 24 hours) at 2025 1759  Last data filed at 2025 0834  Gross per 24 hour   Intake 360 ml   Output 400 ml   Net -40 ml       Physical Exam:    General Appearance: Awake, NAD, MO  Skin: warm and dry  HEENT: oral mucosa normal   Neck: supple, no JVD  Lungs: CTA  Heart: RRR, no rub   Abdomen: soft, nontender, nondistended  : no palpable bladder  Extremities: Trace BLE edema, no cyanosis or clubbing  Neuro: normal speech no asterixis      Scheduled Meds:     atenolol, 25 mg, Oral, Nightly  cholecalciferol, 1,000 Units, Oral, Weekly  docusate sodium, 100 mg, Oral, BID  fluticasone, 1 spray, Nasal, Daily  ipratropium-albuterol, 3 mL, Nebulization, 4x Daily - RT  melatonin, 2.5 mg, Oral, Nightly  nicotine, 1 patch, Transdermal, Q24H  pantoprazole, 40 mg, Oral, Daily  potassium chloride, 20 mEq, Oral, BID With Meals  pregabalin, 25 mg, Oral, Q12H  rOPINIRole, 1 mg, Oral, Nightly  spironolactone, 50 mg, Oral, BID  torsemide, 40 mg, Oral, Daily      IV Meds:        Results Reviewed:   I have personally reviewed the results from the time of this admission to 2025 17:59 EDT     Results from last 7 days   Lab Units 25  0648 25  0557 25  0500   SODIUM mmol/L 138 141 141   POTASSIUM mmol/L 4.4 4.4 4.4   CHLORIDE mmol/L 103 104 104   CO2 mmol/L 27.5 28.0 29.0    BUN mg/dL 15.0 13.0 14.0   CREATININE mg/dL 1.10* 0.99 1.09*   CALCIUM mg/dL 9.0 8.7 9.0   BILIRUBIN mg/dL 1.7* 1.6* 1.8*   ALK PHOS U/L 231* 224* 231*   ALT (SGPT) U/L 18 18 18   AST (SGOT) U/L 44* 59* 87*   GLUCOSE mg/dL 80 127* 127*     Estimated Creatinine Clearance: 46.6 mL/min (A) (by C-G formula based on SCr of 1.1 mg/dL (H)).  Results from last 7 days   Lab Units 07/07/25  0648 07/06/25  0557 07/05/25  0500   MAGNESIUM mg/dL  --   --  2.3   PHOSPHORUS mg/dL 3.5 3.3 2.8         Results from last 7 days   Lab Units 07/07/25  0648 07/06/25  0557 07/05/25  0500 07/04/25  0647 07/03/25  1837   WBC 10*3/mm3 9.49 7.37 6.96 6.52 6.82   HEMOGLOBIN g/dL 8.9* 8.5* 8.7* 8.3* 10.0*   PLATELETS 10*3/mm3 162 142 148 137* 168           Assessment / Plan     ASSESSMENT:  CKD stage 3A - Cr fluctuates low to mid 1's in relation to cirrhosis and diuretic use, creatinine peaked 1.3, down to 1.1 and stable  Vol overload - periph > central in relation to cirrhosis.  Much improved on IV lasix & aldactone.  She will need a higher dose of torsemide when able to transition to PO loop diuretic   ETOH Cirrhosis  Acute on chronic normocytic anemia with iron def anemia - hgb is 8.9.  S/P recent endoscopy with plan for capsule  Recent L2 compression fracture managed via brace she stopped wearing  HTN  with chronic kidney disease adjusting regimen  Right shoulder arthroplasty on 6/5/2025, as per primary team     PLAN:    Transition IV Lasix 40 BID to PO torsemide 40mg daily (20mg at home)  Continue spironolactone 50mg BID  Monitor diuretic toxicity  Surveillance labs    Thank you for allowing us to participate for this patient care      Hardeep Mendes MD  07/07/25  17:59 EDT    [facility]  [phone]

## 2025-07-07 NOTE — PROGRESS NOTES
Name: Filomena Liu ADMIT: 7/3/2025   : 1960  PCP: Fernando Dunn MD    MRN: [id number] LOS: 1 days   AGE/SEX: 64 y.o. female  ROOM: [facility]     Subjective   Subjective   Patient is lying on the bed and does not appear to be any major distress.  Denies nausea, vomiting abdominal pain, chest pain, shortness of breath.       Objective   Objective   Vital Signs  Temp:  [97.9 °F (36.6 °C)-99 °F (37.2 °C)] 97.9 °F (36.6 °C)  Heart Rate:  [69-87] 86  Resp:  [18-22] 18  BP: (101-111)/(52-74) 111/72  SpO2:  [93 %-100 %] 96 %  on  Flow (L/min) (Oxygen Therapy):  [2] 2;   Device (Oxygen Therapy): nasal cannula  Body mass index is 32.03 kg/m².  Physical Exam  HEENT: PERRLA, extraocular movements intact, Scleras no icterus  Neck: Supple, no JVD  Cardiovascular: Regular rate and rhythm with normal S1 and S2  GI: Soft, nontender, bowel sounds are present  Extremities: Positive for edema 2+, pedal pulse are palpable  Neurologic: Grossly nonfocal, no facial asymmetry    Results Review     I reviewed the patient's new clinical results.  Results from last 7 days   Lab Units 25  0648 25  0557 25  0500 25  0647   WBC 10*3/mm3 9.49 7.37 6.96 6.52   HEMOGLOBIN g/dL 8.9* 8.5* 8.7* 8.3*   PLATELETS 10*3/mm3 162 142 148 137*     Results from last 7 days   Lab Units 25  0648 25  0557 25  0500 25  2213 25  0647   SODIUM mmol/L 138 141 141  --  142   POTASSIUM mmol/L 4.4 4.4 4.4 4.1 3.5   CHLORIDE mmol/L 103 104 104  --  101   CO2 mmol/L 27.5 28.0 29.0  --  32.0*   BUN mg/dL 15.0 13.0 14.0  --  15.0   CREATININE mg/dL 1.10* 0.99 1.09*  --  1.15*   GLUCOSE mg/dL 80 127* 127*  --  95   EGFR mL/min/1.73 56.2* 63.8 56.8*  --  53.3*     Results from last 7 days   Lab Units 25  0648 25  0557 25  0500 25  1837   ALBUMIN g/dL 2.8* 2.7* 2.9* 3.4*   BILIRUBIN mg/dL 1.7* 1.6* 1.8* 2.6*   ALK PHOS U/L 231* 224* 231* 233*   AST (SGOT) U/L 44* 59* 87* 175*   ALT  "(SGPT) U/L 18 18 18 25     Results from last 7 days   Lab Units 07/07/25  0648 07/06/25  0557 07/05/25  0500 07/04/25  0647 07/03/25  1837   CALCIUM mg/dL 9.0 8.7 9.0 8.8 9.5   ALBUMIN g/dL 2.8* 2.7* 2.9*  --  3.4*   MAGNESIUM mg/dL  --   --  2.3  --   --    PHOSPHORUS mg/dL 3.5 3.3 2.8  --   --        No results found for: \"HGBA1C\", \"POCGLU\"    No radiology results for the last day      I have personally reviewed all medications:  Scheduled Medications  atenolol, 25 mg, Oral, Nightly  cholecalciferol, 1,000 Units, Oral, Weekly  docusate sodium, 100 mg, Oral, BID  fluticasone, 1 spray, Nasal, Daily  furosemide, 40 mg, Intravenous, BID  ipratropium-albuterol, 3 mL, Nebulization, 4x Daily - RT  melatonin, 2.5 mg, Oral, Nightly  nicotine, 1 patch, Transdermal, Q24H  pantoprazole, 40 mg, Oral, Daily  potassium chloride, 20 mEq, Oral, BID With Meals  pregabalin, 25 mg, Oral, Q12H  rOPINIRole, 1 mg, Oral, Nightly  spironolactone, 50 mg, Oral, BID    Infusions   Diet  Diet: Cardiac, Fluid Restriction (240 mL/tray); Healthy Heart (2-3 Na+); 1500 mL/day; Fluid Consistency: Thin (IDDSI 0)    I have personally reviewed:  [x]  Laboratory   [x]  Microbiology   [x]  Radiology   [x]  EKG/Telemetry  [x]  Cardiology/Vascular   []  Pathology    []  Records       Assessment/Plan     Active Hospital Problems    Diagnosis  POA    **Bilateral leg edema [R60.0]  Yes    Bilateral lower extremity edema [R60.0]  Yes      Resolved Hospital Problems   No resolved problems to display.       64 y.o. female admitted with Bilateral leg edema.    1. Bilateral lower extremity edema/volume overload/liver cirrhosis, was diuresed with IV Lasix and currently off and will continue with p.o. Aldactone.  Nephrology is following along as well.    2.  CKD 3 A, creatinine appears to be close to baseline and some fluctuation is expected while being diuresed.  Today creatinine noted to be 1.10.    3.  Ethanol abuse, currently does not drink.    4.  GERD, on " acid suppressive therapy.    5.  History of COPD, currently not in any exacerbation and not requiring any oxygen.  Continue with DuoNebs.    6.  L2 fracture, patient  is supposed to be on a TLSO brace but has been noncompliant and states it does not fit her well.    7. Anemia, likely anemia of chronic disease however iron panel will be checked.  Hemoglobin noted to be 8.9.    8.  History of thrombocytopenia, likely secondary to underlying liver disease and currently platelet count is  162,000.    9.  Hypertension, on atenolol/Aldactone.    10.  Right shoulder arthroplasty on 6/5/2025, reportedly felt a pop last evening and now unable to move her right upper extremity.  Will consult orthopedics.    11.  Disposition, hopefully home tomorrow.    Copied text on this note has been reviewed by me on 7/7/2028      Rikki Jason MD  [facility]  07/07/25  13:33 EDT

## 2025-07-07 NOTE — PAYOR COMM NOTE
"Filomena Liu P \"PAT\" (64 y.o. Female)     ATTN: NURSE REVIEWER  RE: INITIAL INPT AUTH CLINICALS (PT CAME IN AS OBSERVATION ON 7/3 & CHANGED TO INPATIENT ON 7/6/25)  REF: [id number]  PLS REPLY TO WILBERTO GREER [phone] OR FAX# [phone]      Date of Birth   1960    Social Security Number       Address   [address] [place] [address]    Home Phone   [phone]    MRN   [id number]       Restoration   Rastafari    Marital Status                               Admission Date   7/3/2025    Admission Type   Emergency    Admitting Provider   Belen Choudhury MD    Attending Provider   Rikki Jason MD    Department, Room/Bed   [address], [address]/1       Discharge Date       Discharge Disposition       Discharge Destination                                 Attending Provider: Rikki Jason MD    Allergies: Wound Dressing Adhesive    Isolation: None   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)   Code Status: CPR    Ht: 152.4 cm (60\")   Wt: 76.2 kg (167 lb 15.9 oz)    Admission Cmt: None   Principal Problem: Bilateral leg edema [R60.0]                   Active Insurance as of 7/3/2025       Primary Coverage       Payor Plan Insurance Group Employer/Plan Group    ANTHEM MEDICARE REPLACEMENT ANTHEM MEDICARE ADVANTAGE HMO [id number]       Payor Plan Address Payor Plan Phone Number Payor Plan Fax Number Effective Dates    [address] [phone]  1/1/2024 - None Entered    [facility] [id number]         Subscriber Name Subscriber Birth Date Member ID       FILOMENA LIU P 1960 [id number]                     Emergency Contacts        (Rel.) Home Phone Work Phone Mobile Phone    MARTINEZ STEIN (Significant Other) [phone] -- [phone]    Kermit Nam (Friend) -- -- [phone]                 History & Physical        Belen Choudhury MD at 07/03/25 2223          HISTORY AND PHYSICAL   [facility] " [place]        Date of Admission: 7/3/2025  Patient Identification:  Name: Filomena Liu  Age: 64 y.o.  Sex: female  :  1960  MRN: [id number]                     Primary Care Physician: Fernando Dunn MD    Chief Complaint:  64 year old female presented to the emergency room with leg and abdominal swelling; she has also had back pain and some weeping from her legs; no fever or chills;     History of Present Illness:   As above    Past Medical History:  Past Medical History:   Diagnosis Date    Abdominal aneurysm     DR. JOYCE FOLLOWING 3.5CM    Alcoholism 1985    Anemia 24    HAS HAD IRON INFUSIONS ALMOST YEARLY    Anxiety     Arthritis of back     Asthma 2015    At high risk for falls     Basal cell carcinoma     Bruises easily     Chronic kidney disease 24    Cirrhosis 24    COPD (chronic obstructive pulmonary disease)     MANAGED BY PRIMARY CARE    CTS (carpal tunnel syndrome) Surgery     DDD (degenerative disc disease), cervical     Depression     Diverticulitis of colon Unknown    Fatty liver     GERD (gastroesophageal reflux disease)     GI (gastrointestinal bleed) 24    History of anemia     History of MRSA infection     LEFT ANKLE TREAT [facility]  5YEARS GO    Hyperlipidemia ?    Hypertension ?    Low back strain Unknown    Lower leg edema     Lumbosacral disc disease     Neck strain     Neuropathy     Pancreatitis     Panic attacks     Scoliosis Unknown    Shoulder arthritis 2023    Shoulder pain, left 2023    Sleep apnea     NO MACHINE USE    Spinal stenosis     Spondylolisthesis of cervical region     Steatosis of liver     Tachycardia     Tendinitis of knee     Thoracic disc disorder     Vertigo      Past Surgical History:  Past Surgical History:   Procedure Laterality Date    BACK SURGERY      cervical disc surgery with fusion-    CARPAL TUNNEL RELEASE Bilateral     CATARACT EXTRACTION      CHOLECYSTECTOMY  20    COLONOSCOPY       COLONOSCOPY N/A 11/12/2020    Procedure: COLONOSCOPY, polypectomy;  Surgeon: Filomena Mckeon MD;  Location: [facility] OR;  Service: Gastroenterology;  Laterality: N/A;  Diverticulosis; Hepatic flexure polyp x 1; Polyp at 60cm x 1; Polyp at 50cm x 1- hot snare;    COLONOSCOPY N/A 04/15/2024    Procedure: COLONOSCOPY into sigmoid colon with hot snare polypectomy;  Surgeon: Leatha Johns MD;  Location: [facility] ENDOSCOPY;  Service: General;  Laterality: N/A;  pre- history of polyps  post- diverticulosis, polyp    COLONOSCOPY N/A 05/07/2025    Procedure: COLONOSCOPY to cecum and ti with hot snare polypectomies;  Surgeon: Ana Guerrero MD;  Location: [facility] ENDOSCOPY;  Service: Gastroenterology;  Laterality: N/A;  PRE: IRON DEFICIENCY ANEMIA  POST: diverticulosis, polyps, hemorrhoids    ENDOSCOPY N/A 08/15/2024    Procedure: ESOPHAGOGASTRODUODENOSCOPY;  Surgeon: Garth Constantino MD;  Location: [facility] ENDOSCOPY;  Service: Gastroenterology;  Laterality: N/A;  PRE- CIRRHOSIS, DARK STOOL  POST- NORMAL    ENDOSCOPY N/A 05/07/2025    Procedure: ESOPHAGOGASTRODUODENOSCOPY;  Surgeon: Ana Guerrero MD;  Location: [facility] ENDOSCOPY;  Service: Gastroenterology;  Laterality: N/A;  PRE: CIRRHOSIS  POST:portal hypertensive gastropathy; esophagitis; hiatal hernia; varices    HYSTERECTOMY  1998    INCISION AND DRAINAGE LEG Left 11/17/2018    Procedure: Incision and Drainage of Left ankle;  Surgeon: Maxwell Lanier MD;  Location: [facility] OR;  Service: Orthopedics    NECK SURGERY  2000, 2005,2017    SIGMOIDOSCOPY N/A 03/28/2024    Procedure: SIGMOIDOSCOPY FLEXIBLE;  Surgeon: Leatha Johns MD;  Location: [facility] ENDOSCOPY;  Service: General;  Laterality: N/A;  pre: h/o colon polyps  post: poor prep, diverticulosis    SKIN BIOPSY      SKIN GRAFT SPLIT THICKNESS N/A 02/12/2019    Procedure: debridement SKIN GRAFT SPLIT THICKNESS left ankle wound;  Surgeon: Barry Worthy MD;  Location: [facility] OR [facility];   Service: Plastics    TONGUE LESION EXCISION/BIOPSY N/A 11/26/2024    Procedure: WIDE LOCAL EXCISION OF TONGUE LESION;  Surgeon: El Mercedes MD;  [place] [facility] [place];  Service: ENT;  Laterality: N/A;    TOTAL SHOULDER ARTHROPLASTY Left 07/20/2023    Procedure: Total  shoulder arthroplasty;  Surgeon: Maxwell Lanier MD;  Location: [facility] OR [facility];  Service: Orthopedics;  Laterality: Left;    TOTAL SHOULDER ARTHROPLASTY W/ DISTAL CLAVICLE EXCISION Right 6/5/2025    Procedure: Right Reverse Total Shoulder Arthroplasty;  Surgeon: Maxwell Lanier MD;  Location: [facility] OR [facility];  Service: Orthopedics;  Laterality: Right;    TOTAL THYMECTOMY      TRIGGER POINT INJECTION  [id number]    UPPER GASTROINTESTINAL ENDOSCOPY  8/10/24    WRIST FRACTURE SURGERY      WRIST FRACTURE SURGERY Left       Home Meds:  Medications Prior to Admission   Medication Sig Dispense Refill Last Dose/Taking    albuterol (PROVENTIL HFA;VENTOLIN HFA) 108 (90 Base) MCG/ACT inhaler Inhale 2 puffs 3 times a day. Indications: Spasm of Lung Air Passages       atenolol (TENORMIN) 25 MG tablet Take 1 tablet by mouth Every Night.       cephalexin (KEFLEX) 500 MG capsule Take 1 capsule by mouth 4 (Four) Times a Day for 7 days. 3 capsule 0     Cholecalciferol 25 MCG (1000 UT) tablet Take 1 tablet by mouth 1 (One) Time Per Week. HOLDING AS OF 5-23-25 FOR HIGH VIT D LEVELS  Indications: Vitamin D Deficiency       cyclobenzaprine (FLEXERIL) 5 MG tablet Take 1 tablet by mouth 3 (Three) Times a Day As Needed for Muscle Spasms. 30 tablet 0     docusate sodium (COLACE) 100 MG capsule Take 1 capsule by mouth 2 (Two) Times a Day. 60 capsule 0     fluticasone (FLONASE) 50 MCG/ACT nasal spray Administer 1 spray into the nostril(s) as directed by provider Daily. Indications: Stuffy Nose       ipratropium-albuterol (DUO-NEB) 0.5-2.5 mg/3 ml nebulizer Take 3 mL by nebulization 4 (Four) Times a Day. Indications: Chronic Obstructive Lung Disease        oxyCODONE-acetaminophen (PERCOCET)  MG per tablet Take 1 tablet by mouth Every 4 (Four) Hours As Needed for Moderate Pain for up to 4 days. 8 tablet 0     oxyCODONE-acetaminophen (Percocet)  MG per tablet Take 1 tablet by mouth Every 6 (Six) Hours As Needed for Moderate Pain. 50 tablet 0     oxyCODONE-acetaminophen (Percocet)  MG per tablet Take 1 tablet by mouth Every 6 (Six) Hours As Needed for Moderate Pain. 50 tablet 0     pantoprazole (PROTONIX) 40 MG EC tablet Take 1 tablet by mouth Daily for 60 days. 30 tablet 1     pregabalin (LYRICA) 25 MG capsule Take 1 capsule by mouth Every 12 (Twelve) Hours.       rOPINIRole (REQUIP) 1 MG tablet Take 1 tablet by mouth every night at bedtime. Indications: Restless Leg Syndrome       spironolactone (ALDACTONE) 25 MG tablet Take 1 tablet by mouth 2 (Two) Times a Day. Indications: High Blood Pressure 30 tablet 0     torsemide (DEMADEX) 20 MG tablet Take 1 tablet by mouth Daily. Indications: Cardiac Failure, Edema, High Blood Pressure 30 tablet 0        Allergies:  Allergies   Allergen Reactions    Wound Dressing Adhesive Other (See Comments)     SKIN TEARS AND RASH- PAPER TAPE OK     Immunizations:  Immunization History   Administered Date(s) Administered    COVID-19 (PFIZER) Purple Cap Monovalent 04/07/2021, 04/28/2021     Social History:   Social History     Social History Narrative    Not on file     Social History     Socioeconomic History    Marital status:    Tobacco Use    Smoking status: Never     Passive exposure: Current    Smokeless tobacco: Never    Tobacco comments:     PATIENT STATES SHE IS CURRENTLY TRYING TO QUIT. STATES SHE IS SMOKING APPROX. 4-8 CIGARETTES PER DAY AS OF 5-23-25   Vaping Use    Vaping status: Never Used   Substance and Sexual Activity    Alcohol use: Not Currently     Alcohol/week: 10.0 standard drinks of alcohol     Comment: PATIENT STATES SHE IS 80 DAYS SOBER AS OF 5-23-25    Drug use: No    Sexual activity: Yes  "    Partners: Male     Birth control/protection: Post-menopausal, Tubal ligation, Hysterectomy, Surgical       Family History:  Family History   Problem Relation Age of Onset    Emphysema Mother     Alzheimer's disease Father     Cancer Father     Osteoporosis Sister     Scoliosis Sister     Malig Hyperthermia Neg Hx         Review of Systems  See history of present illness and past medical history.  Patient denies headache, dizziness, syncope, falls, trauma, change in vision, change in hearing, change in taste, changes in weight, changes in appetite, focal weakness, numbness, or paresthesia.  Patient denies chest pain, palpitations, dyspnea, orthopnea, PND, cough, sinus pressure, rhinorrhea, epistaxis, hemoptysis, nausea, vomiting,hematemesis, diarrhea, constipation or hematochezia.  Denies cold or heat intolerance, polydipsia, polyuria, polyphagia. Denies hematuria, pyuria, dysuria, hesitancy, frequency or urgency. Denies consumption of raw and under cooked meats foods or change in water source.  Denies fever, chills, sweats, night sweats.  Denies missing any routine medications. Remainder of ROS is negative.    Objective:  T Max 24 hrs: Temp (24hrs), Av.4 °F (36.9 °C), Min:98.1 °F (36.7 °C), Max:98.6 °F (37 °C)    Vitals Ranges:   Temp:  [98.1 °F (36.7 °C)-98.6 °F (37 °C)] 98.1 °F (36.7 °C)  Heart Rate:  [] 96  Resp:  [20] 20  BP: (142-150)/(78-82) 142/82      Exam:  /82 (BP Location: Right arm, Patient Position: Lying)   Pulse 96   Temp 98.1 °F (36.7 °C) (Oral)   Resp 20   Ht 152.4 cm (60\")   Wt 73.9 kg (162 lb 14.7 oz)   SpO2 97%   BMI 31.82 kg/m²     General Appearance:    Alert, cooperative, no distress, appears stated age   Head:    Normocephalic, without obvious abnormality, atraumatic   Eyes:    PERRL, conjunctivae/corneas clear, EOM's intact, both eyes   Ears:    Normal external ear canals, both ears   Nose:   Nares normal, septum midline, mucosa normal, no drainage    or sinus " tenderness   Throat:   Lips, mucosa, and tongue normal   Neck:   Supple, symmetrical, trachea midline, no adenopathy;     thyroid:  no enlargement/tenderness/nodules; no carotid    bruit or JVD   Back:     Symmetric, no curvature, ROM normal, no CVA tenderness   Lungs:     Clear to auscultation bilaterally, respirations unlabored   Chest Wall:    No tenderness or deformity    Heart:    Regular rate and rhythm, S1 and S2 normal, no murmur, rub   or gallop   Abdomen:     Soft, distended, bowel sounds active all four quadrants,         Extremities:   Extremities normal, atraumatic,  2plus edema                       .    Data Review:  Labs in chart were reviewed.  WBC   Date Value Ref Range Status   07/03/2025 6.82 3.40 - 10.80 10*3/mm3 Final     Hemoglobin   Date Value Ref Range Status   07/03/2025 10.0 (L) 12.0 - 15.9 g/dL Final     Hematocrit   Date Value Ref Range Status   07/03/2025 31.5 (L) 34.0 - 46.6 % Final     Platelets   Date Value Ref Range Status   07/03/2025 168 140 - 450 10*3/mm3 Final     Sodium   Date Value Ref Range Status   07/03/2025 143 136 - 145 mmol/L Final     Potassium   Date Value Ref Range Status   07/03/2025 3.3 (L) 3.5 - 5.2 mmol/L Final     Chloride   Date Value Ref Range Status   07/03/2025 101 98 - 107 mmol/L Final     CO2   Date Value Ref Range Status   07/03/2025 30.8 (H) 22.0 - 29.0 mmol/L Final     BUN   Date Value Ref Range Status   07/03/2025 17.0 8.0 - 23.0 mg/dL Final     Creatinine   Date Value Ref Range Status   07/03/2025 1.33 (H) 0.57 - 1.00 mg/dL Final     Glucose   Date Value Ref Range Status   07/03/2025 118 (H) 65 - 99 mg/dL Final     Calcium   Date Value Ref Range Status   07/03/2025 9.5 8.6 - 10.5 mg/dL Final     AST (SGOT)   Date Value Ref Range Status   07/03/2025 175 (H) 1 - 32 U/L Final     ALT (SGPT)   Date Value Ref Range Status   07/03/2025 25 1 - 33 U/L Final     Alkaline Phosphatase   Date Value Ref Range Status   07/03/2025 233 (H) 39 - 117 U/L Final                 Imaging Results (All)       Procedure Component Value Units Date/Time    CT Abdomen Pelvis Without Contrast - In process [[id number]] Resulted: 07/03/25 2211     Updated: 07/03/25 2031    This result has not been signed. Information might be incomplete.      XR Chest 2 View [[id number]] Collected: 07/03/25 1941     Updated: 07/03/25 1945    Narrative:      TWO VIEWS OF THE CHEST        HISTORY: swelling     COMPARISON: 6/20/2025     TECHNIQUE: PA and lateral views were performed of the chest.     FINDINGS:     Heart size is within normal limits, unchanged.     There is mild pulmonary vascular congestion, but there is no  consolidation, effusion or pneumothorax.     There is no acute bony abnormality.       Impression:         Negative chest, no acute abnormality.     This report was finalized on 7/3/2025 7:42 PM by Dr. Richard Chambers M.D  on Workstation: [id number]                 Assessment:  Active Hospital Problems    Diagnosis  POA    **Bilateral leg edema [R60.0]  Yes      Resolved Hospital Problems   No resolved problems to display.   Copd  Cirrhosis  Back pain  Alcohol dependence  Anemia  ckd3    Plan:  Ask nephrology to see her again  Diurese  Ask neurosurgery to see her  She says the brace she has doesn't fit  Trend labs  Monitor on telemetry  Ct abd noted  Dw patient, family  Patient is full code      Belen Choudhury MD  7/3/2025  22:23 EDT      Electronically signed by Belen Choudhury MD at 07/03/25 2236       Facility-Administered Medications as of 7/6/2025   Medication Dose Route Frequency Provider Last Rate Last Admin    acetaminophen (TYLENOL) tablet 650 mg  650 mg Oral Q4H PRN Belen Choudhury MD        Or    acetaminophen (TYLENOL) 160 MG/5ML oral solution 650 mg  650 mg Oral Q4H PRN Belen Choudhury MD        Or    acetaminophen (TYLENOL) suppository 325 mg  325 mg Rectal Q4H PRN Belen Choudhury MD        albuterol (PROVENTIL) nebulizer solution 0.083% 2.5  mg/3mL  2.5 mg Nebulization Q6H PRN Belen Choudhury MD   2.5 mg at 07/06/25 0537    atenolol (TENORMIN) tablet 25 mg  25 mg Oral Nightly Belen Choudhury MD   25 mg at 07/06/25 2035    sennosides-docusate (PERICOLACE) 8.6-50 MG per tablet 2 tablet  2 tablet Oral BID PRN Belen Choudhury MD   2 tablet at 07/04/25 0904    And    polyethylene glycol (MIRALAX) packet 17 g  17 g Oral Daily PRN Belen Choudhury MD        And    bisacodyl (DULCOLAX) EC tablet 5 mg  5 mg Oral Daily PRN Belen Choudhury MD        And    bisacodyl (DULCOLAX) suppository 10 mg  10 mg Rectal Daily PRN Belen Choudhury MD        Calcium Replacement - Follow Nurse / BPA Driven Protocol   Not Applicable PRN Belen Choudhury MD        [COMPLETED] cephalexin (KEFLEX) capsule 500 mg  500 mg Oral 4x Daily Belen Choudhury MD   500 mg at 07/04/25 1850    cholecalciferol (VITAMIN D3) tablet 1,000 Units  1,000 Units Oral Weekly Belen Choudhury MD   1,000 Units at 07/04/25 0020    cyclobenzaprine (FLEXERIL) tablet 5 mg  5 mg Oral TID PRN Belen Choudhury MD   5 mg at 07/04/25 0237    docusate sodium (COLACE) capsule 100 mg  100 mg Oral BID Belen Choudhury MD   100 mg at 07/06/25 2035    fluticasone (FLONASE) 50 MCG/ACT nasal spray 1 spray  1 spray Nasal Daily Belen Choudhury MD   1 spray at 07/06/25 0935    furosemide (LASIX) injection 40 mg  40 mg Intravenous BID Belen Choudhury MD   40 mg at 07/06/25 2035    [COMPLETED] HYDROmorphone (DILAUDID) injection 0.5 mg  0.5 mg Intravenous Once Woodrow Cardenas MD   0.5 mg at 07/03/25 1955    ipratropium-albuterol (DUO-NEB) nebulizer solution 3 mL  3 mL Nebulization 4x Daily - RT Belen Choudhury MD   3 mL at 07/06/25 2006    Magnesium Standard Dose Replacement - Follow Nurse / BPA Driven Protocol   Not Applicable PRN Belen Choudhury MD        melatonin tablet 2.5 mg  2.5 mg Oral Nightly Belen Choudhury MD    2.5 mg at 25    nicotine (NICODERM CQ) 21 MG/24HR patch 1 patch  1 patch Transdermal Q24H Rikki Jason MD   1 patch at 25 0943    nitroglycerin (NITROSTAT) SL tablet 0.4 mg  0.4 mg Sublingual Q5 Min PRN Belen Choudhury MD        ondansetron ODT (ZOFRAN-ODT) disintegrating tablet 4 mg  4 mg Oral Q6H PRN Belen Choudhury MD        Or    ondansetron (ZOFRAN) injection 4 mg  4 mg Intravenous Q6H PRN Belen Choudhury MD        [] oxyCODONE-acetaminophen (PERCOCET)  MG per tablet 1 tablet  1 tablet Oral Q4H PRN Belen Choudhury MD   1 tablet at 25 1850    [COMPLETED] oxyCODONE-acetaminophen (PERCOCET)  MG per tablet 1 tablet  1 tablet Oral Q4H PRN Russel Rojo MD   1 tablet at 25 1011    oxyCODONE-acetaminophen (PERCOCET)  MG per tablet 1 tablet  1 tablet Oral Q4H PRN Rikki Jason MD   1 tablet at 25 1811    pantoprazole (PROTONIX) EC tablet 40 mg  40 mg Oral Daily Belen Choudhury MD   40 mg at 25 0934    Phosphorus Replacement - Follow Nurse / BPA Driven Protocol   Not Applicable Belen Chau MD        potassium chloride (KLOR-CON M20) CR tablet 20 mEq  20 mEq Oral BID With Meals Fernando Baker MD   20 mEq at 25 1811    [] potassium chloride (KLOR-CON M20) CR tablet 40 mEq  40 mEq Oral TID With Meals Fernando Baker MD   40 mEq at 25 1152    [COMPLETED] potassium chloride (KLOR-CON M20) CR tablet 40 mEq  40 mEq Oral Q4H Rikki Jason MD   40 mEq at 25 1850    Potassium Replacement - Follow Nurse / BPA Driven Protocol   Not Applicable PRBelen Torres MD        pregabalin (LYRICA) capsule 25 mg  25 mg Oral Q12H Stingl, Belen Cuba MD   25 mg at 25    rOPINIRole (REQUIP) tablet 1 mg  1 mg Oral Nightly Stingl, Belen Cuba MD   1 mg at 25    spironolactone (ALDACTONE) tablet 50 mg  50 mg Oral BID  Fernando Baker MD   50 mg at 07/06/25 2035     Lab Results (last 24 hours)       Procedure Component Value Units Date/Time    CBC & Differential [[id number]]  (Abnormal) Collected: 07/06/25 0557    Specimen: Blood Updated: 07/06/25 0713    Narrative:      The following orders were created for panel order CBC & Differential.  Procedure                               Abnormality         Status                     ---------                               -----------         ------                     CBC Auto Differential[[id number]]        Abnormal            Final result                 Please view results for these tests on the individual orders.    CBC Auto Differential [[id number]]  (Abnormal) Collected: 07/06/25 0557    Specimen: Blood Updated: 07/06/25 0713     WBC 7.37 10*3/mm3      RBC 3.07 10*6/mm3      Hemoglobin 8.5 g/dL      Hematocrit 27.8 %      MCV 90.6 fL      MCH 27.7 pg      MCHC 30.6 g/dL      RDW 13.9 %      RDW-SD 46.0 fl      MPV 11.0 fL      Platelets 142 10*3/mm3      Neutrophil % 54.5 %      Lymphocyte % 27.1 %      Monocyte % 13.3 %      Eosinophil % 3.9 %      Basophil % 0.4 %      Immature Grans % 0.8 %      Neutrophils, Absolute 4.01 10*3/mm3      Lymphocytes, Absolute 2.00 10*3/mm3      Monocytes, Absolute 0.98 10*3/mm3      Eosinophils, Absolute 0.29 10*3/mm3      Basophils, Absolute 0.03 10*3/mm3      Immature Grans, Absolute 0.06 10*3/mm3     Comprehensive Metabolic Panel [[id number]]  (Abnormal) Collected: 07/06/25 0557    Specimen: Blood Updated: 07/06/25 0658     Glucose 127 mg/dL      BUN 13.0 mg/dL      Creatinine 0.99 mg/dL      Sodium 141 mmol/L      Potassium 4.4 mmol/L      Chloride 104 mmol/L      CO2 28.0 mmol/L      Calcium 8.7 mg/dL      Total Protein 5.7 g/dL      Albumin 2.7 g/dL      ALT (SGPT) 18 U/L      AST (SGOT) 59 U/L      Alkaline Phosphatase 224 U/L      Total Bilirubin 1.6 mg/dL      Globulin 3.0 gm/dL      A/G Ratio 0.9 g/dL      BUN/Creatinine Ratio 13.1      Anion Gap 9.0 mmol/L      eGFR 63.8 mL/min/1.73     Narrative:      GFR Categories in Chronic Kidney Disease (CKD)              GFR Category          GFR (mL/min/1.73)    Interpretation  G1                    90 or greater        Normal or high (1)  G2                    60-89                Mild decrease (1)  G3a                   45-59                Mild to moderate decrease  G3b                   30-44                Moderate to severe decrease  G4                    15-29                Severe decrease  G5                    14 or less           Kidney failure    (1)In the absence of evidence of kidney disease, neither GFR category G1 or G2 fulfill the criteria for CKD.    eGFR calculation 2021 CKD-EPI creatinine equation, which does not include race as a factor    Phosphorus [[id number]]  (Normal) Collected: 07/06/25 0557    Specimen: Blood Updated: 07/06/25 0658     Phosphorus 3.3 mg/dL           Imaging Results (Last 24 Hours)       ** No results found for the last 24 hours. **          ECG/EMG Results (last 24 hours)       ** No results found for the last 24 hours. **          Orders (last 24 hrs)        Start     Ordered    07/06/25 1917  Inpatient Admission  Once         07/06/25 1917 07/06/25 0640  Manual Differential  Once,   Status:  Canceled         07/06/25 0639    07/06/25 0600  CBC Auto Differential  PROCEDURE ONCE         07/05/25 2201 07/06/25 0600  Phosphorus  PROCEDURE ONCE         07/05/25 2201 07/05/25 1630  nicotine (NICODERM CQ) 21 MG/24HR patch 1 patch  Every 24 Hours Scheduled         07/05/25 1536    07/05/25 1541  oxyCODONE-acetaminophen (PERCOCET)  MG per tablet 1 tablet  Every 4 Hours PRN         07/05/25 1541    07/05/25 0800  potassium chloride (KLOR-CON M20) CR tablet 20 mEq  2 Times Daily With Meals         07/04/25 1128    07/05/25 0600  CBC & Differential  Daily       07/04/25 1128    07/05/25 0600  Renal Function Panel  Daily       07/04/25 1128    07/05/25  0600  CBC & Differential  Daily       07/04/25 1514    07/05/25 0600  Comprehensive Metabolic Panel  Daily       07/04/25 1514    07/04/25 2100  spironolactone (ALDACTONE) tablet 50 mg  2 Times Daily         07/04/25 1126    07/04/25 0900  fluticasone (FLONASE) 50 MCG/ACT nasal spray 1 spray  Daily         07/03/25 2235 07/04/25 0900  pantoprazole (PROTONIX) EC tablet 40 mg  Daily         07/03/25 2235 07/04/25 0800  Oral Care  2 Times Daily       07/03/25 1926    07/03/25 2330  atenolol (TENORMIN) tablet 25 mg  Nightly         07/03/25 2235 07/03/25 2330  cholecalciferol (VITAMIN D3) tablet 1,000 Units  Weekly         07/03/25 2235 07/03/25 2330  docusate sodium (COLACE) capsule 100 mg  2 Times Daily         07/03/25 2235 07/03/25 2330  ipratropium-albuterol (DUO-NEB) nebulizer solution 3 mL  4 Times Daily - RT         07/03/25 2235 07/03/25 2330  pregabalin (LYRICA) capsule 25 mg  Every 12 Hours Scheduled         07/03/25 2235 07/03/25 2330  rOPINIRole (REQUIP) tablet 1 mg  Nightly         07/03/25 2235 07/03/25 2235  furosemide (LASIX) injection 40 mg  2 Times Daily         07/03/25 2233 07/03/25 2234  cyclobenzaprine (FLEXERIL) tablet 5 mg  3 Times Daily PRN         07/03/25 2235 07/03/25 2233  albuterol (PROVENTIL) nebulizer solution 0.083% 2.5 mg/3mL  Every 6 Hours PRN         07/03/25 2235 07/03/25 2232  Potassium Replacement - Follow Nurse / BPA Driven Protocol  As Needed         07/03/25 2233 07/03/25 2232  Magnesium Standard Dose Replacement - Follow Nurse / BPA Driven Protocol  As Needed         07/03/25 2233 07/03/25 2232  Phosphorus Replacement - Follow Nurse / BPA Driven Protocol  As Needed         07/03/25 2233 07/03/25 2232  Calcium Replacement - Follow Nurse / BPA Driven Protocol  As Needed         07/03/25 2233    07/03/25 2100  melatonin tablet 2.5 mg  Nightly         07/03/25 1926    07/03/25 2000  Vital Signs  Every 4 Hours      Comments: Per per  "hospital policy    25 192  Daily Weights  Daily       25 192  sennosides-docusate (PERICOLACE) 8.6-50 MG per tablet 2 tablet  2 Times Daily PRN        Placed in \"And\" Linked Group    25 192  polyethylene glycol (MIRALAX) packet 17 g  Daily PRN        Placed in \"And\" Linked Group    25 192  bisacodyl (DULCOLAX) EC tablet 5 mg  Daily PRN        Placed in \"And\" Linked Group    256    25 192  bisacodyl (DULCOLAX) suppository 10 mg  Daily PRN        Placed in \"And\" Linked Group    25 192  Strict Intake & Output  Every Shift       25 192  acetaminophen (TYLENOL) tablet 650 mg  Every 4 Hours PRN        Placed in \"Or\" Linked Group    256    25 192  acetaminophen (TYLENOL) 160 MG/5ML oral solution 650 mg  Every 4 Hours PRN        Placed in \"Or\" Linked Group    25 192  acetaminophen (TYLENOL) suppository 325 mg  Every 4 Hours PRN        Placed in \"Or\" Linked Group    25 192  ondansetron ODT (ZOFRAN-ODT) disintegrating tablet 4 mg  Every 6 Hours PRN        Placed in \"Or\" Linked Group    25 192  ondansetron (ZOFRAN) injection 4 mg  Every 6 Hours PRN        Placed in \"Or\" Linked Group    25 192  nitroglycerin (NITROSTAT) SL tablet 0.4 mg  Every 5 Minutes PRN         25    Unscheduled  Up with assistance  As Needed       25                  Operative/Procedure Notes (last 24 hours)  Notes from 25 through 25   No notes of this type exist for this encounter.          Physician Progress Notes (last 24 hours)        Rikki Jason MD at 25 1421              Name: Filomena Liu ADMIT: 7/3/2025   : 1960  PCP: Fernando Dunn MD    MRN: [id number] LOS: 0 days   AGE/SEX: 64 y.o. female  ROOM: [facility] "     Subjective   Subjective   Patient is lying on the bed and does not appear to be any major distress.  Denies nausea, vomiting abdominal pain, chest pain, shortness of breath.      Objective   Objective   Vital Signs  Temp:  [97.9 °F (36.6 °C)-98.8 °F (37.1 °C)] 98.8 °F (37.1 °C)  Heart Rate:  [76-93] 81  Resp:  [16-20] 20  BP: (101-126)/(55-68) 126/68  SpO2:  [93 %-100 %] 100 %  on  Flow (L/min) (Oxygen Therapy):  [2] 2;   Device (Oxygen Therapy): room air  Body mass index is 32.81 kg/m².  Physical Exam  HEENT: PERRLA, extraocular movements intact, Scleras no icterus  Neck: Supple, no JVD  Cardiovascular: Regular rate and rhythm with normal S1 and S2  GI: Soft, nontender, bowel sounds are present  Extremities: Positive for edema 2+, pedal pulse are palpable  Neurologic: Grossly nonfocal, no facial asymmetry    Results Review     I reviewed the patient's new clinical results.  Results from last 7 days   Lab Units 07/06/25  0557 07/05/25  0500 07/04/25  0647 07/03/25  1837   WBC 10*3/mm3 7.37 6.96 6.52 6.82   HEMOGLOBIN g/dL 8.5* 8.7* 8.3* 10.0*   PLATELETS 10*3/mm3 142 148 137* 168     Results from last 7 days   Lab Units 07/06/25  0557 07/05/25  0500 07/04/25  2213 07/04/25  0647 07/03/25  1837   SODIUM mmol/L 141 141  --  142 143   POTASSIUM mmol/L 4.4 4.4 4.1 3.5 3.3*   CHLORIDE mmol/L 104 104  --  101 101   CO2 mmol/L 28.0 29.0  --  32.0* 30.8*   BUN mg/dL 13.0 14.0  --  15.0 17.0   CREATININE mg/dL 0.99 1.09*  --  1.15* 1.33*   GLUCOSE mg/dL 127* 127*  --  95 118*   EGFR mL/min/1.73 63.8 56.8*  --  53.3* 44.8*     Results from last 7 days   Lab Units 07/06/25  0557 07/05/25  0500 07/03/25  1837   ALBUMIN g/dL 2.7* 2.9* 3.4*   BILIRUBIN mg/dL 1.6* 1.8* 2.6*   ALK PHOS U/L 224* 231* 233*   AST (SGOT) U/L 59* 87* 175*   ALT (SGPT) U/L 18 18 25     Results from last 7 days   Lab Units 07/06/25  0557 07/05/25  0500 07/04/25  0647 07/03/25  1837   CALCIUM mg/dL 8.7 9.0 8.8 9.5   ALBUMIN g/dL 2.7* 2.9*  --  3.4*  "  MAGNESIUM mg/dL  --  2.3  --   --    PHOSPHORUS mg/dL 3.3 2.8  --   --        No results found for: \"HGBA1C\", \"POCGLU\"    No radiology results for the last day      I have personally reviewed all medications:  Scheduled Medications  atenolol, 25 mg, Oral, Nightly  cholecalciferol, 1,000 Units, Oral, Weekly  docusate sodium, 100 mg, Oral, BID  fluticasone, 1 spray, Nasal, Daily  furosemide, 40 mg, Intravenous, BID  ipratropium-albuterol, 3 mL, Nebulization, 4x Daily - RT  melatonin, 2.5 mg, Oral, Nightly  nicotine, 1 patch, Transdermal, Q24H  pantoprazole, 40 mg, Oral, Daily  potassium chloride, 20 mEq, Oral, BID With Meals  pregabalin, 25 mg, Oral, Q12H  rOPINIRole, 1 mg, Oral, Nightly  spironolactone, 50 mg, Oral, BID    Infusions   Diet  Diet: Cardiac; Healthy Heart (2-3 Na+); Fluid Consistency: Thin (IDDSI 0)    I have personally reviewed:  [x]  Laboratory   [x]  Microbiology   [x]  Radiology   [x]  EKG/Telemetry  [x]  Cardiology/Vascular   []  Pathology    []  Records      Assessment/Plan     Active Hospital Problems    Diagnosis  POA    **Bilateral leg edema [R60.0]  Yes      Resolved Hospital Problems   No resolved problems to display.       64 y.o. female admitted with Bilateral leg edema.    1. Bilateral lower extremity edema/volume overload/liver cirrhosis, was diuresed with IV Lasix and currently off and will continue with p.o. Aldactone.  Nephrology is following along as well.    2.  CKD 3 A, creatinine appears to be close to baseline and some fluctuation is expected while being diuresed.    3.  Ethanol abuse, currently does not drink.    4.  GERD, on acid suppressive therapy.    5.  History of COPD, currently not in any exacerbation and not requiring any oxygen.  Continue with DuoNebs.    6.  L2 fracture, patient  is supposed to be on a TLSO brace but has been noncompliant and states it does not fit her well.    7. Anemia, likely anemia of chronic disease however iron panel will be checked.  Hemoglobin " noted to be 8.5.    8.  History of thrombocytopenia, likely secondary to underlying liver disease and currently platelet count is  142,000.    9.  Hypertension, on atenolol/Aldactone.    10.  Disposition, hopefully home tomorrow.    Copied text on this note has been reviewed by me on 2028      Rikki Jason MD  [facility]  25  14:21 EDT      Electronically signed by Rikki Jason MD at 25 1423       Brodie Santos MD at 25 1105              [facility] Progress Note      Patient Name: Filomena Liu  : 1960  MRN: [id number]  Primary Care Physician:  Fernando Dunn MD  Date of admission: 7/3/2025    Subjective     Interval History:   Blood pressure is still soft but better than yesterday  she is clinically stable  Good urine output 1.3 L from yesterday  Breathing better  Creatinine is down to 0.99 electrolytes stable  Review of Systems:   As noted above    Objective     Vitals:   Temp:  [97.9 °F (36.6 °C)-98.8 °F (37.1 °C)] 98.8 °F (37.1 °C)  Heart Rate:  [71-93] 88  Resp:  [16-20] 18  BP: (101-111)/(55-90) 110/57  Flow (L/min) (Oxygen Therapy):  [2] 2    Intake/Output Summary (Last 24 hours) at 2025 1105  Last data filed at 2025 0715  Gross per 24 hour   Intake 240 ml   Output 1300 ml   Net -1060 ml       Physical Exam:    General Appearance: alert, no acute distress   Skin: warm and dry  HEENT: oral mucosa normal   Neck: supple, no JVD  Lungs: CTA  Heart: RRR, normal S1 and S2  Abdomen: soft, nontender, nondistended  : no palpable bladder  Extremities: Minimal edema, cyanosis or clubbing  Neuro: normal speech no asterixis  Scheduled Meds:     atenolol, 25 mg, Oral, Nightly  cholecalciferol, 1,000 Units, Oral, Weekly  docusate sodium, 100 mg, Oral, BID  fluticasone, 1 spray, Nasal, Daily  furosemide, 40 mg, Intravenous, BID  ipratropium-albuterol, 3 mL, Nebulization, 4x Daily - RT  melatonin, 2.5 mg, Oral,  Nightly  nicotine, 1 patch, Transdermal, Q24H  pantoprazole, 40 mg, Oral, Daily  potassium chloride, 20 mEq, Oral, BID With Meals  pregabalin, 25 mg, Oral, Q12H  rOPINIRole, 1 mg, Oral, Nightly  spironolactone, 50 mg, Oral, BID      IV Meds:        Results Reviewed:   I have personally reviewed the results from the time of this admission to 7/6/2025 11:05 EDT     Results from last 7 days   Lab Units 07/06/25  0557 07/05/25  0500 07/04/25  2213 07/04/25  0647 07/03/25  1837   SODIUM mmol/L 141 141  --  142 143   POTASSIUM mmol/L 4.4 4.4 4.1 3.5 3.3*   CHLORIDE mmol/L 104 104  --  101 101   CO2 mmol/L 28.0 29.0  --  32.0* 30.8*   BUN mg/dL 13.0 14.0  --  15.0 17.0   CREATININE mg/dL 0.99 1.09*  --  1.15* 1.33*   CALCIUM mg/dL 8.7 9.0  --  8.8 9.5   BILIRUBIN mg/dL 1.6* 1.8*  --   --  2.6*   ALK PHOS U/L 224* 231*  --   --  233*   ALT (SGPT) U/L 18 18  --   --  25   AST (SGOT) U/L 59* 87*  --   --  175*   GLUCOSE mg/dL 127* 127*  --  95 118*     Estimated Creatinine Clearance: 52.4 mL/min (by C-G formula based on SCr of 0.99 mg/dL).  Results from last 7 days   Lab Units 07/06/25 0557 07/05/25  0500   MAGNESIUM mg/dL  --  2.3   PHOSPHORUS mg/dL 3.3 2.8         Results from last 7 days   Lab Units 07/06/25  0557 07/05/25  0500 07/04/25 0647 07/03/25  1837   WBC 10*3/mm3 7.37 6.96 6.52 6.82   HEMOGLOBIN g/dL 8.5* 8.7* 8.3* 10.0*   PLATELETS 10*3/mm3 142 148 137* 168           Assessment / Plan     ASSESSMENT:  CKD stage 3A - Cr fluctuates low to mid 1's in relation to cirrhosis and diuretic use, creatinine peaked 1.3 yesterday down to 0.99   Vol overload - periph > central in relation to cirrhosis.  on IV lasix & aldactone.  She will need a higher dose of torsemide when able to transition to PO loop diuretic in couple days  Hypokalemia, due to diuresis, improved with replacement   ETOH Cirrhosis  Iron def anemia - hgb is 8.5.  S/P recent endoscopy with plan for capsule  Recent L2 compression fracture managed via brace  she stopped wearing  HTN, BP adequate 130 to 150, gives cushion for diuresis; on metop     PLAN:  Continue lasix 80mg IV BID   Monitor/record UOP, d/w RN; she does not wish to use purewick  aldactone 50 mg BID  Please note Lasix is albumin-bound-if the response is less can change to torsemide or to Bumex twice a day  Since she is clinically stable aim for net 500 mL to 1 L negative balance from tomorrow  Can taper down the diuretic dosing from tomorrow  Dose medicate per GFR  Intake output charting  Monitor electrolytes  Monitor for signs for overdiuresis  Avoid nephrotoxins thank for the consult      Brodie Santos MD  07/06/25  11:05 EDT    [facility]  [phone]     Electronically signed by Brodie Santos MD at 07/06/25 4845

## 2025-07-08 ENCOUNTER — READMISSION MANAGEMENT (OUTPATIENT)
Dept: CALL CENTER | Facility: HOSPITAL | Age: 65
End: 2025-07-08
Payer: MEDICARE

## 2025-07-08 ENCOUNTER — TELEPHONE (OUTPATIENT)
Dept: ORTHOPEDIC SURGERY | Facility: HOSPITAL | Age: 65
End: 2025-07-08
Payer: MEDICARE

## 2025-07-08 ENCOUNTER — TELEPHONE (OUTPATIENT)
Dept: ORTHOPEDIC SURGERY | Facility: CLINIC | Age: 65
End: 2025-07-08
Payer: MEDICARE

## 2025-07-08 VITALS
HEIGHT: 60 IN | RESPIRATION RATE: 18 BRPM | WEIGHT: 152.7 LBS | DIASTOLIC BLOOD PRESSURE: 73 MMHG | TEMPERATURE: 98.8 F | OXYGEN SATURATION: 98 % | SYSTOLIC BLOOD PRESSURE: 129 MMHG | HEART RATE: 97 BPM | BODY MASS INDEX: 29.98 KG/M2

## 2025-07-08 LAB
ALBUMIN SERPL-MCNC: 3.1 G/DL (ref 3.5–5.2)
ALBUMIN/GLOB SERPL: 0.9 G/DL
ALP SERPL-CCNC: 228 U/L (ref 39–117)
ALT SERPL W P-5'-P-CCNC: 14 U/L (ref 1–33)
ANION GAP SERPL CALCULATED.3IONS-SCNC: 13 MMOL/L (ref 5–15)
AST SERPL-CCNC: 43 U/L (ref 1–32)
BASOPHILS # BLD AUTO: 0.03 10*3/MM3 (ref 0–0.2)
BASOPHILS NFR BLD AUTO: 0.3 % (ref 0–1.5)
BILIRUB SERPL-MCNC: 2 MG/DL (ref 0–1.2)
BUN SERPL-MCNC: 18 MG/DL (ref 8–23)
BUN/CREAT SERPL: 15.4 (ref 7–25)
CALCIUM SPEC-SCNC: 9.2 MG/DL (ref 8.6–10.5)
CHLORIDE SERPL-SCNC: 100 MMOL/L (ref 98–107)
CO2 SERPL-SCNC: 27 MMOL/L (ref 22–29)
CREAT SERPL-MCNC: 1.17 MG/DL (ref 0.57–1)
DEPRECATED RDW RBC AUTO: 43.1 FL (ref 37–54)
EGFRCR SERPLBLD CKD-EPI 2021: 52.2 ML/MIN/1.73
EOSINOPHIL # BLD AUTO: 0.23 10*3/MM3 (ref 0–0.4)
EOSINOPHIL NFR BLD AUTO: 2.6 % (ref 0.3–6.2)
ERYTHROCYTE [DISTWIDTH] IN BLOOD BY AUTOMATED COUNT: 13.6 % (ref 12.3–15.4)
GLOBULIN UR ELPH-MCNC: 3.4 GM/DL
GLUCOSE SERPL-MCNC: 93 MG/DL (ref 65–99)
HCT VFR BLD AUTO: 29.5 % (ref 34–46.6)
HGB BLD-MCNC: 9.6 G/DL (ref 12–15.9)
IMM GRANULOCYTES # BLD AUTO: 0.12 10*3/MM3 (ref 0–0.05)
IMM GRANULOCYTES NFR BLD AUTO: 1.3 % (ref 0–0.5)
LYMPHOCYTES # BLD AUTO: 1.69 10*3/MM3 (ref 0.7–3.1)
LYMPHOCYTES NFR BLD AUTO: 18.8 % (ref 19.6–45.3)
MAGNESIUM SERPL-MCNC: 2.1 MG/DL (ref 1.6–2.4)
MCH RBC QN AUTO: 28.7 PG (ref 26.6–33)
MCHC RBC AUTO-ENTMCNC: 32.5 G/DL (ref 31.5–35.7)
MCV RBC AUTO: 88.1 FL (ref 79–97)
MONOCYTES # BLD AUTO: 0.98 10*3/MM3 (ref 0.1–0.9)
MONOCYTES NFR BLD AUTO: 10.9 % (ref 5–12)
NEUTROPHILS NFR BLD AUTO: 5.94 10*3/MM3 (ref 1.7–7)
NEUTROPHILS NFR BLD AUTO: 66.1 % (ref 42.7–76)
NRBC BLD AUTO-RTO: 0 /100 WBC (ref 0–0.2)
PHOSPHATE SERPL-MCNC: 3.5 MG/DL (ref 2.5–4.5)
PLATELET # BLD AUTO: 171 10*3/MM3 (ref 140–450)
PMV BLD AUTO: 10.5 FL (ref 6–12)
POTASSIUM SERPL-SCNC: 4.4 MMOL/L (ref 3.5–5.2)
PROT SERPL-MCNC: 6.5 G/DL (ref 6–8.5)
RBC # BLD AUTO: 3.35 10*6/MM3 (ref 3.77–5.28)
SODIUM SERPL-SCNC: 140 MMOL/L (ref 136–145)
WBC NRBC COR # BLD AUTO: 8.99 10*3/MM3 (ref 3.4–10.8)

## 2025-07-08 PROCEDURE — 84100 ASSAY OF PHOSPHORUS: CPT

## 2025-07-08 PROCEDURE — 94799 UNLISTED PULMONARY SVC/PX: CPT

## 2025-07-08 PROCEDURE — 94664 DEMO&/EVAL PT USE INHALER: CPT

## 2025-07-08 PROCEDURE — 80053 COMPREHEN METABOLIC PANEL: CPT | Performed by: INTERNAL MEDICINE

## 2025-07-08 PROCEDURE — 83735 ASSAY OF MAGNESIUM: CPT

## 2025-07-08 PROCEDURE — 85025 COMPLETE CBC W/AUTO DIFF WBC: CPT | Performed by: INTERNAL MEDICINE

## 2025-07-08 PROCEDURE — 94761 N-INVAS EAR/PLS OXIMETRY MLT: CPT

## 2025-07-08 RX ORDER — TORSEMIDE 20 MG/1
40 TABLET ORAL DAILY
Qty: 60 TABLET | Refills: 0 | Status: SHIPPED | OUTPATIENT
Start: 2025-07-09 | End: 2025-08-08

## 2025-07-08 RX ORDER — SPIRONOLACTONE 50 MG/1
50 TABLET, FILM COATED ORAL 2 TIMES DAILY
Qty: 60 TABLET | Refills: 0 | Status: SHIPPED | OUTPATIENT
Start: 2025-07-08 | End: 2025-08-07

## 2025-07-08 RX ADMIN — OXYCODONE AND ACETAMINOPHEN 1 TABLET: 325; 10 TABLET ORAL at 04:54

## 2025-07-08 RX ADMIN — CYCLOBENZAPRINE HYDROCHLORIDE 5 MG: 10 TABLET, FILM COATED ORAL at 02:21

## 2025-07-08 RX ADMIN — DOCUSATE SODIUM 100 MG: 100 CAPSULE, LIQUID FILLED ORAL at 09:12

## 2025-07-08 RX ADMIN — OXYCODONE AND ACETAMINOPHEN 1 TABLET: 325; 10 TABLET ORAL at 13:10

## 2025-07-08 RX ADMIN — NICOTINE 1 PATCH: 21 PATCH, EXTENDED RELEASE TRANSDERMAL at 09:12

## 2025-07-08 RX ADMIN — PANTOPRAZOLE SODIUM 40 MG: 40 TABLET, DELAYED RELEASE ORAL at 09:12

## 2025-07-08 RX ADMIN — IPRATROPIUM BROMIDE AND ALBUTEROL SULFATE 3 ML: .5; 3 SOLUTION RESPIRATORY (INHALATION) at 11:00

## 2025-07-08 RX ADMIN — POTASSIUM CHLORIDE 20 MEQ: 1500 TABLET, EXTENDED RELEASE ORAL at 09:12

## 2025-07-08 RX ADMIN — FLUTICASONE PROPIONATE 1 SPRAY: 50 SPRAY, METERED NASAL at 09:13

## 2025-07-08 RX ADMIN — ALBUTEROL SULFATE 2.5 MG: 2.5 SOLUTION RESPIRATORY (INHALATION) at 02:42

## 2025-07-08 RX ADMIN — TORSEMIDE 40 MG: 20 TABLET ORAL at 09:12

## 2025-07-08 RX ADMIN — OXYCODONE AND ACETAMINOPHEN 1 TABLET: 325; 10 TABLET ORAL at 00:34

## 2025-07-08 RX ADMIN — OXYCODONE AND ACETAMINOPHEN 1 TABLET: 325; 10 TABLET ORAL at 09:12

## 2025-07-08 RX ADMIN — IPRATROPIUM BROMIDE AND ALBUTEROL SULFATE 3 ML: .5; 3 SOLUTION RESPIRATORY (INHALATION) at 15:24

## 2025-07-08 RX ADMIN — SPIRONOLACTONE 50 MG: 50 TABLET ORAL at 09:12

## 2025-07-08 NOTE — TELEPHONE ENCOUNTER
Ms. Rm called and left me a VM, called her back. She is currently in the hospital for a non ortho related issue. She said she would like to go home, but was told she needed ortho clearance to do so, and no one from the MD office has been by to see her. I messaged her primary nurse to see if she would be able to look into this issue, as I am not sure ortho was consulted on her this visit. Told Ms. Rm that her primary nurse would update her with any changes. She voiced understanding.

## 2025-07-08 NOTE — TELEPHONE ENCOUNTER
"  Caller: Filomena Liu \"PAT\"    Relationship: Self    Best call back number:     What is the best time to reach you: ANYTIME    Who are you requesting to speak with (clinical staff, provider,  specific staff member): DR. STEWART    What was the call regarding: PATIENT STATES SHE IS IN THE EMERGENCY ROOM AND HAS BEEN ADMITTED. PATIENT STATES SHE HAS HAD NUMEROUS COMPLICATIONS THAT RESULTED IN HER BEING THERE. PATIENT STATES THE EMERGENCY ROOM DOCTOR (DR. FULLER)  IS REQUESTING ORTHOPEDICS TO SIGN OFF ON HER DISCHARGE BEFORE THEY ARE ABLE TO RELEASE HER. PATIENT IS IN ROOM 657. PLEASE CONTACT THE PATIENT TO ADVICE WHAT CAN BE DONE FROM HERE.     Is it okay if the provider responds through [employer]: CALL    "

## 2025-07-08 NOTE — CASE MANAGEMENT/SOCIAL WORK
Continued Stay Note  [facility]     Patient Name: Filomena Liu  MRN: [id number]  Today's Date: 7/8/2025    Admit Date: 7/3/2025    Plan: Home with her signifncant other [facility] and [facility].    Discharge Plan     S/W pt at [facility] who confrms she lives w/ her significant other [facility] who can assist as needed. Pt states she was current w/ [facility] before admission.. Referral was sent to [facility] and they can continue services when pt goes home. CCP will continue to follow ..........Rozina BRYANT/ JENNIFER                     Expected Discharge Date and Time       Expected Discharge Date Expected Discharge Time    Jul 8, 2025               Rozina Jay RN

## 2025-07-08 NOTE — PROGRESS NOTES
[facility] Progress Note      Patient Name: Filomena Liu  : 1960  MRN: [id number]  Primary Care Physician:  Fernando Dunn MD  Date of admission: 7/3/2025    Subjective     Interval History:   Follow-up  CKD 3A.  Urine output only recorded for night shift.  Resumed oral torsemide yesterday afternoon.  Feels ready to go home.  Back pain is much better than last week.  Bowels moving.  Edema much better.  Weight down.  Review of Systems:   As noted above    Objective     Vitals:   Temp:  [97.7 °F (36.5 °C)-98.8 °F (37.1 °C)] 98.8 °F (37.1 °C)  Heart Rate:  [76-97] 97  Resp:  [18] 18  BP: (107-129)/(70-77) 129/73  Flow (L/min) (Oxygen Therapy):  [2] 2    Intake/Output Summary (Last 24 hours) at 2025 0809  Last data filed at 2025 2241  Gross per 24 hour   Intake 360 ml   Output 800 ml   Net -440 ml       Physical Exam:    General Appearance: alert, oriented x 3, no acute distress .  Chronically ill.  Skin: warm and dry  HEENT: oral mucosa normal, nonicteric sclera  Neck: supple, no JVD  Lungs: Clear to auscultation bilaterally.  Heart: RRR, normal S1 and S2  Abdomen: soft, nontender, nondistended. +bs  : no palpable bladder  Extremities: Trace lower extremity edema.  Venous stasis changes.  Small superficial skin tear right shin.  Neuro: normal speech and mental status     Scheduled Meds:     atenolol, 25 mg, Oral, Nightly  cholecalciferol, 1,000 Units, Oral, Weekly  docusate sodium, 100 mg, Oral, BID  fluticasone, 1 spray, Nasal, Daily  ipratropium-albuterol, 3 mL, Nebulization, 4x Daily - RT  melatonin, 2.5 mg, Oral, Nightly  nicotine, 1 patch, Transdermal, Q24H  pantoprazole, 40 mg, Oral, Daily  potassium chloride, 20 mEq, Oral, BID With Meals  pregabalin, 25 mg, Oral, Q12H  rOPINIRole, 1 mg, Oral, Nightly  spironolactone, 50 mg, Oral, BID  torsemide, 40 mg, Oral, Daily      IV Meds:        Results Reviewed:   I have personally reviewed the results from the time  of this admission to 7/8/2025 08:09 EDT     Results from last 7 days   Lab Units 07/08/25  0630 07/07/25  0648 07/06/25  0557   SODIUM mmol/L 140 138 141   POTASSIUM mmol/L 4.4 4.4 4.4   CHLORIDE mmol/L 100 103 104   CO2 mmol/L 27.0 27.5 28.0   BUN mg/dL 18.0 15.0 13.0   CREATININE mg/dL 1.17* 1.10* 0.99   CALCIUM mg/dL 9.2 9.0 8.7   BILIRUBIN mg/dL 2.0* 1.7* 1.6*   ALK PHOS U/L 228* 231* 224*   ALT (SGPT) U/L 14 18 18   AST (SGOT) U/L 43* 44* 59*   GLUCOSE mg/dL 93 80 127*       Estimated Creatinine Clearance: 42.2 mL/min (A) (by C-G formula based on SCr of 1.17 mg/dL (H)).    Results from last 7 days   Lab Units 07/08/25  0630 07/07/25  0648 07/06/25  0557 07/05/25  0500   MAGNESIUM mg/dL 2.1  --   --  2.3   PHOSPHORUS mg/dL 3.5 3.5 3.3 2.8             Results from last 7 days   Lab Units 07/08/25  0630 07/07/25  0648 07/06/25  0557 07/05/25  0500 07/04/25  0647   WBC 10*3/mm3 8.99 9.49 7.37 6.96 6.52   HEMOGLOBIN g/dL 9.6* 8.9* 8.5* 8.7* 8.3*   PLATELETS 10*3/mm3 171 162 142 148 137*             Assessment / Plan     ASSESSMENT:  CKD 3A, nonoliguric.  Baseline creatinine 1.3-1.5. Creatinine below usual baseline.  Volume status improving.  Weight down.  Started oral torsemide yesterday.  2.  Alcoholic cirrhosis.  3.  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  4.  Anemia.  Recent GI evaluation EGD and colonoscopy.  Iron consistent with anemia of chronic disease.  GI outpatient capsule endoscopy planned.  Hemoglobin stable.  5.  Hypotension.  Resolved.  Now hypertensive.  Stop midodrine.  Resume atenolol.  6.  L2 compression fracture.  Noncompliant with TLSO brace.  7.  Infrarenal aortic abdominal aneurysm.  PLAN:  Continue torsemide and Aldactone.  Okay with renal for discharge.  I will call the office and reschedule tomorrow's nurse practitioner appointment.  She already has an appointment with Dr. Chan Brewster July 31.  Thank you for involving us in the care of Filomena  ELGIN Liu.  Please feel free to call with any questions.    Cyndi Dimas MD  07/08/25  08:09 EDT    [facility]  [phone]    Please note that portions of this note were completed with a voice recognition program.

## 2025-07-08 NOTE — DISCHARGE SUMMARY
Patient Name: Filomena Liu  : 1960  MRN: [id number]    Date of Admission: 7/3/2025  Date of Discharge:  2025  Primary Care Physician: Fernando Dunn MD      Chief Complaint:   Leg Swelling      Discharge Diagnoses     Active Hospital Problems    Diagnosis  POA    **Bilateral leg edema [R60.0]  Yes    Bilateral lower extremity edema [R60.0]  Yes      Resolved Hospital Problems   No resolved problems to display.        Hospital Course   64 year old female presented to the emergency room with leg and abdominal swelling; she has also had back pain and some weeping from her legs; no fever or chills.    1. Bilateral lower extremity edema/volume overload/liver cirrhosis, was diuresed with IV Lasix and has been transitioned to oral Aldactone and torsemide and doses as recommended by nephrology.  Advised patient to monitor her sodium intake and will follow with nephrology closely as an outpatient basis.     2.  CKD 3 A, creatinine appears to be close to baseline and some fluctuation is expected while being diuresed.  Today creatinine noted to be 1.17.     3.  Ethanol abuse, currently does not drink.     4.  GERD, on acid suppressive therapy.     5.  History of COPD, currently not in any exacerbation and not requiring any oxygen.  Continue with DuoNebs.     6.  L2 fracture, patient  is supposed to be on a TLSO brace but has been noncompliant and states it does not fit her well.  On analysis  Her pain is reasonably well-controlled and is ambulatory.     7. Anemia, likely anemia of chronic disease however iron panel will be checked.  Hemoglobin noted to be 8.9.     8.  History of thrombocytopenia, likely secondary to underlying liver disease and currently platelet count is  171,000.     9.  Hypertension, on atenolol/Aldactone.     10.  Right shoulder arthroplasty on 2025, reportedly felt a pop on the evening of 2025.  Initially complained of pain and unable to move her right upper extremity  however now no pain and range of motion is normal therefore no further intervention.  Will follow-up with orthopedics as an outpatient basis.    Copy text on this note has been reviewed by me on 7/8/2025    Day of Discharge       Physical Exam:  Temp:  [97.7 °F (36.5 °C)-98.8 °F (37.1 °C)] 98.8 °F (37.1 °C)  Heart Rate:  [76-97] 97  Resp:  [18] 18  BP: (107-129)/(71-77) 129/73  Body mass index is 29.82 kg/m².  Physical Exam  HEENT: PERRLA, extraocular movements intact, Scleras no icterus  Neck: Supple, no JVD  Cardiovascular: Regular rate and rhythm with normal S1 and S2  GI: Soft, nontender, bowel sounds are present  Extremities: Positive for edema trace-1+, pedal pulse are palpable  Neurologic: Grossly nonfocal, no facial asymmetry    Consultants     Consult Orders (all) (From admission, onward)       Start     Ordered    07/03/25 2234  Inpatient Nephrology Consult  Once        Specialty:  Nephrology  Provider:  Chan Brewster MD    07/03/25 2233                  Procedures     * Surgery not found *    Imaging Results (All)       Procedure Component Value Units Date/Time    XR Shoulder 1 View Right [[id number]] Collected: 07/07/25 0603     Updated: 07/07/25 0607    Narrative:      XR SHOULDER 1 VW RIGHT-     Clinical: Postop shoulder pain     COMPARISON examination 6/21/2025     FINDINGS: Total right shoulder replacement prosthesis position is  similar to the previous examination without loosening. No acute osseous  abnormality. Surrounding soft tissues and adjacent ribs within normal  limits. The remainder is unremarkable.     CONCLUSION: Stable appearance to the right shoulder status post  arthroplasty.     This report was finalized on 7/7/2025 6:04 AM by Dr. Nolan Nolen M.D  on Workstation: [id number]       CT Abdomen Pelvis Without Contrast [[id number]] Collected: 07/03/25 2214     Updated: 07/03/25 2233    Narrative:      CT ABDOMEN AND PELVIS WITHOUT IV CONTRAST     HISTORY: Abdominal  distention     TECHNIQUE: Radiation dose reduction techniques were utilized, including  automated exposure control and exposure modulation based on body size.   3 mm images were obtained through the abdomen and pelvis without IV  contrast.     COMPARISON: CT abdomen pelvis 5/16/2025 and 3/6/2025       Impression:      FINDINGS AND IMPRESSION:  Please note evaluation is suboptimal due to the marked streak artifact  from patient's arms being along her side. No free intraperitoneal air is  seen. Soft tissue thickening and subcutaneous stranding is present  throughout the visualized body wall, nonspecific but can be seen in the  setting of anasarca, edema and/or cellulitis and correlation with  physical exam and patient history is recommended. The appendix is  unremarkable.     The liver is cirrhotic. Spleen is enlarged, measuring up to approximate  15.1 cm in length areas of portal hypertension. Gallbladder surgically  absent. Pancreas, adrenal glands and kidneys have an unremarkable  noncontrast CT appearance. No hydronephrosis.     THERE IS ASYMMETRIC THICKENING AND SURROUNDING STRANDING SURROUNDING THE  ADJACENT COLON AND CECUM, SUGGESTIVE OF PORTAL HYPERTENSIVE COLOPATHY  VERSUS COLITIS IN THE APPROPRIATE CONTEXT. CORRELATION WITH PATIENT  HISTORY IS RECOMMENDED.     No abdominal pelvic adenopathy by size criteria. 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     No suspicious lytic or blastic osseous lesion. For the purpose of this  dictation, last well-formed disc base referred to as L5-S1. Burst  compression fracture of the L2 vertebral body appears to demonstrate  increased loss of height since 6/25/2025 resulting approximate 30% loss  of height (previously 25%).     This report was finalized on 7/3/2025 10:29 PM by Dr. David Briceño M.D  on Workstation: [id number]       mBlox  Chest 2 View [[id number]] Collected: 07/03/25 1941     Updated: 07/03/25 1945    Narrative:      TWO VIEWS OF THE CHEST        HISTORY: swelling     COMPARISON: 6/20/2025     TECHNIQUE: PA and lateral views were performed of the chest.     FINDINGS:     Heart size is within normal limits, unchanged.     There is mild pulmonary vascular congestion, but there is no  consolidation, effusion or pneumothorax.     There is no acute bony abnormality.       Impression:         Negative chest, no acute abnormality.     This report was finalized on 7/3/2025 7:42 PM by Dr. Richard Chambers M.D  on Workstation: [id number]             Results for orders placed during the hospital encounter of 06/20/25    Duplex Venous Lower Extremity - Bilateral CAR 06/22/2025  4:45 PM    Interpretation Summary    Normal bilateral lower extremity venous duplex scan.    Left popliteal fossa fluid collection.    Results for orders placed during the hospital encounter of 08/05/24    Adult Transthoracic Echo Complete W/ Cont if Necessary Per Protocol 08/06/2024  9:19 PM    Interpretation Summary    Technically difficult study.    Left ventricular systolic function is normal. Calculated left ventricular EF = 63.4%    Left ventricular diastolic function was indeterminate.    RV borderline dilated with grossly normal function.    At least moderate mitral valve regurgitation is present, may be underestimated.    Suboptima subcostal window though the IVC appears dilated.    Pertinent Labs     Results from last 7 days   Lab Units 07/08/25  0630 07/07/25  0648 07/06/25  0557 07/05/25  0500   WBC 10*3/mm3 8.99 9.49 7.37 6.96   HEMOGLOBIN g/dL 9.6* 8.9* 8.5* 8.7*   PLATELETS 10*3/mm3 171 162 142 148     Results from last 7 days   Lab Units 07/08/25  0630 07/07/25  0648 07/06/25  0557 07/05/25  0500   SODIUM mmol/L 140 138 141 141   POTASSIUM mmol/L 4.4 4.4 4.4 4.4   CHLORIDE mmol/L 100 103 104 104   CO2 mmol/L 27.0 27.5 28.0 29.0   BUN mg/dL 18.0 15.0 13.0  "14.0   CREATININE mg/dL 1.17* 1.10* 0.99 1.09*   GLUCOSE mg/dL 93 80 127* 127*   EGFR mL/min/1.73 52.2* 56.2* 63.8 56.8*     Results from last 7 days   Lab Units 07/08/25  0630 07/07/25  0648 07/06/25  0557 07/05/25  0500   ALBUMIN g/dL 3.1* 2.8* 2.7* 2.9*   BILIRUBIN mg/dL 2.0* 1.7* 1.6* 1.8*   ALK PHOS U/L 228* 231* 224* 231*   AST (SGOT) U/L 43* 44* 59* 87*   ALT (SGPT) U/L 14 18 18 18     Results from last 7 days   Lab Units 07/08/25  0630 07/07/25  0648 07/06/25  0557 07/05/25  0500   CALCIUM mg/dL 9.2 9.0 8.7 9.0   ALBUMIN g/dL 3.1* 2.8* 2.7* 2.9*   MAGNESIUM mg/dL 2.1  --   --  2.3   PHOSPHORUS mg/dL 3.5 3.5 3.3 2.8       Results from last 7 days   Lab Units 07/03/25  1837   PROBNP pg/mL 160.0           Invalid input(s): \"LDLCALC\"          Test Results Pending at Discharge     Pending Results       None              Discharge Details        Discharge Medications        Changes to Medications        Instructions Start Date   oxyCODONE-acetaminophen  MG per tablet  Commonly known as: Percocet  What changed: Another medication with the same name was removed. Continue taking this medication, and follow the directions you see here.   1 tablet, Oral, Every 6 Hours PRN      spironolactone 50 MG tablet  Commonly known as: ALDACTONE  What changed:   medication strength  how much to take   50 mg, Oral, 2 Times Daily      torsemide 20 MG tablet  Commonly known as: DEMADEX  What changed: how much to take   40 mg, Oral, Daily   Start Date: July 9, 2025            Continue These Medications        Instructions Start Date   albuterol sulfate  (90 Base) MCG/ACT inhaler  Commonly known as: PROVENTIL HFA;VENTOLIN HFA;PROAIR HFA   2 puffs, 3 times daily      atenolol 25 MG tablet  Commonly known as: TENORMIN   25 mg, Nightly      cholecalciferol 25 MCG (1000 UT) tablet  Commonly known as: VITAMIN D3   1 tablet, Weekly      cyclobenzaprine 5 MG tablet  Commonly known as: FLEXERIL   5 mg, Oral, 3 Times Daily PRN    "   docusate sodium 100 MG capsule  Commonly known as: COLACE   100 mg, Oral, 2 Times Daily      fluticasone 50 MCG/ACT nasal spray  Commonly known as: FLONASE   1 spray, Daily      ipratropium-albuterol 0.5-2.5 mg/3 ml nebulizer  Commonly known as: DUO-NEB   Take 3 mL by nebulization 4 (Four) Times a Day. Indications: Chronic Obstructive Lung Disease      pantoprazole 40 MG EC tablet  Commonly known as: PROTONIX   40 mg, Oral, Daily      pregabalin 25 MG capsule  Commonly known as: LYRICA   1 capsule, Every 12 Hours Scheduled      rOPINIRole 1 MG tablet  Commonly known as: REQUIP   1 tablet, Every Night at Bedtime             Stop These Medications      cephalexin 500 MG capsule  Commonly known as: KEFLEX              Allergies   Allergen Reactions    Wound Dressing Adhesive Other (See Comments)     SKIN TEARS AND RASH- PAPER TAPE OK       Discharge Disposition:  Home or Self Care      Discharge Diet:  Diet Order   Procedures    Diet: Cardiac, Fluid Restriction (240 mL/tray); Healthy Heart (2-3 Na+); 1500 mL/day; Fluid Consistency: Thin (IDDSI 0)       Discharge Activity:   Activity Instructions       Activity as Tolerated              CODE STATUS:    Code Status and Medical Interventions: CPR (Attempt to Resuscitate); Full   Ordered at: 07/03/25 1926     Code Status (Patient has no pulse and is not breathing):    CPR (Attempt to Resuscitate)     Medical Interventions (Patient has pulse or is breathing):    Full       Future Appointments   Date Time Provider Department Center   7/16/2025 12:00 PM [facility] CT 1 BH [facility] CT BR None   8/15/2025  4:20 PM Maxwell Lanier MD [facility] L100 [facility]   9/8/2025 10:00 AM [facility] US BH [facility] US BR None   4/27/2026 10:15 AM [facility] US N[facility] ART/VASC CART 1 BH [facility] NI [facility]     Additional Instructions for the Follow-ups that You Need to Schedule       Discharge Follow-up with PCP   As directed       Currently Documented PCP:    Fernando Dunn MD    PCP Phone Number:    [phone]      Follow Up Details: 1 week               Contact information for follow-up providers       Fernando Dunn MD .    Specialty: Family Medicine  Why: 1 week  Contact information:  [address]  [facility] [address]  [phone]                       Contact information for after-discharge care       Home Medical Care       [facility] .    Service: Home Health Services  Contact information:  [address] [address] [address]  [phone]                                   Additional Instructions for the Follow-ups that You Need to Schedule       Discharge Follow-up with PCP   As directed       Currently Documented PCP:    Fernando Dunn MD    PCP Phone Number:    [phone]     Follow Up Details: 1 week            Time Spent on Discharge:  Greater than 30 minutes      Rikki Jason MD  [facility]  07/08/25  17:26 EDT

## 2025-07-08 NOTE — TELEPHONE ENCOUNTER
Called patient in regards to [facility] message about patient being discharged. She voiced that she was on her way home already and thanks the office for all that we've done for her.

## 2025-07-08 NOTE — PLAN OF CARE
Goal Outcome Evaluation:      Patient resting abed with multiple complaints of right shoulder pain. She is up with standby assist, gait is steady however, she is holding her right arm tight to her body and guarding with c/o pain. VSS, she is alert and oriented with moments of confusion noted. Bilateral legs improved, edema has dissipated and patient denies any leg discomfort at this time.

## 2025-07-09 NOTE — CASE MANAGEMENT/SOCIAL WORK
Continued Stay Note  [facility]     Patient Name: Filomena Liu  MRN: [id number]  Today's Date: 7/9/2025    Admit Date: 7/3/2025    Plan: Home with her signifncant other [facility].     Discharge Plan       Row Name 07/09/25 0925       Plan    Plan Comments Pt DC'd home yesterday.  Pt is [facility].   [facility] notified of DC yesterday afternoon and HH will followup with pt at home.    Final Discharge Disposition Code 06 - home with home health care    Final Note DC home and continue with [facility]                   Discharge Codes    No documentation.                 Expected Discharge Date and Time       Expected Discharge Date Expected Discharge Time    Jul 8, 2025               Rozina Jay RN

## 2025-07-09 NOTE — CASE MANAGEMENT/SOCIAL WORK
Case Management Discharge Note      Final Note: DC home and continue with [facility]    Provided Post Acute Provider List?: Yes  Post Acute Provider List: Home Health  Provided Post Acute Provider Quality & Resource List?: Yes  Post Acute Provider Quality and Resource List: Home Health  Delivered To: Patient  Method of Delivery: In person    Selected Continued Care - Discharged on 7/8/2025 Admission date: 7/3/2025 - Discharge disposition: Home or Self Care      Destination    No services have been selected for the patient.                Durable Medical Equipment    No services have been selected for the patient.                Dialysis/Infusion    No services have been selected for the patient.                Home Medical Care       Service Provider Services Address Phone Fax Patient Preferred    [facility] [address], [place] [id number] [phone] --              Therapy    No services have been selected for the patient.                Community Resources    No services have been selected for the patient.                Community & DME    No services have been selected for the patient.                    Selected Continued Care - Prior Encounters Includes continued care and service providers with selected services from prior encounters from 4/4/2025 to 7/8/2025      Discharged on 6/27/2025 Admission date: 6/20/2025 - Discharge disposition: Home-Health Care [facility]      Home Medical Care       Service Provider Services Address Phone Fax Patient Preferred    [facility] [address], [place] [id number] [phone] --                          Transportation Services  Transportation: Private Transportation  Private: Car    Final Discharge Disposition Code: 06 - home with home health care

## 2025-07-09 NOTE — OUTREACH NOTE
Prep Survey      Flowsheet Row Responses   [facility] discharged from? [place]   Is LACE score < 7 ? No   Eligibility Readm Mgmt   Discharge diagnosis Bilateral leg edema   Does the patient have one of the following disease processes/diagnoses(primary or secondary)? Other   Does the patient have Home health ordered? Yes   What is the Home health agency?  [facility]    Is there a DME ordered? No   Prep survey completed? Yes            EFRAIN MOHR - Registered Nurse

## 2025-07-11 ENCOUNTER — TELEPHONE (OUTPATIENT)
Dept: ORTHOPEDIC SURGERY | Facility: CLINIC | Age: 65
End: 2025-07-11

## 2025-07-14 ENCOUNTER — READMISSION MANAGEMENT (OUTPATIENT)
Dept: CALL CENTER | Facility: HOSPITAL | Age: 65
End: 2025-07-14
Payer: MEDICARE

## 2025-07-14 ENCOUNTER — OFFICE VISIT (OUTPATIENT)
Dept: ORTHOPEDIC SURGERY | Facility: CLINIC | Age: 65
End: 2025-07-14
Payer: MEDICARE

## 2025-07-14 VITALS — WEIGHT: 145.6 LBS | HEIGHT: 60 IN | BODY MASS INDEX: 28.58 KG/M2 | TEMPERATURE: 98 F

## 2025-07-14 DIAGNOSIS — Z96.611 S/P REVERSE TOTAL SHOULDER ARTHROPLASTY, RIGHT: Primary | ICD-10-CM

## 2025-07-14 PROCEDURE — 1160F RVW MEDS BY RX/DR IN RCRD: CPT | Performed by: NURSE PRACTITIONER

## 2025-07-14 PROCEDURE — 73030 X-RAY EXAM OF SHOULDER: CPT | Performed by: NURSE PRACTITIONER

## 2025-07-14 PROCEDURE — 1159F MED LIST DOCD IN RCRD: CPT | Performed by: NURSE PRACTITIONER

## 2025-07-14 PROCEDURE — 99024 POSTOP FOLLOW-UP VISIT: CPT | Performed by: NURSE PRACTITIONER

## 2025-07-14 NOTE — OUTREACH NOTE
Medical Week 1 Survey      Flowsheet Row Responses   [facility] patient discharged from? [place]   Does the patient have one of the following disease processes/diagnoses(primary or secondary)? Other   Week 1 attempt successful? Yes   Call start time 1345   Call end time 1355   Discharge diagnosis Bilateral leg edema   Meds reviewed with patient/caregiver? Yes   Is the patient having any side effects they believe may be caused by any medication additions or changes? No   Does the patient have all medications ordered at discharge? Yes   Is the patient taking all medications as directed (includes completed medication regime)? Yes   Does the patient have a primary care provider?  Yes   Does the patient have an appointment with their PCP within 7 days of discharge? No   Comments regarding PCP PCP advised pt that he did not need to see her for hosp f/u, per pt report.   Has the patient kept scheduled appointments due by today? Yes   What is the Home health agency?  [facility]    Has home health visited the patient within 72 hours of discharge? Yes   Comments Pt has lost 20lbs of fluid recently per pt reports, her LE edema is resolved. Low NA diet and 50oz per diet of liquids daily.   Did the patient receive a copy of their discharge instructions? Yes   Nursing interventions Reviewed instructions with patient   What is the patient's perception of their health status since discharge? Improving   Is the patient/caregiver able to teach back signs and symptoms related to disease process for when to call PCP? Yes   Is the patient/caregiver able to teach back signs and symptoms related to disease process for when to call 911? Yes   Is the patient/caregiver able to teach back the hierarchy of who to call/visit for symptoms/problems? PCP, Specialist, Home health nurse, Urgent Care, ED, 911 Yes   If the patient is a current smoker, are they able to teach back resources for cessation? --  [Pt smokes less than 1pack per day,  she reports. This nurse educated pt on benefits of nicotine cessation, pt is reducing amt pr day, she reported]   Week 1 call completed? Yes   Revoked No further contact(revokes)-requires comment   Call end time [phone]            Lorin MONAE - Registered Nurse

## 2025-07-14 NOTE — PROGRESS NOTES
"Filomena Liu : 1960 MRN: [id number] DATE: 2025    DIAGNOSIS: 6 week follow up right shoulder arthroplasty      SUBJECTIVE:  Patient returns today for 6 week follow up of right shoulder replacement.  Reports she spent several days in the hospital after I recommended she be evaluated.  She was diuresed and says she has lost about 23 pounds of fluid. She feels she is doing very well overall.  No new issues or complaints today.      OBJECTIVE:    Temp 98 °F (36.7 °C) (Temporal)   Ht 152.4 cm (60\")   Wt 66 kg (145 lb 9.6 oz)   BMI 28.44 kg/m²     Exam: The incision is well healed. No erythema or drainage. Shoulder moves fluidly with pendulums.  Motion is on track per protocol.  The arm is soft and nontender.  Good motor and sensory function.  Palpable distal pulses.     DIAGNOSTIC STUDIES    Xrays: AP and scapular Y views of the right shoulder are ordered and reviewed for evaluation of shoulder replacement.  They demonstrate a well positioned, well aligned replacement without complicating factors noted.  In comparison with previous films there has been no change.    ASSESSMENT: 6 week follow up right shoulder replacement    PLAN:   1.  Continue PT per protocol.  2.  Discontinue sling and begin working on progressing ROM as tolerated.  3.  Counseled patient about appropriate activity modifications and restrictions.  Released to drive at this point.  4.  We discussed the need for prophylactic antibiotics prior to dental appointments for 2 years following replacement surgery.  5.  Follow up in 6 weeks with [facility].       Gypsy Mart, APRN    2025   "

## 2025-07-14 NOTE — PAYOR COMM NOTE
"Filomena Liu \"PAT\" (65 y.o. Female)     ATTN: NURSE REVIEWER  RE: DISCHARGE SUMMARY  REF: [id number]  PLS REPLY TO WILBERTO GREER [phone] OR FAX# [phone]      Date of Birth   1960    Social Security Number       Address   [address]  [facility] [address]    Home Phone       MRN   [id number]       Ep[employer]opalian   Latter day    Marital Status                               Admission Date   7/3/2025    Admission Type   Emergency    Admitting Provider   Belen Choudhury MD    Attending Provider       Department, Room/Bed   [address], E657/1       Discharge Date   2025    Discharge Disposition   Home or Self Care    Discharge Destination                                 Attending Provider: (none)   Allergies: Wound Dressing Adhesive    Isolation: None   Infection: None   Code Status: Prior    Ht: 152.4 cm (60\")   Wt: 69.3 kg (152 lb 11.2 oz)    Admission Cmt: None   Principal Problem: Bilateral leg edema [R60.0]                   Active Insurance as of 7/3/2025       Primary Coverage       Payor Plan Insurance Group Employer/Plan Group    ANTHEM MEDICARE REPLACEMENT ANTHEM MEDICARE ADVANTAGE HMO [id number]       Payor Plan Address Payor Plan Phone Number Payor Plan Fax Number Effective Dates    [address] [phone]  2024 - None Entered    [facility] [address]         Subscriber Name Subscriber Birth Date Member ID       FILOMENA LIU 1960 [id number]                     Emergency Contacts        (Rel.) Home Phone Work Phone Mobile Phone    MARTINEZ STEIN (Significant Other) -- -- [phone]    Kermit Nam (Friend) -- -- [phone]                 Discharge Summary        Rikki Jason MD at 25 1539              Patient Name: Filomena Liu  : 1960  MRN: [id number]    Date of Admission: 7/3/2025  Date of Discharge:  2025  Primary Care Physician: Fernando Dunn MD      Chief " Complaint:   Leg Swelling      Discharge Diagnoses     Active Hospital Problems    Diagnosis  POA    **Bilateral leg edema [R60.0]  Yes    Bilateral lower extremity edema [R60.0]  Yes      Resolved Hospital Problems   No resolved problems to display.        Hospital Course   64 year old female presented to the emergency room with leg and abdominal swelling; she has also had back pain and some weeping from her legs; no fever or chills.    1. Bilateral lower extremity edema/volume overload/liver cirrhosis, was diuresed with IV Lasix and has been transitioned to oral Aldactone and torsemide and doses as recommended by nephrology.  Advised patient to monitor her sodium intake and will follow with nephrology closely as an outpatient basis.     2.  CKD 3 A, creatinine appears to be close to baseline and some fluctuation is expected while being diuresed.  Today creatinine noted to be 1.17.     3.  Ethanol abuse, currently does not drink.     4.  GERD, on acid suppressive therapy.     5.  History of COPD, currently not in any exacerbation and not requiring any oxygen.  Continue with DuoNebs.     6.  L2 fracture, patient  is supposed to be on a TLSO brace but has been noncompliant and states it does not fit her well.  On analysis  Her pain is reasonably well-controlled and is ambulatory.     7. Anemia, likely anemia of chronic disease however iron panel will be checked.  Hemoglobin noted to be 8.9.     8.  History of thrombocytopenia, likely secondary to underlying liver disease and currently platelet count is  171,000.     9.  Hypertension, on atenolol/Aldactone.     10.  Right shoulder arthroplasty on 6/5/2025, reportedly felt a pop on the evening of 7/6/2025.  Initially complained of pain and unable to move her right upper extremity however now no pain and range of motion is normal therefore no further intervention.  Will follow-up with orthopedics as an outpatient basis.    Copy text on this note has been reviewed by me  on 7/8/2025    Day of Discharge       Physical Exam:  Temp:  [97.7 °F (36.5 °C)-98.8 °F (37.1 °C)] 98.8 °F (37.1 °C)  Heart Rate:  [76-97] 97  Resp:  [18] 18  BP: (107-129)/(71-77) 129/73  Body mass index is 29.82 kg/m².  Physical Exam  HEENT: PERRLA, extraocular movements intact, Scleras no icterus  Neck: Supple, no JVD  Cardiovascular: Regular rate and rhythm with normal S1 and S2  GI: Soft, nontender, bowel sounds are present  Extremities: Positive for edema trace-1+, pedal pulse are palpable  Neurologic: Grossly nonfocal, no facial asymmetry    Consultants     Consult Orders (all) (From admission, onward)       Start     Ordered    07/03/25 2234  Inpatient Nephrology Consult  Once        Specialty:  Nephrology  Provider:  Chan Brewster MD    07/03/25 2233                  Procedures     * Surgery not found *    Imaging Results (All)       Procedure Component Value Units Date/Time    XR Shoulder 1 View Right [[id number]] Collected: 07/07/25 0603     Updated: 07/07/25 0607    Narrative:      XR SHOULDER 1 VW RIGHT-     Clinical: Postop shoulder pain     COMPARISON examination 6/21/2025     FINDINGS: Total right shoulder replacement prosthesis position is  similar to the previous examination without loosening. No acute osseous  abnormality. Surrounding soft tissues and adjacent ribs within normal  limits. The remainder is unremarkable.     CONCLUSION: Stable appearance to the right shoulder status post  arthroplasty.     This report was finalized on 7/7/2025 6:04 AM by Dr. Nolan Nolen M.D  on Workstation: [id number]       CT Abdomen Pelvis Without Contrast [[id number]] Collected: 07/03/25 2214     Updated: 07/03/25 2233    Narrative:      CT ABDOMEN AND PELVIS WITHOUT IV CONTRAST     HISTORY: Abdominal distention     TECHNIQUE: Radiation dose reduction techniques were utilized, including  automated exposure control and exposure modulation based on body size.   3 mm images were obtained through the  abdomen and pelvis without IV  contrast.     COMPARISON: CT abdomen pelvis 5/16/2025 and 3/6/2025       Impression:      FINDINGS AND IMPRESSION:  Please note evaluation is suboptimal due to the marked streak artifact  from patient's arms being along her side. No free intraperitoneal air is  seen. Soft tissue thickening and subcutaneous stranding is present  throughout the visualized body wall, nonspecific but can be seen in the  setting of anasarca, edema and/or cellulitis and correlation with  physical exam and patient history is recommended. The appendix is  unremarkable.     The liver is cirrhotic. Spleen is enlarged, measuring up to approximate  15.1 cm in length areas of portal hypertension. Gallbladder surgically  absent. Pancreas, adrenal glands and kidneys have an unremarkable  noncontrast CT appearance. No hydronephrosis.     THERE IS ASYMMETRIC THICKENING AND SURROUNDING STRANDING SURROUNDING THE  ADJACENT COLON AND CECUM, SUGGESTIVE OF PORTAL HYPERTENSIVE COLOPATHY  VERSUS COLITIS IN THE APPROPRIATE CONTEXT. CORRELATION WITH PATIENT  HISTORY IS RECOMMENDED.     No abdominal pelvic adenopathy by size criteria. 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     No suspicious lytic or blastic osseous lesion. For the purpose of this  dictation, last well-formed disc base referred to as L5-S1. Burst  compression fracture of the L2 vertebral body appears to demonstrate  increased loss of height since 6/25/2025 resulting approximate 30% loss  of height (previously 25%).     This report was finalized on 7/3/2025 10:29 PM by Dr. David Briceño M.D  on Workstation: [id number]       XR Chest 2 View [[id number]] Collected: 07/03/25 1941     Updated: 07/03/25 1945    Narrative:      TWO VIEWS OF THE CHEST        HISTORY: swelling     COMPARISON: 6/20/2025     TECHNIQUE: PA and lateral  views were performed of the chest.     FINDINGS:     Heart size is within normal limits, unchanged.     There is mild pulmonary vascular congestion, but there is no  consolidation, effusion or pneumothorax.     There is no acute bony abnormality.       Impression:         Negative chest, no acute abnormality.     This report was finalized on 7/3/2025 7:42 PM by Dr. Richard Chambers M.D  on Workstation: [id number]             Results for orders placed during the hospital encounter of 06/20/25    Duplex Venous Lower Extremity - Bilateral CAR 06/22/2025  4:45 PM    Interpretation Summary    Normal bilateral lower extremity venous duplex scan.    Left popliteal fossa fluid collection.    Results for orders placed during the hospital encounter of 08/05/24    Adult Transthoracic Echo Complete W/ Cont if Necessary Per Protocol 08/06/2024  9:19 PM    Interpretation Summary    Technically difficult study.    Left ventricular systolic function is normal. Calculated left ventricular EF = 63.4%    Left ventricular diastolic function was indeterminate.    RV borderline dilated with grossly normal function.    At least moderate mitral valve regurgitation is present, may be underestimated.    Suboptima subcostal window though the IVC appears dilated.    Pertinent Labs     Results from last 7 days   Lab Units 07/08/25  0630 07/07/25  0648 07/06/25  0557 07/05/25  0500   WBC 10*3/mm3 8.99 9.49 7.37 6.96   HEMOGLOBIN g/dL 9.6* 8.9* 8.5* 8.7*   PLATELETS 10*3/mm3 171 162 142 148     Results from last 7 days   Lab Units 07/08/25  0630 07/07/25  0648 07/06/25  0557 07/05/25  0500   SODIUM mmol/L 140 138 141 141   POTASSIUM mmol/L 4.4 4.4 4.4 4.4   CHLORIDE mmol/L 100 103 104 104   CO2 mmol/L 27.0 27.5 28.0 29.0   BUN mg/dL 18.0 15.0 13.0 14.0   CREATININE mg/dL 1.17* 1.10* 0.99 1.09*   GLUCOSE mg/dL 93 80 127* 127*   EGFR mL/min/1.73 52.2* 56.2* 63.8 56.8*     Results from last 7 days   Lab Units 07/08/25 0630 07/07/25  0648  "07/06/25  0557 07/05/25  0500   ALBUMIN g/dL 3.1* 2.8* 2.7* 2.9*   BILIRUBIN mg/dL 2.0* 1.7* 1.6* 1.8*   ALK PHOS U/L 228* 231* 224* 231*   AST (SGOT) U/L 43* 44* 59* 87*   ALT (SGPT) U/L 14 18 18 18     Results from last 7 days   Lab Units 07/08/25  0630 07/07/25  0648 07/06/25  0557 07/05/25  0500   CALCIUM mg/dL 9.2 9.0 8.7 9.0   ALBUMIN g/dL 3.1* 2.8* 2.7* 2.9*   MAGNESIUM mg/dL 2.1  --   --  2.3   PHOSPHORUS mg/dL 3.5 3.5 3.3 2.8       Results from last 7 days   Lab Units 07/03/25  1837   PROBNP pg/mL 160.0           Invalid input(s): \"LDLCALC\"          Test Results Pending at Discharge     Pending Results       None              Discharge Details        Discharge Medications        Changes to Medications        Instructions Start Date   oxyCODONE-acetaminophen  MG per tablet  Commonly known as: Percocet  What changed: Another medication with the same name was removed. Continue taking this medication, and follow the directions you see here.   1 tablet, Oral, Every 6 Hours PRN      spironolactone 50 MG tablet  Commonly known as: ALDACTONE  What changed:   medication strength  how much to take   50 mg, Oral, 2 Times Daily      torsemide 20 MG tablet  Commonly known as: DEMADEX  What changed: how much to take   40 mg, Oral, Daily   Start Date: July 9, 2025            Continue These Medications        Instructions Start Date   albuterol sulfate  (90 Base) MCG/ACT inhaler  Commonly known as: PROVENTIL HFA;VENTOLIN HFA;PROAIR HFA   2 puffs, 3 times daily      atenolol 25 MG tablet  Commonly known as: TENORMIN   25 mg, Nightly      cholecalciferol 25 MCG (1000 UT) tablet  Commonly known as: VITAMIN D3   1 tablet, Weekly      cyclobenzaprine 5 MG tablet  Commonly known as: FLEXERIL   5 mg, Oral, 3 Times Daily PRN      docusate sodium 100 MG capsule  Commonly known as: COLACE   100 mg, Oral, 2 Times Daily      fluticasone 50 MCG/ACT nasal spray  Commonly known as: FLONASE   1 spray, Daily    "   ipratropium-albuterol 0.5-2.5 mg/3 ml nebulizer  Commonly known as: DUO-NEB   Take 3 mL by nebulization 4 (Four) Times a Day. Indications: Chronic Obstructive Lung Disease      pantoprazole 40 MG EC tablet  Commonly known as: PROTONIX   40 mg, Oral, Daily      pregabalin 25 MG capsule  Commonly known as: LYRICA   1 capsule, Every 12 Hours Scheduled      rOPINIRole 1 MG tablet  Commonly known as: REQUIP   1 tablet, Every Night at Bedtime             Stop These Medications      cephalexin 500 MG capsule  Commonly known as: KEFLEX              Allergies   Allergen Reactions    Wound Dressing Adhesive Other (See Comments)     SKIN TEARS AND RASH- PAPER TAPE OK       Discharge Disposition:  Home or Self Care      Discharge Diet:  Diet Order   Procedures    Diet: Cardiac, Fluid Restriction (240 mL/tray); Healthy Heart (2-3 Na+); 1500 mL/day; Fluid Consistency: Thin (IDDSI 0)       Discharge Activity:   Activity Instructions       Activity as Tolerated              CODE STATUS:    Code Status and Medical Interventions: CPR (Attempt to Resuscitate); Full   Ordered at: 07/03/25 1926     Code Status (Patient has no pulse and is not breathing):    CPR (Attempt to Resuscitate)     Medical Interventions (Patient has pulse or is breathing):    Full       Future Appointments   Date Time Provider Department Center   7/16/2025 12:00 PM [facility] CT 1 BH [facility] CT BR None   8/15/2025  4:20 PM Maxwell Lanier MD [facility] L100 [facility]   9/8/2025 10:00 AM [facility] US BH [facility] US BR None   4/27/2026 10:15 AM [facility] US [facility] ART/VASC CART 1 BH [facility] NI [facility]     Additional Instructions for the Follow-ups that You Need to Schedule       Discharge Follow-up with PCP   As directed       Currently Documented PCP:    Fernando Dunn MD    PCP Phone Number:    [phone]     Follow Up Details: 1 week               Contact information for follow-up providers       Fernando Dunn MD .    Specialty: Family Medicine  Why: 1 week  Contact  information:  [address]  [facility] [address]  [phone]                       Contact information for after-discharge care       Home Medical Care       [facility] .    Service: Home Health Services  Contact information:  [address] [address] [place] [address] [phone]                                   Additional Instructions for the Follow-ups that You Need to Schedule       Discharge Follow-up with PCP   As directed       Currently Documented PCP:    Fernando Dunn MD    PCP Phone Number:    [phone]     Follow Up Details: 1 week            Time Spent on Discharge:  Greater than 30 minutes      Rikki Jason MD  [facility]  07/08/25  17:26 EDT      Electronically signed by Rikki Jason MD at 07/08/25 3237

## 2025-07-15 ENCOUNTER — TELEPHONE (OUTPATIENT)
Dept: NEUROSURGERY | Facility: CLINIC | Age: 65
End: 2025-07-15
Payer: MEDICARE

## 2025-07-15 NOTE — TELEPHONE ENCOUNTER
Called and spoke to patient. Informed patient the scheduled came out. Scheduled patient with Nurse Mcpherson for 08/07/2025 and scheduled her XRAY.

## 2025-07-15 NOTE — TELEPHONE ENCOUNTER
----- Message from Vida Nayak sent at 6/26/2025 12:44 PM EDT -----  Regarding: Hospital follow-up  Patient with L2 fracture needs follow-up 6 weeks with standing lumbar x-rays same day in office

## 2025-07-16 ENCOUNTER — HOSPITAL ENCOUNTER (OUTPATIENT)
Facility: HOSPITAL | Age: 65
Discharge: HOME | End: 2025-07-16
Payer: MEDICARE

## 2025-07-16 DIAGNOSIS — R93.3 ABNORMAL FINDING ON GI TRACT IMAGING: ICD-10-CM

## 2025-07-16 PROCEDURE — 25510000002 DIATRIZOATE MEGLUMINE & SODIUM PER 1 ML

## 2025-07-16 PROCEDURE — 25510000001 IOPAMIDOL 61 % SOLUTION

## 2025-07-16 PROCEDURE — 74177 CT ABD & PELVIS W/CONTRAST: CPT

## 2025-07-16 RX ORDER — DIATRIZOATE MEGLUMINE AND DIATRIZOATE SODIUM 660; 100 MG/ML; MG/ML
30 SOLUTION ORAL; RECTAL
Status: COMPLETED | OUTPATIENT
Start: 2025-07-16 | End: 2025-07-16

## 2025-07-16 RX ORDER — IOPAMIDOL 612 MG/ML
100 INJECTION, SOLUTION INTRAVASCULAR
Status: COMPLETED | OUTPATIENT
Start: 2025-07-16 | End: 2025-07-16

## 2025-07-16 RX ADMIN — IOPAMIDOL 85 ML: 612 INJECTION, SOLUTION INTRAVENOUS at 11:28

## 2025-07-16 RX ADMIN — DIATRIZOATE MEGLUMINE AND DIATRIZOATE SODIUM 30 ML: 600; 100 SOLUTION ORAL; RECTAL at 10:25

## 2025-07-17 DIAGNOSIS — M80.00XD AGE-RELATED OSTEOPOROSIS WITH CURRENT PATHOLOGICAL FRACTURE WITH ROUTINE HEALING, SUBSEQUENT ENCOUNTER: Primary | ICD-10-CM

## 2025-07-20 DIAGNOSIS — Z09 SURGERY FOLLOW-UP: Primary | ICD-10-CM

## 2025-07-21 ENCOUNTER — TELEPHONE (OUTPATIENT)
Dept: NEUROSURGERY | Facility: CLINIC | Age: 65
End: 2025-07-21
Payer: MEDICARE

## 2025-07-21 RX ORDER — OXYCODONE AND ACETAMINOPHEN 10; 325 MG/1; MG/1
1 TABLET ORAL EVERY 8 HOURS PRN
Qty: 40 TABLET | Refills: 0 | Status: SHIPPED | OUTPATIENT
Start: 2025-07-21 | End: 2025-07-22 | Stop reason: SDUPTHER

## 2025-07-21 NOTE — TELEPHONE ENCOUNTER
Caller: PAT    Relationship: SELF    Best call back number: [phone]    What was the call regarding: PATIENT CALLED WANTING TO KNOW IF HER APPT WITH [facility] ON 08.07.25 CAN BE RESCHEDULED SOONER DUE TO THE PAIN    UNABLE TO RESCHEDULE DUE TO XR SCHEDULED IN OFFICE ON SAME DAY    PATIENT HAS SENT OVER A REQUEST TO DR STEWART TO GET PAIN MEDICATION REFILLED FROM HER SHOULDER SX     THANK YOU

## 2025-07-22 ENCOUNTER — TELEPHONE (OUTPATIENT)
Dept: ORTHOPEDIC SURGERY | Facility: HOSPITAL | Age: 65
End: 2025-07-22
Payer: MEDICARE

## 2025-07-22 RX ORDER — OXYCODONE AND ACETAMINOPHEN 10; 325 MG/1; MG/1
1 TABLET ORAL EVERY 8 HOURS PRN
Qty: 40 TABLET | Refills: 0 | Status: SHIPPED | OUTPATIENT
Start: 2025-07-22 | End: 2025-07-22 | Stop reason: SDUPTHER

## 2025-07-22 RX ORDER — OXYCODONE AND ACETAMINOPHEN 10; 325 MG/1; MG/1
1 TABLET ORAL EVERY 8 HOURS PRN
Qty: 40 TABLET | Refills: 0 | Status: SHIPPED | OUTPATIENT
Start: 2025-07-22

## 2025-07-22 NOTE — TELEPHONE ENCOUNTER
Unfortunately, I do not have any office time this week as I am rounding in the hospital this week with Dr. Jefferson on-call.  I have some availability next Thursday the 31st, but if one of the other APCs is open to seeing her, that is also an option.  Sounds like at this point we may need to consider an MRI and follow-up with Dr. Villavicencio to discuss possible kyphoplasty

## 2025-07-22 NOTE — TELEPHONE ENCOUNTER
Ms. Rm called me this morning. She had called the office the other day and requested a refill on her pain medication. It was approved by Dr. Mullins and sent to [facility] in [facility]. Looking at the details the pharmacy never confirmed it and when I called the said they didn't receive anything. It looks as if it may have been printed instead of E*Prescribed. Could another refill be sent in to [facility] in [facility].    Medication: Percocet 10/325   Procedure: TSRA 6/5  Last Fill 7/3

## 2025-07-24 NOTE — PROGRESS NOTES
"Subjective   Patient ID: Filomena Rm is a 65 y.o. female is here today for follow-up for osteoporotic/atraumatic L2 compression fracture with new standing lumbar x-rays.  Seen in hospital 6/25/2025.  Placed in LSO brace.    History of Present Illness  Today, patient reports to limited compliance with her LSO brace.  She states that it seems too big.  Reports about 20 pound weight loss due to water weight loss. She has a soft brace that she wears to some but not consistently.  She has electrical shock pain in the left hip area. Any upright posture gives her pain. Lying down relieves it. On chronic Percocet 7.5 through pain management, recently increased to 10 mg for recent shoulder surgery.    Daily smoker.  No prior lumbar surgery.  History of basal cell skin cancer.    The following portions of the patient's history were reviewed and updated as appropriate: allergies, current medications, past family history, past medical history, past social history, past surgical history, and problem list.    ROS:    Back and left hip pain.    Objective     Vitals:    07/31/25 1355   BP: 120/78   Pulse: 95   SpO2: 96%   Weight: 65.8 kg (145 lb)   Height: 152.4 cm (60\")   PainSc: 8      Body mass index is 28.32 kg/m².      Physical Exam  Vitals reviewed.   Constitutional:       General: She is awake.      Comments: Body mass index is 28.32 kg/m².  Short stature   Pulmonary:      Effort: Pulmonary effort is normal.   Musculoskeletal:      Thoracic back: Scoliosis present.      Lumbar back: Bony tenderness present. Negative right straight leg raise test and negative left straight leg raise test. Scoliosis present.      Comments: Presented without brace.   Skin:     Findings: Bruising present.      Comments: Extensive vascular changes and bruising on legs-chronic issue   Neurological:      General: No focal deficit present.      Mental Status: She is alert.   Psychiatric:         Mood and Affect: Mood normal.         Thought " Content: Thought content normal.       Neurological Exam  Mental Status  Awake and alert.    Motor  Normal muscle bulk throughout. No fasciculations present. Normal muscle tone. No abnormal involuntary movements.  5/5 bilateral lower extremity  .    Gait    Forward flexed posture, ambulates is stable with rollator walker..          Assessment & Plan   Independent Review of Radiographic Studies:      I personally reviewed the images from the following studies.    Lumbar x-rays reveal slight worsening of the superior endplate compression formerly of L2.  There is stable retrolisthesis of L2 on 3.  There is some progression of scoliotic curve as compared to prior x-ray.    Medical Decision Making:      Patient with nontraumatic osteoporotic lumbar fracture presents for follow-up.  X-rays show some slight worsening of the L2 superior endplate compression deformity.  She states she still has quite a bit of pain she is more bothered by pain electrically in her left hip than she is from mid back pain.  When she lies down and rest it goes away.  She is on chronic narcotic medications which have been upped recently due to recent shoulder surgery.  She continues to smoke which she knows is not good for her health.  She is not on any treatment for osteoporosis but states she is aware she has it.  We discussed during her hospitalization that she needs to follow-up with her PCP for workup and treatment but should be held off until fracture is healed.  She presented without brace today and states that she feels that it is too big.  Unfortunately she did not bring it with her to the visit for me to adjust and evaluate for her.  She is going to try again to wear it when she is up and about and if she is still having trouble she will contact me and bring it in or I will have the brace rep go to her and evaluate the brace.  We are going to repeat her x-rays in 6 weeks evaluating both the fracture as well as her scoliosis.  She does  "report seeing pain management and had ablations and epidural injection prior to her shoulder issues.  She did not feel they were very helpful.  Advised that we can work up her chronic back issues once her fracture has healed she is still having problems.  We did discuss smoking cessation.    Diagnoses and all orders for this visit:    1. Compression fracture of L2 vertebra with delayed healing, subsequent encounter (Primary)  -     XR Spine Lumbar 2 or 3 View; Future    2. Tobacco use    3. Age-related osteoporosis with current pathological fracture with delayed healing, subsequent encounter      Return in about 6 weeks (around 9/11/2025) for with imaging, Follow up with APC.           Vida Nayak, APRN  07/31/2025   14:48 EDT    \"Dictated utilizing Dragon dictation\".      "

## 2025-07-30 RX ORDER — PANTOPRAZOLE SODIUM 40 MG/1
40 TABLET, DELAYED RELEASE ORAL 2 TIMES DAILY
Qty: 60 TABLET | Refills: 0 | Status: SHIPPED | OUTPATIENT
Start: 2025-07-30

## 2025-07-31 ENCOUNTER — OFFICE VISIT (OUTPATIENT)
Dept: NEUROSURGERY | Facility: CLINIC | Age: 65
End: 2025-07-31
Payer: MEDICARE

## 2025-07-31 VITALS
DIASTOLIC BLOOD PRESSURE: 78 MMHG | HEIGHT: 60 IN | WEIGHT: 145 LBS | OXYGEN SATURATION: 96 % | BODY MASS INDEX: 28.47 KG/M2 | SYSTOLIC BLOOD PRESSURE: 120 MMHG | HEART RATE: 95 BPM

## 2025-07-31 DIAGNOSIS — Z72.0 TOBACCO USE: ICD-10-CM

## 2025-07-31 DIAGNOSIS — M80.00XG AGE-RELATED OSTEOPOROSIS WITH CURRENT PATHOLOGICAL FRACTURE WITH DELAYED HEALING, SUBSEQUENT ENCOUNTER: ICD-10-CM

## 2025-07-31 DIAGNOSIS — S32.020G COMPRESSION FRACTURE OF L2 VERTEBRA WITH DELAYED HEALING, SUBSEQUENT ENCOUNTER: Primary | ICD-10-CM

## 2025-07-31 RX ORDER — OXYCODONE AND ACETAMINOPHEN 7.5; 325 MG/1; MG/1
TABLET ORAL
COMMUNITY
Start: 2025-07-30

## 2025-08-05 ENCOUNTER — TRANSCRIBE ORDERS (OUTPATIENT)
Dept: ADMINISTRATIVE | Facility: HOSPITAL | Age: 65
End: 2025-08-05
Payer: MEDICARE

## 2025-08-05 DIAGNOSIS — Z12.31 VISIT FOR SCREENING MAMMOGRAM: Primary | ICD-10-CM

## 2025-08-05 DIAGNOSIS — Z78.0 POSTMENOPAUSAL: Primary | ICD-10-CM

## 2025-08-23 PROBLEM — E87.29 ALCOHOLIC KETOACIDOSIS: Status: ACTIVE | Noted: 2025-08-23

## (undated) DEVICE — MARKER,SKIN,WI/RULER AND LABELS: Brand: MEDLINE

## (undated) DEVICE — CONMED GOLDLINE ELECTROSURGICAL HANDPIECE, HAND CONTROLLED WITH ULTRACLEAN BLADE ELECTRODE, ROCKER SWITCH, SAFETY HOLSTER AND 10' (3 M) CABLE: Brand: CONMED GOLDLINE

## (undated) DEVICE — GLV SURG BIOGEL LTX PF 6 1/2

## (undated) DEVICE — SOL IRR NACL 0.9PCT 3000ML

## (undated) DEVICE — SENSR O2 OXIMAX FNGR A/ 18IN NONSTR

## (undated) DEVICE — CANN O2 ETCO2 FITS ALL CONN CO2 SMPL A/ 7IN DISP LF

## (undated) DEVICE — TRAP POLYP 4CHAMBER

## (undated) DEVICE — BNDG ELAS ELITE V/CLOSE 6IN 5YD LF STRL

## (undated) DEVICE — SKIN PREP TRAY W/CHG: Brand: MEDLINE INDUSTRIES, INC.

## (undated) DEVICE — UNDERCAST PADDING: Brand: DEROYAL

## (undated) DEVICE — Device

## (undated) DEVICE — DRAPE,U/ SHT,SPLIT,PLAS,STERIL: Brand: MEDLINE

## (undated) DEVICE — KT ORCA ORCAPOD DISP STRL

## (undated) DEVICE — GLV SURG SENSICARE PI MIC PF SZ7 LF STRL

## (undated) DEVICE — SOL ISO/ALC 70PCT 4OZ

## (undated) DEVICE — GOWN,SIRUS,NON REINFRCD,LARGE,SET IN SL: Brand: MEDLINE

## (undated) DEVICE — TUBING, SUCTION, 1/4" X 10', STRAIGHT: Brand: MEDLINE

## (undated) DEVICE — ADAPT CLN BIOGUARD AIR/H2O DISP

## (undated) DEVICE — BLCK/BITE BLOX W/DENTL/RIM W/STRAP 54F

## (undated) DEVICE — DRSNG TELFA PAD NONADH STR 1S 3X8IN

## (undated) DEVICE — BNDG ELAS CO-FLEX SLF ADHR 4IN5YD LF STRL

## (undated) DEVICE — SUT SILK 2/0 TIES 18IN A185H

## (undated) DEVICE — DRSNG SLVR/ANTIBAC PRIMASEAL POST/OP ADHS 3.5X10IN

## (undated) DEVICE — DERMATOME BLADES: Brand: DERMATOME

## (undated) DEVICE — CULT AER/ANAEROB FASTIDIOUS BACT

## (undated) DEVICE — ZIPPERED TOGA, 2X LARGE: Brand: FLYTE, SURGICOOL

## (undated) DEVICE — DISPOSABLE TOURNIQUET CUFF SINGLE BLADDER, SINGLE PORT AND QUICK CONNECT CONNECTOR: Brand: COLOR CUFF

## (undated) DEVICE — CEMENT MIXING SYSTEM WITH FEMORAL BREAKWAY NOZZLE: Brand: REVOLUTION

## (undated) DEVICE — IMPLANTABLE DEVICE
Type: IMPLANTABLE DEVICE | Site: SHOULDER | Status: NON-FUNCTIONAL
Brand: COMPREHENSIVE® REVERSE SHOULDER
Removed: 2023-07-20

## (undated) DEVICE — GOWN,AURORA,NOREINF,RAGLAN,XL,STERILE: Brand: MEDLINE

## (undated) DEVICE — LN SMPL CO2 SHTRM SD STREAM W/M LUER

## (undated) DEVICE — CVR HNDL LT SURG ACCSSRY BLU STRL

## (undated) DEVICE — FLEX ADVANTAGE 1500CC: Brand: FLEX ADVANTAGE

## (undated) DEVICE — SUT VIC 2/0 CT2 27IN J269H

## (undated) DEVICE — PREP SOL POVIDONE/IODINE BT 4OZ

## (undated) DEVICE — GLV SURG BIOGEL LTX PF 8 1/2

## (undated) DEVICE — 3M™ IOBAN™ 2 ANTIMICROBIAL INCISE DRAPE 6640EZ: Brand: IOBAN™ 2

## (undated) DEVICE — MAT FLR ABSORBENT LG 4FT 10 2.5FT

## (undated) DEVICE — GLV SURG TRIUMPH CLASSIC PF LTX 8.5 STRL

## (undated) DEVICE — GLV SURG BIOGEL LTX PF 7

## (undated) DEVICE — SNAR POLYP CAPTIVATOR CRSNT 27MM 240CM

## (undated) DEVICE — GLV SURG SENSICARE PI PF LF 7 GRN STRL

## (undated) DEVICE — GLV SURG BIOGEL LTX PF 7 1/2

## (undated) DEVICE — MARKR SKIN W/RULR AND LBL

## (undated) DEVICE — SPONGE,LAP,18"X18",DLX,XR,ST,5/PK,40/PK: Brand: MEDLINE

## (undated) DEVICE — THE SINGLE USE ETRAP – POLYP TRAP IS USED FOR SUCTION RETRIEVAL OF ENDOSCOPICALLY REMOVED POLYPS.: Brand: ETRAP

## (undated) DEVICE — BNDG ELAS ELITE V/CLOSE 4IN 5YD LF STRL

## (undated) DEVICE — DRSNG SURESITE WNDW 4X4.5

## (undated) DEVICE — GAUZE,PACKING STRIP,IODOFORM,1/2"X5YD,ST: Brand: CURAD

## (undated) DEVICE — ARM SLING: Brand: DEROYAL

## (undated) DEVICE — PATIENT RETURN ELECTRODE, SINGLE-USE, CONTACT QUALITY MONITORING, ADULT, WITH 9FT CORD, FOR PATIENTS WEIGING OVER 33LBS. (15KG): Brand: MEGADYNE

## (undated) DEVICE — SYS IRR SURGIPHOR A/MIC RTU BO PVPI 450ML STRL

## (undated) DEVICE — HANDPIECE SET WITH COAXIAL HIGH FLOW TIP AND SUCTION TUBE: Brand: INTERPULSE

## (undated) DEVICE — MEDI-VAC YANK SUCT HNDL W/TPRD BULBOUS TIP: Brand: CARDINAL HEALTH

## (undated) DEVICE — CAST PADDING 3"X4 YD KIT: Brand: CARDINAL HEALTH

## (undated) DEVICE — BANDAGE,GAUZE,BULKEE II,4.5"X4.1YD,STRL: Brand: MEDLINE

## (undated) DEVICE — STRIP,CLOSURE,WOUND,MEDI-STRIP,1/2X4: Brand: MEDLINE

## (undated) DEVICE — APPL CHLORAPREP HI/LITE 26ML ORNG

## (undated) DEVICE — CONVERTORS STOCKINETTE: Brand: CONVERTORS

## (undated) DEVICE — PAD GRND E/S W/CORD SPLT A/

## (undated) DEVICE — PK ORTHO MINOR 40

## (undated) DEVICE — AIRLIFE™ ADULT CUSHION NASAL CANNULA WITH 7 FOOT (2.1 M) CRUSH RESISTANT SUPPLY TUBE AND U/CONNECT-IT ADAPTER: Brand: AIRLIFE™

## (undated) DEVICE — GLV SURG SIGNATURE ESSENTIAL PF LTX SZ8.5

## (undated) DEVICE — DUAL CUT SAGITTAL BLADE

## (undated) DEVICE — FRCP BX RADJAW4 NDL 2.8 240CM LG OG BX40

## (undated) DEVICE — SUT GUT CHRM 4/0 RB1 27IN U203H

## (undated) DEVICE — PK SHLDR OPN 40

## (undated) DEVICE — LASSO POLYPECTOMY SNARE: Brand: LASSO

## (undated) DEVICE — MSK PROC CURAPLEX O2 2/ADAPT 7FT

## (undated) DEVICE — TP CAST SCOTCHCAST PLS 4IN 4YD WHT

## (undated) DEVICE — BW-412T DISP COMBO CLEANING BRUSH: Brand: SINGLE USE COMBINATION CLEANING BRUSH

## (undated) DEVICE — SPNG GZ WOVN 4X4IN 12PLY 10/BX STRL

## (undated) DEVICE — DRSNG WND GZ CURAD OIL EMULSION 3X3IN STRL

## (undated) DEVICE — BOOT WALK INTEGRITY FX TALL MD

## (undated) DEVICE — PK ENT 46

## (undated) DEVICE — DRAPE,SHOULDER,BEACH ULTRAGARD: Brand: MEDLINE

## (undated) DEVICE — SUT VIC 5/0 P3 18IN J493G

## (undated) DEVICE — SUT SILK 2/0 FS BLK 18IN 685G

## (undated) DEVICE — CATHETER,URETHRAL,REDRUBBER,STRL,16FR: Brand: MEDLINE

## (undated) DEVICE — IMMOB SHLDR PAD2 UNIV SM

## (undated) DEVICE — SUCTION CANISTER, 3000CC,SAFELINER: Brand: DEROYAL

## (undated) DEVICE — VIAL FORMALIN CAP 10P 40ML

## (undated) DEVICE — SUT VIC 6/0 P1 18IN J489G

## (undated) DEVICE — 9165 UNIVERSAL PATIENT PLATE: Brand: 3M™

## (undated) DEVICE — SNAR POLYP SENSATION STDOVL 27 240 BX40

## (undated) DEVICE — DRAPE,INSTRUMENT,MAGNETIC,10X16: Brand: MEDLINE

## (undated) DEVICE — STCKNT IMPERV 12IN STRL

## (undated) DEVICE — BIT DRL SCRW PERIPH 2.7MM

## (undated) DEVICE — PK ORTHO MINOR TOWER 40

## (undated) DEVICE — APPL CHLORAPREP W/TINT 26ML ORNG